# Patient Record
Sex: MALE | Race: BLACK OR AFRICAN AMERICAN | NOT HISPANIC OR LATINO | Employment: FULL TIME | URBAN - METROPOLITAN AREA
[De-identification: names, ages, dates, MRNs, and addresses within clinical notes are randomized per-mention and may not be internally consistent; named-entity substitution may affect disease eponyms.]

---

## 2017-01-03 ENCOUNTER — LAB VISIT (OUTPATIENT)
Dept: LAB | Facility: HOSPITAL | Age: 67
End: 2017-01-03
Attending: INTERNAL MEDICINE
Payer: COMMERCIAL

## 2017-01-03 DIAGNOSIS — E78.5 HYPERLIPIDEMIA, UNSPECIFIED HYPERLIPIDEMIA TYPE: Chronic | ICD-10-CM

## 2017-01-03 LAB
ALBUMIN SERPL BCP-MCNC: 3.9 G/DL
ALP SERPL-CCNC: 56 U/L
ALT SERPL W/O P-5'-P-CCNC: 58 U/L
ANION GAP SERPL CALC-SCNC: 8 MMOL/L
AST SERPL-CCNC: 32 U/L
BILIRUB SERPL-MCNC: 0.4 MG/DL
BUN SERPL-MCNC: 17 MG/DL
CALCIUM SERPL-MCNC: 9.8 MG/DL
CHLORIDE SERPL-SCNC: 106 MMOL/L
CHOLEST/HDLC SERPL: 4.3 {RATIO}
CO2 SERPL-SCNC: 26 MMOL/L
CREAT SERPL-MCNC: 1.4 MG/DL
EST. GFR  (AFRICAN AMERICAN): >60 ML/MIN/1.73 M^2
EST. GFR  (NON AFRICAN AMERICAN): 52.7 ML/MIN/1.73 M^2
GLUCOSE SERPL-MCNC: 160 MG/DL
HDL/CHOLESTEROL RATIO: 23.3 %
HDLC SERPL-MCNC: 163 MG/DL
HDLC SERPL-MCNC: 38 MG/DL
LDLC SERPL CALC-MCNC: 104.2 MG/DL
NONHDLC SERPL-MCNC: 125 MG/DL
POTASSIUM SERPL-SCNC: 4.4 MMOL/L
PROT SERPL-MCNC: 7.8 G/DL
SODIUM SERPL-SCNC: 140 MMOL/L
TRIGL SERPL-MCNC: 104 MG/DL

## 2017-01-03 PROCEDURE — 80061 LIPID PANEL: CPT

## 2017-01-03 PROCEDURE — 80053 COMPREHEN METABOLIC PANEL: CPT

## 2017-01-03 PROCEDURE — 36415 COLL VENOUS BLD VENIPUNCTURE: CPT | Mod: PO

## 2017-01-09 RX ORDER — FLUTICASONE PROPIONATE 50 MCG
SPRAY, SUSPENSION (ML) NASAL
Qty: 3 BOTTLE | Refills: 3 | Status: SHIPPED | OUTPATIENT
Start: 2017-01-09 | End: 2017-12-11 | Stop reason: SDUPTHER

## 2017-01-12 ENCOUNTER — OFFICE VISIT (OUTPATIENT)
Dept: INTERNAL MEDICINE | Facility: CLINIC | Age: 67
End: 2017-01-12
Payer: COMMERCIAL

## 2017-01-12 ENCOUNTER — LAB VISIT (OUTPATIENT)
Dept: LAB | Facility: HOSPITAL | Age: 67
End: 2017-01-12
Attending: INTERNAL MEDICINE
Payer: COMMERCIAL

## 2017-01-12 VITALS
SYSTOLIC BLOOD PRESSURE: 130 MMHG | TEMPERATURE: 97 F | BODY MASS INDEX: 35.44 KG/M2 | DIASTOLIC BLOOD PRESSURE: 82 MMHG | HEIGHT: 70 IN | WEIGHT: 247.56 LBS | HEART RATE: 75 BPM | OXYGEN SATURATION: 98 %

## 2017-01-12 DIAGNOSIS — E66.9 OBESITY, UNSPECIFIED OBESITY SEVERITY, UNSPECIFIED OBESITY TYPE: ICD-10-CM

## 2017-01-12 DIAGNOSIS — I10 ESSENTIAL HYPERTENSION: ICD-10-CM

## 2017-01-12 DIAGNOSIS — E78.5 HYPERLIPIDEMIA, UNSPECIFIED HYPERLIPIDEMIA TYPE: Chronic | ICD-10-CM

## 2017-01-12 PROCEDURE — 83036 HEMOGLOBIN GLYCOSYLATED A1C: CPT

## 2017-01-12 PROCEDURE — 99999 PR PBB SHADOW E&M-EST. PATIENT-LVL III: CPT | Mod: PBBFAC,,, | Performed by: INTERNAL MEDICINE

## 2017-01-12 PROCEDURE — 1159F MED LIST DOCD IN RCRD: CPT | Mod: S$GLB,,, | Performed by: INTERNAL MEDICINE

## 2017-01-12 PROCEDURE — 3079F DIAST BP 80-89 MM HG: CPT | Mod: S$GLB,,, | Performed by: INTERNAL MEDICINE

## 2017-01-12 PROCEDURE — 4010F ACE/ARB THERAPY RXD/TAKEN: CPT | Mod: S$GLB,,, | Performed by: INTERNAL MEDICINE

## 2017-01-12 PROCEDURE — 99214 OFFICE O/P EST MOD 30 MIN: CPT | Mod: S$GLB,,, | Performed by: INTERNAL MEDICINE

## 2017-01-12 PROCEDURE — 3046F HEMOGLOBIN A1C LEVEL >9.0%: CPT | Mod: S$GLB,,, | Performed by: INTERNAL MEDICINE

## 2017-01-12 PROCEDURE — 2022F DILAT RTA XM EVC RTNOPTHY: CPT | Mod: S$GLB,,, | Performed by: INTERNAL MEDICINE

## 2017-01-12 PROCEDURE — 36415 COLL VENOUS BLD VENIPUNCTURE: CPT | Mod: PO

## 2017-01-12 PROCEDURE — 3075F SYST BP GE 130 - 139MM HG: CPT | Mod: S$GLB,,, | Performed by: INTERNAL MEDICINE

## 2017-01-12 RX ORDER — ROSUVASTATIN CALCIUM 10 MG/1
10 TABLET, COATED ORAL DAILY
Qty: 90 TABLET | Refills: 1 | Status: SHIPPED | OUTPATIENT
Start: 2017-01-12 | End: 2017-04-18 | Stop reason: SDUPTHER

## 2017-01-12 RX ORDER — GLIMEPIRIDE 4 MG/1
4 TABLET ORAL
Qty: 90 TABLET | Refills: 0 | Status: SHIPPED | OUTPATIENT
Start: 2017-01-12 | End: 2017-02-22 | Stop reason: SDUPTHER

## 2017-01-12 NOTE — PATIENT INSTRUCTIONS
Increase trulicity to 1.5 mg weekly    Decrease amaryl to 4mg once daily    Stop pravastatin    Start crestor one tablet daily

## 2017-01-12 NOTE — PROGRESS NOTES
"Subjective:      Patient ID: Anup Miller is a 66 y.o. male.    Chief Complaint: Follow-up    HPI Comments: 67 yo with Patient Active Problem List:     Type II or unspecified type diabetes mellitus without mention of complication, uncontrolled     Elevated liver enzymes     HTN (hypertension)     Hyperlipidemia     Multiple pulmonary nodules determined by computed tomography of lung     Hinton's esophagus     KHOI on CPAP    Here today for management of hypertension, hyperlipidemia, and diabetes.  He is feeling well today without any new complaints.  He reports compliance with his medications without significant side effects.  He reports that his fasting glucose has improved and is now 92 to 130.  Nonfasting sugar is around 150.     Review of Systems   Constitutional: Negative for chills and fever.   HENT: Negative for ear pain and sore throat.    Respiratory: Negative for cough, shortness of breath and wheezing.    Cardiovascular: Negative for chest pain and palpitations.   Gastrointestinal: Negative for abdominal pain and blood in stool.   Genitourinary: Negative for dysuria and hematuria.   Neurological: Negative for seizures and syncope.     Objective:     Visit Vitals    /82 (BP Location: Right arm, Patient Position: Sitting)    Pulse 75    Temp 97.2 °F (36.2 °C) (Tympanic)    Ht 5' 9.75" (1.772 m)    Wt 112.3 kg (247 lb 9.2 oz)    SpO2 98%    BMI 35.78 kg/m2       Physical Exam   Constitutional: He is oriented to person, place, and time. He appears well-developed and well-nourished. No distress.   HENT:   Head: Normocephalic and atraumatic.   Mouth/Throat: Oropharynx is clear and moist.   Eyes: EOM are normal. Pupils are equal, round, and reactive to light.   Neck: Neck supple.   Cardiovascular: Normal rate and regular rhythm.    Pulmonary/Chest: Breath sounds normal. He has no wheezes. He has no rales.   Musculoskeletal: He exhibits no edema.   Neurological: He is alert and oriented to " person, place, and time.   Skin: Skin is warm and dry.   Psychiatric: He has a normal mood and affect. His behavior is normal.     Lab Visit on 01/03/2017   Component Date Value Ref Range Status    Sodium 01/03/2017 140  136 - 145 mmol/L Final    Potassium 01/03/2017 4.4  3.5 - 5.1 mmol/L Final    Chloride 01/03/2017 106  95 - 110 mmol/L Final    CO2 01/03/2017 26  23 - 29 mmol/L Final    Glucose 01/03/2017 160* 70 - 110 mg/dL Final    BUN, Bld 01/03/2017 17  8 - 23 mg/dL Final    Creatinine 01/03/2017 1.4  0.5 - 1.4 mg/dL Final    Calcium 01/03/2017 9.8  8.7 - 10.5 mg/dL Final    Total Protein 01/03/2017 7.8  6.0 - 8.4 g/dL Final    Albumin 01/03/2017 3.9  3.5 - 5.2 g/dL Final    Total Bilirubin 01/03/2017 0.4  0.1 - 1.0 mg/dL Final    Comment: For infants and newborns, interpretation of results should be based  on gestational age, weight and in agreement with clinical  observations.  Premature Infant recommended reference ranges:  Up to 24 hours.............<8.0 mg/dL  Up to 48 hours............<12.0 mg/dL  3-5 days..................<15.0 mg/dL  6-29 days.................<15.0 mg/dL      Alkaline Phosphatase 01/03/2017 56  55 - 135 U/L Final    AST 01/03/2017 32  10 - 40 U/L Final    ALT 01/03/2017 58* 10 - 44 U/L Final    Anion Gap 01/03/2017 8  8 - 16 mmol/L Final    eGFR if African American 01/03/2017 >60.0  >60 mL/min/1.73 m^2 Final    eGFR if non  01/03/2017 52.7* >60 mL/min/1.73 m^2 Final    Comment: Calculation used to obtain the estimated glomerular filtration  rate (eGFR) is the CKD-EPI equation. Since race is unknown   in our information system, the eGFR values for   -American and Non--American patients are given   for each creatinine result.      Cholesterol 01/03/2017 163  120 - 199 mg/dL Final    Comment: The National Cholesterol Education Program (NCEP) has set the  following guidelines (reference ranges) for  Cholesterol:  Optimal.....................<200 mg/dL  Borderline High.............200-239 mg/dL  High........................> or = 240 mg/dL      Triglycerides 01/03/2017 104  30 - 150 mg/dL Final    Comment: The National Cholesterol Education Program (NCEP) has set the  following guidelines (reference values) for triglycerides:  Normal......................<150 mg/dL  Borderline High.............150-199 mg/dL  High........................200-499 mg/dL      HDL 01/03/2017 38* 40 - 75 mg/dL Final    Comment: The National Cholesterol Education Program (NCEP) has set the  following guidelines (reference values) for HDL Cholesterol:  Low...............<40 mg/dL  Optimal...........>60 mg/dL      LDL Cholesterol 01/03/2017 104.2  63.0 - 159.0 mg/dL Final    Comment: The National Cholesterol Education Program (NCEP) has set the  following guidelines (reference values) for LDL Cholesterol:  Optimal.......................<130 mg/dL  Borderline High...............130-159 mg/dL  High..........................160-189 mg/dL  Very High.....................>190 mg/dL      HDL/Chol Ratio 01/03/2017 23.3  20.0 - 50.0 % Final    Total Cholesterol/HDL Ratio 01/03/2017 4.3  2.0 - 5.0 Final    Non-HDL Cholesterol 01/03/2017 125  mg/dL Final    Comment: Risk category and Non-HDL cholesterol goals:  Coronary heart disease (CHD)or equivalent (10-year risk of CHD >20%):  Non-HDL cholesterol goal     <130 mg/dL  Two or more CHD risk factors and 10-year risk of CHD <= 20%:  Non-HDL cholesterol goal     <160 mg/dL  0 to 1 CHD risk factor:  Non-HDL cholesterol goal     <190 mg/dL         Assessment:     1. Uncontrolled type 2 diabetes mellitus without complication, without long-term current use of insulin    2. Essential hypertension    3. Type II or unspecified type diabetes mellitus without mention of complication, uncontrolled    4. Hyperlipidemia, unspecified hyperlipidemia type    5. Obesity, unspecified obesity severity,  unspecified obesity type      Plan:   Uncontrolled type 2 diabetes mellitus without complication, without long-term current use of insulin  Improving.  Decrease amaryl since increasing trulicity   -     Hemoglobin A1c; Future; Expected date: 1/12/17  -     dulaglutide (TRULICITY) 1.5 mg/0.5 mL PnIj; Inject 1.5 mg into the skin every 7 days.  Dispense: 12 Syringe; Refill: 1  -     glimepiride (AMARYL) 4 MG tablet; Take 1 tablet (4 mg total) by mouth daily with breakfast.  Dispense: 90 tablet; Refill: 0    Essential hypertension  Controlled    Obesity  Improving  Cont d and e    Type II or unspecified type diabetes mellitus without mention of complication, uncontrolled    Hyperlipidemia, unspecified hyperlipidemia type  Not at goal  -     rosuvastatin (CRESTOR) 10 MG tablet; Take 1 tablet (10 mg total) by mouth once daily.  Dispense: 90 tablet; Refill: 1  -     Lipid panel; Future; Expected date: 4/12/17  -     ALT (SGPT); Future; Expected date: 4/12/17  -     ALT (SGPT); Future; Expected date: 2/12/17    Increase trulicity to 1.5 mg weekly    Decrease amaryl to 4mg once daily    Stop pravastatin    Start crestor one tablet daily      Lab Frequency Next Occurrence   Prostate Specific Antigen, Diagnostic Once 11/30/2015   Comprehensive metabolic panel Once 12/2/2015   Hemoglobin A1c Once 1/12/2017         Return in about 3 months (around 4/12/2017), or if symptoms worsen or fail to improve.

## 2017-01-12 NOTE — MR AVS SNAPSHOT
[facility]  [address] [place] [address]  Phone: [phone]  Fax: [phone]                  Anup Miller   2017 8:20 AM   Office Visit    Description:  Male : 1950   Provider:  Bryce Gonzales MD   Department:  [facility] Internal [facility]           Reason for Visit     Follow-up           Diagnoses this Visit        Comments    Uncontrolled type 2 diabetes mellitus without complication, without long-term current use of insulin    -  Primary     Essential hypertension         Type II or unspecified type diabetes mellitus without mention of complication, uncontrolled         Hyperlipidemia, unspecified hyperlipidemia type                To Do List           Future Appointments        Provider Department Dept Phone    2017 11:15 AM LAB, SAME DAY [place] [facility] [phone]    2017 8:00 AM Brady Cummins Jr., PA-C [facility] [phone]    2017 10:15 AM LABORATORY, [place] [facility] [phone]    4/3/2017 8:20 AM LABORATORY, [place] [facility] [phone]    4/10/2017 8:00 AM Bryce Gonzales MD [facility] [phone]      Goals (5 Years of Data)     None      Follow-Up and Disposition     Return in about 3 months (around 2017), or if symptoms worsen or fail to improve.       These Medications        Disp Refills Start End    dulaglutide (TRULICITY) 1.5 mg/0.5 mL PnIj 12 Syringe 1 2017     Inject 1.5 mg into the skin every 7 days. - Subcutaneous    Pharmacy: [facility] [address] Ph #: [phone]       glimepiride (AMARYL) 4 MG tablet 90 tablet 0 2017     Take 1 tablet (4 mg total) by mouth daily with breakfast. - Oral    Pharmacy: [facility] [address] Ph #: [phone]       rosuvastatin (CRESTOR) 10 MG tablet 90 tablet 1 2017     Take 1 tablet (10 mg total) by mouth once daily. - Oral    Pharmacy: [facility] [place]  [address] [facility] #: [phone]         [facility]     [facility] On Call Nurse Care Line - 24/7 Assistance  Registered nurses in the [facility] provide clinical advisement, health education, appointment booking, and other advisory services.  Call for this free service at [phone].             Medications           Message regarding Medications     Verify the changes and/or additions to your medication regime listed below are the same as discussed with your clinician today.  If any of these changes or additions are incorrect, please notify your healthcare provider.        START taking these NEW medications        Refills    dulaglutide (TRULICITY) 1.5 mg/0.5 mL PnIj 1    Sig: Inject 1.5 mg into the skin every 7 days.    Class: Normal    Route: Subcutaneous    rosuvastatin (CRESTOR) 10 MG tablet 1    Sig: Take 1 tablet (10 mg total) by mouth once daily.    Class: Normal    Route: Oral      CHANGE how you are taking these medications     Start Taking Instead of    glimepiride (AMARYL) 4 MG tablet glimepiride (AMARYL) 4 MG tablet    Dosage:  Take 1 tablet (4 mg total) by mouth daily with breakfast. Dosage:  Take 1 tablet (4 mg total) by mouth 2 (two) times daily with meals.    Reason for Change:  Reorder       STOP taking these medications     insulin glargine (LANTUS SOLOSTAR) 100 unit/mL (3 mL) InPn pen Inject 10 Units into the skin every evening.    dulaglutide 0.75 mg/0.5 mL PnIj Inject 0.75 mg into the skin every 7 days.    pravastatin (PRAVACHOL) 40 MG tablet Take 1 tablet (40 mg total) by mouth once daily.           Verify that the below list of medications is an accurate representation of the medications you are currently taking.  If none reported, the list may be blank. If incorrect, please contact your healthcare provider. Carry this list with you in case of emergency.     "       Current Medications     blood sugar diagnostic (BLOOD GLUCOSE TEST) Strp 1 strip by Misc.(Non-Drug; Combo Route) route 2 (two) times daily. One touch ultra    fluticasone (FLONASE) 50 mcg/actuation nasal spray USE 2 SPRAYS IN EACH NOSTRIL ONCE DAILY.    glimepiride (AMARYL) 4 MG tablet Take 1 tablet (4 mg total) by mouth daily with breakfast.    hydrochlorothiazide (HYDRODIURIL) 25 MG tablet Take 1 tablet (25 mg total) by mouth once daily.    LANCETS MISC 1 lancet by Misc.(Non-Drug; Combo Route) route 2 (two) times daily. One touch Ultra    losartan (COZAAR) 50 MG tablet Take 1 tablet (50 mg total) by mouth once daily.    metformin (GLUCOPHAGE) 500 MG tablet Take 1 tablet (500 mg total) by mouth 2 (two) times daily with meals.    multivitamin capsule Take 1 capsule by mouth once daily.    pantoprazole (PROTONIX) 40 MG tablet Take 1 tablet (40 mg total) by mouth once daily.    pen needle, diabetic (NOVOTWIST) 32 gauge x 1/5" Ndle Use as directed Lantus    XIIDRA 5 % Dpet Place 1 drop into both eyes 2 (two) times daily.    dulaglutide (TRULICITY) 1.5 mg/0.5 mL PnIj Inject 1.5 mg into the skin every 7 days.    rosuvastatin (CRESTOR) 10 MG tablet Take 1 tablet (10 mg total) by mouth once daily.           Clinical Reference Information           Vital Signs - Last Recorded  Most recent update: 1/12/2017  9:34 AM by Gemma Staley MA    BP Pulse Temp Ht Wt SpO2    130/82 (BP Location: Right arm, Patient Position: Sitting) 75 97.2 °F (36.2 °C) (Tympanic) 5' 9.75" (1.772 m) 112.3 kg (247 lb 9.2 oz) 98%    BMI                35.78 kg/m2          Blood Pressure          Most Recent Value    BP  130/82      Allergies as of 1/12/2017     No Known Allergies      Immunizations Administered on Date of Encounter - 1/12/2017     None      Orders Placed During Today's Visit     Future Labs/Procedures Expected by Expires    Hemoglobin A1c  1/12/2017 1/12/2018    ALT (SGPT)  2/12/2017 3/13/2018    ALT (SGPT)  4/12/2017 " 3/13/2018    Lipid panel  4/12/2017 (Approximate) 5/12/2017      Instructions    Increase trulicity to 1.5 mg weekly    Decrease amaryl to 4mg once daily    Stop pravastatin    Start crestor one tablet daily

## 2017-01-13 LAB
ESTIMATED AVG GLUCOSE: 229 MG/DL
HBA1C MFR BLD HPLC: 9.6 %

## 2017-01-16 ENCOUNTER — OFFICE VISIT (OUTPATIENT)
Dept: PULMONOLOGY | Facility: CLINIC | Age: 67
End: 2017-01-16
Payer: COMMERCIAL

## 2017-01-16 VITALS
HEART RATE: 95 BPM | BODY MASS INDEX: 36.29 KG/M2 | OXYGEN SATURATION: 98 % | DIASTOLIC BLOOD PRESSURE: 62 MMHG | SYSTOLIC BLOOD PRESSURE: 124 MMHG | WEIGHT: 253.5 LBS | HEIGHT: 70 IN | RESPIRATION RATE: 18 BRPM

## 2017-01-16 DIAGNOSIS — G47.33 OSA (OBSTRUCTIVE SLEEP APNEA): Primary | ICD-10-CM

## 2017-01-16 PROCEDURE — 99214 OFFICE O/P EST MOD 30 MIN: CPT | Mod: S$GLB,,, | Performed by: NURSE PRACTITIONER

## 2017-01-16 PROCEDURE — 1159F MED LIST DOCD IN RCRD: CPT | Mod: S$GLB,,, | Performed by: NURSE PRACTITIONER

## 2017-01-16 PROCEDURE — 3074F SYST BP LT 130 MM HG: CPT | Mod: S$GLB,,, | Performed by: NURSE PRACTITIONER

## 2017-01-16 PROCEDURE — 3078F DIAST BP <80 MM HG: CPT | Mod: S$GLB,,, | Performed by: NURSE PRACTITIONER

## 2017-01-16 PROCEDURE — 99999 PR PBB SHADOW E&M-EST. PATIENT-LVL IV: CPT | Mod: PBBFAC,,, | Performed by: NURSE PRACTITIONER

## 2017-01-16 RX ORDER — ROSUVASTATIN CALCIUM 10 MG/1
TABLET, FILM COATED ORAL
COMMUNITY
Start: 2017-01-12 | End: 2017-04-18 | Stop reason: SDUPTHER

## 2017-01-16 NOTE — LETTER
January 16, 2017                   [facility] - [facility]  [address] [place] [address]  Phone: [phone]  Fax: [phone] January 16, 2017     Patient: Anup Miller   YOB: 1950   Date of Visit: 1/16/2017       To Whom It May Concern:    Anup Miller has sleep apnea. He is compliant with CPAP and has AHI less than 10/hr. He wears on average 7hr and 14min. No foreseen work restrictions related to his sleep apnea.    If you have any questions or concerns, please don't hesitate to call.    Sincerely,          Elizabeth Lejeune, NP

## 2017-01-19 ENCOUNTER — TELEPHONE (OUTPATIENT)
Dept: DIABETES | Facility: CLINIC | Age: 67
End: 2017-01-19

## 2017-01-19 NOTE — TELEPHONE ENCOUNTER
----- Message from Javed Kenny sent at 1/19/2017  7:46 AM CST -----  Zhou, wife, [phone], states the pt is running about 30 mins late./ She was advised that they may have to reschedule.

## 2017-02-06 RX ORDER — METFORMIN HYDROCHLORIDE 500 MG/1
500 TABLET ORAL 2 TIMES DAILY WITH MEALS
Qty: 180 TABLET | Refills: 1 | Status: SHIPPED | OUTPATIENT
Start: 2017-02-06 | End: 2017-06-08 | Stop reason: SDUPTHER

## 2017-02-13 ENCOUNTER — LAB VISIT (OUTPATIENT)
Dept: LAB | Facility: HOSPITAL | Age: 67
End: 2017-02-13
Attending: INTERNAL MEDICINE
Payer: COMMERCIAL

## 2017-02-13 DIAGNOSIS — E78.5 HYPERLIPIDEMIA, UNSPECIFIED HYPERLIPIDEMIA TYPE: Chronic | ICD-10-CM

## 2017-02-13 PROCEDURE — 84460 ALANINE AMINO (ALT) (SGPT): CPT

## 2017-02-13 PROCEDURE — 36415 COLL VENOUS BLD VENIPUNCTURE: CPT | Mod: PO

## 2017-02-14 LAB — ALT SERPL W/O P-5'-P-CCNC: 66 U/L

## 2017-02-22 RX ORDER — PANTOPRAZOLE SODIUM 40 MG/1
40 TABLET, DELAYED RELEASE ORAL DAILY
Qty: 90 TABLET | Refills: 0 | Status: SHIPPED | OUTPATIENT
Start: 2017-02-22 | End: 2017-05-31 | Stop reason: SDUPTHER

## 2017-02-22 RX ORDER — LOSARTAN POTASSIUM 50 MG/1
50 TABLET ORAL DAILY
Qty: 90 TABLET | Refills: 0 | Status: SHIPPED | OUTPATIENT
Start: 2017-02-22 | End: 2017-05-31 | Stop reason: SDUPTHER

## 2017-02-22 RX ORDER — GLIMEPIRIDE 4 MG/1
4 TABLET ORAL
Qty: 90 TABLET | Refills: 0 | Status: SHIPPED | OUTPATIENT
Start: 2017-02-22 | End: 2017-04-18 | Stop reason: SDUPTHER

## 2017-03-01 ENCOUNTER — OFFICE VISIT (OUTPATIENT)
Dept: DIABETES | Facility: CLINIC | Age: 67
End: 2017-03-01
Payer: COMMERCIAL

## 2017-03-01 VITALS
HEIGHT: 69 IN | SYSTOLIC BLOOD PRESSURE: 110 MMHG | DIASTOLIC BLOOD PRESSURE: 74 MMHG | BODY MASS INDEX: 37.88 KG/M2 | WEIGHT: 255.75 LBS

## 2017-03-01 DIAGNOSIS — E11.9 TYPE II OR UNSPECIFIED TYPE DIABETES MELLITUS WITHOUT MENTION OF COMPLICATION, NOT STATED AS UNCONTROLLED: Primary | ICD-10-CM

## 2017-03-01 PROCEDURE — 3078F DIAST BP <80 MM HG: CPT | Mod: S$GLB,,, | Performed by: PHYSICIAN ASSISTANT

## 2017-03-01 PROCEDURE — 99214 OFFICE O/P EST MOD 30 MIN: CPT | Mod: S$GLB,,, | Performed by: PHYSICIAN ASSISTANT

## 2017-03-01 PROCEDURE — 2022F DILAT RTA XM EVC RTNOPTHY: CPT | Mod: S$GLB,,, | Performed by: PHYSICIAN ASSISTANT

## 2017-03-01 PROCEDURE — 3074F SYST BP LT 130 MM HG: CPT | Mod: S$GLB,,, | Performed by: PHYSICIAN ASSISTANT

## 2017-03-01 PROCEDURE — 3046F HEMOGLOBIN A1C LEVEL >9.0%: CPT | Mod: S$GLB,,, | Performed by: PHYSICIAN ASSISTANT

## 2017-03-01 PROCEDURE — 1160F RVW MEDS BY RX/DR IN RCRD: CPT | Mod: S$GLB,,, | Performed by: PHYSICIAN ASSISTANT

## 2017-03-01 PROCEDURE — 4010F ACE/ARB THERAPY RXD/TAKEN: CPT | Mod: S$GLB,,, | Performed by: PHYSICIAN ASSISTANT

## 2017-03-01 PROCEDURE — 99999 PR PBB SHADOW E&M-EST. PATIENT-LVL III: CPT | Mod: PBBFAC,,, | Performed by: PHYSICIAN ASSISTANT

## 2017-03-01 NOTE — LETTER
March 5, 2017      Bryce Gonzales MD  [address] [place] [address]           [facility] - Diabetes [facility]  [address] [place] [address]  Phone: [phone]  Fax: [phone]          Patient: Anup Miller   MR Number: [id number]   YOB: 1950   Date of Visit: 3/1/2017       Dear Dr. Bryce Gonzales:    Thank you for referring Anup Miller to me for evaluation. Attached you will find relevant portions of my assessment and plan of care.    If you have questions, please do not hesitate to call me. I look forward to following Anup Miller along with you.    Sincerely,    Brady Cummins Jr., ENRIQUE    Enclosure  CC:  No Recipients    If you would like to receive this communication electronically, please contact [email or web address] or [phone] to request more information on [employer] Link access.    For providers and/or their staff who would like to refer a patient to [facility], please contact us through our one-stop-shop provider referral line, [facility], at [phone].    If you feel you have received this communication in error or would no longer like to receive these types of communications, please e-mail [email or web address]

## 2017-03-05 NOTE — PROGRESS NOTES
PCP: Bryce Gonzales MD    Subjective:      HISTORY OF PRESENT ILLNESS:   Anup Miller Is a pleasant 66 y.o. Black or  male, he is in clinic today to establish care for diabetes mellitus. He was diagnosed as a type II diabetic in 2007. He is to be enrolled in diabetes education classes.     His comorbid conditions are, Diabetes Type 2, Hypertension, Hyperlipidemia, Fatty Liver disease and Obesity     He has the following diabetic complications, without complications    Most recent   Lab Results   Component Value Date    HGBA1C 9.6 (H) 01/12/2017    ADA recommends less than 7.0. He  has gained 2 pounds since last visit.    Blood glucose testing is performed sporadically. Per recall, fasting BGs are , post meal blood sugars are not taken. He denies polyuria, polydipsia, polyphagia, or blurred vision. Also denies nausea, vomiting, diarrhea, fever or hypoglycemic symptoms. He has not had any recent hospitalizations or significant hypoglycemia involving EMS or requiring assistance from others.     Blood Glucose reading this AM: not taken mg/dl fasting.  Blood Glucose reading in clinic:     Past Medical History:   Diagnosis Date    Diabetes mellitus type II     Elevated liver enzymes     Fatty liver disease, nonalcoholic     Hyperlipidemia     Hypertension      Family History   Problem Relation Age of Onset    Asthma Mother     Cancer Sister      Breast    Diabetes Paternal Aunt     Cancer Maternal Grandmother      Breast    Diabetes Maternal Grandmother     Diabetes Paternal Uncle     Colon cancer Neg Hx      Social History     Social History    Marital status:      Spouse name: yoselin    Number of children: 1    Years of education: N/A     Occupational History          Social History Main Topics    Smoking status: Never Smoker    Smokeless tobacco: Never Used    Alcohol use No    Drug use: No    Sexual activity: Yes     Partners: Female     Other Topics  Concern    Not on file     Social History Narrative       DM MEDICATIONS:   Current Outpatient Prescriptions:     blood sugar diagnostic (BLOOD GLUCOSE TEST) Strp, 1 strip by Misc.(Non-Drug; Combo Route) route 2 (two) times daily. One touch ultra, Disp: , Rfl:     CRESTOR 10 mg tablet, , Disp: , Rfl:     dulaglutide (TRULICITY) 1.5 mg/0.5 mL PnIj, Inject 1.5 mg into the skin every 7 days., Disp: 12 Syringe, Rfl: 1    fluticasone (FLONASE) 50 mcg/actuation nasal spray, USE 2 SPRAYS IN EACH NOSTRIL ONCE DAILY., Disp: 3 Bottle, Rfl: 3    glimepiride (AMARYL) 4 MG tablet, Take 1 tablet (4 mg total) by mouth daily with breakfast., Disp: 90 tablet, Rfl: 0    hydrochlorothiazide (HYDRODIURIL) 25 MG tablet, Take 1 tablet (25 mg total) by mouth once daily., Disp: 90 tablet, Rfl: 3    LANCETS MISC, 1 lancet by Misc.(Non-Drug; Combo Route) route 2 (two) times daily. One touch Ultra, Disp: , Rfl:     losartan (COZAAR) 50 MG tablet, Take 1 tablet (50 mg total) by mouth once daily., Disp: 90 tablet, Rfl: 0    metformin (GLUCOPHAGE) 500 MG tablet, Take 1 tablet (500 mg total) by mouth 2 (two) times daily with meals. (Patient taking differently: Take 500 mg by mouth daily with breakfast. ), Disp: 180 tablet, Rfl: 1    multivitamin capsule, Take 1 capsule by mouth once daily., Disp: , Rfl:     pantoprazole (PROTONIX) 40 MG tablet, Take 1 tablet (40 mg total) by mouth once daily., Disp: 90 tablet, Rfl: 0    rosuvastatin (CRESTOR) 10 MG tablet, Take 1 tablet (10 mg total) by mouth once daily., Disp: 90 tablet, Rfl: 1    XIIDRA 5 % Dpet, Place 1 drop into both eyes 2 (two) times daily. As needed, Disp: , Rfl: 1    Anup is compliant most of the time with DM medications.     Labs Reviewed.  Lab Results   Component Value Date    WBC 5.26 11/14/2016    HGB 13.5 (L) 11/14/2016    HCT 41.9 11/14/2016     11/14/2016    CHOL 163 01/03/2017    TRIG 104 01/03/2017    HDL 38 (L) 01/03/2017    ALT 66 (H) 02/13/2017    AST  32 01/03/2017     01/03/2017    K 4.4 01/03/2017     01/03/2017    CREATININE 1.4 01/03/2017    ESTGFRAFRICA >60.0 01/03/2017    EGFRNONAA 52.7 (A) 01/03/2017    BUN 17 01/03/2017    CO2 26 01/03/2017    TSH 0.913 12/29/2014    PSA 5.8 (H) 11/14/2016    INR 1.1 11/30/2015     (H) 01/03/2017       Lab Results   Component Value Date    HGBA1C 9.6 (H) 01/12/2017    HGBA1C 10.9 (H) 11/14/2016    HGBA1C 8.8 (H) 06/14/2016     Lab Results   Component Value Date    TSH 0.913 12/29/2014     Lab Results   Component Value Date    IRON 126 11/30/2015    TIBC 521 (H) 11/30/2015    FERRITIN 247 11/30/2015     Lab Results   Component Value Date    CALCIUM 9.8 01/03/2017       Review of Systems   Constitutional: Negative.  Negative for activity change, appetite change, chills, diaphoresis, fatigue, fever and unexpected weight change.   HENT: Negative.  Negative for dental problem, facial swelling, hearing loss, nosebleeds, trouble swallowing and voice change.    Eyes: Negative.  Negative for photophobia, pain, discharge, redness, itching and visual disturbance.   Respiratory: Negative.  Negative for cough, choking, chest tightness, shortness of breath and wheezing.    Cardiovascular: Negative.  Negative for chest pain, palpitations and leg swelling.   Gastrointestinal: Negative.  Negative for abdominal distention, abdominal pain, blood in stool, constipation, diarrhea, nausea and vomiting.   Endocrine: Negative.  Negative for cold intolerance, heat intolerance, polydipsia, polyphagia and polyuria.   Genitourinary: Negative.  Negative for decreased urine volume, difficulty urinating, dysuria, frequency, scrotal swelling, testicular pain and urgency.   Musculoskeletal: Negative.  Negative for arthralgias, back pain, gait problem, joint swelling, myalgias, neck pain and neck stiffness.   Skin: Negative.  Negative for color change, pallor, rash and wound.   Allergic/Immunologic: Negative.  Negative for environmental  "allergies, food allergies and immunocompromised state.   Neurological: Negative.  Negative for dizziness, tremors, seizures, syncope, facial asymmetry, speech difficulty, weakness, light-headedness, numbness and headaches.   Hematological: Negative.  Negative for adenopathy. Does not bruise/bleed easily.   Psychiatric/Behavioral: Negative.  Negative for agitation, behavioral problems, confusion, decreased concentration, dysphoric mood, hallucinations, self-injury, sleep disturbance and suicidal ideas. The patient is not nervous/anxious and is not hyperactive.        Objective:   Last 3 sets of Vitals:  Vitals - 1 value per visit 1/12/2017 1/16/2017 3/1/2017   SYSTOLIC 130 124 110   DIASTOLIC 82 62 74   PULSE 75 95 -   TEMPERATURE 97.2 - -   RESPIRATIONS - 18 -   SPO2 98 98 -   Weight (lb) 247.58 253.53 255.73   HEIGHT 5' 9.75" 5' 9.75" 5' 9"   BODY MASS INDEX 35.78 36.64 37.77   VISIT REPORT - - -   Pain Score  0 0 -          Physical Exam   Constitutional: He is oriented to person, place, and time. He appears well-developed and well-nourished. He is cooperative.  Non-toxic appearance. He does not have a sickly appearance. He does not appear ill. No distress. He is not intubated.   HENT:   Head: Normocephalic and atraumatic. Not macrocephalic and not microcephalic. Head is without raccoon's eyes, without Keyes's sign, without abrasion, without contusion, without laceration, without right periorbital erythema and without left periorbital erythema. Hair is normal.   Right Ear: External ear normal. No lacerations. No drainage, swelling or tenderness. No foreign bodies. No mastoid tenderness. Tympanic membrane is not injected, not scarred, not perforated, not erythematous, not retracted and not bulging. Tympanic membrane mobility is normal. No middle ear effusion. No hemotympanum. No decreased hearing is noted.   Left Ear: External ear normal. No lacerations. No drainage, swelling or tenderness. No foreign bodies. No " mastoid tenderness. Tympanic membrane is not injected, not scarred, not perforated, not erythematous, not retracted and not bulging. Tympanic membrane mobility is normal.  No middle ear effusion. No hemotympanum. No decreased hearing is noted.   Nose: Nose normal.   Mouth/Throat: Oropharynx is clear and moist.   Eyes: EOM and lids are normal. Pupils are equal, round, and reactive to light. Right eye exhibits no chemosis, no discharge, no exudate and no hordeolum. No foreign body present in the right eye. Left eye exhibits no chemosis, no discharge, no exudate and no hordeolum. No foreign body present in the left eye. Right conjunctiva is not injected. Right conjunctiva has no hemorrhage. Left conjunctiva is not injected. Left conjunctiva has no hemorrhage. No scleral icterus. Right eye exhibits normal extraocular motion and no nystagmus. Left eye exhibits normal extraocular motion and no nystagmus. Right pupil is round and reactive. Left pupil is round and reactive. Pupils are equal.   Fundoscopic exam:       The right eye shows no arteriolar narrowing, no AV nicking, no exudate, no hemorrhage and no papilledema. The right eye shows red reflex and venous pulsations.        The left eye shows no arteriolar narrowing, no AV nicking, no exudate, no hemorrhage and no papilledema. The left eye shows red reflex and venous pulsations.   Neck: Normal range of motion, full passive range of motion without pain and phonation normal. Neck supple. Normal carotid pulses, no hepatojugular reflux and no JVD present. No tracheal tenderness, no spinous process tenderness and no muscular tenderness present. Carotid bruit is not present. No rigidity. No tracheal deviation, no edema, no erythema and normal range of motion present. No thyroid mass and no thyromegaly present.   Cardiovascular: Normal rate, regular rhythm, normal heart sounds and intact distal pulses.   No extrasystoles are present. PMI is not displaced.  Exam reveals no  gallop, no friction rub and no decreased pulses.    No murmur heard.  Pulses:       Carotid pulses are 2+ on the right side, and 2+ on the left side.       Radial pulses are 2+ on the right side, and 2+ on the left side.        Dorsalis pedis pulses are 2+ on the right side, and 2+ on the left side.        Posterior tibial pulses are 2+ on the right side, and 2+ on the left side.   Pulmonary/Chest: Effort normal and breath sounds normal. No accessory muscle usage or stridor. No apnea, no tachypnea and no bradypnea. He is not intubated. No respiratory distress. He has no decreased breath sounds. He has no wheezes. He has no rhonchi. He has no rales. He exhibits no tenderness.   Abdominal: Soft. Bowel sounds are normal. He exhibits no shifting dullness, no distension, no pulsatile liver, no fluid wave, no abdominal bruit, no ascites, no pulsatile midline mass and no mass. There is no hepatosplenomegaly, splenomegaly or hepatomegaly. There is no tenderness. There is no rigidity, no rebound, no guarding, no CVA tenderness and no tenderness at McBurney's point. No hernia. Hernia confirmed negative in the ventral area.   Musculoskeletal: Normal range of motion. He exhibits no edema or tenderness.        Right foot: There is normal range of motion and no deformity.        Left foot: There is normal range of motion and no deformity.   Feet:   Right Foot:   Protective Sensation: 6 sites tested. 6 sites sensed.   Skin Integrity: Negative for ulcer, blister, skin breakdown, erythema, warmth, callus or dry skin.   Left Foot:   Protective Sensation: 6 sites tested. 6 sites sensed.   Skin Integrity: Negative for ulcer, blister, skin breakdown, erythema, warmth, callus or dry skin.   Lymphadenopathy:        Head (right side): No submental, no submandibular, no tonsillar, no preauricular, no posterior auricular and no occipital adenopathy present.        Head (left side): No submental, no submandibular, no tonsillar, no  preauricular, no posterior auricular and no occipital adenopathy present.     He has no cervical adenopathy.        Right cervical: No superficial cervical, no deep cervical and no posterior cervical adenopathy present.       Left cervical: No superficial cervical, no deep cervical and no posterior cervical adenopathy present.   Neurological: He is alert and oriented to person, place, and time. He has normal reflexes. He displays no atrophy and no tremor. No cranial nerve deficit or sensory deficit. He exhibits normal muscle tone. He displays no seizure activity. Coordination and gait normal.   Reflex Scores:       Bicep reflexes are 2+ on the right side and 2+ on the left side.       Brachioradialis reflexes are 2+ on the right side and 2+ on the left side.       Patellar reflexes are 2+ on the right side and 2+ on the left side.  Skin: Skin is warm and dry. No rash noted. No erythema. No pallor.   Psychiatric: He has a normal mood and affect. His mood appears not anxious. His affect is not angry, not blunt, not labile and not inappropriate. His speech is not rapid and/or pressured, not delayed, not tangential and not slurred. He is not agitated, not aggressive, not hyperactive, not slowed, not withdrawn, not actively hallucinating and not combative. Thought content is not paranoid and not delusional. Cognition and memory are not impaired. He does not express impulsivity or inappropriate judgment. He does not exhibit a depressed mood. He expresses no homicidal and no suicidal ideation. He expresses no suicidal plans and no homicidal plans. He is communicative. He exhibits normal recent memory and normal remote memory. He is attentive.         STANDARDS OF CARE:  Eye doctor: Dr. JAIME Parson, last exam 2015.  Dental exam: Recommend regular exams; denies gums bleeding.  Podiatry doctor:    ACTIVITY LEVEL: He exercises rarely.  MEAL PLANNING: Number of meals per day - 3. Number of snacks per day - 2.  Per dietary recall,  patient is not limiting carbohydrates, saturated fats and sodium.   BLOOD GLUCOSE TESTING: Self-monitoring with   SOCIAL HISTORY: . Lives with spouse. ;  no tobacco use    Assessment:     EDUCATIONAL ASSESSMENT:  1. Impediments in learning class environment - NONE.  2. Needs improvement in self-care management skills.    1. Type II or unspecified type diabetes mellitus without mention of complication, uncontrolled      Plan:     Anup Miller is seen today for   1. Type II or unspecified type diabetes mellitus without mention of complication, uncontrolled      We have discussed the etiology and treatment options associated with the diagnosis as well as alternatives. He has elected the following treatments.     Type II or unspecified type diabetes mellitus without mention of complication, uncontrolled  -     POCT glucose  - Discussed recommendations and regulations for Commercial Drivers with Diabetes. To include checking blood sugar before driving, maintaining good glycemic control and avoiding hypoglycemia.    1.) Patient was instructed to monitor blood glucose twice daily, fasting, post meal and ac dinner or at bedtime. Reminded to bring BG records or meter to each visit for review.  2.) Reviewed pathophysiology of type 2 diabetes, complications related to the disease, importance of annual dilated eye exam and self daily foot examination.  3.) Continue medications as prescribed Trulicity and Amaryl. [facility] or Phone review in 1 week with BG records for adjustment of medication.  4.) Reviewed carb counting, portion control, importance of spacing meals throughout the day to prevent post prandial elevations. Recommended low saturated fat, low sodium diet to aid in control of hypertension and cholesterol.  5.) Discussed activity, benefits, methods, and precautions. Recommended patient start/continue some form of exercise and increase as tolerated to 60 minutes per day to facilitate  weight loss and aid in control of BGs. Also reminded patient of WHO recommendation of 10,000 steps daily as a goal.   6.) A1C, TSH, Lipid Panel, CMP with eGFR and Micro/Creatinine per ADA protocol.  7.) Return to clinic in 3 months for follow up. Advised patient to call clinic with any questions or concerns.     A total of 60 minutes was spent in face to face time, of which 50 % was spent in counseling patient on disease process, complications, treatment, and side effects of medications.    The patient was explained the above plan and given opportunity to ask questions. He understands, chooses and consents to this plan and accepts all the risks, which include but are not limited to the risks mentioned above. He understands the alternative of having no testing, interventions or treatments at this time. He left content and without further questions.

## 2017-04-03 ENCOUNTER — LAB VISIT (OUTPATIENT)
Dept: LAB | Facility: HOSPITAL | Age: 67
End: 2017-04-03
Attending: INTERNAL MEDICINE
Payer: COMMERCIAL

## 2017-04-03 DIAGNOSIS — E11.9 TYPE II OR UNSPECIFIED TYPE DIABETES MELLITUS WITHOUT MENTION OF COMPLICATION, NOT STATED AS UNCONTROLLED: ICD-10-CM

## 2017-04-03 DIAGNOSIS — E78.5 HYPERLIPIDEMIA, UNSPECIFIED HYPERLIPIDEMIA TYPE: Chronic | ICD-10-CM

## 2017-04-03 LAB
ALT SERPL W/O P-5'-P-CCNC: 54 U/L
CHOLEST/HDLC SERPL: 3 {RATIO}
HDL/CHOLESTEROL RATIO: 33.3 %
HDLC SERPL-MCNC: 33 MG/DL
HDLC SERPL-MCNC: 99 MG/DL
LDLC SERPL CALC-MCNC: 48 MG/DL
NONHDLC SERPL-MCNC: 66 MG/DL
TRIGL SERPL-MCNC: 90 MG/DL

## 2017-04-03 PROCEDURE — 83036 HEMOGLOBIN GLYCOSYLATED A1C: CPT

## 2017-04-03 PROCEDURE — 84681 ASSAY OF C-PEPTIDE: CPT

## 2017-04-03 PROCEDURE — 80061 LIPID PANEL: CPT

## 2017-04-03 PROCEDURE — 36415 COLL VENOUS BLD VENIPUNCTURE: CPT | Mod: PO

## 2017-04-03 PROCEDURE — 84460 ALANINE AMINO (ALT) (SGPT): CPT

## 2017-04-03 PROCEDURE — 86341 ISLET CELL ANTIBODY: CPT

## 2017-04-04 LAB
ESTIMATED AVG GLUCOSE: 186 MG/DL
HBA1C MFR BLD HPLC: 8.1 %

## 2017-04-05 LAB — GAD65 AB SER-SCNC: 0 NMOL/L

## 2017-04-06 LAB — C PEPTIDE SERPL-MCNC: 4.8 NG/ML

## 2017-04-18 ENCOUNTER — OFFICE VISIT (OUTPATIENT)
Dept: INTERNAL MEDICINE | Facility: CLINIC | Age: 67
End: 2017-04-18
Payer: COMMERCIAL

## 2017-04-18 VITALS
HEART RATE: 103 BPM | BODY MASS INDEX: 37.65 KG/M2 | OXYGEN SATURATION: 98 % | SYSTOLIC BLOOD PRESSURE: 122 MMHG | WEIGHT: 254.19 LBS | HEIGHT: 69 IN | TEMPERATURE: 97 F | DIASTOLIC BLOOD PRESSURE: 80 MMHG

## 2017-04-18 DIAGNOSIS — R74.8 ELEVATED LIVER ENZYMES: ICD-10-CM

## 2017-04-18 DIAGNOSIS — E78.5 HYPERLIPIDEMIA, UNSPECIFIED HYPERLIPIDEMIA TYPE: Chronic | ICD-10-CM

## 2017-04-18 DIAGNOSIS — I10 ESSENTIAL HYPERTENSION: Primary | ICD-10-CM

## 2017-04-18 DIAGNOSIS — Z23 NEED FOR PNEUMOCOCCAL VACCINATION: ICD-10-CM

## 2017-04-18 PROCEDURE — 99214 OFFICE O/P EST MOD 30 MIN: CPT | Mod: 25,S$GLB,, | Performed by: INTERNAL MEDICINE

## 2017-04-18 PROCEDURE — 4010F ACE/ARB THERAPY RXD/TAKEN: CPT | Mod: S$GLB,,, | Performed by: INTERNAL MEDICINE

## 2017-04-18 PROCEDURE — 3079F DIAST BP 80-89 MM HG: CPT | Mod: S$GLB,,, | Performed by: INTERNAL MEDICINE

## 2017-04-18 PROCEDURE — 99999 PR PBB SHADOW E&M-EST. PATIENT-LVL III: CPT | Mod: PBBFAC,,, | Performed by: INTERNAL MEDICINE

## 2017-04-18 PROCEDURE — 3074F SYST BP LT 130 MM HG: CPT | Mod: S$GLB,,, | Performed by: INTERNAL MEDICINE

## 2017-04-18 PROCEDURE — 1160F RVW MEDS BY RX/DR IN RCRD: CPT | Mod: S$GLB,,, | Performed by: INTERNAL MEDICINE

## 2017-04-18 PROCEDURE — 90471 IMMUNIZATION ADMIN: CPT | Mod: S$GLB,,, | Performed by: INTERNAL MEDICINE

## 2017-04-18 PROCEDURE — 90670 PCV13 VACCINE IM: CPT | Mod: S$GLB,,, | Performed by: INTERNAL MEDICINE

## 2017-04-18 PROCEDURE — 3045F PR MOST RECENT HEMOGLOBIN A1C LEVEL 7.0-9.0%: CPT | Mod: S$GLB,,, | Performed by: INTERNAL MEDICINE

## 2017-04-18 RX ORDER — ROSUVASTATIN CALCIUM 10 MG/1
10 TABLET, COATED ORAL DAILY
Qty: 90 TABLET | Refills: 3 | Status: SHIPPED | OUTPATIENT
Start: 2017-04-18 | End: 2017-09-06 | Stop reason: SDUPTHER

## 2017-04-18 RX ORDER — GLIMEPIRIDE 2 MG/1
2 TABLET ORAL
Qty: 90 TABLET | Refills: 0 | Status: SHIPPED | OUTPATIENT
Start: 2017-04-18 | End: 2017-09-06 | Stop reason: SDUPTHER

## 2017-04-18 NOTE — MR AVS SNAPSHOT
[facility]  [address] [facility] [place] [address]  Phone: [phone]  Fax: [phone]                  Anup Miller   2017 8:20 AM   Office Visit    Description:  Male : 1950   Provider:  Bryce Gonzales MD   Department:  [facility] - Internal Medicine           Reason for Visit     Follow-up           Diagnoses this Visit        Comments    Essential hypertension    -  Primary     Hyperlipidemia, unspecified hyperlipidemia type         Uncontrolled type 2 diabetes mellitus without complication, without long-term current use of insulin         Elevated liver enzymes         Need for pneumococcal vaccination                To Do List           Future Appointments        Provider Department Dept Phone    2017 9:00 AM Brady Cummins Jr., ENRIQUE [facility] - Diabetes Management [phone]    2017 9:05 AM LABORATORY, [facility] [phone]    2017 8:00 AM Bryce Gonzales MD [facility] [phone]    1/10/2018 11:00 AM Elizabeth Lejeune, NP [facility] [phone]      Goals (5 Years of Data)     None      Follow-Up and Disposition     Return in about 3 months (around 2017), or if symptoms worsen or fail to improve.       These Medications        Disp Refills Start End    rosuvastatin (CRESTOR) 10 MG tablet 90 tablet 3 2017    Take 1 tablet (10 mg total) by mouth once daily. - Oral    Pharmacy: [facility] [address] [place] [address] [address] [facility] & [facility] Ph #: [phone]       empagliflozin (JARDIANCE) 10 mg Tab 90 tablet 0 2017     Take 10 mg by mouth once daily. - Oral    Pharmacy: [facility] [address] [place], [place] [address] [address] [facility] AT [facility] & [facility] Ph #: [phone]       glimepiride (AMARYL) 2 MG tablet 90 tablet 0 2017     Take 1 tablet (2 mg total) by mouth daily with  breakfast. - Oral    [id number] [place]  [address] [facility] AT [facility] [address]  #: [phone]         [facility]     [facility] On Call Nurse Care Line - 24/7 Assistance  Unless otherwise directed by your provider, please contact [facility], our nurse care line that is available for 24/7 assistance.     Registered nurses in the [facility] provide: appointment scheduling, clinical advisement, health education, and other advisory services.  Call: [phone] (toll free)               Medications           Message regarding Medications     Verify the changes and/or additions to your medication regime listed below are the same as discussed with your clinician today.  If any of these changes or additions are incorrect, please notify your healthcare provider.        START taking these NEW medications        Refills    empagliflozin (JARDIANCE) 10 mg Tab 0    Sig: Take 10 mg by mouth once daily.    Class: Normal    Route: Oral      CHANGE how you are taking these medications     Start Taking Instead of    glimepiride (AMARYL) 2 MG tablet glimepiride (AMARYL) 4 MG tablet    Dosage:  Take 1 tablet (2 mg total) by mouth daily with breakfast. Dosage:  Take 1 tablet (4 mg total) by mouth daily with breakfast.    Reason for Change:  Reorder            Verify that the below list of medications is an accurate representation of the medications you are currently taking.  If none reported, the list may be blank. If incorrect, please contact your healthcare provider. Carry this list with you in case of emergency.           Current Medications     blood sugar diagnostic (BLOOD GLUCOSE TEST) Strp 1 strip by Misc.(Non-Drug; Combo Route) route 2 (two) times daily. One touch ultra    dulaglutide (TRULICITY) 1.5 mg/0.5 mL PnIj Inject 1.5 mg into the skin every 7 days.    fluticasone (FLONASE) 50 mcg/actuation nasal spray USE 2 SPRAYS IN EACH NOSTRIL  "ONCE DAILY.    glimepiride (AMARYL) 2 MG tablet Take 1 tablet (2 mg total) by mouth daily with breakfast.    hydrochlorothiazide (HYDRODIURIL) 25 MG tablet Take 1 tablet (25 mg total) by mouth once daily.    LANCETS MISC 1 lancet by Misc.(Non-Drug; Combo Route) route 2 (two) times daily. One touch Ultra    losartan (COZAAR) 50 MG tablet Take 1 tablet (50 mg total) by mouth once daily.    metformin (GLUCOPHAGE) 500 MG tablet Take 1 tablet (500 mg total) by mouth 2 (two) times daily with meals.    multivitamin capsule Take 1 capsule by mouth once daily.    pantoprazole (PROTONIX) 40 MG tablet Take 1 tablet (40 mg total) by mouth once daily.    rosuvastatin (CRESTOR) 10 MG tablet Take 1 tablet (10 mg total) by mouth once daily.    XIIDRA 5 % Dpet Place 1 drop into both eyes 2 (two) times daily. As needed    empagliflozin (JARDIANCE) 10 mg Tab Take 10 mg by mouth once daily.           Clinical Reference Information           Your Vitals Were     BP Pulse Temp Height Weight SpO2    122/80 (BP Location: Right arm, Patient Position: Sitting) 103 96.7 °F (35.9 °C) (Tympanic) 5' 9" (1.753 m) 115.3 kg (254 lb 3.1 oz) 98%    BMI                37.54 kg/m2          Blood Pressure          Most Recent Value    BP  122/80      Allergies as of 4/18/2017     No Known Allergies      Immunizations Administered on Date of Encounter - 4/18/2017     Name Date Dose VIS Date Route    Pneumococcal Conjugate - 13 Valent  Incomplete 0.5 mL 11/5/2015 Intramuscular      Orders Placed During Today's Visit      Normal Orders This Visit    Pneumococcal Conjugate Vaccine (13 Valent) (IM)     Future Labs/Procedures Expected by Expires    Hemoglobin A1c  7/17/2017 10/15/2017      Instructions    Decrease amaryl to 2mg with breakfast when you start jardiance.       Language Assistance Services     ATTENTION: Language assistance services are available, free of charge. Please call [phone].      ATENCIÓN: Si vilma davidson a durham disposición " servicios gratuitos de asistencia lingüística. Penny garcia [phone].     [facility] Ý: N?u b?n nói Ti?ng Vi?t, có các d?ch v? h? tr? ngôn ng? mi?n phí dành cho b?n. G?i s? [phone].         [facility] complies with applicable Federal civil rights laws and does not discriminate on the basis of race, color, national origin, age, disability, or sex.

## 2017-04-18 NOTE — PROGRESS NOTES
"Subjective:      Patient ID: Anup Miller is a 67 y.o. male.    Chief Complaint: Follow-up    HPI Comments: 68 yo with Patient Active Problem List:     Type II or unspecified type diabetes mellitus without mention of complication, uncontrolled     Elevated liver enzymes     HTN (hypertension)     Hyperlipidemia     Multiple pulmonary nodules determined by computed tomography of lung     Hinton's esophagus     KHOI on CPAP    Here today for management of mult med problems present for months to years. bp improving with meds. Home glucose improving with readings of 134 to 150.  Compliant with meds without significant side effects. Feeling well with increased energy.     Review of Systems   Constitutional: Negative for chills and fever.   HENT: Negative for ear pain and sore throat.    Respiratory: Negative for cough.    Cardiovascular: Negative for chest pain.   Gastrointestinal: Negative for abdominal pain and blood in stool.   Genitourinary: Negative for dysuria and hematuria.   Neurological: Negative for seizures and syncope.     Objective:   /80 (BP Location: Right arm, Patient Position: Sitting)  Pulse 103  Temp 96.7 °F (35.9 °C) (Tympanic)   Ht 5' 9" (1.753 m)  Wt 115.3 kg (254 lb 3.1 oz)  SpO2 98%  BMI 37.54 kg/m2    Physical Exam   Constitutional: He is oriented to person, place, and time. He appears well-developed and well-nourished. No distress.   HENT:   Head: Normocephalic and atraumatic.   Mouth/Throat: Oropharynx is clear and moist.   Eyes: EOM are normal. Pupils are equal, round, and reactive to light.   Neck: Neck supple. No thyromegaly present.   Cardiovascular: Normal rate and regular rhythm.    Pulmonary/Chest: Breath sounds normal. He has no wheezes. He has no rales.   Abdominal: Soft. Bowel sounds are normal. There is no tenderness.   Musculoskeletal: He exhibits no edema.   Lymphadenopathy:     He has no cervical adenopathy.   Neurological: He is alert and oriented to person, " place, and time.   Skin: Skin is warm and dry.   Psychiatric: He has a normal mood and affect. His behavior is normal.     No visits with results within 2 Week(s) from this visit.  Latest known visit with results is:    Lab Visit on 04/03/2017   Component Date Value Ref Range Status    Cholesterol 04/03/2017 99* 120 - 199 mg/dL Final    Comment: The National Cholesterol Education Program (NCEP) has set the  following guidelines (reference ranges) for Cholesterol:  Optimal.....................<200 mg/dL  Borderline High.............200-239 mg/dL  High........................> or = 240 mg/dL      Triglycerides 04/03/2017 90  30 - 150 mg/dL Final    Comment: The National Cholesterol Education Program (NCEP) has set the  following guidelines (reference values) for triglycerides:  Normal......................<150 mg/dL  Borderline High.............150-199 mg/dL  High........................200-499 mg/dL      HDL 04/03/2017 33* 40 - 75 mg/dL Final    Comment: The National Cholesterol Education Program (NCEP) has set the  following guidelines (reference values) for HDL Cholesterol:  Low...............<40 mg/dL  Optimal...........>60 mg/dL      LDL Cholesterol 04/03/2017 48.0* 63.0 - 159.0 mg/dL Final    Comment: The National Cholesterol Education Program (NCEP) has set the  following guidelines (reference values) for LDL Cholesterol:  Optimal.......................<130 mg/dL  Borderline High...............130-159 mg/dL  High..........................160-189 mg/dL  Very High.....................>190 mg/dL      HDL/Chol Ratio 04/03/2017 33.3  20.0 - 50.0 % Final    Total Cholesterol/HDL Ratio 04/03/2017 3.0  2.0 - 5.0 Final    Non-HDL Cholesterol 04/03/2017 66  mg/dL Final    Comment: Risk category and Non-HDL cholesterol goals:  Coronary heart disease (CHD)or equivalent (10-year risk of CHD >20%):  Non-HDL cholesterol goal     <130 mg/dL  Two or more CHD risk factors and 10-year risk of CHD <= 20%:  Non-HDL  cholesterol goal     <160 mg/dL  0 to 1 CHD risk factor:  Non-HDL cholesterol goal     <190 mg/dL      ALT 04/03/2017 54* 10 - 44 U/L Final    Hemoglobin A1C 04/03/2017 8.1* 4.5 - 6.2 % Final    Comment: According to ADA guidelines, hemoglobin A1C <7.0% represents  optimal control in non-pregnant diabetic patients.  Different  metrics may apply to specific populations.   Standards of Medical Care in Diabetes - 2016.  For the purpose of screening for the presence of diabetes:  <5.7%     Consistent with the absence of diabetes  5.7-6.4%  Consistent with increasing risk for diabetes   (prediabetes)  >or=6.5%  Consistent with diabetes  Currently no consensus exists for use of hemoglobin A1C  for diagnosis of diabetes for children.      Estimated Avg Glucose 04/03/2017 186* 68 - 131 mg/dL Final    Glutamic Acid Decarb Ab 04/03/2017 0.00  <=0.02 nmol/L Final    Comment: -------------------ADDITIONAL INFORMATION-------------------  This test was developed and its performance characteristics   determined by [facility] in a manner consistent with CLIA   requirements. This test has not been cleared or approved by   the U.S. Food and Drug Administration.  Test Performed by:  [facility] - [address] [address]      C-Peptide 04/03/2017 4.8  0.9 - 5.5 ng/mL Final       Assessment:     1. Essential hypertension    2. Hyperlipidemia, unspecified hyperlipidemia type    3. Uncontrolled type 2 diabetes mellitus without complication, without long-term current use of insulin    4. Elevated liver enzymes    5. Need for pneumococcal vaccination      Plan:   Essential hypertension  Controlled, continue current medications    Hyperlipidemia, unspecified hyperlipidemia type  Improved  Continue statin  -     rosuvastatin (CRESTOR) 10 MG tablet; Take 1 tablet (10 mg total) by mouth once daily.  Dispense: 90 tablet; Refill: 3    Uncontrolled type 2 diabetes mellitus without  complication, without long-term current use of insulin  Improved, but not at goal  Still has occasional hypoglycemic symptoms  We will try add Jardiance and decrease Amaryl to 2 mg once daily  -     Hemoglobin A1c; Future; Expected date: 7/17/17  -     empagliflozin (JARDIANCE) 10 mg Tab; Take 10 mg by mouth once daily.  Dispense: 90 tablet; Refill: 0  -     glimepiride (AMARYL) 2 MG tablet; Take 1 tablet (2 mg total) by mouth daily with breakfast.  Dispense: 90 tablet; Refill: 0    Elevated liver enzymes  Stable and mild  No worsening with addition of statin    Need for pneumococcal vaccination  -     Pneumococcal Conjugate Vaccine (13 Valent) (IM)      Decrease amaryl to 2mg with breakfast when you start jardiance.    Lab Frequency Next Occurrence   Hemoglobin A1c Once 7/17/2017         Return in about 3 months (around 7/18/2017), or if symptoms worsen or fail to improve.

## 2017-05-02 ENCOUNTER — TELEPHONE (OUTPATIENT)
Dept: INTERNAL MEDICINE | Facility: CLINIC | Age: 67
End: 2017-05-02

## 2017-05-02 NOTE — TELEPHONE ENCOUNTER
Received fax from insurance [employer]; Jardiance PA denied.  Pt must have documented intolerance or contraindication to Invokana, Invomet, or Invokamet XR.  Spoke with pt who stated he has tried and failed Invokana as prescribed by Dr. North.  Notified pt that I will try to appeal decision from insurance.  Pt verbalized understanding.

## 2017-05-08 NOTE — TELEPHONE ENCOUNTER
Received fax from insurance company.  PA on Jardiance has been approved from 5/8/17 thru 5/8/18.  Notified pt's wife who verbalized understanding.

## 2017-05-31 ENCOUNTER — OFFICE VISIT (OUTPATIENT)
Dept: DIABETES | Facility: CLINIC | Age: 67
End: 2017-05-31
Payer: COMMERCIAL

## 2017-05-31 VITALS
DIASTOLIC BLOOD PRESSURE: 78 MMHG | SYSTOLIC BLOOD PRESSURE: 128 MMHG | HEIGHT: 69 IN | WEIGHT: 250.44 LBS | BODY MASS INDEX: 37.09 KG/M2

## 2017-05-31 LAB — GLUCOSE SERPL-MCNC: 182 MG/DL (ref 70–110)

## 2017-05-31 PROCEDURE — 99214 OFFICE O/P EST MOD 30 MIN: CPT | Mod: S$GLB,,, | Performed by: PHYSICIAN ASSISTANT

## 2017-05-31 PROCEDURE — 99999 PR PBB SHADOW E&M-EST. PATIENT-LVL III: CPT | Mod: PBBFAC,,, | Performed by: PHYSICIAN ASSISTANT

## 2017-05-31 PROCEDURE — 3045F PR MOST RECENT HEMOGLOBIN A1C LEVEL 7.0-9.0%: CPT | Mod: S$GLB,,, | Performed by: PHYSICIAN ASSISTANT

## 2017-05-31 PROCEDURE — 4010F ACE/ARB THERAPY RXD/TAKEN: CPT | Mod: S$GLB,,, | Performed by: PHYSICIAN ASSISTANT

## 2017-05-31 PROCEDURE — 82948 REAGENT STRIP/BLOOD GLUCOSE: CPT | Mod: S$GLB,,, | Performed by: PHYSICIAN ASSISTANT

## 2017-05-31 RX ORDER — PANTOPRAZOLE SODIUM 40 MG/1
TABLET, DELAYED RELEASE ORAL
Qty: 90 TABLET | Refills: 1 | Status: SHIPPED | OUTPATIENT
Start: 2017-05-31 | End: 2018-04-25 | Stop reason: SDUPTHER

## 2017-05-31 RX ORDER — HYDROCHLOROTHIAZIDE 25 MG/1
25 TABLET ORAL DAILY
Qty: 90 TABLET | Refills: 3 | Status: SHIPPED | OUTPATIENT
Start: 2017-05-31 | End: 2018-07-07 | Stop reason: SDUPTHER

## 2017-05-31 RX ORDER — LOSARTAN POTASSIUM 50 MG/1
TABLET ORAL
Qty: 90 TABLET | Refills: 1 | Status: SHIPPED | OUTPATIENT
Start: 2017-05-31 | End: 2018-04-25 | Stop reason: SDUPTHER

## 2017-05-31 NOTE — PROGRESS NOTES
Subjective:      Patient ID: Anup Miller is a 67 y.o. male.    PCP: Bryce Gonzales MD      Anup Miller is a pleasant 67 y.o. male presenting to follow up on diabetes mellitus. He has had diabetes for 15 or more years. His last visit in Diabetes Management was 3/1/2017 Since that time he has had mild improvement in his glycemia. His A1c is down 1.5% from last visit.  His blood sugar range fasting has been 130 and 2 hour post meal has been 180-200, and he has been monitoring 1 times per day as directed. His current concerns are glycemic control.    He denies any hospital admissions, emergency room visits, hypoglycemia, syncope, diaphoresis, chest pain, or dyspnea.    He has lost 4 pounds since last visit. His BMI is 36.98    His blood sugar in the clinic today was:   Lab Results   Component Value Date    POCGLU 182 (A) 05/31/2017     We discussed the American diabetes Association recommendations:  hemoglobin A1c below 7.0%; all diabetics should be on statins unless contraindicated; one aspirin daily unless contraindicated; fasting blood sugar between 80 and 130 mg/dL; postprandial blood sugar below 180 mg/dl; prevention of hypoglycemia, may adjust goals to higher levels if persistent; ACE or ARB therapy if not contraindicated; and maintain in an ideal body weight with BMI below 25.    Anup is compliant most of the time with DM medications.     Anup is noncompliant some of the time with lifestyle modifications to include activity and meal planning.       STANDARDS OF CARE:  Eye doctor: Dr. Alex Pelayo, last exam 5/24/2017.  Dental exam: Recommend regular exams; denies gums bleeding.  Podiatry doctor:    ACTIVITY LEVEL: He exercises rarely.  MEAL PLANNING: Number of meals per day - 3. Number of snacks per day - 2.  Per dietary recall, patient is not limiting carbohydrates, saturated fats and sodium.   BLOOD GLUCOSE TESTING: Self-monitoring with   SOCIAL HISTORY: . Lives with spouse. [employer] Truck  ;  no tobacco use    The following results were reviewed with patient.    Lab Results   Component Value Date    WBC 5.26 11/14/2016    HGB 13.5 (L) 11/14/2016    HCT 41.9 11/14/2016     11/14/2016    CHOL 99 (L) 04/03/2017    TRIG 90 04/03/2017    HDL 33 (L) 04/03/2017    ALT 54 (H) 04/03/2017    AST 32 01/03/2017     01/03/2017    K 4.4 01/03/2017     01/03/2017    CREATININE 1.4 01/03/2017    ESTGFRAFRICA >60.0 01/03/2017    EGFRNONAA 52.7 (A) 01/03/2017    BUN 17 01/03/2017    CO2 26 01/03/2017    TSH 0.913 12/29/2014    PSA 5.8 (H) 11/14/2016    INR 1.1 11/30/2015     (H) 01/03/2017       Lab Results   Component Value Date    HGBA1C 8.1 (H) 04/03/2017    HGBA1C 9.6 (H) 01/12/2017    HGBA1C 10.9 (H) 11/14/2016       Lab Results   Component Value Date    GLUTAMICACID 0.00 04/03/2017    CPEPTIDE 4.8 04/03/2017     Lab Results   Component Value Date    TSH 0.913 12/29/2014     Lab Results   Component Value Date    IRON 126 11/30/2015    TIBC 521 (H) 11/30/2015    FERRITIN 247 11/30/2015     Lab Results   Component Value Date    CALCIUM 9.8 01/03/2017           Review of patient's allergies indicates:  No Known Allergies    Past Medical History:   Diagnosis Date    Diabetes mellitus type II Diagnosed 2002    Elevated liver enzymes     Fatty liver disease, nonalcoholic     Hyperlipidemia     Hypertension        Review of Systems   Constitutional: Negative.  Negative for activity change, appetite change, chills, diaphoresis, fatigue, fever and unexpected weight change.   HENT: Negative.  Negative for dental problem, facial swelling, hearing loss, nosebleeds, trouble swallowing and voice change.    Eyes: Negative.  Negative for photophobia, pain, discharge, redness, itching and visual disturbance.   Respiratory: Negative.  Negative for cough, choking, chest tightness, shortness of breath and wheezing.    Cardiovascular: Negative.  Negative for chest pain, palpitations and leg  "swelling.   Gastrointestinal: Negative.  Negative for abdominal distention, abdominal pain, blood in stool, constipation, diarrhea, nausea and vomiting.   Endocrine: Negative.  Negative for cold intolerance, heat intolerance, polydipsia, polyphagia and polyuria.   Genitourinary: Negative.  Negative for decreased urine volume, difficulty urinating, dysuria, frequency, scrotal swelling, testicular pain and urgency.   Musculoskeletal: Negative.  Negative for arthralgias, back pain, gait problem, joint swelling, myalgias, neck pain and neck stiffness.   Skin: Negative.  Negative for color change, pallor, rash and wound.   Allergic/Immunologic: Negative.  Negative for environmental allergies, food allergies and immunocompromised state.   Neurological: Negative.  Negative for dizziness, tremors, seizures, syncope, facial asymmetry, speech difficulty, weakness, light-headedness, numbness and headaches.   Hematological: Negative.  Negative for adenopathy. Does not bruise/bleed easily.   Psychiatric/Behavioral: Negative.  Negative for agitation, behavioral problems, confusion, decreased concentration, dysphoric mood, hallucinations, self-injury, sleep disturbance and suicidal ideas. The patient is not nervous/anxious and is not hyperactive.       Objective:     Vitals - 1 value per visit 3/1/2017 4/18/2017 5/31/2017   SYSTOLIC 110 122 128   DIASTOLIC 74 80 78   PULSE - 103 -   TEMPERATURE - 96.7 -   RESPIRATIONS - - -   SPO2 - 98 -   Weight (lb) 255.73 254.19 250.44   Weight (kg) 116 115.3 113.6   HEIGHT 5' 9" 5' 9" 5' 9"   BODY MASS INDEX 37.77 37.54 36.98   VISIT REPORT - - -   Pain Score  - 0 -       Physical Exam   Constitutional: He is oriented to person, place, and time. He appears well-developed and well-nourished. He is cooperative.  Non-toxic appearance. He does not have a sickly appearance. He does not appear ill. No distress. He is not intubated.   HENT:   Head: Normocephalic and atraumatic. Not macrocephalic and " not microcephalic. Head is without raccoon's eyes, without Keyes's sign, without abrasion, without contusion, without laceration, without right periorbital erythema and without left periorbital erythema. Hair is normal.   Right Ear: External ear normal. No lacerations. No drainage, swelling or tenderness. No foreign bodies. No mastoid tenderness. Tympanic membrane is not injected, not scarred, not perforated, not erythematous, not retracted and not bulging. Tympanic membrane mobility is normal. No middle ear effusion. No hemotympanum. No decreased hearing is noted.   Left Ear: External ear normal. No lacerations. No drainage, swelling or tenderness. No foreign bodies. No mastoid tenderness. Tympanic membrane is not injected, not scarred, not perforated, not erythematous, not retracted and not bulging. Tympanic membrane mobility is normal.  No middle ear effusion. No hemotympanum. No decreased hearing is noted.   Nose: Nose normal.   Mouth/Throat: Oropharynx is clear and moist.   Eyes: EOM and lids are normal. Pupils are equal, round, and reactive to light. Right eye exhibits no chemosis, no discharge, no exudate and no hordeolum. No foreign body present in the right eye. Left eye exhibits no chemosis, no discharge, no exudate and no hordeolum. No foreign body present in the left eye. Right conjunctiva is not injected. Right conjunctiva has no hemorrhage. Left conjunctiva is not injected. Left conjunctiva has no hemorrhage. No scleral icterus. Right eye exhibits normal extraocular motion and no nystagmus. Left eye exhibits normal extraocular motion and no nystagmus. Right pupil is round and reactive. Left pupil is round and reactive. Pupils are equal.   Fundoscopic exam:       The right eye shows no arteriolar narrowing, no AV nicking, no exudate, no hemorrhage and no papilledema. The right eye shows red reflex and venous pulsations.        The left eye shows no arteriolar narrowing, no AV nicking, no exudate, no  hemorrhage and no papilledema. The left eye shows red reflex and venous pulsations.   Neck: Normal range of motion, full passive range of motion without pain and phonation normal. Neck supple. Normal carotid pulses, no hepatojugular reflux and no JVD present. No tracheal tenderness, no spinous process tenderness and no muscular tenderness present. Carotid bruit is not present. No no neck rigidity. No tracheal deviation, no edema, no erythema and normal range of motion present. No thyroid mass and no thyromegaly present.   Cardiovascular: Normal rate, regular rhythm, normal heart sounds and intact distal pulses.   No extrasystoles are present. PMI is not displaced.  Exam reveals no gallop, no friction rub and no decreased pulses.    No murmur heard.  Pulses:       Carotid pulses are 2+ on the right side, and 2+ on the left side.       Radial pulses are 2+ on the right side, and 2+ on the left side.        Dorsalis pedis pulses are 2+ on the right side, and 2+ on the left side.        Posterior tibial pulses are 2+ on the right side, and 2+ on the left side.   Pulmonary/Chest: Effort normal and breath sounds normal. No accessory muscle usage or stridor. No apnea, no tachypnea and no bradypnea. He is not intubated. No respiratory distress. He has no decreased breath sounds. He has no wheezes. He has no rhonchi. He has no rales. He exhibits no tenderness.   Abdominal: Soft. Bowel sounds are normal. He exhibits no shifting dullness, no distension, no pulsatile liver, no fluid wave, no abdominal bruit, no ascites, no pulsatile midline mass and no mass. There is no hepatosplenomegaly, splenomegaly or hepatomegaly. There is no tenderness. There is no rigidity, no rebound, no guarding, no CVA tenderness and no tenderness at McBurney's point. No hernia. Hernia confirmed negative in the ventral area.   Musculoskeletal: Normal range of motion. He exhibits no edema or tenderness.        Right foot: There is normal range of  motion and no deformity.        Left foot: There is normal range of motion and no deformity.   Feet:   Right Foot:   Protective Sensation: 6 sites tested. 6 sites sensed.   Skin Integrity: Negative for ulcer, blister, skin breakdown, erythema, warmth, callus or dry skin.   Left Foot:   Protective Sensation: 6 sites tested. 6 sites sensed.   Skin Integrity: Negative for ulcer, blister, skin breakdown, erythema, warmth, callus or dry skin.   Lymphadenopathy:        Head (right side): No submental, no submandibular, no tonsillar, no preauricular, no posterior auricular and no occipital adenopathy present.        Head (left side): No submental, no submandibular, no tonsillar, no preauricular, no posterior auricular and no occipital adenopathy present.     He has no cervical adenopathy.        Right cervical: No superficial cervical, no deep cervical and no posterior cervical adenopathy present.       Left cervical: No superficial cervical, no deep cervical and no posterior cervical adenopathy present.   Neurological: He is alert and oriented to person, place, and time. He has normal reflexes. He displays no atrophy and no tremor. No cranial nerve deficit or sensory deficit. He exhibits normal muscle tone. He displays no seizure activity. Coordination and gait normal.   Reflex Scores:       Bicep reflexes are 2+ on the right side and 2+ on the left side.       Brachioradialis reflexes are 2+ on the right side and 2+ on the left side.       Patellar reflexes are 2+ on the right side and 2+ on the left side.  Skin: Skin is warm and dry. No rash noted. No erythema. No pallor.   Psychiatric: He has a normal mood and affect. His mood appears not anxious. His affect is not angry, not blunt, not labile and not inappropriate. His speech is not rapid and/or pressured, not delayed, not tangential and not slurred. He is not agitated, not aggressive, not hyperactive, not slowed, not withdrawn, not actively hallucinating and not  combative. Thought content is not paranoid and not delusional. Cognition and memory are not impaired. He does not express impulsivity or inappropriate judgment. He does not exhibit a depressed mood. He expresses no homicidal and no suicidal ideation. He expresses no suicidal plans and no homicidal plans. He is communicative. He exhibits normal recent memory and normal remote memory. He is attentive.     Assessment:     1. Uncontrolled type 2 diabetes mellitus without complication, without long-term current use of insulin       Plan:   Anup Miller is seen today for   1. Uncontrolled type 2 diabetes mellitus without complication, without long-term current use of insulin      We have discussed the etiology and treatment options associated with the diagnosis as well as alternatives. He has elected the following treatments.     Uncontrolled type 2 diabetes mellitus without complication, without long-term current use of insulin   - Advised his to continue to monitor portion size as well as restrict his carbohydrates   - Continue with current medication regime.        1.) Patient was instructed to monitor blood glucose twice daily, fasting, and 2 hour post meal; if on Multiple Daily Injections (MDI) he will need to have pre-meal blood glucose as well. Reminded to bring BG meter or record to each visit for review.  2.) Reviewed pathophysiology of type 2 diabetes, complications related to the disease, importance of annual dilated eye exam and self daily foot examination.  3.) Continue medications as prescribed Trulicity; glimepiride; Jardiance; Metformin. [facility] MyChart or Phone review in 1 week with BG records for adjustment of medication.  4.) Reviewed carb counting, portion control, importance of spacing meals throughout the day to prevent post prandial elevations. Recommended low saturated fat, low sodium diet to aid in control of hypertension and cholesterol.  5.) Discussed activity, benefits, methods, and  precautions. Recommended patient start/continue some form of exercise and increase as tolerated to 60 minutes per day to facilitate weight loss and aid in control of BGs. Also reminded patient of WHO recommendation of 10,000 steps daily as a goal.   6.) A1C, TSH, Lipid Panel, CMP with eGFR and Micro/Creatinine per ADA protocol.  7.) Return to clinic in 3 months for follow up. Advised patient to call clinic with any questions or concerns.    A total of 40 minutes was spent in face to face time, of which 50 % was spent in counseling patient on disease process, complications, treatment, and side effects of medications.    The patient was explained the above plan and given opportunity to ask questions.  He understands, chooses and consents to this plan and accepts all the risks, which include but are not limited to the risks mentioned above.   He understands the alternative of having no testing, interventions or treatments at this time. He left content and without further questions.

## 2017-06-08 RX ORDER — METFORMIN HYDROCHLORIDE 500 MG/1
TABLET ORAL
Qty: 180 TABLET | Refills: 0 | Status: SHIPPED | OUTPATIENT
Start: 2017-06-08 | End: 2017-12-11 | Stop reason: SDUPTHER

## 2017-07-05 ENCOUNTER — PATIENT OUTREACH (OUTPATIENT)
Dept: ADMINISTRATIVE | Facility: HOSPITAL | Age: 67
End: 2017-07-05

## 2017-07-18 ENCOUNTER — LAB VISIT (OUTPATIENT)
Dept: LAB | Facility: HOSPITAL | Age: 67
End: 2017-07-18
Attending: INTERNAL MEDICINE
Payer: COMMERCIAL

## 2017-07-18 ENCOUNTER — OFFICE VISIT (OUTPATIENT)
Dept: INTERNAL MEDICINE | Facility: CLINIC | Age: 67
End: 2017-07-18
Payer: COMMERCIAL

## 2017-07-18 VITALS
OXYGEN SATURATION: 98 % | HEIGHT: 69 IN | BODY MASS INDEX: 37.39 KG/M2 | TEMPERATURE: 97 F | WEIGHT: 252.44 LBS | DIASTOLIC BLOOD PRESSURE: 78 MMHG | SYSTOLIC BLOOD PRESSURE: 118 MMHG | HEART RATE: 92 BPM

## 2017-07-18 DIAGNOSIS — K63.5 POLYP OF COLON, UNSPECIFIED PART OF COLON, UNSPECIFIED TYPE: ICD-10-CM

## 2017-07-18 DIAGNOSIS — R74.8 ELEVATED LIVER ENZYMES: ICD-10-CM

## 2017-07-18 DIAGNOSIS — E78.5 HYPERLIPIDEMIA ASSOCIATED WITH TYPE 2 DIABETES MELLITUS: ICD-10-CM

## 2017-07-18 DIAGNOSIS — G47.33 OSA ON CPAP: ICD-10-CM

## 2017-07-18 DIAGNOSIS — E11.69 HYPERLIPIDEMIA ASSOCIATED WITH TYPE 2 DIABETES MELLITUS: ICD-10-CM

## 2017-07-18 DIAGNOSIS — E11.59 HYPERTENSION ASSOCIATED WITH DIABETES: ICD-10-CM

## 2017-07-18 DIAGNOSIS — I15.2 HYPERTENSION ASSOCIATED WITH DIABETES: ICD-10-CM

## 2017-07-18 LAB
ALBUMIN SERPL BCP-MCNC: 3.9 G/DL
ALP SERPL-CCNC: 47 U/L
ALT SERPL W/O P-5'-P-CCNC: 48 U/L
AST SERPL-CCNC: 31 U/L
BILIRUB DIRECT SERPL-MCNC: 0.2 MG/DL
BILIRUB SERPL-MCNC: 0.5 MG/DL
PROT SERPL-MCNC: 7.7 G/DL

## 2017-07-18 PROCEDURE — 80076 HEPATIC FUNCTION PANEL: CPT

## 2017-07-18 PROCEDURE — 4010F ACE/ARB THERAPY RXD/TAKEN: CPT | Mod: S$GLB,,, | Performed by: INTERNAL MEDICINE

## 2017-07-18 PROCEDURE — 83036 HEMOGLOBIN GLYCOSYLATED A1C: CPT

## 2017-07-18 PROCEDURE — 36415 COLL VENOUS BLD VENIPUNCTURE: CPT | Mod: PO

## 2017-07-18 PROCEDURE — 99999 PR PBB SHADOW E&M-EST. PATIENT-LVL V: CPT | Mod: PBBFAC,,, | Performed by: INTERNAL MEDICINE

## 2017-07-18 PROCEDURE — 3045F PR MOST RECENT HEMOGLOBIN A1C LEVEL 7.0-9.0%: CPT | Mod: S$GLB,,, | Performed by: INTERNAL MEDICINE

## 2017-07-18 PROCEDURE — 99214 OFFICE O/P EST MOD 30 MIN: CPT | Mod: S$GLB,,, | Performed by: INTERNAL MEDICINE

## 2017-07-18 RX ORDER — SODIUM, POTASSIUM,MAG SULFATES 17.5-3.13G
SOLUTION, RECONSTITUTED, ORAL ORAL
Qty: 354 ML | Refills: 0 | Status: SHIPPED | OUTPATIENT
Start: 2017-07-18 | End: 2017-12-13

## 2017-07-18 RX ORDER — SODIUM, POTASSIUM,MAG SULFATES 17.5-3.13G
SOLUTION, RECONSTITUTED, ORAL ORAL
Qty: 354 ML | Refills: 0 | Status: SHIPPED | OUTPATIENT
Start: 2017-07-18 | End: 2017-07-18 | Stop reason: SDUPTHER

## 2017-07-18 RX ORDER — LOTEPREDNOL ETABONATE 2 MG/ML
SUSPENSION/ DROPS OPHTHALMIC
Refills: 0 | COMMUNITY
Start: 2017-05-31 | End: 2017-07-18

## 2017-07-18 NOTE — PROGRESS NOTES
"Subjective:      Patient ID: Anup Miller is a 67 y.o. male.    Chief Complaint: Follow-up    68 yo with Patient Active Problem List:     Uncontrolled type 2 diabetes mellitus without complication, without long-term current use of insulin     Elevated liver enzymes     Hypertension associated with diabetes     Hyperlipidemia associated with type 2 diabetes mellitus     Multiple pulmonary nodules determined by computed tomography of lung     KHOI on CPAP    Here today for management of mult med problems as outlined below.  Compliant with meds without significant side effects. Home glucose improving. Usually 100-160. Some diet noncompliance.       Review of Systems   Constitutional: Negative for chills and fever.   HENT: Negative for ear pain and sore throat.    Respiratory: Negative for cough.    Cardiovascular: Negative for chest pain.   Gastrointestinal: Negative for abdominal pain and blood in stool.   Genitourinary: Negative for dysuria and hematuria.   Neurological: Negative for seizures and syncope.     Objective:   /78 (BP Location: Right arm, Patient Position: Sitting)   Pulse 92   Temp 96.5 °F (35.8 °C) (Tympanic)   Ht 5' 9" (1.753 m)   Wt 114.5 kg (252 lb 6.8 oz)   SpO2 98%   BMI 37.28 kg/m²     Physical Exam   Constitutional: He is oriented to person, place, and time. He appears well-developed and well-nourished. No distress.   HENT:   Head: Normocephalic and atraumatic.   Mouth/Throat: Oropharynx is clear and moist.   Eyes: EOM are normal. Pupils are equal, round, and reactive to light.   Neck: Neck supple. No thyromegaly present.   Cardiovascular: Normal rate and regular rhythm.    Pulmonary/Chest: Breath sounds normal. He has no wheezes. He has no rales.   Abdominal: Soft. Bowel sounds are normal. There is no tenderness.   Lymphadenopathy:     He has no cervical adenopathy.   Neurological: He is alert and oriented to person, place, and time.   Skin: Skin is warm and dry.   Psychiatric: " He has a normal mood and affect. His behavior is normal.     Lab Visit on 07/18/2017   Component Date Value Ref Range Status    Total Protein 07/18/2017 7.7  6.0 - 8.4 g/dL Final    Albumin 07/18/2017 3.9  3.5 - 5.2 g/dL Final    Total Bilirubin 07/18/2017 0.5  0.1 - 1.0 mg/dL Final    Comment: For infants and newborns, interpretation of results should be based  on gestational age, weight and in agreement with clinical  observations.  Premature Infant recommended reference ranges:  Up to 24 hours.............<8.0 mg/dL  Up to 48 hours............<12.0 mg/dL  3-5 days..................<15.0 mg/dL  6-29 days.................<15.0 mg/dL      Bilirubin, Direct 07/18/2017 0.2  0.1 - 0.3 mg/dL Final    AST 07/18/2017 31  10 - 40 U/L Final    ALT 07/18/2017 48* 10 - 44 U/L Final    Alkaline Phosphatase 07/18/2017 47* 55 - 135 U/L Final       Assessment:     1. Uncontrolled type 2 diabetes mellitus without complication, without long-term current use of insulin    2. Elevated liver enzymes    3. Hypertension associated with diabetes    4. Hyperlipidemia associated with type 2 diabetes mellitus    5. KHOI on CPAP    6. Polyp of colon, unspecified part of colon, unspecified type      Plan:   Uncontrolled type 2 diabetes mellitus without complication, without long-term current use of insulin  Comments:  complete a1c as prev ordered  plan to increase jardiance if a1c not at goal  Orders:  -     Hemoglobin A1c; Future; Expected date: 10/16/2017    Elevated liver enzymes  -     Hepatic function panel; Future; Expected date: 07/18/2017    Hypertension associated with diabetes  Comments:  controlled    Hyperlipidemia associated with type 2 diabetes mellitus  Comments:  controlled    KHOI on CPAP  Comments:  stable    Polyp of colon, unspecified part of colon, unspecified type  -     Discontinue: sodium,potassium,mag sulfates (SUPREP BOWEL PREP KIT) 17.5-3.13-1.6 gram SolR; Take as directed  Dispense: 354 mL; Refill: 0  -      Case request GI: COLONOSCOPY  -     sodium,potassium,mag sulfates (SUPREP BOWEL PREP KIT) 17.5-3.13-1.6 gram SolR; Take as directed  Dispense: 354 mL; Refill: 0    Other orders  -     Cancel: Case request GI: COLONOSCOPY        Lab Frequency Next Occurrence   Hemoglobin A1c Once 07/17/2017         Return in about 3 months (around 10/18/2017), or if symptoms worsen or fail to improve.

## 2017-07-19 ENCOUNTER — PATIENT MESSAGE (OUTPATIENT)
Dept: INTERNAL MEDICINE | Facility: CLINIC | Age: 67
End: 2017-07-19

## 2017-07-19 LAB
ESTIMATED AVG GLUCOSE: 177 MG/DL
HBA1C MFR BLD HPLC: 7.8 %

## 2017-07-19 NOTE — TELEPHONE ENCOUNTER
Good news.  Your A1c (diabetes number) continues to improve.  However it is not quite at goal.  I would like to increase your Jardiance to 25 mg as we discussed that your last appointment.  I have sent in this prescription to your pharmacy.  Please let me know if you have any problems with the medication.  Your liver enzymes are stable.

## 2017-07-19 NOTE — TELEPHONE ENCOUNTER
Spoke with pt's wife, Zhou, notified her that pt's A1c is improving but not quite at goal. Dr. Gonzales is increasing Jardiance to 25 mg as discussed at pt's last appointment. Medication was sent to pharmacy. Also, liver ezymes are stable. Pt's wife verbalized understanding.

## 2017-07-26 NOTE — TELEPHONE ENCOUNTER
----- Message from Javed Kenny sent at 7/26/2017  7:40 AM CDT -----  Contact: [phone]  Zhou, wife, is requesting to speak with the nurse in regards to a problem with the pt's medication. Please advise [phone] (home)

## 2017-07-26 NOTE — TELEPHONE ENCOUNTER
Spoke with pt's wife, Zhou, she states that pt's medication (Jardiance) was sent to [facility] instead of [facility]. Rquesting prescription to go to [facility] on file.  Spoke with Ed at [facility] and cancelled prescription for Jardiance.

## 2017-07-27 ENCOUNTER — CLINICAL SUPPORT (OUTPATIENT)
Dept: DIABETES | Facility: CLINIC | Age: 67
End: 2017-07-27
Payer: COMMERCIAL

## 2017-07-27 VITALS — HEIGHT: 69 IN | WEIGHT: 250.44 LBS | BODY MASS INDEX: 37.09 KG/M2

## 2017-07-27 PROCEDURE — G0108 DIAB MANAGE TRN  PER INDIV: HCPCS | Mod: S$GLB,,, | Performed by: DIETITIAN, REGISTERED

## 2017-07-27 PROCEDURE — 99999 PR PBB SHADOW E&M-EST. PATIENT-LVL III: CPT | Mod: PBBFAC,,, | Performed by: DIETITIAN, REGISTERED

## 2017-07-27 NOTE — PROGRESS NOTES
Diabetes Education  Author: Sandy Hinds RD, CDE  Date: 7/27/2017    Diabetes Education Visit  Diabetes Education Record Assessment/Progress: Initial    Diabetes Type  Diabetes Type : Type II    Diabetes History  Diabetes Diagnosis: >10 years (~15 yrs ago)    Nutrition  Meal Planning:  (8102-7131 cals/d w/ excess carb, fat and sodium due to irregular meals, portions. Pt not using MR products consistently but does like protein shake, premier protein.)    Monitoring   Monitoring: Other  Self Monitoring : Per recall, fst -150  Blood Glucose Logs: No    Exercise   Exercise Type:  (Irregular walking patterns.)    Current Diabetes Treatment   Current Treatment: Oral Medication, Diet, Exercise, Injectable (trulicity 1.5 mg weekly, jardiance 25 mg daily, amaryl 2 mg am dailyl, metformin 500mg twice daily.)    Social History  Preferred Learning Method: Face to Face  Primary Support: Self  Occupation: . .  Smoking Status: Never a Smoker  Alcohol Use: Never     Barriers to Change  Barriers to Change: None  Learning Challenges : None    Readiness to Learn   Readiness to Learn : Eager    Cultural Influences  Cultural Influences: No    Diabetes Education Assessment/Progress  Acute Complications (preventing, detecting, and treating acute complications): Discussion, Individual Session, Competent (verbalizes/demonstrates), Written Materials Provided  Chronic Complications (preventing, detecting, and treating chronic complications): Discussion, Individual Session, Competent (verbalizes/demonstrates), Written Materials Provided  Diabetes Disease Process (diabetes disease process and treatment options): Discussion, Individual Session, Competent (verbalizes/demonstrates), Written Materials Provided  Nutrition (Incorporating nutritional management into one's lifestyle): Discussion, Individual Session, Competent (verbalizes/demonstrates), Written Materials Provided  Physical Activity (incorporating physical  activity into one's lifestyle): Discussion, Individual Session, Competent (verbalizes/demonstrates), Written Materials Provided  Medications (states correct name, dose, onset, peak, duration, side effects & timing of meds): Discussion, Individual Session, Competent (verbalizes/demonstrates), Written Materials Provided  Monitoring (monitoring blood glucose/other parameters & using results): Discussion, Individual Session, Competent (verbalizes/demonstrates), Written Materials Provided  Goal Setting and Problem Solving (verbalizes behavior change strategies & sets realistic goals): Discussion, Individual Session, Competent (verbalizes/demonstrates), Written Materials Provided  Behavior Change (developing personal strategies to health & behavior change): Discussion, Individual Session, Comnpetent (verbalizes/demonstrates), Written Materials Provided  Psychosocial Issues (developing personal srategies to address psychosocial concerns): Discussion, Individual Session, Competent (verbalizes/demonstrates), Written Materials Provided    Goals  Healthy Eating: Set (use meal plan - 3meals/d using MR shakes/entrees (criteria, list ex provided))  Start Date: 07/27/17  Target Date: 10/27/17  Physical Activity: Set (150 min/wk - chair based (bands, weights, table bike) due to job)  Start Date: 07/27/17  Target Date: 10/27/17  Monitoring: Set (test bg 2x/d (fst, 2hr pp), call weekly for review)  Start Date: 07/27/17  Target Date: 10/27/17    Diabetes Self-Management Support Plan  Diabetes Learning: other  Other learning: rd/cde via mychart and 3mos fu  Review Status: Patient has selected and agrees to support plan.    Diabetes Care Plan/Intervention  Education Plan/Intervention: Individual Follow-Up DSMT, Endocrine Provider Visit Set Up    Diabetes Meal Plan  Restrictions: Low Fat, Low Sodium  Calories: 1800  Carbohydrate Per Meal: 30-45g  Carbohydrate Per Snack : 15-30g    Education Units of Time   Time Spent: 60 min      Health  Maintenance Topics with due status: Not Due       Topic Last Completion Date    TETANUS VACCINE 04/02/2013    Influenza Vaccine 11/01/2016    Lipid Panel 04/03/2017    Pneumococcal (65+) 04/18/2017    Eye Exam 05/24/2017    Foot Exam 05/31/2017    Hemoglobin A1c 07/18/2017     Health Maintenance Due   Topic Date Due    Colonoscopy  11/14/2016

## 2017-07-27 NOTE — LETTER
July 27, 2017    Bryce Gonzales MD  [address] [place] [address]     [facility]  [address] [place] [address]  Phone: [phone]  Fax: [phone]   Patient: Anup Miller   MR Number: [id number]   YOB: 1950   Date of Visit: 7/27/2017       Dear Dr. Gonzales:    Thank you for referring Anup Miller to me for evaluation. Below are the relevant portions of my assessment and plan of care.    If you have questions, please do not hesitate to call me. I look forward to following Anup along with you.    Sincerely,      Sandy Hinds, ABELINO, CDE           CC  Brady Cummins Jr., ENRIQUE

## 2017-09-06 DIAGNOSIS — E78.5 HYPERLIPIDEMIA, UNSPECIFIED HYPERLIPIDEMIA TYPE: Chronic | ICD-10-CM

## 2017-09-06 RX ORDER — ROSUVASTATIN CALCIUM 10 MG/1
TABLET, COATED ORAL
Qty: 90 TABLET | Refills: 0 | Status: SHIPPED | OUTPATIENT
Start: 2017-09-06 | End: 2017-12-11 | Stop reason: SDUPTHER

## 2017-09-06 RX ORDER — GLIMEPIRIDE 4 MG/1
TABLET ORAL
Qty: 90 TABLET | Refills: 0 | Status: SHIPPED | OUTPATIENT
Start: 2017-09-06 | End: 2017-12-11 | Stop reason: SDUPTHER

## 2017-09-19 ENCOUNTER — TELEPHONE (OUTPATIENT)
Dept: INTERNAL MEDICINE | Facility: CLINIC | Age: 67
End: 2017-09-19

## 2017-09-19 NOTE — TELEPHONE ENCOUNTER
Spoke with pt's wife, Zhou, who stated pt needs refill of Jardiance.  Notified pt's wife that pt should have med on file at pharmacy since a 3-month supply was prescribed on 7/26/17.  Zhou stated bottle showed that only 30 pills were given to pt.  Advised Zhou to take bottle to pharmacy and show them that only 30 pills were given when prescription was for 90 pills.  Zhou verbalized understanding.

## 2017-09-19 NOTE — TELEPHONE ENCOUNTER
----- Message from Javed Kenny sent at 9/19/2017  3:09 PM CDT -----  REFILLS:   Patient is requesting a medication refill.   RX name: lisaince   Strength:  10 mg  Directions: Take one tablet by mouth every day  Pharmacy name:   [facility] [address] [facility] [address] AT [facility] [address] [place] [address] [address]  Phone: [phone] Fax: [phone]    Phone number where pt can be reached: Zhou, wife, #[phone]

## 2017-10-17 ENCOUNTER — LAB VISIT (OUTPATIENT)
Dept: LAB | Facility: HOSPITAL | Age: 67
End: 2017-10-17
Attending: INTERNAL MEDICINE
Payer: COMMERCIAL

## 2017-10-17 PROCEDURE — 36415 COLL VENOUS BLD VENIPUNCTURE: CPT | Mod: PO

## 2017-10-17 PROCEDURE — 83036 HEMOGLOBIN GLYCOSYLATED A1C: CPT

## 2017-10-18 LAB
ESTIMATED AVG GLUCOSE: 166 MG/DL
HBA1C MFR BLD HPLC: 7.4 %

## 2017-11-30 ENCOUNTER — NUTRITION (OUTPATIENT)
Dept: DIABETES | Facility: CLINIC | Age: 67
End: 2017-11-30
Payer: COMMERCIAL

## 2017-11-30 VITALS — BODY MASS INDEX: 37.55 KG/M2 | HEIGHT: 69 IN | WEIGHT: 253.5 LBS

## 2017-11-30 PROCEDURE — 99999 PR PBB SHADOW E&M-EST. PATIENT-LVL III: CPT | Mod: PBBFAC,,, | Performed by: DIETITIAN, REGISTERED

## 2017-11-30 PROCEDURE — G0108 DIAB MANAGE TRN  PER INDIV: HCPCS | Mod: S$GLB,,, | Performed by: DIETITIAN, REGISTERED

## 2017-11-30 NOTE — PROGRESS NOTES
Diabetes Education  Author: Sandy Hinds RD, CDE  Date: 11/30/2017    Diabetes Education Visit  Diabetes Education Record Assessment/Progress: Post Program/Follow-up    Diabetes Type  Diabetes Type : Type II    Nutrition  Meal Planning:  (6248-7826 cals/d - excess cals from irregular meals, portions. )  Meal Plan 24 Hour Recall - Breakfast: 2 boiled eggs- water, occs coffee/tea  Meal Plan 24 Hour Recall - Lunch: gumbo (chix, saus, crab, turkey necks, gizzards)  Meal Plan 24 Hour Recall - Dinner: 3pc chix (bbq), crackers  Meal Plan 24 Hour Recall - Snack: honeybun (yesterday), generally not snacking; janene: water    Monitoring   Self Monitoring : Per recall, fst BG ; pm irregular testing under 166. Pt denies hypoglycemia.  Blood Glucose Logs: No    Exercise   Frequency: Never    Current Diabetes Treatment   Current Treatment:  (trulicity 1.5 mg weekly, jardiance 25 mg daily, amaryl 2 mg am daily, metformin 500mg twice daily)    Social History  Preferred Learning Method: Face to Face  Primary Support: Self    Barriers to Change  Barriers to Change: None  Learning Challenges : None    Diabetes Education Assessment/Progress  Diabetes Disease Process (diabetes disease process and treatment options): Discussion, Individual Session, Demonstrates Understanding/Competency(verbalizes/demonstrates)  Nutrition (Incorporating nutritional management into one's lifestyle): Discussion, Individual Session, Demonstrates Understanding/Competency (verbalizes/demonstrates)  Physical Activity (incorporating physical activity into one's lifestyle): Discussion, Individual Session, Demonstrates Understanding/Competency (verbalizes/demonstrates)  Medications (states correct name, dose, onset, peak, duration, side effects & timing of meds): Discussion, Individual Session, Demonstrates Understanding/Competency(verbalizes/demonstrates)  Monitoring (monitoring blood glucose/other parameters & using results): Discussion, Individual  Session, Demonstrates Understanding/Competency (verbalizes/demonstrates)  Acute Complications (preventing, detecting, and treating acute complications): Discussion, Individual Session, Demonstrates Understanding/Competency (verbalizes/demonstrates)  Chronic Complications (preventing, detecting, and treating chronic complications): Discussion, Individual Session, Demonstrates Understanding/Competency (verbalizes/demonstrates)  Clinical (diabetes and other pertinent medical history): Discussion, Individual Session, Demonstrates Understanding/Competency (verbalizes/demonstrates)  Cognitive (knowledge of self-management skills, functional health literacy): Discussion, Individual Session, Demonstrates Understanding/Competency (verbalizes/demonstrates)  Psychosocial (emotional response to diabetes): Discussion, Individual Session, Demonstrates Understanding/Competency (verbalizes/demonstrates)  Diabetes Distress and Support Systems: Discussion, Individual Session, Demonstrates Understanding/Competency (verbalizes/demonstrates)  Behavioral (readiness for change, lifestyle practices, self-care behaviors): Discussion, Individual Session, Demonstrates Understanding/Competency (verbalizes/demonstrates)    Goals  Patient has selected/evaluated goals during today's session: Yes, evaluated  Healthy Eating: Set (use meal plan - 3meals/d using MR shakes/entrees)  Met Percentage : 25%  Start Date: 11/30/17  Target Date: 02/28/18  Physical Activity: Set (150 min/wk - chair based (bands, weights, table bike) due to job)  Met Percentage : 0%  Start Date: 11/30/17  Target Date: 02/28/18  Monitoring: Set (test bg 2x/d (fst, 2hr pp), call weekly for review)  Met Percentage : 25%  Start Date: 11/30/17  Target Date: 02/28/18    Diabetes Self-Management Support Plan  Exercise/Nutrition: websites  Review Status: Patient has selected and agrees to support plan.    Diabetes Care Plan/Intervention  Education Plan/Intervention: Individual  Follow-Up DSMT, Endocrine Provider Visit Set Up    Diabetes Meal Plan  Restrictions: Low Fat, Low Sodium  Calories: 1800  Carbohydrate Per Meal: 30-45g  Carbohydrate Per Snack : 15-30g    Education Units of Time   Time Spent: 30 min      Health Maintenance Topics with due status: Not Due       Topic Last Completion Date    TETANUS VACCINE 04/02/2013    Lipid Panel 04/03/2017    Pneumococcal (65+) 04/18/2017    Eye Exam 05/24/2017    Foot Exam 05/31/2017    Hemoglobin A1c 10/17/2017     Health Maintenance Due   Topic Date Due    Colonoscopy  11/14/2016    Influenza Vaccine  08/01/2017

## 2017-11-30 NOTE — LETTER
November 30, 2017      Bryce Gonzales MD  [address] [place] [address]         Patient: Anup Miller   MR Number: [id number]   YOB: 1950   Date of Visit: 11/30/2017       Dear Dr. Gonzales:    Thank you for referring Anup for diabetes self-management education and support. He has completed all components of our Diabetes Management Program and his Self-Management Support Plan. Below is a summary of his clinical outcomes and goal progress.    Patient Outcomes:    A1c Status:   Lab Results   Component Value Date    HGBA1C 7.4 (H) 10/17/2017    HGBA1C 7.8 (H) 07/18/2017        A1C                     8.1                                                              4/17       A1C                     10.9                                                             11/16     Goals  Patient has selected/evaluated goals during today's session: Yes, evaluated  Healthy Eating: Set (use meal plan - 3meals/d using MR shakes/entrees)  Met Percentage : 25%  Start Date: 11/30/17  Target Date: 02/28/18  Physical Activity: Set (150 min/wk - chair based (bands, weights, table bike) due to job)  Met Percentage : 0%  Start Date: 11/30/17  Target Date: 02/28/18  Monitoring: Set (test bg 2x/d (fst, 2hr pp), call weekly for review)  Met Percentage : 25%  Start Date: 11/30/17  Target Date: 02/28/18    Diabetes Self-Management Support Plan  Exercise/Nutrition: websites  Review Status: Patient has selected and agrees to support plan.    Follow up:   · Anup to follow diabetes support plan indicated above  · Anup to attend medical appointments as scheduled  · Anup to update you on his DM education progress as needed      If you have questions, please do not hesitate to call me. I look forward to providing additional education and support as needed.    Sincerely,    Sandy Hinds, RD, CDE

## 2017-12-11 DIAGNOSIS — E78.5 HYPERLIPIDEMIA, UNSPECIFIED HYPERLIPIDEMIA TYPE: Chronic | ICD-10-CM

## 2017-12-11 RX ORDER — METFORMIN HYDROCHLORIDE 500 MG/1
TABLET ORAL
Qty: 180 TABLET | Refills: 0 | Status: SHIPPED | OUTPATIENT
Start: 2017-12-11 | End: 2018-02-06 | Stop reason: SDUPTHER

## 2017-12-11 RX ORDER — GLIMEPIRIDE 4 MG/1
TABLET ORAL
Qty: 90 TABLET | Refills: 0 | Status: SHIPPED | OUTPATIENT
Start: 2017-12-11 | End: 2018-02-06 | Stop reason: SDUPTHER

## 2017-12-11 RX ORDER — ROSUVASTATIN CALCIUM 10 MG/1
TABLET, COATED ORAL
Qty: 90 TABLET | Refills: 0 | Status: SHIPPED | OUTPATIENT
Start: 2017-12-11 | End: 2018-04-25 | Stop reason: SDUPTHER

## 2017-12-11 RX ORDER — FLUTICASONE PROPIONATE 50 MCG
SPRAY, SUSPENSION (ML) NASAL
Qty: 1 BOTTLE | Refills: 1 | Status: SHIPPED | OUTPATIENT
Start: 2017-12-11 | End: 2018-04-25 | Stop reason: SDUPTHER

## 2017-12-12 NOTE — TELEPHONE ENCOUNTER
Spoke with pt's wife, notified her that pt is due for appointment. She stated she would call patient and then call clinic back to schedule.

## 2017-12-13 ENCOUNTER — LAB VISIT (OUTPATIENT)
Dept: LAB | Facility: HOSPITAL | Age: 67
End: 2017-12-13
Attending: INTERNAL MEDICINE
Payer: COMMERCIAL

## 2017-12-13 ENCOUNTER — OFFICE VISIT (OUTPATIENT)
Dept: INTERNAL MEDICINE | Facility: CLINIC | Age: 67
End: 2017-12-13
Payer: COMMERCIAL

## 2017-12-13 VITALS
BODY MASS INDEX: 37.09 KG/M2 | OXYGEN SATURATION: 98 % | SYSTOLIC BLOOD PRESSURE: 116 MMHG | HEART RATE: 98 BPM | TEMPERATURE: 97 F | DIASTOLIC BLOOD PRESSURE: 78 MMHG | HEIGHT: 69 IN | WEIGHT: 250.44 LBS

## 2017-12-13 DIAGNOSIS — Z87.891 HISTORY OF SMOKING: ICD-10-CM

## 2017-12-13 DIAGNOSIS — R97.20 ELEVATED PSA: ICD-10-CM

## 2017-12-13 DIAGNOSIS — R91.8 MULTIPLE PULMONARY NODULES DETERMINED BY COMPUTED TOMOGRAPHY OF LUNG: ICD-10-CM

## 2017-12-13 DIAGNOSIS — Z00.00 ROUTINE GENERAL MEDICAL EXAMINATION AT A HEALTH CARE FACILITY: Primary | ICD-10-CM

## 2017-12-13 DIAGNOSIS — R74.8 ELEVATED LIVER ENZYMES: ICD-10-CM

## 2017-12-13 DIAGNOSIS — I15.2 HYPERTENSION ASSOCIATED WITH DIABETES: ICD-10-CM

## 2017-12-13 DIAGNOSIS — E11.59 HYPERTENSION ASSOCIATED WITH DIABETES: ICD-10-CM

## 2017-12-13 DIAGNOSIS — Z00.00 ROUTINE GENERAL MEDICAL EXAMINATION AT A HEALTH CARE FACILITY: ICD-10-CM

## 2017-12-13 LAB — COMPLEXED PSA SERPL-MCNC: 6.7 NG/ML

## 2017-12-13 PROCEDURE — 99397 PER PM REEVAL EST PAT 65+ YR: CPT | Mod: S$GLB,,, | Performed by: INTERNAL MEDICINE

## 2017-12-13 PROCEDURE — 36415 COLL VENOUS BLD VENIPUNCTURE: CPT | Mod: PO

## 2017-12-13 PROCEDURE — 99999 PR PBB SHADOW E&M-EST. PATIENT-LVL IV: CPT | Mod: PBBFAC,,, | Performed by: INTERNAL MEDICINE

## 2017-12-13 PROCEDURE — 84153 ASSAY OF PSA TOTAL: CPT

## 2017-12-13 RX ORDER — SODIUM, POTASSIUM,MAG SULFATES 17.5-3.13G
SOLUTION, RECONSTITUTED, ORAL ORAL
Qty: 354 ML | Refills: 0 | Status: SHIPPED | OUTPATIENT
Start: 2017-12-13 | End: 2018-04-30 | Stop reason: SDUPTHER

## 2017-12-13 RX ORDER — LOTEPREDNOL ETABONATE 2 MG/ML
1 SUSPENSION/ DROPS OPHTHALMIC 3 TIMES DAILY PRN
Status: ON HOLD | COMMUNITY
Start: 2017-09-18 | End: 2020-09-24 | Stop reason: HOSPADM

## 2017-12-13 NOTE — PROGRESS NOTES
"Subjective:      Patient ID: Anup Miller is a 67 y.o. male.    Chief Complaint: Follow-up    66 yo with Patient Active Problem List:     Uncontrolled type 2 diabetes mellitus without complication, without long-term current use of insulin     Elevated liver enzymes     Hypertension associated with diabetes     Hyperlipidemia associated with type 2 diabetes mellitus     Multiple pulmonary nodules determined by computed tomography of lung     KHOI on CPAP     Elevated PSA    Here today for annual prev exam.  He is feeling well in his usual state of health.  He reports compliance with his medications without significant side effects.  He was noncompliant with recommendations by pulmonology and urology for follow-up.  Home glucose 80s to 140s.  He quit smoking over 30 years ago.    quit smoking 30 years again.   Review of Systems   Constitutional: Negative for chills and fever.   HENT: Negative for ear pain and sore throat.    Respiratory: Negative for cough.    Cardiovascular: Negative for chest pain.   Gastrointestinal: Negative for abdominal pain and blood in stool.   Genitourinary: Negative for dysuria and hematuria.   Neurological: Negative for seizures and syncope.     Objective:   /78 (BP Location: Right arm, Patient Position: Sitting)   Pulse 98   Temp 96.5 °F (35.8 °C) (Tympanic)   Ht 5' 9" (1.753 m)   Wt 113.6 kg (250 lb 7.1 oz)   SpO2 98%   BMI 36.98 kg/m²     Physical Exam   Constitutional: He is oriented to person, place, and time. He appears well-developed and well-nourished. No distress.   HENT:   Head: Normocephalic and atraumatic.   Mouth/Throat: Oropharynx is clear and moist.   Eyes: EOM are normal. Pupils are equal, round, and reactive to light.   Neck: Neck supple. Carotid bruit is not present. No thyromegaly present.   Cardiovascular: Normal rate and regular rhythm.    Pulmonary/Chest: Effort normal and breath sounds normal. He has no wheezes. He has no rales.   Abdominal: Soft. " Bowel sounds are normal. There is no tenderness.   Musculoskeletal: He exhibits no edema.   Lymphadenopathy:     He has no cervical adenopathy.   Neurological: He is alert and oriented to person, place, and time.   Skin: Skin is warm and dry.   Psychiatric: He has a normal mood and affect. His behavior is normal.       Assessment:     1. Routine general medical examination at a health care facility    2. Uncontrolled type 2 diabetes mellitus without complication, without long-term current use of insulin    3. Elevated liver enzymes    4. Hypertension associated with diabetes    5. Multiple pulmonary nodules determined by computed tomography of lung    6. Elevated PSA    7. History of smoking      Plan:   Routine general medical examination at a health care facility  -     Comprehensive metabolic panel; Future; Expected date: 03/15/2018  -     PSA, Screening; Future; Expected date: 12/13/2017  -     Case request GI: COLONOSCOPY  -     sodium,potassium,mag sulfates (SUPREP BOWEL PREP KIT) 17.5-3.13-1.6 gram SolR; Take as directed  Dispense: 354 mL; Refill: 0    Uncontrolled type 2 diabetes mellitus without complication, without long-term current use of insulin  -     Hemoglobin A1c; Future; Expected date: 03/13/2018    Elevated liver enzymes  Stable, monitor    Hypertension associated with diabetes  Controlled    Multiple pulmonary nodules determined by computed tomography of lung    Elevated PSA  Recheck PSA    History of smoking  -     US Abdominal Aorta; Future; Expected date: 12/13/2017            Problem List Items Addressed This Visit        Pulmonary    Multiple pulmonary nodules determined by computed tomography of lung    Overview     Found 2013. Largest nodule 4.2mm. Pt has declined repeat scanning.             Cardiac/Vascular    Hypertension associated with diabetes       Renal/    Elevated PSA    Overview     Did not f/u with urology as rec in 2015            Endocrine    Uncontrolled type 2 diabetes  mellitus without complication, without long-term current use of insulin    Relevant Orders    Hemoglobin A1c       GI    Elevated liver enzymes      Other Visit Diagnoses     Routine general medical examination at a health care facility    -  Primary    Relevant Medications    sodium,potassium,mag sulfates (SUPREP BOWEL PREP KIT) 17.5-3.13-1.6 gram SolR    Other Relevant Orders    Comprehensive metabolic panel    PSA, Screening    Case request GI: COLONOSCOPY (Completed)    History of smoking        Relevant Orders    US Abdominal Aorta          Return in about 3 months (around 3/13/2018), or if symptoms worsen or fail to improve.

## 2017-12-18 ENCOUNTER — TELEPHONE (OUTPATIENT)
Dept: DIABETES | Facility: CLINIC | Age: 67
End: 2017-12-18

## 2017-12-18 NOTE — TELEPHONE ENCOUNTER
Returned patient's wife call regarding patient's request to be seen for feet exam and cpap check. Informed her that I can schedule him with a podiatrist for diabetic foot exam and informed her that patient would have to be scheduled with pulmonology for CPAP. Patient's wife stated that she will speak with patient and call back to schedule appointments.

## 2017-12-18 NOTE — TELEPHONE ENCOUNTER
----- Message from Eleni Zuluaga sent at 12/18/2017  2:35 PM CST -----  Contact: yoselin Miller wife, [phone]  Trying to make an appt for  to have his foot check and and cpap.

## 2017-12-20 ENCOUNTER — TELEPHONE (OUTPATIENT)
Dept: RADIOLOGY | Facility: HOSPITAL | Age: 67
End: 2017-12-20

## 2017-12-21 ENCOUNTER — HOSPITAL ENCOUNTER (OUTPATIENT)
Dept: RADIOLOGY | Facility: HOSPITAL | Age: 67
Discharge: HOME | End: 2017-12-21
Attending: INTERNAL MEDICINE
Payer: COMMERCIAL

## 2017-12-21 DIAGNOSIS — Z87.891 HISTORY OF SMOKING: ICD-10-CM

## 2017-12-21 PROCEDURE — 76775 US EXAM ABDO BACK WALL LIM: CPT | Mod: 26,,, | Performed by: RADIOLOGY

## 2017-12-21 PROCEDURE — 76775 US EXAM ABDO BACK WALL LIM: CPT | Mod: TC,PO

## 2018-01-16 ENCOUNTER — TELEPHONE (OUTPATIENT)
Dept: GASTROENTEROLOGY | Facility: CLINIC | Age: 68
End: 2018-01-16

## 2018-01-16 NOTE — TELEPHONE ENCOUNTER
----- Message from Dona Westbrook sent at 1/16/2018  9:46 AM CST -----  Contact: Pt's wife  She is calling in regards to wanting to cancel the procedure for the pt scheduled on 01/24/18. She can be reached at .[phone] (home)

## 2018-02-06 ENCOUNTER — OFFICE VISIT (OUTPATIENT)
Dept: DIABETES | Facility: CLINIC | Age: 68
End: 2018-02-06
Payer: COMMERCIAL

## 2018-02-06 VITALS
DIASTOLIC BLOOD PRESSURE: 82 MMHG | HEIGHT: 69 IN | BODY MASS INDEX: 38.27 KG/M2 | SYSTOLIC BLOOD PRESSURE: 134 MMHG | WEIGHT: 258.38 LBS

## 2018-02-06 LAB — GLUCOSE SERPL-MCNC: 227 MG/DL (ref 70–110)

## 2018-02-06 PROCEDURE — 82948 REAGENT STRIP/BLOOD GLUCOSE: CPT | Mod: S$GLB,,, | Performed by: PHYSICIAN ASSISTANT

## 2018-02-06 PROCEDURE — 99214 OFFICE O/P EST MOD 30 MIN: CPT | Mod: S$GLB,,, | Performed by: PHYSICIAN ASSISTANT

## 2018-02-06 PROCEDURE — 99999 PR PBB SHADOW E&M-EST. PATIENT-LVL III: CPT | Mod: PBBFAC,,, | Performed by: PHYSICIAN ASSISTANT

## 2018-02-06 PROCEDURE — 3008F BODY MASS INDEX DOCD: CPT | Mod: S$GLB,,, | Performed by: PHYSICIAN ASSISTANT

## 2018-02-06 RX ORDER — PIOGLITAZONEHYDROCHLORIDE 15 MG/1
15 TABLET ORAL DAILY
Qty: 30 TABLET | Refills: 11 | Status: SHIPPED | OUTPATIENT
Start: 2018-02-06 | End: 2019-02-05 | Stop reason: SDUPTHER

## 2018-02-06 RX ORDER — GLIMEPIRIDE 4 MG/1
8 TABLET ORAL DAILY
Qty: 180 TABLET | Refills: 2 | Status: SHIPPED | OUTPATIENT
Start: 2018-02-06 | End: 2019-02-05 | Stop reason: SDUPTHER

## 2018-02-06 RX ORDER — METFORMIN HYDROCHLORIDE 750 MG/1
750 TABLET, EXTENDED RELEASE ORAL 2 TIMES DAILY WITH MEALS
Qty: 180 TABLET | Refills: 3 | Status: SHIPPED | OUTPATIENT
Start: 2018-02-06 | End: 2018-12-04 | Stop reason: SDUPTHER

## 2018-02-06 NOTE — PROGRESS NOTES
Subjective:      Patient ID: Anup Miller is a 67 y.o. male.    PCP: Bryce Gonzales MD      Anup Miller is a pleasant 67 y.o. male presenting to follow up on Type 2 diabetes mellitus without complications and without insulin use. He has had diabetes for 15 or more years. His last visit in Diabetes Management was May 31, 2017.  Since that time he has had moderate improvement in his glycemia.  However, his medications became unaffordable and his insurance was not supplementing and the cause was well over $1200 a month for the Jardiance and trulicity.  He states that he could not afford that and has not been on it for several months.  Today his in clinic blood sugar has been running 227 and he states that's pretty much where it has been when he tests his at home.  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  If he could gain control of his diabetes with oral antidiabetic or GLP-1's it would be much more beneficial to him.  His current concerns are glycemic control.    He denies any hospital admissions, emergency room visits, hypoglycemia, syncope, diaphoresis, chest pain, or dyspnea.    He has gained 8 pounds since last visit. His BMI is  .    His blood sugar in the clinic today was:   Lab Results   Component Value Date    POCGLU 227 (A) 02/06/2018     We discussed the American diabetes Association recommendations:  hemoglobin A1c below 7.0%; all diabetics should be on statins unless contraindicated; one aspirin daily unless contraindicated; fasting blood sugar between 80 and 130 mg/dL; postprandial blood sugar below 180 mg/dl; prevention of hypoglycemia, may adjust goals to higher levels if persistent; ACE or ARB therapy if not contraindicated; and maintain in an ideal body weight with BMI below 25.    Anup is compliant most of the time with DM medications.     Anup is noncompliant some of the time with lifestyle modifications to  include activity and meal planning.       Current Outpatient Prescriptions:     ALREX 0.2 % DrpS, Place 1 drop into both eyes 3 (three) times daily as needed., Disp: , Rfl:     dulaglutide (TRULICITY) 1.5 mg/0.5 mL PnIj, Inject 1.5 mg into the skin every 7 days., Disp: 12 Syringe, Rfl: 1    empagliflozin (JARDIANCE) 25 mg Tab, Take 25 mg by mouth once daily., Disp: 90 tablet, Rfl: 1    fluticasone (FLONASE) 50 mcg/actuation nasal spray, USE 2 SPRAYS IN EACH NOSTRIL ONE TIME DAILY, Disp: 1 Bottle, Rfl: 1    glimepiride (AMARYL) 4 MG tablet, TAKE 1 TABLET BY MOUTH DAILY WITH BREAKFAST, Disp: 90 tablet, Rfl: 0    hydrochlorothiazide (HYDRODIURIL) 25 MG tablet, Take 1 tablet (25 mg total) by mouth once daily., Disp: 90 tablet, Rfl: 3    losartan (COZAAR) 50 MG tablet, TAKE 1 TABLET BY MOUTH ONE TIME DAILY, Disp: 90 tablet, Rfl: 1    metFORMIN (GLUCOPHAGE) 500 MG tablet, TAKE 1 TABLET BY MOUTH TWICE DAILY WITH MEALS, Disp: 180 tablet, Rfl: 0    multivitamin capsule, Take 1 capsule by mouth once daily., Disp: , Rfl:     pantoprazole (PROTONIX) 40 MG tablet, TAKE 1 TABLET BY MOUTH ONE TIME DAILY, Disp: 90 tablet, Rfl: 1    rosuvastatin (CRESTOR) 10 MG tablet, TAKE 1 TABLET BY MOUTH DAILY, Disp: 90 tablet, Rfl: 0    sodium,potassium,mag sulfates (SUPREP BOWEL PREP KIT) 17.5-3.13-1.6 gram SolR, Take as directed, Disp: 354 mL, Rfl: 0    XIIDRA 5 % Dpet, Place 1 drop into both eyes once daily. , Disp: , Rfl: 1    STANDARDS OF CARE:  Eye doctor: Dr. Alex Pelayo, last exam 5/24/2017.  Dental exam: Recommend regular exams; denies gums bleeding.  Podiatry doctor:     ACTIVITY LEVEL: He exercises rarely.  MEAL PLANNING: Number of meals per day - 3. Number of snacks per day - 2.  Per dietary recall, patient is not limiting carbohydrates, saturated fats and sodium.   BLOOD GLUCOSE TESTING: Self-monitoring with   ACE/ARB: Yes  Statin: Yes    Health Maintenance   Topic Date Due    Colonoscopy  11/14/2016    Lipid  Panel  04/03/2018    Hemoglobin A1c  04/17/2018    Pneumococcal (65+) (2 of 2 - PPSV23) 04/18/2018    Foot Exam  05/31/2018    Eye Exam  08/24/2018    TETANUS VACCINE  04/02/2023    Hepatitis C Screening  Completed    Zoster Vaccine  Completed    Influenza Vaccine  Completed         Diabetes Management Status    Statin: Taking  ACE/ARB: Taking    Screening or Prevention Patient's value Goal Complete/Controlled?   HgA1C Testing and Control   Lab Results   Component Value Date    HGBA1C 7.4 (H) 10/17/2017      Annually/Less than 8% Yes   Lipid profile : 04/03/2017 Annually Yes   LDL control Lab Results   Component Value Date    LDLCALC 48.0 (L) 04/03/2017    Annually/Less than 100 mg/dl  Yes   Nephropathy screening Lab Results   Component Value Date    LABMICR 3.0 08/03/2015     No results found for: PROTEINUA Annually No   Blood pressure BP Readings from Last 1 Encounters:   12/13/17 116/78    Less than 140/90 Yes   Dilated retinal exam : 08/24/2017 Annually Yes   Foot exam   : 05/31/2017 Annually Yes       The following results were reviewed with patient.    Lab Results   Component Value Date    WBC 5.26 11/14/2016    HGB 13.5 (L) 11/14/2016    HCT 41.9 11/14/2016     11/14/2016    CHOL 99 (L) 04/03/2017    TRIG 90 04/03/2017    HDL 33 (L) 04/03/2017    ALT 48 (H) 07/18/2017    AST 31 07/18/2017     01/03/2017    K 4.4 01/03/2017     01/03/2017    CREATININE 1.4 01/03/2017    ESTGFRAFRICA >60.0 01/03/2017    EGFRNONAA 52.7 (A) 01/03/2017    BUN 17 01/03/2017    CO2 26 01/03/2017    TSH 0.913 12/29/2014    PSA 6.7 (H) 12/13/2017    INR 1.1 11/30/2015     (H) 01/03/2017       Lab Results   Component Value Date    HGBA1C 7.4 (H) 10/17/2017    HGBA1C 7.8 (H) 07/18/2017    HGBA1C 8.1 (H) 04/03/2017       Lab Results   Component Value Date    GLUTAMICACID 0.00 04/03/2017    CPEPTIDE 4.8 04/03/2017     Lab Results   Component Value Date    TSH 0.913 12/29/2014     Lab Results   Component  Value Date    IRON 126 11/30/2015    TIBC 521 (H) 11/30/2015    FERRITIN 247 11/30/2015     Lab Results   Component Value Date    CALCIUM 9.8 01/03/2017     Review of patient's allergies indicates:  No Known Allergies    Past Medical History:   Diagnosis Date    Diabetes mellitus type II Diagnosed 2002    Elevated liver enzymes     Fatty liver disease, nonalcoholic     Hyperlipidemia     Hypertension        Review of Systems   Constitutional: Negative.  Negative for activity change, appetite change, chills, diaphoresis, fatigue, fever and unexpected weight change.   HENT: Negative.  Negative for dental problem, facial swelling, hearing loss, nosebleeds, trouble swallowing and voice change.    Eyes: Negative.  Negative for photophobia, pain, discharge, redness, itching and visual disturbance.   Respiratory: Negative.  Negative for cough, choking, chest tightness, shortness of breath and wheezing.    Cardiovascular: Negative.  Negative for chest pain, palpitations and leg swelling.   Gastrointestinal: Negative.  Negative for abdominal distention, abdominal pain, blood in stool, constipation, diarrhea, nausea and vomiting.   Endocrine: Negative.  Negative for cold intolerance, heat intolerance, polydipsia, polyphagia and polyuria.   Genitourinary: Negative.  Negative for decreased urine volume, difficulty urinating, dysuria, frequency, scrotal swelling, testicular pain and urgency.   Musculoskeletal: Negative.  Negative for arthralgias, back pain, gait problem, joint swelling, myalgias, neck pain and neck stiffness.   Skin: Negative.  Negative for color change, pallor, rash and wound.   Allergic/Immunologic: Negative.  Negative for environmental allergies, food allergies and immunocompromised state.   Neurological: Negative.  Negative for dizziness, tremors, seizures, syncope, facial asymmetry, speech difficulty, weakness, light-headedness, numbness and headaches.   Hematological: Negative.  Negative for  "adenopathy. Does not bruise/bleed easily.   Psychiatric/Behavioral: Negative.  Negative for agitation, behavioral problems, confusion, decreased concentration, dysphoric mood, hallucinations, self-injury, sleep disturbance and suicidal ideas. The patient is not nervous/anxious and is not hyperactive.       Objective:     Vitals - 1 value per visit 11/30/2017 12/13/2017 2/6/2018   SYSTOLIC - 116 134   DIASTOLIC - 78 82   PULSE - 98 -   TEMPERATURE - 96.5 -   RESPIRATIONS - - -   SPO2 - 98 -   Weight (lb) 253.53 250.44 258.38   Weight (kg) 115 113.6 117.2   HEIGHT 5' 9" 5' 9" 5' 9"   BODY MASS INDEX 37.44 36.98 38.16   VISIT REPORT - - -   Pain Score  - 0 0   Some recent data might be hidden       Physical Exam   Constitutional: He is oriented to person, place, and time. He appears well-developed and well-nourished. He is cooperative.  Non-toxic appearance. He does not have a sickly appearance. He does not appear ill. No distress. He is not intubated.   HENT:   Head: Normocephalic and atraumatic. Not macrocephalic and not microcephalic. Head is without raccoon's eyes, without Keyes's sign, without abrasion, without contusion, without laceration, without right periorbital erythema and without left periorbital erythema. Hair is normal.   Right Ear: External ear normal. No lacerations. No drainage, swelling or tenderness. No foreign bodies. No mastoid tenderness. Tympanic membrane is not injected, not scarred, not perforated, not erythematous, not retracted and not bulging. Tympanic membrane mobility is normal. No middle ear effusion. No hemotympanum. No decreased hearing is noted.   Left Ear: External ear normal. No lacerations. No drainage, swelling or tenderness. No foreign bodies. No mastoid tenderness. Tympanic membrane is not injected, not scarred, not perforated, not erythematous, not retracted and not bulging. Tympanic membrane mobility is normal.  No middle ear effusion. No hemotympanum. No decreased hearing is " noted.   Nose: Nose normal.   Mouth/Throat: Oropharynx is clear and moist.   Eyes: EOM and lids are normal. Pupils are equal, round, and reactive to light. Right eye exhibits no chemosis, no discharge, no exudate and no hordeolum. No foreign body present in the right eye. Left eye exhibits no chemosis, no discharge, no exudate and no hordeolum. No foreign body present in the left eye. Right conjunctiva is not injected. Right conjunctiva has no hemorrhage. Left conjunctiva is not injected. Left conjunctiva has no hemorrhage. No scleral icterus. Right eye exhibits normal extraocular motion and no nystagmus. Left eye exhibits normal extraocular motion and no nystagmus. Right pupil is round and reactive. Left pupil is round and reactive. Pupils are equal.   Neck: Normal range of motion, full passive range of motion without pain and phonation normal. Neck supple. Normal carotid pulses, no hepatojugular reflux and no JVD present. No tracheal tenderness, no spinous process tenderness and no muscular tenderness present. Carotid bruit is not present. No neck rigidity. No tracheal deviation, no edema, no erythema and normal range of motion present. No thyroid mass and no thyromegaly present.   Cardiovascular: Normal rate, regular rhythm, normal heart sounds and intact distal pulses.   No extrasystoles are present. PMI is not displaced.  Exam reveals no gallop, no friction rub and no decreased pulses.    No murmur heard.  Pulses:       Carotid pulses are 2+ on the right side, and 2+ on the left side.       Radial pulses are 2+ on the right side, and 2+ on the left side.        Dorsalis pedis pulses are 2+ on the right side, and 2+ on the left side.        Posterior tibial pulses are 2+ on the right side, and 2+ on the left side.   Pulmonary/Chest: Effort normal and breath sounds normal. No accessory muscle usage or stridor. No apnea, no tachypnea and no bradypnea. He is not intubated. No respiratory distress. He has no  decreased breath sounds. He has no wheezes. He has no rhonchi. He has no rales. He exhibits no tenderness.   Abdominal: Soft. Bowel sounds are normal. He exhibits no shifting dullness, no distension, no pulsatile liver, no fluid wave, no abdominal bruit, no ascites, no pulsatile midline mass and no mass. There is no hepatosplenomegaly, splenomegaly or hepatomegaly. There is no tenderness. There is no rigidity, no rebound, no guarding, no CVA tenderness and no tenderness at McBurney's point. No hernia. Hernia confirmed negative in the ventral area.   Musculoskeletal: Normal range of motion. He exhibits no edema or tenderness.        Right foot: There is normal range of motion and no deformity.        Left foot: There is normal range of motion and no deformity.   Feet:   Right Foot:   Protective Sensation: 6 sites tested. 6 sites sensed.   Skin Integrity: Negative for ulcer, blister, skin breakdown, erythema, warmth, callus or dry skin.   Left Foot:   Protective Sensation: 6 sites tested. 6 sites sensed.   Skin Integrity: Negative for ulcer, blister, skin breakdown, erythema, warmth, callus or dry skin.   Lymphadenopathy:        Head (right side): No submental, no submandibular, no tonsillar, no preauricular, no posterior auricular and no occipital adenopathy present.        Head (left side): No submental, no submandibular, no tonsillar, no preauricular, no posterior auricular and no occipital adenopathy present.     He has no cervical adenopathy.        Right cervical: No superficial cervical, no deep cervical and no posterior cervical adenopathy present.       Left cervical: No superficial cervical, no deep cervical and no posterior cervical adenopathy present.   Neurological: He is alert and oriented to person, place, and time. He has normal reflexes. He displays no atrophy and no tremor. No cranial nerve deficit or sensory deficit. He exhibits normal muscle tone. He displays no seizure activity. Coordination and  gait normal.   Reflex Scores:       Bicep reflexes are 2+ on the right side and 2+ on the left side.       Brachioradialis reflexes are 2+ on the right side and 2+ on the left side.       Patellar reflexes are 2+ on the right side and 2+ on the left side.  Skin: Skin is warm and dry. No rash noted. No erythema. No pallor.   Psychiatric: He has a normal mood and affect. His mood appears not anxious. His affect is not angry, not blunt, not labile and not inappropriate. His speech is not rapid and/or pressured, not delayed, not tangential and not slurred. He is not agitated, not aggressive, not hyperactive, not slowed, not withdrawn, not actively hallucinating and not combative. Thought content is not paranoid and not delusional. Cognition and memory are not impaired. He does not express impulsivity or inappropriate judgment. He does not exhibit a depressed mood. He expresses no homicidal and no suicidal ideation. He expresses no suicidal plans and no homicidal plans. He is communicative. He exhibits normal recent memory and normal remote memory. He is attentive.     Assessment:     Anup Miller is seen today for   1. Uncontrolled type 2 diabetes mellitus without complication, without long-term current use of insulin      We have discussed the etiology and treatment options associated with the diagnosis as well as alternatives. He has elected the following treatments.     Uncontrolled type 2 diabetes mellitus without complication, without long-term current use of insulin  -     POCT glucose  -     glimepiride (AMARYL) 4 MG tablet; Take 2 tablets (8 mg total) by mouth once daily.  Dispense: 180 tablet; Refill: 2  -     pioglitazone (ACTOS) 15 MG tablet; Take 1 tablet (15 mg total) by mouth once daily.  Dispense: 30 tablet; Refill: 11  -     metFORMIN (GLUCOPHAGE-XR) 750 MG 24 hr tablet; Take 1 tablet (750 mg total) by mouth 2 (two) times daily with meals.  Dispense: 180 tablet; Refill: 3  - Discontinue Jardiance 25  mg; as well as trulicity 1.5 mg.    1.) Patient was instructed to monitor blood glucose twice daily, fasting, and 2 hour post meal; if on Multiple Daily Injections (MDI) he will need to have pre-meal blood glucose as well. Reminded to bring BG meter or record to each visit for review.  2.) Reviewed pathophysiology of diabetes, complications related to the disease, importance of annual dilated eye exam and self daily foot examination.  3.) Continue medications as prescribed Trulicity; Metformin; Amaryl; Jardiance. [facility] MyChart or Phone review in 1 week with [facility] records for adjustment of medication.  4.) Advised patient to continue to follow up with Dietician/CDE as directed.  5.) Discussed activity, benefits, methods, and precautions. Recommended patient start/continue some form of exercise and increase as tolerated to 60 minutes per day to facilitate weight loss and aid in control of BGs. Also reminded patient of WHO recommendation of 10,000 steps daily as a goal.   6.) A1C, TSH, Lipid Panel, CMP/renal panel with eGFR and Micro/Creatinine prior to next visit, if not already done.  7.) Return to clinic in 6 weeks for follow up. Advised patient to call clinic with any questions or concerns.    A total of 30 minutes was spent in face to face time, of which 50 % was spent in counseling patient on disease process, complications, treatment, and side effects of medications.    The patient was explained the above plan and given opportunity to ask questions.  He understands, chooses and consents to this plan and accepts all the risks, which include but are not limited to the risks mentioned above.   He understands the alternative of having no testing, interventions or treatments at this time. He left content and without further questions.

## 2018-03-14 ENCOUNTER — LAB VISIT (OUTPATIENT)
Dept: LAB | Facility: HOSPITAL | Age: 68
End: 2018-03-14
Attending: INTERNAL MEDICINE
Payer: COMMERCIAL

## 2018-03-14 DIAGNOSIS — Z00.00 ROUTINE GENERAL MEDICAL EXAMINATION AT A HEALTH CARE FACILITY: ICD-10-CM

## 2018-03-14 LAB
ALBUMIN SERPL BCP-MCNC: 4.1 G/DL
ALP SERPL-CCNC: 53 U/L
ALT SERPL W/O P-5'-P-CCNC: 50 U/L
ANION GAP SERPL CALC-SCNC: 11 MMOL/L
AST SERPL-CCNC: 31 U/L
BILIRUB SERPL-MCNC: 0.7 MG/DL
BUN SERPL-MCNC: 21 MG/DL
CALCIUM SERPL-MCNC: 10.2 MG/DL
CHLORIDE SERPL-SCNC: 101 MMOL/L
CO2 SERPL-SCNC: 28 MMOL/L
CREAT SERPL-MCNC: 1.4 MG/DL
EST. GFR  (AFRICAN AMERICAN): >60 ML/MIN/1.73 M^2
EST. GFR  (NON AFRICAN AMERICAN): 52.4 ML/MIN/1.73 M^2
ESTIMATED AVG GLUCOSE: 212 MG/DL
GLUCOSE SERPL-MCNC: 231 MG/DL
HBA1C MFR BLD HPLC: 9 %
POTASSIUM SERPL-SCNC: 4.3 MMOL/L
PROT SERPL-MCNC: 7.9 G/DL
SODIUM SERPL-SCNC: 140 MMOL/L

## 2018-03-14 PROCEDURE — 83036 HEMOGLOBIN GLYCOSYLATED A1C: CPT

## 2018-03-14 PROCEDURE — 36415 COLL VENOUS BLD VENIPUNCTURE: CPT | Mod: PO

## 2018-03-14 PROCEDURE — 80053 COMPREHEN METABOLIC PANEL: CPT

## 2018-03-19 ENCOUNTER — PATIENT MESSAGE (OUTPATIENT)
Dept: DIABETES | Facility: CLINIC | Age: 68
End: 2018-03-19

## 2018-03-27 ENCOUNTER — NUTRITION (OUTPATIENT)
Dept: DIABETES | Facility: CLINIC | Age: 68
End: 2018-03-27
Payer: COMMERCIAL

## 2018-03-27 VITALS — WEIGHT: 253.5 LBS | HEIGHT: 69 IN | BODY MASS INDEX: 37.55 KG/M2

## 2018-03-27 PROCEDURE — 99999 PR PBB SHADOW E&M-EST. PATIENT-LVL III: CPT | Mod: PBBFAC,,, | Performed by: DIETITIAN, REGISTERED

## 2018-03-27 PROCEDURE — G0108 DIAB MANAGE TRN  PER INDIV: HCPCS | Mod: S$GLB,,, | Performed by: DIETITIAN, REGISTERED

## 2018-03-27 NOTE — PROGRESS NOTES
Diabetes Education  Author: Sandy Hinds RD, CDE  Date: 3/27/2018    Diabetes Education Visit  Diabetes Education Record Assessment/Progress: Post Program/Follow-up    Diabetes Type  Diabetes Type : Type II    Nutrition  Meal Planning:  (~2700cals/d - excesss carb, fat, sodium from dining out and irregular meal patterns.)  Meal Plan 24 Hour Recall - Breakfast: none OR grits, egg, sausage - water, occs coffee/tea  Meal Plan 24 Hour Recall - Lunch: hamburger  Meal Plan 24 Hour Recall - Dinner: nuggets  Meal Plan 24 Hour Recall - Snack: aubrey; janene: water    Monitoring   Self Monitoring : Per recall, fst -250. Pt denies hypoglycemia.  Blood Glucose Logs: No  In the last month, how often have you had a low blood sugar reaction?: never    Exercise   Exercise Type: walking  Intensity: Low  Frequency: Daily  Duration: 15 min    Current Diabetes Treatment   Current Treatment: Diet, Exercise, Oral Medication (amaryl 4 mg 2 tab am daily, metformin xr 750mg ac, actos 15mg daily)    Social History  Preferred Learning Method: Face to Face  Primary Support: Self, Spouse     Barriers to Change  Barriers to Change: None  Learning Challenges : None    Readiness to Learn   Readiness to Learn : Acceptance    Cultural Influences  Cultural Influences: No    Diabetes Education Assessment/Progress  Diabetes Disease Process (diabetes disease process and treatment options): Discussion, Individual Session, Demonstrates Understanding/Competency(verbalizes/demonstrates)  Nutrition (Incorporating nutritional management into one's lifestyle): Discussion, Individual Session, Demonstrates Understanding/Competency (verbalizes/demonstrates), Written Materials Provided  Physical Activity (incorporating physical activity into one's lifestyle): Discussion, Individual Session, Demonstrates Understanding/Competency (verbalizes/demonstrates), Written Materials Provided  Medications (states correct name, dose, onset, peak, duration, side  effects & timing of meds): Discussion, Individual Session, Demonstrates Understanding/Competency(verbalizes/demonstrates), Written Materials Provided  Monitoring (monitoring blood glucose/other parameters & using results): Discussion, Individual Session, Demonstrates Understanding/Competency (verbalizes/demonstrates), Written Materials Provided  Acute Complications (preventing, detecting, and treating acute complications): Discussion, Individual Session, Demonstrates Understanding/Competency (verbalizes/demonstrates), Written Materials Provided  Chronic Complications (preventing, detecting, and treating chronic complications): Discussion, Individual Session, Demonstrates Understanding/Competency (verbalizes/demonstrates)  Clinical (diabetes, other pertinent medical history, and relevant comorbidities reviewed during visit): Discussion, Individual Session, Demonstrates Understanding/Competency (verbalizes/demonstrates)  Cognitive (knowledge of self-management skills, functional health literacy): Discussion, Individual Session, Demonstrates Understanding/Competency (verbalizes/demonstrates)  Psychosocial (emotional response to diabetes): Discussion, Individual Session, Demonstrates Understanding/Competency (verbalizes/demonstrates)  Diabetes Distress and Support Systems: Discussion, Individual Session, Demonstrates Understanding/Competency (verbalizes/demonstrates)  Behavioral (readiness for change, lifestyle practices, self-care behaviors): Discussion, Individual Session, Demonstrates Understanding/Competency (verbalizes/demonstrates)   Emphasis on taking amaryl with food.    Goals  Patient has selected/evaluated goals during today's session: Yes, evaluated  Healthy Eating: % Met (use meal plan - 3meals/d using MR shakes/entrees)  Met Percentage : 25%  Start Date: 03/27/18  Target Date: 05/08/18  Physical Activity: % Met (150 min/wk - chair based (bands, weights, table bike) due to job)  Met Percentage : 25%  Start  Date: 03/27/18  Target Date: 05/08/18  Monitoring: % Met (test bg 2x/d (fst, 2hr pp), call weekly for review)  Met Percentage : 25%  Start Date: 03/27/18  Target Date: 05/08/18    Diabetes Self-Management Support Plan  Stress Management: family, friends  Review Status: Patient has selected and agrees to support plan.    Diabetes Care Plan/Intervention  Education Plan/Intervention: Individual Follow-Up DSMT, Endocrine Provider Visit Set Up Pt may benefit from splitting Amaryl doses to twice daily.     Diabetes Meal Plan  Restrictions: Low Fat, Low Sodium  Calories: 1800  Carbohydrate Per Meal: 30-45g  Carbohydrate Per Snack : 15-30g    Education Units of Time   Time Spent: 30 min    Health Maintenance was reviewed today with patient. Discussed with patient importance of routine eye exams, foot exams/foot care, blood work (i.e.: A1c, microalbumin, and lipid), dental visits, yearly flu vaccine, and pneumonia vaccine as indicated by PCP. Patient verbalized understanding.     Health Maintenance Topics with due status: Not Due       Topic Last Completion Date    TETANUS VACCINE 04/02/2013    Lipid Panel 04/03/2017    Pneumococcal (65+) 04/18/2017    Eye Exam 08/24/2017    Hemoglobin A1c 03/14/2018    Foot Exam 03/20/2018    Low Dose Statin 03/27/2018     Health Maintenance Due   Topic Date Due    Colonoscopy  11/14/2016

## 2018-03-27 NOTE — LETTER
March 27, 2018      Bryce Gonzales MD  [address]  [place] [address]         Patient: Anup Miller   MR Number: [id number]   YOB: 1950   Date of Visit: 3/27/2018       Dear Dr. Gonzales:    Thank you for referring Anup for diabetes self-management education and support. He has completed all components of our Diabetes Management Program and his Self-Management Support Plan. Below is a summary of his clinical outcomes and goal progress.    Patient Outcomes:    A1c Status:   Lab Results   Component Value Date    HGBA1C 9.0 (H) 03/14/2018    HGBA1C 7.4 (H) 10/17/2017     Goals  Patient has selected/evaluated goals during today's session: Yes, evaluated  Healthy Eating: % Met (use meal plan - 3meals/d using MR shakes/entrees)  Met Percentage : 25%  Start Date: 03/27/18  Target Date: 05/08/18  Physical Activity: % Met (150 min/wk - chair based (bands, weights, table bike) due to job)  Met Percentage : 25%  Start Date: 03/27/18  Target Date: 05/08/18  Monitoring: % Met (test bg 2x/d (fst, 2hr pp), call weekly for review)  Met Percentage : 25%  Start Date: 03/27/18  Target Date: 05/08/18    Diabetes Self-Management Support Plan  Stress Management: family, friends  Review Status: Patient has selected and agrees to support plan.    Follow up:   · Anup to follow diabetes support plan indicated above  · Anup to attend medical appointments as scheduled  · Anup to update you on his DM education progress as needed      If you have questions, please do not hesitate to call me. I look forward to providing additional education and support as needed.    Sincerely,    Sandy Hinds, RD, CDE

## 2018-04-06 ENCOUNTER — OFFICE VISIT (OUTPATIENT)
Dept: DIABETES | Facility: CLINIC | Age: 68
End: 2018-04-06
Payer: COMMERCIAL

## 2018-04-06 VITALS
HEIGHT: 69 IN | DIASTOLIC BLOOD PRESSURE: 70 MMHG | BODY MASS INDEX: 37.09 KG/M2 | SYSTOLIC BLOOD PRESSURE: 120 MMHG | WEIGHT: 250.44 LBS

## 2018-04-06 LAB — GLUCOSE SERPL-MCNC: 146 MG/DL (ref 70–110)

## 2018-04-06 PROCEDURE — 82948 REAGENT STRIP/BLOOD GLUCOSE: CPT | Mod: S$GLB,,, | Performed by: PHYSICIAN ASSISTANT

## 2018-04-06 PROCEDURE — 3045F PR MOST RECENT HEMOGLOBIN A1C LEVEL 7.0-9.0%: CPT | Mod: CPTII,S$GLB,, | Performed by: PHYSICIAN ASSISTANT

## 2018-04-06 PROCEDURE — 99999 PR PBB SHADOW E&M-EST. PATIENT-LVL III: CPT | Mod: PBBFAC,,, | Performed by: PHYSICIAN ASSISTANT

## 2018-04-06 PROCEDURE — 3074F SYST BP LT 130 MM HG: CPT | Mod: CPTII,S$GLB,, | Performed by: PHYSICIAN ASSISTANT

## 2018-04-06 PROCEDURE — 99214 OFFICE O/P EST MOD 30 MIN: CPT | Mod: S$GLB,,, | Performed by: PHYSICIAN ASSISTANT

## 2018-04-06 PROCEDURE — 3078F DIAST BP <80 MM HG: CPT | Mod: CPTII,S$GLB,, | Performed by: PHYSICIAN ASSISTANT

## 2018-04-06 RX ORDER — DAPAGLIFLOZIN 5 MG/1
5 TABLET, FILM COATED ORAL DAILY
Qty: 90 TABLET | Refills: 1 | Status: SHIPPED | OUTPATIENT
Start: 2018-04-06 | End: 2018-04-06 | Stop reason: SDUPTHER

## 2018-04-06 RX ORDER — DAPAGLIFLOZIN 5 MG/1
5 TABLET, FILM COATED ORAL DAILY
Qty: 30 TABLET | Refills: 11 | Status: SHIPPED | OUTPATIENT
Start: 2018-04-06 | End: 2018-04-06 | Stop reason: SDUPTHER

## 2018-04-06 NOTE — PROGRESS NOTES
Subjective:      Patient ID: Anup Miller is a 68 y.o. male.    PCP: Bryce Gonzales MD      Anup Miller is a pleasant 68 y.o. male presenting to follow up on diabetes mellitus. He has had diabetes for 15 or more years. His last visit in Diabetes Management was 2/6/2018. Since that time he has had mild improvement in his glycemia. He denies any symptoms of dyspnea or edema from related to pioglitazone. He did not bring a log or glucometer but per recall, his blood sugar range fasting has been 180's and fed has been 200+, and he has been monitoring 1-2 times per day. His current concerns are glycemic control.    He denies any hospital admissions, emergency room visits, hypoglycemia, syncope, diaphoresis, chest pain, or dyspnea.    He has lost 2 pounds since last visit. His BMI is 36.98 kg/m².    His blood sugar in the clinic today was:   Lab Results   Component Value Date    POCGLU 146 (A) 04/06/2018       We discussed the American diabetes Association recommendations:  hemoglobin A1c below 7.0%; all diabetics should be on statins unless contraindicated; one aspirin daily unless contraindicated; fasting blood sugar between 80 and 130 mg/dL; postprandial blood sugar below 180 mg/dl; prevention of hypoglycemia, may adjust goals to higher levels if persistent; ACE or ARB therapy if not contraindicated; and maintain in an ideal body weight with BMI below 25.    Anup is compliant most of the time with DM medications.     Anup is noncompliant much of the time with lifestyle modifications to include activity and meal planning.       Current Outpatient Prescriptions:     ALREX 0.2 % DrpS, Place 1 drop into both eyes 3 (three) times daily as needed., Disp: , Rfl:     fluticasone (FLONASE) 50 mcg/actuation nasal spray, USE 2 SPRAYS IN EACH NOSTRIL ONE TIME DAILY, Disp: 1 Bottle, Rfl: 1    glimepiride (AMARYL) 4 MG tablet, Take 2 tablets (8 mg total) by mouth once daily., Disp: 180 tablet, Rfl: 2     hydrochlorothiazide (HYDRODIURIL) 25 MG tablet, Take 1 tablet (25 mg total) by mouth once daily., Disp: 90 tablet, Rfl: 3    losartan (COZAAR) 50 MG tablet, TAKE 1 TABLET BY MOUTH ONE TIME DAILY, Disp: 90 tablet, Rfl: 1    metFORMIN (GLUCOPHAGE-XR) 750 MG 24 hr tablet, Take 1 tablet (750 mg total) by mouth 2 (two) times daily with meals. (Patient taking differently: Take 750 mg by mouth 3 (three) times daily with meals. ), Disp: 180 tablet, Rfl: 3    multivitamin capsule, Take 1 capsule by mouth once daily., Disp: , Rfl:     pantoprazole (PROTONIX) 40 MG tablet, TAKE 1 TABLET BY MOUTH ONE TIME DAILY, Disp: 90 tablet, Rfl: 1    pioglitazone (ACTOS) 15 MG tablet, Take 1 tablet (15 mg total) by mouth once daily., Disp: 30 tablet, Rfl: 11    rosuvastatin (CRESTOR) 10 MG tablet, TAKE 1 TABLET BY MOUTH DAILY, Disp: 90 tablet, Rfl: 0    sodium,potassium,mag sulfates (SUPREP BOWEL PREP KIT) 17.5-3.13-1.6 gram SolR, Take as directed, Disp: 354 mL, Rfl: 0    XIIDRA 5 % Dpet, Place 1 drop into both eyes once daily. , Disp: , Rfl: 1    STANDARDS OF CARE:  Eye doctor: Dr. Alex Pelayo, last exam 5/24/2017.  Dental exam: Recommend regular exams; denies gums bleeding.  Podiatry doctor:     ACTIVITY LEVEL: He exercises rarely.  MEAL PLANNING: Number of meals per day - 3. Number of snacks per day - 2.  Per dietary recall, patient is not limiting carbohydrates, saturated fats and sodium.   BLOOD GLUCOSE TESTING: Self-monitoring with   ACE/ARB: Yes  Statin: Yes    Health Maintenance   Topic Date Due    Colonoscopy  11/14/2016    Lipid Panel  04/03/2018    Pneumococcal (65+) (2 of 2 - PPSV23) 04/18/2018    Eye Exam  08/24/2018    Hemoglobin A1c  09/14/2018    Foot Exam  03/20/2019    TETANUS VACCINE  04/02/2023    Hepatitis C Screening  Completed    Zoster Vaccine  Completed    Influenza Vaccine  Completed       Diabetes Status:   Diabetes Management Status    Statin: Taking  ACE/ARB: Taking    Screening or  Prevention Patient's value Goal Complete/Controlled?   HgA1C Testing and Control   Lab Results   Component Value Date    HGBA1C 9.0 (H) 03/14/2018      Annually/Less than 8% No   Lipid profile : 04/03/2017 Annually No   LDL control Lab Results   Component Value Date    LDLCALC 48.0 (L) 04/03/2017    Annually/Less than 100 mg/dl  No   Nephropathy screening Lab Results   Component Value Date    LABMICR 3.0 08/03/2015     No results found for: PROTEINUA Annually No   Blood pressure BP Readings from Last 1 Encounters:   04/06/18 120/70    Less than 140/90 Yes   Dilated retinal exam : 08/24/2017 Annually Yes   Foot exam   : 03/20/2018 Annually Yes     The following results were reviewed with patient.    Lab Results   Component Value Date    WBC 5.26 11/14/2016    HGB 13.5 (L) 11/14/2016    HCT 41.9 11/14/2016     11/14/2016    CHOL 99 (L) 04/03/2017    TRIG 90 04/03/2017    HDL 33 (L) 04/03/2017    ALT 50 (H) 03/14/2018    AST 31 03/14/2018     03/14/2018    K 4.3 03/14/2018     03/14/2018    CREATININE 1.4 03/14/2018    ESTGFRAFRICA >60.0 03/14/2018    EGFRNONAA 52.4 (A) 03/14/2018    BUN 21 03/14/2018    CO2 28 03/14/2018    TSH 0.913 12/29/2014    PSA 6.7 (H) 12/13/2017    INR 1.1 11/30/2015     (H) 03/14/2018       Lab Results   Component Value Date    HGBA1C 9.0 (H) 03/14/2018    HGBA1C 7.4 (H) 10/17/2017    HGBA1C 7.8 (H) 07/18/2017       Lab Results   Component Value Date    GLUTAMICACID 0.00 04/03/2017    CPEPTIDE 4.8 04/03/2017     Lab Results   Component Value Date    TSH 0.913 12/29/2014     Lab Results   Component Value Date    IRON 126 11/30/2015    TIBC 521 (H) 11/30/2015    FERRITIN 247 11/30/2015     Lab Results   Component Value Date    CALCIUM 10.2 03/14/2018     Review of patient's allergies indicates:  No Known Allergies    Past Medical History:   Diagnosis Date    Diabetes mellitus type II Diagnosed 2002    Elevated liver enzymes     Fatty liver disease, nonalcoholic      Hyperlipidemia     Hypertension        Review of Systems   Constitutional: Negative.  Negative for activity change, appetite change, chills, diaphoresis, fatigue, fever and unexpected weight change.   HENT: Negative.  Negative for dental problem, facial swelling, hearing loss, nosebleeds, trouble swallowing and voice change.    Eyes: Negative.  Negative for photophobia, pain, discharge, redness, itching and visual disturbance.   Respiratory: Negative.  Negative for cough, choking, chest tightness, shortness of breath and wheezing.    Cardiovascular: Negative.  Negative for chest pain, palpitations and leg swelling.   Gastrointestinal: Negative.  Negative for abdominal distention, abdominal pain, blood in stool, constipation, diarrhea, nausea and vomiting.   Endocrine: Negative.  Negative for cold intolerance, heat intolerance, polydipsia, polyphagia and polyuria.   Genitourinary: Negative.  Negative for decreased urine volume, difficulty urinating, dysuria, frequency, scrotal swelling, testicular pain and urgency.   Musculoskeletal: Negative.  Negative for arthralgias, back pain, gait problem, joint swelling, myalgias, neck pain and neck stiffness.   Skin: Negative.  Negative for color change, pallor, rash and wound.   Allergic/Immunologic: Negative.  Negative for environmental allergies, food allergies and immunocompromised state.   Neurological: Negative.  Negative for dizziness, tremors, seizures, syncope, facial asymmetry, speech difficulty, weakness, light-headedness, numbness and headaches.   Hematological: Negative.  Negative for adenopathy. Does not bruise/bleed easily.   Psychiatric/Behavioral: Negative.  Negative for agitation, behavioral problems, confusion, decreased concentration, dysphoric mood, hallucinations, self-injury, sleep disturbance and suicidal ideas. The patient is not nervous/anxious and is not hyperactive.       Objective:     Vitals - 1 value per visit 2/6/2018 3/27/2018 4/6/2018  "  SYSTOLIC 134 - 120   DIASTOLIC 82 - 70   PULSE - - -   TEMPERATURE - - -   RESPIRATIONS - - -   SPO2 - - -   Weight (lb) 258.38 253.53 250.44   Weight (kg) 117.2 115 113.6   HEIGHT 5' 9" 5' 9" 5' 9"   BODY MASS INDEX 38.16 37.44 36.98   VISIT REPORT - - -   Pain Score  0 0 0   Some recent data might be hidden       Physical Exam   Constitutional: He is oriented to person, place, and time. He appears well-developed and well-nourished. He is cooperative.  Non-toxic appearance. He does not have a sickly appearance. He does not appear ill. No distress. He is not intubated.   HENT:   Head: Normocephalic and atraumatic. Not macrocephalic and not microcephalic. Head is without raccoon's eyes, without Keyes's sign, without abrasion, without contusion, without laceration, without right periorbital erythema and without left periorbital erythema. Hair is normal.   Right Ear: External ear normal. No lacerations. No drainage, swelling or tenderness. No foreign bodies. No mastoid tenderness. Tympanic membrane is not injected, not scarred, not perforated, not erythematous, not retracted and not bulging. Tympanic membrane mobility is normal. No middle ear effusion. No hemotympanum. No decreased hearing is noted.   Left Ear: External ear normal. No lacerations. No drainage, swelling or tenderness. No foreign bodies. No mastoid tenderness. Tympanic membrane is not injected, not scarred, not perforated, not erythematous, not retracted and not bulging. Tympanic membrane mobility is normal.  No middle ear effusion. No hemotympanum. No decreased hearing is noted.   Nose: Nose normal.   Mouth/Throat: Oropharynx is clear and moist.   Eyes: EOM and lids are normal. Pupils are equal, round, and reactive to light. Right eye exhibits no chemosis, no discharge, no exudate and no hordeolum. No foreign body present in the right eye. Left eye exhibits no chemosis, no discharge, no exudate and no hordeolum. No foreign body present in the left " eye. Right conjunctiva is not injected. Right conjunctiva has no hemorrhage. Left conjunctiva is not injected. Left conjunctiva has no hemorrhage. No scleral icterus. Right eye exhibits normal extraocular motion and no nystagmus. Left eye exhibits normal extraocular motion and no nystagmus. Right pupil is round and reactive. Left pupil is round and reactive. Pupils are equal.   Neck: Normal range of motion, full passive range of motion without pain and phonation normal. Neck supple. Normal carotid pulses, no hepatojugular reflux and no JVD present. No tracheal tenderness, no spinous process tenderness and no muscular tenderness present. Carotid bruit is not present. No neck rigidity. No tracheal deviation, no edema, no erythema and normal range of motion present. No thyroid mass and no thyromegaly present.   Cardiovascular: Normal rate, regular rhythm, normal heart sounds and intact distal pulses.   No extrasystoles are present. PMI is not displaced.  Exam reveals no gallop, no friction rub and no decreased pulses.    No murmur heard.  Pulses:       Carotid pulses are 2+ on the right side, and 2+ on the left side.       Radial pulses are 2+ on the right side, and 2+ on the left side.        Dorsalis pedis pulses are 2+ on the right side, and 2+ on the left side.        Posterior tibial pulses are 2+ on the right side, and 2+ on the left side.   Pulmonary/Chest: Effort normal and breath sounds normal. No accessory muscle usage or stridor. No apnea, no tachypnea and no bradypnea. He is not intubated. No respiratory distress. He has no decreased breath sounds. He has no wheezes. He has no rhonchi. He has no rales. He exhibits no tenderness.   Abdominal: Soft. Bowel sounds are normal. He exhibits no shifting dullness, no distension, no pulsatile liver, no fluid wave, no abdominal bruit, no ascites, no pulsatile midline mass and no mass. There is no hepatosplenomegaly, splenomegaly or hepatomegaly. There is no  tenderness. There is no rigidity, no rebound, no guarding, no CVA tenderness and no tenderness at McBurney's point. No hernia. Hernia confirmed negative in the ventral area.   Musculoskeletal: Normal range of motion. He exhibits no edema or tenderness.        Right foot: There is normal range of motion and no deformity.        Left foot: There is normal range of motion and no deformity.   Feet:   Right Foot:   Protective Sensation: 6 sites tested. 6 sites sensed.   Skin Integrity: Negative for ulcer, blister, skin breakdown, erythema, warmth, callus or dry skin.   Left Foot:   Protective Sensation: 6 sites tested. 6 sites sensed.   Skin Integrity: Negative for ulcer, blister, skin breakdown, erythema, warmth, callus or dry skin.   Lymphadenopathy:        Head (right side): No submental, no submandibular, no tonsillar, no preauricular, no posterior auricular and no occipital adenopathy present.        Head (left side): No submental, no submandibular, no tonsillar, no preauricular, no posterior auricular and no occipital adenopathy present.     He has no cervical adenopathy.        Right cervical: No superficial cervical, no deep cervical and no posterior cervical adenopathy present.       Left cervical: No superficial cervical, no deep cervical and no posterior cervical adenopathy present.   Neurological: He is alert and oriented to person, place, and time. He has normal reflexes. He displays no atrophy and no tremor. No cranial nerve deficit or sensory deficit. He exhibits normal muscle tone. He displays no seizure activity. Coordination and gait normal.   Reflex Scores:       Bicep reflexes are 2+ on the right side and 2+ on the left side.       Brachioradialis reflexes are 2+ on the right side and 2+ on the left side.       Patellar reflexes are 2+ on the right side and 2+ on the left side.  Skin: Skin is warm and dry. No rash noted. No erythema. No pallor.   Psychiatric: He has a normal mood and affect. His mood  appears not anxious. His affect is not angry, not blunt, not labile and not inappropriate. His speech is not rapid and/or pressured, not delayed, not tangential and not slurred. He is not agitated, not aggressive, not hyperactive, not slowed, not withdrawn, not actively hallucinating and not combative. Thought content is not paranoid and not delusional. Cognition and memory are not impaired. He does not express impulsivity or inappropriate judgment. He does not exhibit a depressed mood. He expresses no homicidal and no suicidal ideation. He expresses no suicidal plans and no homicidal plans. He is communicative. He exhibits normal recent memory and normal remote memory. He is attentive.     Assessment:     1. Uncontrolled type 2 diabetes mellitus without complication, without long-term current use of insulin       Plan:   Anup Miller is seen today for   1. Uncontrolled type 2 diabetes mellitus without complication, without long-term current use of insulin      We have discussed the etiology and treatment options associated with the diagnosis as well as alternatives. Also, pertinent to this visit in decision making was Hyperlipidemia, Hypertension, NAFLD, KHOI..  He has elected the following treatments.     Uncontrolled type 2 diabetes mellitus without complication, without long-term current use of insulin  -     POCT glucose  - Will add SGLT-2i (Farxiga)  - F/U in 3 months.    1.) Patient was instructed to monitor blood glucose twice daily (fasting, and occasional 2 hour post meal). However; if on Multiple Daily Injections (MDI) he will need to have pre-meal blood glucose as well. Reminded to bring BG meter or record to each visit for review.  2.) Reviewed pathophysiology of diabetes, complications related to the disease, importance of annual dilated eye exam and self daily foot examination.  3.) Continue medications as prescribed Glimepiride; Metformin; Pioglitazone. [facility] MyChart or Phone review in 1 week  with [facility] records for adjustment of medication.  4.) Advised patient to continue to follow up with Dietician/CDE as directed.  5.) Discussed activity, benefits, methods, and precautions. Recommended patient start/continue some form of exercise and increase as tolerated to 60 minutes per day to facilitate weight loss and aid in control of BGs. Also reminded patient of WHO recommendation of 10,000 steps daily as a goal.   6.) A1C, TSH, Lipid Panel, CMP/renal panel with eGFR and Micro/Creatinine prior to next visit, if not already done.  7.) Return to clinic in 12 weeks for follow up. Advised patient to call clinic with any questions or concerns.    A total of 30 minutes was spent in face to face time, of which 50 % was spent in counseling patient on disease process, complications, treatment, and side effects of medications.    The patient was explained the above plan and given opportunity to ask questions.  He understands, chooses and consents to this plan and accepts all the risks, which include but are not limited to the risks mentioned above.   He understands the alternative of having no testing, interventions or treatments at this time. He left content and without further questions.     Disclaimer:  This note is prepared using voice recognition software and as such is likely to have errors and has not been proof read. Please contact me for questions.

## 2018-04-07 RX ORDER — DAPAGLIFLOZIN 5 MG/1
5 TABLET, FILM COATED ORAL DAILY
Qty: 90 TABLET | Refills: 3 | Status: SHIPPED | OUTPATIENT
Start: 2018-04-07 | End: 2018-08-06 | Stop reason: DRUGHIGH

## 2018-04-25 DIAGNOSIS — E78.5 HYPERLIPIDEMIA, UNSPECIFIED HYPERLIPIDEMIA TYPE: Chronic | ICD-10-CM

## 2018-04-25 RX ORDER — FLUTICASONE PROPIONATE 50 MCG
SPRAY, SUSPENSION (ML) NASAL
Qty: 3 BOTTLE | Refills: 0 | Status: SHIPPED | OUTPATIENT
Start: 2018-04-25 | End: 2018-12-03

## 2018-04-25 RX ORDER — PANTOPRAZOLE SODIUM 40 MG/1
TABLET, DELAYED RELEASE ORAL
Qty: 90 TABLET | Refills: 0 | Status: SHIPPED | OUTPATIENT
Start: 2018-04-25 | End: 2018-07-07 | Stop reason: SDUPTHER

## 2018-04-25 RX ORDER — LOSARTAN POTASSIUM 50 MG/1
TABLET ORAL
Qty: 90 TABLET | Refills: 0 | Status: SHIPPED | OUTPATIENT
Start: 2018-04-25 | End: 2018-07-07 | Stop reason: SDUPTHER

## 2018-04-25 RX ORDER — ROSUVASTATIN CALCIUM 10 MG/1
10 TABLET, COATED ORAL DAILY
Qty: 90 TABLET | Refills: 0 | Status: SHIPPED | OUTPATIENT
Start: 2018-04-25 | End: 2018-07-07 | Stop reason: SDUPTHER

## 2018-04-25 NOTE — TELEPHONE ENCOUNTER
Spoke with pt's wife, states pt needs a refill on Flonase and Crestor for [facility] day supply [facility]. Informed her that refill request would be sent to Dr. Gonzales for approval. She verbalized understanding.

## 2018-04-25 NOTE — TELEPHONE ENCOUNTER
----- Message from Shayna Ramos sent at 4/25/2018  8:24 AM CDT -----  Contact: Pt-wife   Pt wife called in regards to Crestor prescription callback number to discuss..[phone] (home)

## 2018-04-25 NOTE — TELEPHONE ENCOUNTER
Spoke with pt's wife, notified her that pt's medications were refilled x 1 month but is due for appointment. Appointment scheduled for 4/30/18 at 9:20 am.

## 2018-04-27 DIAGNOSIS — E11.9 TYPE 2 DIABETES MELLITUS WITHOUT COMPLICATION: ICD-10-CM

## 2018-04-30 ENCOUNTER — OFFICE VISIT (OUTPATIENT)
Dept: INTERNAL MEDICINE | Facility: CLINIC | Age: 68
End: 2018-04-30
Payer: COMMERCIAL

## 2018-04-30 ENCOUNTER — LAB VISIT (OUTPATIENT)
Dept: LAB | Facility: HOSPITAL | Age: 68
End: 2018-04-30
Attending: INTERNAL MEDICINE
Payer: COMMERCIAL

## 2018-04-30 VITALS
HEIGHT: 69 IN | OXYGEN SATURATION: 98 % | HEART RATE: 101 BPM | WEIGHT: 255.5 LBS | TEMPERATURE: 97 F | BODY MASS INDEX: 37.84 KG/M2 | SYSTOLIC BLOOD PRESSURE: 118 MMHG | DIASTOLIC BLOOD PRESSURE: 74 MMHG

## 2018-04-30 DIAGNOSIS — Z12.11 COLON CANCER SCREENING: ICD-10-CM

## 2018-04-30 DIAGNOSIS — Z23 NEED FOR PNEUMOCOCCAL VACCINATION: ICD-10-CM

## 2018-04-30 DIAGNOSIS — R97.20 ELEVATED PSA: ICD-10-CM

## 2018-04-30 DIAGNOSIS — E78.5 HYPERLIPIDEMIA ASSOCIATED WITH TYPE 2 DIABETES MELLITUS: ICD-10-CM

## 2018-04-30 DIAGNOSIS — E11.69 HYPERLIPIDEMIA ASSOCIATED WITH TYPE 2 DIABETES MELLITUS: ICD-10-CM

## 2018-04-30 DIAGNOSIS — E11.59 HYPERTENSION ASSOCIATED WITH DIABETES: ICD-10-CM

## 2018-04-30 DIAGNOSIS — Z00.00 ROUTINE GENERAL MEDICAL EXAMINATION AT A HEALTH CARE FACILITY: ICD-10-CM

## 2018-04-30 DIAGNOSIS — I15.2 HYPERTENSION ASSOCIATED WITH DIABETES: ICD-10-CM

## 2018-04-30 LAB
CHOLEST SERPL-MCNC: 186 MG/DL
CHOLEST/HDLC SERPL: 4 {RATIO}
COMPLEXED PSA SERPL-MCNC: 8.5 NG/ML
HDLC SERPL-MCNC: 46 MG/DL
HDLC SERPL: 24.7 %
LDLC SERPL CALC-MCNC: 110 MG/DL
NONHDLC SERPL-MCNC: 140 MG/DL
TRIGL SERPL-MCNC: 150 MG/DL

## 2018-04-30 PROCEDURE — G0009 ADMIN PNEUMOCOCCAL VACCINE: HCPCS | Mod: PBBFAC,PO

## 2018-04-30 PROCEDURE — 99213 OFFICE O/P EST LOW 20 MIN: CPT | Mod: PBBFAC,PO,25 | Performed by: INTERNAL MEDICINE

## 2018-04-30 PROCEDURE — 99999 PR PBB SHADOW E&M-EST. PATIENT-LVL III: CPT | Mod: PBBFAC,,, | Performed by: INTERNAL MEDICINE

## 2018-04-30 PROCEDURE — 84153 ASSAY OF PSA TOTAL: CPT

## 2018-04-30 PROCEDURE — 80061 LIPID PANEL: CPT

## 2018-04-30 PROCEDURE — 36415 COLL VENOUS BLD VENIPUNCTURE: CPT | Mod: PO

## 2018-04-30 PROCEDURE — 99214 OFFICE O/P EST MOD 30 MIN: CPT | Mod: S$GLB,,, | Performed by: INTERNAL MEDICINE

## 2018-04-30 RX ORDER — EMPAGLIFLOZIN 25 MG/1
25 TABLET, FILM COATED ORAL DAILY
COMMUNITY
Start: 2018-03-18 | End: 2018-04-30

## 2018-04-30 RX ORDER — SODIUM, POTASSIUM,MAG SULFATES 17.5-3.13G
SOLUTION, RECONSTITUTED, ORAL ORAL
Qty: 354 ML | Refills: 0 | Status: ON HOLD | OUTPATIENT
Start: 2018-04-30 | End: 2018-07-19 | Stop reason: HOSPADM

## 2018-04-30 NOTE — PROGRESS NOTES
"Subjective:      Patient ID: Anup Miller is a 68 y.o. male.    Chief Complaint: Follow-up    69 yo with Patient Active Problem List:     Uncontrolled type 2 diabetes mellitus without complication, without long-term current use of insulin     Elevated liver enzymes     Hypertension associated with diabetes     Hyperlipidemia associated with type 2 diabetes mellitus     Multiple pulmonary nodules determined by computed tomography of lung     KHOI on CPAP     Elevated PSA    Past Medical History:  Diagnosed 2002: Diabetes mellitus type II  No date: Elevated liver enzymes  No date: Fatty liver disease, nonalcoholic  No date: Hyperlipidemia  No date: Hypertension    Here today for management of multiple medical problems as outlined below.  He reports that he is feeling overall well.  Home glucose improving with addition of farxiga to fastings 125 to 170s.  He is not monitoring his home blood pressure.  He has not seen urology in the recent past.      Review of Systems   Constitutional: Negative for chills and fever.   HENT: Negative for ear pain and sore throat.    Respiratory: Negative for cough.    Cardiovascular: Negative for chest pain.   Gastrointestinal: Negative for abdominal pain and blood in stool.   Genitourinary: Negative for dysuria and hematuria.   Neurological: Negative for seizures and syncope.     Objective:   /74 (BP Location: Right arm, Patient Position: Sitting)   Pulse 101   Temp 96.7 °F (35.9 °C) (Tympanic)   Ht 5' 9" (1.753 m)   Wt 115.9 kg (255 lb 8.2 oz)   SpO2 98%   BMI 37.73 kg/m²     Physical Exam   Constitutional: He is oriented to person, place, and time. He appears well-developed and well-nourished. No distress.   HENT:   Head: Normocephalic and atraumatic.   Eyes: Conjunctivae and EOM are normal. Pupils are equal, round, and reactive to light.   Cardiovascular: Normal rate and regular rhythm.    Pulmonary/Chest: Breath sounds normal. He has no wheezes. He has no rales. "   Abdominal: There is no tenderness.   Musculoskeletal: Normal range of motion.   Neurological: He is alert and oriented to person, place, and time.   Skin: Skin is warm and dry.   Psychiatric: He has a normal mood and affect. His behavior is normal.       Assessment:     1. Uncontrolled type 2 diabetes mellitus without complication, without long-term current use of insulin    2. Hypertension associated with diabetes    3. Hyperlipidemia associated with type 2 diabetes mellitus    4. Routine general medical examination at a health care facility    5. Colon cancer screening    6. Need for pneumococcal vaccination    7. Elevated PSA      Plan:   Uncontrolled type 2 diabetes mellitus without complication, without long-term current use of insulin  Home glucose improving on recent addition of farxiga  -     Hemoglobin A1c; Future; Expected date: 07/02/2018    Hypertension associated with diabetes  Controlled  Hyperlipidemia associated with type 2 diabetes mellitus  -     Lipid panel; Future; Expected date: 04/30/2018    Routine general medical examination at a health care facility    Colon cancer screening  -     Case request GI: COLONOSCOPY  -     sodium,potassium,mag sulfates (SUPREP BOWEL PREP KIT) 17.5-3.13-1.6 gram SolR; Take as directed  Dispense: 354 mL; Refill: 0    Need for pneumococcal vaccination  -     (In Office Administered) Pneumococcal Polysaccharide Vaccine (23 Valent) (SQ/IM)    Elevated PSA  -     PSA, Screening; Future; Expected date: 04/30/2018        Lab Frequency Next Occurrence   Lipid panel Once 10/26/2018       Problem List Items Addressed This Visit        Cardiac/Vascular    Hypertension associated with diabetes    Hyperlipidemia associated with type 2 diabetes mellitus    Relevant Orders    Lipid panel       Renal/    Elevated PSA    Overview     Did not f/u with urology as rec in 2015         Relevant Orders    PSA, Screening       Endocrine    Uncontrolled type 2 diabetes mellitus without  complication, without long-term current use of insulin - Primary    Relevant Orders    Hemoglobin A1c      Other Visit Diagnoses     Routine general medical examination at a health care facility        Colon cancer screening        Relevant Medications    sodium,potassium,mag sulfates (SUPREP BOWEL PREP KIT) 17.5-3.13-1.6 gram SolR    Other Relevant Orders    Case request GI: COLONOSCOPY (Completed)    Need for pneumococcal vaccination        Relevant Orders    (In Office Administered) Pneumococcal Polysaccharide Vaccine (23 Valent) (SQ/IM) (Completed)          Follow-up in about 6 months (around 10/30/2018), or if symptoms worsen or fail to improve.

## 2018-05-02 ENCOUNTER — DOCUMENTATION ONLY (OUTPATIENT)
Dept: ENDOSCOPY | Facility: HOSPITAL | Age: 68
End: 2018-05-02

## 2018-05-02 NOTE — PROGRESS NOTES
5.2.2018 Per [employer], Person pressed touch tone key to speak with an endoscopy . Pt scheduled.  Instructions mailed.

## 2018-05-08 ENCOUNTER — NUTRITION (OUTPATIENT)
Dept: DIABETES | Facility: CLINIC | Age: 68
End: 2018-05-08
Payer: COMMERCIAL

## 2018-05-08 VITALS — HEIGHT: 69 IN | BODY MASS INDEX: 38.33 KG/M2 | WEIGHT: 258.81 LBS

## 2018-05-08 DIAGNOSIS — E78.5 HYPERLIPIDEMIA, UNSPECIFIED HYPERLIPIDEMIA TYPE: ICD-10-CM

## 2018-05-08 DIAGNOSIS — R97.20 RISING PSA LEVEL: Primary | ICD-10-CM

## 2018-05-08 PROCEDURE — G0108 DIAB MANAGE TRN  PER INDIV: HCPCS | Mod: S$GLB,,, | Performed by: DIETITIAN, REGISTERED

## 2018-05-08 PROCEDURE — 99999 PR PBB SHADOW E&M-EST. PATIENT-LVL III: CPT | Mod: PBBFAC,,, | Performed by: DIETITIAN, REGISTERED

## 2018-05-08 NOTE — LETTER
May 8, 2018        Bryce Gonzales MD  [address] [facility] [place] [place] [address]             [facility]  [address] [facility] [address] [place] [address]  Phone: [phone]  Fax: [phone]   Patient: Anup Miller   MR Number: [id number]   YOB: 1950   Date of Visit: 5/8/2018       Dear Dr. Gonzales:    Thank you for referring Anup Miller to me for evaluation. Below are the relevant portions of my assessment and plan of care.    If you have questions, please do not hesitate to call me. I look forward to following Anup along with you.    Sincerely,      Sandy Hinds, ABELINO, CDE           CC  No Recipients

## 2018-05-08 NOTE — PROGRESS NOTES
Diabetes Education  Author: Sandy Hinds RD, CDE  Date: 5/8/2018    Diabetes Education Visit  Diabetes Education Record Assessment/Progress: Comprehensive/Group    Diabetes Type  Diabetes Type : Type II      Nutrition  Meal Planning:  (2700cals/d - excesss carb, fat, sodium from dining out and irregular meal patterns. Pt hasn't tried MR burkett per rec at last visit; he isn't packing food items for work.)    Monitoring   Self Monitoring : Per recall, fst -170; ppd 160-220, 280. Pt denies hypoglycemia. Frequent excursions from food patterns and dining out selections.  Blood Glucose Logs: No  In the last month, how often have you had a low blood sugar reaction?: never    Exercise   Exercise Type: walking  Intensity: Low  Frequency: Daily  Duration: 15 min    Current Diabetes Treatment   Current Treatment:  (amaryl 4 mg 2 tab am daily, metformin xr 750mg ac, actos 15mg daily, farxiga 5mg daily)    Social History  Preferred Learning Method: Face to Face  Primary Support: Self      Barriers to Change  Barriers to Change: None  Learning Challenges : None    Readiness to Learn   Readiness to Learn : Acceptance    Cultural Influences  Cultural Influences: No    Diabetes Education Assessment/Progress  Diabetes Disease Process (diabetes disease process and treatment options): Discussion, Individual Session, Demonstrates Understanding/Competency(verbalizes/demonstrates)  Nutrition (Incorporating nutritional management into one's lifestyle): Discussion, Individual Session, Demonstrates Understanding/Competency (verbalizes/demonstrates), Written Materials Provided  Physical Activity (incorporating physical activity into one's lifestyle): Discussion, Individual Session, Demonstrates Understanding/Competency (verbalizes/demonstrates)  Medications (states correct name, dose, onset, peak, duration, side effects & timing of meds): Discussion, Individual Session, Demonstrates  Understanding/Competency(verbalizes/demonstrates), Written Materials Provided  Monitoring (monitoring blood glucose/other parameters & using results): Discussion, Individual Session, Demonstrates Understanding/Competency (verbalizes/demonstrates), Written Materials Provided  Acute Complications (preventing, detecting, and treating acute complications): Discussion, Individual Session, Demonstrates Understanding/Competency (verbalizes/demonstrates)  Chronic Complications (preventing, detecting, and treating chronic complications): Discussion, Individual Session, Demonstrates Understanding/Competency (verbalizes/demonstrates)  Clinical (diabetes, other pertinent medical history, and relevant comorbidities reviewed during visit): Discussion, Individual Session, Demonstrates Understanding/Competency (verbalizes/demonstrates)  Cognitive (knowledge of self-management skills, functional health literacy): Discussion, Individual Session, Demonstrates Understanding/Competency (verbalizes/demonstrates)  Psychosocial (emotional response to diabetes): Discussion, Individual Session, Demonstrates Understanding/Competency (verbalizes/demonstrates)  Diabetes Distress and Support Systems: Discussion, Individual Session, Demonstrates Understanding/Competency (verbalizes/demonstrates)  Behavioral (readiness for change, lifestyle practices, self-care behaviors): Discussion, Individual Session, Demonstrates Understanding/Competency (verbalizes/demonstrates)    Goals  Patient has selected/evaluated goals during today's session: Yes, evaluated  Healthy Eating: Set (use meal plan - 3meals/d using MR shakes/entrees, packing foods for work (samples of glucerna provided today))  Met Percentage : 25%  Start Date: 05/08/18  Target Date: 06/19/18  Physical Activity: % Met (150 min/wk - chair based (bands, weights, table bike) due to job)  Met Percentage : 0%  Start Date: 05/08/18  Target Date: 06/19/18  Monitoring: Set (test bg 2x/d (fst, 2hr  pp), call weekly for review)  Met Percentage : 50%  Start Date: 05/08/18  Target Date: 06/19/18     Diabetes Care Plan/Intervention  Education Plan/Intervention: Individual Follow-Up DSMT, Endocrine Provider Visit Set Up (Will update A1C, micro/cr after 6/15 per ADA protocol. Maintain visits w/ Guerrero. Emphasis on bringing meter to all visits.)    Diabetes Meal Plan  Restrictions: Low Fat, Low Sodium  Calories: 1800  Carbohydrate Per Meal: 30-45g  Carbohydrate Per Snack : 15-30g    Education Units of Time   Time Spent: 30 min    Health Maintenance was reviewed today with patient. Discussed with patient importance of routine eye exams, foot exams/foot care, blood work (i.e.: A1c, microalbumin, and lipid), dental visits, yearly flu vaccine, and pneumonia vaccine as indicated by PCP. Patient verbalized understanding.     Health Maintenance Topics with due status: Not Due       Topic Last Completion Date    TETANUS VACCINE 04/02/2013    Eye Exam 08/24/2017    Influenza Vaccine 09/17/2017    Hemoglobin A1c 03/14/2018    Foot Exam 04/06/2018    Lipid Panel 04/30/2018    Low Dose Statin 05/08/2018     Health Maintenance Due   Topic Date Due    Colonoscopy  11/14/2016

## 2018-05-17 NOTE — PROGRESS NOTES
Subjective:      Patient ID: Anup Miller is a 68 y.o. male.    Chief Complaint: No chief complaint on file.    HPI  Review of Systems  Objective:   There were no vitals taken for this visit.    Physical Exam    Assessment:     1. Rising PSA level    2. Hyperlipidemia, unspecified hyperlipidemia type      Plan:   Rising PSA level  -     Ambulatory referral to Urology    Hyperlipidemia, unspecified hyperlipidemia type  -     ALT (SGPT); Future; Expected date: 08/08/2018  -     Lipid panel; Future; Expected date: 08/08/2018        Lab Frequency Next Occurrence   Hemoglobin A1c Once 07/02/2018   ALT (SGPT) Once 08/08/2018   Lipid panel Once 08/08/2018   Hemoglobin A1c Once 06/15/2018   Microalbumin/creatinine urine ratio Once 06/15/2018       Problem List Items Addressed This Visit     None      Visit Diagnoses     Rising PSA level    -  Primary    Relevant Orders    Ambulatory referral to Urology    Hyperlipidemia, unspecified hyperlipidemia type        Relevant Orders    ALT (SGPT)    Lipid panel          No Follow-up on file.

## 2018-06-18 ENCOUNTER — LAB VISIT (OUTPATIENT)
Dept: LAB | Facility: HOSPITAL | Age: 68
End: 2018-06-18
Attending: PHYSICIAN ASSISTANT
Payer: COMMERCIAL

## 2018-06-18 LAB
ESTIMATED AVG GLUCOSE: 180 MG/DL
HBA1C MFR BLD HPLC: 7.9 %

## 2018-06-18 PROCEDURE — 36415 COLL VENOUS BLD VENIPUNCTURE: CPT | Mod: PO

## 2018-06-18 PROCEDURE — 83036 HEMOGLOBIN GLYCOSYLATED A1C: CPT

## 2018-07-07 DIAGNOSIS — E78.5 HYPERLIPIDEMIA, UNSPECIFIED HYPERLIPIDEMIA TYPE: Chronic | ICD-10-CM

## 2018-07-09 RX ORDER — ROSUVASTATIN CALCIUM 10 MG/1
TABLET, COATED ORAL
Qty: 90 TABLET | Refills: 1 | Status: SHIPPED | OUTPATIENT
Start: 2018-07-09 | End: 2018-12-04 | Stop reason: SDUPTHER

## 2018-07-09 RX ORDER — HYDROCHLOROTHIAZIDE 25 MG/1
TABLET ORAL
Qty: 90 TABLET | Refills: 1 | Status: SHIPPED | OUTPATIENT
Start: 2018-07-09 | End: 2019-08-08 | Stop reason: SDUPTHER

## 2018-07-09 RX ORDER — LOSARTAN POTASSIUM 50 MG/1
TABLET ORAL
Qty: 90 TABLET | Refills: 1 | Status: SHIPPED | OUTPATIENT
Start: 2018-07-09 | End: 2018-12-04 | Stop reason: SDUPTHER

## 2018-07-09 RX ORDER — PANTOPRAZOLE SODIUM 40 MG/1
TABLET, DELAYED RELEASE ORAL
Qty: 90 TABLET | Refills: 1 | Status: SHIPPED | OUTPATIENT
Start: 2018-07-09 | End: 2019-04-29 | Stop reason: SDUPTHER

## 2018-07-16 ENCOUNTER — DOCUMENTATION ONLY (OUTPATIENT)
Dept: ENDOSCOPY | Facility: HOSPITAL | Age: 68
End: 2018-07-16

## 2018-07-16 ENCOUNTER — TELEPHONE (OUTPATIENT)
Dept: GASTROENTEROLOGY | Facility: CLINIC | Age: 68
End: 2018-07-16

## 2018-07-16 NOTE — TELEPHONE ENCOUNTER
----- Message from Teagan Vasquez sent at 7/16/2018  9:31 AM CDT -----  Contact: pt wife  Calling in regards to the time and questions of procedure on July 19th and please advise [phone]

## 2018-07-16 NOTE — TELEPHONE ENCOUNTER
Returned patients call and patient did not answer, a VM was left for the patient to call back and schedule.

## 2018-07-16 NOTE — PROGRESS NOTES
7.16.2018 Pt's spouse states she does not know where instructions are for scheduled procedure 7/19/2018.  Instructions sent via [email or web address].

## 2018-07-16 NOTE — TELEPHONE ENCOUNTER
----- Message from Janice Landa sent at 7/16/2018  1:01 PM CDT -----  Contact: pt wife  Caller is requesting a call back from the nurse in regards to pt instructions not being on his my [facility].                                                    [phone] (home)

## 2018-07-18 ENCOUNTER — OFFICE VISIT (OUTPATIENT)
Dept: UROLOGY | Facility: CLINIC | Age: 68
End: 2018-07-18
Payer: COMMERCIAL

## 2018-07-18 VITALS
SYSTOLIC BLOOD PRESSURE: 138 MMHG | BODY MASS INDEX: 38.2 KG/M2 | WEIGHT: 257.94 LBS | HEART RATE: 88 BPM | DIASTOLIC BLOOD PRESSURE: 78 MMHG | HEIGHT: 69 IN

## 2018-07-18 DIAGNOSIS — R97.20 ELEVATED PSA: Primary | ICD-10-CM

## 2018-07-18 PROCEDURE — 99999 PR PBB SHADOW E&M-EST. PATIENT-LVL II: CPT | Mod: PBBFAC,,, | Performed by: UROLOGY

## 2018-07-18 PROCEDURE — 3075F SYST BP GE 130 - 139MM HG: CPT | Mod: CPTII,S$GLB,, | Performed by: UROLOGY

## 2018-07-18 PROCEDURE — 3078F DIAST BP <80 MM HG: CPT | Mod: CPTII,S$GLB,, | Performed by: UROLOGY

## 2018-07-18 PROCEDURE — 99214 OFFICE O/P EST MOD 30 MIN: CPT | Mod: S$GLB,,, | Performed by: UROLOGY

## 2018-07-18 RX ORDER — CIPROFLOXACIN 500 MG/1
500 TABLET ORAL EVERY 12 HOURS
Qty: 3 TABLET | Refills: 0 | Status: SHIPPED | OUTPATIENT
Start: 2018-07-18 | End: 2018-12-03

## 2018-07-18 NOTE — LETTER
July 18, 2018      Bryce Gonzales MD  [address] [facility] [address]           [facility]  [address] [place] [address]  Phone: [phone]  Fax: [phone]          Patient: Anup Miller   MR Number: [id number]   YOB: 1950   Date of Visit: 7/18/2018       Dear Dr. Bryce Gonzales:    Thank you for referring Anup Miller to me for evaluation. Attached you will find relevant portions of my assessment and plan of care.    If you have questions, please do not hesitate to call me. I look forward to following Anup Miller along with you.    Sincerely,    Candido Bernstein IV, MD    Enclosure  CC:  No Recipients    If you would like to receive this communication electronically, please contact [email or web address] or [phone] to request more information on [facility] Link access.    For providers and/or their staff who would like to refer a patient to [facility], please contact us through our one-stop-shop provider referral line, [facility], at [phone].    If you feel you have received this communication in error or would no longer like to receive these types of communications, please e-mail [email or web address]

## 2018-07-18 NOTE — PROGRESS NOTES
Chief Complaint: Elevated PSA    HPI:   7/18/18: PSA trend up over last years, no other new acute problems.  11/30/15: 64 yo man referred by Dr. Rincon for elevated PSA.  No abd/pelvic pain and no exac/rel factors.  No hematuria.  No urolithiasis.  No urinary bother.  No  history.  Normal sexual function except mild ED.  Works as a  loads/unloads too. One uncle had prostate cancer.     Allergies:  Patient has no known allergies.    Medications:  has a current medication list which includes the following prescription(s): alrex, dapagliflozin, fluticasone, glimepiride, hydrochlorothiazide, losartan, metformin, multivitamin, pantoprazole, pioglitazone, rosuvastatin, sodium,potassium,mag sulfates, and xiidra.    Review of Systems:  General: No fever, chills, fatigability, or weight loss.  Skin: No rashes, itching, or changes in color or texture of skin.  Chest: Denies ONTIVEROS, cyanosis, wheezing, cough, and sputum production.  Abdomen: Appetite fine. No weight loss. Denies diarrhea, abdominal pain, hematemesis, or blood in stool.  Musculoskeletal: No joint stiffness or swelling. Denies back pain.  : As above.  All other review of systems negative.    PMH:   has a past medical history of Diabetes mellitus type II (Diagnosed 2002); Elevated liver enzymes; Fatty liver disease, nonalcoholic; Hyperlipidemia; and Hypertension.    PSH:   has a past surgical history that includes Back surgery and Cholecystectomy (2013).    FamHx: family history includes Asthma in his mother; Cancer in his maternal grandmother and sister; Diabetes in his father, maternal grandmother, paternal aunt, and paternal uncle.    SocHx:  reports that he has never smoked. He has never used smokeless tobacco. He reports that he does not drink alcohol or use drugs.      Physical Exam:  Vitals:    07/18/18 0709   BP: 138/78   Pulse: 88     General: A&Ox3, no apparent distress, no deformities  Neck: No masses, normal thyroid  Lungs: normal  inspiration, no use of accessory muscles  Heart: normal pulse, no arrhythmias  Abdomen: Soft, NT, ND  Skin: The skin is warm and dry. No jaundice.  Ext: No c/c/e.  :   11/15: Test desc dominik, no abnormalities of epididymus. Penis normal, with normal penile and scrotal skin. Meatus normal. Normal rectal tone, no hemorrhoids. Prost 30 gm no nodules or masses appreciated. SV not palpable. Perineum and anus normal.    Labs/Studies: none today  PSA    1/14: 3.7    11/15: 4.1    11/16: 5.8    12/17: 6.7    4/18: 8.5    Impression/Plan:   1. PSA trend shows clear need for biopsy, will arrange.  Cipro rx.

## 2018-07-19 ENCOUNTER — HOSPITAL ENCOUNTER (OUTPATIENT)
Facility: HOSPITAL | Age: 68
Discharge: HOME | End: 2018-07-19
Attending: INTERNAL MEDICINE | Admitting: INTERNAL MEDICINE
Payer: COMMERCIAL

## 2018-07-19 ENCOUNTER — ANESTHESIA EVENT (OUTPATIENT)
Dept: ENDOSCOPY | Facility: HOSPITAL | Age: 68
End: 2018-07-19
Payer: COMMERCIAL

## 2018-07-19 ENCOUNTER — ANESTHESIA (OUTPATIENT)
Dept: ENDOSCOPY | Facility: HOSPITAL | Age: 68
End: 2018-07-19
Payer: COMMERCIAL

## 2018-07-19 ENCOUNTER — SURGERY (OUTPATIENT)
Age: 68
End: 2018-07-19

## 2018-07-19 VITALS
HEART RATE: 92 BPM | OXYGEN SATURATION: 98 % | DIASTOLIC BLOOD PRESSURE: 93 MMHG | SYSTOLIC BLOOD PRESSURE: 131 MMHG | WEIGHT: 251 LBS | BODY MASS INDEX: 37.18 KG/M2 | RESPIRATION RATE: 18 BRPM | HEIGHT: 69 IN | TEMPERATURE: 98 F

## 2018-07-19 DIAGNOSIS — K64.9 RECTAL VARICES: ICD-10-CM

## 2018-07-19 DIAGNOSIS — D17.9 MULTIPLE LIPOMAS: ICD-10-CM

## 2018-07-19 DIAGNOSIS — D12.6 ADENOMATOUS POLYP OF COLON, UNSPECIFIED PART OF COLON: Primary | ICD-10-CM

## 2018-07-19 DIAGNOSIS — K57.30 DIVERTICULOSIS OF LARGE INTESTINE WITHOUT HEMORRHAGE: ICD-10-CM

## 2018-07-19 DIAGNOSIS — Z86.010 HX OF COLONIC POLYP: ICD-10-CM

## 2018-07-19 PROBLEM — Z86.0100 HX OF COLONIC POLYP: Status: ACTIVE | Noted: 2018-07-19

## 2018-07-19 LAB — POCT GLUCOSE: 218 MG/DL (ref 70–110)

## 2018-07-19 PROCEDURE — 82962 GLUCOSE BLOOD TEST: CPT | Performed by: INTERNAL MEDICINE

## 2018-07-19 PROCEDURE — 45385 COLONOSCOPY W/LESION REMOVAL: CPT | Performed by: INTERNAL MEDICINE

## 2018-07-19 PROCEDURE — 25000003 PHARM REV CODE 250: Performed by: INTERNAL MEDICINE

## 2018-07-19 PROCEDURE — 88305 TISSUE EXAM BY PATHOLOGIST: CPT | Mod: 26,,, | Performed by: PATHOLOGY

## 2018-07-19 PROCEDURE — 37000009 HC ANESTHESIA EA ADD 15 MINS: Performed by: INTERNAL MEDICINE

## 2018-07-19 PROCEDURE — 88305 TISSUE EXAM BY PATHOLOGIST: CPT | Performed by: PATHOLOGY

## 2018-07-19 PROCEDURE — 37000008 HC ANESTHESIA 1ST 15 MINUTES: Performed by: INTERNAL MEDICINE

## 2018-07-19 PROCEDURE — 27201089 HC SNARE, DISP (ANY): Performed by: INTERNAL MEDICINE

## 2018-07-19 PROCEDURE — 25000003 PHARM REV CODE 250: Performed by: NURSE ANESTHETIST, CERTIFIED REGISTERED

## 2018-07-19 PROCEDURE — 27201012 HC FORCEPS, HOT/COLD, DISP: Performed by: INTERNAL MEDICINE

## 2018-07-19 PROCEDURE — 45380 COLONOSCOPY AND BIOPSY: CPT | Mod: 59,,, | Performed by: INTERNAL MEDICINE

## 2018-07-19 PROCEDURE — 63600175 PHARM REV CODE 636 W HCPCS: Performed by: NURSE ANESTHETIST, CERTIFIED REGISTERED

## 2018-07-19 PROCEDURE — 45380 COLONOSCOPY AND BIOPSY: CPT | Performed by: INTERNAL MEDICINE

## 2018-07-19 PROCEDURE — 45385 COLONOSCOPY W/LESION REMOVAL: CPT | Mod: 33,,, | Performed by: INTERNAL MEDICINE

## 2018-07-19 RX ORDER — LIDOCAINE HYDROCHLORIDE 20 MG/ML
INJECTION, SOLUTION EPIDURAL; INFILTRATION; INTRACAUDAL; PERINEURAL
Status: DISCONTINUED | OUTPATIENT
Start: 2018-07-19 | End: 2018-07-19

## 2018-07-19 RX ORDER — SODIUM CHLORIDE 0.9 % (FLUSH) 0.9 %
3 SYRINGE (ML) INJECTION
Status: CANCELLED | OUTPATIENT
Start: 2018-07-19

## 2018-07-19 RX ORDER — PROPOFOL 10 MG/ML
VIAL (ML) INTRAVENOUS
Status: DISCONTINUED | OUTPATIENT
Start: 2018-07-19 | End: 2018-07-19

## 2018-07-19 RX ORDER — SODIUM CHLORIDE, SODIUM LACTATE, POTASSIUM CHLORIDE, CALCIUM CHLORIDE 600; 310; 30; 20 MG/100ML; MG/100ML; MG/100ML; MG/100ML
INJECTION, SOLUTION INTRAVENOUS CONTINUOUS
Status: DISCONTINUED | OUTPATIENT
Start: 2018-07-19 | End: 2018-07-19 | Stop reason: HOSPADM

## 2018-07-19 RX ORDER — SODIUM CHLORIDE, SODIUM LACTATE, POTASSIUM CHLORIDE, CALCIUM CHLORIDE 600; 310; 30; 20 MG/100ML; MG/100ML; MG/100ML; MG/100ML
INJECTION, SOLUTION INTRAVENOUS CONTINUOUS PRN
Status: DISCONTINUED | OUTPATIENT
Start: 2018-07-19 | End: 2018-07-19

## 2018-07-19 RX ADMIN — PROPOFOL 20 MG: 10 INJECTION, EMULSION INTRAVENOUS at 08:07

## 2018-07-19 RX ADMIN — PROPOFOL 80 MG: 10 INJECTION, EMULSION INTRAVENOUS at 08:07

## 2018-07-19 RX ADMIN — PROPOFOL 30 MG: 10 INJECTION, EMULSION INTRAVENOUS at 08:07

## 2018-07-19 RX ADMIN — PROPOFOL 40 MG: 10 INJECTION, EMULSION INTRAVENOUS at 08:07

## 2018-07-19 RX ADMIN — LIDOCAINE HYDROCHLORIDE 40 MG: 20 INJECTION, SOLUTION EPIDURAL; INFILTRATION; INTRACAUDAL; PERINEURAL at 08:07

## 2018-07-19 RX ADMIN — SODIUM CHLORIDE, SODIUM LACTATE, POTASSIUM CHLORIDE, AND CALCIUM CHLORIDE: .6; .31; .03; .02 INJECTION, SOLUTION INTRAVENOUS at 08:07

## 2018-07-19 RX ADMIN — SODIUM CHLORIDE, SODIUM LACTATE, POTASSIUM CHLORIDE, AND CALCIUM CHLORIDE: 600; 310; 30; 20 INJECTION, SOLUTION INTRAVENOUS at 08:07

## 2018-07-19 NOTE — TRANSFER OF CARE
"Anesthesia Transfer of Care Note    Patient: Anup Miller    Procedure(s) Performed: Procedure(s) (LRB):  COLONOSCOPY (N/A)    Patient location: GI    Anesthesia Type: MAC    Transport from OR: Transported from OR on room air with adequate spontaneous ventilation    Post pain: adequate analgesia    Post assessment: no apparent anesthetic complications    Post vital signs: stable    Level of consciousness: responds to stimulation    Nausea/Vomiting: no nausea/vomiting    Complications: none    Transfer of care protocol was followed      Last vitals:   Visit Vitals  /83 (BP Location: Left arm, Patient Position: Lying)   Pulse 104   Temp 36.7 °C (98.1 °F) (Oral)   Resp 17   Ht 5' 9" (1.753 m)   Wt 113.9 kg (251 lb)   SpO2 97%   BMI 37.07 kg/m²     "

## 2018-07-19 NOTE — H&P
Short Stay Endoscopy History and Physical    PCP - Bryce Gonzales MD    Procedure - Colonoscopy  ASA - 2  Mallampati - per anesthesia  History of Anesthesia problems - no  Family history Anesthesia problems -  no     HPI:  This is a 68 y.o.male here for evaluation of : Hx of Colon Polyps    Reflux - no  Dysphagia - no  Abdominal pain - no  Diarrhea - no  Anemia - no  GI bleeding - no  Nausea and vomiting-no  Early satiety-no  aversion to sight or smell of food-no    ROS:  Constitutional: No fevers, chills, No weight loss  ENT: No allergies  CV: No chest pain  Pulm: No cough, No shortness of breath  Ophtho: No vision changes  GI: see HPI  Derm: No rash  Heme: No lymphadenopathy, No bruising  MSK: No arthritis  : No dysuria, No hematuria  Endo: No hot or cold intolerance  Neuro: No syncope, No seizure  Psych: No anxiety, No depression    Medical History:  Past Medical History:   Diagnosis Date    Diabetes mellitus type II Diagnosed 2002    Elevated liver enzymes     Fatty liver disease, nonalcoholic     Hyperlipidemia     Hypertension        Surgical History:  Past Surgical History:   Procedure Laterality Date    BACK SURGERY      CHOLECYSTECTOMY  2013       Family History:  Family History   Problem Relation Age of Onset    Asthma Mother     Diabetes Father     Cancer Sister         Breast    Diabetes Paternal Aunt     Cancer Maternal Grandmother         Breast    Diabetes Maternal Grandmother     Diabetes Paternal Uncle     Colon cancer Neg Hx        Social History:  Social History     Social History    Marital status:      Spouse name: yoselin    Number of children: 1    Years of education: N/A     Occupational History          Social History Main Topics    Smoking status: Never Smoker    Smokeless tobacco: Never Used    Alcohol use No    Drug use: No    Sexual activity: Yes     Partners: Female     Other Topics Concern    Not on file     Social History Narrative    No  narrative on file       Allergies: Review of patient's allergies indicates:  No Known Allergies    Medications:   No current facility-administered medications on file prior to encounter.      Current Outpatient Prescriptions on File Prior to Encounter   Medication Sig Dispense Refill    ALREX 0.2 % DrpS Place 1 drop into both eyes 3 (three) times daily as needed.      dapagliflozin (FARXIGA) 5 mg Tab tablet Take 1 tablet (5 mg total) by mouth once daily. 90 tablet 3    fluticasone (FLONASE) 50 mcg/actuation nasal spray USE 2 SPRAYS IN EACH NOSTRIL ONE TIME DAILY 3 Bottle 0    glimepiride (AMARYL) 4 MG tablet Take 2 tablets (8 mg total) by mouth once daily. 180 tablet 2    metFORMIN (GLUCOPHAGE-XR) 750 MG 24 hr tablet Take 1 tablet (750 mg total) by mouth 2 (two) times daily with meals. (Patient taking differently: Take 750 mg by mouth 3 (three) times daily with meals. ) 180 tablet 3    multivitamin capsule Take 1 capsule by mouth once daily.      pioglitazone (ACTOS) 15 MG tablet Take 1 tablet (15 mg total) by mouth once daily. 30 tablet 11    sodium,potassium,mag sulfates (SUPREP BOWEL PREP KIT) 17.5-3.13-1.6 gram SolR Take as directed 354 mL 0    XIIDRA 5 % Dpet Place 1 drop into both eyes once daily.   1       Objective Findings:    Vital Signs:There were no vitals filed for this visit.        Physical Exam:  General Appearance: Well appearing in no acute distress  Eyes:    No scleral icterus  ENT: Neck supple, Lips, mucosa, and tongue normal; teeth and gums normal  Lungs: CTA bilaterally in anterior and posterior fields, no wheezes, no crackles.  Heart:  Regular rate, S1, S2 normal, no murmurs heard.  Abdomen: Soft, non tender, non distended with normal bowel sounds. No hepatosplenomegaly, ascites, or mass.  Extremities: No clubbing, cyanosis or edema  Skin: No rash    Labs:  Reviewed  Colonoscopy  I have explained the risks and benefits of endoscopy procedures to the patient including but not limited  to bleeding, perforation, infection, and death. The patient wishes to proceed.

## 2018-07-19 NOTE — DISCHARGE INSTRUCTIONS
Hemorrhoids    Hemorrhoids are swollen and inflamed veins inside the rectum and near the anus. The rectum is the last several inches of the colon. The anus is the passage between the rectum and the outside of the body.  Causes  The veins can become swollen due to increased pressure in them. This is most often caused by:  · Chronic constipation or diarrhea  · Straining when having a bowel movement  · Sitting too long on the toilet  · A low-fiber diet  · Pregnancy  Symptoms  · Bleeding from the rectum (this may be noticeable after bowel movements)  · Lump near the anus  · Itching around the anus  · Pain around the anus  There are different types of hemorrhoids. Depending on the type you have and the severity, you may be able to treat yourself at home. In some cases, a procedure may be the best treatment option. Your healthcare provider can tell you more about this, if needed.  Home care  General care  · To get relief from pain or itching, try:  ¨ Topical products. Your healthcare provider may prescribe or recommend creams, ointments, or pads that can be applied to the hemorrhoid. Use these exactly as directed.  ¨ Medicines. Your healthcare provider may recommend stool softeners, suppositories, or laxatives to help manage constipation. Use these exactly as directed.  ¨ Sitz baths. A sitz bath involves sitting in a few inches of warm bath water. Be careful not to make the water so hot that you burn yourself--test it before sitting in it. Soak for about 10 to 15 minutes a few times a day. This may help relieve pain.  Tips to help prevent hemorrhoids  · Eat more fiber. Fiber adds bulk to stool and absorbs water as it moves through your colon. This makes stool softer and easier to pass.  ¨ Increase the fiber in your diet with more fiber-rich foods. These include fresh fruit, vegetables, and whole grains.  ¨ Take a fiber supplement or bulking agent, if advised to by your provider. These include products such as psyllium  or methylcellulose.  · Drink plenty of water, if directed to by your provider. This can help keep stool soft.  · Be more active. Frequent exercise aids digestion and helps prevent constipation. It may also help make bowel movements more regular.  · Dont strain during bowel movements. This can make hemorrhoids more likely. Also, dont sit on the toilet for long periods of time.  Follow-up care  Follow up with your healthcare provider, or as advised. If a culture or imaging tests were done, you will be notified of the results when they are ready. This may take a few days or longer.  When to seek medical advice  Call your healthcare provider right away if any of these occur:  · Increased bleeding from the rectum  · Increased pain around the rectum or anus  · Weakness or dizziness  Call 911  Call 911 or return to the emergency department right away if any of these occur:  · Trouble breathing or swallowing  · Fainting or loss of consciousness  · Unusually fast heart rate  · Vomiting blood  · Large amounts of blood in stool  Date Last Reviewed: 6/22/2015 © 2000-2017 [employer]. [address] [place] [address]. All rights reserved. This information is not intended as a substitute for professional medical care. Always follow your healthcare professional's instructions.        Understanding Colon and Rectal Polyps    The colon (also called the large intestine) is a muscular tube that forms the last part of the digestive tract. It absorbs water and stores food waste. The colon is about 4 to 6 feet long. The rectum is the last 6 inches of the colon. The colon and rectum have a smooth lining composed of millions of cells. Changes in these cells can lead to growths in the colon that can become cancerous and should be removed. Multiple tests are available to screen for colon cancer, but the colonoscopy is the most recommended test. During colonoscopy, these polyps can be removed. How often you need this  test depends on many things including your condition, your family history, symptoms, and what the findings were at the previous colonoscopy.   When the colon lining changes  Changes that happen in the cells that line the colon or rectum can lead to growths called polyps. Over a period of years, polyps can turn cancerous. Removing polyps early may prevent cancer from ever forming.  Polyps  Polyps are fleshy clumps of tissue that form on the lining of the colon or rectum. Small polyps are usually benign (not cancerous). However, over time, cells in a polyp can change and become cancerous. Certain types of polyps known as adenomatous polyps are premalignant. The risk for invasive cancer increases with the size of the polyp and certain cell and gene features. This means that they can become cancerous if they're not removed. Hyperplastic polyps are benign. They can grow quite large and not turn cancerous.   Cancer  Almost all colorectal cancers start when polyp cells begin growing abnormally. As a cancerous tumor grows, it may involve more and more of the colon or rectum. In time, cancer can also grow beyond the colon or rectum and spread to nearby organs or to glands called lymph nodes. The cells can also travel to other parts of the body. This is known as metastasis. The earlier a cancerous tumor is removed, the better the chance of preventing its spread.    Date Last Reviewed: 8/1/2016  © 4125-6894 [employer]. [address] [place] [address], [place] [address]. All rights reserved. This information is not intended as a substitute for professional medical care. Always follow your healthcare professional's instructions.

## 2018-07-19 NOTE — ANESTHESIA RELEASE NOTE
"Anesthesia Release from PACU Note    Patient: Anup Miller    Procedure(s) Performed: Procedure(s) (LRB):  COLONOSCOPY (N/A)    Anesthesia type: MAC    Post pain: Adequate analgesia    Post assessment: no apparent anesthetic complications and tolerated procedure well    Last Vitals:   Visit Vitals  /83 (BP Location: Left arm, Patient Position: Lying)   Pulse 104   Temp 36.7 °C (98.1 °F) (Oral)   Resp 17   Ht 5' 9" (1.753 m)   Wt 113.9 kg (251 lb)   SpO2 97%   BMI 37.07 kg/m²       Post vital signs: stable    Level of consciousness: responds to stimulation    Nausea/Vomiting: no nausea/no vomiting    Complications: none    Airway Patency: patent    Respiratory: unassisted, spontaneous ventilation, room air    Cardiovascular: stable and blood pressure at baseline    Hydration: euvolemic  "

## 2018-07-19 NOTE — OR NURSING
Lipomas: cecum, ascending colon, and hepatic flexure  Rectal varices noted per MD  Hemorrhoids noted per MD

## 2018-07-19 NOTE — [employer] PATIENT INSTRUCTIONS
Discharge Summary/Instructions after an Endoscopic Procedure  Patient Name: Anup Miller  Patient MRN: [id number]  Patient YOB: 1950  Thursday, July 19, 2018 Chacorta Lopez III, MD  RESTRICTIONS:  During your procedure today, you received medications for sedation.  These   medications may affect your judgment, balance and coordination.  Therefore,   for 24 hours, you have the following restrictions:   - DO NOT drive a car, operate machinery, make legal/financial decisions,   sign important papers or drink alcohol.    ACTIVITY:  Today: no heavy lifting, straining or running due to procedural   sedation/anesthesia.  The following day: return to full activity including work.  DIET:  Eat and drink normally unless instructed otherwise.     TREATMENT FOR COMMON SIDE EFFECTS:  - Mild abdominal pain, nausea, belching, bloating or excessive gas:  rest,   eat lightly and use a heating pad.  - Sore Throat: treat with throat lozenges and/or gargle with warm salt   water.  - Because air was used during the procedure, expelling large amounts of air   from your rectum or belching is normal.  - If a bowel prep was taken, you may not have a bowel movement for 1-3 days.    This is normal.  SYMPTOMS TO WATCH FOR AND REPORT TO YOUR PHYSICIAN:  1. Abdominal pain or bloating, other than gas cramps.  2. Chest pain.  3. Back pain.  4. Signs of infection such as: chills or fever occurring within 24 hours   after the procedure.  5. Rectal bleeding, which would show as bright red, maroon, or black stools.   (A tablespoon of blood from the rectum is not serious, especially if   hemorrhoids are present.)  6. Vomiting.  7. Weakness or dizziness.  GO DIRECTLY TO THE NEAREST EMERGENCY ROOM IF YOU HAVE ANY OF THE FOLLOWING:      Difficulty breathing              Chills and/or fever over 101 F   Persistent vomiting and/or vomiting blood   Severe abdominal pain   Severe chest pain   Black, tarry stools   Bleeding- more than one  tablespoon   Any other symptom or condition that you feel may need urgent attention  Your doctor recommends these additional instructions:  If any biopsies were taken, your doctors clinic will contact you in 1 to 2   weeks with any results.  - Discharge patient to home (via wheelchair).   - High fiber diet.   - Continue present medications.   - Await pathology results.   - Repeat colonoscopy in 3 years for surveillance.   - Return to primary care physician as previously scheduled.   - Discharge patient to home (via wheelchair).   - High fiber diet.   - Continue present medications.   - Await pathology results.   - Repeat colonoscopy in 3 years for surveillance.   - Return to primary care physician as previously scheduled.  For questions, problems or results please call your physician Chacorta Lopez III, MD at Work:  [phone]  If you have any questions about the above instructions, call the GI   department at [phone] or call the endoscopy unit at [phone]   from 7am until 3 pm.  [facility] [place], EMERGENCY ROOM PHONE NUMBER:   [phone]  IF A COMPLICATION OR EMERGENCY SITUATION ARISES AND YOU ARE UNABLE TO REACH   YOUR PHYSICIAN - GO DIRECTLY TO THE EMERGENCY ROOM.  I have read or have had read to me these discharge instructions for my   procedure and have received a written copy.  I understand these   instructions and will follow-up with my physician if I have any questions.     __________________________________       _____________________________________  Nurse Signature                                          Patient/Designated   Responsible Party Signature  Chacorta Lopez III, MD  7/19/2018 9:20:57 AM  This report has been verified and signed electronically.  [employer]

## 2018-07-19 NOTE — DISCHARGE SUMMARY
[facility] -   Brief Operative Note     SUMMARY     Surgery Date: 7/19/2018     Surgeon(s) and Role:     * Chacorta Lopez III, MD - Primary    Assisting Surgeon: None    Pre-op Diagnosis:  Colon cancer screening [Z12.11]    Post-op Diagnosis:  Post-Op Diagnosis Codes:     * Colon cancer screening [Z12.11]     - Colon Polyps     - Diverticulosis     - Rectal Varices     - Colonic Lipomas  Procedure(s) (LRB):  COLONOSCOPY (N/A)    Anesthesia: Choice    Description of the findings of the procedure: Procedures completed. See Procedure note for full details.    Findings/Key Components: Procedures completed. See Procedure note for full details.    Prosthetics/Devices: None    Estimated Blood Loss: * No values recorded between 7/19/2018 12:00 AM and 7/19/2018  9:24 AM *         Specimens:   Specimen (12h ago through future)    Start     Ordered    07/19/18 0901  Specimen to Pathology - Surgery  Once     Comments:  1. Transverse Colon Polyps2. Sigmoid Colon Polyp      07/19/18 0903          Discharge Note    SUMMARY     Admit Date: 7/19/2018    Discharge Date and Time: 7/19/2018    Hospital Course (synopsis of major diagnoses, care, treatment, and services provided during the course of the hospital stay):  Procedures completed. See Procedure note for full details. Discharge patient when discharge criteria met.    Final Diagnosis: Post-Op Diagnosis Codes:     * Colon cancer screening [Z12.11]      - Colon Polyps      - Diverticulosis      - Rectal Varices      - Colonic Lipomas  Disposition: Discharge patient when discharge criteria met.    Follow Up/Patient Instructions:       Medications:  Reconciled Home Medications: Current Discharge Medication List      CONTINUE these medications which have NOT CHANGED    Details   dapagliflozin (FARXIGA) 5 mg Tab tablet Take 1 tablet (5 mg total) by mouth once daily.  Qty: 90 tablet, Refills: 3    Associated Diagnoses: Uncontrolled type 2 diabetes mellitus without  complication, without long-term current use of insulin      fluticasone (FLONASE) 50 mcg/actuation nasal spray USE 2 SPRAYS IN EACH NOSTRIL ONE TIME DAILY  Qty: 3 Bottle, Refills: 0      glimepiride (AMARYL) 4 MG tablet Take 2 tablets (8 mg total) by mouth once daily.  Qty: 180 tablet, Refills: 2    Associated Diagnoses: Uncontrolled type 2 diabetes mellitus without complication, without long-term current use of insulin      hydroCHLOROthiazide (HYDRODIURIL) 25 MG tablet TAKE 1 TABLET BY MOUTH ONE TIME DAILY  Qty: 90 tablet, Refills: 1      losartan (COZAAR) 50 MG tablet TAKE 1 TABLET BY MOUTH ONE TIME DAILY  Qty: 90 tablet, Refills: 1      metFORMIN (GLUCOPHAGE-XR) 750 MG 24 hr tablet Take 1 tablet (750 mg total) by mouth 2 (two) times daily with meals.  Qty: 180 tablet, Refills: 3      multivitamin capsule Take 1 capsule by mouth once daily.      pantoprazole (PROTONIX) 40 MG tablet TAKE 1 TABLET BY MOUTH ONE TIME DAILY  Qty: 90 tablet, Refills: 1      pioglitazone (ACTOS) 15 MG tablet Take 1 tablet (15 mg total) by mouth once daily.  Qty: 30 tablet, Refills: 11      rosuvastatin (CRESTOR) 10 MG tablet TAKE 1 TABLET BY MOUTH DAILY  Qty: 90 tablet, Refills: 1    Associated Diagnoses: Hyperlipidemia, unspecified hyperlipidemia type      XIIDRA 5 % Dpet Place 1 drop into both eyes once daily.   Refills: 1      ALREX 0.2 % DrpS Place 1 drop into both eyes 3 (three) times daily as needed.      ciprofloxacin HCl (CIPRO) 500 MG tablet Take 1 tablet (500 mg total) by mouth every 12 (twelve) hours.  Qty: 3 tablet, Refills: 0         STOP taking these medications       sodium,potassium,mag sulfates (SUPREP BOWEL PREP KIT) 17.5-3.13-1.6 gram SolR Comments:   Reason for Stopping:                Discharge Procedure Orders  Diet general     Activity as tolerated

## 2018-07-19 NOTE — ANESTHESIA POSTPROCEDURE EVALUATION
"Anesthesia Post Evaluation    Patient: Anup Miller    Procedure(s) Performed: Procedure(s) (LRB):  COLONOSCOPY (N/A)    Final Anesthesia Type: MAC  Patient location during evaluation: GI PACU  Patient participation: Yes- Able to Participate  Level of consciousness: awake and alert and oriented  Post-procedure vital signs: reviewed and stable  Pain management: adequate  Airway patency: patent  PONV status at discharge: No PONV  Anesthetic complications: no      Cardiovascular status: hemodynamically stable  Respiratory status: unassisted, spontaneous ventilation and room air  Hydration status: euvolemic  Follow-up not needed.        Visit Vitals  /83 (BP Location: Left arm, Patient Position: Lying)   Pulse 104   Temp 36.7 °C (98.1 °F) (Oral)   Resp 17   Ht 5' 9" (1.753 m)   Wt 113.9 kg (251 lb)   SpO2 97%   BMI 37.07 kg/m²       Pain/Dudley Score: Presence of Pain: denies (7/19/2018  9:07 AM)  Dudley Score: 9 (7/19/2018  9:08 AM)      "

## 2018-07-19 NOTE — ANESTHESIA PREPROCEDURE EVALUATION
07/19/2018  Anup Miller is a 68 y.o., male.    Anesthesia Evaluation    I have reviewed the Patient Summary Reports.    I have reviewed the Nursing Notes.   I have reviewed the Medications.     Review of Systems  Anesthesia Hx:  No problems with previous Anesthesia  Denies Family Hx of Anesthesia complications.   Denies Personal Hx of Anesthesia complications.   Social:  No Alcohol Use, Non-Smoker    Hematology/Oncology:  Hematology Normal   Oncology Normal     Cardiovascular:   Hypertension Denies MI.   Denies CABG/stent.      hyperlipidemia    Pulmonary:   Denies COPD.  Denies Asthma. Sleep Apnea, CPAP Multiple pulmonary nodules determined by computed tomography of lung   Renal/:  Renal/ Normal     Hepatic/GI:   Denies GERD. Liver Disease,  Denies Hepatitis. Fatty liver disease, nonalcoholic  Elevated liver enzymes   Musculoskeletal:  Musculoskeletal Normal    Neurological:   Denies CVA. Denies Seizures.    Endocrine:   Diabetes, type 2 Denies Hypothyroidism. Denies Hyperthyroidism.        Physical Exam  General:  Obesity    Airway/Jaw/Neck:  Airway Findings: Mouth Opening: Normal Tongue: Normal  General Airway Assessment: Adult  Mallampati: II      Dental:  Dental Findings: In tact   Chest/Lungs:  Chest/Lungs Findings: Clear to auscultation, Normal Respiratory Rate     Heart/Vascular:  Heart Findings: Rate: Normal  Rhythm: Regular Rhythm  Sounds: Normal             Anesthesia Plan  Type of Anesthesia, risks & benefits discussed:  Anesthesia Type:  MAC  Patient's Preference:   Intra-op Monitoring Plan: standard ASA monitors  Intra-op Monitoring Plan Comments:   Post Op Pain Control Plan:   Post Op Pain Control Plan Comments:   Induction:   IV  Beta Blocker:  Patient is not currently on a Beta-Blocker (No further documentation required).       Informed Consent: Patient understands risks and agrees  with Anesthesia plan.  Questions answered. Anesthesia consent signed with patient.  ASA Score: 2     Day of Surgery Review of History & Physical: I have interviewed and examined the patient. I have reviewed the patient's H&P dated:  There are no significant changes.  H&P update referred to the surgeon.         Ready For Surgery From Anesthesia Perspective.

## 2018-07-26 ENCOUNTER — TELEPHONE (OUTPATIENT)
Dept: UROLOGY | Facility: CLINIC | Age: 68
End: 2018-07-26

## 2018-07-26 NOTE — TELEPHONE ENCOUNTER
----- Message from Hellen Rey sent at 7/26/2018  2:54 PM CDT -----  Pt is requesting a call from nurse to r/s procedure.      Please call pt back at [phone]

## 2018-08-02 ENCOUNTER — LAB VISIT (OUTPATIENT)
Dept: LAB | Facility: HOSPITAL | Age: 68
End: 2018-08-02
Attending: INTERNAL MEDICINE
Payer: COMMERCIAL

## 2018-08-02 DIAGNOSIS — E78.5 HYPERLIPIDEMIA, UNSPECIFIED HYPERLIPIDEMIA TYPE: ICD-10-CM

## 2018-08-02 LAB
ALT SERPL W/O P-5'-P-CCNC: 83 U/L
CHOLEST SERPL-MCNC: 134 MG/DL
CHOLEST/HDLC SERPL: 3.3 {RATIO}
ESTIMATED AVG GLUCOSE: 200 MG/DL
HBA1C MFR BLD HPLC: 8.6 %
HDLC SERPL-MCNC: 41 MG/DL
HDLC SERPL: 30.6 %
LDLC SERPL CALC-MCNC: 71.6 MG/DL
NONHDLC SERPL-MCNC: 93 MG/DL
TRIGL SERPL-MCNC: 107 MG/DL

## 2018-08-02 PROCEDURE — 83036 HEMOGLOBIN GLYCOSYLATED A1C: CPT

## 2018-08-02 PROCEDURE — 80061 LIPID PANEL: CPT

## 2018-08-02 PROCEDURE — 84460 ALANINE AMINO (ALT) (SGPT): CPT

## 2018-08-02 PROCEDURE — 36415 COLL VENOUS BLD VENIPUNCTURE: CPT | Mod: PO

## 2018-08-06 ENCOUNTER — OFFICE VISIT (OUTPATIENT)
Dept: DIABETES | Facility: CLINIC | Age: 68
End: 2018-08-06
Payer: COMMERCIAL

## 2018-08-06 VITALS
BODY MASS INDEX: 37.62 KG/M2 | HEIGHT: 69 IN | WEIGHT: 254 LBS | SYSTOLIC BLOOD PRESSURE: 102 MMHG | DIASTOLIC BLOOD PRESSURE: 68 MMHG

## 2018-08-06 LAB — GLUCOSE SERPL-MCNC: 220 MG/DL (ref 70–110)

## 2018-08-06 PROCEDURE — 3045F PR MOST RECENT HEMOGLOBIN A1C LEVEL 7.0-9.0%: CPT | Mod: CPTII,S$GLB,, | Performed by: PHYSICIAN ASSISTANT

## 2018-08-06 PROCEDURE — 99999 PR PBB SHADOW E&M-EST. PATIENT-LVL III: CPT | Mod: PBBFAC,,, | Performed by: PHYSICIAN ASSISTANT

## 2018-08-06 PROCEDURE — 99214 OFFICE O/P EST MOD 30 MIN: CPT | Mod: S$GLB,,, | Performed by: PHYSICIAN ASSISTANT

## 2018-08-06 PROCEDURE — 3078F DIAST BP <80 MM HG: CPT | Mod: CPTII,S$GLB,, | Performed by: PHYSICIAN ASSISTANT

## 2018-08-06 PROCEDURE — 3074F SYST BP LT 130 MM HG: CPT | Mod: CPTII,S$GLB,, | Performed by: PHYSICIAN ASSISTANT

## 2018-08-06 PROCEDURE — 82948 REAGENT STRIP/BLOOD GLUCOSE: CPT | Mod: S$GLB,,, | Performed by: PHYSICIAN ASSISTANT

## 2018-08-06 RX ORDER — DAPAGLIFLOZIN 10 MG/1
1 TABLET, FILM COATED ORAL DAILY
Qty: 90 TABLET | Refills: 1 | Status: SHIPPED | OUTPATIENT
Start: 2018-08-06 | End: 2018-12-03 | Stop reason: SDUPTHER

## 2018-08-06 NOTE — PROGRESS NOTES
Subjective:      Patient ID: Anup Miller is a 68 y.o. male.    PCP: Bryce Gonzales MD      Anup Miller is a pleasant 68 y.o. male presenting to follow up on diabetes mellitus. Also, pertinent to this visit in decision making is hypertension, hyperlipidemia, and compliance. He has had diabetes for 15 or more years. His last visit in Diabetes Management was 4/6/2018. Since that time he has had mild improvement in his glycemia. His blood sugar range fasting has been 146-160 and fed has been 200+, and he has been monitoring 4 times per day. His current concerns are glycemic control.    He denies any hospital admissions, emergency room visits, hypoglycemia, syncope, diaphoresis, chest pain, or dyspnea.    He has gained 2 pounds since last visit. His BMI is 37.50 kg/m².    His blood sugar in the clinic today was:   Lab Results   Component Value Date    POCGLU 220 (A) 08/06/2018     We discussed the American diabetes Association recommendations:  hemoglobin A1c below 7.0%; all diabetics should be on statins unless contraindicated; one aspirin daily unless contraindicated; fasting blood sugar between 80 and 130 mg/dL; postprandial blood sugar below 180 mg/dl; prevention of hypoglycemia, may adjust goals to higher levels if persistent; ACE or ARB therapy if not contraindicated; and maintain in an ideal body weight with BMI below 25.    Anup is compliant most of the time with DM medications.     Anup is compliant most of the time with lifestyle modifications to include activity and meal planning.       Current Outpatient Prescriptions:     ALREX 0.2 % DrpS, Place 1 drop into both eyes 3 (three) times daily as needed., Disp: , Rfl:     ciprofloxacin HCl (CIPRO) 500 MG tablet, Take 1 tablet (500 mg total) by mouth every 12 (twelve) hours., Disp: 3 tablet, Rfl: 0    dapagliflozin (FARXIGA) 5 mg Tab tablet, Take 1 tablet (5 mg total) by mouth once daily., Disp: 90 tablet, Rfl: 3    fluticasone (FLONASE) 50  mcg/actuation nasal spray, USE 2 SPRAYS IN EACH NOSTRIL ONE TIME DAILY, Disp: 3 Bottle, Rfl: 0    glimepiride (AMARYL) 4 MG tablet, Take 2 tablets (8 mg total) by mouth once daily., Disp: 180 tablet, Rfl: 2    hydroCHLOROthiazide (HYDRODIURIL) 25 MG tablet, TAKE 1 TABLET BY MOUTH ONE TIME DAILY, Disp: 90 tablet, Rfl: 1    losartan (COZAAR) 50 MG tablet, TAKE 1 TABLET BY MOUTH ONE TIME DAILY, Disp: 90 tablet, Rfl: 1    metFORMIN (GLUCOPHAGE-XR) 750 MG 24 hr tablet, Take 1 tablet (750 mg total) by mouth 2 (two) times daily with meals. (Patient taking differently: Take 750 mg by mouth 3 (three) times daily with meals. ), Disp: 180 tablet, Rfl: 3    multivitamin capsule, Take 1 capsule by mouth once daily., Disp: , Rfl:     pantoprazole (PROTONIX) 40 MG tablet, TAKE 1 TABLET BY MOUTH ONE TIME DAILY, Disp: 90 tablet, Rfl: 1    pioglitazone (ACTOS) 15 MG tablet, Take 1 tablet (15 mg total) by mouth once daily., Disp: 30 tablet, Rfl: 11    rosuvastatin (CRESTOR) 10 MG tablet, TAKE 1 TABLET BY MOUTH DAILY, Disp: 90 tablet, Rfl: 1    XIIDRA 5 % Dpet, Place 1 drop into both eyes once daily. , Disp: , Rfl: 1    STANDARDS OF CARE:  Eye doctor: Dr. Alex Pelayo, last exam 5/24/2017.  Dental exam: Recommend regular exams; denies gums bleeding.  Podiatry doctor:     ACTIVITY LEVEL: He exercises rarely.  MEAL PLANNING: Number of meals per day - 3. Number of snacks per day - 2.  Per dietary recall, patient is not limiting carbohydrates, saturated fats and sodium.   BLOOD GLUCOSE TESTING: Self-monitoring with   ACE/ARB: Yes  Statin: Yes    Health Maintenance   Topic Date Due    Influenza Vaccine  08/01/2018    Eye Exam  08/24/2018    Hemoglobin A1c  02/02/2019    Foot Exam  07/23/2019    Lipid Panel  08/02/2019    Colonoscopy  07/19/2021    TETANUS VACCINE  04/02/2023    Hepatitis C Screening  Completed    Zoster Vaccine  Completed    Pneumococcal (65+)  Completed       Diabetes Status:   Diabetes Management  Status    Statin: Taking  ACE/ARB: Taking    Screening or Prevention Patient's value Goal Complete/Controlled?   HgA1C Testing and Control   Lab Results   Component Value Date    HGBA1C 8.6 (H) 08/02/2018      Annually/Less than 8% No   Lipid profile : 08/02/2018 Annually Yes   LDL control Lab Results   Component Value Date    LDLCALC 71.6 08/02/2018    Annually/Less than 100 mg/dl  Yes   Nephropathy screening Lab Results   Component Value Date    LABMICR 4.0 06/18/2018     No results found for: PROTEINUA Annually Yes   Blood pressure BP Readings from Last 1 Encounters:   08/06/18 102/68    Less than 140/90 Yes   Dilated retinal exam : 08/24/2017 Annually Yes   Foot exam   : 08/06/2018 Annually Yes     The following results were reviewed with patient.    Lab Results   Component Value Date    WBC 5.26 11/14/2016    HGB 13.5 (L) 11/14/2016    HCT 41.9 11/14/2016     11/14/2016    CHOL 134 08/02/2018    TRIG 107 08/02/2018    HDL 41 08/02/2018    LDLCALC 71.6 08/02/2018    ALT 83 (H) 08/02/2018    AST 31 03/14/2018     03/14/2018    K 4.3 03/14/2018     03/14/2018    ANIONGAP 11 03/14/2018    CREATININE 1.4 03/14/2018    ESTGFRAFRICA >60.0 03/14/2018    EGFRNONAA 52.4 (A) 03/14/2018    BUN 21 03/14/2018    CO2 28 03/14/2018    TSH 0.913 12/29/2014    PSA 8.5 (H) 04/30/2018    INR 1.1 11/30/2015     (H) 03/14/2018       Lab Results   Component Value Date    HGBA1C 8.6 (H) 08/02/2018    HGBA1C 7.9 (H) 06/18/2018    HGBA1C 9.0 (H) 03/14/2018       Lab Results   Component Value Date    GLUTAMICACID 0.00 04/03/2017    CPEPTIDE 4.8 04/03/2017     Lab Results   Component Value Date    TSH 0.913 12/29/2014     Lab Results   Component Value Date    IRON 126 11/30/2015    TIBC 521 (H) 11/30/2015    FERRITIN 247 11/30/2015       Lab Results   Component Value Date    CALCIUM 10.2 03/14/2018     Review of patient's allergies indicates:  No Known Allergies    Past Medical History:   Diagnosis Date     Diabetes mellitus type II Diagnosed 2002    Elevated liver enzymes     Fatty liver disease, nonalcoholic     Hyperlipidemia     Hypertension     Sleep apnea        Review of Systems   Constitutional: Negative.  Negative for activity change, appetite change, chills, diaphoresis, fatigue, fever and unexpected weight change.   HENT: Negative.  Negative for dental problem, facial swelling, hearing loss, nosebleeds, trouble swallowing and voice change.    Eyes: Negative.  Negative for photophobia, pain, discharge, redness, itching and visual disturbance.   Respiratory: Negative.  Negative for cough, choking, chest tightness, shortness of breath and wheezing.    Cardiovascular: Negative.  Negative for chest pain, palpitations and leg swelling.   Gastrointestinal: Negative.  Negative for abdominal distention, abdominal pain, blood in stool, constipation, diarrhea, nausea and vomiting.   Endocrine: Negative.  Negative for cold intolerance, heat intolerance, polydipsia, polyphagia and polyuria.   Genitourinary: Negative.  Negative for decreased urine volume, difficulty urinating, dysuria, frequency, scrotal swelling, testicular pain and urgency.   Musculoskeletal: Negative.  Negative for arthralgias, back pain, gait problem, joint swelling, myalgias, neck pain and neck stiffness.   Skin: Negative.  Negative for color change, pallor, rash and wound.   Allergic/Immunologic: Negative.  Negative for environmental allergies, food allergies and immunocompromised state.   Neurological: Negative.  Negative for dizziness, tremors, seizures, syncope, facial asymmetry, speech difficulty, weakness, light-headedness, numbness and headaches.   Hematological: Negative.  Negative for adenopathy. Does not bruise/bleed easily.   Psychiatric/Behavioral: Negative.  Negative for agitation, behavioral problems, confusion, decreased concentration, dysphoric mood, hallucinations, self-injury, sleep disturbance and suicidal ideas. The patient  "is not nervous/anxious and is not hyperactive.       Objective:     Vitals - 1 value per visit 7/18/2018 7/19/2018 8/6/2018   SYSTOLIC 138 131 102   DIASTOLIC 78 93 68   PULSE 88 92 -   TEMPERATURE - 98.1 -   RESPIRATIONS - 18 -   SPO2 - 98 -   Weight (lb) 257.94 251 253.97   Weight (kg) 117 113.853 115.2   HEIGHT 5' 9" 5' 9" 5' 9"   BODY MASS INDEX 38.09 37.07 37.5   VISIT REPORT - - -   Pain Score  0 - 0   Some recent data might be hidden     Physical Exam   Constitutional: He is oriented to person, place, and time. He appears well-developed and well-nourished. He is cooperative.  Non-toxic appearance. He does not have a sickly appearance. He does not appear ill. No distress. He is not intubated.   HENT:   Head: Normocephalic and atraumatic. Not macrocephalic and not microcephalic. Head is without raccoon's eyes, without Keyes's sign, without abrasion, without contusion, without laceration, without right periorbital erythema and without left periorbital erythema. Hair is normal.   Right Ear: External ear normal. No lacerations. No drainage, swelling or tenderness. No foreign bodies. No mastoid tenderness. Tympanic membrane is not injected, not scarred, not perforated, not erythematous, not retracted and not bulging. Tympanic membrane mobility is normal. No middle ear effusion. No hemotympanum. No decreased hearing is noted.   Left Ear: External ear normal. No lacerations. No drainage, swelling or tenderness. No foreign bodies. No mastoid tenderness. Tympanic membrane is not injected, not scarred, not perforated, not erythematous, not retracted and not bulging. Tympanic membrane mobility is normal.  No middle ear effusion. No hemotympanum. No decreased hearing is noted.   Nose: Nose normal.   Mouth/Throat: Oropharynx is clear and moist.   Eyes: EOM and lids are normal. Pupils are equal, round, and reactive to light. Right eye exhibits no chemosis, no discharge, no exudate and no hordeolum. No foreign body present " in the right eye. Left eye exhibits no chemosis, no discharge, no exudate and no hordeolum. No foreign body present in the left eye. Right conjunctiva is not injected. Right conjunctiva has no hemorrhage. Left conjunctiva is not injected. Left conjunctiva has no hemorrhage. No scleral icterus. Right eye exhibits normal extraocular motion and no nystagmus. Left eye exhibits normal extraocular motion and no nystagmus. Right pupil is round and reactive. Left pupil is round and reactive. Pupils are equal.   Neck: Normal range of motion, full passive range of motion without pain and phonation normal. Neck supple. Normal carotid pulses, no hepatojugular reflux and no JVD present. No tracheal tenderness, no spinous process tenderness and no muscular tenderness present. Carotid bruit is not present. No neck rigidity. No tracheal deviation, no edema, no erythema and normal range of motion present. No thyroid mass and no thyromegaly present.   Cardiovascular: Normal rate, regular rhythm, normal heart sounds and intact distal pulses.   No extrasystoles are present. PMI is not displaced.  Exam reveals no gallop, no friction rub and no decreased pulses.    No murmur heard.  Pulses:       Carotid pulses are 2+ on the right side, and 2+ on the left side.       Radial pulses are 2+ on the right side, and 2+ on the left side.        Dorsalis pedis pulses are 2+ on the right side, and 2+ on the left side.        Posterior tibial pulses are 2+ on the right side, and 2+ on the left side.   Pulmonary/Chest: Effort normal and breath sounds normal. No accessory muscle usage or stridor. No apnea, no tachypnea and no bradypnea. He is not intubated. No respiratory distress. He has no decreased breath sounds. He has no wheezes. He has no rhonchi. He has no rales. He exhibits no tenderness.   Abdominal: Soft. Bowel sounds are normal. He exhibits no shifting dullness, no distension, no pulsatile liver, no fluid wave, no abdominal bruit, no  ascites, no pulsatile midline mass and no mass. There is no hepatosplenomegaly, splenomegaly or hepatomegaly. There is no tenderness. There is no rigidity, no rebound, no guarding, no CVA tenderness and no tenderness at McBurney's point. No hernia. Hernia confirmed negative in the ventral area.   Musculoskeletal: Normal range of motion. He exhibits no edema or tenderness.        Right foot: There is normal range of motion and no deformity.        Left foot: There is normal range of motion and no deformity.   Feet:   Right Foot:   Protective Sensation: 6 sites tested. 6 sites sensed.   Skin Integrity: Negative for ulcer, blister, skin breakdown, erythema, warmth, callus or dry skin.   Left Foot:   Protective Sensation: 6 sites tested. 6 sites sensed.   Skin Integrity: Negative for ulcer, blister, skin breakdown, erythema, warmth, callus or dry skin.   Lymphadenopathy:        Head (right side): No submental, no submandibular, no tonsillar, no preauricular, no posterior auricular and no occipital adenopathy present.        Head (left side): No submental, no submandibular, no tonsillar, no preauricular, no posterior auricular and no occipital adenopathy present.     He has no cervical adenopathy.        Right cervical: No superficial cervical, no deep cervical and no posterior cervical adenopathy present.       Left cervical: No superficial cervical, no deep cervical and no posterior cervical adenopathy present.   Neurological: He is alert and oriented to person, place, and time. He has normal reflexes. He displays no atrophy and no tremor. No cranial nerve deficit or sensory deficit. He exhibits normal muscle tone. He displays no seizure activity. Coordination and gait normal.   Reflex Scores:       Bicep reflexes are 2+ on the right side and 2+ on the left side.       Brachioradialis reflexes are 2+ on the right side and 2+ on the left side.       Patellar reflexes are 2+ on the right side and 2+ on the left  side.  Skin: Skin is warm and dry. No rash noted. No erythema. No pallor.   Psychiatric: He has a normal mood and affect. His mood appears not anxious. His affect is not angry, not blunt, not labile and not inappropriate. His speech is not rapid and/or pressured, not delayed, not tangential and not slurred. He is not agitated, not aggressive, not hyperactive, not slowed, not withdrawn, not actively hallucinating and not combative. Thought content is not paranoid and not delusional. Cognition and memory are not impaired. He does not express impulsivity or inappropriate judgment. He does not exhibit a depressed mood. He expresses no homicidal and no suicidal ideation. He expresses no suicidal plans and no homicidal plans. He is communicative. He exhibits normal recent memory and normal remote memory. He is attentive.     Assessment:     1. Uncontrolled type 2 diabetes mellitus without complication, without long-term current use of insulin       Plan:   Anup Miller is seen today for   1. Uncontrolled type 2 diabetes mellitus without complication, without long-term current use of insulin      We have discussed the etiology and treatment options associated with the diagnosis as well as alternatives.       He has elected the following treatments.     Uncontrolled type 2 diabetes mellitus without complication, without long-term current use of insulin  -     POCT glucose  - Continue with D&E, reduce portion size, and restrict carbohydrates (no more that 45 grams ) per meal.  - Increase Farxiga to 10 mg  - Increase Actos to 30 mg    1.) Patient was instructed to continue to monitor blood glucose 4 times daily. Reminded to bring BG meter or record to each visit for review.  2.) Reviewed pathophysiology of diabetes, complications related to the disease, importance of annual dilated eye exam and self daily foot examination.  3.) Continue medications as prescribed Metformin; Pioglitazone; Farxiga. [facility] or Phone  review in 1 week with [facility] records for adjustment of medication.  4.) Advised patient to continue to follow up with Dietician/CDE as directed.  5.) Discussed activity, benefits, methods, and precautions. Recommended patient start/continue some form of exercise and increase as tolerated to 60 minutes per day to facilitate weight loss and aid in control of BGs. Also reminded patient of WHO recommendation of 10,000 steps daily as a goal.   6.) A1C, TSH, Lipid Panel, CMP/renal panel with eGFR and Micro/Creatinine prior to next visit, if not already done.  7.) Return to clinic in 12 weeks for follow up. Advised patient to call clinic with any questions or concerns.    A total of 30 minutes was spent in face to face time, of which 50 % was spent in counseling patient on disease process, complications, treatment, and side effects of medications.    The patient was explained the above plan and given opportunity to ask questions.  He understands, chooses and consents to this plan and accepts all the risks, which include but are not limited to the risks mentioned above.   He understands the alternative of having no testing, interventions or treatments at this time. He left content and without further questions.     Disclaimer:  This note is prepared using voice recognition software and as such is likely to have errors and has not been proof read. Please contact me for questions.

## 2018-10-22 RX ORDER — DULAGLUTIDE 1.5 MG/.5ML
INJECTION, SOLUTION SUBCUTANEOUS
Qty: 12 ML | Refills: 0 | Status: SHIPPED | OUTPATIENT
Start: 2018-10-22 | End: 2018-12-03 | Stop reason: SDUPTHER

## 2018-12-03 ENCOUNTER — LAB VISIT (OUTPATIENT)
Dept: LAB | Facility: HOSPITAL | Age: 68
End: 2018-12-03
Attending: PHYSICIAN ASSISTANT
Payer: COMMERCIAL

## 2018-12-03 ENCOUNTER — CLINICAL SUPPORT (OUTPATIENT)
Dept: CARDIOLOGY | Facility: CLINIC | Age: 68
End: 2018-12-03
Payer: COMMERCIAL

## 2018-12-03 ENCOUNTER — OFFICE VISIT (OUTPATIENT)
Dept: DIABETES | Facility: CLINIC | Age: 68
End: 2018-12-03
Payer: COMMERCIAL

## 2018-12-03 VITALS
SYSTOLIC BLOOD PRESSURE: 140 MMHG | HEIGHT: 69 IN | WEIGHT: 257.94 LBS | DIASTOLIC BLOOD PRESSURE: 76 MMHG | BODY MASS INDEX: 38.2 KG/M2

## 2018-12-03 DIAGNOSIS — I49.9 CARDIAC ARRHYTHMIA, UNSPECIFIED CARDIAC ARRHYTHMIA TYPE: ICD-10-CM

## 2018-12-03 LAB
ESTIMATED AVG GLUCOSE: 157 MG/DL
GLUCOSE SERPL-MCNC: 138 MG/DL (ref 70–110)
HBA1C MFR BLD HPLC: 7.1 %

## 2018-12-03 PROCEDURE — 3078F DIAST BP <80 MM HG: CPT | Mod: CPTII,S$GLB,, | Performed by: PHYSICIAN ASSISTANT

## 2018-12-03 PROCEDURE — 1101F PT FALLS ASSESS-DOCD LE1/YR: CPT | Mod: CPTII,S$GLB,, | Performed by: PHYSICIAN ASSISTANT

## 2018-12-03 PROCEDURE — 82962 GLUCOSE BLOOD TEST: CPT | Mod: S$GLB,,, | Performed by: PHYSICIAN ASSISTANT

## 2018-12-03 PROCEDURE — 3077F SYST BP >= 140 MM HG: CPT | Mod: CPTII,S$GLB,, | Performed by: PHYSICIAN ASSISTANT

## 2018-12-03 PROCEDURE — 99999 PR PBB SHADOW E&M-EST. PATIENT-LVL III: CPT | Mod: PBBFAC,,, | Performed by: PHYSICIAN ASSISTANT

## 2018-12-03 PROCEDURE — 99214 OFFICE O/P EST MOD 30 MIN: CPT | Mod: S$GLB,,, | Performed by: PHYSICIAN ASSISTANT

## 2018-12-03 PROCEDURE — 3045F PR MOST RECENT HEMOGLOBIN A1C LEVEL 7.0-9.0%: CPT | Mod: CPTII,S$GLB,, | Performed by: PHYSICIAN ASSISTANT

## 2018-12-03 PROCEDURE — 36415 COLL VENOUS BLD VENIPUNCTURE: CPT | Mod: PO

## 2018-12-03 PROCEDURE — 93000 ELECTROCARDIOGRAM COMPLETE: CPT | Mod: S$GLB,,, | Performed by: NUCLEAR MEDICINE

## 2018-12-03 PROCEDURE — 83036 HEMOGLOBIN GLYCOSYLATED A1C: CPT

## 2018-12-03 RX ORDER — DAPAGLIFLOZIN 10 MG/1
1 TABLET, FILM COATED ORAL DAILY
Qty: 90 TABLET | Refills: 1 | Status: SHIPPED | OUTPATIENT
Start: 2018-12-03 | End: 2019-03-04 | Stop reason: CLARIF

## 2018-12-03 NOTE — PROGRESS NOTES
Subjective:      Patient ID: Anup Miller is a 68 y.o. male.    PCP: Bryce Gonzales MD      Anup Miller is a pleasant 68 y.o. male presenting to follow up on diabetes mellitus. Also, pertinent to this visit in decision making is hypertension, hyperlipidemia, and compliance. He has had diabetes for 15 or more years. His last visit in Diabetes Management was 8/6//2018. Since that time he has had significant improvement in his SMBG glycemia, he will need to have a hemoglobin A1c done today. His blood sugar range fasting has been  and fed has been 180+, and he has been monitoring 4 times per day. His current concerns are glycemic control.  Additionally, he states he feels better and has lots more energy than before.    He denies any hospital admissions, emergency room visits, hypoglycemia, syncope, diaphoresis, chest pain, or dyspnea.    He has gained 4 pounds since last visit. His BMI is 38.09 kg/m².    His blood sugar in the clinic today was:   Lab Results   Component Value Date    POCGLU 138 (A) 12/03/2018     We discussed the American diabetes Association recommendations:  hemoglobin A1c below 7.0%; all diabetics should be on statins unless contraindicated; one aspirin daily unless contraindicated; fasting blood sugar between 80 and 130 mg/dL; postprandial blood sugar below 180 mg/dl; prevention of hypoglycemia, may adjust goals to higher levels if persistent; ACE or ARB therapy if not contraindicated; and maintain in an ideal body weight with BMI below 25.    Anup is compliant most of the time with DM medications.     Anup is compliant most of the time with lifestyle modifications to include activity and meal planning.       Current Outpatient Medications:     ALREX 0.2 % DrpS, Place 1 drop into both eyes 3 (three) times daily as needed., Disp: , Rfl:     dapagliflozin 10 mg Tab, Take 1 tablet by mouth once daily., Disp: 90 tablet, Rfl: 1    losartan (COZAAR) 50 MG tablet, TAKE 1 TABLET BY  MOUTH ONE TIME DAILY, Disp: 90 tablet, Rfl: 1    metFORMIN (GLUCOPHAGE-XR) 750 MG 24 hr tablet, Take 1 tablet (750 mg total) by mouth 2 (two) times daily with meals. (Patient taking differently: Take 750 mg by mouth 3 (three) times daily with meals. ), Disp: 180 tablet, Rfl: 3    multivitamin capsule, Take 1 capsule by mouth once daily., Disp: , Rfl:     pantoprazole (PROTONIX) 40 MG tablet, TAKE 1 TABLET BY MOUTH ONE TIME DAILY, Disp: 90 tablet, Rfl: 1    pioglitazone (ACTOS) 15 MG tablet, Take 1 tablet (15 mg total) by mouth once daily., Disp: 30 tablet, Rfl: 11    rosuvastatin (CRESTOR) 10 MG tablet, TAKE 1 TABLET BY MOUTH DAILY, Disp: 90 tablet, Rfl: 1    TRULICITY 1.5 mg/0.5 mL PnIj, INJECT 1.5 MG UNDER THE SKIN EVERY 7 DAYS, Disp: 12 mL, Rfl: 0    XIIDRA 5 % Dpet, Place 1 drop into both eyes once daily. , Disp: , Rfl: 1    glimepiride (AMARYL) 4 MG tablet, Take 2 tablets (8 mg total) by mouth once daily., Disp: 180 tablet, Rfl: 2    hydroCHLOROthiazide (HYDRODIURIL) 25 MG tablet, TAKE 1 TABLET BY MOUTH ONE TIME DAILY, Disp: 90 tablet, Rfl: 1    STANDARDS OF CARE:  Eye doctor: Dr. Alex Pelayo, last exam 5/24/2017.  Dental exam: Recommend regular exams; denies gums bleeding.  Podiatry doctor:     ACTIVITY LEVEL: He exercises rarely.  MEAL PLANNING: Number of meals per day - 3. Number of snacks per day - 2.  Per dietary recall, patient is not limiting carbohydrates, saturated fats and sodium.   BLOOD GLUCOSE TESTING: Self-monitoring with   ACE/ARB: Yes  Statin: Yes    Health Maintenance   Topic Date Due    Influenza Vaccine  08/01/2018    Eye Exam  08/24/2018    Hemoglobin A1c  02/02/2019    Lipid Panel  08/02/2019    Foot Exam  08/06/2019    Colonoscopy  07/19/2021    TETANUS VACCINE  04/02/2023    Hepatitis C Screening  Completed    Zoster Vaccine  Completed    Pneumococcal Vaccine (65+ Low/Medium Risk)  Completed       Diabetes Status:   Diabetes Management Status    Statin:  Taking  ACE/ARB: Taking    Screening or Prevention Patient's value Goal Complete/Controlled?   HgA1C Testing and Control   Lab Results   Component Value Date    HGBA1C 8.6 (H) 08/02/2018      Annually/Less than 8% No   Lipid profile : 08/02/2018 Annually Yes   LDL control Lab Results   Component Value Date    LDLCALC 71.6 08/02/2018    Annually/Less than 100 mg/dl  Yes   Nephropathy screening Lab Results   Component Value Date    LABMICR 4.0 06/18/2018     No results found for: PROTEINUA Annually Yes   Blood pressure BP Readings from Last 1 Encounters:   12/03/18 (!) 140/76    Less than 140/90 Yes   Dilated retinal exam : 08/24/2017 Annually Yes   Foot exam   : 08/06/2018 Annually Yes     The following results were reviewed with patient.    Lab Results   Component Value Date    WBC 5.26 11/14/2016    HGB 13.5 (L) 11/14/2016    HCT 41.9 11/14/2016     11/14/2016    CHOL 134 08/02/2018    TRIG 107 08/02/2018    HDL 41 08/02/2018    LDLCALC 71.6 08/02/2018    ALT 83 (H) 08/02/2018    AST 31 03/14/2018     03/14/2018    K 4.3 03/14/2018     03/14/2018    ANIONGAP 11 03/14/2018    CREATININE 1.4 03/14/2018    ESTGFRAFRICA >60.0 03/14/2018    EGFRNONAA 52.4 (A) 03/14/2018    BUN 21 03/14/2018    CO2 28 03/14/2018    TSH 0.913 12/29/2014    PSA 8.5 (H) 04/30/2018    INR 1.1 11/30/2015     (H) 03/14/2018       Lab Results   Component Value Date    HGBA1C 8.6 (H) 08/02/2018    HGBA1C 7.9 (H) 06/18/2018    HGBA1C 9.0 (H) 03/14/2018       Lab Results   Component Value Date    GLUTAMICACID 0.00 04/03/2017    CPEPTIDE 4.8 04/03/2017     Lab Results   Component Value Date    TSH 0.913 12/29/2014     Lab Results   Component Value Date    IRON 126 11/30/2015    TIBC 521 (H) 11/30/2015    FERRITIN 247 11/30/2015       Lab Results   Component Value Date    CALCIUM 10.2 03/14/2018     Review of patient's allergies indicates:  No Known Allergies    Past Medical History:   Diagnosis Date    Diabetes mellitus  type II Diagnosed 2002    Elevated liver enzymes     Fatty liver disease, nonalcoholic     Hyperlipidemia     Hypertension     Sleep apnea        Review of Systems   Constitutional: Negative.  Negative for activity change, appetite change, chills, diaphoresis, fatigue, fever and unexpected weight change.   HENT: Negative.  Negative for dental problem, facial swelling, hearing loss, nosebleeds, trouble swallowing and voice change.    Eyes: Negative.  Negative for photophobia, pain, discharge, redness, itching and visual disturbance.   Respiratory: Negative.  Negative for cough, choking, chest tightness, shortness of breath and wheezing.    Cardiovascular: Negative.  Negative for chest pain, palpitations and leg swelling.   Gastrointestinal: Negative.  Negative for abdominal distention, abdominal pain, blood in stool, constipation, diarrhea, nausea and vomiting.   Endocrine: Negative.  Negative for cold intolerance, heat intolerance, polydipsia, polyphagia and polyuria.   Genitourinary: Negative.  Negative for decreased urine volume, difficulty urinating, dysuria, frequency, scrotal swelling, testicular pain and urgency.   Musculoskeletal: Negative.  Negative for arthralgias, back pain, gait problem, joint swelling, myalgias, neck pain and neck stiffness.   Skin: Negative.  Negative for color change, pallor, rash and wound.   Allergic/Immunologic: Negative.  Negative for environmental allergies, food allergies and immunocompromised state.   Neurological: Negative.  Negative for dizziness, tremors, seizures, syncope, facial asymmetry, speech difficulty, weakness, light-headedness, numbness and headaches.   Hematological: Negative.  Negative for adenopathy. Does not bruise/bleed easily.   Psychiatric/Behavioral: Negative.  Negative for agitation, behavioral problems, confusion, decreased concentration, dysphoric mood, hallucinations, self-injury, sleep disturbance and suicidal ideas. The patient is not  "nervous/anxious and is not hyperactive.       Objective:     Vitals - 1 value per visit 7/19/2018 8/6/2018 12/3/2018   SYSTOLIC 131 102 140   DIASTOLIC 93 68 76   PULSE 92 - -   TEMPERATURE 98.1 - -   RESPIRATIONS 18 - -   SPO2 98 - -   Weight (lb) 251 253.97 257.94   Weight (kg) 113.853 115.2 117   HEIGHT 5' 9" 5' 9" 5' 9"   BODY MASS INDEX 37.07 37.5 38.09   VISIT REPORT - - -   Pain Score  - 0 0   Some recent data might be hidden     Physical Exam   Constitutional: He is oriented to person, place, and time. He appears well-developed and well-nourished. He is cooperative.  Non-toxic appearance. He does not have a sickly appearance. He does not appear ill. No distress. He is not intubated.   HENT:   Head: Normocephalic and atraumatic. Not macrocephalic and not microcephalic. Head is without raccoon's eyes, without Keyes's sign, without abrasion, without contusion, without laceration, without right periorbital erythema and without left periorbital erythema. Hair is normal.   Right Ear: External ear normal. No lacerations. No drainage, swelling or tenderness. No foreign bodies. No mastoid tenderness. Tympanic membrane is not injected, not scarred, not perforated, not erythematous, not retracted and not bulging. Tympanic membrane mobility is normal. No middle ear effusion. No hemotympanum. No decreased hearing is noted.   Left Ear: External ear normal. No lacerations. No drainage, swelling or tenderness. No foreign bodies. No mastoid tenderness. Tympanic membrane is not injected, not scarred, not perforated, not erythematous, not retracted and not bulging. Tympanic membrane mobility is normal.  No middle ear effusion. No hemotympanum. No decreased hearing is noted.   Nose: Nose normal.   Mouth/Throat: Oropharynx is clear and moist.   Eyes: EOM and lids are normal. Pupils are equal, round, and reactive to light. Right eye exhibits no chemosis, no discharge, no exudate and no hordeolum. No foreign body present in the " right eye. Left eye exhibits no chemosis, no discharge, no exudate and no hordeolum. No foreign body present in the left eye. Right conjunctiva is not injected. Right conjunctiva has no hemorrhage. Left conjunctiva is not injected. Left conjunctiva has no hemorrhage. No scleral icterus. Right eye exhibits normal extraocular motion and no nystagmus. Left eye exhibits normal extraocular motion and no nystagmus. Right pupil is round and reactive. Left pupil is round and reactive. Pupils are equal.   Neck: Normal range of motion, full passive range of motion without pain and phonation normal. Neck supple. Normal carotid pulses, no hepatojugular reflux and no JVD present. No tracheal tenderness, no spinous process tenderness and no muscular tenderness present. Carotid bruit is not present. No neck rigidity. No tracheal deviation, no edema, no erythema and normal range of motion present. No thyroid mass and no thyromegaly present.   Cardiovascular: Normal rate, normal heart sounds and intact distal pulses. An irregularly irregular rhythm present.  Extrasystoles (But no compensatory pauses) are present. PMI is not displaced. Exam reveals no gallop, no friction rub and no decreased pulses.   No murmur heard.  Pulses:       Carotid pulses are 2+ on the right side, and 2+ on the left side.       Radial pulses are 2+ on the right side, and 2+ on the left side.        Dorsalis pedis pulses are 2+ on the right side, and 2+ on the left side.        Posterior tibial pulses are 2+ on the right side, and 2+ on the left side.   Pulmonary/Chest: Effort normal and breath sounds normal. No accessory muscle usage or stridor. No apnea, no tachypnea and no bradypnea. He is not intubated. No respiratory distress. He has no decreased breath sounds. He has no wheezes. He has no rhonchi. He has no rales. He exhibits no tenderness.   Abdominal: Soft. Bowel sounds are normal. He exhibits no shifting dullness, no distension, no pulsatile liver,  no fluid wave, no abdominal bruit, no ascites, no pulsatile midline mass and no mass. There is no hepatosplenomegaly, splenomegaly or hepatomegaly. There is no tenderness. There is no rigidity, no rebound, no guarding, no CVA tenderness and no tenderness at McBurney's point. No hernia. Hernia confirmed negative in the ventral area.   Musculoskeletal: Normal range of motion. He exhibits no edema or tenderness.        Right foot: There is normal range of motion and no deformity.        Left foot: There is normal range of motion and no deformity.   Feet:   Right Foot:   Protective Sensation: 6 sites tested. 6 sites sensed.   Skin Integrity: Negative for ulcer, blister, skin breakdown, erythema, warmth, callus or dry skin.   Left Foot:   Protective Sensation: 6 sites tested. 6 sites sensed.   Skin Integrity: Negative for ulcer, blister, skin breakdown, erythema, warmth, callus or dry skin.   Lymphadenopathy:        Head (right side): No submental, no submandibular, no tonsillar, no preauricular, no posterior auricular and no occipital adenopathy present.        Head (left side): No submental, no submandibular, no tonsillar, no preauricular, no posterior auricular and no occipital adenopathy present.     He has no cervical adenopathy.        Right cervical: No superficial cervical, no deep cervical and no posterior cervical adenopathy present.       Left cervical: No superficial cervical, no deep cervical and no posterior cervical adenopathy present.   Neurological: He is alert and oriented to person, place, and time. He has normal reflexes. He displays no atrophy and no tremor. No cranial nerve deficit or sensory deficit. He exhibits normal muscle tone. He displays no seizure activity. Coordination and gait normal.   Reflex Scores:       Bicep reflexes are 2+ on the right side and 2+ on the left side.       Brachioradialis reflexes are 2+ on the right side and 2+ on the left side.       Patellar reflexes are 2+ on the  right side and 2+ on the left side.  Skin: Skin is warm and dry. No rash noted. No erythema. No pallor.   Psychiatric: He has a normal mood and affect. His mood appears not anxious. His affect is not angry, not blunt, not labile and not inappropriate. His speech is not rapid and/or pressured, not delayed, not tangential and not slurred. He is not agitated, not aggressive, not hyperactive, not slowed, not withdrawn, not actively hallucinating and not combative. Thought content is not paranoid and not delusional. Cognition and memory are not impaired. He does not express impulsivity or inappropriate judgment. He does not exhibit a depressed mood. He expresses no homicidal and no suicidal ideation. He expresses no suicidal plans and no homicidal plans. He is communicative. He exhibits normal recent memory and normal remote memory. He is attentive.     Assessment:     1. Uncontrolled type 2 diabetes mellitus without complication, without long-term current use of insulin       Plan:   Anup Miller is seen today for   1. Uncontrolled type 2 diabetes mellitus without complication, without long-term current use of insulin      We have discussed the etiology and treatment options associated with the diagnosis as well as alternatives.       He has elected the following treatments.     Uncontrolled type 2 diabetes mellitus without complication, without long-term current use of insulin  -     POCT glucose  - A1c today  - Continue with D&E, reduce portion size, and restrict carbohydrates (no more that 45 grams ) per meal.  - Farxiga to 10 mg  - Actos to 15 mg  - Trulicity 1.5 mg      Arrhythmia, unspecified etiology   To Cardiology today for EKG 12 lead      1.) Patient was instructed to continue to monitor blood glucose 4 times daily. Reminded to bring BG meter or record to each visit for review.  2.) Reviewed pathophysiology of diabetes, complications related to the disease, importance of annual dilated eye exam and self  daily foot examination.  3.) Continue medications as prescribed Trulicity 1.5;  Metformin; Pioglitazone; Farxiga. [facility] MyChart or Phone review in 1 week with [facility] records for adjustment of medication.  4.) Advised patient to continue to follow up with Dietician/CDE as directed.  5.) Discussed activity, benefits, methods, and precautions. Recommended patient start/continue some form of exercise and increase as tolerated to 60 minutes per day to facilitate weight loss and aid in control of BGs. Also reminded patient of WHO recommendation of 10,000 steps daily as a goal.   6.) A1C, TSH, Lipid Panel, CMP/renal panel with eGFR and Micro/Creatinine prior to next visit, if not already done.  7.) Return to clinic in 12 weeks for follow up. Advised patient to call clinic with any questions or concerns.    A total of 30 minutes was spent in face to face time, of which 50 % was spent in counseling patient on disease process, complications, treatment, and side effects of medications.    The patient was explained the above plan and given opportunity to ask questions.  He understands, chooses and consents to this plan and accepts all the risks, which include but are not limited to the risks mentioned above.   He understands the alternative of having no testing, interventions or treatments at this time. He left content and without further questions.     Disclaimer:  This note is prepared using voice recognition software and as such is likely to have errors and has not been proof read. Please contact me for questions.

## 2018-12-04 DIAGNOSIS — E78.5 HYPERLIPIDEMIA, UNSPECIFIED HYPERLIPIDEMIA TYPE: Chronic | ICD-10-CM

## 2018-12-05 ENCOUNTER — OFFICE VISIT (OUTPATIENT)
Dept: SLEEP MEDICINE | Facility: CLINIC | Age: 68
End: 2018-12-05
Payer: COMMERCIAL

## 2018-12-05 VITALS
HEIGHT: 69 IN | BODY MASS INDEX: 38.27 KG/M2 | OXYGEN SATURATION: 99 % | HEART RATE: 97 BPM | SYSTOLIC BLOOD PRESSURE: 140 MMHG | RESPIRATION RATE: 17 BRPM | DIASTOLIC BLOOD PRESSURE: 80 MMHG | WEIGHT: 258.38 LBS

## 2018-12-05 DIAGNOSIS — G47.33 OSA (OBSTRUCTIVE SLEEP APNEA): Primary | ICD-10-CM

## 2018-12-05 PROCEDURE — 99213 OFFICE O/P EST LOW 20 MIN: CPT | Mod: S$GLB,,, | Performed by: NURSE PRACTITIONER

## 2018-12-05 PROCEDURE — 3077F SYST BP >= 140 MM HG: CPT | Mod: CPTII,S$GLB,, | Performed by: NURSE PRACTITIONER

## 2018-12-05 PROCEDURE — 1101F PT FALLS ASSESS-DOCD LE1/YR: CPT | Mod: CPTII,S$GLB,, | Performed by: NURSE PRACTITIONER

## 2018-12-05 PROCEDURE — 99999 PR PBB SHADOW E&M-EST. PATIENT-LVL IV: CPT | Mod: PBBFAC,,, | Performed by: NURSE PRACTITIONER

## 2018-12-05 PROCEDURE — 3079F DIAST BP 80-89 MM HG: CPT | Mod: CPTII,S$GLB,, | Performed by: NURSE PRACTITIONER

## 2018-12-05 RX ORDER — METFORMIN HYDROCHLORIDE 750 MG/1
TABLET, EXTENDED RELEASE ORAL
Qty: 180 TABLET | Refills: 3 | Status: SHIPPED | OUTPATIENT
Start: 2018-12-05 | End: 2019-08-20 | Stop reason: SDUPTHER

## 2018-12-05 RX ORDER — ROSUVASTATIN CALCIUM 10 MG/1
TABLET, COATED ORAL
Qty: 90 TABLET | Refills: 3 | Status: SHIPPED | OUTPATIENT
Start: 2018-12-05 | End: 2019-10-31 | Stop reason: SDUPTHER

## 2018-12-05 RX ORDER — LOSARTAN POTASSIUM 50 MG/1
TABLET ORAL
Qty: 90 TABLET | Refills: 3 | Status: SHIPPED | OUTPATIENT
Start: 2018-12-05 | End: 2019-02-05 | Stop reason: SDUPTHER

## 2018-12-05 NOTE — LETTER
December 5, 2018                   [facility]  [address] [place] [address]  Phone: [phone] December 5, 2018     Patient: Anup Miller    YOB: 1950   Date of Visit: 12/5/2018       To Whom It May Concern:     Anup Miller has sleep apnea. He is compliant with CPAP and has AHI less than 10/hr. He wears on average 8hr and 3sec. No foreseen work restrictions related to his sleep apnea.  .    If you have any questions or concerns, please don't hesitate to call.    Sincerely,          Elizabeth Lejeune, NP

## 2018-12-05 NOTE — PROGRESS NOTES
"Subjective:      Patient ID: Anup Miller is a 68 y.o. male.    Chief Complaint: Sleep Apnea    HPI  Presents to office for review of AutoPAP therapy. Patient states improved symptoms with use of AutoPAP. Sleeping more soundly. Waking up feeling more refreshed. Improved daytime sleepiness. Patient states he is benefiting from use of the AutoPAP.     Patient Active Problem List   Diagnosis    Uncontrolled type 2 diabetes mellitus without complication, without long-term current use of insulin    Elevated liver enzymes    Hypertension associated with diabetes    Hyperlipidemia associated with type 2 diabetes mellitus    Multiple pulmonary nodules determined by computed tomography of lung    KHOI on CPAP    Elevated PSA    Hx of colonic polyp    Cardiac arrhythmia           BP (!) 140/80   Pulse 97   Resp 17   Ht 5' 9" (1.753 m)   Wt 117.2 kg (258 lb 6.1 oz)   SpO2 99%   BMI 38.16 kg/m²    Body mass index is 38.16 kg/m².    Review of Systems   Constitutional: Negative.    HENT: Negative.    Respiratory: Negative.    Cardiovascular: Negative.    Musculoskeletal: Negative.    Gastrointestinal: Negative.    Neurological: Negative.    Psychiatric/Behavioral: Negative.      Objective:      Physical Exam   Constitutional: He is oriented to person, place, and time. He appears well-developed and well-nourished.   HENT:   Head: Normocephalic and atraumatic.   Neck: Normal range of motion. Neck supple.   Cardiovascular: Normal rate and regular rhythm.   Pulmonary/Chest: Effort normal and breath sounds normal.   Abdominal: Soft. Bowel sounds are normal.   Musculoskeletal: Normal range of motion. He exhibits no edema.   Neurological: He is alert and oriented to person, place, and time.   Skin: Skin is warm and dry.   Psychiatric: He has a normal mood and affect.     Personal Diagnostic Review  Autopap download: Patient wears on average 8 hrs and 0 minutes. Greater than 4 hrs 93.3 % of the time. 90% percentile " pressure 14 with AHI 7 events/hr    Assessment:       1. KHOI (obstructive sleep apnea)        Outpatient Encounter Medications as of 12/5/2018   Medication Sig Dispense Refill    ALREX 0.2 % DrpS Place 1 drop into both eyes 3 (three) times daily as needed.      dapagliflozin (FARXIGA) 10 mg Tab Take 1 tablet by mouth once daily. 90 tablet 1    dulaglutide (TRULICITY) 1.5 mg/0.5 mL PnIj INJECT 1.5 MG UNDER THE SKIN EVERY 7 DAYS 12 mL 1    glimepiride (AMARYL) 4 MG tablet Take 2 tablets (8 mg total) by mouth once daily. 180 tablet 2    hydroCHLOROthiazide (HYDRODIURIL) 25 MG tablet TAKE 1 TABLET BY MOUTH ONE TIME DAILY 90 tablet 1    losartan (COZAAR) 50 MG tablet TAKE 1 TABLET BY MOUTH ONE TIME DAILY 90 tablet 3    metFORMIN (GLUCOPHAGE-XR) 750 MG 24 hr tablet TAKE 1 TABLET BY MOUTH TWICE DAILY WITH MEALS 180 tablet 3    multivitamin capsule Take 1 capsule by mouth once daily.      pantoprazole (PROTONIX) 40 MG tablet TAKE 1 TABLET BY MOUTH ONE TIME DAILY 90 tablet 1    pioglitazone (ACTOS) 15 MG tablet Take 1 tablet (15 mg total) by mouth once daily. 30 tablet 11    rosuvastatin (CRESTOR) 10 MG tablet TAKE 1 TABLET BY MOUTH DAILY 90 tablet 3    XIIDRA 5 % Dpet Place 1 drop into both eyes once daily.   1     No facility-administered encounter medications on file as of 12/5/2018.      Orders Placed This Encounter   Procedures    CPAP/BIPAP SUPPLIES     Order Specific Question:   Type of mask:     Answer:   Nasal     Order Specific Question:   Headgear?     Answer:   Yes     Order Specific Question:   Tubing?     Answer:   Yes     Order Specific Question:   Humidifier chamber?     Answer:   Yes     Order Specific Question:   Chin strap?     Answer:   Yes     Order Specific Question:   Filters?     Answer:   Yes     Order Specific Question:   Length of need (1-99 months):     Answer:   99    CPAP/BIPAP SUPPLIES     Order Specific Question:   Type of mask:     Answer:   FFM     Order Specific Question:    Headgear?     Answer:   Yes     Order Specific Question:   Tubing?     Answer:   Yes     Order Specific Question:   Humidifier chamber?     Answer:   Yes     Order Specific Question:   Chin strap?     Answer:   Yes     Order Specific Question:   Filters?     Answer:   Yes     Order Specific Question:   Length of need (1-99 months):     Answer:   99     Plan:   Doing well on PAP settings. Patient is compliant. Follow up in 12 months with PAP data download or call earlier if any problems.

## 2019-02-05 ENCOUNTER — OFFICE VISIT (OUTPATIENT)
Dept: INTERNAL MEDICINE | Facility: CLINIC | Age: 69
End: 2019-02-05
Payer: MEDICARE

## 2019-02-05 VITALS
SYSTOLIC BLOOD PRESSURE: 140 MMHG | HEART RATE: 122 BPM | BODY MASS INDEX: 37.93 KG/M2 | OXYGEN SATURATION: 97 % | TEMPERATURE: 97 F | DIASTOLIC BLOOD PRESSURE: 76 MMHG | WEIGHT: 256.81 LBS

## 2019-02-05 DIAGNOSIS — R91.8 MULTIPLE PULMONARY NODULES DETERMINED BY COMPUTED TOMOGRAPHY OF LUNG: ICD-10-CM

## 2019-02-05 DIAGNOSIS — E78.5 HYPERLIPIDEMIA ASSOCIATED WITH TYPE 2 DIABETES MELLITUS: ICD-10-CM

## 2019-02-05 DIAGNOSIS — E11.69 HYPERLIPIDEMIA ASSOCIATED WITH TYPE 2 DIABETES MELLITUS: ICD-10-CM

## 2019-02-05 DIAGNOSIS — E11.59 HYPERTENSION ASSOCIATED WITH DIABETES: ICD-10-CM

## 2019-02-05 DIAGNOSIS — Z00.00 ROUTINE GENERAL MEDICAL EXAMINATION AT A HEALTH CARE FACILITY: Primary | ICD-10-CM

## 2019-02-05 DIAGNOSIS — J01.90 ACUTE SINUSITIS, RECURRENCE NOT SPECIFIED, UNSPECIFIED LOCATION: ICD-10-CM

## 2019-02-05 DIAGNOSIS — R97.20 ELEVATED PSA: ICD-10-CM

## 2019-02-05 DIAGNOSIS — I15.2 HYPERTENSION ASSOCIATED WITH DIABETES: ICD-10-CM

## 2019-02-05 PROCEDURE — 3077F PR MOST RECENT SYSTOLIC BLOOD PRESSURE >= 140 MM HG: ICD-10-PCS | Mod: CPTII,S$GLB,, | Performed by: INTERNAL MEDICINE

## 2019-02-05 PROCEDURE — 99999 PR PBB SHADOW E&M-EST. PATIENT-LVL III: ICD-10-PCS | Mod: PBBFAC,,, | Performed by: INTERNAL MEDICINE

## 2019-02-05 PROCEDURE — 99999 PR PBB SHADOW E&M-EST. PATIENT-LVL III: CPT | Mod: PBBFAC,,, | Performed by: INTERNAL MEDICINE

## 2019-02-05 PROCEDURE — 99397 PER PM REEVAL EST PAT 65+ YR: CPT | Mod: S$GLB,,, | Performed by: INTERNAL MEDICINE

## 2019-02-05 PROCEDURE — 3045F PR MOST RECENT HEMOGLOBIN A1C LEVEL 7.0-9.0%: ICD-10-PCS | Mod: CPTII,S$GLB,, | Performed by: INTERNAL MEDICINE

## 2019-02-05 PROCEDURE — 1101F PR PT FALLS ASSESS DOC 0-1 FALLS W/OUT INJ PAST YR: ICD-10-PCS | Mod: CPTII,S$GLB,, | Performed by: INTERNAL MEDICINE

## 2019-02-05 PROCEDURE — 3045F PR MOST RECENT HEMOGLOBIN A1C LEVEL 7.0-9.0%: CPT | Mod: CPTII,S$GLB,, | Performed by: INTERNAL MEDICINE

## 2019-02-05 PROCEDURE — 3078F DIAST BP <80 MM HG: CPT | Mod: CPTII,S$GLB,, | Performed by: INTERNAL MEDICINE

## 2019-02-05 PROCEDURE — 3078F PR MOST RECENT DIASTOLIC BLOOD PRESSURE < 80 MM HG: ICD-10-PCS | Mod: CPTII,S$GLB,, | Performed by: INTERNAL MEDICINE

## 2019-02-05 PROCEDURE — 99214 PR OFFICE/OUTPT VISIT, EST, LEVL IV, 30-39 MIN: ICD-10-PCS | Mod: 25,S$GLB,, | Performed by: INTERNAL MEDICINE

## 2019-02-05 PROCEDURE — 99397 PR PREVENTIVE VISIT,EST,65 & OVER: ICD-10-PCS | Mod: S$GLB,,, | Performed by: INTERNAL MEDICINE

## 2019-02-05 PROCEDURE — 1101F PT FALLS ASSESS-DOCD LE1/YR: CPT | Mod: CPTII,S$GLB,, | Performed by: INTERNAL MEDICINE

## 2019-02-05 PROCEDURE — 3077F SYST BP >= 140 MM HG: CPT | Mod: CPTII,S$GLB,, | Performed by: INTERNAL MEDICINE

## 2019-02-05 PROCEDURE — 99214 OFFICE O/P EST MOD 30 MIN: CPT | Mod: 25,S$GLB,, | Performed by: INTERNAL MEDICINE

## 2019-02-05 RX ORDER — METHYLPREDNISOLONE 4 MG/1
TABLET ORAL
Qty: 1 PACKAGE | Refills: 0 | Status: SHIPPED | OUTPATIENT
Start: 2019-02-05 | End: 2019-07-25 | Stop reason: ALTCHOICE

## 2019-02-05 RX ORDER — GLIMEPIRIDE 4 MG/1
8 TABLET ORAL DAILY
Qty: 180 TABLET | Refills: 3 | Status: SHIPPED | OUTPATIENT
Start: 2019-02-05 | End: 2019-08-20 | Stop reason: SDUPTHER

## 2019-02-05 RX ORDER — AMOXICILLIN AND CLAVULANATE POTASSIUM 875; 125 MG/1; MG/1
1 TABLET, FILM COATED ORAL 2 TIMES DAILY
Qty: 20 TABLET | Refills: 0 | Status: SHIPPED | OUTPATIENT
Start: 2019-02-05 | End: 2019-02-15

## 2019-02-05 RX ORDER — FLUTICASONE PROPIONATE 50 MCG
2 SPRAY, SUSPENSION (ML) NASAL DAILY
Qty: 16 G | Refills: 1 | Status: SHIPPED | OUTPATIENT
Start: 2019-02-05 | End: 2019-03-07

## 2019-02-05 RX ORDER — PIOGLITAZONEHYDROCHLORIDE 15 MG/1
15 TABLET ORAL DAILY
Qty: 90 TABLET | Refills: 1 | Status: SHIPPED | OUTPATIENT
Start: 2019-02-05 | End: 2019-08-05 | Stop reason: SDUPTHER

## 2019-02-05 RX ORDER — LOSARTAN POTASSIUM 100 MG/1
100 TABLET ORAL DAILY
Qty: 90 TABLET | Refills: 1 | Status: SHIPPED | OUTPATIENT
Start: 2019-02-05 | End: 2019-03-04

## 2019-02-05 RX ORDER — PIOGLITAZONEHYDROCHLORIDE 15 MG/1
15 TABLET ORAL DAILY
Qty: 90 TABLET | Refills: 1 | Status: SHIPPED | OUTPATIENT
Start: 2019-02-05 | End: 2019-02-05 | Stop reason: SDUPTHER

## 2019-02-05 RX ORDER — LOSARTAN POTASSIUM 100 MG/1
100 TABLET ORAL DAILY
Qty: 90 TABLET | Refills: 1 | Status: SHIPPED | OUTPATIENT
Start: 2019-02-05 | End: 2019-02-05 | Stop reason: SDUPTHER

## 2019-02-06 ENCOUNTER — TELEPHONE (OUTPATIENT)
Dept: INTERNAL MEDICINE | Facility: CLINIC | Age: 69
End: 2019-02-06

## 2019-02-06 NOTE — PROGRESS NOTES
Subjective:      Patient ID: Anup Miller is a 68 y.o. male.    Chief Complaint: Sinus Problem  69 yo with   Patient Active Problem List   Diagnosis    Uncontrolled type 2 diabetes mellitus without complication, without long-term current use of insulin    Elevated liver enzymes    Hypertension associated with diabetes    Hyperlipidemia associated with type 2 diabetes mellitus    Multiple pulmonary nodules determined by computed tomography of lung    KHOI on CPAP    Elevated PSA    Hx of colonic polyp    Cardiac arrhythmia     Past Medical History:   Diagnosis Date    Diabetes mellitus type II Diagnosed 2002    Elevated liver enzymes     Fatty liver disease, nonalcoholic     Hyperlipidemia     Hypertension     Sleep apnea      Here today for annual prev exam.  Compliant with meds without significant side effects. Energy and appetite are good. Pt also c/o sinus problem    Past Surgical History:   Procedure Laterality Date    BACK SURGERY      CHOLECYSTECTOMY  2013    CHOLECYSTECTOMY, LAPAROSCOPIC N/A 12/6/2013    [facility]    COLONOSCOPY N/A 7/19/2018    Performed by Chac[facility] Lopez III, [facility] at [facility] ENDO    COLONOSCOPY N/A 11/14/2013    Performed by Joaquin Stevens [facility] at [facility] ENDO    EGD (ESOPHAGOGASTRODUODENOSCOPY) N/A 11/14/2013    [facility] ENDO    ESOPHAGOGASTRODUODENOSCOPY (EGD) N/A 12/15/2015    Performed by [facility] ENDO     Social History     Socioeconomic History    Marital status:      Spouse name: yoselin    Number of children: 1    Years of education: Not on file    Highest education level: Not on file   Social Needs    Financial resource strain: Not on file    Food insecurity - worry: Not on file    Food insecurity - inability: Not on file    Transportation needs - medical: Not on file    Transportation needs - non-medical: Not on file   Occupational History     Occupation:    Tobacco Use    Smoking status: Never Smoker    Smokeless tobacco: Never Used   Substance and Sexual Activity    Alcohol use: No     Alcohol/week: 0.0 oz    Drug use: No    Sexual activity: Yes     Partners: Female   Other Topics Concern    Not on file   Social History Narrative    Not on file       Sinus Problem   This is a new problem. The current episode started 1 to 4 weeks ago. The problem has been gradually worsening since onset. There has been no fever. The pain is mild. Associated symptoms include congestion, coughing, headaches and sinus pressure. Pertinent negatives include no chills, diaphoresis, ear pain, hoarse voice, neck pain, shortness of breath, sneezing, sore throat or swollen glands. Treatments tried: Robitussin.  Mucinex.  Alayna-Kimball Plus. The treatment provided mild relief.     Review of Systems   Constitutional: Negative for chills and diaphoresis.   HENT: Positive for congestion and sinus pressure. Negative for ear pain, hoarse voice, sneezing and sore throat.    Respiratory: Positive for cough. Negative for shortness of breath.    Musculoskeletal: Negative for neck pain.   Neurological: Positive for headaches.     Objective:   BP (!) 140/76 (BP Location: Right arm, Patient Position: Sitting)   Pulse (!) 122   Temp 97 °F (36.1 °C) (Tympanic)   Wt 116.5 kg (256 lb 13.4 oz)   SpO2 97%   BMI 37.93 kg/m²     Physical Exam   Constitutional: He is oriented to person, place, and time. He appears well-developed and well-nourished. No distress.   HENT:   Head: Normocephalic and atraumatic.   Right Ear: Tympanic membrane normal.   Left Ear: Tympanic membrane normal.   Nose: Mucosal edema and rhinorrhea present. Right sinus exhibits no maxillary sinus tenderness and no frontal sinus tenderness. Left sinus exhibits no maxillary sinus tenderness and no frontal sinus tenderness.   Mouth/Throat: Posterior oropharyngeal erythema present. No oropharyngeal exudate or posterior  oropharyngeal edema.   Eyes: EOM are normal. Pupils are equal, round, and reactive to light.   Neck: Neck supple. Carotid bruit is not present. No thyromegaly present.   Cardiovascular: Normal rate and regular rhythm.   Pulmonary/Chest: Breath sounds normal. He has no wheezes. He has no rales.   Abdominal: Soft. Bowel sounds are normal. There is no tenderness.   Musculoskeletal: He exhibits no edema.   Lymphadenopathy:     He has no cervical adenopathy.   Neurological: He is alert and oriented to person, place, and time.   Skin: Skin is warm and dry.   Psychiatric: He has a normal mood and affect. His behavior is normal.     No visits with results within 2 Week(s) from this visit.   Latest known visit with results is:   Lab Visit on 12/03/2018   Component Date Value Ref Range Status    Hemoglobin A1C 12/03/2018 7.1* 4.0 - 5.6 % Final    Comment: ADA Screening Guidelines:  5.7-6.4%  Consistent with prediabetes  >or=6.5%  Consistent with diabetes  High levels of fetal hemoglobin interfere with the HbA1C  assay. Heterozygous hemoglobin variants (HbS, HgC, etc)do  not significantly interfere with this assay.   However, presence of multiple variants may affect accuracy.      Estimated Avg Glucose 12/03/2018 157* 68 - 131 mg/dL Final       Assessment:     1. Routine general medical examination at a health care facility    2. Elevated PSA    3. Hyperlipidemia associated with type 2 diabetes mellitus    4. Hypertension associated with diabetes    5. Multiple pulmonary nodules determined by computed tomography of lung    6. Uncontrolled type 2 diabetes mellitus without complication, without long-term current use of insulin    7. Acute sinusitis, recurrence not specified, unspecified location      Plan:   Routine general medical examination at a health care facility  Heart healthy diet and regular exercise.  Health maintenance reviewed  Elevated PSA  [facility]  -     PSA, Screening;  Future; Expected date: 02/05/2019    Hyperlipidemia associated with type 2 diabetes mellitus  Controlled  Hypertension associated with diabetes  Continue current medications and monitor not during acute illness  -     Discontinue: losartan (COZAAR) 100 MG tablet; Take 1 tablet (100 mg total) by mouth once daily.  Dispense: 90 tablet; Refill: 1  -     losartan (COZAAR) 100 MG tablet; Take 1 tablet (100 mg total) by mouth once daily.  Dispense: 90 tablet; Refill: 1    Multiple pulmonary nodules determined by computed tomography of lung    Uncontrolled type 2 diabetes mellitus without complication, without long-term current use of insulin  Stable  Acute sinusitis, recurrence not specified, unspecified location  -     amoxicillin-clavulanate 875-125mg (AUGMENTIN) 875-125 mg per tablet; Take 1 tablet by mouth 2 (two) times daily. for 10 days  Dispense: 20 tablet; Refill: 0  -     fluticasone (FLONASE) 50 mcg/actuation nasal spray; 2 sprays (100 mcg total) by Each Nare route once daily. For nasal congestion or runny nose  Dispense: 16 g; Refill: 1  -     methylPREDNISolone (MEDROL DOSEPACK) 4 mg tablet; use as directed  Dispense: 1 Package; Refill: 0          Problem List Items Addressed This Visit        Pulmonary    Multiple pulmonary nodules determined by computed tomography of lung    Overview     Found 2013. Largest nodule 4.2mm. Pt has declined repeat scanning.             Cardiac/Vascular    Hypertension associated with diabetes    Relevant Medications    pioglitazone (ACTOS) 15 MG tablet    losartan (COZAAR) 100 MG tablet    Hyperlipidemia associated with type 2 diabetes mellitus    Relevant Medications    pioglitazone (ACTOS) 15 MG tablet       Renal/    Elevated PSA    Overview     Did not f/u with urology as rec in 2015         Relevant Orders    PSA, Screening       Endocrine    Uncontrolled type 2 diabetes mellitus without complication, without long-term current use of insulin    Relevant Medications     pioglitazone (ACTOS) 15 MG tablet      Other Visit Diagnoses     Routine general medical examination at a health care facility    -  Primary    Acute sinusitis, recurrence not specified, unspecified location        Relevant Medications    amoxicillin-clavulanate 875-125mg (AUGMENTIN) 875-125 mg per tablet    fluticasone (FLONASE) 50 mcg/actuation nasal spray    methylPREDNISolone (MEDROL DOSEPACK) 4 mg tablet          Follow-up in about 6 months (around 8/5/2019), or if symptoms worsen or fail to improve.

## 2019-02-06 NOTE — TELEPHONE ENCOUNTER
Pt stated he does not see an outside urologist.  Last saw Dr. Bernstein and cancelled the prostate biopsy because he didn't want anyone cutting on him.  Advised pt that he will need a PSA completed.  Pt verbalized understanding and scheduled lab work for 2/7/19.

## 2019-02-07 ENCOUNTER — LAB VISIT (OUTPATIENT)
Dept: LAB | Facility: HOSPITAL | Age: 69
End: 2019-02-07
Attending: INTERNAL MEDICINE
Payer: COMMERCIAL

## 2019-02-07 DIAGNOSIS — R97.20 ELEVATED PSA: ICD-10-CM

## 2019-02-07 LAB — COMPLEXED PSA SERPL-MCNC: 9.8 NG/ML

## 2019-02-07 PROCEDURE — 36415 COLL VENOUS BLD VENIPUNCTURE: CPT

## 2019-02-07 PROCEDURE — 84153 ASSAY OF PSA TOTAL: CPT

## 2019-02-08 ENCOUNTER — TELEPHONE (OUTPATIENT)
Dept: INTERNAL MEDICINE | Facility: CLINIC | Age: 69
End: 2019-02-08

## 2019-02-09 NOTE — TELEPHONE ENCOUNTER
Prostate cancer marker (PSA) continues to rise.  It is very important that he see a urologist as soon as possible.

## 2019-02-11 ENCOUNTER — TELEPHONE (OUTPATIENT)
Dept: INTERNAL MEDICINE | Facility: CLINIC | Age: 69
End: 2019-02-11

## 2019-02-11 NOTE — TELEPHONE ENCOUNTER
Notified pt that PSA continues to rise and that Dr. Gonzales said it is very important that he see a urologist as soon as possible.  Pt stated he does not want to see a urologist because the last time he saw Dr. Bernstein it was recommended that he have a prostate biopsy and he does not want to be cut on.  Advised pt that he have an appt with a urologist of his choosing to discuss alternative.  Pt agreed to that and scheduled appt with Dr. Bernstein for 3/11/19.

## 2019-02-11 NOTE — TELEPHONE ENCOUNTER
----- Message from Ran Zayas sent at 2/11/2019  8:36 AM CST -----  Contact: Pt   Pt is returning nurse missed call. Pt state that he is unsure of what the call might be about. .[phone] (home)       ..Thank You  Rafaela Zayas

## 2019-02-24 RX ORDER — DULAGLUTIDE 0.75 MG/.5ML
INJECTION, SOLUTION SUBCUTANEOUS
Qty: 12 SYRINGE | Refills: 3 | OUTPATIENT
Start: 2019-02-24

## 2019-03-04 ENCOUNTER — OFFICE VISIT (OUTPATIENT)
Dept: DIABETES | Facility: CLINIC | Age: 69
End: 2019-03-04
Payer: COMMERCIAL

## 2019-03-04 ENCOUNTER — OFFICE VISIT (OUTPATIENT)
Dept: URGENT CARE | Facility: CLINIC | Age: 69
End: 2019-03-04
Payer: COMMERCIAL

## 2019-03-04 VITALS
HEART RATE: 94 BPM | DIASTOLIC BLOOD PRESSURE: 88 MMHG | TEMPERATURE: 97 F | HEIGHT: 69 IN | SYSTOLIC BLOOD PRESSURE: 126 MMHG | OXYGEN SATURATION: 99 % | BODY MASS INDEX: 38.72 KG/M2 | WEIGHT: 261.44 LBS

## 2019-03-04 VITALS
HEART RATE: 98 BPM | HEIGHT: 69 IN | WEIGHT: 261.94 LBS | SYSTOLIC BLOOD PRESSURE: 120 MMHG | TEMPERATURE: 98 F | BODY MASS INDEX: 38.8 KG/M2 | DIASTOLIC BLOOD PRESSURE: 76 MMHG

## 2019-03-04 DIAGNOSIS — E11.69 HYPERLIPIDEMIA ASSOCIATED WITH TYPE 2 DIABETES MELLITUS: ICD-10-CM

## 2019-03-04 DIAGNOSIS — J01.00 ACUTE NON-RECURRENT MAXILLARY SINUSITIS: Primary | ICD-10-CM

## 2019-03-04 DIAGNOSIS — E11.59 HYPERTENSION ASSOCIATED WITH DIABETES: ICD-10-CM

## 2019-03-04 DIAGNOSIS — E78.5 HYPERLIPIDEMIA ASSOCIATED WITH TYPE 2 DIABETES MELLITUS: ICD-10-CM

## 2019-03-04 DIAGNOSIS — I15.2 HYPERTENSION ASSOCIATED WITH DIABETES: ICD-10-CM

## 2019-03-04 DIAGNOSIS — R05.9 COUGH: ICD-10-CM

## 2019-03-04 LAB — GLUCOSE SERPL-MCNC: 105 MG/DL (ref 70–110)

## 2019-03-04 PROCEDURE — 1101F PT FALLS ASSESS-DOCD LE1/YR: CPT | Mod: CPTII,S$GLB,, | Performed by: NURSE PRACTITIONER

## 2019-03-04 PROCEDURE — 99999 PR PBB SHADOW E&M-EST. PATIENT-LVL III: ICD-10-PCS | Mod: PBBFAC,,, | Performed by: PHYSICIAN ASSISTANT

## 2019-03-04 PROCEDURE — 3074F SYST BP LT 130 MM HG: CPT | Mod: CPTII,S$GLB,, | Performed by: NURSE PRACTITIONER

## 2019-03-04 PROCEDURE — 3045F PR MOST RECENT HEMOGLOBIN A1C LEVEL 7.0-9.0%: ICD-10-PCS | Mod: CPTII,S$GLB,, | Performed by: PHYSICIAN ASSISTANT

## 2019-03-04 PROCEDURE — 82962 POCT GLUCOSE, HAND-HELD DEVICE: ICD-10-PCS | Mod: S$GLB,,, | Performed by: PHYSICIAN ASSISTANT

## 2019-03-04 PROCEDURE — 1101F PT FALLS ASSESS-DOCD LE1/YR: CPT | Mod: CPTII,S$GLB,, | Performed by: PHYSICIAN ASSISTANT

## 2019-03-04 PROCEDURE — 99214 PR OFFICE/OUTPT VISIT, EST, LEVL IV, 30-39 MIN: ICD-10-PCS | Mod: S$GLB,,, | Performed by: PHYSICIAN ASSISTANT

## 2019-03-04 PROCEDURE — 3079F PR MOST RECENT DIASTOLIC BLOOD PRESSURE 80-89 MM HG: ICD-10-PCS | Mod: CPTII,S$GLB,, | Performed by: NURSE PRACTITIONER

## 2019-03-04 PROCEDURE — 3079F DIAST BP 80-89 MM HG: CPT | Mod: CPTII,S$GLB,, | Performed by: NURSE PRACTITIONER

## 2019-03-04 PROCEDURE — 99214 OFFICE O/P EST MOD 30 MIN: CPT | Mod: 25,S$GLB,, | Performed by: NURSE PRACTITIONER

## 2019-03-04 PROCEDURE — 1101F PR PT FALLS ASSESS DOC 0-1 FALLS W/OUT INJ PAST YR: ICD-10-PCS | Mod: CPTII,S$GLB,, | Performed by: PHYSICIAN ASSISTANT

## 2019-03-04 PROCEDURE — 99214 PR OFFICE/OUTPT VISIT, EST, LEVL IV, 30-39 MIN: ICD-10-PCS | Mod: 25,S$GLB,, | Performed by: NURSE PRACTITIONER

## 2019-03-04 PROCEDURE — 3078F PR MOST RECENT DIASTOLIC BLOOD PRESSURE < 80 MM HG: ICD-10-PCS | Mod: CPTII,S$GLB,, | Performed by: PHYSICIAN ASSISTANT

## 2019-03-04 PROCEDURE — 3074F PR MOST RECENT SYSTOLIC BLOOD PRESSURE < 130 MM HG: ICD-10-PCS | Mod: CPTII,S$GLB,, | Performed by: PHYSICIAN ASSISTANT

## 2019-03-04 PROCEDURE — 1101F PR PT FALLS ASSESS DOC 0-1 FALLS W/OUT INJ PAST YR: ICD-10-PCS | Mod: CPTII,S$GLB,, | Performed by: NURSE PRACTITIONER

## 2019-03-04 PROCEDURE — 3078F DIAST BP <80 MM HG: CPT | Mod: CPTII,S$GLB,, | Performed by: PHYSICIAN ASSISTANT

## 2019-03-04 PROCEDURE — 96372 THER/PROPH/DIAG INJ SC/IM: CPT | Mod: S$GLB,,, | Performed by: NURSE PRACTITIONER

## 2019-03-04 PROCEDURE — 99999 PR PBB SHADOW E&M-EST. PATIENT-LVL III: CPT | Mod: PBBFAC,,, | Performed by: PHYSICIAN ASSISTANT

## 2019-03-04 PROCEDURE — 3074F SYST BP LT 130 MM HG: CPT | Mod: CPTII,S$GLB,, | Performed by: PHYSICIAN ASSISTANT

## 2019-03-04 PROCEDURE — 99999 PR PBB SHADOW E&M-EST. PATIENT-LVL V: CPT | Mod: PBBFAC,,, | Performed by: NURSE PRACTITIONER

## 2019-03-04 PROCEDURE — 99214 OFFICE O/P EST MOD 30 MIN: CPT | Mod: S$GLB,,, | Performed by: PHYSICIAN ASSISTANT

## 2019-03-04 PROCEDURE — 82962 GLUCOSE BLOOD TEST: CPT | Mod: S$GLB,,, | Performed by: PHYSICIAN ASSISTANT

## 2019-03-04 PROCEDURE — 99999 PR PBB SHADOW E&M-EST. PATIENT-LVL V: ICD-10-PCS | Mod: PBBFAC,,, | Performed by: NURSE PRACTITIONER

## 2019-03-04 PROCEDURE — 96372 PR INJECTION,THERAP/PROPH/DIAG2ST, IM OR SUBCUT: ICD-10-PCS | Mod: S$GLB,,, | Performed by: NURSE PRACTITIONER

## 2019-03-04 PROCEDURE — 3074F PR MOST RECENT SYSTOLIC BLOOD PRESSURE < 130 MM HG: ICD-10-PCS | Mod: CPTII,S$GLB,, | Performed by: NURSE PRACTITIONER

## 2019-03-04 PROCEDURE — 3045F PR MOST RECENT HEMOGLOBIN A1C LEVEL 7.0-9.0%: CPT | Mod: CPTII,S$GLB,, | Performed by: PHYSICIAN ASSISTANT

## 2019-03-04 RX ORDER — LOSARTAN POTASSIUM 50 MG/1
TABLET ORAL
COMMUNITY
Start: 2019-02-17 | End: 2020-03-25

## 2019-03-04 RX ORDER — MONTELUKAST SODIUM 10 MG/1
10 TABLET ORAL NIGHTLY
Qty: 30 TABLET | Refills: 0 | Status: SHIPPED | OUTPATIENT
Start: 2019-03-04 | End: 2019-04-03

## 2019-03-04 RX ORDER — LIDOCAINE HYDROCHLORIDE 10 MG/ML
1 INJECTION, SOLUTION EPIDURAL; INFILTRATION; INTRACAUDAL; PERINEURAL
Status: DISCONTINUED | OUTPATIENT
Start: 2019-03-04 | End: 2019-07-25

## 2019-03-04 RX ORDER — AZITHROMYCIN 500 MG/1
500 TABLET, FILM COATED ORAL DAILY
Qty: 5 TABLET | Refills: 0 | Status: SHIPPED | OUTPATIENT
Start: 2019-03-04 | End: 2019-03-09

## 2019-03-04 RX ORDER — CEFTRIAXONE 500 MG/1
500 INJECTION, POWDER, FOR SOLUTION INTRAMUSCULAR; INTRAVENOUS
Status: COMPLETED | OUTPATIENT
Start: 2019-03-04 | End: 2019-03-04

## 2019-03-04 RX ORDER — LEVOCETIRIZINE DIHYDROCHLORIDE 5 MG/1
5 TABLET, FILM COATED ORAL EVERY MORNING
Qty: 30 TABLET | Refills: 11 | Status: ON HOLD | OUTPATIENT
Start: 2019-03-04 | End: 2020-09-24 | Stop reason: HOSPADM

## 2019-03-04 RX ADMIN — CEFTRIAXONE 500 MG: 500 INJECTION, POWDER, FOR SOLUTION INTRAMUSCULAR; INTRAVENOUS at 12:03

## 2019-03-04 NOTE — PROGRESS NOTES
Subjective:      Patient ID: Anup Miller is a 68 y.o. male.    PCP: Bryce Gonzales MD      Anup Miller is a pleasant 68 y.o. male presenting to follow up on diabetes mellitus. Also, pertinent to this visit in decision making is hypertension, hyperlipidemia, and compliance. He has had diabetes for 15 or more years. His last visit in Diabetes Management was 12/03//2018. Since that time he has been in his usual state of health without significant hyperglycemia or hypoglycemia. He has been checking his blood glucose regularly twice daily, fasting and before supper. Since that time he has had significant improvement in his SMBG glycemia with A1c of 7.1%.  His blood sugar range fasting has been good  and fed has been 180+, and he has been monitoring 4 times per day. His current concerns are glycemic control.  Additionally, he states he feels better and has lots more energy than before.    He denies any hospital admissions, emergency room visits, hypoglycemia, syncope, diaphoresis, chest pain, or dyspnea.    He has gained 4 pounds since last visit. His BMI is 38.68 kg/m².    His blood sugar in the clinic today was:   Lab Results   Component Value Date    POCGLU 105 03/04/2019     We discussed the American diabetes Association recommendations:  hemoglobin A1c below 7.0%; all diabetics should be on statins unless contraindicated; one aspirin daily unless contraindicated; fasting blood sugar between 80 and 130 mg/dL; postprandial blood sugar below 180 mg/dl; prevention of hypoglycemia, may adjust goals to higher levels if persistent; ACE or ARB therapy if not contraindicated; and maintain in an ideal body weight with BMI below 25.    Anup is compliant most of the time with DM medications.     Anup is compliant most of the time with lifestyle modifications to include activity and meal planning.       Current Outpatient Medications:     ALREX 0.2 % DrpS, Place 1 drop into both eyes 3 (three) times daily  as needed., Disp: , Rfl:     dulaglutide (TRULICITY) 1.5 mg/0.5 mL PnIj, INJECT 1.5 MG UNDER THE SKIN EVERY 7 DAYS, Disp: 12 Syringe, Rfl: 1    fluticasone (FLONASE) 50 mcg/actuation nasal spray, 2 sprays (100 mcg total) by Each Nare route once daily. For nasal congestion or runny nose, Disp: 16 g, Rfl: 1    glimepiride (AMARYL) 4 MG tablet, Take 2 tablets (8 mg total) by mouth once daily., Disp: 180 tablet, Rfl: 3    hydroCHLOROthiazide (HYDRODIURIL) 25 MG tablet, TAKE 1 TABLET BY MOUTH ONE TIME DAILY, Disp: 90 tablet, Rfl: 1    losartan (COZAAR) 50 MG tablet, , Disp: , Rfl:     metFORMIN (GLUCOPHAGE-XR) 750 MG 24 hr tablet, TAKE 1 TABLET BY MOUTH TWICE DAILY WITH MEALS, Disp: 180 tablet, Rfl: 3    multivitamin capsule, Take 1 capsule by mouth once daily., Disp: , Rfl:     pantoprazole (PROTONIX) 40 MG tablet, TAKE 1 TABLET BY MOUTH ONE TIME DAILY, Disp: 90 tablet, Rfl: 1    pioglitazone (ACTOS) 15 MG tablet, Take 1 tablet (15 mg total) by mouth once daily., Disp: 90 tablet, Rfl: 1    rosuvastatin (CRESTOR) 10 MG tablet, TAKE 1 TABLET BY MOUTH DAILY, Disp: 90 tablet, Rfl: 3    XIIDRA 5 % Dpet, Place 1 drop into both eyes once daily. , Disp: , Rfl: 1    empagliflozin (JARDIANCE) 25 mg Tab, Take 25 mg by mouth once daily., Disp: 90 tablet, Rfl: 1    methylPREDNISolone (MEDROL DOSEPACK) 4 mg tablet, use as directed, Disp: 1 Package, Rfl: 0    STANDARDS OF CARE:  Eye doctor: Dr. Alex Pelayo, last exam 5/24/2017.  Dental exam: Recommend regular exams; denies gums bleeding.  Podiatry doctor:     ACTIVITY LEVEL: He exercises rarely.  MEAL PLANNING: Number of meals per day - 3. Number of snacks per day - 2.  Per dietary recall, patient is not limiting carbohydrates, saturated fats and sodium.   BLOOD GLUCOSE TESTING: Self-monitoring with   ACE/ARB: Yes  Statin: Yes    Health Maintenance   Topic Date Due    Eye Exam  08/24/2018    Hemoglobin A1c  06/03/2019    Lipid Panel  08/02/2019    Low Dose Statin   03/04/2020    Foot Exam  03/04/2020    Colonoscopy  07/19/2021    TETANUS VACCINE  04/02/2023    Hepatitis C Screening  Completed    Zoster Vaccine  Completed    Pneumococcal Vaccine (65+ Low/Medium Risk)  Completed    Influenza Vaccine  Completed       Diabetes Status:   Diabetes Management Status    Statin: Taking  ACE/ARB: Taking    Screening or Prevention Patient's value Goal Complete/Controlled?   HgA1C Testing and Control   Lab Results   Component Value Date    HGBA1C 7.1 (H) 12/03/2018      Annually/Less than 8% No   Lipid profile : 08/02/2018 Annually Yes   LDL control Lab Results   Component Value Date    LDLCALC 71.6 08/02/2018    Annually/Less than 100 mg/dl  Yes   Nephropathy screening Lab Results   Component Value Date    LABMICR 4.0 06/18/2018     No results found for: PROTEINUA Annually Yes   Blood pressure BP Readings from Last 1 Encounters:   03/04/19 120/76    Less than 140/90 Yes   Dilated retinal exam : 08/24/2017 Annually Yes   Foot exam   : 03/04/2019 Annually Yes     The following results were reviewed with patient.    Lab Results   Component Value Date    WBC 5.26 11/14/2016    HGB 13.5 (L) 11/14/2016    HCT 41.9 11/14/2016     11/14/2016    CHOL 134 08/02/2018    TRIG 107 08/02/2018    HDL 41 08/02/2018    LDLCALC 71.6 08/02/2018    ALT 83 (H) 08/02/2018    AST 31 03/14/2018     03/14/2018    K 4.3 03/14/2018     03/14/2018    ANIONGAP 11 03/14/2018    CREATININE 1.4 03/14/2018    ESTGFRAFRICA >60.0 03/14/2018    EGFRNONAA 52.4 (A) 03/14/2018    BUN 21 03/14/2018    CO2 28 03/14/2018    TSH 0.913 12/29/2014    PSA 9.8 (H) 02/07/2019    INR 1.1 11/30/2015     (H) 03/14/2018       Lab Results   Component Value Date    HGBA1C 7.1 (H) 12/03/2018    HGBA1C 8.6 (H) 08/02/2018    HGBA1C 7.9 (H) 06/18/2018       Lab Results   Component Value Date    GLUTAMICACID 0.00 04/03/2017    CPEPTIDE 4.8 04/03/2017     Lab Results   Component Value Date    TSH 0.913 12/29/2014      Lab Results   Component Value Date    IRON 126 11/30/2015    TIBC 521 (H) 11/30/2015    FERRITIN 247 11/30/2015       Lab Results   Component Value Date    CALCIUM 10.2 03/14/2018     Review of patient's allergies indicates:  No Known Allergies    Past Medical History:   Diagnosis Date    Diabetes mellitus type II Diagnosed 2002    Elevated liver enzymes     Fatty liver disease, nonalcoholic     Hyperlipidemia     Hypertension     Sleep apnea        Review of Systems   Constitutional: Negative.  Negative for activity change, appetite change, chills, diaphoresis, fatigue, fever and unexpected weight change.   HENT: Negative.  Negative for dental problem, facial swelling, hearing loss, nosebleeds, trouble swallowing and voice change.    Eyes: Negative.  Negative for photophobia, pain, discharge, redness, itching and visual disturbance.   Respiratory: Negative.  Negative for cough, choking, chest tightness, shortness of breath and wheezing.    Cardiovascular: Negative.  Negative for chest pain, palpitations and leg swelling.   Gastrointestinal: Negative.  Negative for abdominal distention, abdominal pain, blood in stool, constipation, diarrhea, nausea and vomiting.   Endocrine: Negative.  Negative for cold intolerance, heat intolerance, polydipsia, polyphagia and polyuria.   Genitourinary: Negative.  Negative for decreased urine volume, difficulty urinating, dysuria, frequency, scrotal swelling, testicular pain and urgency.   Musculoskeletal: Negative.  Negative for arthralgias, back pain, gait problem, joint swelling, myalgias, neck pain and neck stiffness.   Skin: Negative.  Negative for color change, pallor, rash and wound.   Allergic/Immunologic: Negative.  Negative for environmental allergies, food allergies and immunocompromised state.   Neurological: Negative.  Negative for dizziness, tremors, seizures, syncope, facial asymmetry, speech difficulty, weakness, light-headedness, numbness and headaches.  "  Hematological: Negative.  Negative for adenopathy. Does not bruise/bleed easily.   Psychiatric/Behavioral: Negative.  Negative for agitation, behavioral problems, confusion, decreased concentration, dysphoric mood, hallucinations, self-injury, sleep disturbance and suicidal ideas. The patient is not nervous/anxious and is not hyperactive.       Objective:     Vitals - 1 value per visit 12/5/2018 2/5/2019 3/4/2019   SYSTOLIC 140 140 120   DIASTOLIC 80 76 76   PULSE 97 122 98   TEMPERATURE - 97 97.9   RESPIRATIONS 17 - -   SPO2 99 97 -   Weight (lb) 258.38 256.84 261.91   Weight (kg) 117.2 116.5 118.8   HEIGHT 5' 9" - 5' 9"   BODY MASS INDEX 38.16 37.93 38.68   VISIT REPORT - - -   Pain Score  - 0 0   Some recent data might be hidden     Physical Exam   Constitutional: He is oriented to person, place, and time. He appears well-developed and well-nourished. He is cooperative.  Non-toxic appearance. He does not have a sickly appearance. He does not appear ill. No distress. He is not intubated.   HENT:   Head: Normocephalic and atraumatic. Not macrocephalic and not microcephalic. Head is without raccoon's eyes, without Keyes's sign, without abrasion, without contusion, without laceration, without right periorbital erythema and without left periorbital erythema. Hair is normal.   Right Ear: External ear normal. No lacerations. No drainage, swelling or tenderness. No foreign bodies. No mastoid tenderness. Tympanic membrane is not injected, not scarred, not perforated, not erythematous, not retracted and not bulging. Tympanic membrane mobility is normal. No middle ear effusion. No hemotympanum. No decreased hearing is noted.   Left Ear: External ear normal. No lacerations. No drainage, swelling or tenderness. No foreign bodies. No mastoid tenderness. Tympanic membrane is not injected, not scarred, not perforated, not erythematous, not retracted and not bulging. Tympanic membrane mobility is normal.  No middle ear " effusion. No hemotympanum. No decreased hearing is noted.   Nose: Nose normal.   Mouth/Throat: Oropharynx is clear and moist.   Eyes: EOM and lids are normal. Pupils are equal, round, and reactive to light. Right eye exhibits no chemosis, no discharge, no exudate and no hordeolum. No foreign body present in the right eye. Left eye exhibits no chemosis, no discharge, no exudate and no hordeolum. No foreign body present in the left eye. Right conjunctiva is not injected. Right conjunctiva has no hemorrhage. Left conjunctiva is not injected. Left conjunctiva has no hemorrhage. No scleral icterus. Right eye exhibits normal extraocular motion and no nystagmus. Left eye exhibits normal extraocular motion and no nystagmus. Right pupil is round and reactive. Left pupil is round and reactive. Pupils are equal.   Neck: Normal range of motion, full passive range of motion without pain and phonation normal. Neck supple. Normal carotid pulses, no hepatojugular reflux and no JVD present. No tracheal tenderness, no spinous process tenderness and no muscular tenderness present. Carotid bruit is not present. No neck rigidity. No tracheal deviation, no edema, no erythema and normal range of motion present. No thyroid mass and no thyromegaly present.   Cardiovascular: Normal rate, regular rhythm, normal heart sounds and intact distal pulses.  No extrasystoles (But no compensatory pauses) are present. PMI is not displaced. Exam reveals no gallop, no friction rub and no decreased pulses.   No murmur heard.  Pulses:       Carotid pulses are 2+ on the right side, and 2+ on the left side.       Radial pulses are 2+ on the right side, and 2+ on the left side.        Dorsalis pedis pulses are 2+ on the right side, and 2+ on the left side.        Posterior tibial pulses are 2+ on the right side, and 2+ on the left side.   Pulmonary/Chest: Effort normal and breath sounds normal. No accessory muscle usage or stridor. No apnea, no tachypnea and  no bradypnea. He is not intubated. No respiratory distress. He has no decreased breath sounds. He has no wheezes. He has no rhonchi. He has no rales. He exhibits no tenderness.   Abdominal: Soft. Bowel sounds are normal. He exhibits no shifting dullness, no distension, no pulsatile liver, no fluid wave, no abdominal bruit, no ascites, no pulsatile midline mass and no mass. There is no hepatosplenomegaly, splenomegaly or hepatomegaly. There is no tenderness. There is no rigidity, no rebound, no guarding, no CVA tenderness and no tenderness at McBurney's point. No hernia. Hernia confirmed negative in the ventral area.   Musculoskeletal: Normal range of motion. He exhibits no edema or tenderness.        Right foot: There is normal range of motion and no deformity.        Left foot: There is normal range of motion and no deformity.   Feet:   Right Foot:   Protective Sensation: 6 sites tested. 6 sites sensed.   Skin Integrity: Negative for ulcer, blister, skin breakdown, erythema, warmth, callus or dry skin.   Left Foot:   Protective Sensation: 6 sites tested. 6 sites sensed.   Skin Integrity: Negative for ulcer, blister, skin breakdown, erythema, warmth, callus or dry skin.   Lymphadenopathy:        Head (right side): No submental, no submandibular, no tonsillar, no preauricular, no posterior auricular and no occipital adenopathy present.        Head (left side): No submental, no submandibular, no tonsillar, no preauricular, no posterior auricular and no occipital adenopathy present.     He has no cervical adenopathy.        Right cervical: No superficial cervical, no deep cervical and no posterior cervical adenopathy present.       Left cervical: No superficial cervical, no deep cervical and no posterior cervical adenopathy present.   Neurological: He is alert and oriented to person, place, and time. He has normal reflexes. He displays no atrophy and no tremor. No cranial nerve deficit or sensory deficit. He exhibits  normal muscle tone. He displays no seizure activity. Coordination and gait normal.   Reflex Scores:       Bicep reflexes are 2+ on the right side and 2+ on the left side.       Brachioradialis reflexes are 2+ on the right side and 2+ on the left side.       Patellar reflexes are 2+ on the right side and 2+ on the left side.  Skin: Skin is warm and dry. No rash noted. No erythema. No pallor.   Psychiatric: He has a normal mood and affect. His mood appears not anxious. His affect is not angry, not blunt, not labile and not inappropriate. His speech is not rapid and/or pressured, not delayed, not tangential and not slurred. He is not agitated, not aggressive, not hyperactive, not slowed, not withdrawn, not actively hallucinating and not combative. Thought content is not paranoid and not delusional. Cognition and memory are not impaired. He does not express impulsivity or inappropriate judgment. He does not exhibit a depressed mood. He expresses no homicidal and no suicidal ideation. He expresses no suicidal plans and no homicidal plans. He is communicative. He exhibits normal recent memory and normal remote memory. He is attentive.     Assessment:     1. Uncontrolled type 2 diabetes mellitus without complication, without long-term current use of insulin    2. Hypertension associated with diabetes    3. Hyperlipidemia associated with type 2 diabetes mellitus       Plan:   Anup Miller is seen today for   1. Uncontrolled type 2 diabetes mellitus without complication, without long-term current use of insulin    2. Hypertension associated with diabetes    3. Hyperlipidemia associated with type 2 diabetes mellitus      We have discussed the etiology and treatment options associated with the diagnosis as well as alternatives.       He has elected the following treatments.     Anup Miller is seen today for   1. Uncontrolled type 2 diabetes mellitus without complication, without long-term current use of insulin    2.  Hypertension associated with diabetes    3. Hyperlipidemia associated with type 2 diabetes mellitus      We have discussed the etiology and treatment options associated with the diagnosis as well as alternatives. He has elected the following treatments.     Uncontrolled type 2 diabetes mellitus without complication, without long-term current use of insulin  -     POCT Glucose, Hand-Held Device  -     dulaglutide (TRULICITY) 1.5 mg/0.5 mL PnIj; INJECT 1.5 MG UNDER THE SKIN EVERY 7 DAYS  Dispense: 12 Syringe; Refill: 1    Hypertension associated with diabetes  -     POCT Glucose, Hand-Held Device  -     dulaglutide (TRULICITY) 1.5 mg/0.5 mL PnIj; INJECT 1.5 MG UNDER THE SKIN EVERY 7 DAYS  Dispense: 12 Syringe; Refill: 1    Hyperlipidemia associated with type 2 diabetes mellitus  -     POCT Glucose, Hand-Held Device  -     dulaglutide (TRULICITY) 1.5 mg/0.5 mL PnIj; INJECT 1.5 MG UNDER THE SKIN EVERY 7 DAYS  Dispense: 12 Syringe; Refill: 1    Other orders  -     empagliflozin (JARDIANCE) 25 mg Tab; Take 25 mg by mouth once daily.  Dispense: 90 tablet; Refill: 1  -     levocetirizine (XYZAL) 5 MG tablet; Take 1 tablet (5 mg total) by mouth every morning.  Dispense: 30 tablet; Refill: 11  -     montelukast (SINGULAIR) 10 mg tablet; Take 1 tablet (10 mg total) by mouth every evening.  Dispense: 30 tablet; Refill: 0    Uncontrolled type 2 diabetes mellitus without complication, without long-term current use of insulin  -     POCT glucose  - Labs today  - Continue with D&E, reduce portion size, and restrict carbohydrates (no more that 45 grams ) per meal.  - Change Farxiga to Jardiance 25 mg  - Actos to 15 mg continue  - Trulicity 1.5 mg continue      1.) Patient was instructed to continue to monitor blood glucose 4 times daily. Reminded to bring BG meter or record to each visit for review.  2.) Reviewed pathophysiology of diabetes, complications related to the disease, importance of annual dilated eye exam and self daily  foot examination.  3.) Continue medications as prescribed Trulicity 1.5;  Metformin; Pioglitazone; Farxiga. [facility] MyChart or Phone review in 1 week with [facility] records for adjustment of medication.  4.) Advised patient to continue to follow up with Dietician/CDE as directed.  5.) Discussed activity, benefits, methods, and precautions. Recommended patient start/continue some form of exercise and increase as tolerated to 60 minutes per day to facilitate weight loss and aid in control of BGs. Also reminded patient of WHO recommendation of 10,000 steps daily as a goal.   6.) A1C, TSH, Lipid Panel, CMP/renal panel with eGFR and Micro/Creatinine prior to next visit, if not already done.  7.) Return to clinic in 12 weeks for follow up. Advised patient to call clinic with any questions or concerns.    A total of 30 minutes was spent in face to face time, of which 50 % was spent in counseling patient on disease process, complications, treatment, and side effects of medications.    The patient was explained the above plan and given opportunity to ask questions.  He understands, chooses and consents to this plan and accepts all the risks, which include but are not limited to the risks mentioned above.   He understands the alternative of having no testing, interventions or treatments at this time. He left content and without further questions.     Disclaimer:  This note is prepared using voice recognition software and as such is likely to have errors and has not been proof read. Please contact me for questions.

## 2019-03-04 NOTE — PATIENT INSTRUCTIONS
Sinusitis (Antibiotic Treatment)    The sinuses are air-filled spaces within the bones of the face. They connect to the inside of the nose. Sinusitis is an inflammation of the tissue lining the sinus cavity. Sinus inflammation can occur during a cold. It can also be due to allergies to pollens and other particles in the air. Sinusitis can cause symptoms of sinus congestion and fullness. A sinus infection causes fever, headache and facial pain. There is often green or yellow drainage from the nose or into the back of the throat (post-nasal drip). You have been given antibiotics to treat this condition.  Home care:  · Take the full course of antibiotics as instructed. Do not stop taking them, even if you feel better.  · Drink plenty of water, hot tea, and other liquids. This may help thin mucus. It also may promote sinus drainage.  · Heat may help soothe painful areas of the face. Use a towel soaked in hot water. Or,  the shower and direct the hot spray onto your face. Using a vaporizer along with a menthol rub at night may also help.   · An expectorant containing guaifenesin may help thin the mucus and promote drainage from the sinuses.  · Over-the-counter decongestants may be used unless a similar medicine was prescribed. Nasal sprays work the fastest. Use one that contains phenylephrine or oxymetazoline. First blow the nose gently. Then use the spray. Do not use these medicines more often than directed on the label or symptoms may get worse. You may also use tablets containing pseudoephedrine. Avoid products that combine ingredients, because side effects may be increased. Read labels. You can also ask the pharmacist for help. (NOTE: Persons with high blood pressure should not use decongestants. They can raise blood pressure.)  · Over-the-counter antihistamines may help if allergies contributed to your sinusitis.    · Do not use nasal rinses or irrigation during an acute sinus infection, unless told to by  your health care provider. Rinsing may spread the infection to other sinuses.  · Use acetaminophen or ibuprofen to control pain, unless another pain medicine was prescribed. (If you have chronic liver or kidney disease or ever had a stomach ulcer, talk with your doctor before using these medicines. Aspirin should never be used in anyone under 18 years of age who is ill with a fever. It may cause severe liver damage.)  · Don't smoke. This can worsen symptoms.  Follow-up care  Follow up with your healthcare provider or our staff if you are not improving within the next week.  When to seek medical advice  Call your healthcare provider if any of these occur:  · Facial pain or headache becoming more severe  · Stiff neck  · Unusual drowsiness or confusion  · Swelling of the forehead or eyelids  · Vision problems, including blurred or double vision  · Fever of 100.4ºF (38ºC) or higher, or as directed by your healthcare provider  · Seizure  · Breathing problems  · Symptoms not resolving within 10 days  Date Last Reviewed: 4/13/2015  © 0464-9193 [employer]. [address] [place] [address], [place] [address]. All rights reserved. This information is not intended as a substitute for professional medical care. Always follow your healthcare professional's instructions.

## 2019-03-04 NOTE — PROGRESS NOTES
Subjective:       Patient ID: Anup Miller is a 68 y.o. male.    Chief Complaint: Sinusitis    68 year old presents to Urgent Care with reports of sinus pressure and tenderness that has been present for about 2 weeks with no improvement with OTC medications. Denies any other problems or concerns at this time.       Sinusitis   Associated symptoms include sinus pressure. Pertinent negatives include no chills, ear pain, shortness of breath or sore throat.     Review of Systems   Constitutional: Negative for appetite change, chills and fever.   HENT: Positive for sinus pressure and sinus pain. Negative for ear pain, sore throat and trouble swallowing.    Eyes: Negative for visual disturbance.   Respiratory: Negative for shortness of breath.    Cardiovascular: Negative for chest pain.   Gastrointestinal: Negative for abdominal pain, diarrhea, nausea and vomiting.   Endocrine: Negative for cold intolerance, polyphagia and polyuria.   Genitourinary: Negative for decreased urine volume and dysuria.   Musculoskeletal: Negative for back pain.   Skin: Negative for rash.   Allergic/Immunologic: Negative for environmental allergies and food allergies.   Neurological: Negative for dizziness, tremors, weakness and numbness.   Hematological: Does not bruise/bleed easily.   Psychiatric/Behavioral: Negative for confusion and hallucinations. The patient is not nervous/anxious and is not hyperactive.    All other systems reviewed and are negative.      Objective:     Physical Exam   Constitutional: He is oriented to person, place, and time. He appears well-developed and well-nourished. He appears lethargic.   HENT:   Head: Macrocephalic.   Right Ear: External ear normal.   Left Ear: External ear normal.   Nose: Mucosal edema present. Right sinus exhibits maxillary sinus tenderness. Left sinus exhibits maxillary sinus tenderness.   Mouth/Throat: Oropharynx is clear and moist.   Eyes: Conjunctivae and EOM are normal. Pupils are  equal, round, and reactive to light.   Neck: Normal range of motion. Neck supple. No JVD present.   Cardiovascular: Normal rate, regular rhythm, normal heart sounds and intact distal pulses.   Pulmonary/Chest: Effort normal and breath sounds normal. He has no wheezes.   Abdominal: Soft. Bowel sounds are normal. There is no tenderness.   Lymphadenopathy:     He has no cervical adenopathy.   Neurological: He is oriented to person, place, and time. He appears lethargic.   Skin: Skin is warm and dry.   Psychiatric: He has a normal mood and affect. His behavior is normal. Judgment and thought content normal.   Nursing note and vitals reviewed.  Patient requesting injection instead of oral antibiotics. Discussed risk and benefits associated with sinusitis infection-informed patient that medication will be sent to pharmacy and recommended that they are taken as prescribed. Agrees and understands plan.   Assessment:     1. Acute non-recurrent maxillary sinusitis    2. Cough      Plan:   Anup was seen today for sinusitis.    Diagnoses and all orders for this visit:    Acute non-recurrent maxillary sinusitis    Cough    Other orders  -     cefTRIAXone injection 500 mg  -     lidocaine (PF) 10 mg/ml (1%) injection 10 mg

## 2019-04-08 ENCOUNTER — TELEPHONE (OUTPATIENT)
Dept: UROLOGY | Facility: CLINIC | Age: 69
End: 2019-04-08

## 2019-04-08 NOTE — TELEPHONE ENCOUNTER
----- Message from Josse Julio sent at 4/8/2019  7:49 AM CDT -----  Contact: self [phone]  Pt running late, does not want to reschedule; will arrive by 8:15am.  Please call back at [phone]/Md

## 2019-04-29 RX ORDER — PANTOPRAZOLE SODIUM 40 MG/1
TABLET, DELAYED RELEASE ORAL
Qty: 90 TABLET | Refills: 3 | Status: SHIPPED | OUTPATIENT
Start: 2019-04-29 | End: 2020-03-25

## 2019-05-27 ENCOUNTER — OFFICE VISIT (OUTPATIENT)
Dept: DIABETES | Facility: CLINIC | Age: 69
End: 2019-05-27
Payer: COMMERCIAL

## 2019-05-27 VITALS
BODY MASS INDEX: 38.33 KG/M2 | SYSTOLIC BLOOD PRESSURE: 110 MMHG | WEIGHT: 258.81 LBS | HEIGHT: 69 IN | DIASTOLIC BLOOD PRESSURE: 66 MMHG

## 2019-05-27 LAB — GLUCOSE SERPL-MCNC: 193 MG/DL (ref 70–110)

## 2019-05-27 PROCEDURE — 1101F PR PT FALLS ASSESS DOC 0-1 FALLS W/OUT INJ PAST YR: ICD-10-PCS | Mod: CPTII,S$GLB,, | Performed by: PHYSICIAN ASSISTANT

## 2019-05-27 PROCEDURE — 3045F PR MOST RECENT HEMOGLOBIN A1C LEVEL 7.0-9.0%: CPT | Mod: CPTII,S$GLB,, | Performed by: PHYSICIAN ASSISTANT

## 2019-05-27 PROCEDURE — 3045F PR MOST RECENT HEMOGLOBIN A1C LEVEL 7.0-9.0%: ICD-10-PCS | Mod: CPTII,S$GLB,, | Performed by: PHYSICIAN ASSISTANT

## 2019-05-27 PROCEDURE — 3074F PR MOST RECENT SYSTOLIC BLOOD PRESSURE < 130 MM HG: ICD-10-PCS | Mod: CPTII,S$GLB,, | Performed by: PHYSICIAN ASSISTANT

## 2019-05-27 PROCEDURE — 1101F PT FALLS ASSESS-DOCD LE1/YR: CPT | Mod: CPTII,S$GLB,, | Performed by: PHYSICIAN ASSISTANT

## 2019-05-27 PROCEDURE — 99214 PR OFFICE/OUTPT VISIT, EST, LEVL IV, 30-39 MIN: ICD-10-PCS | Mod: S$GLB,,, | Performed by: PHYSICIAN ASSISTANT

## 2019-05-27 PROCEDURE — 3078F PR MOST RECENT DIASTOLIC BLOOD PRESSURE < 80 MM HG: ICD-10-PCS | Mod: CPTII,S$GLB,, | Performed by: PHYSICIAN ASSISTANT

## 2019-05-27 PROCEDURE — 3074F SYST BP LT 130 MM HG: CPT | Mod: CPTII,S$GLB,, | Performed by: PHYSICIAN ASSISTANT

## 2019-05-27 PROCEDURE — 82962 GLUCOSE BLOOD TEST: CPT | Mod: S$GLB,,, | Performed by: PHYSICIAN ASSISTANT

## 2019-05-27 PROCEDURE — 99214 OFFICE O/P EST MOD 30 MIN: CPT | Mod: S$GLB,,, | Performed by: PHYSICIAN ASSISTANT

## 2019-05-27 PROCEDURE — 82962 POCT GLUCOSE, HAND-HELD DEVICE: ICD-10-PCS | Mod: S$GLB,,, | Performed by: PHYSICIAN ASSISTANT

## 2019-05-27 PROCEDURE — 99999 PR PBB SHADOW E&M-EST. PATIENT-LVL III: CPT | Mod: PBBFAC,,, | Performed by: PHYSICIAN ASSISTANT

## 2019-05-27 PROCEDURE — 3078F DIAST BP <80 MM HG: CPT | Mod: CPTII,S$GLB,, | Performed by: PHYSICIAN ASSISTANT

## 2019-05-27 PROCEDURE — 99999 PR PBB SHADOW E&M-EST. PATIENT-LVL III: ICD-10-PCS | Mod: PBBFAC,,, | Performed by: PHYSICIAN ASSISTANT

## 2019-05-27 NOTE — PROGRESS NOTES
Subjective:      Patient ID: Anup Miller is a 69 y.o. male.    PCP: Bryce Gonzales MD      Anup Miller is a pleasant 69 y.o. male presenting to follow up on diabetes mellitus. Also, pertinent to this visit in decision making is occupation of a , hypertension, hyperlipidemia, and adherence. He has had diabetes for 15 or more years. His last visit in Diabetes Management was 3/4/19. Since that time he has been in his usual state of health without significant hyperglycemia or hypoglycemia. He has been checking his blood glucose regularly daily. Since that time he has had significant improvement in his SMBG glycemia with A1c of 7.1%.  Lately he has noticed his fasting blood sugars are higher than they were 3 months ago.  His blood sugar range fasting has been good 's and fed has been 200+, and he has been monitoring 4 times per day. His current concerns are glycemic control.  Additionally, he is concerned and would like to explore other then use for treatment.  Also, he has modified his diet to consume less carbohydrates.  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  His insurance would not cover the Jardiance which I have prescribed last visit.    He denies any hospital admissions, emergency room visits, hypoglycemia, syncope, diaphoresis, chest pain, or dyspnea.    He has gained 4 pounds since last visit. His BMI is 38.22 kg/m².    His blood sugar in the clinic today was:   Lab Results   Component Value Date    POCGLU 193 (A) 05/27/2019     Anup is compliant most of the time with DM medications.     Anup is compliant most of the time with lifestyle modifications to include activity and meal planning.     Diabetes Management Status    Statin: Taking  ACE/ARB: Taking    Screening or Prevention Patient's value Goal Complete/Controlled?   HgA1C Testing and Control   Lab Results  "  Component Value Date    HGBA1C 7.1 (H) 12/03/2018      Annually/Less than 8% No   Lipid profile : 08/02/2018 Annually Yes   LDL control Lab Results   Component Value Date    LDLCALC 71.6 08/02/2018    Annually/Less than 100 mg/dl  Yes   Nephropathy screening Lab Results   Component Value Date    LABMICR 4.0 06/18/2018     No results found for: PROTEINUA Annually Yes   Blood pressure BP Readings from Last 1 Encounters:   05/27/19 110/66    Less than 140/90 Yes   Dilated retinal exam : 02/07/2019 Annually Yes   Foot exam   : 03/04/2019 Annually Yes         Lab Results   Component Value Date    HGBA1C 7.1 (H) 12/03/2018    HGBA1C 8.6 (H) 08/02/2018    HGBA1C 7.9 (H) 06/18/2018       Review of Systems   Constitutional: Negative for activity change and unexpected weight change.   Eyes: Negative for visual disturbance.   Respiratory: Negative for chest tightness and shortness of breath.    Cardiovascular: Negative for chest pain and leg swelling.   Gastrointestinal: Negative for constipation and diarrhea.   Endocrine: Negative for cold intolerance, heat intolerance, polydipsia, polyphagia and polyuria.   Genitourinary: Negative for frequency.   Skin: Negative for wound.   Neurological: Negative for numbness.   Psychiatric/Behavioral: Negative for confusion.      Objective:   /66   Ht 5' 9" (1.753 m)   Wt 117.4 kg (258 lb 13.1 oz)   BMI 38.22 kg/m²     Physical Exam   Constitutional: He is oriented to person, place, and time. He appears well-developed and well-nourished. No distress.   Neck: Normal range of motion. Neck supple. No tracheal deviation present. No thyromegaly present.   Cardiovascular: Normal rate, regular rhythm and normal heart sounds.   Pulmonary/Chest: Effort normal and breath sounds normal.   Musculoskeletal: He exhibits no edema.   Lymphadenopathy:     He has no cervical adenopathy.   Neurological: He is alert and oriented to person, place, and time.   Skin: Skin is warm and dry. He is not " diaphoretic.   Psychiatric: He has a normal mood and affect.     Assessment:     1. Uncontrolled type 2 diabetes mellitus without complication, without long-term current use of insulin       Plan:   Anup Miller is seen today for   1. Uncontrolled type 2 diabetes mellitus without complication, without long-term current use of insulin      Uncontrolled type 2 diabetes mellitus without complication, without long-term current use of insulin  -     POCT Glucose, Hand-Held Device    Uncontrolled type 2 diabetes mellitus without complication, without long-term current use of insulin  -     POCT glucose  - Labs today  - Continue with D&E, reduce portion size, and restrict carbohydrates (no more that 45 grams ) per meal.  - Jardiance 25 mg (Too expensive)  - Will try Steglatro 15 because of formulary  - Increase glimepiride to 4 mg BID  - Continue Metformin BID  - Actos to 15 mg continue  - Trulicity 1.5 mg continue      A total of 30 minutes was spent in face to face time, of which 50 % was spent in counseling patient on disease process, complications, treatment, and side effects of medications.    The patient was explained the above plan and given opportunity to ask questions.  He understands, chooses and consents to this plan and accepts all the risks, which include but are not limited to the risks mentioned above.   He understands the alternative of having no testing, interventions or treatments at this time. He left content and without further questions.     Disclaimer:  This note is prepared using voice recognition software and as such is likely to have errors and has not been proof read. Please contact me for questions.

## 2019-06-06 ENCOUNTER — TELEPHONE (OUTPATIENT)
Dept: DIABETES | Facility: CLINIC | Age: 69
End: 2019-06-06

## 2019-06-06 NOTE — TELEPHONE ENCOUNTER
----- Message from Ken Kenny sent at 6/6/2019  8:24 AM CDT -----  Type:  RX Refill Request    Who Called:  Wife- Zhou Miller  Refill or New Rx:  Refill   RX Name and Strength:  steglatro  How is the patient currently taking it? (ex. 1XDay):    Is this a 30 day or 90 day RX:  30 day supply   Preferred Pharmacy with phone number:  [facility]  Local or Mail Order:  Local   Ordering Provider:  Dr Cummins   Best Call Back Number:  [phone] Additional Information:  Patient's wife asking for recent prescribed rx to be erx to [facility]

## 2019-06-14 ENCOUNTER — PATIENT OUTREACH (OUTPATIENT)
Dept: ADMINISTRATIVE | Facility: HOSPITAL | Age: 69
End: 2019-06-14

## 2019-06-14 RX ORDER — ERTUGLIFLOZIN 15 MG/1
TABLET, FILM COATED ORAL
Qty: 30 TABLET | Refills: 0 | Status: SHIPPED | OUTPATIENT
Start: 2019-06-14 | End: 2019-07-12 | Stop reason: SDUPTHER

## 2019-06-21 ENCOUNTER — PATIENT OUTREACH (OUTPATIENT)
Dept: ADMINISTRATIVE | Facility: HOSPITAL | Age: 69
End: 2019-06-21

## 2019-06-24 ENCOUNTER — LAB VISIT (OUTPATIENT)
Dept: LAB | Facility: HOSPITAL | Age: 69
End: 2019-06-24
Attending: INTERNAL MEDICINE
Payer: COMMERCIAL

## 2019-06-24 LAB
ESTIMATED AVG GLUCOSE: 226 MG/DL (ref 68–131)
HBA1C MFR BLD HPLC: 9.5 % (ref 4–5.6)

## 2019-06-24 PROCEDURE — 83036 HEMOGLOBIN GLYCOSYLATED A1C: CPT

## 2019-06-24 PROCEDURE — 36415 COLL VENOUS BLD VENIPUNCTURE: CPT

## 2019-07-08 ENCOUNTER — TELEPHONE (OUTPATIENT)
Dept: INTERNAL MEDICINE | Facility: CLINIC | Age: 69
End: 2019-07-08

## 2019-07-08 NOTE — TELEPHONE ENCOUNTER
----- Message from Patti Vasquez sent at 7/8/2019 11:36 AM CDT -----  Contact: Wife Mrs MillerZadrtdg-[phone]  Would like to consult with the nurse, patient is returning the nurse call, please call back at [phone], thanks sj  .Type:  Patient Returning Call    Who Called: Mrs Miller  Who Left Message for Patient:Madison  Does the patient know what this is regarding?:no  Would the patient rather a call back or a response via [facility]? callback  Best Call Back Number:[phone]  Additional Information:

## 2019-07-08 NOTE — TELEPHONE ENCOUNTER
----- Message from Karen Kenny sent at 7/8/2019 11:14 AM CDT -----  Contact: Zhou - Wife  Type:  Sooner Apoointment Request    Caller is requesting a sooner appointment.  Caller declined first available appointment listed below.  Caller will not accept being placed on the waitlist and is requesting a message be sent to doctor.  Name of Caller: 07/22/2019  When is the first available appointment? 07/22/2019  Symptoms: follow up  Would the patient rather a call back or a response via [facility]? Call back  Best Call Back Number: [phone]  Additional Information: The pt wants to be worked in next week.

## 2019-07-13 RX ORDER — ERTUGLIFLOZIN 15 MG/1
TABLET, FILM COATED ORAL
Qty: 30 TABLET | Refills: 0 | Status: SHIPPED | OUTPATIENT
Start: 2019-07-13 | End: 2019-07-25

## 2019-07-25 ENCOUNTER — OFFICE VISIT (OUTPATIENT)
Dept: INTERNAL MEDICINE | Facility: CLINIC | Age: 69
End: 2019-07-25
Payer: COMMERCIAL

## 2019-07-25 ENCOUNTER — LAB VISIT (OUTPATIENT)
Dept: LAB | Facility: HOSPITAL | Age: 69
End: 2019-07-25
Attending: INTERNAL MEDICINE
Payer: COMMERCIAL

## 2019-07-25 VITALS
OXYGEN SATURATION: 97 % | DIASTOLIC BLOOD PRESSURE: 78 MMHG | HEART RATE: 107 BPM | WEIGHT: 253.5 LBS | SYSTOLIC BLOOD PRESSURE: 118 MMHG | BODY MASS INDEX: 37.44 KG/M2 | TEMPERATURE: 97 F

## 2019-07-25 DIAGNOSIS — R97.20 ELEVATED PSA: ICD-10-CM

## 2019-07-25 DIAGNOSIS — I15.2 HYPERTENSION ASSOCIATED WITH DIABETES: ICD-10-CM

## 2019-07-25 DIAGNOSIS — E11.69 HYPERLIPIDEMIA ASSOCIATED WITH TYPE 2 DIABETES MELLITUS: ICD-10-CM

## 2019-07-25 DIAGNOSIS — G47.33 OSA ON CPAP: ICD-10-CM

## 2019-07-25 DIAGNOSIS — E78.5 HYPERLIPIDEMIA ASSOCIATED WITH TYPE 2 DIABETES MELLITUS: ICD-10-CM

## 2019-07-25 DIAGNOSIS — R74.8 ELEVATED LIVER ENZYMES: ICD-10-CM

## 2019-07-25 DIAGNOSIS — E11.65 UNCONTROLLED TYPE 2 DIABETES MELLITUS WITH HYPERGLYCEMIA: Primary | ICD-10-CM

## 2019-07-25 DIAGNOSIS — E11.59 HYPERTENSION ASSOCIATED WITH DIABETES: ICD-10-CM

## 2019-07-25 LAB — COMPLEXED PSA SERPL-MCNC: 10.1 NG/ML (ref 0–4)

## 2019-07-25 PROCEDURE — 84153 ASSAY OF PSA TOTAL: CPT

## 2019-07-25 PROCEDURE — 3078F PR MOST RECENT DIASTOLIC BLOOD PRESSURE < 80 MM HG: ICD-10-PCS | Mod: CPTII,S$GLB,, | Performed by: INTERNAL MEDICINE

## 2019-07-25 PROCEDURE — 3046F HEMOGLOBIN A1C LEVEL >9.0%: CPT | Mod: CPTII,S$GLB,, | Performed by: INTERNAL MEDICINE

## 2019-07-25 PROCEDURE — 36415 COLL VENOUS BLD VENIPUNCTURE: CPT

## 2019-07-25 PROCEDURE — 1101F PR PT FALLS ASSESS DOC 0-1 FALLS W/OUT INJ PAST YR: ICD-10-PCS | Mod: CPTII,S$GLB,, | Performed by: INTERNAL MEDICINE

## 2019-07-25 PROCEDURE — 99999 PR PBB SHADOW E&M-EST. PATIENT-LVL III: CPT | Mod: PBBFAC,,, | Performed by: INTERNAL MEDICINE

## 2019-07-25 PROCEDURE — 3046F PR MOST RECENT HEMOGLOBIN A1C LEVEL > 9.0%: ICD-10-PCS | Mod: CPTII,S$GLB,, | Performed by: INTERNAL MEDICINE

## 2019-07-25 PROCEDURE — 3078F DIAST BP <80 MM HG: CPT | Mod: CPTII,S$GLB,, | Performed by: INTERNAL MEDICINE

## 2019-07-25 PROCEDURE — 99214 OFFICE O/P EST MOD 30 MIN: CPT | Mod: S$GLB,,, | Performed by: INTERNAL MEDICINE

## 2019-07-25 PROCEDURE — 1101F PT FALLS ASSESS-DOCD LE1/YR: CPT | Mod: CPTII,S$GLB,, | Performed by: INTERNAL MEDICINE

## 2019-07-25 PROCEDURE — 3074F PR MOST RECENT SYSTOLIC BLOOD PRESSURE < 130 MM HG: ICD-10-PCS | Mod: CPTII,S$GLB,, | Performed by: INTERNAL MEDICINE

## 2019-07-25 PROCEDURE — 99214 PR OFFICE/OUTPT VISIT, EST, LEVL IV, 30-39 MIN: ICD-10-PCS | Mod: S$GLB,,, | Performed by: INTERNAL MEDICINE

## 2019-07-25 PROCEDURE — 99999 PR PBB SHADOW E&M-EST. PATIENT-LVL III: ICD-10-PCS | Mod: PBBFAC,,, | Performed by: INTERNAL MEDICINE

## 2019-07-25 PROCEDURE — 3074F SYST BP LT 130 MM HG: CPT | Mod: CPTII,S$GLB,, | Performed by: INTERNAL MEDICINE

## 2019-07-25 NOTE — PROGRESS NOTES
Subjective:      Patient ID: Anup Miller is a 69 y.o. male.    Chief Complaint: Follow-up (6 month)    HPI   68 yo with   Patient Active Problem List   Diagnosis    Uncontrolled type 2 diabetes mellitus    Elevated liver enzymes    Hypertension associated with diabetes    Hyperlipidemia associated with type 2 diabetes mellitus    Multiple pulmonary nodules determined by computed tomography of lung    KHOI on CPAP    Elevated PSA    Hx of colonic polyp    Cardiac arrhythmia     Past Medical History:   Diagnosis Date    Diabetes mellitus type II Diagnosed 2002    Elevated liver enzymes     Fatty liver disease, nonalcoholic     Hyperlipidemia     Hypertension     Sleep apnea      Here today for management of multiple medical problems as outlined below.  He reports compliance with his medications without significant side effects.  He has not been compliant with urology follow-up.    Review of Systems   Constitutional: Negative for chills and fever.   HENT: Negative for ear pain and sore throat.    Respiratory: Negative for cough.    Cardiovascular: Negative for chest pain.   Gastrointestinal: Negative for abdominal pain and blood in stool.   Genitourinary: Negative for dysuria and hematuria.   Neurological: Negative for seizures and syncope.     Objective:   /78 (BP Location: Left arm, Patient Position: Sitting, BP Method: Large (Manual))   Pulse 107   Temp 97.3 °F (36.3 °C) (Tympanic)   Wt 115 kg (253 lb 8.5 oz)   SpO2 97%   BMI 37.44 kg/m²     Physical Exam   Constitutional: He is oriented to person, place, and time. He appears well-developed and well-nourished. No distress.   HENT:   Head: Normocephalic and atraumatic.   Mouth/Throat: Oropharynx is clear and moist.   Eyes: Pupils are equal, round, and reactive to light. EOM are normal.   Neck: Neck supple. No thyromegaly present.   Cardiovascular: Normal rate and regular rhythm.   Pulmonary/Chest: Breath sounds normal. He has no  wheezes. He has no rales.   Abdominal: Soft. Bowel sounds are normal. There is no tenderness.   Musculoskeletal: He exhibits no edema.   Lymphadenopathy:     He has no cervical adenopathy.   Neurological: He is alert and oriented to person, place, and time.   Skin: Skin is warm and dry.   Psychiatric: He has a normal mood and affect. His behavior is normal.         Assessment:     1. Uncontrolled type 2 diabetes mellitus with hyperglycemia    2. Elevated liver enzymes    3. Hypertension associated with diabetes    4. Hyperlipidemia associated with type 2 diabetes mellitus    5. KHOI on CPAP    6. Elevated PSA      Plan:   Uncontrolled type 2 diabetes mellitus with hyperglycemia  Worsening  Continue current medications.  Discussed med adjustment with upcoming diabetes clinic appointment  -     Hemoglobin A1c; Future; Expected date: 09/25/2019  -     Comprehensive metabolic panel; Future; Expected date: 09/25/2019    Elevated liver enzymes  Continue to monitor  Hypertension associated with diabetes  Controlled.  Continue current medications  Hyperlipidemia associated with type 2 diabetes mellitus  Controlled.  Continue current medication  -     Lipid panel; Future; Expected date: 09/25/2019    KHOI on CPAP  Continue CPAP  Elevated PSA  Declines biopsy.  Advised to discuss further with Urology  -     PSA, Screening; Future; Expected date: 07/25/2019            Problem List Items Addressed This Visit        Cardiac/Vascular    Hypertension associated with diabetes    Hyperlipidemia associated with type 2 diabetes mellitus    Relevant Orders    Lipid panel       Renal/    Elevated PSA    Overview     Did not f/u with urology as rec in 2015         Relevant Orders    PSA, Screening (Completed)       Endocrine    Uncontrolled type 2 diabetes mellitus - Primary    Relevant Orders    Hemoglobin A1c    Comprehensive metabolic panel       GI    Elevated liver enzymes       Other    KHOI on CPAP          Follow up in about 6  months (around 1/25/2020), or if symptoms worsen or fail to improve.

## 2019-08-05 RX ORDER — PIOGLITAZONEHYDROCHLORIDE 15 MG/1
15 TABLET ORAL DAILY
Qty: 90 TABLET | Refills: 1 | Status: SHIPPED | OUTPATIENT
Start: 2019-08-05 | End: 2019-08-20 | Stop reason: SDUPTHER

## 2019-08-08 RX ORDER — HYDROCHLOROTHIAZIDE 25 MG/1
TABLET ORAL
Qty: 90 TABLET | Refills: 3 | Status: SHIPPED | OUTPATIENT
Start: 2019-08-08 | End: 2020-06-08

## 2019-08-11 RX ORDER — ERTUGLIFLOZIN 15 MG/1
TABLET, FILM COATED ORAL
Qty: 30 TABLET | Refills: 0 | Status: SHIPPED | OUTPATIENT
Start: 2019-08-11 | End: 2019-12-30

## 2019-08-11 RX ORDER — ERTUGLIFLOZIN 15 MG/1
TABLET, FILM COATED ORAL
Qty: 30 TABLET | Refills: 0 | Status: SHIPPED | OUTPATIENT
Start: 2019-08-11 | End: 2019-08-20 | Stop reason: SDUPTHER

## 2019-08-20 ENCOUNTER — OFFICE VISIT (OUTPATIENT)
Dept: DIABETES | Facility: CLINIC | Age: 69
End: 2019-08-20
Payer: COMMERCIAL

## 2019-08-20 VITALS
SYSTOLIC BLOOD PRESSURE: 122 MMHG | BODY MASS INDEX: 37.48 KG/M2 | HEIGHT: 69 IN | DIASTOLIC BLOOD PRESSURE: 76 MMHG | WEIGHT: 253.06 LBS

## 2019-08-20 DIAGNOSIS — E78.5 HYPERLIPIDEMIA ASSOCIATED WITH TYPE 2 DIABETES MELLITUS: ICD-10-CM

## 2019-08-20 DIAGNOSIS — E11.65 UNCONTROLLED TYPE 2 DIABETES MELLITUS WITH HYPERGLYCEMIA: Primary | ICD-10-CM

## 2019-08-20 DIAGNOSIS — E11.69 HYPERLIPIDEMIA ASSOCIATED WITH TYPE 2 DIABETES MELLITUS: ICD-10-CM

## 2019-08-20 DIAGNOSIS — I15.2 HYPERTENSION ASSOCIATED WITH DIABETES: ICD-10-CM

## 2019-08-20 DIAGNOSIS — E11.59 HYPERTENSION ASSOCIATED WITH DIABETES: ICD-10-CM

## 2019-08-20 LAB — GLUCOSE SERPL-MCNC: 104 MG/DL (ref 70–110)

## 2019-08-20 PROCEDURE — 82962 GLUCOSE BLOOD TEST: CPT | Mod: S$GLB,,, | Performed by: PHYSICIAN ASSISTANT

## 2019-08-20 PROCEDURE — 82962 POCT GLUCOSE, HAND-HELD DEVICE: ICD-10-PCS | Mod: S$GLB,,, | Performed by: PHYSICIAN ASSISTANT

## 2019-08-20 PROCEDURE — 99214 PR OFFICE/OUTPT VISIT, EST, LEVL IV, 30-39 MIN: ICD-10-PCS | Mod: S$GLB,,, | Performed by: PHYSICIAN ASSISTANT

## 2019-08-20 PROCEDURE — 3074F PR MOST RECENT SYSTOLIC BLOOD PRESSURE < 130 MM HG: ICD-10-PCS | Mod: CPTII,S$GLB,, | Performed by: PHYSICIAN ASSISTANT

## 2019-08-20 PROCEDURE — 1101F PR PT FALLS ASSESS DOC 0-1 FALLS W/OUT INJ PAST YR: ICD-10-PCS | Mod: CPTII,S$GLB,, | Performed by: PHYSICIAN ASSISTANT

## 2019-08-20 PROCEDURE — 99999 PR PBB SHADOW E&M-EST. PATIENT-LVL III: ICD-10-PCS | Mod: PBBFAC,,, | Performed by: PHYSICIAN ASSISTANT

## 2019-08-20 PROCEDURE — 3046F PR MOST RECENT HEMOGLOBIN A1C LEVEL > 9.0%: ICD-10-PCS | Mod: CPTII,S$GLB,, | Performed by: PHYSICIAN ASSISTANT

## 2019-08-20 PROCEDURE — 99999 PR PBB SHADOW E&M-EST. PATIENT-LVL III: CPT | Mod: PBBFAC,,, | Performed by: PHYSICIAN ASSISTANT

## 2019-08-20 PROCEDURE — 1101F PT FALLS ASSESS-DOCD LE1/YR: CPT | Mod: CPTII,S$GLB,, | Performed by: PHYSICIAN ASSISTANT

## 2019-08-20 PROCEDURE — 3046F HEMOGLOBIN A1C LEVEL >9.0%: CPT | Mod: CPTII,S$GLB,, | Performed by: PHYSICIAN ASSISTANT

## 2019-08-20 PROCEDURE — 3078F PR MOST RECENT DIASTOLIC BLOOD PRESSURE < 80 MM HG: ICD-10-PCS | Mod: CPTII,S$GLB,, | Performed by: PHYSICIAN ASSISTANT

## 2019-08-20 PROCEDURE — 3078F DIAST BP <80 MM HG: CPT | Mod: CPTII,S$GLB,, | Performed by: PHYSICIAN ASSISTANT

## 2019-08-20 PROCEDURE — 99214 OFFICE O/P EST MOD 30 MIN: CPT | Mod: S$GLB,,, | Performed by: PHYSICIAN ASSISTANT

## 2019-08-20 PROCEDURE — 3074F SYST BP LT 130 MM HG: CPT | Mod: CPTII,S$GLB,, | Performed by: PHYSICIAN ASSISTANT

## 2019-08-20 RX ORDER — PIOGLITAZONEHYDROCHLORIDE 15 MG/1
15 TABLET ORAL DAILY
Qty: 90 TABLET | Refills: 1 | Status: SHIPPED | OUTPATIENT
Start: 2019-08-20 | End: 2020-03-16 | Stop reason: SDUPTHER

## 2019-08-20 RX ORDER — GLIMEPIRIDE 4 MG/1
8 TABLET ORAL DAILY
Qty: 180 TABLET | Refills: 3 | Status: SHIPPED | OUTPATIENT
Start: 2019-08-20 | End: 2020-03-16 | Stop reason: SDUPTHER

## 2019-08-20 RX ORDER — METFORMIN HYDROCHLORIDE 750 MG/1
750 TABLET, EXTENDED RELEASE ORAL 2 TIMES DAILY WITH MEALS
Qty: 180 TABLET | Refills: 3 | Status: SHIPPED | OUTPATIENT
Start: 2019-08-20 | End: 2020-03-16 | Stop reason: SDUPTHER

## 2019-08-20 NOTE — PROGRESS NOTES
Subjective:      Patient ID: Anup Miller is a 69 y.o. male.    PCP: Bryce Gonzales MD      Anup Miller is a pleasant 69 y.o. male presenting to follow up on Type 2 diabetes mellitus.  Also, pertinent to this visit in decision making is hypertension, hyperlipidemia, and adherence.  He has had diabetes for 15 or more years.  His last visit in Diabetes Management was 5/27/2019 .   Since that time he has been in his usual state of health without significant hyperglycemia or hypoglycemia. He has been checking his blood glucose regularly four times daily, before meals and HS.  Since that time he has had moderate improvement in his glycemia with A1c of 9.5%.   He is currently on Trulicity, Amaryl, Metformin, Pioglitazone, Steglatro.  His current concerns are glycemic control.    He denies any hospital admissions, emergency room visits, hypoglycemia, syncope, diaphoresis, chest pain, or dyspnea.    He has gained 4 pounds since last visit. His BMI is 37.37 kg/m².    His blood sugar in the clinic today was:   Lab Results   Component Value Date    POCGLU 104 08/20/2019     Anup is compliant most of the time with DM medications.     Anup is compliant most of the time with lifestyle modifications to include activity and meal planning.     Diabetes Management Status    Statin: Taking  ACE/ARB: Taking    Screening or Prevention Patient's value Goal Complete/Controlled?   HgA1C Testing and Control   Lab Results   Component Value Date    HGBA1C 9.5 (H) 06/24/2019      Annually/Less than 8% No   Lipid profile : 08/02/2018 Annually Yes   LDL control Lab Results   Component Value Date    LDLCALC 71.6 08/02/2018    Annually/Less than 100 mg/dl  Yes   Nephropathy screening Lab Results   Component Value Date    LABMICR 4.0 06/18/2018     No results found for: PROTEINUA Annually Yes   Blood pressure BP Readings from Last 1 Encounters:   08/20/19 122/76    Less than 140/90 Yes   Dilated retinal exam : 02/07/2019 Annually  "Yes   Foot exam   : 03/04/2019 Annually Yes         Lab Results   Component Value Date    HGBA1C 9.5 (H) 06/24/2019    HGBA1C 7.1 (H) 12/03/2018    HGBA1C 8.6 (H) 08/02/2018       Review of Systems   Constitutional: Negative for activity change and unexpected weight change.   Eyes: Negative for visual disturbance.   Respiratory: Negative for chest tightness and shortness of breath.    Cardiovascular: Negative for chest pain and leg swelling.   Gastrointestinal: Negative for constipation and diarrhea.   Endocrine: Negative for cold intolerance, heat intolerance, polydipsia, polyphagia and polyuria.   Genitourinary: Negative for frequency.   Skin: Negative for wound.   Neurological: Negative for numbness.   Psychiatric/Behavioral: Negative for confusion.      Objective:   /76   Ht 5' 9" (1.753 m)   Wt 114.8 kg (253 lb 1.4 oz)   BMI 37.37 kg/m²     Physical Exam   Constitutional: He is oriented to person, place, and time. He appears well-developed and well-nourished. No distress.   Neck: Normal range of motion. Neck supple. No tracheal deviation present. No thyromegaly present.   Cardiovascular: Normal rate, regular rhythm and normal heart sounds.   Pulmonary/Chest: Effort normal and breath sounds normal.   Musculoskeletal: He exhibits no edema.   Lymphadenopathy:     He has no cervical adenopathy.   Neurological: He is alert and oriented to person, place, and time.   Skin: Skin is warm and dry. He is not diaphoretic.   Psychiatric: He has a normal mood and affect.     Assessment:     1. Uncontrolled type 2 diabetes mellitus with hyperglycemia    2. Uncontrolled type 2 diabetes mellitus without complication, without long-term current use of insulin    3. Hypertension associated with diabetes    4. Hyperlipidemia associated with type 2 diabetes mellitus       Plan:   Anup Miller is seen today for   1. Uncontrolled type 2 diabetes mellitus with hyperglycemia    2. Uncontrolled type 2 diabetes mellitus " without complication, without long-term current use of insulin    3. Hypertension associated with diabetes    4. Hyperlipidemia associated with type 2 diabetes mellitus      Uncontrolled type 2 diabetes mellitus with hyperglycemia  -     POCT Glucose, Hand-Held Device  -     dulaglutide (TRULICITY) 1.5 mg/0.5 mL PnIj; INJECT 1.5 MG UNDER THE SKIN EVERY 7 DAYS  Dispense: 12 Syringe; Refill: 1  -     glimepiride (AMARYL) 4 MG tablet; Take 2 tablets (8 mg total) by mouth once daily.  Dispense: 180 tablet; Refill: 3  -     metFORMIN (GLUCOPHAGE-XR) 750 MG 24 hr tablet; Take 1 tablet (750 mg total) by mouth 2 (two) times daily with meals.  Dispense: 180 tablet; Refill: 3  -     pioglitazone (ACTOS) 15 MG tablet; Take 1 tablet (15 mg total) by mouth once daily.  Dispense: 90 tablet; Refill: 1  -     ertugliflozin (STEGLATRO) 15 mg Tab; Take 1 tablet by mouth once daily.  Dispense: 30 tablet; Refill: 11  -     Hemoglobin A1c; Future; Expected date: 09/27/2019  -     Renal function panel; Future; Expected date: 09/27/2019  -     ALT (SGPT); Future; Expected date: 09/27/2019  -     AST (SGOT); Future; Expected date: 09/27/2019  -     Lipid panel; Future; Expected date: 09/27/2019  -     TSH; Future; Expected date: 09/27/2019  -     CBC auto differential; Future; Expected date: 09/27/2019  -     Protein / creatinine ratio, urine; Future; Expected date: 09/27/2019    Uncontrolled type 2 diabetes mellitus without complication, without long-term current use of insulin    Hypertension associated with diabetes  -     Hemoglobin A1c; Future; Expected date: 09/27/2019  -     Renal function panel; Future; Expected date: 09/27/2019  -     ALT (SGPT); Future; Expected date: 09/27/2019  -     AST (SGOT); Future; Expected date: 09/27/2019  -     Lipid panel; Future; Expected date: 09/27/2019  -     TSH; Future; Expected date: 09/27/2019  -     CBC auto differential; Future; Expected date: 09/27/2019  -     Protein / creatinine ratio,  urine; Future; Expected date: 09/27/2019    Hyperlipidemia associated with type 2 diabetes mellitus  -     Hemoglobin A1c; Future; Expected date: 09/27/2019  -     Renal function panel; Future; Expected date: 09/27/2019  -     ALT (SGPT); Future; Expected date: 09/27/2019  -     AST (SGOT); Future; Expected date: 09/27/2019  -     Lipid panel; Future; Expected date: 09/27/2019  -     TSH; Future; Expected date: 09/27/2019  -     CBC auto differential; Future; Expected date: 09/27/2019  -     Protein / creatinine ratio, urine; Future; Expected date: 09/27/2019    Uncontrolled type 2 diabetes mellitus without complication, without long-term current use of insulin  -     POCT glucose  - Labs today  - Continue with D&E, reduce portion size, and restrict carbohydrates (no more that 45 grams ) per meal.  - Will try Steglatro 15 because of formulary  - Increase glimepiride to 4 mg BID  - Continue Metformin BID  - Actos to 15 mg continue  - Trulicity 1.5 mg continue      A total of 30 minutes was spent in face to face time, of which 50 % was spent in counseling patient on disease process, complications, treatment, and side effects of medications.    The patient was explained the above plan and given opportunity to ask questions.  He understands, chooses and consents to this plan and accepts all the risks, which include but are not limited to the risks mentioned above.   He understands the alternative of having no testing, interventions or treatments at this time. He left content and without further questions.     Disclaimer:  This note is prepared using voice recognition software and as such is likely to have errors and has not been proof read. Please contact me for questions.

## 2019-10-31 DIAGNOSIS — E78.5 HYPERLIPIDEMIA, UNSPECIFIED HYPERLIPIDEMIA TYPE: Chronic | ICD-10-CM

## 2019-11-01 RX ORDER — ROSUVASTATIN CALCIUM 10 MG/1
TABLET, COATED ORAL
Qty: 90 TABLET | Refills: 3 | Status: ON HOLD | OUTPATIENT
Start: 2019-11-01 | End: 2020-09-24 | Stop reason: HOSPADM

## 2019-11-03 RX ORDER — DULAGLUTIDE 1.5 MG/.5ML
INJECTION, SOLUTION SUBCUTANEOUS
Qty: 12 ML | Refills: 0 | Status: SHIPPED | OUTPATIENT
Start: 2019-11-03 | End: 2019-11-04 | Stop reason: SDUPTHER

## 2019-11-04 NOTE — TELEPHONE ENCOUNTER
----- Message from Karen Efraín sent at 11/4/2019  1:15 PM CST -----  Contact: Wife  1. What is the name of the medication you are requesting? Trulicity  2. What is the dose? n/a  3. How do you take the medication? Orally, topically, etc? n/a  4. How often do you take this medication? n/a  5. Do you need a 30 day or 90 day supply? n/a  6. How many refills are you requesting? n/a  7. What is your preferred pharmacy and location of the pharmacy? [facility]  8. Who can we contact with further questions? The pt at [phone]

## 2019-11-06 ENCOUNTER — OFFICE VISIT (OUTPATIENT)
Dept: SLEEP MEDICINE | Facility: CLINIC | Age: 69
End: 2019-11-06
Payer: COMMERCIAL

## 2019-11-06 VITALS
SYSTOLIC BLOOD PRESSURE: 130 MMHG | WEIGHT: 256.38 LBS | BODY MASS INDEX: 37.97 KG/M2 | HEART RATE: 96 BPM | DIASTOLIC BLOOD PRESSURE: 80 MMHG | RESPIRATION RATE: 18 BRPM | HEIGHT: 69 IN | OXYGEN SATURATION: 98 %

## 2019-11-06 DIAGNOSIS — G47.33 OSA (OBSTRUCTIVE SLEEP APNEA): Primary | ICD-10-CM

## 2019-11-06 DIAGNOSIS — E66.9 OBESITY, CLASS II, BMI 35-39.9: ICD-10-CM

## 2019-11-06 PROCEDURE — 3079F DIAST BP 80-89 MM HG: CPT | Mod: CPTII,S$GLB,, | Performed by: NURSE PRACTITIONER

## 2019-11-06 PROCEDURE — 1101F PT FALLS ASSESS-DOCD LE1/YR: CPT | Mod: CPTII,S$GLB,, | Performed by: NURSE PRACTITIONER

## 2019-11-06 PROCEDURE — 1101F PR PT FALLS ASSESS DOC 0-1 FALLS W/OUT INJ PAST YR: ICD-10-PCS | Mod: CPTII,S$GLB,, | Performed by: NURSE PRACTITIONER

## 2019-11-06 PROCEDURE — 99214 PR OFFICE/OUTPT VISIT, EST, LEVL IV, 30-39 MIN: ICD-10-PCS | Mod: S$GLB,,, | Performed by: NURSE PRACTITIONER

## 2019-11-06 PROCEDURE — 3075F PR MOST RECENT SYSTOLIC BLOOD PRESS GE 130-139MM HG: ICD-10-PCS | Mod: CPTII,S$GLB,, | Performed by: NURSE PRACTITIONER

## 2019-11-06 PROCEDURE — 99214 OFFICE O/P EST MOD 30 MIN: CPT | Mod: S$GLB,,, | Performed by: NURSE PRACTITIONER

## 2019-11-06 PROCEDURE — 99999 PR PBB SHADOW E&M-EST. PATIENT-LVL IV: CPT | Mod: PBBFAC,,, | Performed by: NURSE PRACTITIONER

## 2019-11-06 PROCEDURE — 3075F SYST BP GE 130 - 139MM HG: CPT | Mod: CPTII,S$GLB,, | Performed by: NURSE PRACTITIONER

## 2019-11-06 PROCEDURE — 99999 PR PBB SHADOW E&M-EST. PATIENT-LVL IV: ICD-10-PCS | Mod: PBBFAC,,, | Performed by: NURSE PRACTITIONER

## 2019-11-06 PROCEDURE — 3079F PR MOST RECENT DIASTOLIC BLOOD PRESSURE 80-89 MM HG: ICD-10-PCS | Mod: CPTII,S$GLB,, | Performed by: NURSE PRACTITIONER

## 2019-11-06 NOTE — PROGRESS NOTES
"Subjective:      Patient ID: Anup Miller is a 69 y.o. male.    Chief Complaint: Sleep Apnea    HPI  Presents to office for review of AutoPAP therapy. Patient states improved symptoms with use of AutoPAP. Sleeping more soundly. Waking up feeling more refreshed. Improved daytime sleepiness. Patient states he is benefiting from use of the AutoPAP. He is a . No dayteime sleepiness. [place] Sleepiness scale score: 1    Patient Active Problem List   Diagnosis    Uncontrolled type 2 diabetes mellitus    Elevated liver enzymes    Hypertension associated with diabetes    Hyperlipidemia associated with type 2 diabetes mellitus    Multiple pulmonary nodules determined by computed tomography of lung    KHOI on CPAP    Elevated PSA    Hx of colonic polyp    Cardiac arrhythmia          /80   Pulse 96   Resp 18   Ht 5' 9" (1.753 m)   Wt 116.3 kg (256 lb 6.3 oz)   SpO2 98%   BMI 37.86 kg/m²   Body mass index is 37.86 kg/m².    Review of Systems   Constitutional: Negative.    HENT: Negative.    Respiratory: Negative.    Cardiovascular: Negative.    Musculoskeletal: Negative.    Gastrointestinal: Negative.    Neurological: Negative.    Psychiatric/Behavioral: Negative.      Objective:      Physical Exam   Constitutional: He is oriented to person, place, and time. He appears well-developed and well-nourished.   HENT:   Head: Normocephalic and atraumatic.   Mallampati Score: II     Neck: Normal range of motion. Neck supple.   Cardiovascular: Normal rate and regular rhythm.   Pulmonary/Chest: Effort normal and breath sounds normal.   Abdominal: Soft. Bowel sounds are normal.   Musculoskeletal: Normal range of motion. He exhibits no edema.   Neurological: He is alert and oriented to person, place, and time.   Skin: Skin is warm and dry.   Psychiatric: He has a normal mood and affect.     Personal Diagnostic Review  Autopap download: Patient wears on average 7 hrs and 58 minutes. Greater than 4 hrs " 96.7 % of the time. 90% percentile pressure 14 with AHI 6 events/hr    Assessment:       1. KHOI (obstructive sleep apnea)    2. Obesity, Class II, BMI 35-39.9        Outpatient Encounter Medications as of 11/6/2019   Medication Sig Dispense Refill    ALREX 0.2 % DrpS Place 1 drop into both eyes 3 (three) times daily as needed.      dulaglutide (TRULICITY) 1.5 mg/0.5 mL PnIj INJECT 1.5 MG UNDER THE SKIN EVERY 7 DAYS 12 mL 0    ertugliflozin (STEGLATRO) 15 mg Tab Take 1 tablet by mouth once daily. 30 tablet 11    glimepiride (AMARYL) 4 MG tablet Take 2 tablets (8 mg total) by mouth once daily. 180 tablet 3    hydroCHLOROthiazide (HYDRODIURIL) 25 MG tablet TAKE 1 TABLET BY MOUTH ONE TIME DAILY 90 tablet 3    levocetirizine (XYZAL) 5 MG tablet Take 1 tablet (5 mg total) by mouth every morning. 30 tablet 11    losartan (COZAAR) 50 MG tablet       metFORMIN (GLUCOPHAGE-XR) 750 MG 24 hr tablet Take 1 tablet (750 mg total) by mouth 2 (two) times daily with meals. 180 tablet 3    multivitamin capsule Take 1 capsule by mouth once daily.      pantoprazole (PROTONIX) 40 MG tablet TAKE 1 TABLET BY MOUTH ONE TIME DAILY 90 tablet 3    pioglitazone (ACTOS) 15 MG tablet Take 1 tablet (15 mg total) by mouth once daily. 90 tablet 1    rosuvastatin (CRESTOR) 10 MG tablet TAKE 1 TABLET BY MOUTH DAILY 90 tablet 3    STEGLATRO 15 mg Tab TAKE 1 TABLET BY MOUTH EVERY DAY 30 tablet 0     No facility-administered encounter medications on file as of 11/6/2019.      Orders Placed This Encounter   Procedures    CPAP/BIPAP SUPPLIES     Order Specific Question:   Type of mask:     Answer:   FFM     Order Specific Question:   Headgear?     Answer:   Yes     Order Specific Question:   Tubing?     Answer:   Yes     Order Specific Question:   Humidifier chamber?     Answer:   Yes     Order Specific Question:   Chin strap?     Answer:   Yes     Order Specific Question:   Filters?     Answer:   Yes     Order Specific Question:   Cushions?      Answer:   Yes     Order Specific Question:   Length of need (1-99 months):     Answer:   99     Plan:        Problem List Items Addressed This Visit     None      Visit Diagnoses     KHOI (obstructive sleep apnea)    -  Primary    Relevant Orders    CPAP/BIPAP SUPPLIES    Obesity, Class II, BMI 35-39.9          Weight loss and exercise to improve overall health.  CDL letter

## 2019-11-06 NOTE — LETTER
November 6, 2019                   [facility]  [address]  [place] [address]  Phone: [phone]  Fax: [phone] November 6, 2019     Patient: Anup Miller    YOB: 1950   Date of Visit: 11/6/2019       To Whom It May Concern:     Anup Miller has KHOI on CPAP. He compliant. No daytime sleepiness. No work restrictions as long as he remains compliant.    If you have any questions or concerns, please don't hesitate to call.    Sincerely,          Elizabeth Lejeune, NP

## 2019-11-26 ENCOUNTER — PATIENT OUTREACH (OUTPATIENT)
Dept: ADMINISTRATIVE | Facility: HOSPITAL | Age: 69
End: 2019-11-26

## 2019-12-02 ENCOUNTER — TELEPHONE (OUTPATIENT)
Dept: DIABETES | Facility: CLINIC | Age: 69
End: 2019-12-02

## 2019-12-02 NOTE — TELEPHONE ENCOUNTER
Called pt to reschedule appt I spoke with the wife and she stated new time and date of appt was ok

## 2019-12-19 ENCOUNTER — LAB VISIT (OUTPATIENT)
Dept: LAB | Facility: HOSPITAL | Age: 69
End: 2019-12-19
Attending: PHYSICIAN ASSISTANT
Payer: COMMERCIAL

## 2019-12-19 DIAGNOSIS — E11.69 HYPERLIPIDEMIA ASSOCIATED WITH TYPE 2 DIABETES MELLITUS: ICD-10-CM

## 2019-12-19 DIAGNOSIS — I15.2 HYPERTENSION ASSOCIATED WITH DIABETES: ICD-10-CM

## 2019-12-19 DIAGNOSIS — E11.59 HYPERTENSION ASSOCIATED WITH DIABETES: ICD-10-CM

## 2019-12-19 DIAGNOSIS — E78.5 HYPERLIPIDEMIA ASSOCIATED WITH TYPE 2 DIABETES MELLITUS: ICD-10-CM

## 2019-12-19 DIAGNOSIS — E11.65 UNCONTROLLED TYPE 2 DIABETES MELLITUS WITH HYPERGLYCEMIA: ICD-10-CM

## 2019-12-19 LAB
ESTIMATED AVG GLUCOSE: 177 MG/DL (ref 68–131)
HBA1C MFR BLD HPLC: 7.8 % (ref 4–5.6)

## 2019-12-19 PROCEDURE — 36415 COLL VENOUS BLD VENIPUNCTURE: CPT

## 2019-12-19 PROCEDURE — 83036 HEMOGLOBIN GLYCOSYLATED A1C: CPT

## 2019-12-30 ENCOUNTER — OFFICE VISIT (OUTPATIENT)
Dept: DIABETES | Facility: CLINIC | Age: 69
End: 2019-12-30
Payer: COMMERCIAL

## 2019-12-30 VITALS
DIASTOLIC BLOOD PRESSURE: 72 MMHG | HEIGHT: 69 IN | WEIGHT: 262.38 LBS | BODY MASS INDEX: 38.86 KG/M2 | SYSTOLIC BLOOD PRESSURE: 130 MMHG

## 2019-12-30 DIAGNOSIS — E11.69 HYPERLIPIDEMIA ASSOCIATED WITH TYPE 2 DIABETES MELLITUS: ICD-10-CM

## 2019-12-30 DIAGNOSIS — I15.2 HYPERTENSION ASSOCIATED WITH DIABETES: ICD-10-CM

## 2019-12-30 DIAGNOSIS — E11.59 HYPERTENSION ASSOCIATED WITH DIABETES: ICD-10-CM

## 2019-12-30 DIAGNOSIS — E11.65 UNCONTROLLED TYPE 2 DIABETES MELLITUS WITH HYPERGLYCEMIA: Primary | ICD-10-CM

## 2019-12-30 DIAGNOSIS — E78.5 HYPERLIPIDEMIA ASSOCIATED WITH TYPE 2 DIABETES MELLITUS: ICD-10-CM

## 2019-12-30 LAB — GLUCOSE SERPL-MCNC: 154 MG/DL (ref 70–110)

## 2019-12-30 PROCEDURE — 3075F PR MOST RECENT SYSTOLIC BLOOD PRESS GE 130-139MM HG: ICD-10-PCS | Mod: CPTII,S$GLB,, | Performed by: NURSE PRACTITIONER

## 2019-12-30 PROCEDURE — 3075F SYST BP GE 130 - 139MM HG: CPT | Mod: CPTII,S$GLB,, | Performed by: NURSE PRACTITIONER

## 2019-12-30 PROCEDURE — 99999 PR PBB SHADOW E&M-EST. PATIENT-LVL III: CPT | Mod: PBBFAC,,, | Performed by: NURSE PRACTITIONER

## 2019-12-30 PROCEDURE — 1101F PR PT FALLS ASSESS DOC 0-1 FALLS W/OUT INJ PAST YR: ICD-10-PCS | Mod: CPTII,S$GLB,, | Performed by: NURSE PRACTITIONER

## 2019-12-30 PROCEDURE — 1159F MED LIST DOCD IN RCRD: CPT | Mod: S$GLB,,, | Performed by: NURSE PRACTITIONER

## 2019-12-30 PROCEDURE — 1126F PR PAIN SEVERITY QUANTIFIED, NO PAIN PRESENT: ICD-10-PCS | Mod: S$GLB,,, | Performed by: NURSE PRACTITIONER

## 2019-12-30 PROCEDURE — 1126F AMNT PAIN NOTED NONE PRSNT: CPT | Mod: S$GLB,,, | Performed by: NURSE PRACTITIONER

## 2019-12-30 PROCEDURE — 99215 OFFICE O/P EST HI 40 MIN: CPT | Mod: S$GLB,,, | Performed by: NURSE PRACTITIONER

## 2019-12-30 PROCEDURE — 99215 PR OFFICE/OUTPT VISIT, EST, LEVL V, 40-54 MIN: ICD-10-PCS | Mod: S$GLB,,, | Performed by: NURSE PRACTITIONER

## 2019-12-30 PROCEDURE — 99999 PR PBB SHADOW E&M-EST. PATIENT-LVL III: ICD-10-PCS | Mod: PBBFAC,,, | Performed by: NURSE PRACTITIONER

## 2019-12-30 PROCEDURE — 1159F PR MEDICATION LIST DOCUMENTED IN MEDICAL RECORD: ICD-10-PCS | Mod: S$GLB,,, | Performed by: NURSE PRACTITIONER

## 2019-12-30 PROCEDURE — 3078F DIAST BP <80 MM HG: CPT | Mod: CPTII,S$GLB,, | Performed by: NURSE PRACTITIONER

## 2019-12-30 PROCEDURE — 3078F PR MOST RECENT DIASTOLIC BLOOD PRESSURE < 80 MM HG: ICD-10-PCS | Mod: CPTII,S$GLB,, | Performed by: NURSE PRACTITIONER

## 2019-12-30 PROCEDURE — 82962 GLUCOSE BLOOD TEST: CPT | Mod: S$GLB,,, | Performed by: NURSE PRACTITIONER

## 2019-12-30 PROCEDURE — 82962 POCT GLUCOSE, HAND-HELD DEVICE: ICD-10-PCS | Mod: S$GLB,,, | Performed by: NURSE PRACTITIONER

## 2019-12-30 PROCEDURE — 1101F PT FALLS ASSESS-DOCD LE1/YR: CPT | Mod: CPTII,S$GLB,, | Performed by: NURSE PRACTITIONER

## 2019-12-30 NOTE — LETTER
December 30, 2019      Bryce Gonzales MD  [address]  [place] [address]           [facility]  [address] [place] [address]  Phone: [phone]  Fax: [phone]          Patient: Anup Miller   MR Number: [id number]   YOB: 1950   Date of Visit: 12/30/2019       Dear Dr. Bryce Gonzales:    Thank you for referring Anup Miller to me for evaluation. Attached you will find relevant portions of my assessment and plan of care.    If you have questions, please do not hesitate to call me. I look forward to following Anup Miller along with you.    Sincerely,    Shahla Whatley, NP    Enclosure  CC:  No Recipients    If you would like to receive this communication electronically, please contact [email or web address] or [phone] to request more information on [employer] Link access.    For providers and/or their staff who would like to refer a patient to [facility], please contact us through our one-stop-shop provider referral line, [facility] , at [phone].    If you feel you have received this communication in error or would no longer like to receive these types of communications, please e-mail [email or web address]

## 2019-12-30 NOTE — PATIENT INSTRUCTIONS
DOWNLOAD Compendium ROCHELLE ON TELEPHONE. USE THIS TO CALORIE COUNT AND COUNT CARBS    CARB GOAL: 45 GRAMS OR LESS PER MEAL; 15 GRAMS OR LESS PER SNACK  WORK ON DIET. RETURN IN 3 MONTHS.     IF YOU ARE HAPPY WITH YOUR VISIT TODAY, PLEASE FILL OUT THE SURVEY THAT MAY BE SENT TO YOUR EMAIL ADDRESS OR MAILBOX FOLLOWING THIS VISIT. WE LOVE TO HEAR YOUR COMMENTS! HAVE A WONDERFUL DAY!

## 2019-12-30 NOTE — PROGRESS NOTES
"Subjective:         Patient ID: Anup Miller is a 69 y.o. male.  Patient's current PCP is Bryce Gonzales MD.     Chief Complaint: Diabetes Mellitus    HPI  Anup Miller is a 69 y.o. Black or  male presenting for new consultation for Diabetes. Patient has been diagnosed with diabetes for over 15 years and has the following complications associated with diabetes: none reported. Other pertinent history: HTN, HLD. Blood glucose testing is performed regularly. However, patient did not bring log with him today. Recent A1C showed improvement.    He denies any recent hospital admissions or emergency room visits related to Diabetes Mellitus.      Height: 5' 9" (175.3 cm)  //  Weight: 119 kg (262 lb 5.6 oz), Body mass index is 38.74 kg/m².  His blood sugar in clinic today is:   Lab Results   Component Value Date    POCGLU 154 (A) 12/30/2019       Labs reviewed and are noted below.    His most recent A1C is:   Lab Results   Component Value Date    HGBA1C 7.8 (H) 12/19/2019     Lab Results   Component Value Date    CPEPTIDE 4.8 04/03/2017     Lab Results   Component Value Date    GLUTAMICACID 0.00 04/03/2017     No components found for: LDL    CURRENT DM MEDICATIONS:   Current Outpatient Medications   Medication Sig Dispense Refill    dulaglutide (TRULICITY) 1.5 mg/0.5 mL PnIj INJECT 1.5 MG UNDER THE SKIN EVERY 7 DAYS 12 mL 3    ertugliflozin (STEGLATRO) 15 mg Tab Take 1 tablet by mouth once daily. 30 tablet 11    glimepiride (AMARYL) 4 MG tablet Take 2 tablets (8 mg total) by mouth once daily. 180 tablet 3    metFORMIN (GLUCOPHAGE-XR) 750 MG 24 hr tablet Take 1 tablet (750 mg total) by mouth 2 (two) times daily with meals. 180 tablet 3    pioglitazone (ACTOS) 15 MG tablet Take 1 tablet (15 mg total) by mouth once daily. 90 tablet 1     No current facility-administered medications for this visit.        Health Maintenance   Topic Date Due    Eye Exam  02/07/2020    Foot Exam  03/04/2020    " Hemoglobin A1c  06/19/2020    Lipid Panel  09/27/2020    Low Dose Statin  12/30/2020    Colonoscopy  07/19/2021    TETANUS VACCINE  04/02/2023    Hepatitis C Screening  Completed    Pneumococcal Vaccine (65+ Low/Medium Risk)  Completed       STANDARDS OF CARE:  Current Ophthalmologist/Optometrist: MARCOS  Current Podiatrist: No  ACE/ARB: Yes  Statin: Yes  He  has attended diabetes education in the past.     LIFESTYLE:    BLOOD GLUCOSE TESTING: Patient reports testing on average a total of 2 times per day.       Review of Systems   Constitutional: Negative.  Negative for activity change, appetite change, fatigue and unexpected weight change.   Eyes: Negative for visual disturbance.   Gastrointestinal: Negative for constipation, diarrhea and nausea.   Endocrine: Negative.  Negative for cold intolerance, heat intolerance, polydipsia, polyphagia and polyuria.   Skin: Negative for wound.         Objective:      Physical Exam   Constitutional: He appears well-developed and well-nourished.   HENT:   Head: Normocephalic and atraumatic.   Cardiovascular: Normal rate.   Pulmonary/Chest: Effort normal.   Skin: Skin is warm and dry.   Psychiatric: He has a normal mood and affect. His speech is normal and behavior is normal. Judgment and thought content normal.   Nursing note and vitals reviewed.      Assessment:       1. Uncontrolled type 2 diabetes mellitus with hyperglycemia    2. Hypertension associated with diabetes    3. Hyperlipidemia associated with type 2 diabetes mellitus        Plan:   Uncontrolled type 2 diabetes mellitus with hyperglycemia  -     POCT Glucose, Hand-Held Device  -     Hemoglobin A1c; Future; Expected date: 03/30/2020    - Condition improved, but not at goal. Patient declines to make any medication changes. We did go over diet in depth and he feels as if he can make great headway in this area. DM education reviewed. Patient encouraged to carb count and exercise per recommendations. Labs and  Referrals as noted. Patient instructed to send in log weekly for review and potential medication adjustments. RV scheduled in 3 months.     Hypertension associated with diabetes    - Stable    Hyperlipidemia associated with type 2 diabetes mellitus    - LDL at goal    Additional Details:    1. Patient was instructed to monitor blood glucose 2 - 3 x daily, fasting and ac dinner or at bedtime. Discussed ADA goals for blood glucose levels and hypoglycemic protocol.  Reminded to bring BG records or meter to each visit for review.    2. The patient was explained the above plan and given opportunity to ask questions.  He understands, chooses and consents to this plan and accepts all the risks, which include but are not limited to the risks mentioned above. He understands the alternative of having no testing, interventions or treatments at this time. He left content and without further questions.     A total of 60 minutes was spent in face to face time, of which over 50% was spent in counseling patient on disease process, complications, treatment, and side effects of medications.        TALISHA Pope

## 2020-01-29 ENCOUNTER — OFFICE VISIT (OUTPATIENT)
Dept: INTERNAL MEDICINE | Facility: CLINIC | Age: 70
End: 2020-01-29
Payer: COMMERCIAL

## 2020-01-29 ENCOUNTER — LAB VISIT (OUTPATIENT)
Dept: LAB | Facility: HOSPITAL | Age: 70
End: 2020-01-29
Attending: INTERNAL MEDICINE
Payer: COMMERCIAL

## 2020-01-29 VITALS
HEART RATE: 96 BPM | WEIGHT: 260.81 LBS | HEIGHT: 69 IN | TEMPERATURE: 98 F | BODY MASS INDEX: 38.63 KG/M2 | SYSTOLIC BLOOD PRESSURE: 128 MMHG | DIASTOLIC BLOOD PRESSURE: 78 MMHG | OXYGEN SATURATION: 97 %

## 2020-01-29 DIAGNOSIS — R91.8 MULTIPLE PULMONARY NODULES DETERMINED BY COMPUTED TOMOGRAPHY OF LUNG: ICD-10-CM

## 2020-01-29 DIAGNOSIS — E78.5 HYPERLIPIDEMIA ASSOCIATED WITH TYPE 2 DIABETES MELLITUS: ICD-10-CM

## 2020-01-29 DIAGNOSIS — I15.2 HYPERTENSION ASSOCIATED WITH DIABETES: ICD-10-CM

## 2020-01-29 DIAGNOSIS — E11.69 HYPERLIPIDEMIA ASSOCIATED WITH TYPE 2 DIABETES MELLITUS: ICD-10-CM

## 2020-01-29 DIAGNOSIS — R97.20 ELEVATED PSA: ICD-10-CM

## 2020-01-29 DIAGNOSIS — G47.33 OSA ON CPAP: ICD-10-CM

## 2020-01-29 DIAGNOSIS — E11.59 HYPERTENSION ASSOCIATED WITH DIABETES: ICD-10-CM

## 2020-01-29 DIAGNOSIS — E11.65 UNCONTROLLED TYPE 2 DIABETES MELLITUS WITH HYPERGLYCEMIA: Primary | ICD-10-CM

## 2020-01-29 LAB — COMPLEXED PSA SERPL-MCNC: 12.3 NG/ML (ref 0–4)

## 2020-01-29 PROCEDURE — 3078F PR MOST RECENT DIASTOLIC BLOOD PRESSURE < 80 MM HG: ICD-10-PCS | Mod: CPTII,S$GLB,, | Performed by: INTERNAL MEDICINE

## 2020-01-29 PROCEDURE — 1101F PT FALLS ASSESS-DOCD LE1/YR: CPT | Mod: CPTII,S$GLB,, | Performed by: INTERNAL MEDICINE

## 2020-01-29 PROCEDURE — 99999 PR PBB SHADOW E&M-EST. PATIENT-LVL IV: CPT | Mod: PBBFAC,,, | Performed by: INTERNAL MEDICINE

## 2020-01-29 PROCEDURE — 99999 PR PBB SHADOW E&M-EST. PATIENT-LVL IV: ICD-10-PCS | Mod: PBBFAC,,, | Performed by: INTERNAL MEDICINE

## 2020-01-29 PROCEDURE — 1159F PR MEDICATION LIST DOCUMENTED IN MEDICAL RECORD: ICD-10-PCS | Mod: S$GLB,,, | Performed by: INTERNAL MEDICINE

## 2020-01-29 PROCEDURE — 1126F AMNT PAIN NOTED NONE PRSNT: CPT | Mod: S$GLB,,, | Performed by: INTERNAL MEDICINE

## 2020-01-29 PROCEDURE — 1159F MED LIST DOCD IN RCRD: CPT | Mod: S$GLB,,, | Performed by: INTERNAL MEDICINE

## 2020-01-29 PROCEDURE — 3078F DIAST BP <80 MM HG: CPT | Mod: CPTII,S$GLB,, | Performed by: INTERNAL MEDICINE

## 2020-01-29 PROCEDURE — 84153 ASSAY OF PSA TOTAL: CPT

## 2020-01-29 PROCEDURE — 99214 PR OFFICE/OUTPT VISIT, EST, LEVL IV, 30-39 MIN: ICD-10-PCS | Mod: S$GLB,,, | Performed by: INTERNAL MEDICINE

## 2020-01-29 PROCEDURE — 3074F SYST BP LT 130 MM HG: CPT | Mod: CPTII,S$GLB,, | Performed by: INTERNAL MEDICINE

## 2020-01-29 PROCEDURE — 1126F PR PAIN SEVERITY QUANTIFIED, NO PAIN PRESENT: ICD-10-PCS | Mod: S$GLB,,, | Performed by: INTERNAL MEDICINE

## 2020-01-29 PROCEDURE — 99214 OFFICE O/P EST MOD 30 MIN: CPT | Mod: S$GLB,,, | Performed by: INTERNAL MEDICINE

## 2020-01-29 PROCEDURE — 1101F PR PT FALLS ASSESS DOC 0-1 FALLS W/OUT INJ PAST YR: ICD-10-PCS | Mod: CPTII,S$GLB,, | Performed by: INTERNAL MEDICINE

## 2020-01-29 PROCEDURE — 36415 COLL VENOUS BLD VENIPUNCTURE: CPT

## 2020-01-29 PROCEDURE — 3074F PR MOST RECENT SYSTOLIC BLOOD PRESSURE < 130 MM HG: ICD-10-PCS | Mod: CPTII,S$GLB,, | Performed by: INTERNAL MEDICINE

## 2020-01-29 NOTE — PROGRESS NOTES
"Subjective:      Patient ID: Anup Miller is a 69 y.o. male.    Chief Complaint: Follow-up    HPI   68 yo with   Patient Active Problem List   Diagnosis    Uncontrolled type 2 diabetes mellitus    Elevated liver enzymes    Hypertension associated with diabetes    Hyperlipidemia associated with type 2 diabetes mellitus    Multiple pulmonary nodules determined by computed tomography of lung    KHOI on CPAP    Elevated PSA    Hx of colonic polyp    Cardiac arrhythmia     Past Medical History:   Diagnosis Date    Diabetes mellitus type II Diagnosed 2002    Elevated liver enzymes     Fatty liver disease, nonalcoholic     Hyperlipidemia     Hypertension     Sleep apnea      Here today for management of multiple medical problems as outlined below.  He reports compliance with his medications without significant side effects.  He is feeling well in his usual state of health.  Review of Systems   Constitutional: Negative for chills and fever.   HENT: Negative for ear pain and sore throat.    Respiratory: Negative for cough.    Cardiovascular: Negative for chest pain.   Gastrointestinal: Negative for abdominal pain and blood in stool.   Genitourinary: Negative for dysuria and hematuria.   Neurological: Negative for seizures and syncope.     Objective:   /78 (BP Location: Left arm, Patient Position: Sitting, BP Method: Large (Manual))   Pulse 96   Temp 98.3 °F (36.8 °C) (Oral)   Ht 5' 9" (1.753 m)   Wt 118.3 kg (260 lb 12.9 oz)   SpO2 97%   BMI 38.51 kg/m²     Physical Exam   Constitutional: He is oriented to person, place, and time. He appears well-developed and well-nourished. No distress.   HENT:   Head: Normocephalic and atraumatic.   Mouth/Throat: Oropharynx is clear and moist.   Eyes: Pupils are equal, round, and reactive to light. EOM are normal.   Neck: Neck supple. No thyromegaly present.   Cardiovascular: Normal rate and regular rhythm.   Pulmonary/Chest: Breath sounds normal. He has no " wheezes. He has no rales.   Abdominal: Soft. Bowel sounds are normal. There is no tenderness.   Musculoskeletal: He exhibits no edema.   Lymphadenopathy:     He has no cervical adenopathy.   Neurological: He is alert and oriented to person, place, and time.   Skin: Skin is warm and dry.   Psychiatric: He has a normal mood and affect. His behavior is normal.     No visits with results within 2 Week(s) from this visit.   Latest known visit with results is:   Office Visit on 12/30/2019   Component Date Value Ref Range Status    POC Glucose 12/30/2019 154* 70 - 110 MG/DL Final       Assessment:     1. Uncontrolled type 2 diabetes mellitus with hyperglycemia    2. KHOI on CPAP    3. Hyperlipidemia associated with type 2 diabetes mellitus    4. Hypertension associated with diabetes    5. Multiple pulmonary nodules determined by computed tomography of lung    6. Elevated PSA      Plan:   Uncontrolled type 2 diabetes mellitus with hyperglycemia  Declines adjustment in meds.  Reports feeling well and will adjust diet and activity.  -     Hemoglobin A1c; Future; Expected date: 07/27/2020    KHOI on CPAP  Continue CPAP  Hyperlipidemia associated with type 2 diabetes mellitus  Continue statin  Hypertension associated with diabetes  Controlled.  Continue current medications  Multiple pulmonary nodules determined by computed tomography of lung    Elevated PSA  Has declined biopsy in the past but will reconsider  -     Cancel: PSA, Screening; Future; Expected date: 01/29/2020  -     Ambulatory referral to Urology  -     PROSTATE SPECIFIC ANTIGEN, DIAGNOSTIC; Future; Expected date: 01/29/2020        Lab Frequency Next Occurrence   Renal function panel Once 09/27/2019   ALT (SGPT) Once 09/27/2019   AST (SGOT) Once 09/27/2019   Lipid panel Once 09/27/2019   TSH Once 09/27/2019   CBC auto differential Once 09/27/2019   Hemoglobin A1c Once 03/30/2020   Hemoglobin A1c Once 07/27/2020       Problem List Items Addressed This Visit         Pulmonary    Multiple pulmonary nodules determined by computed tomography of lung    Overview     Found 2013. Largest nodule 4.2mm. Pt has declined repeat scanning.             Cardiac/Vascular    Hypertension associated with diabetes    Hyperlipidemia associated with type 2 diabetes mellitus       Renal/    Elevated PSA    Overview     Did not f/u with urology as rec in 2015         Relevant Orders    Ambulatory referral to Urology    PROSTATE SPECIFIC ANTIGEN, DIAGNOSTIC       Endocrine    Uncontrolled type 2 diabetes mellitus - Primary    Relevant Orders    Hemoglobin A1c       Other    KHOI on CPAP          Follow up in about 6 months (around 7/29/2020), or if symptoms worsen or fail to improve.

## 2020-01-31 ENCOUNTER — TELEPHONE (OUTPATIENT)
Dept: INTERNAL MEDICINE | Facility: CLINIC | Age: 70
End: 2020-01-31

## 2020-01-31 DIAGNOSIS — R97.20 ELEVATED PSA: Primary | ICD-10-CM

## 2020-01-31 NOTE — TELEPHONE ENCOUNTER
PSA continues to rise and very important to meed with a urologist. Let me know if referral is needed.

## 2020-02-03 NOTE — TELEPHONE ENCOUNTER
Spoke with pt and relayed lab results per Dr. Gonzales. Pt verbalized understanding and asked that referral be placed to see urologist. Referral pended.

## 2020-02-06 NOTE — TELEPHONE ENCOUNTER
Spoke with pt to let him know that urology referral has been placed and they should be contacting him to schedule. Verbalized understanding.

## 2020-03-13 ENCOUNTER — PATIENT OUTREACH (OUTPATIENT)
Dept: ADMINISTRATIVE | Facility: OTHER | Age: 70
End: 2020-03-13

## 2020-03-13 ENCOUNTER — LAB VISIT (OUTPATIENT)
Dept: LAB | Facility: HOSPITAL | Age: 70
End: 2020-03-13
Attending: NURSE PRACTITIONER
Payer: COMMERCIAL

## 2020-03-13 DIAGNOSIS — E11.65 UNCONTROLLED TYPE 2 DIABETES MELLITUS WITH HYPERGLYCEMIA: ICD-10-CM

## 2020-03-13 DIAGNOSIS — E11.65 UNCONTROLLED TYPE 2 DIABETES MELLITUS WITH HYPERGLYCEMIA: Primary | ICD-10-CM

## 2020-03-13 LAB
ESTIMATED AVG GLUCOSE: 180 MG/DL (ref 68–131)
HBA1C MFR BLD HPLC: 7.9 % (ref 4–5.6)

## 2020-03-13 PROCEDURE — 36415 COLL VENOUS BLD VENIPUNCTURE: CPT | Mod: PO

## 2020-03-13 PROCEDURE — 83036 HEMOGLOBIN GLYCOSYLATED A1C: CPT

## 2020-03-15 ENCOUNTER — TELEPHONE (OUTPATIENT)
Dept: DIABETES | Facility: CLINIC | Age: 70
End: 2020-03-15

## 2020-03-16 ENCOUNTER — OFFICE VISIT (OUTPATIENT)
Dept: DIABETES | Facility: CLINIC | Age: 70
End: 2020-03-16
Payer: COMMERCIAL

## 2020-03-16 ENCOUNTER — TELEPHONE (OUTPATIENT)
Dept: DIABETES | Facility: CLINIC | Age: 70
End: 2020-03-16

## 2020-03-16 VITALS
HEIGHT: 69 IN | TEMPERATURE: 98 F | HEART RATE: 95 BPM | DIASTOLIC BLOOD PRESSURE: 74 MMHG | BODY MASS INDEX: 37.95 KG/M2 | WEIGHT: 256.19 LBS | SYSTOLIC BLOOD PRESSURE: 126 MMHG

## 2020-03-16 DIAGNOSIS — E78.5 HYPERLIPIDEMIA ASSOCIATED WITH TYPE 2 DIABETES MELLITUS: ICD-10-CM

## 2020-03-16 DIAGNOSIS — R74.8 ELEVATED LIVER ENZYMES: ICD-10-CM

## 2020-03-16 DIAGNOSIS — E11.65 UNCONTROLLED TYPE 2 DIABETES MELLITUS WITH HYPERGLYCEMIA: Primary | ICD-10-CM

## 2020-03-16 DIAGNOSIS — I15.2 HYPERTENSION ASSOCIATED WITH DIABETES: ICD-10-CM

## 2020-03-16 DIAGNOSIS — L60.0 INGROWN TOENAIL OF BOTH FEET: ICD-10-CM

## 2020-03-16 DIAGNOSIS — E66.9 OBESITY (BMI 30-39.9): ICD-10-CM

## 2020-03-16 DIAGNOSIS — E11.69 HYPERLIPIDEMIA ASSOCIATED WITH TYPE 2 DIABETES MELLITUS: ICD-10-CM

## 2020-03-16 DIAGNOSIS — E11.59 HYPERTENSION ASSOCIATED WITH DIABETES: ICD-10-CM

## 2020-03-16 PROCEDURE — 3078F DIAST BP <80 MM HG: CPT | Mod: CPTII,S$GLB,, | Performed by: PHYSICIAN ASSISTANT

## 2020-03-16 PROCEDURE — 3074F PR MOST RECENT SYSTOLIC BLOOD PRESSURE < 130 MM HG: ICD-10-PCS | Mod: CPTII,S$GLB,, | Performed by: PHYSICIAN ASSISTANT

## 2020-03-16 PROCEDURE — 1159F PR MEDICATION LIST DOCUMENTED IN MEDICAL RECORD: ICD-10-PCS | Mod: S$GLB,,, | Performed by: PHYSICIAN ASSISTANT

## 2020-03-16 PROCEDURE — 1101F PT FALLS ASSESS-DOCD LE1/YR: CPT | Mod: CPTII,S$GLB,, | Performed by: PHYSICIAN ASSISTANT

## 2020-03-16 PROCEDURE — 1126F AMNT PAIN NOTED NONE PRSNT: CPT | Mod: S$GLB,,, | Performed by: PHYSICIAN ASSISTANT

## 2020-03-16 PROCEDURE — 1101F PR PT FALLS ASSESS DOC 0-1 FALLS W/OUT INJ PAST YR: ICD-10-PCS | Mod: CPTII,S$GLB,, | Performed by: PHYSICIAN ASSISTANT

## 2020-03-16 PROCEDURE — 99999 PR PBB SHADOW E&M-EST. PATIENT-LVL III: ICD-10-PCS | Mod: PBBFAC,,, | Performed by: PHYSICIAN ASSISTANT

## 2020-03-16 PROCEDURE — 3074F SYST BP LT 130 MM HG: CPT | Mod: CPTII,S$GLB,, | Performed by: PHYSICIAN ASSISTANT

## 2020-03-16 PROCEDURE — 1159F MED LIST DOCD IN RCRD: CPT | Mod: S$GLB,,, | Performed by: PHYSICIAN ASSISTANT

## 2020-03-16 PROCEDURE — 3078F PR MOST RECENT DIASTOLIC BLOOD PRESSURE < 80 MM HG: ICD-10-PCS | Mod: CPTII,S$GLB,, | Performed by: PHYSICIAN ASSISTANT

## 2020-03-16 PROCEDURE — 3051F HG A1C>EQUAL 7.0%<8.0%: CPT | Mod: CPTII,S$GLB,, | Performed by: PHYSICIAN ASSISTANT

## 2020-03-16 PROCEDURE — 99214 OFFICE O/P EST MOD 30 MIN: CPT | Mod: S$GLB,,, | Performed by: PHYSICIAN ASSISTANT

## 2020-03-16 PROCEDURE — 99214 PR OFFICE/OUTPT VISIT, EST, LEVL IV, 30-39 MIN: ICD-10-PCS | Mod: S$GLB,,, | Performed by: PHYSICIAN ASSISTANT

## 2020-03-16 PROCEDURE — 3051F PR MOST RECENT HEMOGLOBIN A1C LEVEL 7.0 - < 8.0%: ICD-10-PCS | Mod: CPTII,S$GLB,, | Performed by: PHYSICIAN ASSISTANT

## 2020-03-16 PROCEDURE — 1126F PR PAIN SEVERITY QUANTIFIED, NO PAIN PRESENT: ICD-10-PCS | Mod: S$GLB,,, | Performed by: PHYSICIAN ASSISTANT

## 2020-03-16 PROCEDURE — 99999 PR PBB SHADOW E&M-EST. PATIENT-LVL III: CPT | Mod: PBBFAC,,, | Performed by: PHYSICIAN ASSISTANT

## 2020-03-16 RX ORDER — GLIMEPIRIDE 4 MG/1
8 TABLET ORAL DAILY
Qty: 180 TABLET | Refills: 3 | Status: ON HOLD | OUTPATIENT
Start: 2020-03-16 | End: 2020-09-24 | Stop reason: HOSPADM

## 2020-03-16 RX ORDER — PIOGLITAZONEHYDROCHLORIDE 15 MG/1
15 TABLET ORAL DAILY
Qty: 90 TABLET | Refills: 1 | Status: ON HOLD | OUTPATIENT
Start: 2020-03-16 | End: 2020-09-08

## 2020-03-16 RX ORDER — METFORMIN HYDROCHLORIDE 750 MG/1
750 TABLET, EXTENDED RELEASE ORAL 2 TIMES DAILY WITH MEALS
Qty: 180 TABLET | Refills: 3 | Status: ON HOLD | OUTPATIENT
Start: 2020-03-16 | End: 2020-09-24 | Stop reason: HOSPADM

## 2020-03-16 NOTE — TELEPHONE ENCOUNTER
----- Message from Sachin Pastor sent at 3/16/2020 12:13 PM CDT -----  Contact: Pt spouse   Caller called in regards to letting the staff know that nothing changed as far as the pt dulaglutide (TRULICITY) 1.5 mg/0.5 mL PnIj . Caller can be reached at [phone] (jorx)

## 2020-03-16 NOTE — TELEPHONE ENCOUNTER
Spoke with pt in regard to concerns. Verbalized to pt that we will get back with him once notified by physician.

## 2020-03-16 NOTE — PROGRESS NOTES
PCP: Bryce Gonzales MD    Subjective:     Chief Complaint: Diabetes - Established Patient    HISTORY OF PRESENT ILLNESS: 69 y.o.   male presenting for diabetes management visit.   Patient has previously been established with the Diabetes Management Department and was last seen by Shahla Leahy NP on 12/30/19.   Patient is new to me and is here for establishment of future care .   Patient has had Type II diabetes since 2002.  Pertinent to decision making is the following comorbidities: HTN, HLD, Obesity by BMI and History of elevated liver enzymes. 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   Patient has the following Diabetes complications: without complications  He  has attended diabetes education in the past.     Patient's most recent A1c of 7.9% was completed 3 days ago.   Patient states since His last A1c His blood glucose levels have been high  in the morning / fasting.   Patient monitors blood glucose 1 times per day with meter : Fasting.   Patient blood glucose monitoring device will not be uploaded into [employer] today secondary to patient forgetting device.   Per patient recall, fasting blood sugars ranging from 160 - 170.   Patient endorses the following diabetes related symptoms: Polydipsia, Polyuria and Tingling in Lower Extremities. Polydipsia and Polyuria related to HCTZ. Tingling in feet is chronic and bothers him.   Patient is due today for the following diabetes-related health maintenance standards: Foot Exam  and Eye Exam.   He denies recent hospital admissions or emergency room visits.   He voices having hypoglycemia when skipping meals.   Patient's concerns today include glycemic control.   Patient medication regimen is as below.     CURRENT DM MEDICATIONS:    Steglatro 15 mg    Trulicity 1.5 mg weekly - has stopped x 1 month secondary to cost   Amaryl 4 mg before breakfast and before dinner   Metformin 750 mg BID    Actos 15 mg    Patient  "has failed the following Diabetes medications:    Farxiga, Invokana    Byetta, Victoza   Lantus - L   Januvia      Labs Reviewed.       Lab Results   Component Value Date    CPEPTIDE 4.8 04/03/2017     Lab Results   Component Value Date    GLUTAMICACID 0.00 04/03/2017       Height: 5' 9" (175.3 cm)  //  Weight: 116.2 kg (256 lb 2.8 oz), Body mass index is 37.83 kg/m².  His blood sugar in clinic today is:    Lab Results   Component Value Date    POCGLU 154 (A) 12/30/2019       Review of Systems   Constitutional: Negative for activity change, appetite change, chills and fever.   HENT: Negative for dental problem, mouth sores, nosebleeds, sore throat and trouble swallowing.    Eyes: Negative for pain and discharge.   Respiratory: Negative for shortness of breath, wheezing and stridor.    Cardiovascular: Negative for chest pain, palpitations and leg swelling.   Gastrointestinal: Negative for abdominal pain, diarrhea, nausea and vomiting.   Endocrine: Positive for polydipsia and polyuria. Negative for polyphagia.   Genitourinary: Negative for dysuria, frequency and urgency.   Musculoskeletal: Negative for joint swelling and myalgias.   Skin: Negative for rash and wound.   Neurological: Positive for numbness. Negative for dizziness, syncope, weakness and headaches.   Psychiatric/Behavioral: Negative for behavioral problems and dysphoric mood.         Diabetes Management Status  Statin: Taking  ACE/ARB: Taking    Screening or Prevention Patient's value Goal Complete/Controlled?   HgA1C Testing and Control   Lab Results   Component Value Date    HGBA1C 7.9 (H) 03/13/2020      Annually/Less than 8% Yes   Lipid profile : 09/27/2019 Annually Yes   LDL control Lab Results   Component Value Date    LDLCALC 78.4 09/27/2019    Annually/Less than 100 mg/dl  Yes   Nephropathy screening Lab Results   Component Value Date    MICALBCREAT 3.0 06/18/2018     No results found for: PROTEINUA Annually No   Blood pressure BP Readings " from Last 1 Encounters:   01/29/20 128/78    Less than 140/90 Yes   Dilated retinal exam : 02/07/2019 Annually Yes    Foot exam   : 03/04/2019 Annually No     Social History     Socioeconomic History    Marital status:      Spouse name: yoselin    Number of children: 1    Years of education: Not on file    Highest education level: Not on file   Occupational History    Occupation:    Social Needs    Financial resource strain: Not on file    Food insecurity:     Worry: Not on file     Inability: Not on file    Transportation needs:     Medical: Not on file     Non-medical: Not on file   Tobacco Use    Smoking status: Never Smoker    Smokeless tobacco: Never Used   Substance and Sexual Activity    Alcohol use: No     Alcohol/week: 0.0 standard drinks    Drug use: No    Sexual activity: Yes     Partners: Female   Lifestyle    Physical activity:     Days per week: Not on file     Minutes per session: Not on file    Stress: Not on file   Relationships    Social connections:     Talks on phone: Not on file     Gets together: Not on file     Attends Oriental orthodox service: Not on file     Active member of club or organization: Not on file     Attends meetings of clubs or organizations: Not on file     Relationship status: Not on file   Other Topics Concern    Not on file   Social History Narrative    Not on file     Past Medical History:   Diagnosis Date    Diabetes mellitus type II Diagnosed 2002    Elevated liver enzymes     Fatty liver disease, nonalcoholic     Hyperlipidemia     Hypertension     Sleep apnea        Objective:        Physical Exam   Constitutional: He is oriented to person, place, and time. He appears well-developed and well-nourished. No distress.   HENT:   Head: Normocephalic and atraumatic.   Right Ear: External ear normal.   Left Ear: External ear normal.   Nose: Nose normal.   Eyes: Pupils are equal, round, and reactive to light. EOM are normal. Right eye exhibits no  discharge. Left eye exhibits no discharge.   Neck: Normal range of motion. Neck supple.   Cardiovascular: Normal rate, regular rhythm, normal heart sounds and intact distal pulses.   Pulses:       Dorsalis pedis pulses are 2+ on the right side, and 2+ on the left side.        Posterior tibial pulses are 2+ on the right side, and 2+ on the left side.   Pulmonary/Chest: Effort normal and breath sounds normal.   Abdominal: Soft.   Musculoskeletal: Normal range of motion.        Right foot: There is normal range of motion and no deformity.        Left foot: There is normal range of motion and no deformity.   Feet:   Right Foot:   Protective Sensation: 6 sites tested. 5 sites sensed.   Skin Integrity: Positive for callus and dry skin. Negative for ulcer, blister, skin breakdown, erythema or warmth.   Left Foot:   Protective Sensation: 6 sites tested. 5 sites sensed.   Skin Integrity: Positive for callus and dry skin. Negative for ulcer, blister, skin breakdown, erythema or warmth.   Neurological: He is oriented to person, place, and time.   Skin: Skin is warm and dry. Capillary refill takes less than 2 seconds. He is not diaphoretic.   Psychiatric: He has a normal mood and affect. His behavior is normal. Judgment and thought content normal.         Assessment / Plan:     Uncontrolled type 2 diabetes mellitus with hyperglycemia  -     POCT Glucose, Hand-Held Device  -     ertugliflozin (STEGLATRO) 15 mg Tab; Take 1 tablet by mouth once daily.  Dispense: 30 tablet; Refill: 11  -     pioglitazone (ACTOS) 15 MG tablet; Take 1 tablet (15 mg total) by mouth once daily.  Dispense: 90 tablet; Refill: 1  -     metFORMIN (GLUCOPHAGE-XR) 750 MG XR 24hr tablet; Take 1 tablet (750 mg total) by mouth 2 (two) times daily with meals.  Dispense: 180 tablet; Refill: 3  -     glimepiride (AMARYL) 4 MG tablet; Take 2 tablets (8 mg total) by mouth once daily.  Dispense: 180 tablet; Refill: 3  -     Ambulatory referral/consult to Podiatry;  Future; Expected date: 03/23/2020  -     Hemoglobin A1c; Standing    Hypertension associated with diabetes    Hyperlipidemia associated with type 2 diabetes mellitus    Ingrown toenail of both feet  -     Ambulatory referral/consult to Podiatry; Future; Expected date: 03/23/2020    Elevated liver enzymes    Obesity (BMI 30-39.9)    Other orders  -     dulaglutide (TRULICITY) 1.5 mg/0.5 mL PnIj; INJECT 1.5 MG UNDER THE SKIN EVERY 7 DAYS  Dispense: 12 mL; Refill: 3      Additional Plan Details:    - POCT Glucose  - Encouraged continuation of lifestyle changes including regular exercise and limiting carbohydrates to 30-45 grams per meal threes times daily and 15 grams per snack with a limit of two daily.   - Encouraged continued monitoring of blood glucose with an increase to 2 times daily at Fasting and Before Bed.   - Current DM Medication Regimen: Restart Trulicity 1.5 mg with savings card. Continue Steglatro 15 mg, Amaryl 4 mg before breakfast and before dinner, Metformin 750 mg BID, and Actos 15 mg.  - Referral to Podiatry - Diabetic foot care; Ingrown toenail of both feet  - Health Maintenance standards addressed today: Foot Exam - completed today and documented in physical exam with feedback to patient about proper diabetic foot care and findings and Eye Exam - will be completed outside of [facility] and patient will schedule  - Nursing Visit: Patient is age 79 or younger with an A1c of 7.5 or greater and qualifies for nursing visit, but will be deferred for now. .   - Follow up in 3 months with A1c prior.       Blakeney McKnight, PA-C [facility]    A total of 30 minutes was spent in face to face time, of which over 50 % was spent in counseling patient on disease process, complications, treatment, and side effects of medications.

## 2020-03-17 ENCOUNTER — PATIENT MESSAGE (OUTPATIENT)
Dept: DIABETES | Facility: CLINIC | Age: 70
End: 2020-03-17

## 2020-03-17 ENCOUNTER — TELEPHONE (OUTPATIENT)
Dept: DIABETES | Facility: CLINIC | Age: 70
End: 2020-03-17

## 2020-03-17 DIAGNOSIS — E11.65 UNCONTROLLED TYPE 2 DIABETES MELLITUS WITH HYPERGLYCEMIA: Primary | ICD-10-CM

## 2020-03-17 RX ORDER — INSULIN GLARGINE 100 [IU]/ML
10 INJECTION, SOLUTION SUBCUTANEOUS NIGHTLY
Qty: 3 ML | Refills: 11 | Status: ON HOLD | OUTPATIENT
Start: 2020-03-17 | End: 2020-09-24 | Stop reason: HOSPADM

## 2020-03-17 NOTE — TELEPHONE ENCOUNTER
Patient unable to fill Trulicity secondary to cost of medication.       Will rx Lantus 10 units qhs.     BM

## 2020-03-25 RX ORDER — PANTOPRAZOLE SODIUM 40 MG/1
TABLET, DELAYED RELEASE ORAL
Qty: 90 TABLET | Refills: 3 | Status: ON HOLD | OUTPATIENT
Start: 2020-03-25 | End: 2020-09-24 | Stop reason: HOSPADM

## 2020-03-25 RX ORDER — LOSARTAN POTASSIUM 100 MG/1
TABLET ORAL
Qty: 90 TABLET | Refills: 3 | Status: ON HOLD | OUTPATIENT
Start: 2020-03-25 | End: 2020-09-24 | Stop reason: HOSPADM

## 2020-06-22 ENCOUNTER — PATIENT OUTREACH (OUTPATIENT)
Dept: ADMINISTRATIVE | Facility: OTHER | Age: 70
End: 2020-06-22

## 2020-07-15 DIAGNOSIS — Z71.89 COMPLEX CARE COORDINATION: ICD-10-CM

## 2020-07-22 ENCOUNTER — TELEPHONE (OUTPATIENT)
Dept: DIABETES | Facility: CLINIC | Age: 70
End: 2020-07-22

## 2020-07-22 NOTE — TELEPHONE ENCOUNTER
Called pt wife to get further detail on why he's shaking I ask her does he have an seizure disorder she stated he does not have a seizure disorder she went on to explain she think it was the Mucinex was causing him to shake she stated she is feeling better today and I advised her if he starts to shake again and blood sugars are normal then he may need to go to the ED so they can run some test on him.

## 2020-07-25 ENCOUNTER — HOSPITAL ENCOUNTER (INPATIENT)
Facility: HOSPITAL | Age: 70
LOS: 1 days | Discharge: ANOTHER HOSPITAL | DRG: 208 | End: 2020-07-26
Attending: EMERGENCY MEDICINE | Admitting: INTERNAL MEDICINE
Payer: COMMERCIAL

## 2020-07-25 DIAGNOSIS — U07.1 COVID-19 VIRUS INFECTION: ICD-10-CM

## 2020-07-25 DIAGNOSIS — U07.1 PNEUMONIA DUE TO COVID-19 VIRUS: ICD-10-CM

## 2020-07-25 DIAGNOSIS — J96.01 ACUTE HYPOXEMIC RESPIRATORY FAILURE: Primary | ICD-10-CM

## 2020-07-25 DIAGNOSIS — J12.82 PNEUMONIA DUE TO COVID-19 VIRUS: ICD-10-CM

## 2020-07-25 DIAGNOSIS — Z20.822 SUSPECTED COVID-19 VIRUS INFECTION: ICD-10-CM

## 2020-07-25 DIAGNOSIS — U07.1 COVID-19 VIRUS DETECTED: ICD-10-CM

## 2020-07-25 DIAGNOSIS — J18.9 PNEUMONIA OF BOTH LUNGS DUE TO INFECTIOUS ORGANISM, UNSPECIFIED PART OF LUNG: ICD-10-CM

## 2020-07-25 LAB
ALBUMIN SERPL BCP-MCNC: 3.2 G/DL (ref 3.5–5.2)
ALLENS TEST: ABNORMAL
ALP SERPL-CCNC: 38 U/L (ref 55–135)
ALT SERPL W/O P-5'-P-CCNC: 107 U/L (ref 10–44)
ANION GAP SERPL CALC-SCNC: 20 MMOL/L (ref 8–16)
AST SERPL-CCNC: 118 U/L (ref 10–40)
BASOPHILS # BLD AUTO: 0 K/UL (ref 0–0.2)
BASOPHILS NFR BLD: 0 % (ref 0–1.9)
BILIRUB SERPL-MCNC: 0.5 MG/DL (ref 0.1–1)
BUN SERPL-MCNC: 21 MG/DL (ref 8–23)
CALCIUM SERPL-MCNC: 8.9 MG/DL (ref 8.7–10.5)
CHLORIDE SERPL-SCNC: 101 MMOL/L (ref 95–110)
CK MB SERPL-MCNC: 3.1 NG/ML (ref 0.1–6.5)
CK MB SERPL-RTO: 0.2 % (ref 0–5)
CK SERPL-CCNC: 1613 U/L (ref 20–200)
CO2 SERPL-SCNC: 16 MMOL/L (ref 23–29)
CREAT SERPL-MCNC: 1.5 MG/DL (ref 0.5–1.4)
CRP SERPL-MCNC: 109.2 MG/L (ref 0–8.2)
DELSYS: ABNORMAL
DIFFERENTIAL METHOD: ABNORMAL
EOSINOPHIL # BLD AUTO: 0.1 K/UL (ref 0–0.5)
EOSINOPHIL NFR BLD: 1.7 % (ref 0–8)
EP: 8
ERYTHROCYTE [DISTWIDTH] IN BLOOD BY AUTOMATED COUNT: 13.3 % (ref 11.5–14.5)
ERYTHROCYTE [SEDIMENTATION RATE] IN BLOOD BY WESTERGREN METHOD: 12 MM/H
EST. GFR  (AFRICAN AMERICAN): 55 ML/MIN/1.73 M^2
EST. GFR  (NON AFRICAN AMERICAN): 47 ML/MIN/1.73 M^2
FIO2: 75
GLUCOSE SERPL-MCNC: 133 MG/DL (ref 70–110)
HCO3 UR-SCNC: 18.8 MMOL/L (ref 24–28)
HCT VFR BLD AUTO: 42.8 % (ref 40–54)
HGB BLD-MCNC: 13.6 G/DL (ref 14–18)
IMM GRANULOCYTES # BLD AUTO: 0.03 K/UL (ref 0–0.04)
IMM GRANULOCYTES NFR BLD AUTO: 0.4 % (ref 0–0.5)
IP: 14
LACTATE SERPL-SCNC: 1.7 MMOL/L (ref 0.5–2.2)
LDH SERPL L TO P-CCNC: 743 U/L (ref 110–260)
LYMPHOCYTES # BLD AUTO: 0.8 K/UL (ref 1–4.8)
LYMPHOCYTES NFR BLD: 11.8 % (ref 18–48)
MCH RBC QN AUTO: 27.4 PG (ref 27–31)
MCHC RBC AUTO-ENTMCNC: 31.8 G/DL (ref 32–36)
MCV RBC AUTO: 86 FL (ref 82–98)
MIN VOL: 36
MODE: ABNORMAL
MONOCYTES # BLD AUTO: 0.9 K/UL (ref 0.3–1)
MONOCYTES NFR BLD: 12.3 % (ref 4–15)
NEUTROPHILS # BLD AUTO: 5.2 K/UL (ref 1.8–7.7)
NEUTROPHILS NFR BLD: 73.8 % (ref 38–73)
NRBC BLD-RTO: 0 /100 WBC
PCO2 BLDA: 29.4 MMHG (ref 35–45)
PH SMN: 7.41 [PH] (ref 7.35–7.45)
PLATELET # BLD AUTO: 282 K/UL (ref 150–350)
PMV BLD AUTO: 9.9 FL (ref 9.2–12.9)
PO2 BLDA: 127 MMHG (ref 80–100)
POC BE: -6 MMOL/L
POC SATURATED O2: 99 % (ref 95–100)
POTASSIUM SERPL-SCNC: 4.3 MMOL/L (ref 3.5–5.1)
PROCALCITONIN SERPL IA-MCNC: 0.22 NG/ML
PROT SERPL-MCNC: 7.6 G/DL (ref 6–8.4)
RBC # BLD AUTO: 4.96 M/UL (ref 4.6–6.2)
SAMPLE: ABNORMAL
SARS-COV-2 RDRP RESP QL NAA+PROBE: POSITIVE
SITE: ABNORMAL
SODIUM SERPL-SCNC: 137 MMOL/L (ref 136–145)
SP02: 100
SPONT RATE: 50
TROPONIN I SERPL DL<=0.01 NG/ML-MCNC: 0.03 NG/ML (ref 0–0.03)
WBC # BLD AUTO: 7.02 K/UL (ref 3.9–12.7)

## 2020-07-25 PROCEDURE — 83615 LACTATE (LD) (LDH) ENZYME: CPT

## 2020-07-25 PROCEDURE — U0002 COVID-19 LAB TEST NON-CDC: HCPCS

## 2020-07-25 PROCEDURE — 99900035 HC TECH TIME PER 15 MIN (STAT)

## 2020-07-25 PROCEDURE — 36600 WITHDRAWAL OF ARTERIAL BLOOD: CPT

## 2020-07-25 PROCEDURE — 25000003 PHARM REV CODE 250

## 2020-07-25 PROCEDURE — 82553 CREATINE MB FRACTION: CPT

## 2020-07-25 PROCEDURE — 93010 EKG 12-LEAD: ICD-10-PCS | Mod: ,,, | Performed by: INTERNAL MEDICINE

## 2020-07-25 PROCEDURE — 84484 ASSAY OF TROPONIN QUANT: CPT

## 2020-07-25 PROCEDURE — 36415 COLL VENOUS BLD VENIPUNCTURE: CPT

## 2020-07-25 PROCEDURE — 86140 C-REACTIVE PROTEIN: CPT

## 2020-07-25 PROCEDURE — 93005 ELECTROCARDIOGRAM TRACING: CPT

## 2020-07-25 PROCEDURE — 84145 PROCALCITONIN (PCT): CPT

## 2020-07-25 PROCEDURE — 85025 COMPLETE CBC W/AUTO DIFF WBC: CPT

## 2020-07-25 PROCEDURE — 27000190 HC CPAP FULL FACE MASK W/VALVE

## 2020-07-25 PROCEDURE — 96375 TX/PRO/DX INJ NEW DRUG ADDON: CPT | Mod: 59

## 2020-07-25 PROCEDURE — 63600175 PHARM REV CODE 636 W HCPCS: Performed by: EMERGENCY MEDICINE

## 2020-07-25 PROCEDURE — 63600175 PHARM REV CODE 636 W HCPCS

## 2020-07-25 PROCEDURE — 99291 CRITICAL CARE FIRST HOUR: CPT | Mod: 25

## 2020-07-25 PROCEDURE — 96374 THER/PROPH/DIAG INJ IV PUSH: CPT | Mod: 59

## 2020-07-25 PROCEDURE — 25000003 PHARM REV CODE 250: Performed by: EMERGENCY MEDICINE

## 2020-07-25 PROCEDURE — 31500 INSERT EMERGENCY AIRWAY: CPT

## 2020-07-25 PROCEDURE — 80053 COMPREHEN METABOLIC PANEL: CPT

## 2020-07-25 PROCEDURE — 82803 BLOOD GASES ANY COMBINATION: CPT

## 2020-07-25 PROCEDURE — 87040 BLOOD CULTURE FOR BACTERIA: CPT

## 2020-07-25 PROCEDURE — 82728 ASSAY OF FERRITIN: CPT

## 2020-07-25 PROCEDURE — 93010 ELECTROCARDIOGRAM REPORT: CPT | Mod: ,,, | Performed by: INTERNAL MEDICINE

## 2020-07-25 PROCEDURE — 82550 ASSAY OF CK (CPK): CPT

## 2020-07-25 PROCEDURE — 94660 CPAP INITIATION&MGMT: CPT

## 2020-07-25 PROCEDURE — 83605 ASSAY OF LACTIC ACID: CPT

## 2020-07-25 PROCEDURE — 96365 THER/PROPH/DIAG IV INF INIT: CPT

## 2020-07-25 RX ORDER — ACETAMINOPHEN 500 MG
1000 TABLET ORAL
Status: COMPLETED | OUTPATIENT
Start: 2020-07-25 | End: 2020-07-25

## 2020-07-25 RX ORDER — AZITHROMYCIN 250 MG/1
TABLET, FILM COATED ORAL
Status: ON HOLD | COMMUNITY
Start: 2020-07-24 | End: 2020-09-24 | Stop reason: HOSPADM

## 2020-07-25 RX ORDER — LORAZEPAM 2 MG/ML
1 INJECTION INTRAMUSCULAR
Status: COMPLETED | OUTPATIENT
Start: 2020-07-25 | End: 2020-07-25

## 2020-07-25 RX ORDER — DEXAMETHASONE SODIUM PHOSPHATE 4 MG/ML
6 INJECTION, SOLUTION INTRA-ARTICULAR; INTRALESIONAL; INTRAMUSCULAR; INTRAVENOUS; SOFT TISSUE
Status: COMPLETED | OUTPATIENT
Start: 2020-07-25 | End: 2020-07-25

## 2020-07-25 RX ORDER — KETOROLAC TROMETHAMINE 30 MG/ML
15 INJECTION, SOLUTION INTRAMUSCULAR; INTRAVENOUS
Status: COMPLETED | OUTPATIENT
Start: 2020-07-25 | End: 2020-07-25

## 2020-07-25 RX ADMIN — LORAZEPAM 1 MG: 2 INJECTION INTRAMUSCULAR; INTRAVENOUS at 07:07

## 2020-07-25 RX ADMIN — AZITHROMYCIN MONOHYDRATE 500 MG: 500 INJECTION, POWDER, LYOPHILIZED, FOR SOLUTION INTRAVENOUS at 09:07

## 2020-07-25 RX ADMIN — ACETAMINOPHEN 1000 MG: 500 TABLET ORAL at 08:07

## 2020-07-25 RX ADMIN — DEXAMETHASONE SODIUM PHOSPHATE 6 MG: 4 INJECTION, SOLUTION INTRAMUSCULAR; INTRAVENOUS at 11:07

## 2020-07-25 RX ADMIN — CEFTRIAXONE 1 G: 1 INJECTION, SOLUTION INTRAVENOUS at 08:07

## 2020-07-25 RX ADMIN — KETOROLAC TROMETHAMINE 15 MG: 30 INJECTION, SOLUTION INTRAMUSCULAR at 08:07

## 2020-07-26 ENCOUNTER — HOSPITAL ENCOUNTER (INPATIENT)
Facility: HOSPITAL | Age: 70
LOS: 31 days | Discharge: NURSING, REHAB OR LONG-TERM CARE | DRG: 004 | End: 2020-08-26
Attending: INTERNAL MEDICINE | Admitting: INTERNAL MEDICINE
Payer: COMMERCIAL

## 2020-07-26 VITALS
HEART RATE: 102 BPM | HEIGHT: 69 IN | DIASTOLIC BLOOD PRESSURE: 57 MMHG | BODY MASS INDEX: 37.84 KG/M2 | WEIGHT: 255.5 LBS | TEMPERATURE: 99 F | OXYGEN SATURATION: 91 % | SYSTOLIC BLOOD PRESSURE: 118 MMHG | RESPIRATION RATE: 47 BRPM

## 2020-07-26 DIAGNOSIS — R13.11 ORAL PHASE DYSPHAGIA: ICD-10-CM

## 2020-07-26 DIAGNOSIS — R00.0 TACHYCARDIA: ICD-10-CM

## 2020-07-26 DIAGNOSIS — U07.1 PNEUMONIA DUE TO COVID-19 VIRUS: ICD-10-CM

## 2020-07-26 DIAGNOSIS — I48.91 ATRIAL FIBRILLATION: ICD-10-CM

## 2020-07-26 DIAGNOSIS — R50.9 FEVER: ICD-10-CM

## 2020-07-26 DIAGNOSIS — J12.82 PNEUMONIA DUE TO COVID-19 VIRUS: ICD-10-CM

## 2020-07-26 DIAGNOSIS — J96.01 ACUTE HYPOXEMIC RESPIRATORY FAILURE: ICD-10-CM

## 2020-07-26 DIAGNOSIS — I31.9 PERICARDITIS: ICD-10-CM

## 2020-07-26 DIAGNOSIS — U07.1 COVID-19 VIRUS INFECTION: ICD-10-CM

## 2020-07-26 PROBLEM — J06.9 ACUTE RESPIRATORY DISEASE DUE TO COVID-19 VIRUS: Status: ACTIVE | Noted: 2020-07-26

## 2020-07-26 LAB
ALBUMIN SERPL BCP-MCNC: 3 G/DL (ref 3.5–5.2)
ALBUMIN SERPL BCP-MCNC: 3.1 G/DL (ref 3.5–5.2)
ALLENS TEST: ABNORMAL
ALP SERPL-CCNC: 39 U/L (ref 55–135)
ALP SERPL-CCNC: 46 U/L (ref 55–135)
ALT SERPL W/O P-5'-P-CCNC: 106 U/L (ref 10–44)
ALT SERPL W/O P-5'-P-CCNC: 109 U/L (ref 10–44)
ANION GAP SERPL CALC-SCNC: 16 MMOL/L (ref 8–16)
ANION GAP SERPL CALC-SCNC: 16 MMOL/L (ref 8–16)
AST SERPL-CCNC: 118 U/L (ref 10–40)
AST SERPL-CCNC: 126 U/L (ref 10–40)
BACTERIA #/AREA URNS HPF: NORMAL /HPF
BASOPHILS # BLD AUTO: 0.01 K/UL (ref 0–0.2)
BASOPHILS # BLD AUTO: 0.01 K/UL (ref 0–0.2)
BASOPHILS NFR BLD: 0.1 % (ref 0–1.9)
BASOPHILS NFR BLD: 0.2 % (ref 0–1.9)
BILIRUB SERPL-MCNC: 0.5 MG/DL (ref 0.1–1)
BILIRUB SERPL-MCNC: 0.6 MG/DL (ref 0.1–1)
BILIRUB UR QL STRIP: NEGATIVE
BNP SERPL-MCNC: <10 PG/ML (ref 0–99)
BUN SERPL-MCNC: 28 MG/DL (ref 8–23)
BUN SERPL-MCNC: 33 MG/DL (ref 8–23)
CALCIUM SERPL-MCNC: 9 MG/DL (ref 8.7–10.5)
CALCIUM SERPL-MCNC: 9.1 MG/DL (ref 8.7–10.5)
CHLORIDE SERPL-SCNC: 101 MMOL/L (ref 95–110)
CHLORIDE SERPL-SCNC: 104 MMOL/L (ref 95–110)
CK SERPL-CCNC: 1702 U/L (ref 20–200)
CLARITY UR: CLEAR
CO2 SERPL-SCNC: 18 MMOL/L (ref 23–29)
CO2 SERPL-SCNC: 19 MMOL/L (ref 23–29)
COLOR UR: YELLOW
CREAT SERPL-MCNC: 1.5 MG/DL (ref 0.5–1.4)
CREAT SERPL-MCNC: 1.7 MG/DL (ref 0.5–1.4)
CRP SERPL-MCNC: 109.1 MG/L (ref 0–8.2)
CRP SERPL-MCNC: 124.4 MG/L (ref 0–8.2)
D DIMER PPP IA.FEU-MCNC: 0.36 MG/L FEU
D DIMER PPP IA.FEU-MCNC: 1.15 MG/L FEU
DELSYS: ABNORMAL
DIFFERENTIAL METHOD: ABNORMAL
DIFFERENTIAL METHOD: ABNORMAL
EOSINOPHIL # BLD AUTO: 0 K/UL (ref 0–0.5)
EOSINOPHIL # BLD AUTO: 0.2 K/UL (ref 0–0.5)
EOSINOPHIL NFR BLD: 0 % (ref 0–8)
EOSINOPHIL NFR BLD: 2.8 % (ref 0–8)
EP: 8
EP: 8
ERYTHROCYTE [DISTWIDTH] IN BLOOD BY AUTOMATED COUNT: 13.4 % (ref 11.5–14.5)
ERYTHROCYTE [DISTWIDTH] IN BLOOD BY AUTOMATED COUNT: 13.8 % (ref 11.5–14.5)
ERYTHROCYTE [SEDIMENTATION RATE] IN BLOOD BY WESTERGREN METHOD: 12 MM/H
ERYTHROCYTE [SEDIMENTATION RATE] IN BLOOD BY WESTERGREN METHOD: 12 MM/H
ERYTHROCYTE [SEDIMENTATION RATE] IN BLOOD BY WESTERGREN METHOD: 20 MM/H
ERYTHROCYTE [SEDIMENTATION RATE] IN BLOOD BY WESTERGREN METHOD: 30 MM/H
ERYTHROCYTE [SEDIMENTATION RATE] IN BLOOD BY WESTERGREN METHOD: 80 MM/HR (ref 0–10)
EST. GFR  (AFRICAN AMERICAN): 47 ML/MIN/1.73 M^2
EST. GFR  (AFRICAN AMERICAN): 54.5 ML/MIN/1.73 M^2
EST. GFR  (NON AFRICAN AMERICAN): 41 ML/MIN/1.73 M^2
EST. GFR  (NON AFRICAN AMERICAN): 47.2 ML/MIN/1.73 M^2
ESTIMATED AVG GLUCOSE: 192 MG/DL (ref 68–131)
FERRITIN SERPL-MCNC: 1752 NG/ML (ref 20–300)
FIO2: 100
FIO2: 50
FIO2: 70
FIO2: 75
GLUCOSE SERPL-MCNC: 185 MG/DL (ref 70–110)
GLUCOSE SERPL-MCNC: 199 MG/DL (ref 70–110)
GLUCOSE UR QL STRIP: ABNORMAL
HBA1C MFR BLD HPLC: 8.3 % (ref 4–5.6)
HCO3 UR-SCNC: 17.2 MMOL/L (ref 24–28)
HCO3 UR-SCNC: 17.8 MMOL/L (ref 24–28)
HCO3 UR-SCNC: 18.5 MMOL/L (ref 24–28)
HCO3 UR-SCNC: 19.9 MMOL/L (ref 24–28)
HCT VFR BLD AUTO: 40.5 % (ref 40–54)
HCT VFR BLD AUTO: 41 % (ref 40–54)
HGB BLD-MCNC: 12.8 G/DL (ref 14–18)
HGB BLD-MCNC: 13.2 G/DL (ref 14–18)
HGB UR QL STRIP: ABNORMAL
IMM GRANULOCYTES # BLD AUTO: 0.06 K/UL (ref 0–0.04)
IMM GRANULOCYTES # BLD AUTO: 0.07 K/UL (ref 0–0.04)
IMM GRANULOCYTES NFR BLD AUTO: 0.7 % (ref 0–0.5)
IMM GRANULOCYTES NFR BLD AUTO: 1.1 % (ref 0–0.5)
INR PPP: 1 (ref 0.8–1.2)
IP: 14
IP: 14
KETONES UR QL STRIP: ABNORMAL
LACTATE SERPL-SCNC: 2.6 MMOL/L (ref 0.5–2.2)
LDH SERPL L TO P-CCNC: 745 U/L (ref 110–260)
LEUKOCYTE ESTERASE UR QL STRIP: NEGATIVE
LYMPHOCYTES # BLD AUTO: 0.7 K/UL (ref 1–4.8)
LYMPHOCYTES # BLD AUTO: 0.9 K/UL (ref 1–4.8)
LYMPHOCYTES NFR BLD: 13.1 % (ref 18–48)
LYMPHOCYTES NFR BLD: 8.6 % (ref 18–48)
MAGNESIUM SERPL-MCNC: 2.3 MG/DL (ref 1.6–2.6)
MAGNESIUM SERPL-MCNC: 2.4 MG/DL (ref 1.6–2.6)
MCH RBC QN AUTO: 27 PG (ref 27–31)
MCH RBC QN AUTO: 27.8 PG (ref 27–31)
MCHC RBC AUTO-ENTMCNC: 31.6 G/DL (ref 32–36)
MCHC RBC AUTO-ENTMCNC: 32.2 G/DL (ref 32–36)
MCV RBC AUTO: 85 FL (ref 82–98)
MCV RBC AUTO: 87 FL (ref 82–98)
MICROSCOPIC COMMENT: NORMAL
MIN VOL: 20.8
MODE: ABNORMAL
MONOCYTES # BLD AUTO: 0.8 K/UL (ref 0.3–1)
MONOCYTES # BLD AUTO: 1.1 K/UL (ref 0.3–1)
MONOCYTES NFR BLD: 11.3 % (ref 4–15)
MONOCYTES NFR BLD: 14.1 % (ref 4–15)
NEUTROPHILS # BLD AUTO: 3.9 K/UL (ref 1.8–7.7)
NEUTROPHILS # BLD AUTO: 7.8 K/UL (ref 1.8–7.7)
NEUTROPHILS NFR BLD: 68.7 % (ref 38–73)
NEUTROPHILS NFR BLD: 79.3 % (ref 38–73)
NITRITE UR QL STRIP: NEGATIVE
NRBC BLD-RTO: 0 /100 WBC
NRBC BLD-RTO: 0 /100 WBC
PCO2 BLDA: 29.3 MMHG (ref 35–45)
PCO2 BLDA: 31.3 MMHG (ref 35–45)
PCO2 BLDA: 35.2 MMHG (ref 35–45)
PCO2 BLDA: 35.6 MMHG (ref 35–45)
PEEP: 8
PEEP: 8
PH SMN: 7.32 [PH] (ref 7.35–7.45)
PH SMN: 7.36 [PH] (ref 7.35–7.45)
PH SMN: 7.36 [PH] (ref 7.35–7.45)
PH SMN: 7.38 [PH] (ref 7.35–7.45)
PH UR STRIP: 6 [PH] (ref 5–8)
PHOSPHATE SERPL-MCNC: 3.7 MG/DL (ref 2.7–4.5)
PHOSPHATE SERPL-MCNC: 5.1 MG/DL (ref 2.7–4.5)
PLATELET # BLD AUTO: 268 K/UL (ref 150–350)
PLATELET # BLD AUTO: 324 K/UL (ref 150–350)
PMV BLD AUTO: 10.3 FL (ref 9.2–12.9)
PMV BLD AUTO: 9.8 FL (ref 9.2–12.9)
PO2 BLDA: 111 MMHG (ref 80–100)
PO2 BLDA: 67 MMHG (ref 80–100)
PO2 BLDA: 68 MMHG (ref 80–100)
PO2 BLDA: 69 MMHG (ref 80–100)
POC BE: -6 MMOL/L
POC BE: -8 MMOL/L
POC SATURATED O2: 92 % (ref 95–100)
POC SATURATED O2: 93 % (ref 95–100)
POC SATURATED O2: 93 % (ref 95–100)
POC SATURATED O2: 98 % (ref 95–100)
POC TCO2: 19 MMOL/L (ref 23–27)
POCT GLUCOSE: 183 MG/DL (ref 70–110)
POTASSIUM SERPL-SCNC: 4.3 MMOL/L (ref 3.5–5.1)
POTASSIUM SERPL-SCNC: 4.7 MMOL/L (ref 3.5–5.1)
PROT SERPL-MCNC: 7.5 G/DL (ref 6–8.4)
PROT SERPL-MCNC: 7.7 G/DL (ref 6–8.4)
PROT UR QL STRIP: ABNORMAL
PROTHROMBIN TIME: 11.1 SEC (ref 9–12.5)
RBC # BLD AUTO: 4.74 M/UL (ref 4.6–6.2)
RBC # BLD AUTO: 4.74 M/UL (ref 4.6–6.2)
RBC #/AREA URNS HPF: 2 /HPF (ref 0–4)
SAMPLE: ABNORMAL
SITE: ABNORMAL
SODIUM SERPL-SCNC: 135 MMOL/L (ref 136–145)
SODIUM SERPL-SCNC: 139 MMOL/L (ref 136–145)
SP GR UR STRIP: 1.02 (ref 1–1.03)
SP02: 99
SPONT RATE: 45
URN SPEC COLLECT METH UR: ABNORMAL
UROBILINOGEN UR STRIP-ACNC: NEGATIVE EU/DL
VT: 420
VT: 450
WBC # BLD AUTO: 5.67 K/UL (ref 3.9–12.7)
WBC # BLD AUTO: 9.89 K/UL (ref 3.9–12.7)
YEAST URNS QL MICRO: NORMAL

## 2020-07-26 PROCEDURE — 25000003 PHARM REV CODE 250: Performed by: INTERNAL MEDICINE

## 2020-07-26 PROCEDURE — 99900035 HC TECH TIME PER 15 MIN (STAT)

## 2020-07-26 PROCEDURE — 82550 ASSAY OF CK (CPK): CPT

## 2020-07-26 PROCEDURE — 25000003 PHARM REV CODE 250: Performed by: EMERGENCY MEDICINE

## 2020-07-26 PROCEDURE — 85610 PROTHROMBIN TIME: CPT

## 2020-07-26 PROCEDURE — 36415 COLL VENOUS BLD VENIPUNCTURE: CPT

## 2020-07-26 PROCEDURE — 84145 PROCALCITONIN (PCT): CPT

## 2020-07-26 PROCEDURE — 86703 HIV-1/HIV-2 1 RESULT ANTBDY: CPT

## 2020-07-26 PROCEDURE — 63600175 PHARM REV CODE 636 W HCPCS: Performed by: STUDENT IN AN ORGANIZED HEALTH CARE EDUCATION/TRAINING PROGRAM

## 2020-07-26 PROCEDURE — 80053 COMPREHEN METABOLIC PANEL: CPT

## 2020-07-26 PROCEDURE — 83735 ASSAY OF MAGNESIUM: CPT

## 2020-07-26 PROCEDURE — 82803 BLOOD GASES ANY COMBINATION: CPT

## 2020-07-26 PROCEDURE — 94761 N-INVAS EAR/PLS OXIMETRY MLT: CPT

## 2020-07-26 PROCEDURE — 83605 ASSAY OF LACTIC ACID: CPT

## 2020-07-26 PROCEDURE — 82800 BLOOD PH: CPT

## 2020-07-26 PROCEDURE — 83880 ASSAY OF NATRIURETIC PEPTIDE: CPT

## 2020-07-26 PROCEDURE — 83036 HEMOGLOBIN GLYCOSYLATED A1C: CPT

## 2020-07-26 PROCEDURE — 36600 WITHDRAWAL OF ARTERIAL BLOOD: CPT

## 2020-07-26 PROCEDURE — 80053 COMPREHEN METABOLIC PANEL: CPT | Mod: 91

## 2020-07-26 PROCEDURE — 85025 COMPLETE CBC W/AUTO DIFF WBC: CPT

## 2020-07-26 PROCEDURE — 20000000 HC ICU ROOM

## 2020-07-26 PROCEDURE — 80074 ACUTE HEPATITIS PANEL: CPT

## 2020-07-26 PROCEDURE — 11000001 HC ACUTE MED/SURG PRIVATE ROOM

## 2020-07-26 PROCEDURE — S0028 INJECTION, FAMOTIDINE, 20 MG: HCPCS | Performed by: STUDENT IN AN ORGANIZED HEALTH CARE EDUCATION/TRAINING PROGRAM

## 2020-07-26 PROCEDURE — 85651 RBC SED RATE NONAUTOMATED: CPT

## 2020-07-26 PROCEDURE — 81000 URINALYSIS NONAUTO W/SCOPE: CPT

## 2020-07-26 PROCEDURE — 85379 FIBRIN DEGRADATION QUANT: CPT | Mod: 91

## 2020-07-26 PROCEDURE — 85379 FIBRIN DEGRADATION QUANT: CPT

## 2020-07-26 PROCEDURE — 25000003 PHARM REV CODE 250: Performed by: STUDENT IN AN ORGANIZED HEALTH CARE EDUCATION/TRAINING PROGRAM

## 2020-07-26 PROCEDURE — 83735 ASSAY OF MAGNESIUM: CPT | Mod: 91

## 2020-07-26 PROCEDURE — 86140 C-REACTIVE PROTEIN: CPT

## 2020-07-26 PROCEDURE — 84100 ASSAY OF PHOSPHORUS: CPT

## 2020-07-26 PROCEDURE — 84100 ASSAY OF PHOSPHORUS: CPT | Mod: 91

## 2020-07-26 PROCEDURE — 63600175 PHARM REV CODE 636 W HCPCS: Performed by: INTERNAL MEDICINE

## 2020-07-26 PROCEDURE — 83615 LACTATE (LD) (LDH) ENZYME: CPT

## 2020-07-26 PROCEDURE — 85025 COMPLETE CBC W/AUTO DIFF WBC: CPT | Mod: 91

## 2020-07-26 PROCEDURE — 86140 C-REACTIVE PROTEIN: CPT | Mod: 91

## 2020-07-26 PROCEDURE — 94660 CPAP INITIATION&MGMT: CPT

## 2020-07-26 PROCEDURE — 82728 ASSAY OF FERRITIN: CPT

## 2020-07-26 PROCEDURE — 94002 VENT MGMT INPAT INIT DAY: CPT

## 2020-07-26 PROCEDURE — 63600175 PHARM REV CODE 636 W HCPCS: Performed by: EMERGENCY MEDICINE

## 2020-07-26 PROCEDURE — 84439 ASSAY OF FREE THYROXINE: CPT

## 2020-07-26 PROCEDURE — 96361 HYDRATE IV INFUSION ADD-ON: CPT

## 2020-07-26 PROCEDURE — 27000221 HC OXYGEN, UP TO 24 HOURS

## 2020-07-26 PROCEDURE — 85347 COAGULATION TIME ACTIVATED: CPT

## 2020-07-26 PROCEDURE — 84443 ASSAY THYROID STIM HORMONE: CPT

## 2020-07-26 PROCEDURE — 84484 ASSAY OF TROPONIN QUANT: CPT

## 2020-07-26 RX ORDER — FENTANYL CITRATE 50 UG/ML
50 INJECTION, SOLUTION INTRAMUSCULAR; INTRAVENOUS
Status: COMPLETED | OUTPATIENT
Start: 2020-07-26 | End: 2020-07-26

## 2020-07-26 RX ORDER — DEXAMETHASONE SODIUM PHOSPHATE 4 MG/ML
6 INJECTION, SOLUTION INTRA-ARTICULAR; INTRALESIONAL; INTRAMUSCULAR; INTRAVENOUS; SOFT TISSUE DAILY
Status: DISCONTINUED | OUTPATIENT
Start: 2020-07-27 | End: 2020-08-04

## 2020-07-26 RX ORDER — LABETALOL HYDROCHLORIDE 5 MG/ML
10 INJECTION, SOLUTION INTRAVENOUS EVERY 4 HOURS PRN
Status: DISCONTINUED | OUTPATIENT
Start: 2020-07-26 | End: 2020-07-26 | Stop reason: HOSPADM

## 2020-07-26 RX ORDER — CHLORHEXIDINE GLUCONATE ORAL RINSE 1.2 MG/ML
15 SOLUTION DENTAL 2 TIMES DAILY
Status: DISCONTINUED | OUTPATIENT
Start: 2020-07-26 | End: 2020-07-26 | Stop reason: HOSPADM

## 2020-07-26 RX ORDER — GLUCAGON 1 MG
1 KIT INJECTION
Status: DISCONTINUED | OUTPATIENT
Start: 2020-07-26 | End: 2020-08-01

## 2020-07-26 RX ORDER — FENTANYL CITRAT/DEXTROSE 5%/PF 100 MCG/10
25 PATIENT CONTROLLED ANALGESIA SYRINGE INTRAVENOUS CONTINUOUS
Status: DISCONTINUED | OUTPATIENT
Start: 2020-07-26 | End: 2020-07-26 | Stop reason: HOSPADM

## 2020-07-26 RX ORDER — PROPOFOL 10 MG/ML
5 INJECTION, EMULSION INTRAVENOUS CONTINUOUS
Status: DISCONTINUED | OUTPATIENT
Start: 2020-07-26 | End: 2020-07-26 | Stop reason: HOSPADM

## 2020-07-26 RX ORDER — MIDAZOLAM HYDROCHLORIDE 1 MG/ML
5 INJECTION INTRAMUSCULAR; INTRAVENOUS
Status: COMPLETED | OUTPATIENT
Start: 2020-07-26 | End: 2020-07-26

## 2020-07-26 RX ORDER — FAMOTIDINE 10 MG/ML
20 INJECTION INTRAVENOUS EVERY 12 HOURS
Status: DISCONTINUED | OUTPATIENT
Start: 2020-07-26 | End: 2020-07-29

## 2020-07-26 RX ORDER — PROPOFOL 10 MG/ML
INJECTION, EMULSION INTRAVENOUS
Status: DISCONTINUED
Start: 2020-07-26 | End: 2020-07-26 | Stop reason: HOSPADM

## 2020-07-26 RX ORDER — IBUPROFEN 200 MG
16 TABLET ORAL
Status: DISCONTINUED | OUTPATIENT
Start: 2020-07-26 | End: 2020-07-26 | Stop reason: HOSPADM

## 2020-07-26 RX ORDER — MIDAZOLAM HYDROCHLORIDE 5 MG/ML
5 INJECTION INTRAMUSCULAR; INTRAVENOUS
Status: DISCONTINUED | OUTPATIENT
Start: 2020-07-26 | End: 2020-07-26 | Stop reason: SDUPTHER

## 2020-07-26 RX ORDER — PROPOFOL 10 MG/ML
40 VIAL (ML) INTRAVENOUS ONCE
Status: COMPLETED | OUTPATIENT
Start: 2020-07-26 | End: 2020-07-26

## 2020-07-26 RX ORDER — CEFTRIAXONE 1 G/1
1 INJECTION, POWDER, FOR SOLUTION INTRAMUSCULAR; INTRAVENOUS
Status: DISCONTINUED | OUTPATIENT
Start: 2020-07-26 | End: 2020-07-30

## 2020-07-26 RX ORDER — FENTANYL CITRATE 50 UG/ML
50 INJECTION, SOLUTION INTRAMUSCULAR; INTRAVENOUS
Status: DISCONTINUED | OUTPATIENT
Start: 2020-08-01 | End: 2020-07-30

## 2020-07-26 RX ORDER — SODIUM CHLORIDE 0.9 % (FLUSH) 0.9 %
10 SYRINGE (ML) INJECTION
Status: DISCONTINUED | OUTPATIENT
Start: 2020-07-26 | End: 2020-07-26 | Stop reason: HOSPADM

## 2020-07-26 RX ORDER — LORAZEPAM 2 MG/ML
2 INJECTION INTRAMUSCULAR ONCE
Status: COMPLETED | OUTPATIENT
Start: 2020-07-26 | End: 2020-07-26

## 2020-07-26 RX ORDER — PROPOFOL 10 MG/ML
20 VIAL (ML) INTRAVENOUS ONCE
Status: COMPLETED | OUTPATIENT
Start: 2020-07-26 | End: 2020-07-26

## 2020-07-26 RX ORDER — FENTANYL CITRATE-0.9 % NACL/PF 10 MCG/ML
25 PLASTIC BAG, INJECTION (ML) INTRAVENOUS CONTINUOUS
Status: DISCONTINUED | OUTPATIENT
Start: 2020-07-26 | End: 2020-07-30

## 2020-07-26 RX ORDER — HYDRALAZINE HYDROCHLORIDE 20 MG/ML
10 INJECTION INTRAMUSCULAR; INTRAVENOUS EVERY 6 HOURS PRN
Status: DISCONTINUED | OUTPATIENT
Start: 2020-07-26 | End: 2020-07-26 | Stop reason: HOSPADM

## 2020-07-26 RX ORDER — ENOXAPARIN SODIUM 100 MG/ML
40 INJECTION SUBCUTANEOUS EVERY 24 HOURS
Status: DISCONTINUED | OUTPATIENT
Start: 2020-07-26 | End: 2020-07-26 | Stop reason: HOSPADM

## 2020-07-26 RX ORDER — PROPOFOL 10 MG/ML
5 INJECTION, EMULSION INTRAVENOUS CONTINUOUS
Status: DISCONTINUED | OUTPATIENT
Start: 2020-07-26 | End: 2020-07-30

## 2020-07-26 RX ORDER — FAMOTIDINE 10 MG/ML
20 INJECTION INTRAVENOUS 2 TIMES DAILY
Status: DISCONTINUED | OUTPATIENT
Start: 2020-07-26 | End: 2020-07-26 | Stop reason: HOSPADM

## 2020-07-26 RX ORDER — INSULIN ASPART 100 [IU]/ML
0-5 INJECTION, SOLUTION INTRAVENOUS; SUBCUTANEOUS EVERY 6 HOURS PRN
Status: DISCONTINUED | OUTPATIENT
Start: 2020-07-26 | End: 2020-08-01

## 2020-07-26 RX ORDER — FENTANYL CITRATE 50 UG/ML
50 INJECTION, SOLUTION INTRAMUSCULAR; INTRAVENOUS
Status: DISCONTINUED | OUTPATIENT
Start: 2020-07-26 | End: 2020-07-30

## 2020-07-26 RX ORDER — SODIUM CHLORIDE 9 MG/ML
1000 INJECTION, SOLUTION INTRAVENOUS
Status: COMPLETED | OUTPATIENT
Start: 2020-07-26 | End: 2020-07-26

## 2020-07-26 RX ORDER — DEXMEDETOMIDINE HYDROCHLORIDE 4 UG/ML
0.2 INJECTION, SOLUTION INTRAVENOUS CONTINUOUS
Status: DISCONTINUED | OUTPATIENT
Start: 2020-07-26 | End: 2020-08-08

## 2020-07-26 RX ORDER — IBUPROFEN 200 MG
24 TABLET ORAL
Status: DISCONTINUED | OUTPATIENT
Start: 2020-07-26 | End: 2020-07-26 | Stop reason: HOSPADM

## 2020-07-26 RX ORDER — ETOMIDATE 2 MG/ML
20 INJECTION INTRAVENOUS
Status: COMPLETED | OUTPATIENT
Start: 2020-07-26 | End: 2020-07-26

## 2020-07-26 RX ORDER — SUCCINYLCHOLINE CHLORIDE 20 MG/ML
100 INJECTION INTRAMUSCULAR; INTRAVENOUS
Status: COMPLETED | OUTPATIENT
Start: 2020-07-26 | End: 2020-07-26

## 2020-07-26 RX ORDER — INSULIN ASPART 100 [IU]/ML
1-10 INJECTION, SOLUTION INTRAVENOUS; SUBCUTANEOUS
Status: DISCONTINUED | OUTPATIENT
Start: 2020-07-26 | End: 2020-07-26 | Stop reason: HOSPADM

## 2020-07-26 RX ORDER — ALBUTEROL SULFATE 90 UG/1
2 AEROSOL, METERED RESPIRATORY (INHALATION) EVERY 6 HOURS
Status: DISCONTINUED | OUTPATIENT
Start: 2020-07-26 | End: 2020-07-26 | Stop reason: HOSPADM

## 2020-07-26 RX ORDER — ROSUVASTATIN CALCIUM 10 MG/1
10 TABLET, COATED ORAL DAILY
Status: DISCONTINUED | OUTPATIENT
Start: 2020-07-26 | End: 2020-07-26 | Stop reason: HOSPADM

## 2020-07-26 RX ORDER — GLUCAGON 1 MG
1 KIT INJECTION
Status: DISCONTINUED | OUTPATIENT
Start: 2020-07-26 | End: 2020-07-26 | Stop reason: HOSPADM

## 2020-07-26 RX ORDER — HEPARIN SODIUM 5000 [USP'U]/ML
5000 INJECTION, SOLUTION INTRAVENOUS; SUBCUTANEOUS EVERY 8 HOURS
Status: DISCONTINUED | OUTPATIENT
Start: 2020-07-26 | End: 2020-07-27

## 2020-07-26 RX ORDER — CHLORHEXIDINE GLUCONATE ORAL RINSE 1.2 MG/ML
15 SOLUTION DENTAL 2 TIMES DAILY
Status: DISCONTINUED | OUTPATIENT
Start: 2020-07-26 | End: 2020-07-27

## 2020-07-26 RX ORDER — PANTOPRAZOLE SODIUM 40 MG/1
40 TABLET, DELAYED RELEASE ORAL DAILY
Status: DISCONTINUED | OUTPATIENT
Start: 2020-07-26 | End: 2020-07-26 | Stop reason: SDUPTHER

## 2020-07-26 RX ORDER — ACETAMINOPHEN 650 MG/20.3ML
650 LIQUID ORAL EVERY 8 HOURS
Status: DISCONTINUED | OUTPATIENT
Start: 2020-07-26 | End: 2020-07-30

## 2020-07-26 RX ORDER — SODIUM CHLORIDE 0.9 % (FLUSH) 0.9 %
10 SYRINGE (ML) INJECTION
Status: DISCONTINUED | OUTPATIENT
Start: 2020-07-26 | End: 2020-08-26 | Stop reason: HOSPADM

## 2020-07-26 RX ADMIN — PROPOFOL 20 MG: 10 INJECTION, EMULSION INTRAVENOUS at 05:07

## 2020-07-26 RX ADMIN — SODIUM CHLORIDE, SODIUM LACTATE, POTASSIUM CHLORIDE, AND CALCIUM CHLORIDE 1000 ML: .6; .31; .03; .02 INJECTION, SOLUTION INTRAVENOUS at 09:07

## 2020-07-26 RX ADMIN — PROPOFOL 50 MCG/KG/MIN: 10 INJECTION, EMULSION INTRAVENOUS at 11:07

## 2020-07-26 RX ADMIN — FAMOTIDINE 20 MG: 10 INJECTION INTRAVENOUS at 10:07

## 2020-07-26 RX ADMIN — FENTANYL CITRATE 50 MCG: 50 INJECTION INTRAMUSCULAR; INTRAVENOUS at 09:07

## 2020-07-26 RX ADMIN — Medication 25 MCG/HR: at 09:07

## 2020-07-26 RX ADMIN — DEXMEDETOMIDINE HYDROCHLORIDE 0.2 MCG/KG/HR: 4 INJECTION, SOLUTION INTRAVENOUS at 09:07

## 2020-07-26 RX ADMIN — PROPOFOL 5 MCG/KG/MIN: 10 INJECTION, EMULSION INTRAVENOUS at 05:07

## 2020-07-26 RX ADMIN — FENTANYL CITRATE 50 MCG: 50 INJECTION INTRAMUSCULAR; INTRAVENOUS at 05:07

## 2020-07-26 RX ADMIN — PROPOFOL 40 MG: 10 INJECTION, EMULSION INTRAVENOUS at 05:07

## 2020-07-26 RX ADMIN — ETOMIDATE 20 MG: 2 INJECTION, SOLUTION INTRAVENOUS at 05:07

## 2020-07-26 RX ADMIN — PROPOFOL 50 MCG/KG/MIN: 10 INJECTION, EMULSION INTRAVENOUS at 10:07

## 2020-07-26 RX ADMIN — CHLORHEXIDINE GLUCONATE 0.12% ORAL RINSE 15 ML: 1.2 LIQUID ORAL at 10:07

## 2020-07-26 RX ADMIN — HEPARIN SODIUM 5000 UNITS: 5000 INJECTION, SOLUTION INTRAVENOUS; SUBCUTANEOUS at 10:07

## 2020-07-26 RX ADMIN — MIDAZOLAM HYDROCHLORIDE 5 MG: 1 INJECTION, SOLUTION INTRAMUSCULAR; INTRAVENOUS at 06:07

## 2020-07-26 RX ADMIN — ACETAMINOPHEN 650 MG: 160 SOLUTION ORAL at 10:07

## 2020-07-26 RX ADMIN — SODIUM CHLORIDE 1000 ML: 0.9 INJECTION, SOLUTION INTRAVENOUS at 05:07

## 2020-07-26 RX ADMIN — CEFTRIAXONE 1 G: 1 INJECTION, POWDER, FOR SOLUTION INTRAMUSCULAR; INTRAVENOUS at 10:07

## 2020-07-26 RX ADMIN — ENOXAPARIN SODIUM 40 MG: 40 INJECTION SUBCUTANEOUS at 05:07

## 2020-07-26 RX ADMIN — LABETALOL HYDROCHLORIDE 10 MG: 5 INJECTION, SOLUTION INTRAVENOUS at 06:07

## 2020-07-26 RX ADMIN — SUCCINYLCHOLINE CHLORIDE 100 MG: 20 INJECTION, SOLUTION INTRAMUSCULAR; INTRAVENOUS; PARENTERAL at 05:07

## 2020-07-26 RX ADMIN — LORAZEPAM 2 MG: 2 INJECTION INTRAMUSCULAR; INTRAVENOUS at 05:07

## 2020-07-26 NOTE — ED NOTES
Pt lying in bed comfortably. AAO x 4. Skin warm and dry. Resp even and unlabored with equal chest rise and fall with Bipap in place. Denies any needs or assist at this time.

## 2020-07-26 NOTE — H&P
[facility]  History & Physical    Patient Name: Anup Miller  MRN: [id number]  Admission Date: 7/25/2020  Attending Physician: Marty Westbrook MD   Primary Care Provider: Bryce Gonzales MD         Patient information was obtained from patient and ER records.     Subjective:     Principal Problem:Acute respiratory disease due to COVID-19 virus    Chief Complaint:   Chief Complaint   Patient presents with    Shortness of Breath     reports feeling bad for 1 week. SOB got worse today. pt sat low 70s on RA. grunting and only able to speak in small sentences.        HPI: Anup Miller is a 70 y.o. male patient with a PMHx of HTN, HLD, and DM who presents to the Emergency Department for evaluation of SOB which  1 week ago. Pt became more SOB today and has an O2 sat of low 70s upon arrival on RA. Symptoms are worsening and moderate in severity. No mitigating or exacerbating factors reported. Associated sxs include cough and fever. Patient denies any congestion, sore throat, CP, abd pain, N/V, back pain, HA, weakness, and all other sxs at this time. No prior Tx reported. No further complaints or concerns at this time.  Patient continued to be tachypneic and respiratory distress patient continued to be tachypneic and respiratory distress continue BiPAP request ICU admission if can be weaned off of BiPAP and moved to telemetry.       Past Medical History:   Diagnosis Date    Diabetes mellitus type II Diagnosed 2002    Elevated liver enzymes     Fatty liver disease, nonalcoholic     Hyperlipidemia     Hypertension     Sleep apnea        Past Surgical History:   Procedure Laterality Date    BACK SURGERY      CHOLECYSTECTOMY  2013    COLONOSCOPY N/A 7/19/2018    Procedure: COLONOSCOPY;  Surgeon: Chacorta Lopez III, MD;  Location: [facility];  Service: Endoscopy;  Laterality: N/A;       Review of patient's allergies indicates:  No Known Allergies    No current  facility-administered medications on file prior to encounter.      Current Outpatient Medications on File Prior to Encounter   Medication Sig    ALREX 0.2 % DrpS Place 1 drop into both eyes 3 (three) times daily as needed.    azithromycin (Z-TEMI) 250 MG tablet     ertugliflozin (STEGLATRO) 15 mg Tab Take 1 tablet by mouth once daily.    glimepiride (AMARYL) 4 MG tablet Take 2 tablets (8 mg total) by mouth once daily.    hydroCHLOROthiazide (HYDRODIURIL) 25 MG tablet TAKE 1 TABLET BY MOUTH ONE TIME DAILY    losartan (COZAAR) 100 MG tablet TAKE 1 TABLET BY MOUTH ONE TIME DAILY    metFORMIN (GLUCOPHAGE-XR) 750 MG XR 24hr tablet Take 1 tablet (750 mg total) by mouth 2 (two) times daily with meals.    multivitamin capsule Take 1 capsule by mouth once daily.    pantoprazole (PROTONIX) 40 MG tablet TAKE 1 TABLET BY MOUTH ONE TIME DAILY    pioglitazone (ACTOS) 15 MG tablet Take 1 tablet (15 mg total) by mouth once daily.    rosuvastatin (CRESTOR) 10 MG tablet TAKE 1 TABLET BY MOUTH DAILY    insulin (LANTUS SOLOSTAR U-100 INSULIN) glargine 100 units/mL (3mL) SubQ pen Inject 10 Units into the skin every evening.    levocetirizine (XYZAL) 5 MG tablet Take 1 tablet (5 mg total) by mouth every morning.     Family History     Problem Relation (Age of Onset)    Asthma Mother    Cancer Sister, Maternal Grandmother    Diabetes Father, Paternal Aunt, Maternal Grandmother, Paternal Uncle        Tobacco Use    Smoking status: Never Smoker    Smokeless tobacco: Never Used   Substance and Sexual Activity    Alcohol use: No     Alcohol/week: 0.0 standard drinks    Drug use: No    Sexual activity: Yes     Partners: Female     Review of Systems   Constitutional: Positive for fever. Negative for activity change and appetite change.   HENT: Negative for congestion, rhinorrhea and sore throat.    Eyes: Negative for discharge and redness.   Respiratory: Positive for cough and shortness of breath. Negative for chest tightness  and wheezing.    Cardiovascular: Negative for chest pain and leg swelling.   Gastrointestinal: Negative for abdominal pain, diarrhea and vomiting.   Endocrine: Negative for cold intolerance, heat intolerance and polyuria.   Genitourinary: Negative for difficulty urinating, dysuria, flank pain, hematuria and urgency.   Musculoskeletal: Negative for arthralgias, back pain and myalgias.   Skin: Negative for color change and pallor.   Allergic/Immunologic: Negative for immunocompromised state.   Neurological: Negative for dizziness, light-headedness and headaches.   Hematological: Negative for adenopathy.   Psychiatric/Behavioral: Negative for agitation and confusion. The patient is not nervous/anxious.      Objective:     Vital Signs (Most Recent):  Temp: 98.9 °F (37.2 °C) (07/25/20 2124)  Pulse: 76 (07/26/20 0017)  Resp: (!) 45 (07/26/20 0017)  BP: 128/72 (07/26/20 0016)  SpO2: 100 % (07/26/20 0017) Vital Signs (24h Range):  Temp:  [98.9 °F (37.2 °C)-101.5 °F (38.6 °C)] 98.9 °F (37.2 °C)  Pulse:  [76-96] 76  Resp:  [30-65] 45  SpO2:  [71 %-100 %] 100 %  BP: (128-163)/(72-87) 128/72     Weight: 115.9 kg (255 lb 8.2 oz)  Body mass index is 37.73 kg/m².    Physical Exam  Vitals signs and nursing note reviewed.   Constitutional:       Appearance: He is well-developed.   HENT:      Head: Normocephalic and atraumatic.   Eyes:      General: No scleral icterus.        Right eye: No discharge.         Left eye: No discharge.   Cardiovascular:      Rate and Rhythm: Normal rate and regular rhythm.      Heart sounds: Normal heart sounds.   Pulmonary:      Effort: Pulmonary effort is normal.      Breath sounds: Rales present.   Abdominal:      General: Bowel sounds are normal. There is no distension.      Palpations: Abdomen is soft.   Musculoskeletal: Normal range of motion.   Lymphadenopathy:      Cervical: No cervical adenopathy.   Skin:     General: Skin is warm and dry.   Neurological:      Mental Status: He is alert and  oriented to person, place, and time.             Significant Labs: All pertinent labs within the past 24 hours have been reviewed.    Significant Imaging: I have reviewed all pertinent imaging results/findings within the past 24 hours.    Assessment/Plan:     * Acute respiratory disease due to COVID-19 virus  - COVID-19 testing   - Infection Control notified     - Isolation:   - Airborne, Contact and Droplet Precautions  - Cohort patients into COVID units  - N95 mask, wear eye protection  - 20 second hand hygiene              - Limit visitors per hospital policy              - Consolidating lab draws, nursing care, provider visits, and interventions    - Diagnostics: (leukopenia, hyponatremia, hyperferritinemia, elevated troponin, elevated d-dimer, age, and comorbidities are significant predictors of poor clinical outcome)  CBC, CMP, Ferritin, CRP and Portable CXR    - Management:  Supplemental O2 to maintain SpO2 >92%  Continuous/intermittent Pulse Ox  Albuterol treatment PRN                   Acute hypoxemic respiratory failure  -covid 19 pneumonia   -azithromycin/ceftrixone   -inhalers  -dexa  -continuous bipap icu admit if weaned off can consider telemetry      Uncontrolled type 2 diabetes mellitus  -sliding scale for now   -hold home antidiabetics     VTE Risk Mitigation (From admission, onward)         Ordered     enoxaparin injection 40 mg  Every 24 hours      07/26/20 0040     IP VTE HIGH RISK PATIENT  Once      07/26/20 0040     Place sequential compression device  Until discontinued      07/26/20 0040                   Shreyas Cortez MD  Department of Hospital Medicine   [facility]

## 2020-07-26 NOTE — HPI
Anup Miller is a 70 y.o. male patient with a PMHx of HTN, HLD, and DM who presents to the Emergency Department for evaluation of SOB which  1 week ago. Pt became more SOB today and has an O2 sat of low 70s upon arrival on RA. Symptoms are worsening and moderate in severity. No mitigating or exacerbating factors reported. Associated sxs include cough and fever. Patient denies any congestion, sore throat, CP, abd pain, N/V, back pain, HA, weakness, and all other sxs at this time. No prior Tx reported. No further complaints or concerns at this time.  Patient continued to be tachypneic and respiratory distress patient continued to be tachypneic and respiratory distress continue BiPAP request ICU admission if can be weaned off of BiPAP and moved to telemetry.

## 2020-07-26 NOTE — ED NOTES
Pt in bed shaking uncontrollably, Breathing 60+ breaths per minute. Dr Berumen notified of change in condition.

## 2020-07-26 NOTE — SUBJECTIVE & OBJECTIVE
Past Medical History:   Diagnosis Date    Diabetes mellitus type II Diagnosed 2002    Elevated liver enzymes     Fatty liver disease, nonalcoholic     Hyperlipidemia     Hypertension     Sleep apnea        Past Surgical History:   Procedure Laterality Date    BACK SURGERY      CHOLECYSTECTOMY  2013    COLONOSCOPY N/A 7/19/2018    Procedure: COLONOSCOPY;  Surgeon: Chacorta Lopez III, MD;  Location: [facility];  Service: Endoscopy;  Laterality: N/A;       Review of patient's allergies indicates:  No Known Allergies    No current facility-administered medications on file prior to encounter.      Current Outpatient Medications on File Prior to Encounter   Medication Sig    ALREX 0.2 % DrpS Place 1 drop into both eyes 3 (three) times daily as needed.    azithromycin (Z-TEMI) 250 MG tablet     ertugliflozin (STEGLATRO) 15 mg Tab Take 1 tablet by mouth once daily.    glimepiride (AMARYL) 4 MG tablet Take 2 tablets (8 mg total) by mouth once daily.    hydroCHLOROthiazide (HYDRODIURIL) 25 MG tablet TAKE 1 TABLET BY MOUTH ONE TIME DAILY    losartan (COZAAR) 100 MG tablet TAKE 1 TABLET BY MOUTH ONE TIME DAILY    metFORMIN (GLUCOPHAGE-XR) 750 MG XR 24hr tablet Take 1 tablet (750 mg total) by mouth 2 (two) times daily with meals.    multivitamin capsule Take 1 capsule by mouth once daily.    pantoprazole (PROTONIX) 40 MG tablet TAKE 1 TABLET BY MOUTH ONE TIME DAILY    pioglitazone (ACTOS) 15 MG tablet Take 1 tablet (15 mg total) by mouth once daily.    rosuvastatin (CRESTOR) 10 MG tablet TAKE 1 TABLET BY MOUTH DAILY    insulin (LANTUS SOLOSTAR U-100 INSULIN) glargine 100 units/mL (3mL) SubQ pen Inject 10 Units into the skin every evening.    levocetirizine (XYZAL) 5 MG tablet Take 1 tablet (5 mg total) by mouth every morning.     Family History     Problem Relation (Age of Onset)    Asthma Mother    Cancer Sister, Maternal Grandmother    Diabetes Father, Paternal Aunt, Maternal Grandmother, Paternal Uncle         Tobacco Use    Smoking status: Never Smoker    Smokeless tobacco: Never Used   Substance and Sexual Activity    Alcohol use: No     Alcohol/week: 0.0 standard drinks    Drug use: No    Sexual activity: Yes     Partners: Female     Review of Systems   Constitutional: Positive for fever. Negative for activity change and appetite change.   HENT: Negative for congestion, rhinorrhea and sore throat.    Eyes: Negative for discharge and redness.   Respiratory: Positive for cough and shortness of breath. Negative for chest tightness and wheezing.    Cardiovascular: Negative for chest pain and leg swelling.   Gastrointestinal: Negative for abdominal pain, diarrhea and vomiting.   Endocrine: Negative for cold intolerance, heat intolerance and polyuria.   Genitourinary: Negative for difficulty urinating, dysuria, flank pain, hematuria and urgency.   Musculoskeletal: Negative for arthralgias, back pain and myalgias.   Skin: Negative for color change and pallor.   Allergic/Immunologic: Negative for immunocompromised state.   Neurological: Negative for dizziness, light-headedness and headaches.   Hematological: Negative for adenopathy.   Psychiatric/Behavioral: Negative for agitation and confusion. The patient is not nervous/anxious.      Objective:     Vital Signs (Most Recent):  Temp: 98.9 °F (37.2 °C) (07/25/20 2124)  Pulse: 76 (07/26/20 0017)  Resp: (!) 45 (07/26/20 0017)  BP: 128/72 (07/26/20 0016)  SpO2: 100 % (07/26/20 0017) Vital Signs (24h Range):  Temp:  [98.9 °F (37.2 °C)-101.5 °F (38.6 °C)] 98.9 °F (37.2 °C)  Pulse:  [76-96] 76  Resp:  [30-65] 45  SpO2:  [71 %-100 %] 100 %  BP: (128-163)/(72-87) 128/72     Weight: 115.9 kg (255 lb 8.2 oz)  Body mass index is 37.73 kg/m².    Physical Exam  Vitals signs and nursing note reviewed.   Constitutional:       Appearance: He is well-developed.   HENT:      Head: Normocephalic and atraumatic.   Eyes:      General: No scleral icterus.        Right eye: No discharge.          Left eye: No discharge.   Cardiovascular:      Rate and Rhythm: Normal rate and regular rhythm.      Heart sounds: Normal heart sounds.   Pulmonary:      Effort: Pulmonary effort is normal.      Breath sounds: Rales present.   Abdominal:      General: Bowel sounds are normal. There is no distension.      Palpations: Abdomen is soft.   Musculoskeletal: Normal range of motion.   Lymphadenopathy:      Cervical: No cervical adenopathy.   Skin:     General: Skin is warm and dry.   Neurological:      Mental Status: He is alert and oriented to person, place, and time.             Significant Labs: All pertinent labs within the past 24 hours have been reviewed.    Significant Imaging: I have reviewed all pertinent imaging results/findings within the past 24 hours.

## 2020-07-26 NOTE — ED NOTES
Patient in bed resting quietly on BIPAP. Patient continues to breath 48 bpm on BIPAP. No signs of acute distress at this time. Will continue to monitor.

## 2020-07-26 NOTE — ASSESSMENT & PLAN NOTE
-covid 19 pneumonia   -azithromycin/ceftrixone   -inhalers  -dexa  -continuous bipap icu admit if weaned off can consider telemetry

## 2020-07-26 NOTE — ED NOTES
Pt sitting in bed. Bed in lowest position. Side rails up x 2. Pt is alert and oriented x 4. Pt has clear lung sounds on right side. Pt has coarse lung sounds on left. Pt has audible and active bowel sounds x 4. Pt has no edema noted. Pt is resting comfortably. Pt has no complaints at this time. Pt states that he is feeling better since admission.

## 2020-07-26 NOTE — ED NOTES
Assumed care of pt at this time. Pt lying in bed comfortably. AAO x 4. Resp even and unlabored with equal chest rise and fall with Bipap in place. Vitals stable on the cardiac monitor. Skin warm and dry. 20G PIV noted to R AC and L hand. Flushes well, drg CDI. Condom cath noted on patient draining clear yellow urine. Side rails up x 2. Call light within reach. No distress noted. Pt denies any needs or assist at this time.

## 2020-07-26 NOTE — ED NOTES
Wife Zhou updated on POC and patient condition. [phone]. Don't give anybody else information but the wife!!

## 2020-07-26 NOTE — ED NOTES
The patient is resting quietly, eyes closed, arouses easily to stimuli. Airway is open and patent, respirations are spontaneous, normal respiratory effort and rate noted, skin warm and dry, appearance: in no acute distress and resting comfortably. Pt given his phone to speak with his wife when she calls.

## 2020-07-26 NOTE — PLAN OF CARE
[facility] Transfer Acceptance Note     Transferring Facility/Hospital: [facility]     Referring Provider/Specialty giving report: Dr. Alberto Hyde* - hospital medicine     Accepting Physician for admission to hospital: Tolu Hargrove MD    [place] Dest[place] & [place] [facility] [place]     Date of acceptance:  7/26/2020   10:48 AM    Patients name: Anup Miller     Allergies:Review of patient's allergies indicates:  No Known Allergies     Reason for transfer:  CAPACITY_ICU bed availability      Overview/ Report from Physician/Mid-Level Provider:    HPI:  69 y/o hx of HTN, DM, HLD, CKD, obesity presented [place] [facility] [place] last night with 1 week of progressive dyspnea found with room air hypoxia to 70% and noted to be in moderate respiratory distress.   Initial vital signs patient febrile to 101.5 and tachypneic, hypertensive and hypoxic was placed on Non-rebreather and transitioned to BIPAP.  Pt is COVID positive with COVID pneumonia.       When stopped for nebulizer treatment pt becomes very tachypneic, but is comfortable on BIPAP  Placed on continuous BIPAP @ 0100 - Current settings 14/8  Fio2 75%.      CXR shows patchy bilateral infiltrates.     Labs demonstrate normal electrolytes, mild increase in Cr to 1.7, transaminases in 100s, , Ferritin 1752, D-dimer is normal.  Lymphopneia is present.  Blood cultures sent     Patient has received Ceftriaxone_Azithromycin_Dexamethasone 6mg IV_Toradol 15_Ativa 1mg_APAP 1gram    VS: Temp:  [98.4 °F (36.9 °C)]   Pulse:  [71-81]   Resp:  [13-51]   BP: (128-150)/(72-88)   SpO2:  [97 %-100 %]     Labs:  Lab Results   Component Value Date    [id number] Positive (A) 07/25/2020     Lab Results   Component Value Date    WBC 5.67 07/26/2020    HGB 12.8 (L) 07/26/2020    HCT 40.5 07/26/2020    MCV 85 07/26/2020     07/26/2020       BMP  Lab Results   Component Value Date     (L) 07/26/2020    K  4.3 07/26/2020     07/26/2020    CO2 18 (L) 07/26/2020    BUN 28 (H) 07/26/2020    CREATININE 1.7 (H) 07/26/2020    CALCIUM 9.1 07/26/2020    ANIONGAP 16 07/26/2020    ESTGFRAFRICA 47 (A) 07/26/2020    EGFRNONAA 41 (A) 07/26/2020     Lab Results   Component Value Date    .1 (H) 07/26/2020    FERRITIN 1,752 (H) 07/25/2020     (H) 07/25/2020    DDIMER 0.36 07/26/2020    CPK 1702 (H) 07/26/2020    LYMPH 0.7 (L) 07/26/2020    LYMPH 13.1 (L) 07/26/2020     Lab Results   Component Value Date    PROCAL 0.22 07/25/2020     ABG  Recent Labs   Lab 07/26/20  0050   PH 7.360   PO2 111*   PCO2 35.2   HCO3 19.9*   BE -6         Diagnostic Tests/Radiographs: as above - see epic      To Do List upon arrival:    1. [place] COVID orderset     2. Continue CAP coverage as appropriate - procalcitonin returned negative_doses of CTX/Azithro given 8-9pm    3. Continue Dexamethasone 6mg daily_ 1st dose given 2350    4. Place consult for remdesivir.  Pt does have mild transaminase elevation but not quite @ 5x ULN.   Daily CMP.             Tolu Hargrove M.D.  Department of Hospital Medicine  Physician in Lead of Transfers ()  [facility] - [phone]   Ext: [phone]  ([phone])  Pager: [phone]

## 2020-07-26 NOTE — ED NOTES
RT at bedside, repeatedly suctioning patient. Pt coughing and gagging around ETT. Whole body tremors noted.

## 2020-07-26 NOTE — ASSESSMENT & PLAN NOTE
- COVID-19 testing   - Infection Control notified     - Isolation:   - Airborne, Contact and Droplet Precautions  - Cohort patients into COVID units  - N95 mask, wear eye protection  - 20 second hand hygiene              - Limit visitors per hospital policy              - Consolidating lab draws, nursing care, provider visits, and interventions    - Diagnostics: (leukopenia, hyponatremia, hyperferritinemia, elevated troponin, elevated d-dimer, age, and comorbidities are significant predictors of poor clinical outcome)  CBC, CMP, Ferritin, CRP and Portable CXR    - Management:  Supplemental O2 to maintain SpO2 >92%  Continuous/intermittent Pulse Ox  Albuterol treatment PRN

## 2020-07-26 NOTE — ED NOTES
Spoke to wife. Informed her on current condition. She will bring phone to patient some time today.

## 2020-07-26 NOTE — ED NOTES
Per Dr Berumen, transfer form does not need to be completed as the patient will be d/c and taken to [facility].   Verified with Fiona Charge nurse

## 2020-07-26 NOTE — ED PROVIDER NOTES
SCRIBE #1 NOTE: I, Ulises Flores, am scribing for, and in the presence of, Marty Westbrook MD. I have scribed the entire note.       History     Chief Complaint   Patient presents with    Shortness of Breath     reports feeling bad for 1 week. SOB got worse today. pt sat low 70s on RA. grunting and only able to speak in small sentences.     Review of patient's allergies indicates:  No Known Allergies      History of Present Illness     HPI    7/25/2020, 7:40  PM   History obtained from the patient      History of Present Illness: Anup Miller is a 70 y.o. male patient with a PMHx of HTN, HLD, and DM who presents to the Emergency Department for evaluation of SOB which onset gradually x 1 week ago. Pt became more SOB today and has an O2 sat of low 70s upon arrival on RA. Symptoms are worsening and moderate in severity. No mitigating or exacerbating factors reported. Associated sxs include cough and fever. Patient denies any congestion, sore throat, CP, abd pain, N/V, back pain, HA, weakness, and all other sxs at this time. No prior Tx reported. No further complaints or concerns at this time.        Arrival mode: Personal vehicle     PCP: Bryce Gonzales MD        Past Medical History:  Past Medical History:   Diagnosis Date    Diabetes mellitus type II Diagnosed 2002    Elevated liver enzymes     Fatty liver disease, nonalcoholic     Hyperlipidemia     Hypertension     Sleep apnea        Past Surgical History:  Past Surgical History:   Procedure Laterality Date    BACK SURGERY      CHOLECYSTECTOMY  2013    COLONOSCOPY N/A 7/19/2018    Procedure: COLONOSCOPY;  Surgeon: Chacorta Lopez III, MD;  Location: [facility];  Service: Endoscopy;  Laterality: N/A;         Family History:  Family History   Problem Relation Age of Onset    Asthma Mother     Diabetes Father     Cancer Sister         Breast    Diabetes Paternal Aunt     Cancer Maternal Grandmother         Breast    Diabetes Maternal  Grandmother     Diabetes Paternal Uncle     Colon cancer Neg Hx        Social History:  Social History     Tobacco Use    Smoking status: Never Smoker    Smokeless tobacco: Never Used   Substance and Sexual Activity    Alcohol use: No     Alcohol/week: 0.0 standard drinks    Drug use: No    Sexual activity: Yes     Partners: Female        Review of Systems     Review of Systems   Constitutional: Positive for fever.        (+) body aches   HENT: Negative for congestion and sore throat.    Respiratory: Positive for cough. Negative for shortness of breath.    Cardiovascular: Negative for chest pain.   Gastrointestinal: Negative for abdominal pain, nausea and vomiting.   Genitourinary: Negative for dysuria.   Musculoskeletal: Negative for back pain.   Skin: Negative for rash.   Neurological: Negative for weakness and headaches.   Hematological: Does not bruise/bleed easily.   All other systems reviewed and are negative.       Physical Exam     Initial Vitals [07/25/20 1936]   BP Pulse Resp Temp SpO2   (!) 163/76 92 (!) 30 (!) 101.5 °F (38.6 °C) (!) 71 %      MAP       --          Physical Exam  Nursing Notes and Vital Signs Reviewed.  Constitutional: Patient is in mild distress. Well-developed. Ill-apearing.  Head: Atraumatic. Normocephalic.  Eyes: PERRL. EOM intact. Conjunctivae are not pale. No scleral icterus.  ENT: Mucous membranes are moist. Oropharynx is clear and symmetric.    Neck: Supple. Full ROM. No lymphadenopathy.  Cardiovascular: Regular rate. Regular rhythm. No murmurs, rubs, or gallops. Distal pulses are 2+ and symmetric.  Pulmonary/Chest: Tachypnic. Diffuse crackles. No wheezing or rales. Severe respiratory distress. Cannot finish a sentence.   Abdominal: Soft and non-distended.  There is no tenderness.  No rebound, guarding, or rigidity. Good bowel sounds.  Genitourinary: No CVA tenderness  Musculoskeletal: Moves all extremities. No obvious deformities. No edema. No calf tenderness.  Skin: Warm  "and dry.  Neurological:  Alert, awake, and appropriate.  Normal speech.  No acute focal neurological deficits are appreciated.  Psychiatric: Normal affect. Good eye contact. Appropriate in content.     ED Course   Critical Care    Date/Time: 7/25/2020 7:53 PM  Performed by: Marty Westbrook MD  Authorized by: Marty Westbrook MD   Direct patient critical care time: 10 minutes  Additional history critical care time: 5 minutes  Ordering / reviewing critical care time: 5 minutes  Documentation critical care time: 5 minutes  Consulting other physicians critical care time: 5 minutes  Consult with family critical care time: 5 minutes  Total critical care time (exclusive of procedural time) : 35 minutes  Critical care time was exclusive of separately billable procedures and treating other patients and teaching time.  Critical care was necessary to treat or prevent imminent or life-threatening deterioration of the following conditions: hypoxic respiratory failure.  Critical care was time spent personally by me on the following activities: blood draw for specimens, development of treatment plan with patient or surrogate, discussions with consultants, discussions with primary provider, interpretation of cardiac output measurements, evaluation of patient's response to treatment, examination of patient, obtaining history from patient or surrogate, ordering and performing treatments and interventions, ordering and review of laboratory studies, ordering and review of radiographic studies, pulse oximetry, re-evaluation of patient's condition and review of old charts.        ED Vital Signs:  Vitals:    07/25/20 1936 07/25/20 1938 07/25/20 1940 07/25/20 1956   BP: (!) 163/76  (!) 163/76 (!) 147/87   Pulse: 92  93 89   Resp: (!) 30  (!) 37 (!) 65   Temp: (!) 101.5 °F (38.6 °C)      TempSrc: Oral      SpO2: (!) 71% (!) 91% (!) 91% 100%   Weight: 115.9 kg (255 lb 8.2 oz)      Height: 5' 9" (1.753 m)       07/25/20 2016 07/25/20 " 2045 07/25/20 2124 07/25/20 2231   BP: (!) 163/85 (!) 140/72 136/75 132/74   Pulse: 96 96 87 83   Resp: (!) 55 (!) 50 (!) 46    Temp: (!) 101.5 °F (38.6 °C)  98.9 °F (37.2 °C)    TempSrc:   Oral    SpO2: 98% 100%  98%   Weight:       Height:        07/25/20 2243 07/25/20 2349 07/26/20 0006 07/26/20 0016   BP:    128/72   Pulse:    77   Resp: (!) 52 (!) 50 (!) 49    Temp:       TempSrc:       SpO2:    100%   Weight:       Height:        07/26/20 0017   BP:    Pulse: 76   Resp: (!) 45   Temp:    TempSrc:    SpO2: 100%   Weight:    Height:        Abnormal Lab Results:  Labs Reviewed   CBC W/ AUTO DIFFERENTIAL - Abnormal; Notable for the following components:       Result Value    Hemoglobin 13.6 (*)     Mean Corpuscular Hemoglobin Conc 31.8 (*)     Lymph # 0.8 (*)     Gran% 73.8 (*)     Lymph% 11.8 (*)     All other components within normal limits   LACTATE DEHYDROGENASE - Abnormal; Notable for the following components:     (*)     All other components within normal limits   SARS-COV-2 RNA AMPLIFICATION, QUAL - Abnormal; Notable for the following components:    SARS-CoV-2 RNA, Amplification, Qual Positive (*)     All other components within normal limits   COMPREHENSIVE METABOLIC PANEL - Abnormal; Notable for the following components:    CO2 16 (*)     Glucose 133 (*)     Creatinine 1.5 (*)     Albumin 3.2 (*)     Alkaline Phosphatase 38 (*)      (*)      (*)     Anion Gap 20 (*)     eGFR if  55 (*)     eGFR if non  47 (*)     All other components within normal limits   C-REACTIVE PROTEIN - Abnormal; Notable for the following components:    .2 (*)     All other components within normal limits   CK-MB - Abnormal; Notable for the following components:    CPK 1613 (*)     All other components within normal limits   TROPONIN I - Abnormal; Notable for the following components:    Troponin I 0.031 (*)     All other components within normal limits   ISTAT PROCEDURE -  Abnormal; Notable for the following components:    POC PCO2 29.4 (*)     POC PO2 127 (*)     POC HCO3 18.8 (*)     All other components within normal limits   ISTAT PROCEDURE - Abnormal; Notable for the following components:    POC PO2 111 (*)     POC HCO3 19.9 (*)     All other components within normal limits   CULTURE, BLOOD   CULTURE, BLOOD   LACTIC ACID, PLASMA   PROCALCITONIN   FERRITIN   URINALYSIS, REFLEX TO URINE CULTURE   HEMOGLOBIN A1C   B-TYPE NATRIURETIC PEPTIDE   C-REACTIVE PROTEIN   SEDIMENTATION RATE   CK   D DIMER, QUANTITATIVE   POCT GLUCOSE MONITORING CONTINUOUS        All Lab Results:  Results for orders placed or performed during the hospital encounter of 07/25/20   CBC auto differential   Result Value Ref Range    WBC 7.02 3.90 - 12.70 K/uL    RBC 4.96 4.60 - 6.20 M/uL    Hemoglobin 13.6 (L) 14.0 - 18.0 g/dL    Hematocrit 42.8 40.0 - 54.0 %    Mean Corpuscular Volume 86 82 - 98 fL    Mean Corpuscular Hemoglobin 27.4 27.0 - 31.0 pg    Mean Corpuscular Hemoglobin Conc 31.8 (L) 32.0 - 36.0 g/dL    RDW 13.3 11.5 - 14.5 %    Platelets 282 150 - 350 K/uL    MPV 9.9 9.2 - 12.9 fL    Immature Granulocytes 0.4 0.0 - 0.5 %    Gran # (ANC) 5.2 1.8 - 7.7 K/uL    Immature Grans (Abs) 0.03 0.00 - 0.04 K/uL    Lymph # 0.8 (L) 1.0 - 4.8 K/uL    Mono # 0.9 0.3 - 1.0 K/uL    Eos # 0.1 0.0 - 0.5 K/uL    Baso # 0.00 0.00 - 0.20 K/uL    nRBC 0 0 /100 WBC    Gran% 73.8 (H) 38.0 - 73.0 %    Lymph% 11.8 (L) 18.0 - 48.0 %    Mono% 12.3 4.0 - 15.0 %    Eosinophil% 1.7 0.0 - 8.0 %    Basophil% 0.0 0.0 - 1.9 %    Differential Method Automated    Lactate Dehydrogenase   Result Value Ref Range     (H) 110 - 260 U/L   COVID-19 Rapid Screening   Result Value Ref Range    SARS-CoV-2 RNA, Amplification, Qual Positive (A) Negative   Comprehensive metabolic panel   Result Value Ref Range    Sodium 137 136 - 145 mmol/L    Potassium 4.3 3.5 - 5.1 mmol/L    Chloride 101 95 - 110 mmol/L    CO2 16 (L) 23 - 29 mmol/L    Glucose  133 (H) 70 - 110 mg/dL    BUN, Bld 21 8 - 23 mg/dL    Creatinine 1.5 (H) 0.5 - 1.4 mg/dL    Calcium 8.9 8.7 - 10.5 mg/dL    Total Protein 7.6 6.0 - 8.4 g/dL    Albumin 3.2 (L) 3.5 - 5.2 g/dL    Total Bilirubin 0.5 0.1 - 1.0 mg/dL    Alkaline Phosphatase 38 (L) 55 - 135 U/L     (H) 10 - 40 U/L     (H) 10 - 44 U/L    Anion Gap 20 (H) 8 - 16 mmol/L    eGFR if African American 55 (A) >60 mL/min/1.73 m^2    eGFR if non African American 47 (A) >60 mL/min/1.73 m^2   C-reactive protein   Result Value Ref Range    .2 (H) 0.0 - 8.2 mg/L   CK-MB   Result Value Ref Range    CPK 1613 (H) 20 - 200 U/L    CPK MB 3.1 0.1 - 6.5 ng/mL    MB% 0.2 0.0 - 5.0 %   Lactic acid, plasma   Result Value Ref Range    Lactate (Lactic Acid) 1.7 0.5 - 2.2 mmol/L   Troponin I   Result Value Ref Range    Troponin I 0.031 (H) 0.000 - 0.026 ng/mL   Procalcitonin   Result Value Ref Range    Procalcitonin 0.22 <0.25 ng/mL   ISTAT PROCEDURE   Result Value Ref Range    POC PH 7.413 7.35 - 7.45    POC PCO2 29.4 (L) 35 - 45 mmHg    POC PO2 127 (H) 80 - 100 mmHg    POC HCO3 18.8 (L) 24 - 28 mmol/L    POC BE -6 -2 to 2 mmol/L    POC SATURATED O2 99 95 - 100 %    Rate 12     Sample ARTERIAL     Site LR     Allens Test Pass     DelSys Adult Vent     Mode BiPAP     FiO2 75     Spont Rate 50     Min Vol 36     Sp02 100     IP 14     EP 8    ISTAT PROCEDURE   Result Value Ref Range    POC PH 7.360 7.35 - 7.45    POC PCO2 35.2 35 - 45 mmHg    POC PO2 111 (H) 80 - 100 mmHg    POC HCO3 19.9 (L) 24 - 28 mmol/L    POC BE -6 -2 to 2 mmol/L    POC SATURATED O2 98 95 - 100 %    Rate 12     Sample ARTERIAL     Site RR     Allens Test Pass     DelSys CPAP/BiPAP     Mode BiPAP     FiO2 75     Spont Rate 45     Min Vol 20.8     Sp02 99     IP 14     EP 8          Imaging Results:  Imaging Results          X-Ray Chest AP Portable (Final result)  Result time 07/25/20 20:18:27    Final result by Efren Cisneros MD (07/25/20 20:18:27)                  Impression:      Bilateral patchy infiltrates throughout the lungs.      Electronically signed by: Efren Cisneros MD  Date:    07/25/2020  Time:    20:18             Narrative:    EXAMINATION:  XR CHEST AP PORTABLE    CLINICAL HISTORY:  Suspected Covid-19 Virus Infection;    COMPARISON:  Chest x-ray, 11/21/2013    FINDINGS:  There are patchy infiltrates throughout the lungs bilaterally.  The heart size is normal.  No pleural effusion or pneumothorax.                                 The EKG was ordered, reviewed, and independently interpreted by the ED provider.  Interpretation time: 1940  Rate: 93 BPM  Rhythm: normal sinus rhythm  Interpretation: Right atrial enlargement Nonspecific ST abnormality. Prolonged QT. No STEMI.       The Emergency Provider reviewed the vital signs and test results, which are outlined above.     ED Discussion     1:02 AM: Discussed case with Alisa Cloud NP ([facility]). Dr. Cortez agrees with current care and management of pt and accepts admission. Recommend keeping on continuous BiPAP  Admitting Service: Hospital medicine  Admitting Physician: Dr. Cortez  Admit to: ICU    1:04 AM:  Re-evaluated pt. Patient no longer anxious, now calm on bipap with improved ABG. I have discussed test results, shared treatment plan, and the need for admission with patient at bedside. Pt express understanding at this time and agree with all information. All questions answered. Pt have no further questions or concerns at this time. Pt is ready for admit.        ED Medication(s):  Medications   pantoprazole EC tablet 40 mg (has no administration in time range)   rosuvastatin tablet 10 mg (has no administration in time range)   insulin aspart U-100 pen 1-10 Units (has no administration in time range)   glucose chewable tablet 16 g (has no administration in time range)   glucose chewable tablet 24 g (has no administration in time range)   dextrose 50% injection 12.5 g (has no administration in time  range)   dextrose 50% injection 25 g (has no administration in time range)   glucagon (human recombinant) injection 1 mg (has no administration in time range)   sodium chloride 0.9% flush 10 mL (has no administration in time range)   dexAMETHasone tablet 6 mg (has no administration in time range)   enoxaparin injection 40 mg (has no administration in time range)   albuterol inhaler 2 puff (has no administration in time range)   cefTRIAXone (ROCEPHIN) 1 g/50 mL D5W IVPB (has no administration in time range)   azithromycin 500 mg in dextrose 5 % 250 mL IVPB (ready to mix system) (has no administration in time range)   hydrALAZINE injection 10 mg (has no administration in time range)   lorazepam injection 1 mg (1 mg Intravenous Given 7/25/20 1956)   ketorolac injection 15 mg (15 mg Intravenous Given 7/25/20 2013)   acetaminophen tablet 1,000 mg (1,000 mg Oral Given 7/25/20 2016)   cefTRIAXone (ROCEPHIN) 1 g/50 mL D5W IVPB (0 g Intravenous Stopped 7/25/20 2055)   azithromycin 500 mg in dextrose 5 % 250 mL IVPB (ready to mix system) (0 mg Intravenous Stopped 7/25/20 2243)   dexamethasone injection 6 mg (6 mg Intravenous Given 7/25/20 2348)       New Prescriptions    No medications on file                 Medical Decision Making:   Clinical Tests:   Lab Tests: Ordered and Reviewed  Radiological Study: Ordered and Reviewed  Medical Tests: Ordered and Reviewed             Scribe Attestation:   Scribe #1: I performed the above scribed service and the documentation accurately describes the services I performed. I attest to the accuracy of the note.     Attending:   Physician Attestation Statement for Scribe #1: I, Marty Westbrook MD, personally performed the services described in this documentation, as scribed by Ulises Flores, in my presence, and it is both accurate and complete.           Clinical Impression       ICD-10-CM ICD-9-CM   1. Acute hypoxemic respiratory failure  J96.01 518.81   2. Suspected Covid-19  Virus Infection  R68.89    3. COVID-19 virus infection  U07.1    4. Pneumonia of both lungs due to infectious organism, unspecified part of lung  J18.9 483.8       Disposition:   Disposition: Admitted  Condition: Serious        Marty Westbrook MD  07/26/20 5691

## 2020-07-26 NOTE — PROGRESS NOTES
RT at  BS with Dr. Rasheed and [place]. PT intubated number 8 ET tube secured 23 at the lip . Placed on 980. All alarms functioning an ET . RT wore PPE.

## 2020-07-26 NOTE — PROGRESS NOTES
5:37 PM  July 26, 2020      Patient awaiting transfer to [facility] for ICU critical care divert.  Patient reportedly failing BiPAP with worsening work breathing.  Patient positive COVID.  Discussed risk benefits of intubation with patient.  Patient would like to proceed with intubation.  Patient denies any prior history of failed intubations.    General:  Patient on BiPAP.  Patient is tachypneic.  Heart:  Regular rate and rhythm  Lungs:  Diffuse crackles.  Tachypneic    Procedure:  Elective intubation.  Medications:  50 mcg of Fentanyl, 60 mg of Propafol, 100 mg of succinylcholine  ETT 8 0  Video scope x3 attempt  ET tube 23 at the lip.  Positive CO2 change.  Breath sounds equal bilaterally.    Assessment:   Impending respiratory failure  Plan elective intubation.    Care to Dr. Berumen

## 2020-07-26 NOTE — ED NOTES
The patient is lying in bed with eyes closed. Easily aroused. Airway is open, respiration is labored with abdominal muscle use. Pt is tachypneic with RR rate of 46-54. Skin warm and dry, moves all extremities well. Rounded, soft and nontender abdomen.External  Urinary catheter in place with 0 ml urine output. Bed placed in low position, side rails up x 2, call light is within reach of patient, explanation of care provided to patient. Will continue to monitor.

## 2020-07-27 ENCOUNTER — RESEARCH ENCOUNTER (OUTPATIENT)
Dept: RESEARCH | Facility: HOSPITAL | Age: 70
End: 2020-07-27

## 2020-07-27 ENCOUNTER — TELEPHONE (OUTPATIENT)
Dept: INTERNAL MEDICINE | Facility: CLINIC | Age: 70
End: 2020-07-27

## 2020-07-27 LAB
ALBUMIN SERPL BCP-MCNC: 2.8 G/DL (ref 3.5–5.2)
ALP SERPL-CCNC: 43 U/L (ref 55–135)
ALT SERPL W/O P-5'-P-CCNC: 98 U/L (ref 10–44)
ANION GAP SERPL CALC-SCNC: 16 MMOL/L (ref 8–16)
AST SERPL-CCNC: 114 U/L (ref 10–40)
BASOPHILS # BLD AUTO: 0.01 K/UL (ref 0–0.2)
BASOPHILS NFR BLD: 0.1 % (ref 0–1.9)
BILIRUB SERPL-MCNC: 0.6 MG/DL (ref 0.1–1)
BUN SERPL-MCNC: 32 MG/DL (ref 8–23)
CALCIUM SERPL-MCNC: 8.9 MG/DL (ref 8.7–10.5)
CHLORIDE SERPL-SCNC: 104 MMOL/L (ref 95–110)
CO2 SERPL-SCNC: 18 MMOL/L (ref 23–29)
CREAT SERPL-MCNC: 1.4 MG/DL (ref 0.5–1.4)
DIFFERENTIAL METHOD: ABNORMAL
EOSINOPHIL # BLD AUTO: 0 K/UL (ref 0–0.5)
EOSINOPHIL NFR BLD: 0 % (ref 0–8)
ERYTHROCYTE [DISTWIDTH] IN BLOOD BY AUTOMATED COUNT: 13.9 % (ref 11.5–14.5)
EST. GFR  (AFRICAN AMERICAN): 59.3 ML/MIN/1.73 M^2
EST. GFR  (NON AFRICAN AMERICAN): 51.3 ML/MIN/1.73 M^2
FACT X PPP CHRO-ACNC: 0.24 IU/ML (ref 0.3–0.7)
FACT X PPP CHRO-ACNC: 1.23 IU/ML (ref 0.3–0.7)
FACT X PPP CHRO-ACNC: 1.3 IU/ML (ref 0.3–0.7)
FACT X PPP CHRO-ACNC: 1.44 IU/ML (ref 0.3–0.7)
FERRITIN SERPL-MCNC: 2359 NG/ML (ref 20–300)
GLUCOSE SERPL-MCNC: 248 MG/DL (ref 70–110)
HAV IGM SERPL QL IA: NEGATIVE
HBV CORE IGM SERPL QL IA: NEGATIVE
HBV SURFACE AG SERPL QL IA: NEGATIVE
HCT VFR BLD AUTO: 39.5 % (ref 40–54)
HCV AB SERPL QL IA: NEGATIVE
HGB BLD-MCNC: 12.1 G/DL (ref 14–18)
HIV 1+2 AB+HIV1 P24 AG SERPL QL IA: NEGATIVE
IMM GRANULOCYTES # BLD AUTO: 0.12 K/UL (ref 0–0.04)
IMM GRANULOCYTES NFR BLD AUTO: 1.3 % (ref 0–0.5)
LACTATE SERPL-SCNC: 1.9 MMOL/L (ref 0.5–2.2)
LYMPHOCYTES # BLD AUTO: 1.3 K/UL (ref 1–4.8)
LYMPHOCYTES NFR BLD: 13.5 % (ref 18–48)
MAGNESIUM SERPL-MCNC: 2.6 MG/DL (ref 1.6–2.6)
MCH RBC QN AUTO: 27.4 PG (ref 27–31)
MCHC RBC AUTO-ENTMCNC: 30.6 G/DL (ref 32–36)
MCV RBC AUTO: 89 FL (ref 82–98)
MONOCYTES # BLD AUTO: 0.8 K/UL (ref 0.3–1)
MONOCYTES NFR BLD: 8.3 % (ref 4–15)
NEUTROPHILS # BLD AUTO: 7.1 K/UL (ref 1.8–7.7)
NEUTROPHILS NFR BLD: 76.8 % (ref 38–73)
NRBC BLD-RTO: 0 /100 WBC
PHOSPHATE SERPL-MCNC: 4.6 MG/DL (ref 2.7–4.5)
PLATELET # BLD AUTO: 268 K/UL (ref 150–350)
PMV BLD AUTO: 10.9 FL (ref 9.2–12.9)
POCT GLUCOSE: 184 MG/DL (ref 70–110)
POCT GLUCOSE: 232 MG/DL (ref 70–110)
POCT GLUCOSE: 244 MG/DL (ref 70–110)
POCT GLUCOSE: 253 MG/DL (ref 70–110)
POCT GLUCOSE: 297 MG/DL (ref 70–110)
POTASSIUM SERPL-SCNC: 4.7 MMOL/L (ref 3.5–5.1)
PROCALCITONIN SERPL IA-MCNC: 1.38 NG/ML
PROT SERPL-MCNC: 7.2 G/DL (ref 6–8.4)
RBC # BLD AUTO: 4.42 M/UL (ref 4.6–6.2)
SODIUM SERPL-SCNC: 138 MMOL/L (ref 136–145)
T4 FREE SERPL-MCNC: 0.93 NG/DL (ref 0.71–1.51)
TROPONIN I SERPL DL<=0.01 NG/ML-MCNC: 0.42 NG/ML (ref 0–0.03)
TROPONIN I SERPL DL<=0.01 NG/ML-MCNC: 0.99 NG/ML (ref 0–0.03)
TSH SERPL DL<=0.005 MIU/L-ACNC: 0.18 UIU/ML (ref 0.4–4)
WBC # BLD AUTO: 9.26 K/UL (ref 3.9–12.7)

## 2020-07-27 PROCEDURE — 20000000 HC ICU ROOM

## 2020-07-27 PROCEDURE — 87205 SMEAR GRAM STAIN: CPT

## 2020-07-27 PROCEDURE — 84484 ASSAY OF TROPONIN QUANT: CPT

## 2020-07-27 PROCEDURE — 99291 PR CRITICAL CARE, E/M 30-74 MINUTES: ICD-10-PCS | Mod: ,,, | Performed by: INTERNAL MEDICINE

## 2020-07-27 PROCEDURE — 25000003 PHARM REV CODE 250: Performed by: STUDENT IN AN ORGANIZED HEALTH CARE EDUCATION/TRAINING PROGRAM

## 2020-07-27 PROCEDURE — 94761 N-INVAS EAR/PLS OXIMETRY MLT: CPT

## 2020-07-27 PROCEDURE — 99291 CRITICAL CARE FIRST HOUR: CPT | Mod: ,,, | Performed by: INTERNAL MEDICINE

## 2020-07-27 PROCEDURE — 99900035 HC TECH TIME PER 15 MIN (STAT)

## 2020-07-27 PROCEDURE — 94003 VENT MGMT INPAT SUBQ DAY: CPT

## 2020-07-27 PROCEDURE — 63600175 PHARM REV CODE 636 W HCPCS: Performed by: STUDENT IN AN ORGANIZED HEALTH CARE EDUCATION/TRAINING PROGRAM

## 2020-07-27 PROCEDURE — 27000221 HC OXYGEN, UP TO 24 HOURS

## 2020-07-27 PROCEDURE — 85520 HEPARIN ASSAY: CPT | Mod: 91

## 2020-07-27 PROCEDURE — 99900026 HC AIRWAY MAINTENANCE (STAT)

## 2020-07-27 PROCEDURE — 87070 CULTURE OTHR SPECIMN AEROBIC: CPT

## 2020-07-27 PROCEDURE — S0028 INJECTION, FAMOTIDINE, 20 MG: HCPCS | Performed by: STUDENT IN AN ORGANIZED HEALTH CARE EDUCATION/TRAINING PROGRAM

## 2020-07-27 PROCEDURE — 36415 COLL VENOUS BLD VENIPUNCTURE: CPT

## 2020-07-27 PROCEDURE — 85025 COMPLETE CBC W/AUTO DIFF WBC: CPT

## 2020-07-27 PROCEDURE — 83605 ASSAY OF LACTIC ACID: CPT

## 2020-07-27 PROCEDURE — 84100 ASSAY OF PHOSPHORUS: CPT

## 2020-07-27 PROCEDURE — 80053 COMPREHEN METABOLIC PANEL: CPT

## 2020-07-27 PROCEDURE — 63600175 PHARM REV CODE 636 W HCPCS: Performed by: INTERNAL MEDICINE

## 2020-07-27 PROCEDURE — 25000003 PHARM REV CODE 250: Performed by: INTERNAL MEDICINE

## 2020-07-27 PROCEDURE — 83735 ASSAY OF MAGNESIUM: CPT

## 2020-07-27 RX ORDER — HEPARIN SODIUM,PORCINE/D5W 25000/250
18 INTRAVENOUS SOLUTION INTRAVENOUS CONTINUOUS
Status: DISCONTINUED | OUTPATIENT
Start: 2020-07-27 | End: 2020-08-04

## 2020-07-27 RX ORDER — ROSUVASTATIN CALCIUM 10 MG/1
10 TABLET, COATED ORAL NIGHTLY
Status: DISCONTINUED | OUTPATIENT
Start: 2020-07-27 | End: 2020-08-26 | Stop reason: HOSPADM

## 2020-07-27 RX ADMIN — FAMOTIDINE 20 MG: 10 INJECTION INTRAVENOUS at 09:07

## 2020-07-27 RX ADMIN — Medication 75 MCG/HR: at 10:07

## 2020-07-27 RX ADMIN — ACETAMINOPHEN 650 MG: 160 SOLUTION ORAL at 05:07

## 2020-07-27 RX ADMIN — DEXMEDETOMIDINE HYDROCHLORIDE 1.4 MCG/KG/HR: 4 INJECTION, SOLUTION INTRAVENOUS at 02:07

## 2020-07-27 RX ADMIN — ACETAMINOPHEN 650 MG: 160 SOLUTION ORAL at 02:07

## 2020-07-27 RX ADMIN — Medication 25 MCG/HR: at 08:07

## 2020-07-27 RX ADMIN — DEXMEDETOMIDINE HYDROCHLORIDE 1.4 MCG/KG/HR: 4 INJECTION, SOLUTION INTRAVENOUS at 11:07

## 2020-07-27 RX ADMIN — AZITHROMYCIN MONOHYDRATE 500 MG: 500 INJECTION, POWDER, LYOPHILIZED, FOR SOLUTION INTRAVENOUS at 09:07

## 2020-07-27 RX ADMIN — Medication 75 MCG/HR: at 11:07

## 2020-07-27 RX ADMIN — Medication 125 MCG/HR: at 12:07

## 2020-07-27 RX ADMIN — ROSUVASTATIN CALCIUM 10 MG: 10 TABLET, FILM COATED ORAL at 08:07

## 2020-07-27 RX ADMIN — ACETAMINOPHEN 650 MG: 160 SOLUTION ORAL at 09:07

## 2020-07-27 RX ADMIN — INSULIN ASPART 2 UNITS: 100 INJECTION, SOLUTION INTRAVENOUS; SUBCUTANEOUS at 05:07

## 2020-07-27 RX ADMIN — DEXMEDETOMIDINE HYDROCHLORIDE 0.6 MCG/KG/HR: 4 INJECTION, SOLUTION INTRAVENOUS at 09:07

## 2020-07-27 RX ADMIN — HEPARIN SODIUM AND DEXTROSE 18 UNITS/KG/HR: 10000; 5 INJECTION INTRAVENOUS at 02:07

## 2020-07-27 RX ADMIN — DEXMEDETOMIDINE HYDROCHLORIDE 0.4 MCG/KG/HR: 4 INJECTION, SOLUTION INTRAVENOUS at 05:07

## 2020-07-27 RX ADMIN — PROPOFOL 5 MCG/KG/MIN: 10 INJECTION, EMULSION INTRAVENOUS at 08:07

## 2020-07-27 RX ADMIN — CEFTRIAXONE 1 G: 1 INJECTION, POWDER, FOR SOLUTION INTRAMUSCULAR; INTRAVENOUS at 09:07

## 2020-07-27 RX ADMIN — DEXAMETHASONE SODIUM PHOSPHATE 6 MG: 4 INJECTION, SOLUTION INTRA-ARTICULAR; INTRALESIONAL; INTRAMUSCULAR; INTRAVENOUS; SOFT TISSUE at 09:07

## 2020-07-27 RX ADMIN — PROPOFOL 15 MCG/KG/MIN: 10 INJECTION, EMULSION INTRAVENOUS at 11:07

## 2020-07-27 RX ADMIN — PROPOFOL 35 MCG/KG/MIN: 10 INJECTION, EMULSION INTRAVENOUS at 05:07

## 2020-07-27 RX ADMIN — FENTANYL CITRATE 50 MCG: 50 INJECTION INTRAMUSCULAR; INTRAVENOUS at 05:07

## 2020-07-27 RX ADMIN — Medication 150 MCG/HR: at 01:07

## 2020-07-27 RX ADMIN — HEPARIN SODIUM AND DEXTROSE 18 UNITS/KG/HR: 10000; 5 INJECTION INTRAVENOUS at 03:07

## 2020-07-27 RX ADMIN — FAMOTIDINE 20 MG: 10 INJECTION INTRAVENOUS at 08:07

## 2020-07-27 RX ADMIN — DEXMEDETOMIDINE HYDROCHLORIDE 1.4 MCG/KG/HR: 4 INJECTION, SOLUTION INTRAVENOUS at 08:07

## 2020-07-27 RX ADMIN — Medication 50 MCG/HR: at 09:07

## 2020-07-27 RX ADMIN — FENTANYL CITRATE 50 MCG: 50 INJECTION INTRAMUSCULAR; INTRAVENOUS at 09:07

## 2020-07-27 RX ADMIN — INSULIN ASPART 2 UNITS: 100 INJECTION, SOLUTION INTRAVENOUS; SUBCUTANEOUS at 12:07

## 2020-07-27 RX ADMIN — CHLORHEXIDINE GLUCONATE 0.12% ORAL RINSE 15 ML: 1.2 LIQUID ORAL at 09:07

## 2020-07-27 NOTE — ASSESSMENT & PLAN NOTE
- hx of elevated LFTs dating back to 2015  - will check Acute hepatitis panel and HIV  - monitor with CMP daily

## 2020-07-27 NOTE — PLAN OF CARE
Patient was transferred from [facility] with SOB, Sats low 70s.  Patient was placed on Bipap for a few hours and subsequently intubated. PMHx of HTN, HLD, and DM.  COVID+ 7/25.   CM contacted pt's wife Zhou Miller [phone] as pt remains intubated to complete the discharge assessment.  Pt lives with his wife and their 11 year old son in a single story home.  He has a glucometer at home and uses BIPAP every night.  His wife was very tearful on the phone and expressed her concerns for him getting better.  Prior to this admission he was independent functional status.  When medically stable, anticipate discharge plan A - home with family or discharge plan B - SNF.  CM to follow.    Bryce Gonzales MD     Payor: CIG[employer] / Plan: CIGNA OPEN ACCESS PLUS / Product Type: Commercial /        [facility] - [place], [place] - [address]  [address]  [place] [address]  Phone: [phone] Fax: [phone]    [id number] - [place], [place] - [address] AT [facility] [address]  [address] [address]  Phone: [phone] Fax: [phone]      07/27/20 121   Discharge Assessment   Assessment Type Discharge Planning Assessment   Confirmed/corrected address and phone number on facesheet? Yes   Assessment information obtained from? Caregiver  (Spoke with pt's wife Zhou [phone])   Expected Length of Stay (days) 7   Communicated expected length of stay with patient/caregiver yes   Prior to hospitilization cognitive status: Alert/Oriented   Prior to hospitalization functional status: Independent   Current cognitive status: Coma/Sedated/Intubated   Current Functional Status:   (Pt is intubated.)   Facility Arrived From: [facility]   Lives With spouse;child(santo), dependent  (Lives with his wife and their 11 year old son)   Able to Return to Prior Arrangements yes   Is patient able to care for self  after discharge? Unable to determine at this time (comments)   Who are your caregiver(s) and their phone number(s)? Wife Zhou [phone]   Patient's perception of discharge disposition home or selfcare   Readmission Within the Last 30 Days current reason for admission unrelated to previous admission   If yes, most recent facility name: [facility]   Patient currently being followed by outpatient case management? No   Patient currently receives any other outside agency services? No   Equipment Currently Used at Home BIPAP;other (see comments)  (Glucometer)   Do you have any problems affording any of your prescribed medications? TBD   Is the patient taking medications as prescribed? yes   Does the patient have transportation home? Yes   Transportation Anticipated family or friend will provide  (pt's wife Zhou [phone])   Dialysis Name and Scheduled days NA   Does the patient receive services at the [facility]? No   Discharge Plan A Home with family   Discharge Plan B Skilled Nursing Facility   DME Needed Upon Discharge  other (see comments)  (TBD)   Readmission Questionnaire   At the time of your discharge, did someone talk to you about what your health problems were? Yes   At the time of discharge, did someone talk to you about what to watch out for regarding worsening of your health problem? Yes   At the time of discharge, did someone talk to you about what to do if you experienced worsening of your health problem? Yes   At the time of discharge, did someone talk to you about which medication to take when you left the hospital and which ones to stop taking? Yes   At the time of discharge, did someone talk to you about when and where to follow up with a doctor after you left the hospital? Yes   How often do you need to have someone help you when you read instructions, pamphlets, or other written material from your doctor or pharmacy? Always   Do you have problems taking your medications as  prescribed? No   Do you have any problems affording any of  your prescribed medications? To be determined   Do you have problems obtaining/receiving your medications? No   Does the patient have transportation to healthcare appointments? Yes   Living Arrangements house   Does the patient have family/friends to help with healtcare needs after discharge? yes     Vandana García RN CM  [id number]

## 2020-07-27 NOTE — H&P
[facility]   Critical Care Medicine  History & Physical    Patient Name: Anup Miller  MRN: [id number]  Admission Date: 7/26/2020  Hospital Length of Stay: 1 days  Code Status: Full Code  Attending Physician: Jong Morrell MD   Primary Care Provider: Bryce Gonzales MD   Principal Problem: Pneumonia due to COVID-19 virus    Subjective:     HPI:  Anup Miller is a 70 y.o. male patient with a PMHx of HTN, HLD, and DM who presents to the [facility] for evaluation of SOB which  1 week ago. Pt became more SOB today and has an O2 sat of low 70s upon arrival on RA. Symptoms are worsening and moderate in severity. No mitigating or exacerbating factors reported. Associated sxs include cough and fever. Patient denies any congestion, sore throat, CP, abd pain, N/V, back pain, HA, weakness, and all other sxs at this time. No prior Tx reported. No further complaints or concerns at this time. Patient was placed on Bipap for a few hours and subsequently intubated. COVID positive. Was started on IV dexamethasone, Ceftriaxone, and Azithromycin. Patient transferred to [facility] on evening of 7/26/20 for higher level of care.     Hospital/ICU Course:  No notes on file     Past Medical History:   Diagnosis Date    Diabetes mellitus type II Diagnosed 2002    Elevated liver enzymes     Fatty liver disease, nonalcoholic     Hyperlipidemia     Hypertension     Sleep apnea        Past Surgical History:   Procedure Laterality Date    BACK SURGERY      CHOLECYSTECTOMY  2013    COLONOSCOPY N/A 7/19/2018    Procedure: COLONOSCOPY;  Surgeon: Chacorta Lopez III, MD;  Location: [facility];  Service: Endoscopy;  Laterality: N/A;       Review of patient's allergies indicates:  No Known Allergies    Family History     Problem Relation (Age of Onset)    Asthma Mother    Cancer Sister, Maternal Grandmother    Diabetes Father, Paternal Aunt, Maternal Grandmother, Paternal Uncle        Tobacco Use     Smoking status: Never Smoker    Smokeless tobacco: Never Used   Substance and Sexual Activity    Alcohol use: No     Alcohol/week: 0.0 standard drinks    Drug use: No    Sexual activity: Yes     Partners: Female      Review of Systems   Unable to perform ROS: Intubated     Objective:     Vital Signs (Most Recent):  Temp: 100.3 °F (37.9 °C) (07/26/20 2315)  Pulse: 95 (07/26/20 2341)  Resp: (!) 38 (07/26/20 2341)  BP: 106/67 (07/26/20 2330)  SpO2: (!) 93 % (07/26/20 2341) Vital Signs (24h Range):  Temp:  [97.5 °F (36.4 °C)-102.5 °F (39.2 °C)] 100.3 °F (37.9 °C)  Pulse:  [] 95  Resp:  [13-67] 38  SpO2:  [81 %-100 %] 93 %  BP: (103-197)/() 106/67   Weight: 111 kg (244 lb 11.4 oz)  Body mass index is 36.14 kg/m².    No intake or output data in the 24 hours ending 07/26/20 2344    Physical Exam  Nursing note reviewed.   Constitutional:       Comments: Intubated and sedated   HENT:      Head: Normocephalic and atraumatic.   Eyes:      Conjunctiva/sclera: Conjunctivae normal.   Neck:      Musculoskeletal: Neck supple.   Cardiovascular:      Rate and Rhythm: Regular rhythm. Tachycardia present.      Heart sounds: No murmur.   Pulmonary:      Breath sounds: No stridor. No wheezing or rales.      Comments: Course mechanical breath sounds bilaterally   Abdominal:      General: There is no distension.      Palpations: Abdomen is soft.      Tenderness: There is no abdominal tenderness.   Musculoskeletal:      Right lower leg: No edema.      Left lower leg: No edema.   Skin:     General: Skin is warm and dry.   Neurological:      General: No focal deficit present.         Vents:  Vent Mode: A/C (07/26/20 2341)  Ventilator Initiated: Yes (07/26/20 2108)  Set Rate: 30 BPM (07/26/20 2341)  Vt Set: 420 mL (07/26/20 2341)  Pressure Support: 0 cmH20 (07/26/20 2341)  PEEP/CPAP: 8 cmH20 (07/26/20 2341)  Oxygen Concentration (%): 50 (07/26/20 2341)  Peak Airway Pressure: 27 cmH2O (07/26/20 2341)  Plateau Pressure: 15  cmH20 (07/26/20 2341)  Total Ve: 15.5 mL (07/26/20 2341)  F/VT Ratio<105 (RSBI): 161.02 (07/26/20 2341)  Lines/Drains/Airways     Drain                 NG/OG Tube 07/26/20 1756 [place] sump;orogastric 18 Fr. Center mouth less than 1 day         Urethral Catheter 07/26/20 2205 16 Fr. less than 1 day          Airway                 Airway - Non-Surgical 07/26/20 1724 Endotracheal Tube less than 1 day          Peripheral Intravenous Line                 Peripheral IV - Single Lumen 07/25/20 1945 20 G Right Antecubital 1 day         Peripheral IV - Single Lumen 07/25/20 1950 20 G Left Hand 1 day              Significant Labs:    CBC/Anemia Profile:  Recent Labs   Lab 07/25/20 1953 07/26/20  0553 07/26/20 2236   WBC 7.02 5.67 9.89   HGB 13.6* 12.8* 13.2*   HCT 42.8 40.5 41.0    268 324   MCV 86 85 87   RDW 13.3 13.4 13.8   FERRITIN 1,752*  --   --         Chemistries:  Recent Labs   Lab 07/25/20 2050 07/26/20  0553    135*   K 4.3 4.3    101   CO2 16* 18*   BUN 21 28*   CREATININE 1.5* 1.7*   CALCIUM 8.9 9.1   ALBUMIN 3.2* 3.1*   PROT 7.6 7.7   BILITOT 0.5 0.5   ALKPHOS 38* 39*   * 109*   * 118*   MG  --  2.4   PHOS  --  5.1*       All pertinent labs within the past 24 hours have been reviewed.    Significant Imaging: I have reviewed all pertinent imaging results/findings within the past 24 hours.    Assessment/Plan:     Pulmonary  Acute hypoxemic respiratory failure  - see Pneumonia due to COVID 19    Cardiac/Vascular  Hyperlipidemia associated with type 2 diabetes mellitus  - restart Crestor once extubated     Hypertension associated with diabetes  - hold antihypertensives in setting of shock    Endocrine  Uncontrolled type 2 diabetes mellitus  - A1c 8.3%  - LDSSI     GI  Elevated liver enzymes  - hx of elevated LFTs dating back to 2015  - will check Acute hepatitis panel and HIV  - monitor with CMP daily     Other  * Pneumonia due to COVID-19 virus  Anup Miller is a 70  y.o. male patient with a PMHx of HTN, HLD, and DM who presents to the [facility] for shortness of breath and found to be COVID positive. Was started on IV dexamethasone, Ceftriaxone, and Azithromycin. Patient transferred to [facility] on evening of 7/26/20 for higher level of care. Labs on arrival to [facility] remarkable for WBC 9.8, Procal 1.3, D-dimer 1.1, Ferritin 2,300, , and .     Plan   - Given 1 L LR on arrival to [facility]  - Will start Remdesivir for COVID and Heparin gtt for hypercoagulable state in COVID   - Continue IV Dexamethasone   - Continue Ceftriaxone and Azithromycin for possible superimposed bacterial pneumonia   - f/u blood and sputum cx   - trend lactate          Critical secondary to Patient has a condition that poses threat to life and bodily function: Severe Respiratory Distress     Critical care was time spent personally by me on the following activities: development of treatment plan with patient or surrogate and bedside caregivers, discussions with consultants, evaluation of patient's response to treatment, examination of patient, ordering and performing treatments and interventions, ordering and review of laboratory studies, ordering and review of radiographic studies, pulse oximetry, re-evaluation of patient's condition. This critical care time did not overlap with that of any other provider or involve time for any procedures.     Adam Felipe MD  Critical Care Medicine  [facility]

## 2020-07-27 NOTE — PROGRESS NOTES
RT received @ 2100 on transport vent w/ EMS. Charge RN notified MD of pt arrival. Pt placed on ventilator per same settings pt was on, 20/450/+8/70%. Pt's ETT is 8.0, 27 @ teeth. RT will continue to monitor.  RT Aubree

## 2020-07-27 NOTE — HPI
Anup Miller is a 70 y.o. male patient with a PMHx of HTN, HLD, and DM who presents to the [place] for evaluation of SOB which  1 week ago. Pt became more SOB and has an O2 sat of low 70s upon arrival on RA. Symptoms are worsening and moderate in severity. No mitigating or exacerbating factors reported. Associated sxs include cough and fever. Patient denies any congestion, sore throat, CP, abd pain, N/V, back pain, HA, weakness, and all other sxs at this time. No prior Tx reported. No further complaints or concerns at this time. Patient was placed on Bipap for a few hours and subsequently intubated. COVID positive. Was started on IV dexamethasone, Ceftriaxone, and Azithromycin. Patient transferred to [facility] on evening of 7/26/20 for higher level of care.

## 2020-07-27 NOTE — SUBJECTIVE & OBJECTIVE
Past Medical History:   Diagnosis Date    Diabetes mellitus type II Diagnosed 2002    Elevated liver enzymes     Fatty liver disease, nonalcoholic     Hyperlipidemia     Hypertension     Sleep apnea        Past Surgical History:   Procedure Laterality Date    BACK SURGERY      CHOLECYSTECTOMY  2013    COLONOSCOPY N/A 7/19/2018    Procedure: COLONOSCOPY;  Surgeon: Chacorta Lopez III, MD;  Location: [facility];  Service: Endoscopy;  Laterality: N/A;       Review of patient's allergies indicates:  No Known Allergies    Family History     Problem Relation (Age of Onset)    Asthma Mother    Cancer Sister, Maternal Grandmother    Diabetes Father, Paternal Aunt, Maternal Grandmother, Paternal Uncle        Tobacco Use    Smoking status: Never Smoker    Smokeless tobacco: Never Used   Substance and Sexual Activity    Alcohol use: No     Alcohol/week: 0.0 standard drinks    Drug use: No    Sexual activity: Yes     Partners: Female      Review of Systems   Unable to perform ROS: Intubated     Objective:     Vital Signs (Most Recent):  Temp: 100.3 °F (37.9 °C) (07/26/20 2315)  Pulse: 95 (07/26/20 2341)  Resp: (!) 38 (07/26/20 2341)  BP: 106/67 (07/26/20 2330)  SpO2: (!) 93 % (07/26/20 2341) Vital Signs (24h Range):  Temp:  [97.5 °F (36.4 °C)-102.5 °F (39.2 °C)] 100.3 °F (37.9 °C)  Pulse:  [] 95  Resp:  [13-67] 38  SpO2:  [81 %-100 %] 93 %  BP: (103-197)/() 106/67   Weight: 111 kg (244 lb 11.4 oz)  Body mass index is 36.14 kg/m².    No intake or output data in the 24 hours ending 07/26/20 2344    Physical Exam  Nursing note reviewed.   Constitutional:       Comments: Intubated and sedated   HENT:      Head: Normocephalic and atraumatic.   Eyes:      Conjunctiva/sclera: Conjunctivae normal.   Neck:      Musculoskeletal: Neck supple.   Cardiovascular:      Rate and Rhythm: Regular rhythm. Tachycardia present.      Heart sounds: No murmur.   Pulmonary:      Breath sounds: No stridor. No wheezing or rales.       Comments: Course mechanical breath sounds bilaterally   Abdominal:      General: There is no distension.      Palpations: Abdomen is soft.      Tenderness: There is no abdominal tenderness.   Musculoskeletal:      Right lower leg: No edema.      Left lower leg: No edema.   Skin:     General: Skin is warm and dry.   Neurological:      General: No focal deficit present.         Vents:  Vent Mode: A/C (07/26/20 2341)  Ventilator Initiated: Yes (07/26/20 2108)  Set Rate: 30 BPM (07/26/20 2341)  Vt Set: 420 mL (07/26/20 2341)  Pressure Support: 0 cmH20 (07/26/20 2341)  PEEP/CPAP: 8 cmH20 (07/26/20 2341)  Oxygen Concentration (%): 50 (07/26/20 2341)  Peak Airway Pressure: 27 cmH2O (07/26/20 2341)  Plateau Pressure: 15 cmH20 (07/26/20 2341)  Total Ve: 15.5 mL (07/26/20 2341)  F/VT Ratio<105 (RSBI): 161.02 (07/26/20 2341)  Lines/Drains/Airways     Drain                 NG/OG Tube 07/26/20 1756 [place] sump;orogastric 18 Fr. Center mouth less than 1 day         Urethral Catheter 07/26/20 2205 16 Fr. less than 1 day          Airway                 Airway - Non-Surgical 07/26/20 1724 Endotracheal Tube less than 1 day          Peripheral Intravenous Line                 Peripheral IV - Single Lumen 07/25/20 1945 20 G Right Antecubital 1 day         Peripheral IV - Single Lumen 07/25/20 1950 20 G Left Hand 1 day              Significant Labs:    CBC/Anemia Profile:  Recent Labs   Lab 07/25/20 1953 07/26/20  0553 07/26/20 2236   WBC 7.02 5.67 9.89   HGB 13.6* 12.8* 13.2*   HCT 42.8 40.5 41.0    268 324   MCV 86 85 87   RDW 13.3 13.4 13.8   FERRITIN 1,752*  --   --         Chemistries:  Recent Labs   Lab 07/25/20 2050 07/26/20  0553    135*   K 4.3 4.3    101   CO2 16* 18*   BUN 21 28*   CREATININE 1.5* 1.7*   CALCIUM 8.9 9.1   ALBUMIN 3.2* 3.1*   PROT 7.6 7.7   BILITOT 0.5 0.5   ALKPHOS 38* 39*   * 109*   * 118*   MG  --  2.4   PHOS  --  5.1*       All pertinent labs within the past 24 hours  have been reviewed.    Significant Imaging: I have reviewed all pertinent imaging results/findings within the past 24 hours.

## 2020-07-27 NOTE — HOSPITAL COURSE
71 y/o male admitted with a dx of COVID-19 infection  Pneumonitis and  acute hypoxic  respiratory  Failure . Pt  Was placed on Bipap due to  Worsen respiratory status . He improved  For a couple of Hours on Bipap  And subsequently  Need mechanical intubation due to worse respiratory status on bipap .  Pt required heavy sedation  After intubation due to agitation .  Pt  Is max out on propofol  .  His vs stabilized  And  The RR went down to 40  After 5 mg of versed .  The Vent setting were changed. Pt will be transfer to the Main campus .

## 2020-07-27 NOTE — LETTER
July 27, 2020      [facility]  [address]  [place] [address]  Phone: [phone]  Fax: [phone]       Patient: Anup Miller   YOB: 1950    To Whom It May Concern:    Anup Miller is currently admitted into [facility] and being treated for COVID and pneumonia.    Sincerely,    Tisha Brooks MA

## 2020-07-27 NOTE — TELEPHONE ENCOUNTER
Pt wife called stating that the pt job needs something stating that he is currently in the hospital and positive for covid. Letter created and faxed to [phone]

## 2020-07-27 NOTE — DISCHARGE SUMMARY
[facility] Hospital Medicine  Discharge Summary      Patient Name: Anup Miller  MRN: [id number]  Admission Date: 7/25/2020  Hospital Length of Stay: 1 days  Discharge Date and Time: 7/26/2020  7:04 PM  Attending Physician: No att. providers found   Discharging Provider: Alberto Wu MD  Primary Care Provider: Bryce Gonzales MD      HPI:   Anup Miller is a 70 y.o. male patient with a PMHx of HTN, HLD, and DM who presents to the Emergency Department for evaluation of SOB which  1 week ago. Pt became more SOB today and has an O2 sat of low 70s upon arrival on RA. Symptoms are worsening and moderate in severity. No mitigating or exacerbating factors reported. Associated sxs include cough and fever. Patient denies any congestion, sore throat, CP, abd pain, N/V, back pain, HA, weakness, and all other sxs at this time. No prior Tx reported. No further complaints or concerns at this time.  Patient continued to be tachypneic and respiratory distress patient continued to be tachypneic and respiratory distress continue BiPAP request ICU admission if can be weaned off of BiPAP and moved to telemetry.       * No surgery found *      Hospital Course:   69 y/o male admitted with a dx of COVID-19 infection  Pneumonitis and  acute hypoxic  respiratory  Failure . Pt  Was placed on Bipap due to  Worsen respiratory status . He improved  For a couple of Hours on Bipap  And subsequently  Need mechanical intubation due to worse respiratory status on bipap .  Pt required heavy sedation  After intubation due to agitation .  Pt  Is max out on propofol  .  His vs stabilized  And  The RR went down to 40  After 5 mg of versed .  The Vent setting were changed. Pt will be transfer to the Main campus .       Consults:   Consults (From admission, onward)        Status Ordering Provider     Inpatient consult to Pulmonology  Once     Provider:  Karl Drew MD    Acknowledged CLEO GUERIN          *  Pneumonia due to COVID-19 virus  - COVID-19 testing   - Infection Control notified     - Isolation:   - Airborne, Contact and Droplet Precautions  - Cohort patients into COVID units  - N95 mask, wear eye protection  - 20 second hand hygiene              - Limit visitors per hospital policy              - Consolidating lab draws, nursing care, provider visits, and interventions    - Diagnostics: (leukopenia, hyponatremia, hyperferritinemia, elevated troponin, elevated d-dimer, age, and comorbidities are significant predictors of poor clinical outcome)  CBC, CMP, Ferritin, CRP and Portable CXR    - Management:  Supplemental O2 to maintain SpO2 >92%  Continuous/intermittent Pulse Ox  Albuterol treatment PRN                   Acute hypoxemic respiratory failure  -covid 19 pneumonia   -azithromycin/ceftrixone   -inhalers  -dexa  -continuous bipap icu admit if weaned off can consider telemetry      Uncontrolled type 2 diabetes mellitus  -sliding scale for now   -hold home antidiabetics       Final Active Diagnoses:    Diagnosis Date Noted POA    PRINCIPAL PROBLEM:  Pneumonia due to COVID-19 virus [U07.1, J12.89] 07/26/2020 Yes    Acute hypoxemic respiratory failure [J96.01] 07/26/2020 Yes    Uncontrolled type 2 diabetes mellitus [E11.65] 10/14/2013 Yes      Problems Resolved During this Admission:       Discharged Condition: fair    Disposition: Another Health Care Inst*    Follow Up:  Follow-up Information     Bryce Gonzales MD In 5 weeks.    Specialty: Internal Medicine  Contact information:  [id number]  [place] [address] [phone]                 Patient Instructions:      Diet diabetic     Activity as tolerated       Significant Diagnostic Studies: Labs:   BMP:   Recent Labs   Lab 07/25/20 2050 07/26/20  0553   * 199*    135*   K 4.3 4.3    101   CO2 16* 18*   BUN 21 28*   CREATININE 1.5* 1.7*   CALCIUM 8.9 9.1   MG  --  2.4   , CMP   Recent Labs   Lab 07/25/20 2050  07/26/20  0553    135*   K 4.3 4.3    101   CO2 16* 18*   * 199*   BUN 21 28*   CREATININE 1.5* 1.7*   CALCIUM 8.9 9.1   PROT 7.6 7.7   ALBUMIN 3.2* 3.1*   BILITOT 0.5 0.5   ALKPHOS 38* 39*   * 118*   * 109*   ANIONGAP 20* 16   ESTGFRAFRICA 55* 47*   EGFRNONAA 47* 41*    and CBC   Recent Labs   Lab 07/25/20 1953 07/26/20  0553   WBC 7.02 5.67   HGB 13.6* 12.8*   HCT 42.8 40.5    268     Microbiology:   Blood Culture   Lab Results   Component Value Date    LABBLOO No Growth to date 07/25/2020       Pending Diagnostic Studies:     None         Medications:  Reconciled Home Medications:      Medication List      CONTINUE taking these medications    ALREX 0.2 % Drps  Generic drug: loteprednol  Place 1 drop into both eyes 3 (three) times daily as needed.     azithromycin 250 MG tablet  Commonly known as: Z-TEMI     ertugliflozin 15 mg Tab  Commonly known as: STEGLATRO  Take 1 tablet by mouth once daily.     glimepiride 4 MG tablet  Commonly known as: AMARYL  Take 2 tablets (8 mg total) by mouth once daily.     hydroCHLOROthiazide 25 MG tablet  Commonly known as: HYDRODIURIL  TAKE 1 TABLET BY MOUTH ONE TIME DAILY     insulin glargine 100 units/mL (3mL) SubQ pen  Commonly known as: LANTUS SOLOSTAR U-100 INSULIN  Inject 10 Units into the skin every evening.     levocetirizine 5 MG tablet  Commonly known as: XYZAL  Take 1 tablet (5 mg total) by mouth every morning.     losartan 100 MG tablet  Commonly known as: COZAAR  TAKE 1 TABLET BY MOUTH ONE TIME DAILY     metFORMIN 750 MG XR 24hr tablet  Commonly known as: GLUCOPHAGE-XR  Take 1 tablet (750 mg total) by mouth 2 (two) times daily with meals.     multivitamin capsule  Take 1 capsule by mouth once daily.     pantoprazole 40 MG tablet  Commonly known as: PROTONIX  TAKE 1 TABLET BY MOUTH ONE TIME DAILY     pioglitazone 15 MG tablet  Commonly known as: ACTOS  Take 1 tablet (15 mg total) by mouth once daily.     rosuvastatin 10 MG  tablet  Commonly known as: CRESTOR  TAKE 1 TABLET BY MOUTH DAILY            Indwelling Lines/Drains at time of discharge:   Lines/Drains/Airways     Drain                 NG/OG Tube 07/26/20 1756 [place] sump;orogastric 18 Fr. Center mouth less than 1 day    Male External Urinary Catheter 07/25/20 2022 Medium less than 1 day          Airway                 Airway - Non-Surgical 07/26/20 1724 Endotracheal Tube less than 1 day                Time spent on the discharge of patient: 35 minutes  Patient was seen and examined on the date of discharge and determined to be suitable for discharge.         Alberto Wu MD  Department of Hospital Medicine  [facility]

## 2020-07-27 NOTE — CARE UPDATE
Remdesivir FDA EUA Verbal Consent  The patient or parent/caregiver has been provided with the remdesivir Fact Sheet for Patients and Parents/Caregivers and has been counseled that the FDA has authorized the emergency use of remdesivir, which is not an FDA approved drug. The significant known and potential risks and benefits are unknown. The patient or parent/caregiver has been given the option to accept or refuse and has verbally agreed to receive remdesivir. Daily labs will be ordered and monitoring for Serious Adverse Events will be performed.    Adam Negron,   PGY-1 Internal Medicine  [facility]

## 2020-07-27 NOTE — ASSESSMENT & PLAN NOTE
Anup Miller is a 70 y.o. male patient with a PMHx of HTN, HLD, and DM who presents to the [place] Emergency Department for shortness of breath and found to be COVID positive. Was started on IV dexamethasone, Ceftriaxone, and Azithromycin. Patient transferred to [facility] on evening of 7/26/20 for higher level of care. Labs on arrival to [facility] remarkable for WBC 9.8, Procal 1.3, D-dimer 1.1, Ferritin 2,300, , and .     Plan   - Given 1 L LR on arrival to C  - Will start Remdesivir for COVID and Heparin gtt for hypercoagulable state in COVID   - Continue IV Dexamethasone   - Continue Ceftriaxone and Azithromycin for possible superimposed bacterial pneumonia   - f/u blood and sputum cx   - trend lactate

## 2020-07-27 NOTE — PROGRESS NOTES
07/27/2020:  14:58  TC to the patient's wife Zhou regarding [facility] COVID clinical trial. She was currently driving and emailed the informed consent form to her to read and review.  She stated she wanted to review with the patient's daughter, Tim.    16:00 TC to the patient's wife Zhou.  She stated both her and Tim read the [facility] COVID informed consent form and they did not want him to participate in the [facility] COVID clinical trial. Thanked her for her time and stated if she had any additional questions or wanted to reconsider participation to call or email.

## 2020-07-28 LAB
ALBUMIN SERPL BCP-MCNC: 2.5 G/DL (ref 3.5–5.2)
ALP SERPL-CCNC: 53 U/L (ref 55–135)
ALT SERPL W/O P-5'-P-CCNC: 84 U/L (ref 10–44)
ANION GAP SERPL CALC-SCNC: 16 MMOL/L (ref 8–16)
AST SERPL-CCNC: 85 U/L (ref 10–40)
BASOPHILS # BLD AUTO: 0.01 K/UL (ref 0–0.2)
BASOPHILS NFR BLD: 0.1 % (ref 0–1.9)
BILIRUB SERPL-MCNC: 0.6 MG/DL (ref 0.1–1)
BUN SERPL-MCNC: 31 MG/DL (ref 8–23)
CALCIUM SERPL-MCNC: 8.7 MG/DL (ref 8.7–10.5)
CHLORIDE SERPL-SCNC: 108 MMOL/L (ref 95–110)
CO2 SERPL-SCNC: 18 MMOL/L (ref 23–29)
CREAT SERPL-MCNC: 1.3 MG/DL (ref 0.5–1.4)
DIFFERENTIAL METHOD: ABNORMAL
EOSINOPHIL # BLD AUTO: 0 K/UL (ref 0–0.5)
EOSINOPHIL NFR BLD: 0 % (ref 0–8)
ERYTHROCYTE [DISTWIDTH] IN BLOOD BY AUTOMATED COUNT: 14 % (ref 11.5–14.5)
EST. GFR  (AFRICAN AMERICAN): >60 ML/MIN/1.73 M^2
EST. GFR  (NON AFRICAN AMERICAN): 56.1 ML/MIN/1.73 M^2
FACT X PPP CHRO-ACNC: 0.29 IU/ML (ref 0.3–0.7)
FACT X PPP CHRO-ACNC: 0.35 IU/ML (ref 0.3–0.7)
FACT X PPP CHRO-ACNC: 0.77 IU/ML (ref 0.3–0.7)
FACT X PPP CHRO-ACNC: 1.05 IU/ML (ref 0.3–0.7)
FACT X PPP CHRO-ACNC: 1.08 IU/ML (ref 0.3–0.7)
GLUCOSE SERPL-MCNC: 222 MG/DL (ref 70–110)
HCT VFR BLD AUTO: 40.8 % (ref 40–54)
HGB BLD-MCNC: 12.7 G/DL (ref 14–18)
IMM GRANULOCYTES # BLD AUTO: 0.21 K/UL (ref 0–0.04)
IMM GRANULOCYTES NFR BLD AUTO: 1.6 % (ref 0–0.5)
LYMPHOCYTES # BLD AUTO: 1 K/UL (ref 1–4.8)
LYMPHOCYTES NFR BLD: 7.5 % (ref 18–48)
MAGNESIUM SERPL-MCNC: 2.7 MG/DL (ref 1.6–2.6)
MCH RBC QN AUTO: 27.5 PG (ref 27–31)
MCHC RBC AUTO-ENTMCNC: 31.1 G/DL (ref 32–36)
MCV RBC AUTO: 88 FL (ref 82–98)
MONOCYTES # BLD AUTO: 0.8 K/UL (ref 0.3–1)
MONOCYTES NFR BLD: 6.1 % (ref 4–15)
NEUTROPHILS # BLD AUTO: 11.3 K/UL (ref 1.8–7.7)
NEUTROPHILS NFR BLD: 84.7 % (ref 38–73)
NRBC BLD-RTO: 0 /100 WBC
PHOSPHATE SERPL-MCNC: 3.5 MG/DL (ref 2.7–4.5)
PLATELET # BLD AUTO: 308 K/UL (ref 150–350)
PMV BLD AUTO: 10.7 FL (ref 9.2–12.9)
POCT GLUCOSE: 241 MG/DL (ref 70–110)
POCT GLUCOSE: 265 MG/DL (ref 70–110)
POCT GLUCOSE: 265 MG/DL (ref 70–110)
POTASSIUM SERPL-SCNC: 5 MMOL/L (ref 3.5–5.1)
PROT SERPL-MCNC: 7.2 G/DL (ref 6–8.4)
RBC # BLD AUTO: 4.62 M/UL (ref 4.6–6.2)
SODIUM SERPL-SCNC: 142 MMOL/L (ref 136–145)
WBC # BLD AUTO: 13.27 K/UL (ref 3.9–12.7)

## 2020-07-28 PROCEDURE — 85025 COMPLETE CBC W/AUTO DIFF WBC: CPT

## 2020-07-28 PROCEDURE — 99900035 HC TECH TIME PER 15 MIN (STAT)

## 2020-07-28 PROCEDURE — 27000221 HC OXYGEN, UP TO 24 HOURS

## 2020-07-28 PROCEDURE — 94003 VENT MGMT INPAT SUBQ DAY: CPT

## 2020-07-28 PROCEDURE — 80053 COMPREHEN METABOLIC PANEL: CPT

## 2020-07-28 PROCEDURE — 25000003 PHARM REV CODE 250: Performed by: STUDENT IN AN ORGANIZED HEALTH CARE EDUCATION/TRAINING PROGRAM

## 2020-07-28 PROCEDURE — 25000003 PHARM REV CODE 250: Performed by: INTERNAL MEDICINE

## 2020-07-28 PROCEDURE — 99291 CRITICAL CARE FIRST HOUR: CPT | Mod: ,,, | Performed by: INTERNAL MEDICINE

## 2020-07-28 PROCEDURE — 84100 ASSAY OF PHOSPHORUS: CPT

## 2020-07-28 PROCEDURE — 99291 PR CRITICAL CARE, E/M 30-74 MINUTES: ICD-10-PCS | Mod: ,,, | Performed by: INTERNAL MEDICINE

## 2020-07-28 PROCEDURE — S0028 INJECTION, FAMOTIDINE, 20 MG: HCPCS | Performed by: STUDENT IN AN ORGANIZED HEALTH CARE EDUCATION/TRAINING PROGRAM

## 2020-07-28 PROCEDURE — 85520 HEPARIN ASSAY: CPT | Mod: 91

## 2020-07-28 PROCEDURE — 83735 ASSAY OF MAGNESIUM: CPT

## 2020-07-28 PROCEDURE — 94761 N-INVAS EAR/PLS OXIMETRY MLT: CPT

## 2020-07-28 PROCEDURE — 99900026 HC AIRWAY MAINTENANCE (STAT)

## 2020-07-28 PROCEDURE — 20000000 HC ICU ROOM

## 2020-07-28 PROCEDURE — 63600175 PHARM REV CODE 636 W HCPCS: Performed by: STUDENT IN AN ORGANIZED HEALTH CARE EDUCATION/TRAINING PROGRAM

## 2020-07-28 PROCEDURE — 63600175 PHARM REV CODE 636 W HCPCS: Performed by: INTERNAL MEDICINE

## 2020-07-28 PROCEDURE — 85520 HEPARIN ASSAY: CPT

## 2020-07-28 RX ORDER — POLYETHYLENE GLYCOL 3350 17 G/17G
17 POWDER, FOR SOLUTION ORAL DAILY
Status: DISCONTINUED | OUTPATIENT
Start: 2020-07-28 | End: 2020-08-26 | Stop reason: HOSPADM

## 2020-07-28 RX ADMIN — REMDESIVIR 100 MG: 100 INJECTION, POWDER, LYOPHILIZED, FOR SOLUTION INTRAVENOUS at 04:07

## 2020-07-28 RX ADMIN — ACETAMINOPHEN 650 MG: 160 SOLUTION ORAL at 06:07

## 2020-07-28 RX ADMIN — ROSUVASTATIN CALCIUM 10 MG: 10 TABLET, FILM COATED ORAL at 09:07

## 2020-07-28 RX ADMIN — HEPARIN SODIUM AND DEXTROSE 20 UNITS/KG/HR: 10000; 5 INJECTION INTRAVENOUS at 04:07

## 2020-07-28 RX ADMIN — PROPOFOL 30 MCG/KG/MIN: 10 INJECTION, EMULSION INTRAVENOUS at 12:07

## 2020-07-28 RX ADMIN — PROPOFOL 20 MCG/KG/MIN: 10 INJECTION, EMULSION INTRAVENOUS at 04:07

## 2020-07-28 RX ADMIN — Medication 175 MCG/HR: at 08:07

## 2020-07-28 RX ADMIN — DEXMEDETOMIDINE HYDROCHLORIDE 1 MCG/KG/HR: 4 INJECTION, SOLUTION INTRAVENOUS at 08:07

## 2020-07-28 RX ADMIN — DEXMEDETOMIDINE HYDROCHLORIDE 1.4 MCG/KG/HR: 4 INJECTION, SOLUTION INTRAVENOUS at 01:07

## 2020-07-28 RX ADMIN — CEFTRIAXONE 1 G: 1 INJECTION, POWDER, FOR SOLUTION INTRAMUSCULAR; INTRAVENOUS at 09:07

## 2020-07-28 RX ADMIN — PROPOFOL 25 MCG/KG/MIN: 10 INJECTION, EMULSION INTRAVENOUS at 04:07

## 2020-07-28 RX ADMIN — PROPOFOL 20 MCG/KG/MIN: 10 INJECTION, EMULSION INTRAVENOUS at 01:07

## 2020-07-28 RX ADMIN — FAMOTIDINE 20 MG: 10 INJECTION INTRAVENOUS at 08:07

## 2020-07-28 RX ADMIN — PROPOFOL 20 MCG/KG/MIN: 10 INJECTION, EMULSION INTRAVENOUS at 08:07

## 2020-07-28 RX ADMIN — INSULIN ASPART 2 UNITS: 100 INJECTION, SOLUTION INTRAVENOUS; SUBCUTANEOUS at 12:07

## 2020-07-28 RX ADMIN — ACETAMINOPHEN 650 MG: 160 SOLUTION ORAL at 02:07

## 2020-07-28 RX ADMIN — ACETAMINOPHEN 650 MG: 160 SOLUTION ORAL at 09:07

## 2020-07-28 RX ADMIN — FAMOTIDINE 20 MG: 10 INJECTION INTRAVENOUS at 10:07

## 2020-07-28 RX ADMIN — DEXMEDETOMIDINE HYDROCHLORIDE 0.6 MCG/KG/HR: 4 INJECTION, SOLUTION INTRAVENOUS at 12:07

## 2020-07-28 RX ADMIN — DEXMEDETOMIDINE HYDROCHLORIDE 1 MCG/KG/HR: 4 INJECTION, SOLUTION INTRAVENOUS at 04:07

## 2020-07-28 RX ADMIN — INSULIN ASPART 3 UNITS: 100 INJECTION, SOLUTION INTRAVENOUS; SUBCUTANEOUS at 04:07

## 2020-07-28 RX ADMIN — AZITHROMYCIN MONOHYDRATE 500 MG: 500 INJECTION, POWDER, LYOPHILIZED, FOR SOLUTION INTRAVENOUS at 10:07

## 2020-07-28 RX ADMIN — DEXAMETHASONE SODIUM PHOSPHATE 6 MG: 4 INJECTION, SOLUTION INTRA-ARTICULAR; INTRALESIONAL; INTRAMUSCULAR; INTRAVENOUS; SOFT TISSUE at 10:07

## 2020-07-28 NOTE — SUBJECTIVE & OBJECTIVE
Interval History/Significant Events: Failed SBT today. Pt de-recruits extremely quickly during SBT and takes a lengthy amount of time to recover. Needs more time, however, O2 requirements are improving.     Review of Systems   Unable to perform ROS: Intubated     Objective:     Vital Signs (Most Recent):  Temp: 98.2 °F (36.8 °C) (07/28/20 1200)  Pulse: 65 (07/28/20 1400)  Resp: (!) 27 (07/28/20 1400)  BP: 112/70 (07/28/20 1400)  SpO2: (!) 94 % (07/28/20 1400) Vital Signs (24h Range):  Temp:  [97.9 °F (36.6 °C)-99 °F (37.2 °C)] 98.2 °F (36.8 °C)  Pulse:  [64-74] 65  Resp:  [26-46] 27  SpO2:  [88 %-96 %] 94 %  BP: (109-146)/(65-86) 112/70   Weight: 111 kg (244 lb 11.4 oz)  Body mass index is 36.14 kg/m².      Intake/Output Summary (Last 24 hours) at 7/28/2020 1639  Last data filed at 7/28/2020 0700  Gross per 24 hour   Intake 1405.99 ml   Output 1975 ml   Net -569.01 ml       Physical Exam  Nursing note reviewed.   Constitutional:       Comments: Intubated and sedated   HENT:      Head: Normocephalic and atraumatic.   Eyes:      Conjunctiva/sclera: Conjunctivae normal.   Neck:      Musculoskeletal: Neck supple.   Cardiovascular:      Rate and Rhythm: Regular rhythm. Tachycardia present.      Heart sounds: No murmur.   Pulmonary:      Breath sounds: No stridor. No wheezing or rales.      Comments: Course mechanical breath sounds bilaterally   Abdominal:      General: There is no distension.      Palpations: Abdomen is soft.      Tenderness: There is no abdominal tenderness.   Musculoskeletal:      Right lower leg: No edema.      Left lower leg: No edema.   Skin:     General: Skin is warm and dry.   Neurological:      General: No focal deficit present.         Vents:  Vent Mode: A/C (07/28/20 1318)  Ventilator Initiated: Yes (07/26/20 2108)  Set Rate: 20 BPM (07/28/20 1318)  Vt Set: 0 mL (07/28/20 1318)  Pressure Support: 0 cmH20 (07/28/20 1318)  PEEP/CPAP: 10 cmH20 (07/28/20 1318)  Oxygen Concentration (%): 60  (07/28/20 1400)  Peak Airway Pressure: 29 cmH2O (07/28/20 1318)  Plateau Pressure: 34 cmH20 (07/28/20 1318)  Total Ve: 13.2 mL (07/28/20 1318)  F/VT Ratio<105 (RSBI): (!) 51.69 (07/28/20 1318)  Lines/Drains/Airways     Drain                 NG/OG Tube 07/26/20 1756 [place] sump;orogastric 18 Fr. Center mouth 1 day         Urethral Catheter 07/26/20 2205 16 Fr. 1 day          Airway                 Airway - Non-Surgical 07/26/20 1724 Endotracheal Tube 1 day          Peripheral Intravenous Line                 Peripheral IV - Single Lumen 07/25/20 1945 20 G Right Antecubital 2 days         Peripheral IV - Single Lumen 07/25/20 1950 20 G Left Hand 2 days         Peripheral IV - Single Lumen 07/27/20 1447 18 G Anterior;Distal;Left Wrist 1 day         Peripheral IV - Single Lumen 07/28/20 0100 20 G Distal;Posterior;Right Forearm less than 1 day              Significant Labs:    CBC/Anemia Profile:  Recent Labs   Lab 07/26/20 2236 07/27/20  0500 07/28/20  0500   WBC 9.89 9.26 13.27*   HGB 13.2* 12.1* 12.7*   HCT 41.0 39.5* 40.8    268 308   MCV 87 89 88   RDW 13.8 13.9 14.0   FERRITIN 2,359*  --   --         Chemistries:  Recent Labs   Lab 07/26/20 2236 07/27/20  0500 07/28/20  0500    138 142   K 4.7 4.7 5.0    104 108   CO2 19* 18* 18*   BUN 33* 32* 31*   CREATININE 1.5* 1.4 1.3   CALCIUM 9.0 8.9 8.7   ALBUMIN 3.0* 2.8* 2.5*   PROT 7.5 7.2 7.2   BILITOT 0.6 0.6 0.6   ALKPHOS 46* 43* 53*   * 98* 84*   * 114* 85*   MG 2.3 2.6 2.7*   PHOS 3.7 4.6* 3.5       All pertinent labs within the past 24 hours have been reviewed.    Significant Imaging:  I have reviewed all pertinent imaging results/findings within the past 24 hours.

## 2020-07-28 NOTE — PROGRESS NOTES
[facility]   Critical Care Medicine  Progress Note    Patient Name: Anup Miller  MRN: [id number]  Admission Date: 7/26/2020  Hospital Length of Stay: 2 days  Code Status: Full Code  Attending Provider: Jong Morrell MD  Primary Care Provider: Bryce Gonzales MD   Principal Problem: Pneumonia due to COVID-19 virus    Subjective:     HPI:  Anup Miller is a 70 y.o. male patient with a PMHx of HTN, HLD, and DM who presents to the [place] Emergency Department for evaluation of SOB which  1 week ago. Pt became more SOB today and has an O2 sat of low 70s upon arrival on RA. Symptoms are worsening and moderate in severity. No mitigating or exacerbating factors reported. Associated sxs include cough and fever. Patient denies any congestion, sore throat, CP, abd pain, N/V, back pain, HA, weakness, and all other sxs at this time. No prior Tx reported. No further complaints or concerns at this time. Patient was placed on Bipap for a few hours and subsequently intubated. COVID positive. Was started on IV dexamethasone, Ceftriaxone, and Azithromycin. Patient transferred to [facility] on evening of 7/26/20 for higher level of care.     Interval History/Significant Events: Failed SBT today. Pt de-recruits extremely quickly during SBT and takes a lengthy amount of time to recover. Needs more time, however, O2 requirements are improving.     Review of Systems   Unable to perform ROS: Intubated     Objective:     Vital Signs (Most Recent):  Temp: 98.2 °F (36.8 °C) (07/28/20 1200)  Pulse: 65 (07/28/20 1400)  Resp: (!) 27 (07/28/20 1400)  BP: 112/70 (07/28/20 1400)  SpO2: (!) 94 % (07/28/20 1400) Vital Signs (24h Range):  Temp:  [97.9 °F (36.6 °C)-99 °F (37.2 °C)] 98.2 °F (36.8 °C)  Pulse:  [64-74] 65  Resp:  [26-46] 27  SpO2:  [88 %-96 %] 94 %  BP: (109-146)/(65-86) 112/70   Weight: 111 kg (244 lb 11.4 oz)  Body mass index is 36.14 kg/m².      Intake/Output Summary (Last 24 hours) at 7/28/2020 3609  Last  data filed at 7/28/2020 0700  Gross per 24 hour   Intake 1405.99 ml   Output 1975 ml   Net -569.01 ml       Physical Exam  Nursing note reviewed.   Constitutional:       Comments: Intubated and sedated   HENT:      Head: Normocephalic and atraumatic.   Eyes:      Conjunctiva/sclera: Conjunctivae normal.   Neck:      Musculoskeletal: Neck supple.   Cardiovascular:      Rate and Rhythm: Regular rhythm. Tachycardia present.      Heart sounds: No murmur.   Pulmonary:      Breath sounds: No stridor. No wheezing or rales.      Comments: Course mechanical breath sounds bilaterally   Abdominal:      General: There is no distension.      Palpations: Abdomen is soft.      Tenderness: There is no abdominal tenderness.   Musculoskeletal:      Right lower leg: No edema.      Left lower leg: No edema.   Skin:     General: Skin is warm and dry.   Neurological:      General: No focal deficit present.         Vents:  Vent Mode: A/C (07/28/20 1318)  Ventilator Initiated: Yes (07/26/20 2108)  Set Rate: 20 BPM (07/28/20 1318)  Vt Set: 0 mL (07/28/20 1318)  Pressure Support: 0 cmH20 (07/28/20 1318)  PEEP/CPAP: 10 cmH20 (07/28/20 1318)  Oxygen Concentration (%): 60 (07/28/20 1400)  Peak Airway Pressure: 29 cmH2O (07/28/20 1318)  Plateau Pressure: 34 cmH20 (07/28/20 1318)  Total Ve: 13.2 mL (07/28/20 1318)  F/VT Ratio<105 (RSBI): (!) 51.69 (07/28/20 1318)  Lines/Drains/Airways     Drain                 NG/OG Tube 07/26/20 1756 [place] sump;orogastric 18 Fr. Center mouth 1 day         Urethral Catheter 07/26/20 2205 16 Fr. 1 day          Airway                 Airway - Non-Surgical 07/26/20 1724 Endotracheal Tube 1 day          Peripheral Intravenous Line                 Peripheral IV - Single Lumen 07/25/20 1945 20 G Right Antecubital 2 days         Peripheral IV - Single Lumen 07/25/20 1950 20 G Left Hand 2 days         Peripheral IV - Single Lumen 07/27/20 1447 18 G Anterior;Distal;Left Wrist 1 day         Peripheral IV - Single Lumen  07/28/20 0100 20 G Distal;Posterior;Right Forearm less than 1 day              Significant Labs:    CBC/Anemia Profile:  Recent Labs   Lab 07/26/20 2236 07/27/20  0500 07/28/20  0500   WBC 9.89 9.26 13.27*   HGB 13.2* 12.1* 12.7*   HCT 41.0 39.5* 40.8    268 308   MCV 87 89 88   RDW 13.8 13.9 14.0   FERRITIN 2,359*  --   --         Chemistries:  Recent Labs   Lab 07/26/20 2236 07/27/20  0500 07/28/20  0500    138 142   K 4.7 4.7 5.0    104 108   CO2 19* 18* 18*   BUN 33* 32* 31*   CREATININE 1.5* 1.4 1.3   CALCIUM 9.0 8.9 8.7   ALBUMIN 3.0* 2.8* 2.5*   PROT 7.5 7.2 7.2   BILITOT 0.6 0.6 0.6   ALKPHOS 46* 43* 53*   * 98* 84*   * 114* 85*   MG 2.3 2.6 2.7*   PHOS 3.7 4.6* 3.5       All pertinent labs within the past 24 hours have been reviewed.    Significant Imaging:  I have reviewed all pertinent imaging results/findings within the past 24 hours.      ABG  Recent Labs   Lab 07/26/20  2340   PH 7.363   PO2 69*   PCO2 31.3*   HCO3 17.8*   BE -8     Assessment/Plan:     Pulmonary  Acute hypoxemic respiratory failure  - see Pneumonia due to COVID 19    Cardiac/Vascular  Hyperlipidemia associated with type 2 diabetes mellitus  - restart Crestor once extubated     Hypertension associated with diabetes  - hold antihypertensives in setting of shock    Endocrine  Uncontrolled type 2 diabetes mellitus  - A1c 8.3%  - LDSSI + Detemir 5U qd    GI  Elevated liver enzymes  - hx of elevated LFTs dating back to 2015  - will check Acute hepatitis panel and HIV  - monitor with CMP daily   - Improving    Other  * Pneumonia due to COVID-19 virus  Anup Miller is a 70 y.o. male patient with a PMHx of HTN, HLD, and DM who presents to the [place] Emergency Department for shortness of breath and found to be COVID positive. Was started on IV dexamethasone, Ceftriaxone, and Azithromycin. Patient transferred to [facility] on evening of 7/26/20 for higher level of care. Labs on arrival to [facility] remarkable for  WBC 9.8, Procal 1.3, D-dimer 1.1, Ferritin 2,300, , and .     Plan   - Given 1 L LR on arrival to [facility]  - Will start Remdesivir for COVID and Heparin gtt for hypercoagulable state in COVID   - Continue IV Dexamethasone   - Continue Ceftriaxone and Azithromycin for possible superimposed bacterial pneumonia   - D/C Azithromycin (completed 3 doses)  - f/u blood and sputum cx - NGTD  - trend lactate        Critical Care Daily Checklist:    A: Awake: RASS Goal/Actual Goal:    Actual: [place] Agitation Sedation Scale (RASS): Light sedation   B: Spontaneous Breathing Trial Performed? Spon. Breathing Trial Initiated?: Not initiated (07/28/20 1318)   C: SAT & SBT Coordinated?  Performed - failed                      D: Delirium: CAM-ICU Overall CAM-ICU: Positive   E: Early Mobility Performed? Yes   F: Feeding Goal:  40  Status:   10 (trickle)   Current Diet Order   Procedures    Diet NPO   Glucerna   AS: Analgesia/Sedation Yes. Will wean as tolerated   T: Thromboembolic Prophylaxis Heparin   H: HOB > 300 Yes   U: Stress Ulcer Prophylaxis (if needed) Yes   G: Glucose Control Yes Detemir and LDSSI   B: Bowel Function     I: Indwelling Catheter (Lines & Boothe) Necessity Yes   D: De-escalation of Antimicrobials/Pharmacotherapies Yes (d/c [place])    Plan for the day/ETD Supportive Care    Code Status:  Family/Goals of Care: Full Code         Critical secondary to Patient has a condition that poses threat to life and bodily function: Severe Respiratory Distress      Critical care was time spent personally by me on the following activities: development of treatment plan with patient or surrogate and bedside caregivers, discussions with consultants, evaluation of patient's response to treatment, examination of patient, ordering and performing treatments and interventions, ordering and review of laboratory studies, ordering and review of radiographic studies, pulse oximetry, re-evaluation of patient's condition. This  critical care time did not overlap with that of any other provider or involve time for any procedures.     Efren West MD  Critical Care Medicine  [facility]

## 2020-07-28 NOTE — NURSING
Physician notified of 1st degree AV Block. Prolonged STI; and frequent PJC's.  Waiting for new orders

## 2020-07-28 NOTE — NURSING
Wife called five times during shift. Gave full report initially. Later conversations were limited and notified her that their was no change in status and pt was stable and comfortable.wife expressed that she did not want pt sister to obtain information. Pt sister called and was upset that I would not give her pt information. Explained HIPPA to her.

## 2020-07-29 LAB
ALBUMIN SERPL BCP-MCNC: 2.4 G/DL (ref 3.5–5.2)
ALLENS TEST: ABNORMAL
ALP SERPL-CCNC: 53 U/L (ref 55–135)
ALT SERPL W/O P-5'-P-CCNC: 73 U/L (ref 10–44)
ANION GAP SERPL CALC-SCNC: 12 MMOL/L (ref 8–16)
ANISOCYTOSIS BLD QL SMEAR: SLIGHT
AST SERPL-CCNC: 54 U/L (ref 10–40)
BACTERIA SPEC AEROBE CULT: NO GROWTH
BASO STIPL BLD QL SMEAR: ABNORMAL
BASOPHILS # BLD AUTO: 0.04 K/UL (ref 0–0.2)
BASOPHILS NFR BLD: 0.4 % (ref 0–1.9)
BILIRUB SERPL-MCNC: 0.5 MG/DL (ref 0.1–1)
BUN SERPL-MCNC: 40 MG/DL (ref 8–23)
CALCIUM SERPL-MCNC: 8.7 MG/DL (ref 8.7–10.5)
CHLORIDE SERPL-SCNC: 110 MMOL/L (ref 95–110)
CO2 SERPL-SCNC: 21 MMOL/L (ref 23–29)
CREAT SERPL-MCNC: 1.2 MG/DL (ref 0.5–1.4)
DELSYS: ABNORMAL
DIFFERENTIAL METHOD: ABNORMAL
EOSINOPHIL # BLD AUTO: 0 K/UL (ref 0–0.5)
EOSINOPHIL NFR BLD: 0.1 % (ref 0–8)
ERYTHROCYTE [DISTWIDTH] IN BLOOD BY AUTOMATED COUNT: 14.1 % (ref 11.5–14.5)
ERYTHROCYTE [SEDIMENTATION RATE] IN BLOOD BY WESTERGREN METHOD: 20 MM/H
ERYTHROCYTE [SEDIMENTATION RATE] IN BLOOD BY WESTERGREN METHOD: 20 MM/H
ERYTHROCYTE [SEDIMENTATION RATE] IN BLOOD BY WESTERGREN METHOD: 31 MM/H
EST. GFR  (AFRICAN AMERICAN): >60 ML/MIN/1.73 M^2
EST. GFR  (NON AFRICAN AMERICAN): >60 ML/MIN/1.73 M^2
FACT X PPP CHRO-ACNC: 0.21 IU/ML (ref 0.3–0.7)
FACT X PPP CHRO-ACNC: 0.7 IU/ML (ref 0.3–0.7)
FACT X PPP CHRO-ACNC: 0.97 IU/ML (ref 0.3–0.7)
FIO2: 100
FIO2: 70
FIO2: 70
GLUCOSE SERPL-MCNC: 267 MG/DL (ref 70–110)
GRAM STN SPEC: NORMAL
HCO3 UR-SCNC: 19.1 MMOL/L (ref 24–28)
HCO3 UR-SCNC: 21.2 MMOL/L (ref 24–28)
HCO3 UR-SCNC: 22.2 MMOL/L (ref 24–28)
HCO3 UR-SCNC: 24.2 MMOL/L (ref 24–28)
HCT VFR BLD AUTO: 43 % (ref 40–54)
HGB BLD-MCNC: 13 G/DL (ref 14–18)
IMM GRANULOCYTES # BLD AUTO: 0.21 K/UL (ref 0–0.04)
IMM GRANULOCYTES NFR BLD AUTO: 2 % (ref 0–0.5)
IP: 15
IP: 18
IP: 8
IT: 0.8
IT: 0.8
LYMPHOCYTES # BLD AUTO: 1.3 K/UL (ref 1–4.8)
LYMPHOCYTES NFR BLD: 12.6 % (ref 18–48)
MAGNESIUM SERPL-MCNC: 2.9 MG/DL (ref 1.6–2.6)
MCH RBC QN AUTO: 26.9 PG (ref 27–31)
MCHC RBC AUTO-ENTMCNC: 30.2 G/DL (ref 32–36)
MCV RBC AUTO: 89 FL (ref 82–98)
MODE: ABNORMAL
MONOCYTES # BLD AUTO: 0.6 K/UL (ref 0.3–1)
MONOCYTES NFR BLD: 6 % (ref 4–15)
NEUTROPHILS # BLD AUTO: 8.1 K/UL (ref 1.8–7.7)
NEUTROPHILS NFR BLD: 78.9 % (ref 38–73)
NRBC BLD-RTO: 1 /100 WBC
PCO2 BLDA: 31.4 MMHG (ref 35–45)
PCO2 BLDA: 36.2 MMHG (ref 35–45)
PCO2 BLDA: 38.8 MMHG (ref 35–45)
PCO2 BLDA: 61 MMHG (ref 35–45)
PEEP: 10
PEEP: 12
PEEP: 2
PH SMN: 7.21 [PH] (ref 7.35–7.45)
PH SMN: 7.34 [PH] (ref 7.35–7.45)
PH SMN: 7.39 [PH] (ref 7.35–7.45)
PH SMN: 7.39 [PH] (ref 7.35–7.45)
PHOSPHATE SERPL-MCNC: 3.1 MG/DL (ref 2.7–4.5)
PIP: 28
PIP: 31
PLATELET # BLD AUTO: 336 K/UL (ref 150–350)
PLATELET BLD QL SMEAR: ABNORMAL
PMV BLD AUTO: 10.6 FL (ref 9.2–12.9)
PO2 BLDA: 121 MMHG (ref 80–100)
PO2 BLDA: 149 MMHG (ref 80–100)
PO2 BLDA: 42 MMHG (ref 40–60)
PO2 BLDA: 73 MMHG (ref 80–100)
POC BE: -3 MMOL/L
POC BE: -4 MMOL/L
POC BE: -5 MMOL/L
POC BE: -6 MMOL/L
POC SATURATED O2: 75 % (ref 95–100)
POC SATURATED O2: 95 % (ref 95–100)
POC SATURATED O2: 99 % (ref 95–100)
POC SATURATED O2: 99 % (ref 95–100)
POC TCO2: 20 MMOL/L (ref 23–27)
POC TCO2: 22 MMOL/L (ref 24–29)
POC TCO2: 23 MMOL/L (ref 23–27)
POC TCO2: 26 MMOL/L (ref 23–27)
POCT GLUCOSE: 252 MG/DL (ref 70–110)
POCT GLUCOSE: 259 MG/DL (ref 70–110)
POCT GLUCOSE: 263 MG/DL (ref 70–110)
POCT GLUCOSE: 269 MG/DL (ref 70–110)
POCT GLUCOSE: 280 MG/DL (ref 70–110)
POLYCHROMASIA BLD QL SMEAR: ABNORMAL
POTASSIUM SERPL-SCNC: 4.6 MMOL/L (ref 3.5–5.1)
PROT SERPL-MCNC: 7 G/DL (ref 6–8.4)
RBC # BLD AUTO: 4.83 M/UL (ref 4.6–6.2)
SAMPLE: ABNORMAL
SITE: ABNORMAL
SODIUM SERPL-SCNC: 143 MMOL/L (ref 136–145)
SP02: 95
SP02: 98
VT: 586
VT: 638
WBC # BLD AUTO: 10.25 K/UL (ref 3.9–12.7)

## 2020-07-29 PROCEDURE — 31615 TRCHEOBRNCHSC EST TRACHS INC: CPT

## 2020-07-29 PROCEDURE — 27200966 HC CLOSED SUCTION SYSTEM

## 2020-07-29 PROCEDURE — 82800 BLOOD PH: CPT

## 2020-07-29 PROCEDURE — 25000003 PHARM REV CODE 250: Performed by: NURSE PRACTITIONER

## 2020-07-29 PROCEDURE — 80053 COMPREHEN METABOLIC PANEL: CPT

## 2020-07-29 PROCEDURE — 85025 COMPLETE CBC W/AUTO DIFF WBC: CPT

## 2020-07-29 PROCEDURE — C9399 UNCLASSIFIED DRUGS OR BIOLOG: HCPCS | Performed by: STUDENT IN AN ORGANIZED HEALTH CARE EDUCATION/TRAINING PROGRAM

## 2020-07-29 PROCEDURE — 85520 HEPARIN ASSAY: CPT | Mod: 91

## 2020-07-29 PROCEDURE — S0028 INJECTION, FAMOTIDINE, 20 MG: HCPCS | Performed by: STUDENT IN AN ORGANIZED HEALTH CARE EDUCATION/TRAINING PROGRAM

## 2020-07-29 PROCEDURE — 85520 HEPARIN ASSAY: CPT

## 2020-07-29 PROCEDURE — 25000003 PHARM REV CODE 250: Performed by: INTERNAL MEDICINE

## 2020-07-29 PROCEDURE — 99291 CRITICAL CARE FIRST HOUR: CPT | Mod: ,,, | Performed by: INTERNAL MEDICINE

## 2020-07-29 PROCEDURE — 25000003 PHARM REV CODE 250: Performed by: STUDENT IN AN ORGANIZED HEALTH CARE EDUCATION/TRAINING PROGRAM

## 2020-07-29 PROCEDURE — 82803 BLOOD GASES ANY COMBINATION: CPT

## 2020-07-29 PROCEDURE — 63600175 PHARM REV CODE 636 W HCPCS: Performed by: STUDENT IN AN ORGANIZED HEALTH CARE EDUCATION/TRAINING PROGRAM

## 2020-07-29 PROCEDURE — 84100 ASSAY OF PHOSPHORUS: CPT

## 2020-07-29 PROCEDURE — 63600175 PHARM REV CODE 636 W HCPCS: Performed by: INTERNAL MEDICINE

## 2020-07-29 PROCEDURE — 99291 PR CRITICAL CARE, E/M 30-74 MINUTES: ICD-10-PCS | Mod: ,,, | Performed by: INTERNAL MEDICINE

## 2020-07-29 PROCEDURE — 99900035 HC TECH TIME PER 15 MIN (STAT)

## 2020-07-29 PROCEDURE — 36600 WITHDRAWAL OF ARTERIAL BLOOD: CPT

## 2020-07-29 PROCEDURE — 20000000 HC ICU ROOM

## 2020-07-29 PROCEDURE — 27000221 HC OXYGEN, UP TO 24 HOURS

## 2020-07-29 PROCEDURE — 37799 UNLISTED PX VASCULAR SURGERY: CPT

## 2020-07-29 PROCEDURE — 25000003 PHARM REV CODE 250

## 2020-07-29 PROCEDURE — 94761 N-INVAS EAR/PLS OXIMETRY MLT: CPT

## 2020-07-29 PROCEDURE — 94003 VENT MGMT INPAT SUBQ DAY: CPT

## 2020-07-29 PROCEDURE — 97802 MEDICAL NUTRITION INDIV IN: CPT

## 2020-07-29 PROCEDURE — 83735 ASSAY OF MAGNESIUM: CPT

## 2020-07-29 RX ORDER — FAMOTIDINE 20 MG/1
20 TABLET, FILM COATED ORAL 2 TIMES DAILY
Status: DISCONTINUED | OUTPATIENT
Start: 2020-07-29 | End: 2020-08-26 | Stop reason: HOSPADM

## 2020-07-29 RX ADMIN — INSULIN DETEMIR 5 UNITS: 100 INJECTION, SOLUTION SUBCUTANEOUS at 09:07

## 2020-07-29 RX ADMIN — REMDESIVIR 100 MG: 100 INJECTION, POWDER, LYOPHILIZED, FOR SOLUTION INTRAVENOUS at 04:07

## 2020-07-29 RX ADMIN — INSULIN ASPART 1 UNITS: 100 INJECTION, SOLUTION INTRAVENOUS; SUBCUTANEOUS at 11:07

## 2020-07-29 RX ADMIN — INSULIN ASPART 3 UNITS: 100 INJECTION, SOLUTION INTRAVENOUS; SUBCUTANEOUS at 06:07

## 2020-07-29 RX ADMIN — ACETAMINOPHEN 650 MG: 160 SOLUTION ORAL at 02:07

## 2020-07-29 RX ADMIN — PROPOFOL 20 MCG/KG/MIN: 10 INJECTION, EMULSION INTRAVENOUS at 05:07

## 2020-07-29 RX ADMIN — DEXTROSE MONOHYDRATE 1 MCG/KG/MIN: 50 INJECTION, SOLUTION INTRAVENOUS at 08:07

## 2020-07-29 RX ADMIN — INSULIN ASPART 3 UNITS: 100 INJECTION, SOLUTION INTRAVENOUS; SUBCUTANEOUS at 01:07

## 2020-07-29 RX ADMIN — INSULIN ASPART 3 UNITS: 100 INJECTION, SOLUTION INTRAVENOUS; SUBCUTANEOUS at 05:07

## 2020-07-29 RX ADMIN — WHITE PETROLATUM: 1.75 OINTMENT TOPICAL at 10:07

## 2020-07-29 RX ADMIN — ACETAMINOPHEN 650 MG: 160 SOLUTION ORAL at 09:07

## 2020-07-29 RX ADMIN — FAMOTIDINE 20 MG: 20 TABLET ORAL at 09:07

## 2020-07-29 RX ADMIN — MINERAL OIL AND PETROLATUM: 150; 830 OINTMENT OPHTHALMIC at 09:07

## 2020-07-29 RX ADMIN — DEXAMETHASONE SODIUM PHOSPHATE 6 MG: 4 INJECTION, SOLUTION INTRA-ARTICULAR; INTRALESIONAL; INTRAMUSCULAR; INTRAVENOUS; SOFT TISSUE at 09:07

## 2020-07-29 RX ADMIN — DEXMEDETOMIDINE HYDROCHLORIDE 1 MCG/KG/HR: 4 INJECTION, SOLUTION INTRAVENOUS at 09:07

## 2020-07-29 RX ADMIN — DEXMEDETOMIDINE HYDROCHLORIDE 0.6 MCG/KG/HR: 4 INJECTION, SOLUTION INTRAVENOUS at 03:07

## 2020-07-29 RX ADMIN — INSULIN DETEMIR 10 UNITS: 100 INJECTION, SOLUTION SUBCUTANEOUS at 08:07

## 2020-07-29 RX ADMIN — PROPOFOL 5 MCG/KG/MIN: 10 INJECTION, EMULSION INTRAVENOUS at 11:07

## 2020-07-29 RX ADMIN — DEXMEDETOMIDINE HYDROCHLORIDE 1.2 MCG/KG/HR: 4 INJECTION, SOLUTION INTRAVENOUS at 11:07

## 2020-07-29 RX ADMIN — POLYETHYLENE GLYCOL 3350 17 G: 17 POWDER, FOR SOLUTION ORAL at 09:07

## 2020-07-29 RX ADMIN — DEXMEDETOMIDINE HYDROCHLORIDE 0.4 MCG/KG/HR: 4 INJECTION, SOLUTION INTRAVENOUS at 06:07

## 2020-07-29 RX ADMIN — Medication 200 MCG/HR: at 04:07

## 2020-07-29 RX ADMIN — FAMOTIDINE 20 MG: 10 INJECTION INTRAVENOUS at 09:07

## 2020-07-29 RX ADMIN — DEXMEDETOMIDINE HYDROCHLORIDE 1 MCG/KG/HR: 4 INJECTION, SOLUTION INTRAVENOUS at 07:07

## 2020-07-29 RX ADMIN — ACETAMINOPHEN 650 MG: 160 SOLUTION ORAL at 05:07

## 2020-07-29 RX ADMIN — ROSUVASTATIN CALCIUM 10 MG: 10 TABLET, FILM COATED ORAL at 09:07

## 2020-07-29 RX ADMIN — HEPARIN SODIUM AND DEXTROSE 13 UNITS/KG/HR: 10000; 5 INJECTION INTRAVENOUS at 06:07

## 2020-07-29 RX ADMIN — CEFTRIAXONE 1 G: 1 INJECTION, POWDER, FOR SOLUTION INTRAMUSCULAR; INTRAVENOUS at 10:07

## 2020-07-29 RX ADMIN — Medication 200 MCG/HR: at 11:07

## 2020-07-29 NOTE — PROGRESS NOTES
[facility] - ICU 16   Critical Care Medicine  Progress Note    Patient Name: Anup Miller  MRN: [id number]  Admission Date: 7/26/2020  Hospital Length of Stay: 3 days  Code Status: Full Code  Attending Provider: Jong Morrell MD  Primary Care Provider: Bryce Gonzales MD   Principal Problem: Pneumonia due to COVID-19 virus    Subjective:     HPI:  Anup Miller is a 70 y.o. male patient with a PMHx of HTN, HLD, and DM who presents to the [facility] [facility] for evaluation of SOB which  1 week ago. Pt became more SOB today and has an O2 sat of low 70s upon arrival on RA. Symptoms are worsening and moderate in severity. No mitigating or exacerbating factors reported. Associated sxs include cough and fever. Patient denies any congestion, sore throat, CP, abd pain, N/V, back pain, HA, weakness, and all other sxs at this time. No prior Tx reported. No further complaints or concerns at this time. Patient was placed on Bipap for a few hours and subsequently intubated. COVID positive. Was started on IV dexamethasone, Ceftriaxone, and Azithromycin. Patient transferred to [facility] on evening of 7/26/20 for higher level of care.     Interval History/Significant Events: Attempted to wean sedation for SAT/SBT today. Pt coughing a lot and getting extremely anxious resulting in desaturations in O2. FiO2 now at 100% with oxygen in the low 90's. Will allow pt some time to recover and wean. Will obtain a gas shortly and assess P/F and utility of proning him. Wife updated this morning.     Review of Systems   Unable to perform ROS: Intubated     Objective:     Vital Signs (Most Recent):  Temp: 98.4 °F (36.9 °C) (07/29/20 0400)  Pulse: 82 (07/29/20 0750)  Resp: (!) 40 (07/29/20 0750)  BP: 128/78 (07/29/20 0700)  SpO2: (!) 90 % (07/29/20 0750) Vital Signs (24h Range):  Temp:  [98 °F (36.7 °C)-98.9 °F (37.2 °C)] 98.4 °F (36.9 °C)  Pulse:  [64-82] 82  Resp:  [24-42] 40  SpO2:  [88 %-97 %] 90 %  BP:  (106-141)/(61-81) 128/78   Weight: 111 kg (244 lb 11.4 oz)  Body mass index is 36.14 kg/m².      Intake/Output Summary (Last 24 hours) at 7/29/2020 0936  Last data filed at 7/29/2020 0700  Gross per 24 hour   Intake 2020.67 ml   Output 1630 ml   Net 390.67 ml       Physical Exam  Nursing note reviewed.   Constitutional:       Comments: Intubated and sedated   HENT:      Head: Normocephalic and atraumatic.   Eyes:      Conjunctiva/sclera: Conjunctivae normal.   Neck:      Musculoskeletal: Neck supple.   Cardiovascular:      Rate and Rhythm: Regular rhythm. Tachycardia present.      Heart sounds: No murmur.   Pulmonary:      Breath sounds: No stridor. No wheezing or rales.      Comments: Course mechanical breath sounds bilaterally   Abdominal:      General: There is no distension.      Palpations: Abdomen is soft.      Tenderness: There is no abdominal tenderness.   Musculoskeletal:      Right lower leg: No edema.      Left lower leg: No edema.   Skin:     General: Skin is warm and dry.   Neurological:      General: No focal deficit present.         Vents:  Vent Mode: A/C (07/29/20 0750)  Ventilator Initiated: Yes(chart correction) (07/26/20 2108)  Set Rate: 20 BPM (07/29/20 0750)  Vt Set: 0 mL (07/29/20 0750)  Pressure Support: 0 cmH20 (07/29/20 0750)  PEEP/CPAP: 8 cmH20 (07/29/20 0750)  Oxygen Concentration (%): 50 (07/29/20 0750)  Peak Airway Pressure: 30 cmH2O (07/29/20 0750)  Plateau Pressure: 34 cmH20 (07/29/20 0750)  Total Ve: 20.4 mL (07/29/20 0750)  F/VT Ratio<105 (RSBI): (!) 73.94 (07/29/20 0750)  Lines/Drains/Airways     Drain                 NG/OG Tube 07/26/20 1756 Leslie sump;orogastric 18 Fr. Center mouth 2 days         Urethral Catheter 07/26/20 2205 16 Fr. 2 days          Airway                 Airway - Non-Surgical 07/26/20 1724 Endotracheal Tube 2 days          Peripheral Intravenous Line                 Peripheral IV - Single Lumen 07/25/20 1945 20 G Right Antecubital 3 days         Peripheral IV -  Single Lumen 07/25/20 1950 20 G Left Hand 3 days         Peripheral IV - Single Lumen 07/27/20 1447 18 G Anterior;Distal;Left Wrist 1 day         Peripheral IV - Single Lumen 07/28/20 0100 20 G Distal;Posterior;Right Forearm 1 day              Significant Labs:    CBC/Anemia Profile:  Recent Labs   Lab 07/28/20  0500 07/29/20  0500   WBC 13.27* 10.25   HGB 12.7* 13.0*   HCT 40.8 43.0    336   MCV 88 89   RDW 14.0 14.1        Chemistries:  Recent Labs   Lab 07/28/20  0500 07/29/20  0500    143   K 5.0 4.6    110   CO2 18* 21*   BUN 31* 40*   CREATININE 1.3 1.2   CALCIUM 8.7 8.7   ALBUMIN 2.5* 2.4*   PROT 7.2 7.0   BILITOT 0.6 0.5   ALKPHOS 53* 53*   ALT 84* 73*   AST 85* 54*   MG 2.7* 2.9*   PHOS 3.5 3.1       All pertinent labs within the past 24 hours have been reviewed.    Significant Imaging:  I have reviewed all pertinent imaging results/findings within the past 24 hours.      ABG  Recent Labs   Lab 07/26/20  2340   PH 7.363   PO2 69*   PCO2 31.3*   HCO3 17.8*   BE -8     Assessment/Plan:     Pulmonary  Acute hypoxemic respiratory failure  - see Pneumonia due to COVID 19    Cardiac/Vascular  Hyperlipidemia associated with type 2 diabetes mellitus  - restart Crestor once extubated     Hypertension associated with diabetes  - hold antihypertensives in setting of shock    Endocrine  Uncontrolled type 2 diabetes mellitus  - A1c 8.3%  - LDSSI   - 10U detemir qhs    GI  Elevated liver enzymes  - hx of elevated LFTs dating back to 2015  - will check Acute hepatitis panel and HIV  - monitor with CMP daily     Other  * Pneumonia due to COVID-19 virus  Anup Miller is a 70 y.o. male patient with a PMHx of HTN, HLD, and DM who presents to the [facility] for shortness of breath and found to be COVID positive. Was started on IV dexamethasone, Ceftriaxone, and Azithromycin. Patient transferred to [facility] on evening of 7/26/20 for higher level of care. Labs on arrival to [facility] remarkable  for WBC 9.8, Procal 1.3, D-dimer 1.1, Ferritin 2,300, , and .     Plan   - Given 1 L LR on arrival to [facility]  - Will start Remdesivir for COVID and Heparin gtt for hypercoagulable state in COVID   - Continue IV Dexamethasone   - Continue Ceftriaxone and Azithromycin for possible superimposed bacterial pneumonia   - f/u blood and sputum cx - NGTD  - nl lactate   - Afebrile, without leukocytosis  - very sensitive trachea. Coughs frequently resulting in severe de-recruitment  - Serial P:F assessment for utility of proning       Critical Care Daily Checklist:    A: Awake: RASS Goal/Actual Goal:    Actual: Hackett Agitation Sedation Scale (RASS): Light sedation   B: Spontaneous Breathing Trial Performed? Spon. Breathing Trial Initiated?: Not initiated (07/28/20 1318)   C: SAT & SBT Coordinated?  Yes                      D: Delirium: CAM-ICU Overall CAM-ICU: Positive   E: Early Mobility Performed? Yes   F: Feeding Goal:  40  Status:   10  Current Diet Order   Procedures    Diet NPO   Glucerna TF   AS: Analgesia/Sedation Fentanyl/Precedex, Propofol   T: Thromboembolic Prophylaxis Heparin   H: HOB > 300 Yes   U: Stress Ulcer Prophylaxis (if needed) Yes   G: Glucose Control Yes   B: Bowel Function     I: Indwelling Catheter (Lines & Boothe) Necessity Miralax   D: De-escalation of Antimicrobials/Pharmacotherapies Yes. On CTX day 4/5    Plan for the day/ETD SBT, wean O2. Consider proning    Code Status:  Family/Goals of Care: Full Code  Wife updated by critical care resident this morning.        Critical secondary to Patient has a condition that poses threat to life and bodily function: Severe Respiratory Distress      Critical care was time spent personally by me on the following activities: development of treatment plan with patient or surrogate and bedside caregivers, discussions with consultants, evaluation of patient's response to treatment, examination of patient, ordering and performing treatments and  interventions, ordering and review of laboratory studies, ordering and review of radiographic studies, pulse oximetry, re-evaluation of patient's condition. This critical care time did not overlap with that of any other provider or involve time for any procedures.     Efren West MD  Critical Care Medicine  [facility] - ICU 16 WT

## 2020-07-29 NOTE — PROCEDURES
"Anup Miller is a 70 y.o. male patient.    Temp: 98.1 °F (36.7 °C) (07/29/20 1600)  Pulse: 82 (07/29/20 1700)  Resp: (!) 26 (07/29/20 1700)  BP: 125/76 (07/29/20 1700)  SpO2: (!) 94 % (07/29/20 1700)  Weight: 111 kg (244 lb 11.4 oz) (07/29/20 1000)  Height: 5' 9" (175.3 cm) (07/29/20 1000)       Central Line    Date/Time: 7/29/2020 5:56 PM  Location procedure was performed: [facility] RESPIRATORY ICU  Performed by: Efren West MD  Assisting provider: Bolivar Newell MD  Pre-operative Diagnosis: covid ards  Post-operative diagnosis: covid ards  Consent Done: Yes  Indications: med administration, vascular access and hemodynamic monitoring  Preparation: skin prepped with ChloraPrep  Location details: right internal jugular  Catheter type: triple lumen  Catheter size: 7 Fr  Catheter Length: 16cm    Ultrasound guidance: yes  Vessel Caliber: medium, patent, compressibility normal  Manometry: Yes  Number of attempts: 2  Assessment: placement verified by x-ray  Complications: none  Estimated blood loss (mL): 5  Post-procedure: line sutured,  chlorhexidine patch,  sterile dressing applied and blood return through all ports  Complications: No          Efren West  7/29/2020  "

## 2020-07-29 NOTE — PROGRESS NOTES
"  [facility] - ICU 16 WT  Adult Nutrition  Consult Note    SUMMARY     Recommendations    1. Consider modifying current TF regimen:   - With Propofol, rec'd Peptamen Intense VHP @ 50 mL/hr to provide 1815 kcals (615 calories), 110 g of protein, 1008 mL fluid.    - When Propofol discontinued, rec'd Peptamen Intense VHP @ 60 mL/hr to provide 1440 kcals, 132 g of protein, 1210 mL fluid.  2. RD to monitor & follow-up.    Goals: Meet % EEN, EPN by RD f/u date  Nutrition Goal Status: new  Communication of RD Recs: reviewed with RN    Reason for Assessment    Reason For Assessment: new tube feeding  Diagnosis: other (see comments)(PNA  COVID19)  Relevant Medical History: DM, HTN, HLD  Interdisciplinary Rounds: did not attend    General Information Comments: RD working remotely, pt +COVID-19. Pt intubated/sedated, TFs initiated yesterday - pt tolerating thus far at [facility]. Unsure of PO intake PTA, chart review reveals UBW ~ 250#. RD unable to assess for malnutrition, although pt likely doesn't meet criteria. NFPE not complete  +COVID-19.  Nutrition Discharge Planning: Unable to determine    Nutrition/Diet History    Factors Affecting Nutritional Intake: NPO, on mechanical ventilation    Anthropometrics    Temp: 98.1 °F (36.7 °C)  Height: 5' 9" (175.3 cm)  Height (inches): 69 in  Weight Method: Bed Scale  Weight: 111 kg (244 lb 11.4 oz)  Weight (lb): 244.71 lb  Ideal Body Weight (IBW), Male: 160 lb  % Ideal Body Weight, Male (lb): 152.94 %  BMI (Calculated): 36.1  BMI Grade: 35 - 39.9 - obesity - grade II  Usual Body Weight (UBW), k.6 kg(Per chart review)  % Usual Body Weight: 97.92  % Weight Change From Usual Weight: -2.29 %    Lab/Procedures/Meds    Pertinent Labs Reviewed: reviewed  Pertinent Labs Comments: A1C 8.3  Pertinent Medications Reviewed: reviewed  Pertinent Medications Comments: Precedex, Fentanyl, Propofol, Heparin    Estimated/Assessed Needs    Weight Used For Calorie " Calculations: 111 kg (244 lb 11.4 oz)     Energy Calorie Requirements (kcal): 8911-5463 kcal/d  Energy Need Method: Kcal/kg(11-14 kcal/kg, BMI > 35)     Protein Requirements: 145 g/d (2 g/kg IBW)  Weight Used For Protein Calculations: 72.7 kg (160 lb 4.4 oz)     Estimated Fluid Requirement Method: other (see comments)(Per MD or 1 mL/kcal)     CHO Requirement: 50% total kcals    Nutrition Prescription Ordered    Current Diet Order: NPO  Current Nutrition Support Formula Ordered: Glucerna 1.5  Current Nutrition Support Rate Ordered: 10 mL/hr    Evaluation of Received Nutrient/Fluid Intake    Enteral Calories (kcal): 360  Enteral Protein (gm): 20  Enteral (Free Water) Fluid (mL): 182    Other Calories (kcal): 615 (Propofol)  Total Calories (kcal): 975    % Kcal Needs: 80%  % Protein Needs: 14%    Energy Calories Required: not meeting needs  Protein Required: not meeting needs  Fluid Required: other (see comments)(Per MD or 1 mL/kcal)    Comments: LBM: 7/25    Tolerance: tolerating    Nutrition Risk    Level of Risk/Frequency of Follow-up: (2x/week)     Assessment and Plan    Nutrition Problem  Inadequate oral intake    Related to (etiology):   Inability to consume sufficient energy    Signs and Symptoms (as evidenced by):   NPO, Intubated     Interventions(treatment strategy):  Collaboration of nutrition care w/ other providers     Nutrition Diagnosis Status:   New     Monitor and Evaluation    Food and Nutrient Intake: energy intake, food and beverage intake, enteral nutrition intake  Food and Nutrient Adminstration: diet order, enteral and parenteral nutrition administration  Physical Activity and Function: nutrition-related ADLs and IADLs  Anthropometric Measurements: weight, weight change  Biochemical Data, Medical Tests and Procedures: lipid profile, inflammatory profile, gastrointestinal profile, electrolyte and renal panel, glucose/endocrine profile  Nutrition-Focused Physical Findings: overall appearance      Nutrition Follow-Up    RD Follow-up?: Yes

## 2020-07-29 NOTE — EICU
Intervention Initiated From:  Bedside    Kelli Communicated with Bedside Nurse regarding:  Time-Out    E-alerted into the room. Dr. West at bedside. Time out performed and documented for RIJ TLC placement. Blood return positive, xray ordered. NAD noted.

## 2020-07-29 NOTE — PLAN OF CARE
Recommendations     1. Consider modifying current TF regimen:              - With Propofol, rec'd Peptamen Intense VHP @ 50 mL/hr to provide 1815 kcals (615 calories), 110 g of protein, 1008 mL fluid.               - When Propofol discontinued, rec'd Peptamen Intense VHP @ 60 mL/hr to provide 1440 kcals, 132 g of protein, 1210 mL fluid.  2. RD to monitor & follow-up.     Goals: Meet % EEN, EPN by RD f/u date  Nutrition Goal Status: new  Communication of ABELINO Ortiz: reviewed with RN

## 2020-07-29 NOTE — SUBJECTIVE & OBJECTIVE
Interval History/Significant Events: Attempted to wean sedation for SAT/SBT today. Pt coughing a lot and getting extremely anxious resulting in desaturations in O2. FiO2 now at 100% with oxygen in the low 90's. Will allow pt some time to recover and wean. Will obtain a gas shortly and assess P/F and utility of proning him. Wife updated this morning.     Review of Systems   Unable to perform ROS: Intubated     Objective:     Vital Signs (Most Recent):  Temp: 98.4 °F (36.9 °C) (07/29/20 0400)  Pulse: 82 (07/29/20 0750)  Resp: (!) 40 (07/29/20 0750)  BP: 128/78 (07/29/20 0700)  SpO2: (!) 90 % (07/29/20 0750) Vital Signs (24h Range):  Temp:  [98 °F (36.7 °C)-98.9 °F (37.2 °C)] 98.4 °F (36.9 °C)  Pulse:  [64-82] 82  Resp:  [24-42] 40  SpO2:  [88 %-97 %] 90 %  BP: (106-141)/(61-81) 128/78   Weight: 111 kg (244 lb 11.4 oz)  Body mass index is 36.14 kg/m².      Intake/Output Summary (Last 24 hours) at 7/29/2020 0936  Last data filed at 7/29/2020 0700  Gross per 24 hour   Intake 2020.67 ml   Output 1630 ml   Net 390.67 ml       Physical Exam  Nursing note reviewed.   Constitutional:       Comments: Intubated and sedated   HENT:      Head: Normocephalic and atraumatic.   Eyes:      Conjunctiva/sclera: Conjunctivae normal.   Neck:      Musculoskeletal: Neck supple.   Cardiovascular:      Rate and Rhythm: Regular rhythm. Tachycardia present.      Heart sounds: No murmur.   Pulmonary:      Breath sounds: No stridor. No wheezing or rales.      Comments: Course mechanical breath sounds bilaterally   Abdominal:      General: There is no distension.      Palpations: Abdomen is soft.      Tenderness: There is no abdominal tenderness.   Musculoskeletal:      Right lower leg: No edema.      Left lower leg: No edema.   Skin:     General: Skin is warm and dry.   Neurological:      General: No focal deficit present.         Vents:  Vent Mode: A/C (07/29/20 0750)  Ventilator Initiated: Yes(chart correction) (07/26/20 5537)  Set Rate: 20  BPM (07/29/20 0750)  Vt Set: 0 mL (07/29/20 0750)  Pressure Support: 0 cmH20 (07/29/20 0750)  PEEP/CPAP: 8 cmH20 (07/29/20 0750)  Oxygen Concentration (%): 50 (07/29/20 0750)  Peak Airway Pressure: 30 cmH2O (07/29/20 0750)  Plateau Pressure: 34 cmH20 (07/29/20 0750)  Total Ve: 20.4 mL (07/29/20 0750)  F/VT Ratio<105 (RSBI): (!) 73.94 (07/29/20 0750)  Lines/Drains/Airways     Drain                 NG/OG Tube 07/26/20 1756 Hardy sump;orogastric 18 Fr. Center mouth 2 days         Urethral Catheter 07/26/20 2205 16 Fr. 2 days          Airway                 Airway - Non-Surgical 07/26/20 1724 Endotracheal Tube 2 days          Peripheral Intravenous Line                 Peripheral IV - Single Lumen 07/25/20 1945 20 G Right Antecubital 3 days         Peripheral IV - Single Lumen 07/25/20 1950 20 G Left Hand 3 days         Peripheral IV - Single Lumen 07/27/20 1447 18 G Anterior;Distal;Left Wrist 1 day         Peripheral IV - Single Lumen 07/28/20 0100 20 G Distal;Posterior;Right Forearm 1 day              Significant Labs:    CBC/Anemia Profile:  Recent Labs   Lab 07/28/20  0500 07/29/20  0500   WBC 13.27* 10.25   HGB 12.7* 13.0*   HCT 40.8 43.0    336   MCV 88 89   RDW 14.0 14.1        Chemistries:  Recent Labs   Lab 07/28/20  0500 07/29/20  0500    143   K 5.0 4.6    110   CO2 18* 21*   BUN 31* 40*   CREATININE 1.3 1.2   CALCIUM 8.7 8.7   ALBUMIN 2.5* 2.4*   PROT 7.2 7.0   BILITOT 0.6 0.5   ALKPHOS 53* 53*   ALT 84* 73*   AST 85* 54*   MG 2.7* 2.9*   PHOS 3.5 3.1       All pertinent labs within the past 24 hours have been reviewed.    Significant Imaging:  I have reviewed all pertinent imaging results/findings within the past 24 hours.

## 2020-07-29 NOTE — PROCEDURES
"Anup Miller is a 70 y.o. male patient.    Temp: 98.1 °F (36.7 °C) (20 1600)  Pulse: 82 (20 1700)  Resp: (!) 26 (20 1700)  BP: 125/76 (20 1700)  SpO2: (!) 94 % (20 1700)  Weight: 111 kg (244 lb 11.4 oz) (20 1000)  Height: 5' 9" (175.3 cm) (20 1000)       Arterial Line    Date/Time: 2020 5:53 PM  Location procedure was performed: [facility] RESPIRATORY ICU  Performed by: Efren West MD  Authorized by: Efren West MD   Assisting provider: Bolivar Newell MD  Pre-op Diagnosis: covid ARDS  Post-operative diagnosis:  covid ARDS  Consent Done: Yes  Consent: Verbal consent obtained.  Consent given by: spouse  Patient identity confirmed: , MRN, name and provided demographic data  Time out: Immediately prior to procedure a "time out" was called to verify the correct patient, procedure, equipment, support staff and site/side marked as required.  Preparation: Patient was prepped and draped in the usual sterile fashion.  Indications: multiple ABGs, respiratory failure and hemodynamic monitoring  Location: right radial  Anesthesia: see MAR for details  Patient sedated: yes  Sedatives: fentanyl and propofol  Description of findings:  First attempt failed (kink in the wire). Repeat on same wrist successful without incident.    Anderson's test normal: yes  Needle gauge: 20  Seldinger technique: Seldinger technique used  Number of attempts: 2  Estimated blood loss (mL): 2  Post-procedure: line sutured and dressing applied  Post-procedure CMS: normal  Patient tolerance: Patient tolerated the procedure well with no immediate complications          Efren West  2020  "

## 2020-07-30 LAB
ALBUMIN SERPL BCP-MCNC: 2.3 G/DL (ref 3.5–5.2)
ALLENS TEST: ABNORMAL
ALP SERPL-CCNC: 57 U/L (ref 55–135)
ALT SERPL W/O P-5'-P-CCNC: 76 U/L (ref 10–44)
ANION GAP SERPL CALC-SCNC: 10 MMOL/L (ref 8–16)
AST SERPL-CCNC: 54 U/L (ref 10–40)
BASOPHILS # BLD AUTO: 0.13 K/UL (ref 0–0.2)
BASOPHILS NFR BLD: 0.8 % (ref 0–1.9)
BILIRUB SERPL-MCNC: 0.4 MG/DL (ref 0.1–1)
BUN SERPL-MCNC: 37 MG/DL (ref 8–23)
CALCIUM SERPL-MCNC: 9.4 MG/DL (ref 8.7–10.5)
CHLORIDE SERPL-SCNC: 110 MMOL/L (ref 95–110)
CO2 SERPL-SCNC: 24 MMOL/L (ref 23–29)
CREAT SERPL-MCNC: 1.1 MG/DL (ref 0.5–1.4)
DELSYS: ABNORMAL
DIFFERENTIAL METHOD: ABNORMAL
EOSINOPHIL # BLD AUTO: 0 K/UL (ref 0–0.5)
EOSINOPHIL NFR BLD: 0 % (ref 0–8)
ERYTHROCYTE [DISTWIDTH] IN BLOOD BY AUTOMATED COUNT: 14.3 % (ref 11.5–14.5)
ERYTHROCYTE [SEDIMENTATION RATE] IN BLOOD BY WESTERGREN METHOD: 30 MM/H
ERYTHROCYTE [SEDIMENTATION RATE] IN BLOOD BY WESTERGREN METHOD: 34 MM/H
ERYTHROCYTE [SEDIMENTATION RATE] IN BLOOD BY WESTERGREN METHOD: 34 MM/H
ERYTHROCYTE [SEDIMENTATION RATE] IN BLOOD BY WESTERGREN METHOD: 35 MM/H
EST. GFR  (AFRICAN AMERICAN): >60 ML/MIN/1.73 M^2
EST. GFR  (NON AFRICAN AMERICAN): >60 ML/MIN/1.73 M^2
FACT X PPP CHRO-ACNC: 0.31 IU/ML (ref 0.3–0.7)
FACT X PPP CHRO-ACNC: 0.79 IU/ML (ref 0.3–0.7)
FACT X PPP CHRO-ACNC: 0.91 IU/ML (ref 0.3–0.7)
FIO2: 50
FIO2: 50
FIO2: 60
FIO2: 60
GLUCOSE SERPL-MCNC: 252 MG/DL (ref 70–110)
HCO3 UR-SCNC: 26.2 MMOL/L (ref 24–28)
HCO3 UR-SCNC: 26.3 MMOL/L (ref 24–28)
HCO3 UR-SCNC: 28.7 MMOL/L (ref 24–28)
HCO3 UR-SCNC: 30.3 MMOL/L (ref 24–28)
HCT VFR BLD AUTO: 45.5 % (ref 40–54)
HGB BLD-MCNC: 13.5 G/DL (ref 14–18)
IMM GRANULOCYTES # BLD AUTO: 0.76 K/UL (ref 0–0.04)
IMM GRANULOCYTES NFR BLD AUTO: 4.7 % (ref 0–0.5)
IP: 15
LYMPHOCYTES # BLD AUTO: 2.4 K/UL (ref 1–4.8)
LYMPHOCYTES NFR BLD: 14.7 % (ref 18–48)
MAGNESIUM SERPL-MCNC: 2.8 MG/DL (ref 1.6–2.6)
MCH RBC QN AUTO: 27.4 PG (ref 27–31)
MCHC RBC AUTO-ENTMCNC: 29.7 G/DL (ref 32–36)
MCV RBC AUTO: 92 FL (ref 82–98)
MIN VOL: 11.6
MIN VOL: 12
MODE: ABNORMAL
MONOCYTES # BLD AUTO: 1.3 K/UL (ref 0.3–1)
MONOCYTES NFR BLD: 8.4 % (ref 4–15)
NEUTROPHILS # BLD AUTO: 11.4 K/UL (ref 1.8–7.7)
NEUTROPHILS NFR BLD: 71.4 % (ref 38–73)
NRBC BLD-RTO: 0 /100 WBC
PCO2 BLDA: 103.1 MMHG (ref 35–45)
PCO2 BLDA: 51.2 MMHG (ref 35–45)
PCO2 BLDA: 52.2 MMHG (ref 35–45)
PCO2 BLDA: 58.9 MMHG (ref 35–45)
PEEP: 10
PEEP: 10
PEEP: 12
PEEP: 8
PH SMN: 7.08 [PH] (ref 7.35–7.45)
PH SMN: 7.26 [PH] (ref 7.35–7.45)
PH SMN: 7.32 [PH] (ref 7.35–7.45)
PH SMN: 7.35 [PH] (ref 7.35–7.45)
PHOSPHATE SERPL-MCNC: 6 MG/DL (ref 2.7–4.5)
PIP: 27
PIP: 35
PLATELET # BLD AUTO: 386 K/UL (ref 150–350)
PMV BLD AUTO: 10 FL (ref 9.2–12.9)
PO2 BLDA: 106 MMHG (ref 80–100)
PO2 BLDA: 109 MMHG (ref 80–100)
PO2 BLDA: 112 MMHG (ref 80–100)
PO2 BLDA: 161 MMHG (ref 80–100)
POC BE: -1 MMOL/L
POC BE: 0 MMOL/L
POC BE: 0 MMOL/L
POC BE: 3 MMOL/L
POC SATURATED O2: 97 % (ref 95–100)
POC SATURATED O2: 98 % (ref 95–100)
POC TCO2: 28 MMOL/L (ref 23–27)
POC TCO2: 28 MMOL/L (ref 23–27)
POC TCO2: 30 MMOL/L (ref 23–27)
POC TCO2: 33 MMOL/L (ref 23–27)
POCT GLUCOSE: 232 MG/DL (ref 70–110)
POCT GLUCOSE: 236 MG/DL (ref 70–110)
POCT GLUCOSE: 249 MG/DL (ref 70–110)
POCT GLUCOSE: 253 MG/DL (ref 70–110)
POCT GLUCOSE: 296 MG/DL (ref 70–110)
POTASSIUM SERPL-SCNC: 5.8 MMOL/L (ref 3.5–5.1)
PROT SERPL-MCNC: 7.2 G/DL (ref 6–8.4)
RBC # BLD AUTO: 4.93 M/UL (ref 4.6–6.2)
SAMPLE: ABNORMAL
SITE: ABNORMAL
SODIUM SERPL-SCNC: 144 MMOL/L (ref 136–145)
SP02: 100
SP02: 100
VT: 350
WBC # BLD AUTO: 16.01 K/UL (ref 3.9–12.7)

## 2020-07-30 PROCEDURE — 25000003 PHARM REV CODE 250

## 2020-07-30 PROCEDURE — 63600175 PHARM REV CODE 636 W HCPCS: Performed by: STUDENT IN AN ORGANIZED HEALTH CARE EDUCATION/TRAINING PROGRAM

## 2020-07-30 PROCEDURE — 80053 COMPREHEN METABOLIC PANEL: CPT

## 2020-07-30 PROCEDURE — 82800 BLOOD PH: CPT

## 2020-07-30 PROCEDURE — 25000003 PHARM REV CODE 250: Performed by: INTERNAL MEDICINE

## 2020-07-30 PROCEDURE — 99900035 HC TECH TIME PER 15 MIN (STAT)

## 2020-07-30 PROCEDURE — 37799 UNLISTED PX VASCULAR SURGERY: CPT

## 2020-07-30 PROCEDURE — 63600175 PHARM REV CODE 636 W HCPCS: Performed by: NURSE PRACTITIONER

## 2020-07-30 PROCEDURE — 25000003 PHARM REV CODE 250: Performed by: STUDENT IN AN ORGANIZED HEALTH CARE EDUCATION/TRAINING PROGRAM

## 2020-07-30 PROCEDURE — 82803 BLOOD GASES ANY COMBINATION: CPT

## 2020-07-30 PROCEDURE — 85520 HEPARIN ASSAY: CPT | Mod: 91

## 2020-07-30 PROCEDURE — 20000000 HC ICU ROOM

## 2020-07-30 PROCEDURE — 84100 ASSAY OF PHOSPHORUS: CPT

## 2020-07-30 PROCEDURE — 27200966 HC CLOSED SUCTION SYSTEM

## 2020-07-30 PROCEDURE — 27000221 HC OXYGEN, UP TO 24 HOURS

## 2020-07-30 PROCEDURE — 85520 HEPARIN ASSAY: CPT

## 2020-07-30 PROCEDURE — 94003 VENT MGMT INPAT SUBQ DAY: CPT

## 2020-07-30 PROCEDURE — 94761 N-INVAS EAR/PLS OXIMETRY MLT: CPT

## 2020-07-30 PROCEDURE — 85025 COMPLETE CBC W/AUTO DIFF WBC: CPT

## 2020-07-30 PROCEDURE — 99900026 HC AIRWAY MAINTENANCE (STAT)

## 2020-07-30 PROCEDURE — 83735 ASSAY OF MAGNESIUM: CPT

## 2020-07-30 PROCEDURE — 99291 PR CRITICAL CARE, E/M 30-74 MINUTES: ICD-10-PCS | Mod: ,,, | Performed by: INTERNAL MEDICINE

## 2020-07-30 PROCEDURE — 99291 CRITICAL CARE FIRST HOUR: CPT | Mod: ,,, | Performed by: INTERNAL MEDICINE

## 2020-07-30 PROCEDURE — C9399 UNCLASSIFIED DRUGS OR BIOLOG: HCPCS | Performed by: STUDENT IN AN ORGANIZED HEALTH CARE EDUCATION/TRAINING PROGRAM

## 2020-07-30 PROCEDURE — 63600175 PHARM REV CODE 636 W HCPCS: Performed by: INTERNAL MEDICINE

## 2020-07-30 RX ORDER — ACETAMINOPHEN 650 MG/20.3ML
650 LIQUID ORAL EVERY 8 HOURS PRN
Status: DISCONTINUED | OUTPATIENT
Start: 2020-07-30 | End: 2020-08-26 | Stop reason: HOSPADM

## 2020-07-30 RX ORDER — FENTANYL CITRATE-0.9 % NACL/PF 10 MCG/ML
25 PLASTIC BAG, INJECTION (ML) INTRAVENOUS CONTINUOUS
Status: DISCONTINUED | OUTPATIENT
Start: 2020-07-30 | End: 2020-08-01

## 2020-07-30 RX ORDER — FUROSEMIDE 10 MG/ML
60 INJECTION INTRAMUSCULAR; INTRAVENOUS ONCE
Status: COMPLETED | OUTPATIENT
Start: 2020-07-30 | End: 2020-07-30

## 2020-07-30 RX ORDER — CEFTRIAXONE 1 G/1
1 INJECTION, POWDER, FOR SOLUTION INTRAMUSCULAR; INTRAVENOUS
Status: COMPLETED | OUTPATIENT
Start: 2020-07-30 | End: 2020-07-30

## 2020-07-30 RX ORDER — PROPOFOL 10 MG/ML
5 INJECTION, EMULSION INTRAVENOUS CONTINUOUS
Status: DISCONTINUED | OUTPATIENT
Start: 2020-07-30 | End: 2020-08-01

## 2020-07-30 RX ORDER — NOREPINEPHRINE BITARTRATE/D5W 4MG/250ML
PLASTIC BAG, INJECTION (ML) INTRAVENOUS
Status: DISPENSED
Start: 2020-07-30 | End: 2020-07-30

## 2020-07-30 RX ADMIN — FAMOTIDINE 20 MG: 20 TABLET ORAL at 08:07

## 2020-07-30 RX ADMIN — INSULIN ASPART 3 UNITS: 100 INJECTION, SOLUTION INTRAVENOUS; SUBCUTANEOUS at 05:07

## 2020-07-30 RX ADMIN — PROPOFOL 50 MCG/KG/MIN: 10 INJECTION, EMULSION INTRAVENOUS at 01:07

## 2020-07-30 RX ADMIN — MINERAL OIL AND PETROLATUM: 150; 830 OINTMENT OPHTHALMIC at 06:07

## 2020-07-30 RX ADMIN — POLYETHYLENE GLYCOL 3350 17 G: 17 POWDER, FOR SOLUTION ORAL at 08:07

## 2020-07-30 RX ADMIN — Medication 250 MCG/HR: at 09:07

## 2020-07-30 RX ADMIN — MINERAL OIL AND PETROLATUM: 150; 830 OINTMENT OPHTHALMIC at 09:07

## 2020-07-30 RX ADMIN — DEXMEDETOMIDINE HYDROCHLORIDE 0.8 MCG/KG/HR: 4 INJECTION, SOLUTION INTRAVENOUS at 02:07

## 2020-07-30 RX ADMIN — INSULIN ASPART 2 UNITS: 100 INJECTION, SOLUTION INTRAVENOUS; SUBCUTANEOUS at 05:07

## 2020-07-30 RX ADMIN — INSULIN ASPART 4 UNITS: 100 INJECTION, SOLUTION INTRAVENOUS; SUBCUTANEOUS at 12:07

## 2020-07-30 RX ADMIN — DEXTROSE MONOHYDRATE 2 MCG/KG/MIN: 50 INJECTION, SOLUTION INTRAVENOUS at 05:07

## 2020-07-30 RX ADMIN — PROPOFOL 50 MCG/KG/MIN: 10 INJECTION, EMULSION INTRAVENOUS at 10:07

## 2020-07-30 RX ADMIN — PROPOFOL 50 MCG/KG/MIN: 10 INJECTION, EMULSION INTRAVENOUS at 08:07

## 2020-07-30 RX ADMIN — DEXMEDETOMIDINE HYDROCHLORIDE 1.4 MCG/KG/HR: 4 INJECTION, SOLUTION INTRAVENOUS at 10:07

## 2020-07-30 RX ADMIN — DEXMEDETOMIDINE HYDROCHLORIDE 1 MCG/KG/HR: 4 INJECTION, SOLUTION INTRAVENOUS at 04:07

## 2020-07-30 RX ADMIN — ACETAMINOPHEN 650 MG: 160 SOLUTION ORAL at 05:07

## 2020-07-30 RX ADMIN — PROPOFOL 50 MCG/KG/MIN: 10 INJECTION, EMULSION INTRAVENOUS at 04:07

## 2020-07-30 RX ADMIN — Medication 250 MCG/HR: at 08:07

## 2020-07-30 RX ADMIN — DEXMEDETOMIDINE HYDROCHLORIDE 1.4 MCG/KG/HR: 4 INJECTION, SOLUTION INTRAVENOUS at 05:07

## 2020-07-30 RX ADMIN — FUROSEMIDE 60 MG: 10 INJECTION, SOLUTION INTRAMUSCULAR; INTRAVENOUS at 06:07

## 2020-07-30 RX ADMIN — INSULIN ASPART 1 UNITS: 100 INJECTION, SOLUTION INTRAVENOUS; SUBCUTANEOUS at 11:07

## 2020-07-30 RX ADMIN — MINERAL OIL AND PETROLATUM: 150; 830 OINTMENT OPHTHALMIC at 04:07

## 2020-07-30 RX ADMIN — PROPOFOL 50 MCG/KG/MIN: 10 INJECTION, EMULSION INTRAVENOUS at 07:07

## 2020-07-30 RX ADMIN — CEFTRIAXONE SODIUM 1 G: 1 INJECTION, POWDER, FOR SOLUTION INTRAMUSCULAR; INTRAVENOUS at 09:07

## 2020-07-30 RX ADMIN — DEXAMETHASONE SODIUM PHOSPHATE 6 MG: 4 INJECTION, SOLUTION INTRA-ARTICULAR; INTRALESIONAL; INTRAMUSCULAR; INTRAVENOUS; SOFT TISSUE at 08:07

## 2020-07-30 RX ADMIN — REMDESIVIR 100 MG: 100 INJECTION, POWDER, LYOPHILIZED, FOR SOLUTION INTRAVENOUS at 04:07

## 2020-07-30 RX ADMIN — INSULIN DETEMIR 13 UNITS: 100 INJECTION, SOLUTION SUBCUTANEOUS at 08:07

## 2020-07-30 RX ADMIN — Medication 25 MCG/HR: at 04:07

## 2020-07-30 RX ADMIN — ROSUVASTATIN CALCIUM 10 MG: 10 TABLET, FILM COATED ORAL at 08:07

## 2020-07-30 NOTE — PHYSICIAN QUERY
PT Name: Anup Miller  MR #: [id number]     SYSTEMIC INFECTIOUS PROCESS CLARIFICATION     CDS: Aurelio Verdugo RN CCDS               Contact information: [email or web address]   This form is a permanent document in the medical record.     Query Date: July 30, 2020    By submitting this query, we are merely seeking further clarification of documentation.  Please utilize your independent clinical judgment when addressing the question(s) below.  The Medical Record contains the following:  Indicators Supporting Clinical Findings Location in Medical Record   x HR         RR          BP        Temp T 102.5, , RR 28, /74  T 100.3, HR 75, RR 33, BP 81/56 (MAP 64)  found with room air hypoxia to 70% 7/26/2020 Vitals  7/27/2020 Vitals  7/26/2020 Critical Care Medicine plan of care Tolu Hargrove MD   x Lactic Acid          Procalcitonin  7/26/2020 22:36   Lactate, Jett 2.6 (H)      7/26/2020 22:36   Procalcitonin 1.38 (H)    Lab values   x WBC           Bands          CRP     7/26/2020 22:36 7/28/2020 05:00 7/30/2020 02:58   WBC 9.89 13.27 (H) 16.01 (H)      7/26/2020 22:36   .4 (H)    Lab values    Culture(s)      AMS, Confusion, LOC, etc.     x Organ Dysfunction/Failure Acute respiratory failure due to ARDS 7/27/2020 H&P Adam Felipe MD/Jong Morrell MD    Bacteremia or Sepsis / Septic     x Known or Suspected Source of Infection documented COVID 19 Pneumonia 7/27/2020 H&P Adam Felipe MD/Jong Morrell MD    (Failed) Outpatient Treatment     x Medication cefTRIAXone 1 g IV q24h  dexamethasone 6 mg IV daily  remdesivir (EUA) 100 mg IV daily  remdesivir (EUA) 200 mg IV  azithromycin 500 mg IVPB q24h  lactated ringers bolus 1,000 mL 7/26/2020 - Current medications    7/28/2020- Current Medication  7/27/2020 Medication  7/26-7/28/2020 Medication  7/26/2020 Medication   x Treatment Arterial line placement 7/29/2020 Procedure note Efren Anup West MD/Jong Morrell MD   x Other Shock 7/27/2020  H&P Adam Felipe MD/Jong Morrell MD        Provider, please specify diagnosis or diagnoses associated with above clinical findings.  [ x  ] Sepsis due to COVID-19   [   ] Severe Sepsis due to COVID-19 with Acute Organ Dysfunction/Failure (please specify organ dysfunction/failure): Acute respiratory failure due to ARDS   [   ] Sepsis due to COVID-19 with Septic Shock   [   ] Sepsis Ruled Out   [   ] Other Infectious Disease (please specify): __________   [  ] Clinically Undetermined         Please document in your progress notes daily for the duration of treatment until resolved and include in your discharge summary.

## 2020-07-30 NOTE — PROCEDURES
Procedure Note  Supine Positioning  Critical Care Medicine       Chief Complaint: Pneumonia due to COVID-19 virus  MRN: [id number]  LOS: 4 Days  Sex: male   Age: 70 y.o.     Time Of Supination: 16:10      Last ABG:   Recent Labs   Lab 07/30/20  1350   PH 7.319*   PO2 109*   PCO2 51.2*   HCO3 26.3   BE 0       Vital Signs (Most Recent):   Temp: 98.6 °F (37 °C) (07/30/20 1400)  Pulse: 85 (07/30/20 1400)  Resp: (!) 34 (07/30/20 1145)  BP: 94/68 (07/30/20 1400)  SpO2: 100 % (07/30/20 1400)    Ventilator Settings:  Vent Mode: A/C  Oxygen Concentration (%):  [40-70] 50  Resp Rate Total:  [20 br/min-35 br/min] 34 br/min  Vt Set:  [0 mL-350 mL] 350 mL  PEEP/CPAP:  [8 qzS10-61 cmH20] 10 cmH20  Pressure Support:  [0 cmH20] 0 cmH20  Mean Airway Pressure:  [16 lxO10-91 cmH20] 17 cmH20      Indication: Severe, refractory ARDS. High risk for deterioration during supination procedure in need of direct monitoring by Critical Care personnel.     Prior to beginning the procedure, all appropriate equipment and personnel were gathered in the room. Two individuals were placed on each side of the patient and two respiratory therapists stood at the head of the bed and was dedicated to the management of the head of the patient, the endotracheal tube, and the ventilator lines. The person at the head of the bed coordinated the steps of the supination procedure at the direction of Critical Care team members at the bedside. The patient was first log-rolled 90 degrees onto their side away from the direction of their central venous catheter. Cardiac electrodes were then moved from the patient's posterior back to the anterior. Once properly positioned in the bed, another 90 degree log roll was performed placing the patient into the supine position. The patient's arms were placed alongside the body. Continuous pulse oximetry and blood pressure monitoring was performed without any desaturation or hemodynamic instability. The patient tolerated the  procedure well.     Total Critical Care time (uninterrupted not including procedures): 15 minutes        Efren West MD  Internal Medicine Resident, PGY-3

## 2020-07-30 NOTE — PLAN OF CARE
Pt remains intubated, on 50%, 10 peep.  Not medically stable for discharge.    CM to follow for discharge needs.       07/30/20 9847   Discharge Reassessment   Assessment Type Discharge Planning Reassessment   Do you have any problems affording any of your prescribed medications? TBD   Discharge Plan A Skilled Nursing Facility   Discharge Plan B Home with family   DME Needed Upon Discharge  other (see comments)  (TBD)   Anticipated Discharge Disposition SNF   Can the patient/caregiver answer the patient profile reliably?   (Pt is intubated)   Describe the patient's ability to walk at the present time. Major restrictions/daily assistance from another person   Number of comorbid conditions (as recorded on the chart) Four   Post-Acute Status   Post-Acute Placement Status Awaiting Internal Medical Clearance     Vandana García RN   [id number]

## 2020-07-30 NOTE — SUBJECTIVE & OBJECTIVE
Interval History/Significant Events:     Worsening oxygenation yesterday with P:F <150. R IJ and Arterial Line placed. Pt sedated, paralyzed, and proned at 9:30pm last night. Oxygenation improving since proning.     Attempted to wean sedation for SAT/SBT today. Pt coughing a lot and getting extremely anxious resulting in desaturations in O2. FiO2 now at 100% with oxygen in the low 90's. Will allow pt some time to recover and wean. Will obtain a gas shortly and assess P/F and utility of proning him. Wife updated this morning.     Review of Systems   Unable to perform ROS: Intubated     Objective:     Vital Signs (Most Recent):  Temp: (!) 95.7 °F (35.4 °C) (07/30/20 0900)  Pulse: 87 (07/30/20 0900)  Resp: (!) 35 (07/30/20 0700)  BP: 116/84 (07/30/20 0900)  SpO2: (!) 94 % (07/30/20 0900) Vital Signs (24h Range):  Temp:  [95.7 °F (35.4 °C)-99.3 °F (37.4 °C)] 95.7 °F (35.4 °C)  Pulse:  [] 87  Resp:  [20-35] 35  SpO2:  [92 %-100 %] 94 %  BP: ()/(58-89) 116/84  Arterial Line BP: (104-161)/(53-79) 133/78   Weight: 111 kg (244 lb 11.4 oz)  Body mass index is 36.14 kg/m².      Intake/Output Summary (Last 24 hours) at 7/30/2020 0920  Last data filed at 7/30/2020 0900  Gross per 24 hour   Intake 2540.96 ml   Output 5360 ml   Net -2819.04 ml       Physical Exam  Nursing note reviewed.   Constitutional:       Comments: Intubated and sedated   HENT:      Head: Normocephalic and atraumatic.   Eyes:      Conjunctiva/sclera: Conjunctivae normal.   Neck:      Musculoskeletal: Neck supple.   Cardiovascular:      Rate and Rhythm: Regular rhythm. Tachycardia present.      Heart sounds: No murmur.   Pulmonary:      Breath sounds: No stridor. No wheezing or rales.      Comments: Course mechanical breath sounds bilaterally   Abdominal:      General: There is no distension.      Palpations: Abdomen is soft.      Tenderness: There is no abdominal tenderness.   Musculoskeletal:      Right lower leg: No edema.      Left lower leg:  No edema.   Skin:     General: Skin is warm and dry.   Neurological:      General: No focal deficit present.         Vents:  Vent Mode: A/C (07/30/20 0823)  Ventilator Initiated: Yes(chart correction) (07/26/20 2108)  Set Rate: 35 BPM (07/30/20 0823)  Vt Set: 350 mL (07/30/20 0823)  Pressure Support: 0 cmH20 (07/30/20 0823)  PEEP/CPAP: 8 cmH20 (07/30/20 0823)  Oxygen Concentration (%): 40 (07/30/20 0900)  Peak Airway Pressure: 28 cmH2O (07/30/20 0823)  Plateau Pressure: 21 cmH20 (07/30/20 0823)  Total Ve: 12.7 mL (07/30/20 0823)  F/VT Ratio<105 (RSBI): (!) 94.85 (07/30/20 0500)  Lines/Drains/Airways     Central Venous Catheter Line            Percutaneous Central Line Insertion/Assessment - Triple Lumen  07/29/20 1729 right internal jugular less than 1 day          Drain                 NG/OG Tube 07/26/20 1756 [place] sump;orogastric 18 Fr. Center mouth 3 days         Urethral Catheter 07/26/20 2205 16 Fr. 3 days          Airway                 Airway - Non-Surgical 07/26/20 1724 Endotracheal Tube 3 days          Arterial Line                 Arterial Line 07/29/20 1700 Right Radial less than 1 day          Peripheral Intravenous Line                 Peripheral IV - Single Lumen 07/25/20 1945 20 G Right Antecubital 4 days         Peripheral IV - Single Lumen 07/25/20 1950 20 G Left Hand 4 days         Peripheral IV - Single Lumen 07/27/20 1447 18 G Anterior;Distal;Left Wrist 2 days         Peripheral IV - Single Lumen 07/28/20 0100 20 G Distal;Posterior;Right Forearm 2 days              Significant Labs:    CBC/Anemia Profile:  Recent Labs   Lab 07/29/20  0500 07/30/20  0258   WBC 10.25 16.01*   HGB 13.0* 13.5*   HCT 43.0 45.5    386*   MCV 89 92   RDW 14.1 14.3        Chemistries:  Recent Labs   Lab 07/29/20  0500 07/30/20  0258    144   K 4.6 5.8*    110   CO2 21* 24   BUN 40* 37*   CREATININE 1.2 1.1   CALCIUM 8.7 9.4   ALBUMIN 2.4* 2.3*   PROT 7.0 7.2   BILITOT 0.5 0.4   ALKPHOS 53* 57   ALT  73* 76*   AST 54* 54*   MG 2.9* 2.8*   PHOS 3.1 6.0*       All pertinent labs within the past 24 hours have been reviewed.    Significant Imaging:  I have reviewed all pertinent imaging results/findings within the past 24 hours.

## 2020-07-30 NOTE — PLAN OF CARE
POC reviewed with pt and pts wife via phone. Pt unable to verbalize understanding. Questions and concerns addressed with pts wife. Facetime call with pts wife and patient.  Pt proned at 2130.  Nimbex gtt initiated. Precedex, Heparin, Fentanyl, Propofol gtts continued.  BIS monitoring and TOF in use.  TF held while pt proned per CC.   Per CC do not administered heparin bolus per nomogram for anti-xa 0.21, increase by 2u and then recheck per nomogram.  Head turned q2 while in proned position.  Pt progressing toward goals. Will continue to monitor. See flowsheets for full assessment and VS info

## 2020-07-30 NOTE — PLAN OF CARE
POC reviewed with patient, family over the phone. Supinated this shift approx 1615. Nimbex, prop, fent, heparin, precedex per flowsheets. Great UOP this shift after lasix admin this am. VS, assessments per flowsheets. All questions concerns addressed at bedside, WCTM.

## 2020-07-30 NOTE — ASSESSMENT & PLAN NOTE
Anup Miller is a 70 y.o. male patient with a PMHx of HTN, HLD, and DM who presents to the [place] [facility] for shortness of breath and found to be COVID positive. Was started on IV dexamethasone, Ceftriaxone, and Azithromycin. Patient transferred to [facility] on evening of 7/26/20 for higher level of care. Labs on arrival to [facility] remarkable for WBC 9.8, Procal 1.3, D-dimer 1.1, Ferritin 2,300, , and .     Plan   - Given 1 L LR on arrival to [facility]  - Remdesivir  - Heparin gtt for hypercoagulable state in COVID   - IV Dexamethasone   - Continue Ceftriaxone and Azithromycin for possible superimposed bacterial pneumonia (complete)   - f/u blood and sputum cx - NGTD  - trend lactate - nl  - Proned due to P:F <150. Tolerating well.  - Paralyzed, proned, sedated.

## 2020-07-30 NOTE — SIGNIFICANT EVENT
Procedure Note  Prone Positioning  Critical Care Medicine       Chief Complaint: Pneumonia due to COVID-19 virus  MRN: [id number]  LOS: 3  Sex: male  Age: 70 y.o.      Last ABG:   Recent Labs   Lab 07/29/20 1859   PH 7.345*   PO2 42   PCO2 38.8   HCO3 21.2*   BE -5       Vital Signs (Most Recent):   Temp: 99.3 °F (37.4 °C) (07/29/20 1900)  Pulse: 83 (07/29/20 2000)  Resp: (!) 27 (07/29/20 1800)  BP: 133/78 (07/29/20 2000)  SpO2: 96 % (07/29/20 2000)    Ventilator Settings:  Vent Mode: A/C  Oxygen Concentration (%):  [] 70  Resp Rate Total:  [20 br/min-43 br/min] 20 br/min  Vt Set:  [0 mL] 0 mL  PEEP/CPAP:  [8 jqK66-51 cmH20] 12 cmH20  Pressure Support:  [0 cmH20] 0 cmH20  Mean Airway Pressure:  [15 ojK32-79 cmH20] 17 cmH20    Indication: Severe, refractory ARDS. High risk for deterioration during proning procedure in need of direct monitoring by Critical Care personnel.     Prior to beginning the procedure, all appropriate equipment and personnel were gathered in the room. Two individuals were placed on each side of the patient and one person stood at the head of the bed and was dedicated to the management of the head of the patient, the endotracheal tube, and the ventilator lines. The person at the head of the bed  coordinated the steps of the proning procedure with team team at the direction of Critical Care team members at the bedside. The patient was first log-rolled 90 degrees onto their side towards the direction of their central venous catheter. Cardiac electrodes were then moved from the patient's anterior to the posterior. The patient?s knees, forehead, chest, and iliac crests were protected using adhesive pads and/or foam pads to reduce the risk of skin breakdown. Once properly positioned in the bed, another 90 degree log roll was performed placing the patient into the prone position. The patient's arms were placed alongside the body. The patient's head should be turned alternately to the right or left  every 2 hours. The patient tolerated the procedure well.     Total Critical Care time (uninterrupted not including procedures): 20 mins    Connie Caicedo NP  Critical Care Medicine

## 2020-07-30 NOTE — PROGRESS NOTES
[facility] - ICU 16   Critical Care Medicine  Progress Note    Patient Name: Anup Miller  MRN: [id number]  Admission Date: 7/26/2020  Hospital Length of Stay: 4 days  Code Status: Full Code  Attending Provider: Jong Morrell MD  Primary Care Provider: Bryce Gonzales MD   Principal Problem: Pneumonia due to COVID-19 virus    Subjective:     HPI:  Anup Miller is a 70 y.o. male patient with a PMHx of HTN, HLD, and DM who presents to the [place] [facility] for evaluation of SOB which  1 week ago. Pt became more SOB today and has an O2 sat of low 70s upon arrival on RA. Symptoms are worsening and moderate in severity. No mitigating or exacerbating factors reported. Associated sxs include cough and fever. Patient denies any congestion, sore throat, CP, abd pain, N/V, back pain, HA, weakness, and all other sxs at this time. No prior Tx reported. No further complaints or concerns at this time. Patient was placed on Bipap for a few hours and subsequently intubated. COVID positive. Was started on IV dexamethasone, Ceftriaxone, and Azithromycin. Patient transferred to [facility] on evening of 7/26/20 for higher level of care.     Hospital Course: Admitted on ventilator. P:F ratio <150, so he was subsequently proned.     Interval History/Significant Events:     Worsening oxygenation yesterday with P:F <150. R IJ and Arterial Line placed. Pt sedated, paralyzed, and proned at 9:30pm last night. Oxygenation improving since proning.     Attempted to wean sedation for SAT/SBT today. Pt coughing a lot and getting extremely anxious resulting in desaturations in O2. FiO2 now at 100% with oxygen in the low 90's. Will allow pt some time to recover and wean. Will obtain a gas shortly and assess P/F and utility of proning him. Wife updated this morning.     Review of Systems   Unable to perform ROS: Intubated     Objective:     Vital Signs (Most Recent):  Temp: (!) 95.7 °F (35.4 °C) (07/30/20 0900)  Pulse:  87 (07/30/20 0900)  Resp: (!) 35 (07/30/20 0700)  BP: 116/84 (07/30/20 0900)  SpO2: (!) 94 % (07/30/20 0900) Vital Signs (24h Range):  Temp:  [95.7 °F (35.4 °C)-99.3 °F (37.4 °C)] 95.7 °F (35.4 °C)  Pulse:  [] 87  Resp:  [20-35] 35  SpO2:  [92 %-100 %] 94 %  BP: ()/(58-89) 116/84  Arterial Line BP: (104-161)/(53-79) 133/78   Weight: 111 kg (244 lb 11.4 oz)  Body mass index is 36.14 kg/m².      Intake/Output Summary (Last 24 hours) at 7/30/2020 0920  Last data filed at 7/30/2020 0900  Gross per 24 hour   Intake 2540.96 ml   Output 5360 ml   Net -2819.04 ml       Physical Exam  Nursing note reviewed.   Constitutional:       Comments: Intubated and sedated   HENT:      Head: Normocephalic and atraumatic.   Eyes:      Conjunctiva/sclera: Conjunctivae normal.   Neck:      Musculoskeletal: Neck supple.   Cardiovascular:      Rate and Rhythm: Regular rhythm. Tachycardia present.      Heart sounds: No murmur.   Pulmonary:      Breath sounds: No stridor. No wheezing or rales.      Comments: Course mechanical breath sounds bilaterally   Abdominal:      General: There is no distension.      Palpations: Abdomen is soft.      Tenderness: There is no abdominal tenderness.   Musculoskeletal:      Right lower leg: No edema.      Left lower leg: No edema.   Skin:     General: Skin is warm and dry.   Neurological:      General: No focal deficit present.         Vents:  Vent Mode: A/C (07/30/20 0823)  Ventilator Initiated: Yes(chart correction) (07/26/20 2108)  Set Rate: 35 BPM (07/30/20 0823)  Vt Set: 350 mL (07/30/20 0823)  Pressure Support: 0 cmH20 (07/30/20 0823)  PEEP/CPAP: 8 cmH20 (07/30/20 0823)  Oxygen Concentration (%): 40 (07/30/20 0900)  Peak Airway Pressure: 28 cmH2O (07/30/20 0823)  Plateau Pressure: 21 cmH20 (07/30/20 0823)  Total Ve: 12.7 mL (07/30/20 0823)  F/VT Ratio<105 (RSBI): (!) 94.85 (07/30/20 0500)  Lines/Drains/Airways     Central Venous Catheter Line            Percutaneous Central Line  Insertion/Assessment - Triple Lumen  07/29/20 1729 right internal jugular less than 1 day          Drain                 NG/OG Tube 07/26/20 1756 [place] sump;orogastric 18 Fr. Center mouth 3 days         Urethral Catheter 07/26/20 2205 16 Fr. 3 days          Airway                 Airway - Non-Surgical 07/26/20 1724 Endotracheal Tube 3 days          Arterial Line                 Arterial Line 07/29/20 1700 Right Radial less than 1 day          Peripheral Intravenous Line                 Peripheral IV - Single Lumen 07/25/20 1945 20 G Right Antecubital 4 days         Peripheral IV - Single Lumen 07/25/20 1950 20 G Left Hand 4 days         Peripheral IV - Single Lumen 07/27/20 1447 18 G Anterior;Distal;Left Wrist 2 days         Peripheral IV - Single Lumen 07/28/20 0100 20 G Distal;Posterior;Right Forearm 2 days              Significant Labs:    CBC/Anemia Profile:  Recent Labs   Lab 07/29/20  0500 07/30/20  0258   WBC 10.25 16.01*   HGB 13.0* 13.5*   HCT 43.0 45.5    386*   MCV 89 92   RDW 14.1 14.3        Chemistries:  Recent Labs   Lab 07/29/20  0500 07/30/20  0258    144   K 4.6 5.8*    110   CO2 21* 24   BUN 40* 37*   CREATININE 1.2 1.1   CALCIUM 8.7 9.4   ALBUMIN 2.4* 2.3*   PROT 7.0 7.2   BILITOT 0.5 0.4   ALKPHOS 53* 57   ALT 73* 76*   AST 54* 54*   MG 2.9* 2.8*   PHOS 3.1 6.0*       All pertinent labs within the past 24 hours have been reviewed.    Significant Imaging:  I have reviewed all pertinent imaging results/findings within the past 24 hours.      ABG  Recent Labs   Lab 07/30/20  0533   PH 7.256*   PO2 112*   PCO2 58.9*   HCO3 26.2   BE -1     Assessment/Plan:     Pulmonary  Acute hypoxemic respiratory failure  - see Pneumonia due to COVID 19    Cardiac/Vascular  Hyperlipidemia associated with type 2 diabetes mellitus  - restart Crestor once extubated     Hypertension associated with diabetes  - hold antihypertensives in setting of shock    Endocrine  Uncontrolled type 2 diabetes  mellitus  - A1c 8.3%  - Detemir 13U qhs  - LDSSI     GI  Elevated liver enzymes  - hx of elevated LFTs dating back to 2015  - will check Acute hepatitis panel and HIV  - monitor with CMP daily     Other  * Pneumonia due to COVID-19 virus  Anup Miller is a 70 y.o. male patient with a PMHx of HTN, HLD, and DM who presents to the [place] [facility] for shortness of breath and found to be COVID positive. Was started on IV dexamethasone, Ceftriaxone, and Azithromycin. [place] [facility] [place] for higher level of care. Labs [place] [facility] remarkable for WBC 9.8, Procal 1.3, D-dimer 1.1, Ferritin 2,300, , and .     Plan   - Given 1 L LR [place] [facility]  - Remdesivir  - Heparin gtt for hypercoagulable state in COVID   - IV Dexamethasone   - Continue Ceftriaxone and Azithromycin for possible superimposed bacterial pneumonia (complete)   - f/u blood and sputum cx - NGTD  - trend lactate - nl  - Proned due to P:F <150. Tolerating well.  - Paralyzed, proned, sedated.     Critical Care Daily Checklist:    A: Awake: RASS Goal/Actual Goal:    Actual: [place] Agitation Sedation Scale (RASS): Unarousable   B: Spontaneous Breathing Trial Performed? Spon. Breathing Trial Initiated?: Not initiated (07/28/20 1318)   C: SAT & SBT Coordinated?  Proned                    D: Delirium: CAM-ICU Overall CAM-ICU: Positive   E: Early Mobility Performed? Yes   F: Feeding Goal: Goals: Meet % EEN, EPN by RD f/u date  Status: Nutrition Goal Status: new   Current Diet Order   Procedures    Diet NPO      AS: Analgesia/Sedation Paralyzed and sedated   T: Thromboembolic Prophylaxis Heparin   H: HOB > 300 No   U: Stress Ulcer Prophylaxis (if needed) ppx   G: Glucose Control Yes   B: Bowel Function  Miralax   I: Indwelling Catheter (Lines & Boothe) Necessity A line R IJ triple lumen   D: De-escalation of Antimicrobials/Pharmacotherapies Yes    Plan for the day/ETD Supinate    Code  Status:  Family/Goals of Care: Full Code       Critical secondary to Patient has a condition that poses threat to life and bodily function: Severe Respiratory Distress      Critical care was time spent personally by me on the following activities: development of treatment plan with patient or surrogate and bedside caregivers, discussions with consultants, evaluation of patient's response to treatment, examination of patient, ordering and performing treatments and interventions, ordering and review of laboratory studies, ordering and review of radiographic studies, pulse oximetry, re-evaluation of patient's condition. This critical care time did not overlap with that of any other provider or involve time for any procedures.     Efren West MD  Critical Care Medicine  [facility] - ICU 16 [facility]

## 2020-07-31 LAB
ALBUMIN SERPL BCP-MCNC: 2.4 G/DL (ref 3.5–5.2)
ALLENS TEST: ABNORMAL
ALLENS TEST: ABNORMAL
ALP SERPL-CCNC: 61 U/L (ref 55–135)
ALT SERPL W/O P-5'-P-CCNC: 77 U/L (ref 10–44)
ANION GAP SERPL CALC-SCNC: 12 MMOL/L (ref 8–16)
AST SERPL-CCNC: 56 U/L (ref 10–40)
BACTERIA BLD CULT: NORMAL
BACTERIA BLD CULT: NORMAL
BASOPHILS # BLD AUTO: 0.07 K/UL (ref 0–0.2)
BASOPHILS NFR BLD: 0.7 % (ref 0–1.9)
BILIRUB SERPL-MCNC: 0.4 MG/DL (ref 0.1–1)
BUN SERPL-MCNC: 40 MG/DL (ref 8–23)
CALCIUM SERPL-MCNC: 9.1 MG/DL (ref 8.7–10.5)
CHLORIDE SERPL-SCNC: 107 MMOL/L (ref 95–110)
CO2 SERPL-SCNC: 25 MMOL/L (ref 23–29)
CREAT SERPL-MCNC: 1.2 MG/DL (ref 0.5–1.4)
DELSYS: ABNORMAL
DELSYS: ABNORMAL
DIFFERENTIAL METHOD: ABNORMAL
EOSINOPHIL # BLD AUTO: 0.1 K/UL (ref 0–0.5)
EOSINOPHIL NFR BLD: 0.5 % (ref 0–8)
ERYTHROCYTE [DISTWIDTH] IN BLOOD BY AUTOMATED COUNT: 14.4 % (ref 11.5–14.5)
ERYTHROCYTE [SEDIMENTATION RATE] IN BLOOD BY WESTERGREN METHOD: 34 MM/H
ERYTHROCYTE [SEDIMENTATION RATE] IN BLOOD BY WESTERGREN METHOD: 34 MM/H
EST. GFR  (AFRICAN AMERICAN): >60 ML/MIN/1.73 M^2
EST. GFR  (NON AFRICAN AMERICAN): >60 ML/MIN/1.73 M^2
FACT X PPP CHRO-ACNC: 0.34 IU/ML (ref 0.3–0.7)
FACT X PPP CHRO-ACNC: 0.4 IU/ML (ref 0.3–0.7)
FIO2: 40
FIO2: 40
GLUCOSE SERPL-MCNC: 243 MG/DL (ref 70–110)
HCO3 UR-SCNC: 23.1 MMOL/L (ref 24–28)
HCO3 UR-SCNC: 27.5 MMOL/L (ref 24–28)
HCT VFR BLD AUTO: 44.5 % (ref 40–54)
HGB BLD-MCNC: 13.4 G/DL (ref 14–18)
IMM GRANULOCYTES # BLD AUTO: 0.46 K/UL (ref 0–0.04)
IMM GRANULOCYTES NFR BLD AUTO: 4.7 % (ref 0–0.5)
LYMPHOCYTES # BLD AUTO: 1.3 K/UL (ref 1–4.8)
LYMPHOCYTES NFR BLD: 13.7 % (ref 18–48)
MAGNESIUM SERPL-MCNC: 2.7 MG/DL (ref 1.6–2.6)
MCH RBC QN AUTO: 27.1 PG (ref 27–31)
MCHC RBC AUTO-ENTMCNC: 30.1 G/DL (ref 32–36)
MCV RBC AUTO: 90 FL (ref 82–98)
MIN VOL: 14.2
MODE: ABNORMAL
MODE: ABNORMAL
MONOCYTES # BLD AUTO: 0.8 K/UL (ref 0.3–1)
MONOCYTES NFR BLD: 8.3 % (ref 4–15)
NEUTROPHILS # BLD AUTO: 7 K/UL (ref 1.8–7.7)
NEUTROPHILS NFR BLD: 72.1 % (ref 38–73)
NRBC BLD-RTO: 1 /100 WBC
PCO2 BLDA: 33 MMHG (ref 35–45)
PCO2 BLDA: 45 MMHG (ref 35–45)
PEEP: 10
PEEP: 8
PH SMN: 7.39 [PH] (ref 7.35–7.45)
PH SMN: 7.45 [PH] (ref 7.35–7.45)
PHOSPHATE SERPL-MCNC: 4.5 MG/DL (ref 2.7–4.5)
PIP: 22
PLATELET # BLD AUTO: 366 K/UL (ref 150–350)
PMV BLD AUTO: 10.6 FL (ref 9.2–12.9)
PO2 BLDA: 56 MMHG (ref 80–100)
PO2 BLDA: 63 MMHG (ref 80–100)
POC BE: -1 MMOL/L
POC BE: 3 MMOL/L
POC SATURATED O2: 90 % (ref 95–100)
POC SATURATED O2: 91 % (ref 95–100)
POC TCO2: 24 MMOL/L (ref 23–27)
POC TCO2: 29 MMOL/L (ref 23–27)
POCT GLUCOSE: 143 MG/DL (ref 70–110)
POCT GLUCOSE: 185 MG/DL (ref 70–110)
POCT GLUCOSE: 223 MG/DL (ref 70–110)
POCT GLUCOSE: 266 MG/DL (ref 70–110)
POCT GLUCOSE: 279 MG/DL (ref 70–110)
POTASSIUM SERPL-SCNC: 5 MMOL/L (ref 3.5–5.1)
PROT SERPL-MCNC: 7.6 G/DL (ref 6–8.4)
RBC # BLD AUTO: 4.94 M/UL (ref 4.6–6.2)
SAMPLE: ABNORMAL
SAMPLE: ABNORMAL
SITE: ABNORMAL
SITE: ABNORMAL
SODIUM SERPL-SCNC: 144 MMOL/L (ref 136–145)
SP02: 96
VT: 350
VT: 350
WBC # BLD AUTO: 9.73 K/UL (ref 3.9–12.7)

## 2020-07-31 PROCEDURE — 63600175 PHARM REV CODE 636 W HCPCS: Performed by: STUDENT IN AN ORGANIZED HEALTH CARE EDUCATION/TRAINING PROGRAM

## 2020-07-31 PROCEDURE — 25000003 PHARM REV CODE 250: Performed by: INTERNAL MEDICINE

## 2020-07-31 PROCEDURE — 25000003 PHARM REV CODE 250: Performed by: PHYSICIAN ASSISTANT

## 2020-07-31 PROCEDURE — 99900026 HC AIRWAY MAINTENANCE (STAT)

## 2020-07-31 PROCEDURE — 94761 N-INVAS EAR/PLS OXIMETRY MLT: CPT

## 2020-07-31 PROCEDURE — 99900035 HC TECH TIME PER 15 MIN (STAT)

## 2020-07-31 PROCEDURE — 94003 VENT MGMT INPAT SUBQ DAY: CPT

## 2020-07-31 PROCEDURE — 99291 PR CRITICAL CARE, E/M 30-74 MINUTES: ICD-10-PCS | Mod: ,,, | Performed by: NURSE PRACTITIONER

## 2020-07-31 PROCEDURE — 84100 ASSAY OF PHOSPHORUS: CPT

## 2020-07-31 PROCEDURE — 80053 COMPREHEN METABOLIC PANEL: CPT

## 2020-07-31 PROCEDURE — 27200966 HC CLOSED SUCTION SYSTEM

## 2020-07-31 PROCEDURE — 85520 HEPARIN ASSAY: CPT

## 2020-07-31 PROCEDURE — 83735 ASSAY OF MAGNESIUM: CPT

## 2020-07-31 PROCEDURE — 99291 CRITICAL CARE FIRST HOUR: CPT | Mod: ,,, | Performed by: NURSE PRACTITIONER

## 2020-07-31 PROCEDURE — 37799 UNLISTED PX VASCULAR SURGERY: CPT

## 2020-07-31 PROCEDURE — 25000003 PHARM REV CODE 250: Performed by: STUDENT IN AN ORGANIZED HEALTH CARE EDUCATION/TRAINING PROGRAM

## 2020-07-31 PROCEDURE — 27000221 HC OXYGEN, UP TO 24 HOURS

## 2020-07-31 PROCEDURE — 20000000 HC ICU ROOM

## 2020-07-31 PROCEDURE — 85025 COMPLETE CBC W/AUTO DIFF WBC: CPT

## 2020-07-31 PROCEDURE — 82803 BLOOD GASES ANY COMBINATION: CPT

## 2020-07-31 RX ORDER — AMOXICILLIN 250 MG
1 CAPSULE ORAL 2 TIMES DAILY
Status: DISCONTINUED | OUTPATIENT
Start: 2020-07-31 | End: 2020-08-26 | Stop reason: HOSPADM

## 2020-07-31 RX ADMIN — MINERAL OIL AND PETROLATUM: 150; 830 OINTMENT OPHTHALMIC at 03:07

## 2020-07-31 RX ADMIN — PROPOFOL 50 MCG/KG/MIN: 10 INJECTION, EMULSION INTRAVENOUS at 06:07

## 2020-07-31 RX ADMIN — INSULIN ASPART 3 UNITS: 100 INJECTION, SOLUTION INTRAVENOUS; SUBCUTANEOUS at 05:07

## 2020-07-31 RX ADMIN — FAMOTIDINE 20 MG: 20 TABLET ORAL at 08:07

## 2020-07-31 RX ADMIN — PROPOFOL 50 MCG/KG/MIN: 10 INJECTION, EMULSION INTRAVENOUS at 07:07

## 2020-07-31 RX ADMIN — POLYETHYLENE GLYCOL 3350 17 G: 17 POWDER, FOR SOLUTION ORAL at 08:07

## 2020-07-31 RX ADMIN — ACETAMINOPHEN 650 MG: 160 SOLUTION ORAL at 03:07

## 2020-07-31 RX ADMIN — REMDESIVIR 100 MG: 100 INJECTION, POWDER, LYOPHILIZED, FOR SOLUTION INTRAVENOUS at 05:07

## 2020-07-31 RX ADMIN — HEPARIN SODIUM AND DEXTROSE 10 UNITS/KG/HR: 10000; 5 INJECTION INTRAVENOUS at 08:07

## 2020-07-31 RX ADMIN — MINERAL OIL AND PETROLATUM: 150; 830 OINTMENT OPHTHALMIC at 06:07

## 2020-07-31 RX ADMIN — STANDARDIZED SENNA CONCENTRATE AND DOCUSATE SODIUM 1 TABLET: 8.6; 5 TABLET ORAL at 08:07

## 2020-07-31 RX ADMIN — INSULIN DETEMIR 13 UNITS: 100 INJECTION, SOLUTION SUBCUTANEOUS at 08:07

## 2020-07-31 RX ADMIN — DEXMEDETOMIDINE HYDROCHLORIDE 0.9 MCG/KG/HR: 4 INJECTION, SOLUTION INTRAVENOUS at 06:07

## 2020-07-31 RX ADMIN — PROPOFOL 50 MCG/KG/MIN: 10 INJECTION, EMULSION INTRAVENOUS at 08:07

## 2020-07-31 RX ADMIN — MINERAL OIL AND PETROLATUM: 150; 830 OINTMENT OPHTHALMIC at 10:07

## 2020-07-31 RX ADMIN — PROPOFOL 50 MCG/KG/MIN: 10 INJECTION, EMULSION INTRAVENOUS at 01:07

## 2020-07-31 RX ADMIN — DEXAMETHASONE SODIUM PHOSPHATE 6 MG: 4 INJECTION, SOLUTION INTRA-ARTICULAR; INTRALESIONAL; INTRAMUSCULAR; INTRAVENOUS; SOFT TISSUE at 08:07

## 2020-07-31 RX ADMIN — DEXMEDETOMIDINE HYDROCHLORIDE 1 MCG/KG/HR: 4 INJECTION, SOLUTION INTRAVENOUS at 02:07

## 2020-07-31 RX ADMIN — ROSUVASTATIN CALCIUM 10 MG: 10 TABLET, FILM COATED ORAL at 08:07

## 2020-07-31 RX ADMIN — STANDARDIZED SENNA CONCENTRATE AND DOCUSATE SODIUM 1 TABLET: 8.6; 5 TABLET ORAL at 02:07

## 2020-07-31 RX ADMIN — PROPOFOL 50 MCG/KG/MIN: 10 INJECTION, EMULSION INTRAVENOUS at 05:07

## 2020-07-31 RX ADMIN — DEXMEDETOMIDINE HYDROCHLORIDE 0.9 MCG/KG/HR: 4 INJECTION, SOLUTION INTRAVENOUS at 08:07

## 2020-07-31 RX ADMIN — ACETAMINOPHEN 650 MG: 160 SOLUTION ORAL at 12:07

## 2020-07-31 NOTE — PLAN OF CARE
Problem: Ventilator-Induced Lung Injury (Mechanical Ventilation, Invasive)  Goal: Absence of Ventilator-Induced Lung Injury  7/31/2020 0415 by Isaac Goodman RN  Outcome: Ongoing, Progressing  7/31/2020 0413 by Isaac Goodman RN  Outcome: Ongoing, Progressing  7/31/2020 0412 by Isaac Goodman RN  Outcome: Ongoing, Progressing   Patient in supine position at of 1600 7/30/2020, tolerating. Nimbex off at 2000 and tolerating well. Ventilator settings on A/C mod reate of 34, tidal volume 350, fi2 40 % and peep 10. Will continue to monitor and assess.   Problem: Communication Impairment (Artificial Airway)  Goal: Effective Communication  7/31/2020 0415 by Isaac Goodman RN  Outcome: Ongoing, Progressing  7/31/2020 0413 by Isaac Goodman RN  Outcome: Ongoing, Progressing  7/31/2020 0412 by Isaac Goodman RN  Outcome: Ongoing, Progressing     Problem: Skin and Tissue Injury (Artificial Airway)  Goal: Absence of Device-Related Skin or Tissue Injury  7/31/2020 0415 by Isaac Goodman RN  Outcome: Ongoing, Progressing  7/31/2020 0413 by Isaac Goodman RN  Outcome: Ongoing, Progressing  7/31/2020 0412 by Isaac Goodman RN  Outcome: Ongoing, Progressing     Problem: Adult Inpatient Plan of Care  Goal: Plan of Care Review  7/31/2020 0415 by Isaac Goodman RN  Outcome: Ongoing, Progressing  7/31/2020 0413 by Isaac Goodman RN  Outcome: Ongoing, Progressing  7/31/2020 0412 by Isaac Goodman RN  Outcome: Ongoing, Progressing

## 2020-07-31 NOTE — ASSESSMENT & PLAN NOTE
- Hx of elevated LFTs dating back to 2015  - Acute hepatitis panel and HIV - negative  - CMP daily

## 2020-07-31 NOTE — HOSPITAL COURSE
As of 7/29, the patient's oxygenation continued to worsen with P:F <150. R IJ and Arterial Line placed. Pt sedated, paralyzed, and proned at 9:30pm with improved oxygenation. Repeat AM gas on 7/30 Arterial Blood Gas result:  pO2 112; pCO2 58.9; pH 7.256;  HCO3 26.2, %O2 Sat 97%. FiO2 60, 8 PEEP. Pt supinated at 4:10 pm on 7/30 with Arterial Blood Gas result:  pO2 106; pCO2 52.2; pH 7.349;  HCO3 28.7, %O2 Sat 106. (P:F 212). FiO2 50, 10 PEEP.    As of 7/31, paralytic d/c'ed and sedation slowly weaned. Propofol d/c'ed on 8/1 2/2 triglycerides with ketamine and versed initiated. Now requiring higher vent settings due to dyssynchrony, will continue to increase sedation. Patient paralyzed again on 8/1 for vent dyssynchrony. Nimbex was discontinued on 08/03 and sedative medications were increased, however, patient struggled to pull enough tidal volume and was desatting even on maximum sedation so nimbex was restarted. Nimbex was stopped on 08/04 and restarted propofol. As patient became more agitated, ketamine was added on 08/06. Sedation was attempted to be weaned off by adding seroquel and zyprexa. Versed and ketamine were weaned off. Patient was weaned off of levo on morning of 08/09. Patient failed multiple SBTs and it was discussed with family of likely PEG/trach, pending their decision. Patient has continued agitation with coughing/ increased BPs requiring propofol, fentanyl, and precedex with scheduled valium. Discussed with family that as patient continues to require ventillator support past 21d, would benefit from trach/peg. Family agrees to pursue trach/peg/LTAC placement. Trach/peg placed 8/25 and patient tolerating ventilation well.

## 2020-07-31 NOTE — SUBJECTIVE & OBJECTIVE
Interval History/Significant Events: Patient remains intubated and sedated. Nimbex off. Sedation weaning.    Review of Systems   Unable to perform ROS: Intubated     Objective:     Vital Signs (Most Recent):  Temp: (!) 100.9 °F (38.3 °C) (07/31/20 1324)  Pulse: 81 (07/31/20 1324)  Resp: (!) 26 (07/31/20 1324)  BP: 115/67 (07/31/20 1400)  SpO2: 95 % (07/31/20 1324) Vital Signs (24h Range):  Temp:  [99 °F (37.2 °C)-101.5 °F (38.6 °C)] 100.9 °F (38.3 °C)  Pulse:  [64-81] 81  Resp:  [14-36] 26  SpO2:  [94 %-100 %] 95 %  BP: ()/(58-69) 115/67  Arterial Line BP: ()/(56-68) 145/67   Weight: 111 kg (244 lb 11.4 oz)  Body mass index is 36.14 kg/m².      Intake/Output Summary (Last 24 hours) at 7/31/2020 1613  Last data filed at 7/31/2020 1300  Gross per 24 hour   Intake 2583.55 ml   Output 2220 ml   Net 363.55 ml       Physical Exam  Nursing note reviewed.   Constitutional:       Interventions: He is intubated.      Comments: Intubated and sedated   HENT:      Head: Normocephalic and atraumatic.   Eyes:      Conjunctiva/sclera: Conjunctivae normal.   Neck:      Musculoskeletal: Neck supple.   Cardiovascular:      Rate and Rhythm: Tachycardia present.      Heart sounds: No murmur.   Pulmonary:      Effort: He is intubated.      Breath sounds: No stridor. Examination of the right-lower field reveals rhonchi and rales. Examination of the left-lower field reveals rhonchi and rales. Rhonchi and rales present. No wheezing.   Abdominal:      General: There is no distension.      Palpations: Abdomen is soft.      Tenderness: There is no abdominal tenderness.   Musculoskeletal:      Right lower leg: No edema.      Left lower leg: No edema.   Skin:     General: Skin is warm and dry.   Neurological:      General: No focal deficit present.         Vents:  Vent Mode: A/C (07/31/20 1324)  Ventilator Initiated: Yes(chart correction) (07/26/20 2108)  Set Rate: 34 BPM (07/31/20 1324)  Vt Set: 350 mL (07/31/20 1324)  Pressure  Support: 0 cmH20 (07/31/20 1324)  PEEP/CPAP: 8 cmH20 (07/31/20 1324)  Oxygen Concentration (%): 50 (07/31/20 1324)  Peak Airway Pressure: 23 cmH2O (07/31/20 1324)  Plateau Pressure: 23 cmH20 (07/31/20 1324)  Total Ve: 12.2 mL (07/31/20 1324)  F/VT Ratio<105 (RSBI): (!) 74.71 (07/31/20 1324)  Lines/Drains/Airways     Central Venous Catheter Line            Percutaneous Central Line Insertion/Assessment - Triple Lumen  07/29/20 1729 right internal jugular 1 day          Drain                 NG/OG Tube 07/26/20 1756 [place] sump;orogastric 18 Fr. Center mouth 4 days         Urethral Catheter 07/26/20 2205 16 Fr. 4 days          Airway                 Airway - Non-Surgical 07/26/20 1724 Endotracheal Tube 4 days          Arterial Line                 Arterial Line 07/29/20 1700 Right Radial 1 day          Peripheral Intravenous Line                 Peripheral IV - Single Lumen 07/27/20 1447 18 G Anterior;Distal;Left Wrist 4 days              Significant Labs:    CBC/Anemia Profile:  Recent Labs   Lab 07/30/20  0258 07/31/20  0319   WBC 16.01* 9.73   HGB 13.5* 13.4*   HCT 45.5 44.5   * 366*   MCV 92 90   RDW 14.3 14.4        Chemistries:  Recent Labs   Lab 07/30/20  0258 07/31/20  0319    144   K 5.8* 5.0    107   CO2 24 25   BUN 37* 40*   CREATININE 1.1 1.2   CALCIUM 9.4 9.1   ALBUMIN 2.3* 2.4*   PROT 7.2 7.6   BILITOT 0.4 0.4   ALKPHOS 57 61   ALT 76* 77*   AST 54* 56*   MG 2.8* 2.7*   PHOS 6.0* 4.5       All pertinent labs within the past 24 hours have been reviewed.    Significant Imaging:  I have reviewed all pertinent imaging results/findings within the past 24 hours.

## 2020-07-31 NOTE — PROGRESS NOTES
[facility] - ICU 16   Critical Care Medicine  Progress Note    Patient Name: Anup Miller  MRN: [id number]  Admission Date: 7/26/2020  Hospital Length of Stay: 5 days  Code Status: Full Code  Attending Provider: Marina Chandler MD  Primary Care Provider: Bryce Gonzales MD   Principal Problem: Pneumonia due to COVID-19 virus    Subjective:     HPI:  Anup Miller is a 70 y.o. male patient with a PMHx of HTN, HLD, and DM who presents to the [place] [facility] for evaluation of SOB which  1 week ago. Pt became more SOB today and has an O2 sat of low 70s upon arrival on RA. Symptoms are worsening and moderate in severity. No mitigating or exacerbating factors reported. Associated sxs include cough and fever. Patient denies any congestion, sore throat, CP, abd pain, N/V, back pain, HA, weakness, and all other sxs at this time. No prior Tx reported. No further complaints or concerns at this time. Patient was placed on Bipap for a few hours and subsequently intubated. COVID positive. Was started on IV dexamethasone, Ceftriaxone, and Azithromycin. Patient transferred to [facility] on evening of 7/26/20 for higher level of care.     Hospital/ICU Course:  7/29  -- Oxygenation worsening on 7/29 with P:F <150.   -- R IJ and Arterial Line placed. Pt sedated, paralyzed, and proned at 9:30pm. Oxygenation improving since proning.   7/30  Repeat AM gas on 7/30 Arterial Blood Gas result:  pO2 112; pCO2 58.9; pH 7.256;  HCO3 26.2, %O2 Sat 97%. FiO2 60, 8 PEEP  Pt supinated at 4:10 pm on 7/30 with Arterial Blood Gas result:  pO2 106; pCO2 52.2; pH 7.349;  HCO3 28.7, %O2 Sat 106. (P:F 212). FiO2 50, 10 PEEP    As of 7/31, paralytic d/c'ed and sedation slowly weaned. Patient remains sedated at 50%/8 PEEP.    Interval History/Significant Events: Patient remains intubated and sedated. Nimbex off. Sedation weaning.    Review of Systems   Unable to perform ROS: Intubated     Objective:     Vital Signs (Most  Recent):  Temp: (!) 100.9 °F (38.3 °C) (07/31/20 1324)  Pulse: 81 (07/31/20 1324)  Resp: (!) 26 (07/31/20 1324)  BP: 115/67 (07/31/20 1400)  SpO2: 95 % (07/31/20 1324) Vital Signs (24h Range):  Temp:  [99 °F (37.2 °C)-101.5 °F (38.6 °C)] 100.9 °F (38.3 °C)  Pulse:  [64-81] 81  Resp:  [14-36] 26  SpO2:  [94 %-100 %] 95 %  BP: ()/(58-69) 115/67  Arterial Line BP: ()/(56-68) 145/67   Weight: 111 kg (244 lb 11.4 oz)  Body mass index is 36.14 kg/m².      Intake/Output Summary (Last 24 hours) at 7/31/2020 1613  Last data filed at 7/31/2020 1300  Gross per 24 hour   Intake 2583.55 ml   Output 2220 ml   Net 363.55 ml       Physical Exam  Nursing note reviewed.   Constitutional:       Interventions: He is intubated.      Comments: Intubated and sedated   HENT:      Head: Normocephalic and atraumatic.   Eyes:      Conjunctiva/sclera: Conjunctivae normal.   Neck:      Musculoskeletal: Neck supple.   Cardiovascular:      Rate and Rhythm: Tachycardia present.      Heart sounds: No murmur.   Pulmonary:      Effort: He is intubated.      Breath sounds: No stridor. Examination of the right-lower field reveals rhonchi and rales. Examination of the left-lower field reveals rhonchi and rales. Rhonchi and rales present. No wheezing.   Abdominal:      General: There is no distension.      Palpations: Abdomen is soft.      Tenderness: There is no abdominal tenderness.   Musculoskeletal:      Right lower leg: No edema.      Left lower leg: No edema.   Skin:     General: Skin is warm and dry.   Neurological:      General: No focal deficit present.         Vents:  Vent Mode: A/C (07/31/20 1324)  Ventilator Initiated: Yes(chart correction) (07/26/20 2108)  Set Rate: 34 BPM (07/31/20 1324)  Vt Set: 350 mL (07/31/20 1324)  Pressure Support: 0 cmH20 (07/31/20 1324)  PEEP/CPAP: 8 cmH20 (07/31/20 1324)  Oxygen Concentration (%): 50 (07/31/20 1324)  Peak Airway Pressure: 23 cmH2O (07/31/20 1324)  Plateau Pressure: 23 cmH20 (07/31/20  1324)  Total Ve: 12.2 mL (07/31/20 1324)  F/VT Ratio<105 (RSBI): (!) 74.71 (07/31/20 1324)  Lines/Drains/Airways     Central Venous Catheter Line            Percutaneous Central Line Insertion/Assessment - Triple Lumen  07/29/20 1729 right internal jugular 1 day          Drain                 NG/OG Tube 07/26/20 1756 [place] sump;orogastric 18 Fr. Center mouth 4 days         Urethral Catheter 07/26/20 2205 16 Fr. 4 days          Airway                 Airway - Non-Surgical 07/26/20 1724 Endotracheal Tube 4 days          Arterial Line                 Arterial Line 07/29/20 1700 Right Radial 1 day          Peripheral Intravenous Line                 Peripheral IV - Single Lumen 07/27/20 1447 18 G Anterior;Distal;Left Wrist 4 days              Significant Labs:    CBC/Anemia Profile:  Recent Labs   Lab 07/30/20  0258 07/31/20  0319   WBC 16.01* 9.73   HGB 13.5* 13.4*   HCT 45.5 44.5   * 366*   MCV 92 90   RDW 14.3 14.4        Chemistries:  Recent Labs   Lab 07/30/20  0258 07/31/20  0319    144   K 5.8* 5.0    107   CO2 24 25   BUN 37* 40*   CREATININE 1.1 1.2   CALCIUM 9.4 9.1   ALBUMIN 2.3* 2.4*   PROT 7.2 7.6   BILITOT 0.4 0.4   ALKPHOS 57 61   ALT 76* 77*   AST 54* 56*   MG 2.8* 2.7*   PHOS 6.0* 4.5       All pertinent labs within the past 24 hours have been reviewed.    Significant Imaging:  I have reviewed all pertinent imaging results/findings within the past 24 hours.      ABG  Recent Labs   Lab 07/31/20  1411   PH 7.454*   PO2 56*   PCO2 33.0*   HCO3 23.1*   BE -1     Assessment/Plan:     Pulmonary  Acute hypoxemic respiratory failure  - see Pneumonia due to COVID 19    Cardiac/Vascular  Hyperlipidemia associated with type 2 diabetes mellitus  - Restart Crestor once extubated     Hypertension associated with diabetes  - Hold antihypertensives in setting of shock  - Resume as tolerated    Endocrine  Uncontrolled type 2 diabetes mellitus  - A1c 8.3%  - accuchecks q6h  - Detemir 13U qhs  - LDSSI      GI  Elevated liver enzymes  - Hx of elevated LFTs dating back to 2015  - Acute hepatitis panel and HIV - negative  - CMP daily    Other  * Pneumonia due to COVID-19 virus  Anup Miller is a 70 y.o. male patient with a PMHx of HTN, HLD, and DM who presents to the [place] [facility] for shortness of breath and found to be COVID positive. Was started on IV dexamethasone, Ceftriaxone, and Azithromycin. Patient transferred [place] [facility] on evening of 7/26/20 for higher level of care. Labs on arrival to [facility] remarkable for WBC 9.8, Procal 1.3, D-dimer 1.1, Ferritin 2,300, , and .     Plan   - Given 1 L LR on arrival to [facility]  - Remdesivir complete  - Heparin gtt for hypercoagulable state in COVID  - IV Dexamethasone  - Continue Ceftriaxone and Azithromycin for possible superimposed bacterial pneumonia (complete)   - f/u blood and sputum cx - NGTD  - trend lactate - nl  - Proned due to P:F <150. Tolerating well.  - Paralyzed, proned, sedated previously  On FiO2 - 50% PEEP - 18  Supinated at 4:10 pm on 7/30  Recent Labs     07/30/20  1824   PH 7.349*   PCO2 52.2*   PO2 106*   HCO3 28.7*   POCSATURATED 98   BE 3          Critical Care Daily Checklist:    A: Awake: RASS Goal/Actual Goal:    Actual: Hackett Agitation Sedation Scale (RASS): Unarousable   B: Spontaneous Breathing Trial Performed? Spon. Breathing Trial Initiated?: Not initiated (07/28/20 1318)   C: SAT & SBT Coordinated?  n/a                      D: Delirium: CAM-ICU Overall CAM-ICU: Positive   E: Early Mobility Performed? No   F: Feeding Goal: Goals: Meet % EEN, EPN by RD f/u date  Status: Nutrition Goal Status: new   Current Diet Order   Procedures    Diet NPO      AS: Analgesia/Sedation done   T: Thromboembolic Prophylaxis Heparin gtt   H: HOB > 300 Yes   U: Stress Ulcer Prophylaxis (if needed) famotidine   G: Glucose Control q6h   B: Bowel Function     I: Indwelling Catheter (Lines & Boothe) Necessity necessary   D:  De-escalation of Antimicrobials/Pharmacotherapies done    Plan for the day/ETD Cont course    Code Status:  Family/Goals of Care: Full Code  Cont course     Critical Care Time: 65 minutes  Critical secondary to Patient has a condition that poses threat to life and bodily function: Severe Respiratory Distress.      Critical care was time spent personally by me on the following activities: development of treatment plan with patient or surrogate and bedside caregivers, discussions with consultants, evaluation of patient's response to treatment, examination of patient, ordering and performing treatments and interventions, ordering and review of laboratory studies, ordering and review of radiographic studies, pulse oximetry, re-evaluation of patient's condition. This critical care time did not overlap with that of any other provider or involve time for any procedures.     Kody Gunter NP  Critical Care Medicine  [facility] - ICU 16 WT

## 2020-07-31 NOTE — ASSESSMENT & PLAN NOTE
Anup Miller is a 70 y.o. male patient with a PMHx of HTN, HLD, and DM who presents to the [place] [facility] for shortness of breath and found to be COVID positive. Was started on IV dexamethasone, Ceftriaxone, and Azithromycin. Patient transferred to [facility] on evening of 7/26/20 for higher level of care. Labs on arrival to [facility] remarkable for WBC 9.8, Procal 1.3, D-dimer 1.1, Ferritin 2,300, , and .     Plan   - Given 1 L LR on arrival to [facility]  - Remdesivir complete  - Heparin gtt for hypercoagulable state in COVID  - IV Dexamethasone  - Continue Ceftriaxone and Azithromycin for possible superimposed bacterial pneumonia (complete)   - f/u blood and sputum cx - NGTD  - trend lactate - nl  - Proned due to P:F <150. Tolerating well.  - Paralyzed, proned, sedated previously  On FiO2 - 50% PEEP - 18  Supinated at 4:10 pm on 7/30  Recent Labs     07/30/20  1824   PH 7.349*   PCO2 52.2*   PO2 106*   HCO3 28.7*   POCSATURATED 98   BE 3

## 2020-08-01 LAB
ALBUMIN SERPL BCP-MCNC: 2.2 G/DL (ref 3.5–5.2)
ALLENS TEST: ABNORMAL
ALLENS TEST: ABNORMAL
ALLENS TEST: NORMAL
ALP SERPL-CCNC: 55 U/L (ref 55–135)
ALT SERPL W/O P-5'-P-CCNC: 68 U/L (ref 10–44)
ANION GAP SERPL CALC-SCNC: 14 MMOL/L (ref 8–16)
ANISOCYTOSIS BLD QL SMEAR: SLIGHT
AST SERPL-CCNC: 56 U/L (ref 10–40)
BASOPHILS NFR BLD: 0 % (ref 0–1.9)
BILIRUB SERPL-MCNC: 0.5 MG/DL (ref 0.1–1)
BUN SERPL-MCNC: 39 MG/DL (ref 8–23)
CALCIUM SERPL-MCNC: 9.3 MG/DL (ref 8.7–10.5)
CHLORIDE SERPL-SCNC: 109 MMOL/L (ref 95–110)
CO2 SERPL-SCNC: 19 MMOL/L (ref 23–29)
CREAT SERPL-MCNC: 1 MG/DL (ref 0.5–1.4)
DELSYS: ABNORMAL
DELSYS: ABNORMAL
DIFFERENTIAL METHOD: ABNORMAL
EOSINOPHIL NFR BLD: 3 % (ref 0–8)
ERYTHROCYTE [DISTWIDTH] IN BLOOD BY AUTOMATED COUNT: 13.9 % (ref 11.5–14.5)
ERYTHROCYTE [SEDIMENTATION RATE] IN BLOOD BY WESTERGREN METHOD: 34 MM/H
ERYTHROCYTE [SEDIMENTATION RATE] IN BLOOD BY WESTERGREN METHOD: 36 MM/H
EST. GFR  (AFRICAN AMERICAN): >60 ML/MIN/1.73 M^2
EST. GFR  (NON AFRICAN AMERICAN): >60 ML/MIN/1.73 M^2
FACT X PPP CHRO-ACNC: 0.44 IU/ML (ref 0.3–0.7)
FIO2: 60
FIO2: 70
GLUCOSE SERPL-MCNC: 253 MG/DL (ref 70–110)
HCO3 UR-SCNC: 24.3 MMOL/L (ref 24–28)
HCO3 UR-SCNC: 24.9 MMOL/L (ref 24–28)
HCO3 UR-SCNC: 26.4 MMOL/L (ref 24–28)
HCT VFR BLD AUTO: 42.4 % (ref 40–54)
HGB BLD-MCNC: 13.1 G/DL (ref 14–18)
IMM GRANULOCYTES # BLD AUTO: ABNORMAL K/UL (ref 0–0.04)
IMM GRANULOCYTES NFR BLD AUTO: ABNORMAL % (ref 0–0.5)
LYMPHOCYTES NFR BLD: 7 % (ref 18–48)
MAGNESIUM SERPL-MCNC: 2.4 MG/DL (ref 1.6–2.6)
MCH RBC QN AUTO: 28.1 PG (ref 27–31)
MCHC RBC AUTO-ENTMCNC: 30.9 G/DL (ref 32–36)
MCV RBC AUTO: 91 FL (ref 82–98)
METAMYELOCYTES NFR BLD MANUAL: 2 %
MIN VOL: 12.4
MODE: ABNORMAL
MODE: ABNORMAL
MONOCYTES NFR BLD: 7 % (ref 4–15)
MYELOCYTES NFR BLD MANUAL: 1 %
NEUTROPHILS NFR BLD: 79 % (ref 38–73)
NEUTS BAND NFR BLD MANUAL: 1 %
NRBC BLD-RTO: 0 /100 WBC
PCO2 BLDA: 43.5 MMHG (ref 35–45)
PCO2 BLDA: 44.2 MMHG (ref 35–45)
PCO2 BLDA: 60.9 MMHG (ref 35–45)
PEEP: 10
PEEP: 10
PH SMN: 7.24 [PH] (ref 7.35–7.45)
PH SMN: 7.36 [PH] (ref 7.35–7.45)
PH SMN: 7.36 [PH] (ref 7.35–7.45)
PHOSPHATE SERPL-MCNC: 4.3 MG/DL (ref 2.7–4.5)
PIP: 28
PLATELET # BLD AUTO: 341 K/UL (ref 150–350)
PLATELET BLD QL SMEAR: ABNORMAL
PMV BLD AUTO: 10.8 FL (ref 9.2–12.9)
PO2 BLDA: 76 MMHG (ref 80–100)
PO2 BLDA: 93 MMHG (ref 80–100)
PO2 BLDA: 95 MMHG (ref 80–100)
POC BE: -1 MMOL/L
POC SATURATED O2: 94 % (ref 95–100)
POC SATURATED O2: 95 % (ref 95–100)
POC SATURATED O2: 97 % (ref 95–100)
POC TCO2: 26 MMOL/L (ref 23–27)
POC TCO2: 26 MMOL/L (ref 23–27)
POC TCO2: 28 MMOL/L (ref 23–27)
POCT GLUCOSE: 243 MG/DL (ref 70–110)
POCT GLUCOSE: 257 MG/DL (ref 70–110)
POCT GLUCOSE: 268 MG/DL (ref 70–110)
POCT GLUCOSE: 270 MG/DL (ref 70–110)
POTASSIUM SERPL-SCNC: 5 MMOL/L (ref 3.5–5.1)
PROT SERPL-MCNC: 7.4 G/DL (ref 6–8.4)
PROVIDER CREDENTIALS: NORMAL
RBC # BLD AUTO: 4.67 M/UL (ref 4.6–6.2)
SAMPLE: ABNORMAL
SAMPLE: ABNORMAL
SAMPLE: NORMAL
SITE: ABNORMAL
SITE: ABNORMAL
SITE: NORMAL
SODIUM SERPL-SCNC: 142 MMOL/L (ref 136–145)
SP02: 96
TIME NOTIFIED: 1249
TRIGL SERPL-MCNC: 529 MG/DL (ref 30–150)
VERBAL RESULT READBACK PERFORMED: YES
VT: 350
VT: 380
WBC # BLD AUTO: 9.81 K/UL (ref 3.9–12.7)

## 2020-08-01 PROCEDURE — 82803 BLOOD GASES ANY COMBINATION: CPT

## 2020-08-01 PROCEDURE — 85520 HEPARIN ASSAY: CPT

## 2020-08-01 PROCEDURE — 84100 ASSAY OF PHOSPHORUS: CPT

## 2020-08-01 PROCEDURE — 27200966 HC CLOSED SUCTION SYSTEM

## 2020-08-01 PROCEDURE — 80053 COMPREHEN METABOLIC PANEL: CPT

## 2020-08-01 PROCEDURE — 85007 BL SMEAR W/DIFF WBC COUNT: CPT

## 2020-08-01 PROCEDURE — 63600175 PHARM REV CODE 636 W HCPCS: Performed by: STUDENT IN AN ORGANIZED HEALTH CARE EDUCATION/TRAINING PROGRAM

## 2020-08-01 PROCEDURE — 25000003 PHARM REV CODE 250: Performed by: STUDENT IN AN ORGANIZED HEALTH CARE EDUCATION/TRAINING PROGRAM

## 2020-08-01 PROCEDURE — 84478 ASSAY OF TRIGLYCERIDES: CPT

## 2020-08-01 PROCEDURE — 63600175 PHARM REV CODE 636 W HCPCS: Performed by: NURSE PRACTITIONER

## 2020-08-01 PROCEDURE — 25000003 PHARM REV CODE 250: Performed by: NURSE PRACTITIONER

## 2020-08-01 PROCEDURE — 37799 UNLISTED PX VASCULAR SURGERY: CPT

## 2020-08-01 PROCEDURE — 94761 N-INVAS EAR/PLS OXIMETRY MLT: CPT

## 2020-08-01 PROCEDURE — 25000003 PHARM REV CODE 250: Performed by: PHYSICIAN ASSISTANT

## 2020-08-01 PROCEDURE — 82800 BLOOD PH: CPT

## 2020-08-01 PROCEDURE — 99900035 HC TECH TIME PER 15 MIN (STAT)

## 2020-08-01 PROCEDURE — 27000221 HC OXYGEN, UP TO 24 HOURS

## 2020-08-01 PROCEDURE — 85027 COMPLETE CBC AUTOMATED: CPT

## 2020-08-01 PROCEDURE — 83735 ASSAY OF MAGNESIUM: CPT

## 2020-08-01 PROCEDURE — 99291 CRITICAL CARE FIRST HOUR: CPT | Mod: ,,, | Performed by: NURSE PRACTITIONER

## 2020-08-01 PROCEDURE — 99291 PR CRITICAL CARE, E/M 30-74 MINUTES: ICD-10-PCS | Mod: ,,, | Performed by: NURSE PRACTITIONER

## 2020-08-01 PROCEDURE — 20000000 HC ICU ROOM

## 2020-08-01 PROCEDURE — 99900026 HC AIRWAY MAINTENANCE (STAT)

## 2020-08-01 PROCEDURE — 25000003 PHARM REV CODE 250: Performed by: INTERNAL MEDICINE

## 2020-08-01 RX ORDER — FENTANYL CITRATE-0.9 % NACL/PF 10 MCG/ML
25 PLASTIC BAG, INJECTION (ML) INTRAVENOUS CONTINUOUS
Status: DISCONTINUED | OUTPATIENT
Start: 2020-08-01 | End: 2020-08-08

## 2020-08-01 RX ORDER — DEXTROSE MONOHYDRATE 100 MG/ML
INJECTION, SOLUTION INTRAVENOUS CONTINUOUS PRN
Status: DISCONTINUED | OUTPATIENT
Start: 2020-08-01 | End: 2020-08-02

## 2020-08-01 RX ORDER — GLUCAGON 1 MG
1 KIT INJECTION
Status: DISCONTINUED | OUTPATIENT
Start: 2020-08-01 | End: 2020-08-02

## 2020-08-01 RX ORDER — INSULIN ASPART 100 [IU]/ML
1-10 INJECTION, SOLUTION INTRAVENOUS; SUBCUTANEOUS EVERY 4 HOURS PRN
Status: DISCONTINUED | OUTPATIENT
Start: 2020-08-01 | End: 2020-08-02

## 2020-08-01 RX ADMIN — INSULIN ASPART 2 UNITS: 100 INJECTION, SOLUTION INTRAVENOUS; SUBCUTANEOUS at 11:08

## 2020-08-01 RX ADMIN — INSULIN DETEMIR 13 UNITS: 100 INJECTION, SOLUTION SUBCUTANEOUS at 08:08

## 2020-08-01 RX ADMIN — POLYETHYLENE GLYCOL 3350 17 G: 17 POWDER, FOR SOLUTION ORAL at 08:08

## 2020-08-01 RX ADMIN — MINERAL OIL AND PETROLATUM: 150; 830 OINTMENT OPHTHALMIC at 06:08

## 2020-08-01 RX ADMIN — STANDARDIZED SENNA CONCENTRATE AND DOCUSATE SODIUM 1 TABLET: 8.6; 5 TABLET ORAL at 08:08

## 2020-08-01 RX ADMIN — DEXMEDETOMIDINE HYDROCHLORIDE 0.8 MCG/KG/HR: 4 INJECTION, SOLUTION INTRAVENOUS at 02:08

## 2020-08-01 RX ADMIN — MIDAZOLAM HYDROCHLORIDE 5 MG/HR: 5 INJECTION, SOLUTION INTRAMUSCULAR; INTRAVENOUS at 10:08

## 2020-08-01 RX ADMIN — MINERAL OIL AND PETROLATUM: 150; 830 OINTMENT OPHTHALMIC at 10:08

## 2020-08-01 RX ADMIN — DEXMEDETOMIDINE HYDROCHLORIDE 0.8 MCG/KG/HR: 4 INJECTION, SOLUTION INTRAVENOUS at 11:08

## 2020-08-01 RX ADMIN — Medication 125 MCG/HR: at 01:08

## 2020-08-01 RX ADMIN — MINERAL OIL AND PETROLATUM: 150; 830 OINTMENT OPHTHALMIC at 03:08

## 2020-08-01 RX ADMIN — Medication 250 MCG/HR: at 08:08

## 2020-08-01 RX ADMIN — FAMOTIDINE 20 MG: 20 TABLET ORAL at 08:08

## 2020-08-01 RX ADMIN — MIDAZOLAM HYDROCHLORIDE 2 MG/HR: 5 INJECTION, SOLUTION INTRAMUSCULAR; INTRAVENOUS at 05:08

## 2020-08-01 RX ADMIN — ROSUVASTATIN CALCIUM 10 MG: 10 TABLET, FILM COATED ORAL at 08:08

## 2020-08-01 RX ADMIN — INSULIN ASPART 6 UNITS: 100 INJECTION, SOLUTION INTRAVENOUS; SUBCUTANEOUS at 05:08

## 2020-08-01 RX ADMIN — Medication 250 MCG/HR: at 02:08

## 2020-08-01 RX ADMIN — KETAMINE HYDROCHLORIDE 2.5 MCG/KG/MIN: 100 INJECTION INTRAMUSCULAR; INTRAVENOUS at 10:08

## 2020-08-01 RX ADMIN — KETAMINE HYDROCHLORIDE 5 MCG/KG/MIN: 100 INJECTION INTRAMUSCULAR; INTRAVENOUS at 11:08

## 2020-08-01 RX ADMIN — DEXAMETHASONE SODIUM PHOSPHATE 6 MG: 4 INJECTION, SOLUTION INTRA-ARTICULAR; INTRALESIONAL; INTRAMUSCULAR; INTRAVENOUS; SOFT TISSUE at 08:08

## 2020-08-01 RX ADMIN — INSULIN ASPART 6 UNITS: 100 INJECTION, SOLUTION INTRAVENOUS; SUBCUTANEOUS at 12:08

## 2020-08-01 NOTE — SUBJECTIVE & OBJECTIVE
Interval History/Significant Events: Propofol d/c'ed on 8/1 2/2 triglycerides with ketamine and versed initiated. Now requiring higher vent settings due to dyssynchrony, will continue to increase sedation and consider paralyzing if necessary.     Review of Systems   Unable to perform ROS: Intubated     Objective:     Vital Signs (Most Recent):  Temp: 99.3 °F (37.4 °C) (08/01/20 1500)  Pulse: 70 (08/01/20 1500)  Resp: (!) 34 (08/01/20 1500)  BP: 123/69 (08/01/20 1500)  SpO2: 98 % (08/01/20 1500) Vital Signs (24h Range):  Temp:  [97.2 °F (36.2 °C)-101.4 °F (38.6 °C)] 99.3 °F (37.4 °C)  Pulse:  [62-89] 70  Resp:  [21-42] 34  SpO2:  [79 %-98 %] 98 %  BP: ()/(61-79) 123/69  Arterial Line BP: ()/(59-78) 128/64   Weight: 111 kg (244 lb 11.4 oz)  Body mass index is 36.14 kg/m².      Intake/Output Summary (Last 24 hours) at 8/1/2020 1529  Last data filed at 8/1/2020 1452  Gross per 24 hour   Intake 2551.74 ml   Output 1910 ml   Net 641.74 ml       Physical Exam  Nursing note reviewed.   Constitutional:       Interventions: He is sedated, intubated and restrained.      Comments: Intubated and sedated   HENT:      Head: Normocephalic and atraumatic.   Eyes:      Conjunctiva/sclera: Conjunctivae normal.   Neck:      Musculoskeletal: Neck supple.   Cardiovascular:      Rate and Rhythm: Normal rate.      Heart sounds: No murmur.   Pulmonary:      Effort: He is intubated.      Breath sounds: No stridor. Examination of the right-lower field reveals rhonchi and rales. Examination of the left-lower field reveals rhonchi and rales. Rhonchi and rales present. No wheezing.   Abdominal:      General: There is no distension.      Palpations: Abdomen is soft.      Tenderness: There is no abdominal tenderness.   Musculoskeletal:      Right lower leg: No edema.      Left lower leg: No edema.   Skin:     General: Skin is warm and dry.   Neurological:      General: No focal deficit present.      GCS: GCS eye subscore is 1. GCS  verbal subscore is 1. GCS motor subscore is 1.         Vents:  Vent Mode: A/C (08/01/20 1329)  Ventilator Initiated: Yes(chart correction) (07/26/20 2108)  Set Rate: 34 BPM (08/01/20 1329)  Vt Set: 350 mL (08/01/20 1329)  Pressure Support: 0 cmH20 (08/01/20 1329)  PEEP/CPAP: 12 cmH20 (08/01/20 1329)  Oxygen Concentration (%): 80 (08/01/20 1500)  Peak Airway Pressure: 19 cmH2O (08/01/20 1329)  Plateau Pressure: 3.1 cmH20 (08/01/20 1329)  Total Ve: 14.9 mL (08/01/20 1329)  F/VT Ratio<105 (RSBI): 110.82 (08/01/20 1329)  Lines/Drains/Airways     Central Venous Catheter Line            Percutaneous Central Line Insertion/Assessment - Triple Lumen  07/29/20 1729 right internal jugular 2 days          Drain                 NG/OG Tube 07/26/20 1756 [place] sump;orogastric 18 Fr. Center mouth 5 days         Urethral Catheter 07/26/20 2205 16 Fr. 5 days          Airway                 Airway - Non-Surgical 07/26/20 1724 Endotracheal Tube 5 days          Arterial Line                 Arterial Line 07/29/20 1700 Right Radial 2 days          Peripheral Intravenous Line                 Peripheral IV - Single Lumen 07/31/20 1508 18 G Anterior;Right Forearm 1 day              Significant Labs:    CBC/Anemia Profile:  Recent Labs   Lab 07/31/20 0319 08/01/20  0230   WBC 9.73 9.81   HGB 13.4* 13.1*   HCT 44.5 42.4   * 341   MCV 90 91   RDW 14.4 13.9        Chemistries:  Recent Labs   Lab 07/31/20  0319 08/01/20  0230    142   K 5.0 5.0    109   CO2 25 19*   BUN 40* 39*   CREATININE 1.2 1.0   CALCIUM 9.1 9.3   ALBUMIN 2.4* 2.2*   PROT 7.6 7.4   BILITOT 0.4 0.5   ALKPHOS 61 55   ALT 77* 68*   AST 56* 56*   MG 2.7* 2.4   PHOS 4.5 4.3       All pertinent labs within the past 24 hours have been reviewed.    Significant Imaging:  I have reviewed all pertinent imaging results/findings within the past 24 hours.

## 2020-08-01 NOTE — ASSESSMENT & PLAN NOTE
Anup Miller is a 70 y.o. male patient with a PMHx of HTN, HLD, and DM who presents to the [place] [facility] for shortness of breath and found to be COVID positive. Was started on IV dexamethasone, Ceftriaxone, and Azithromycin. Patient transferred to [facility] on evening of 7/26/20 for higher level of care. Labs on arrival to [facility] remarkable for WBC 9.8, Procal 1.3, D-dimer 1.1, Ferritin 2,300, , and . Given 1 L LR on arrival to [facility]    - Remdesivir complete  - Heparin gtt for hypercoagulable state in COVID  - IV Dexamethasone  - Continue Ceftriaxone and Azithromycin for possible superimposed bacterial pneumonia (complete)   - f/u blood and sputum cx - NGTD  - trend lactate - nl  - Proned due to P:F <150. Tolerating well.  - Paralyzed, proned, sedated previously. Last supinated at 4:10 pm on 7/30.  - nimbex d/c'ed on 7/31. Patient tolerating sedation only.

## 2020-08-01 NOTE — PLAN OF CARE
CMICU DAILY GOALS       A: Awake    RASS: Goal - RASS Goal: 0-->alert and calm  Actual - RASS (Hackett Agitation-Sedation Scale): -4-->deep sedation   Restraint necessity: Clinical Justification: Removing medical devices, Treatment Interference  B: Breath   SBT: Not attempted   C: Coordinate A & B, analgesics/sedatives   Pain: managed    SAT: Not attempted  D: Delirium   CAM-ICU: Overall CAM-ICU: Positive  E: Early(intubated/ Progressive (non-intubated) Mobility   MOVE Screen: Fail   Activity: Activity Management: activity minimized  FAS: Feeding/Nutrition   Diet order: Diet/Nutrition Received: NPO, tube feeding, Specialty Diet/Nutrition Received: renal diet Fluid restriction:    T: Thrombus   DVT prophylaxis: VTE Required Core Measure: (SCDs) Sequential compression device initiated/maintained  H: HOB Elevation   Head of Bed (HOB): HOB at 30-45 degrees  U: Ulcer Prophylaxis   GI: yes  G: Glucose control   managed Glycemic Management: blood glucose monitoring  S: Skin   Bundle compliance: yes   Bathing/Skin Care: back care, bath, complete, bath, chlorhexidine, dressed/undressed, incontinence care, foot care, linen changed Date: 8/1/2020  B: Bowel Function   constipation  I: Indwelling Catheters   Boothe necessity:      Urethral Catheter 07/26/20 2205 16 Fr.-Reason for Continuing Urinary Catheterization: Critically ill in ICU requiring intensive monitoring   CVC necessity: Yes   IPAD offered: Not appropriate  D: De-escalation Antibx   No  Plan for the day   Wean off vent settings  Family/Goals of care/Code Status   Code Status: Full Code     No acute events throughout day, VS and assessment per flow sheet, patient progressing towards goals as tolerated, plan of care reviewed with Anup Miller and family, all concerns addressed, will continue to monitor.

## 2020-08-01 NOTE — PROGRESS NOTES
[facility] - ICU 16   Critical Care Medicine  Progress Note    Patient Name: Anup Miller  MRN: [id number]  Admission Date: 7/26/2020  Hospital Length of Stay: 6 days  Code Status: Full Code  Attending Provider: Adam Dickinson MD  Primary Care Provider: Bryce Goznales MD   Principal Problem: Pneumonia due to COVID-19 virus    Subjective:     HPI:  Anup Miller is a 70 y.o. male patient with a PMHx of HTN, HLD, and DM who presents to the [place] [facility] for evaluation of SOB which  1 week ago. Pt became more SOB today and has an O2 sat of low 70s upon arrival on RA. Symptoms are worsening and moderate in severity. No mitigating or exacerbating factors reported. Associated sxs include cough and fever. Patient denies any congestion, sore throat, CP, abd pain, N/V, back pain, HA, weakness, and all other sxs at this time. No prior Tx reported. No further complaints or concerns at this time. Patient was placed on Bipap for a few hours and subsequently intubated. COVID positive. Was started on IV dexamethasone, Ceftriaxone, and Azithromycin. [place] [facility] [place] higher level of care.     Hospital/ICU Course:  As of 7/29, the patient's oxygenation continued to worsen with P:F <150. R IJ and Arterial Line placed. Pt sedated, paralyzed, and proned at 9:30pm with improved oxygenation. Repeat AM gas on 7/30 Arterial Blood Gas result:  pO2 112; pCO2 58.9; pH 7.256;  HCO3 26.2, %O2 Sat 97%. FiO2 60, 8 PEEP. Pt supinated at 4:10 pm on 7/30 with Arterial Blood Gas result:  pO2 106; pCO2 52.2; pH 7.349;  HCO3 28.7, %O2 Sat 106. (P:F 212). FiO2 50, 10 PEEP.    As of 7/31, paralytic d/c'ed and sedation slowly weaned. Propofol d/c'ed on 8/1 2/2 triglycerides with ketamine and versed initiated. Now requiring higher vent settings due to dyssynchrony, will continue to increase sedation and consider paralyzing if necessary.     Interval History/Significant Events:  Propofol d/c'ed on 8/1 2/2 triglycerides with ketamine and versed initiated. Now requiring higher vent settings due to dyssynchrony, will continue to increase sedation and consider paralyzing if necessary.     Review of Systems   Unable to perform ROS: Intubated     Objective:     Vital Signs (Most Recent):  Temp: 99.3 °F (37.4 °C) (08/01/20 1500)  Pulse: 70 (08/01/20 1500)  Resp: (!) 34 (08/01/20 1500)  BP: 123/69 (08/01/20 1500)  SpO2: 98 % (08/01/20 1500) Vital Signs (24h Range):  Temp:  [97.2 °F (36.2 °C)-101.4 °F (38.6 °C)] 99.3 °F (37.4 °C)  Pulse:  [62-89] 70  Resp:  [21-42] 34  SpO2:  [79 %-98 %] 98 %  BP: ()/(61-79) 123/69  Arterial Line BP: ()/(59-78) 128/64   Weight: 111 kg (244 lb 11.4 oz)  Body mass index is 36.14 kg/m².      Intake/Output Summary (Last 24 hours) at 8/1/2020 1529  Last data filed at 8/1/2020 1452  Gross per 24 hour   Intake 2551.74 ml   Output 1910 ml   Net 641.74 ml       Physical Exam  Nursing note reviewed.   Constitutional:       Interventions: He is sedated, intubated and restrained.      Comments: Intubated and sedated   HENT:      Head: Normocephalic and atraumatic.   Eyes:      Conjunctiva/sclera: Conjunctivae normal.   Neck:      Musculoskeletal: Neck supple.   Cardiovascular:      Rate and Rhythm: Normal rate.      Heart sounds: No murmur.   Pulmonary:      Effort: He is intubated.      Breath sounds: No stridor. Examination of the right-lower field reveals rhonchi and rales. Examination of the left-lower field reveals rhonchi and rales. Rhonchi and rales present. No wheezing.   Abdominal:      General: There is no distension.      Palpations: Abdomen is soft.      Tenderness: There is no abdominal tenderness.   Musculoskeletal:      Right lower leg: No edema.      Left lower leg: No edema.   Skin:     General: Skin is warm and dry.   Neurological:      General: No focal deficit present.      GCS: GCS eye subscore is 1. GCS verbal subscore is 1. GCS motor subscore  is 1.         Vents:  Vent Mode: A/C (08/01/20 1329)  Ventilator Initiated: Yes(chart correction) (07/26/20 2108)  Set Rate: 34 BPM (08/01/20 1329)  Vt Set: 350 mL (08/01/20 1329)  Pressure Support: 0 cmH20 (08/01/20 1329)  PEEP/CPAP: 12 cmH20 (08/01/20 1329)  Oxygen Concentration (%): 80 (08/01/20 1500)  Peak Airway Pressure: 19 cmH2O (08/01/20 1329)  Plateau Pressure: 3.1 cmH20 (08/01/20 1329)  Total Ve: 14.9 mL (08/01/20 1329)  F/VT Ratio<105 (RSBI): 110.82 (08/01/20 1329)  Lines/Drains/Airways     Central Venous Catheter Line            Percutaneous Central Line Insertion/Assessment - Triple Lumen  07/29/20 1729 right internal jugular 2 days          Drain                 NG/OG Tube 07/26/20 1756 [place] sump;orogastric 18 Fr. Center mouth 5 days         Urethral Catheter 07/26/20 2205 16 Fr. 5 days          Airway                 Airway - Non-Surgical 07/26/20 1724 Endotracheal Tube 5 days          Arterial Line                 Arterial Line 07/29/20 1700 Right Radial 2 days          Peripheral Intravenous Line                 Peripheral IV - Single Lumen 07/31/20 1508 18 G Anterior;Right Forearm 1 day              Significant Labs:    CBC/Anemia Profile:  Recent Labs   Lab 07/31/20  0319 08/01/20  0230   WBC 9.73 9.81   HGB 13.4* 13.1*   HCT 44.5 42.4   * 341   MCV 90 91   RDW 14.4 13.9        Chemistries:  Recent Labs   Lab 07/31/20  0319 08/01/20  0230    142   K 5.0 5.0    109   CO2 25 19*   BUN 40* 39*   CREATININE 1.2 1.0   CALCIUM 9.1 9.3   ALBUMIN 2.4* 2.2*   PROT 7.6 7.4   BILITOT 0.4 0.5   ALKPHOS 61 55   ALT 77* 68*   AST 56* 56*   MG 2.7* 2.4   PHOS 4.5 4.3       All pertinent labs within the past 24 hours have been reviewed.    Significant Imaging:  I have reviewed all pertinent imaging results/findings within the past 24 hours.      ABG  Recent Labs   Lab 08/01/20  1251   PH 7.355   PO2 95   PCO2 43.5   HCO3 24.3   BE -1     Assessment/Plan:     Pulmonary  Acute hypoxemic  respiratory failure  - see Pneumonia due to COVID 19    Cardiac/Vascular  Hyperlipidemia associated with type 2 diabetes mellitus  - Restart Crestor once extubated   - q6h accuchecks; SSI (1-10 units)    Hypertension associated with diabetes  - Hold antihypertensives in setting of shock  - Resume as tolerated    Endocrine  Uncontrolled type 2 diabetes mellitus  - A1c 8.3%  - accuchecks q6h  - Detemir 13U qhs  - LDSSI     GI  Elevated liver enzymes  - Hx of elevated LFTs dating back to 2015  - Acute hepatitis panel and HIV - negative  - CMP daily    Other  * Pneumonia due to COVID-19 virus  Anup Miller is a 70 y.o. male patient with a PMHx of HTN, HLD, and DM who presents to the [place] [facility] for shortness of breath and found to be COVID positive. Was started on IV dexamethasone, Ceftriaxone, and Azithromycin. Patient transferred to [facility] [place] on evening of 7/26/20 for higher level of care. Labs on arrival to [facility] remarkable for WBC 9.8, Procal 1.3, D-dimer 1.1, Ferritin 2,300, , and . Given 1 L LR on arrival to [facility] [place]    - Remdesivir complete  - Heparin gtt for hypercoagulable state in COVID  - IV Dexamethasone  - Continue Ceftriaxone and Azithromycin for possible superimposed bacterial pneumonia (complete)   - f/u blood and sputum cx - NGTD  - trend lactate - nl  - Proned due to P:F <150. Tolerating well.  - Paralyzed, proned, sedated previously. Last supinated at 4:10 pm on 7/30.  - nimbex d/c'ed on 7/31. Patient tolerating sedation only.       Critical Care Daily Checklist:    A: Awake: RASS Goal/Actual Goal: RASS Goal: 0-->alert and calm  Actual: Hackett Agitation Sedation Scale (RASS): Deep sedation   B: Spontaneous Breathing Trial Performed? Spon. Breathing Trial Initiated?: Not initiated (07/28/20 1318)   C: SAT & SBT Coordinated?  Not appropriate                      D: Delirium: CAM-ICU Overall CAM-ICU: Positive   E: Early Mobility Performed? No   F: Feeding Goal: Goals: Meet  % EEN, EPN by RD f/u date  Status: Nutrition Goal Status: new   Current Diet Order   Procedures    Diet NPO      AS: Analgesia/Sedation done   T: Thromboembolic Prophylaxis Heparin gtt   H: HOB > 300 Yes   U: Stress Ulcer Prophylaxis (if needed) famotidine   G: Glucose Control q6h   B: Bowel Function     I: Indwelling Catheter (Lines & Boothe) Necessity necessary   D: De-escalation of Antimicrobials/Pharmacotherapies done    Plan for the day/ETD Cont course    Code Status:  Family/Goals of Care: Full Code  Cont course     Critical Care Time: 70 minutes  Critical secondary to Patient has a condition that poses threat to life and bodily function: Severe Respiratory Distress remains intubated.      Critical care was time spent personally by me on the following activities: development of treatment plan with patient or surrogate and bedside caregivers, discussions with consultants, evaluation of patient's response to treatment, examination of patient, ordering and performing treatments and interventions, ordering and review of laboratory studies, ordering and review of radiographic studies, pulse oximetry, re-evaluation of patient's condition. This critical care time did not overlap with that of any other provider or involve time for any procedures.     Kody Gunter NP  Critical Care Medicine  [facility] - ICU 16 [facility]

## 2020-08-02 LAB
ALBUMIN SERPL BCP-MCNC: 2.1 G/DL (ref 3.5–5.2)
ALLENS TEST: ABNORMAL
ALP SERPL-CCNC: 65 U/L (ref 55–135)
ALT SERPL W/O P-5'-P-CCNC: 68 U/L (ref 10–44)
ANION GAP SERPL CALC-SCNC: 11 MMOL/L (ref 8–16)
ANION GAP SERPL CALC-SCNC: 8 MMOL/L (ref 8–16)
ANISOCYTOSIS BLD QL SMEAR: SLIGHT
AST SERPL-CCNC: 50 U/L (ref 10–40)
BASOPHILS # BLD AUTO: ABNORMAL K/UL (ref 0–0.2)
BASOPHILS NFR BLD: 0 % (ref 0–1.9)
BILIRUB SERPL-MCNC: 0.5 MG/DL (ref 0.1–1)
BUN SERPL-MCNC: 42 MG/DL (ref 8–23)
BUN SERPL-MCNC: 43 MG/DL (ref 8–23)
BURR CELLS BLD QL SMEAR: ABNORMAL
CALCIUM SERPL-MCNC: 9.2 MG/DL (ref 8.7–10.5)
CALCIUM SERPL-MCNC: 9.4 MG/DL (ref 8.7–10.5)
CHLORIDE SERPL-SCNC: 110 MMOL/L (ref 95–110)
CHLORIDE SERPL-SCNC: 110 MMOL/L (ref 95–110)
CO2 SERPL-SCNC: 23 MMOL/L (ref 23–29)
CO2 SERPL-SCNC: 26 MMOL/L (ref 23–29)
CREAT SERPL-MCNC: 1.1 MG/DL (ref 0.5–1.4)
CREAT SERPL-MCNC: 1.1 MG/DL (ref 0.5–1.4)
DELSYS: ABNORMAL
DIFFERENTIAL METHOD: ABNORMAL
EOSINOPHIL # BLD AUTO: ABNORMAL K/UL (ref 0–0.5)
EOSINOPHIL NFR BLD: 1 % (ref 0–8)
ERYTHROCYTE [DISTWIDTH] IN BLOOD BY AUTOMATED COUNT: 14.3 % (ref 11.5–14.5)
ERYTHROCYTE [SEDIMENTATION RATE] IN BLOOD BY WESTERGREN METHOD: 36 MM/H
EST. GFR  (AFRICAN AMERICAN): >60 ML/MIN/1.73 M^2
EST. GFR  (AFRICAN AMERICAN): >60 ML/MIN/1.73 M^2
EST. GFR  (NON AFRICAN AMERICAN): >60 ML/MIN/1.73 M^2
EST. GFR  (NON AFRICAN AMERICAN): >60 ML/MIN/1.73 M^2
FACT X PPP CHRO-ACNC: 0.4 IU/ML (ref 0.3–0.7)
FIO2: 40
GLUCOSE SERPL-MCNC: 252 MG/DL (ref 70–110)
GLUCOSE SERPL-MCNC: 260 MG/DL (ref 70–110)
HCO3 UR-SCNC: 27.4 MMOL/L (ref 24–28)
HCT VFR BLD AUTO: 42.7 % (ref 40–54)
HGB BLD-MCNC: 12.8 G/DL (ref 14–18)
IMM GRANULOCYTES # BLD AUTO: ABNORMAL K/UL (ref 0–0.04)
IMM GRANULOCYTES NFR BLD AUTO: ABNORMAL % (ref 0–0.5)
LYMPHOCYTES # BLD AUTO: ABNORMAL K/UL (ref 1–4.8)
LYMPHOCYTES NFR BLD: 6 % (ref 18–48)
MAGNESIUM SERPL-MCNC: 2.4 MG/DL (ref 1.6–2.6)
MCH RBC QN AUTO: 27.7 PG (ref 27–31)
MCHC RBC AUTO-ENTMCNC: 30 G/DL (ref 32–36)
MCV RBC AUTO: 92 FL (ref 82–98)
METAMYELOCYTES NFR BLD MANUAL: 1 %
MIN VOL: 14.1
MODE: ABNORMAL
MONOCYTES # BLD AUTO: ABNORMAL K/UL (ref 0.3–1)
MONOCYTES NFR BLD: 8 % (ref 4–15)
MYELOCYTES NFR BLD MANUAL: 1 %
NEUTROPHILS NFR BLD: 78 % (ref 38–73)
NEUTS BAND NFR BLD MANUAL: 5 %
NRBC BLD-RTO: 0 /100 WBC
PCO2 BLDA: 43.8 MMHG (ref 35–45)
PEEP: 8
PH SMN: 7.4 [PH] (ref 7.35–7.45)
PHOSPHATE SERPL-MCNC: 4.4 MG/DL (ref 2.7–4.5)
PIP: 24
PLATELET # BLD AUTO: 340 K/UL (ref 150–350)
PLATELET BLD QL SMEAR: ABNORMAL
PMV BLD AUTO: 11.2 FL (ref 9.2–12.9)
PO2 BLDA: 67 MMHG (ref 80–100)
POC BE: 3 MMOL/L
POC SATURATED O2: 93 % (ref 95–100)
POC TCO2: 29 MMOL/L (ref 23–27)
POCT GLUCOSE: 247 MG/DL (ref 70–110)
POCT GLUCOSE: 290 MG/DL (ref 70–110)
POCT GLUCOSE: 291 MG/DL (ref 70–110)
POCT GLUCOSE: 308 MG/DL (ref 70–110)
POCT GLUCOSE: 323 MG/DL (ref 70–110)
POCT GLUCOSE: 327 MG/DL (ref 70–110)
POCT GLUCOSE: 336 MG/DL (ref 70–110)
POIKILOCYTOSIS BLD QL SMEAR: SLIGHT
POTASSIUM SERPL-SCNC: 5.4 MMOL/L (ref 3.5–5.1)
POTASSIUM SERPL-SCNC: 5.6 MMOL/L (ref 3.5–5.1)
PROT SERPL-MCNC: 7.2 G/DL (ref 6–8.4)
RBC # BLD AUTO: 4.62 M/UL (ref 4.6–6.2)
SAMPLE: ABNORMAL
SITE: ABNORMAL
SODIUM SERPL-SCNC: 144 MMOL/L (ref 136–145)
SODIUM SERPL-SCNC: 144 MMOL/L (ref 136–145)
SP02: 91
TROPONIN I SERPL DL<=0.01 NG/ML-MCNC: <0.006 NG/ML (ref 0–0.03)
VT: 380
WBC # BLD AUTO: 13.21 K/UL (ref 3.9–12.7)

## 2020-08-02 PROCEDURE — 27200966 HC CLOSED SUCTION SYSTEM

## 2020-08-02 PROCEDURE — 25000003 PHARM REV CODE 250: Performed by: INTERNAL MEDICINE

## 2020-08-02 PROCEDURE — 94761 N-INVAS EAR/PLS OXIMETRY MLT: CPT

## 2020-08-02 PROCEDURE — 80048 BASIC METABOLIC PNL TOTAL CA: CPT

## 2020-08-02 PROCEDURE — 25000003 PHARM REV CODE 250: Performed by: STUDENT IN AN ORGANIZED HEALTH CARE EDUCATION/TRAINING PROGRAM

## 2020-08-02 PROCEDURE — 63600175 PHARM REV CODE 636 W HCPCS: Performed by: STUDENT IN AN ORGANIZED HEALTH CARE EDUCATION/TRAINING PROGRAM

## 2020-08-02 PROCEDURE — 99900026 HC AIRWAY MAINTENANCE (STAT)

## 2020-08-02 PROCEDURE — 83735 ASSAY OF MAGNESIUM: CPT

## 2020-08-02 PROCEDURE — 84484 ASSAY OF TROPONIN QUANT: CPT

## 2020-08-02 PROCEDURE — C9399 UNCLASSIFIED DRUGS OR BIOLOG: HCPCS | Performed by: NURSE PRACTITIONER

## 2020-08-02 PROCEDURE — 85007 BL SMEAR W/DIFF WBC COUNT: CPT

## 2020-08-02 PROCEDURE — 99291 CRITICAL CARE FIRST HOUR: CPT | Mod: ,,, | Performed by: NURSE PRACTITIONER

## 2020-08-02 PROCEDURE — 85520 HEPARIN ASSAY: CPT

## 2020-08-02 PROCEDURE — 25000003 PHARM REV CODE 250: Performed by: NURSE PRACTITIONER

## 2020-08-02 PROCEDURE — 99900035 HC TECH TIME PER 15 MIN (STAT)

## 2020-08-02 PROCEDURE — 93005 ELECTROCARDIOGRAM TRACING: CPT

## 2020-08-02 PROCEDURE — 93010 ELECTROCARDIOGRAM REPORT: CPT | Mod: ,,, | Performed by: INTERNAL MEDICINE

## 2020-08-02 PROCEDURE — 63600175 PHARM REV CODE 636 W HCPCS: Performed by: NURSE PRACTITIONER

## 2020-08-02 PROCEDURE — 94003 VENT MGMT INPAT SUBQ DAY: CPT

## 2020-08-02 PROCEDURE — 93010 EKG 12-LEAD: ICD-10-PCS | Mod: ,,, | Performed by: INTERNAL MEDICINE

## 2020-08-02 PROCEDURE — 20000000 HC ICU ROOM

## 2020-08-02 PROCEDURE — 27000221 HC OXYGEN, UP TO 24 HOURS

## 2020-08-02 PROCEDURE — 25000003 PHARM REV CODE 250: Performed by: PHYSICIAN ASSISTANT

## 2020-08-02 PROCEDURE — 84100 ASSAY OF PHOSPHORUS: CPT

## 2020-08-02 PROCEDURE — 99291 PR CRITICAL CARE, E/M 30-74 MINUTES: ICD-10-PCS | Mod: ,,, | Performed by: NURSE PRACTITIONER

## 2020-08-02 PROCEDURE — 80053 COMPREHEN METABOLIC PANEL: CPT

## 2020-08-02 PROCEDURE — 85027 COMPLETE CBC AUTOMATED: CPT

## 2020-08-02 RX ORDER — INSULIN ASPART 100 [IU]/ML
1-10 INJECTION, SOLUTION INTRAVENOUS; SUBCUTANEOUS EVERY 4 HOURS PRN
Status: DISCONTINUED | OUTPATIENT
Start: 2020-08-02 | End: 2020-08-05

## 2020-08-02 RX ORDER — FUROSEMIDE 10 MG/ML
40 INJECTION INTRAMUSCULAR; INTRAVENOUS ONCE
Status: COMPLETED | OUTPATIENT
Start: 2020-08-02 | End: 2020-08-02

## 2020-08-02 RX ORDER — INSULIN ASPART 100 [IU]/ML
7 INJECTION, SOLUTION INTRAVENOUS; SUBCUTANEOUS ONCE
Status: COMPLETED | OUTPATIENT
Start: 2020-08-02 | End: 2020-08-02

## 2020-08-02 RX ORDER — LOSARTAN POTASSIUM 25 MG/1
50 TABLET ORAL DAILY
Status: DISCONTINUED | OUTPATIENT
Start: 2020-08-02 | End: 2020-08-04

## 2020-08-02 RX ORDER — GLUCAGON 1 MG
1 KIT INJECTION
Status: DISCONTINUED | OUTPATIENT
Start: 2020-08-02 | End: 2020-08-05

## 2020-08-02 RX ORDER — DEXTROSE MONOHYDRATE 100 MG/ML
INJECTION, SOLUTION INTRAVENOUS CONTINUOUS PRN
Status: DISCONTINUED | OUTPATIENT
Start: 2020-08-02 | End: 2020-08-05

## 2020-08-02 RX ORDER — FUROSEMIDE 10 MG/ML
INJECTION INTRAMUSCULAR; INTRAVENOUS
Status: DISPENSED
Start: 2020-08-02 | End: 2020-08-03

## 2020-08-02 RX ADMIN — DEXMEDETOMIDINE HYDROCHLORIDE 0.6 MCG/KG/HR: 4 INJECTION, SOLUTION INTRAVENOUS at 11:08

## 2020-08-02 RX ADMIN — INSULIN DETEMIR 10 UNITS: 100 INJECTION, SOLUTION SUBCUTANEOUS at 01:08

## 2020-08-02 RX ADMIN — INSULIN ASPART 3 UNITS: 100 INJECTION, SOLUTION INTRAVENOUS; SUBCUTANEOUS at 08:08

## 2020-08-02 RX ADMIN — DEXMEDETOMIDINE HYDROCHLORIDE 0.8 MCG/KG/HR: 4 INJECTION, SOLUTION INTRAVENOUS at 03:08

## 2020-08-02 RX ADMIN — DEXAMETHASONE SODIUM PHOSPHATE 6 MG: 4 INJECTION, SOLUTION INTRA-ARTICULAR; INTRALESIONAL; INTRAMUSCULAR; INTRAVENOUS; SOFT TISSUE at 08:08

## 2020-08-02 RX ADMIN — DEXMEDETOMIDINE HYDROCHLORIDE 0.6 MCG/KG/HR: 4 INJECTION, SOLUTION INTRAVENOUS at 06:08

## 2020-08-02 RX ADMIN — STANDARDIZED SENNA CONCENTRATE AND DOCUSATE SODIUM 1 TABLET: 8.6; 5 TABLET ORAL at 08:08

## 2020-08-02 RX ADMIN — Medication 250 MCG/HR: at 03:08

## 2020-08-02 RX ADMIN — MINERAL OIL AND PETROLATUM: 150; 830 OINTMENT OPHTHALMIC at 11:08

## 2020-08-02 RX ADMIN — INSULIN ASPART 7 UNITS: 100 INJECTION, SOLUTION INTRAVENOUS; SUBCUTANEOUS at 01:08

## 2020-08-02 RX ADMIN — MIDAZOLAM HYDROCHLORIDE 5 MG/HR: 5 INJECTION, SOLUTION INTRAMUSCULAR; INTRAVENOUS at 06:08

## 2020-08-02 RX ADMIN — ROSUVASTATIN CALCIUM 10 MG: 10 TABLET, FILM COATED ORAL at 08:08

## 2020-08-02 RX ADMIN — DEXTROSE 3 MCG/KG/MIN: 5 SOLUTION INTRAVENOUS at 06:08

## 2020-08-02 RX ADMIN — FAMOTIDINE 20 MG: 20 TABLET ORAL at 08:08

## 2020-08-02 RX ADMIN — ACETAMINOPHEN 650 MG: 160 SOLUTION ORAL at 07:08

## 2020-08-02 RX ADMIN — KETAMINE HYDROCHLORIDE 2.5 MCG/KG/MIN: 100 INJECTION INTRAMUSCULAR; INTRAVENOUS at 09:08

## 2020-08-02 RX ADMIN — HEPARIN SODIUM AND DEXTROSE 10 UNITS/KG/HR: 10000; 5 INJECTION INTRAVENOUS at 06:08

## 2020-08-02 RX ADMIN — FUROSEMIDE 40 MG: 10 INJECTION, SOLUTION INTRAMUSCULAR; INTRAVENOUS at 07:08

## 2020-08-02 RX ADMIN — MINERAL OIL AND PETROLATUM: 150; 830 OINTMENT OPHTHALMIC at 01:08

## 2020-08-02 RX ADMIN — DEXMEDETOMIDINE HYDROCHLORIDE 0.6 MCG/KG/HR: 4 INJECTION, SOLUTION INTRAVENOUS at 12:08

## 2020-08-02 RX ADMIN — DEXMEDETOMIDINE HYDROCHLORIDE 0.8 MCG/KG/HR: 4 INJECTION, SOLUTION INTRAVENOUS at 06:08

## 2020-08-02 RX ADMIN — INSULIN ASPART 8 UNITS: 100 INJECTION, SOLUTION INTRAVENOUS; SUBCUTANEOUS at 04:08

## 2020-08-02 RX ADMIN — INSULIN ASPART 6 UNITS: 100 INJECTION, SOLUTION INTRAVENOUS; SUBCUTANEOUS at 07:08

## 2020-08-02 RX ADMIN — Medication 250 MCG/HR: at 06:08

## 2020-08-02 RX ADMIN — INSULIN DETEMIR 10 UNITS: 100 INJECTION, SOLUTION SUBCUTANEOUS at 08:08

## 2020-08-02 RX ADMIN — INSULIN ASPART 7 UNITS: 100 INJECTION, SOLUTION INTRAVENOUS; SUBCUTANEOUS at 02:08

## 2020-08-02 RX ADMIN — MINERAL OIL AND PETROLATUM: 150; 830 OINTMENT OPHTHALMIC at 06:08

## 2020-08-02 RX ADMIN — INSULIN ASPART 8 UNITS: 100 INJECTION, SOLUTION INTRAVENOUS; SUBCUTANEOUS at 01:08

## 2020-08-02 RX ADMIN — DEXTROSE 2.5 MCG/KG/MIN: 5 SOLUTION INTRAVENOUS at 02:08

## 2020-08-02 NOTE — PLAN OF CARE
"      SICU PLAN OF CARE NOTE    Dx: Pneumonia due to COVID-19 virus    Shift Events: Pt went into afib on the monitor @ approx. 1715, rate mid 90's - MD aware. Pt is sedated and paralyzed. BIS and TOF monitoring.     Goals of Care: Plan to rest for today and wean paralytic tomorrow. Continue to wean vent as tolerated. POC reviewed with pt wife - all questions and concerns addressed and answered appropriately.     Neuro: Unresponsive - pt sedated and paralyzed    Vital Signs: BP (!) 103/58 (BP Location: Left arm, Patient Position: Lying)   Pulse 66   Temp 97.3 °F (36.3 °C) (Core Esophageal)   Resp (!) 36   Ht 5' 9" (1.753 m)   Wt 111 kg (244 lb 11.4 oz)   SpO2 95%   BMI 36.14 kg/m²     Respiratory: Ventilator 50%/8 PEEP/Rate 36/    Diet: Tube Feeds @ 10ml/hr via pump - tolerating well. Minimal residuals.     Gtts: Fentanyl, Precedex, Cisatricurium, and Heparin and ketamine    Urine Output: Urinary Catheter >2300 cc/shift    Drains: OG - continuous tube feeds by pump    TOF baseline 4/4 @ 3, 2/4 @ 3 on 2.5mcg/kg of nimbex      Labs/Accuchecks: Accuchecks Q4H - sliding scale given every check d/t increased glucose levels. Multiple additional doses of insulin given per MD order, Glucose levels communicated to Kody. Labs every AM. Heparin monitoring Xa daily.     Skin: No acute breakdown noted to marbin prominences. Foam on coccyx. Heel boots in place. Pt turned Q2-4H. Waffle mattress inflated.       Will continue to monitor closely.     "

## 2020-08-02 NOTE — ASSESSMENT & PLAN NOTE
Anup Miller is a 70 y.o. male patient with a PMHx of HTN, HLD, and DM who presents to the [place] [facility] for shortness of breath and found to be COVID positive. Was started on IV dexamethasone, Ceftriaxone, and Azithromycin. Patient transferred to [facility] [place] on evening of 7/26/20 for higher level of care. Labs on arrival to [facility] remarkable for WBC 9.8, Procal 1.3, D-dimer 1.1, Ferritin 2,300, , and . Given 1 L LR on arrival to [facility] [place]    - Remdesivir complete  - Heparin gtt for hypercoagulable state in COVID  - IV Dexamethasone continues  - Continue Ceftriaxone and Azithromycin for possible superimposed bacterial pneumonia (now complete).  - f/u blood and sputum cx - NGTD  - trend lactate - nl  - Paralyzed, proned, sedated previously d/t P:F <150. Last supinated at 4:10 pm on 7/30.  - nimbex restarted on 8/1. Continue to wean vent as tolerated  --spot dose Lasix (x 1 dose 8/2)

## 2020-08-02 NOTE — ASSESSMENT & PLAN NOTE
- Hold antihypertensives in setting of shock  - Resume as tolerated   Bcc  Nodular Histology Text: The dermis contains distinctive tumor cell masses of various shapes and sizes composed of cells with large oval or elongated nuclei with relatively little cytoplasm.  The nuclei are homogenous.  There is no pronounced variation in size or intensity of staining and no significant anaplastic appearance.  The cells at the outer aspect of the tumor masses show palisading of nuclei.  Tumor masses are surrounded by a connective tissue stroma and in some areas there is retraction artifact of the tumor nodule away from the surrounding stroma.  A nodular histology is noted.

## 2020-08-02 NOTE — PROGRESS NOTES
[facility] - ICU 16   Critical Care Medicine  Progress Note    Patient Name: Anup Miller  MRN: [id number]  Admission Date: 7/26/2020  Hospital Length of Stay: 7 days  Code Status: Full Code  Attending Provider: Adam Dickinson MD  Primary Care Provider: Bryce Gonzales MD   Principal Problem: Pneumonia due to COVID-19 virus    Subjective:     HPI:  Anup Miller is a 70 y.o. male patient with a PMHx of HTN, HLD, and DM who presents to the [place] [facility] for evaluation of SOB which  1 week ago. Pt became more SOB today and has an O2 sat of low 70s upon arrival on RA. Symptoms are worsening and moderate in severity. No mitigating or exacerbating factors reported. Associated sxs include cough and fever. Patient denies any congestion, sore throat, CP, abd pain, N/V, back pain, HA, weakness, and all other sxs at this time. No prior Tx reported. No further complaints or concerns at this time. Patient was placed on Bipap for a few hours and subsequently intubated. COVID positive. Was started on IV dexamethasone, Ceftriaxone, and Azithromycin. [place] [facility] [place] higher level of care.     Hospital/ICU Course:  As of 7/29, the patient's oxygenation continued to worsen with P:F <150. R IJ and Arterial Line placed. Pt sedated, paralyzed, and proned at 9:30pm with improved oxygenation. Repeat AM gas on 7/30 Arterial Blood Gas result:  pO2 112; pCO2 58.9; pH 7.256;  HCO3 26.2, %O2 Sat 97%. FiO2 60, 8 PEEP. Pt supinated at 4:10 pm on 7/30 with Arterial Blood Gas result:  pO2 106; pCO2 52.2; pH 7.349;  HCO3 28.7, %O2 Sat 106. (P:F 212). FiO2 50, 10 PEEP.    As of 7/31, paralytic d/c'ed and sedation slowly weaned. Propofol d/c'ed on 8/1 2/2 triglycerides with ketamine and versed initiated. Now requiring higher vent settings due to dyssynchrony, will continue to increase sedation. Patient paralyzed again on 8/1 for vent dyssynchrony.    Interval  History/Significant Events: No acute overnight events. Patient remains intubated, sedated, and paralyzed. Weaning vent as tolerated.    Review of Systems   Unable to perform ROS: Intubated     Objective:     Vital Signs (Most Recent):  Temp: 100 °F (37.8 °C) (08/02/20 0900)  Pulse: 74 (08/02/20 0900)  Resp: (!) 36 (08/02/20 0900)  BP: (!) 144/80 (08/02/20 0800)  SpO2: (!) 90 % (08/02/20 0900) Vital Signs (24h Range):  Temp:  [98.2 °F (36.8 °C)-100 °F (37.8 °C)] 100 °F (37.8 °C)  Pulse:  [66-89] 74  Resp:  [24-42] 36  SpO2:  [79 %-98 %] 90 %  BP: (109-145)/(67-80) 144/80  Arterial Line BP: (104-167)/(52-78) 104/52   Weight: 111 kg (244 lb 11.4 oz)  Body mass index is 36.14 kg/m².      Intake/Output Summary (Last 24 hours) at 8/2/2020 0930  Last data filed at 8/2/2020 0900  Gross per 24 hour   Intake 2102.61 ml   Output 2615 ml   Net -512.39 ml       Physical Exam  Nursing note reviewed.   Constitutional:       Interventions: He is sedated, intubated and restrained.      Comments: Intubated, sedated, and paralyzed.   HENT:      Head: Normocephalic and atraumatic.   Eyes:      Conjunctiva/sclera: Conjunctivae normal.   Neck:      Musculoskeletal: Neck supple.   Cardiovascular:      Rate and Rhythm: Normal rate.      Heart sounds: No murmur.   Pulmonary:      Effort: He is intubated.      Breath sounds: No stridor. Examination of the right-lower field reveals rhonchi and rales. Examination of the left-lower field reveals rhonchi and rales. Rhonchi and rales present. No wheezing.   Abdominal:      General: There is no distension.      Palpations: Abdomen is soft.      Tenderness: There is no abdominal tenderness.   Musculoskeletal:      Right lower leg: No edema.      Left lower leg: No edema.   Skin:     General: Skin is warm and dry.   Neurological:      General: No focal deficit present.      GCS: GCS eye subscore is 1. GCS verbal subscore is 1. GCS motor subscore is 1.         Vents:  Vent Mode: A/C (08/02/20  0744)  Ventilator Initiated: Yes(chart correction) (07/26/20 2108)  Set Rate: 36 BPM (08/02/20 0744)  Vt Set: 380 mL (08/02/20 0744)  Pressure Support: 0 cmH20 (08/02/20 0744)  PEEP/CPAP: 10 cmH20 (08/02/20 0744)  Oxygen Concentration (%): 40 (08/02/20 0900)  Peak Airway Pressure: 28 cmH2O (08/02/20 0744)  Plateau Pressure: 25 cmH20 (08/02/20 0744)  Total Ve: 14.1 mL (08/02/20 0744)  F/VT Ratio<105 (RSBI): (!) 91.37 (08/02/20 0744)  Lines/Drains/Airways     Central Venous Catheter Line            Percutaneous Central Line Insertion/Assessment - Triple Lumen  07/29/20 1729 right internal jugular 3 days          Drain                 NG/OG Tube 07/26/20 1756 [place] sump;orogastric 18 Fr. Center mouth 6 days         Urethral Catheter 07/26/20 2205 16 Fr. 6 days          Airway                 Airway - Non-Surgical 07/26/20 1724 Endotracheal Tube 6 days          Arterial Line                 Arterial Line 07/29/20 1700 Right Radial 3 days          Peripheral Intravenous Line                 Peripheral IV - Single Lumen 07/31/20 1508 18 G Anterior;Right Forearm 1 day              Significant Labs:    CBC/Anemia Profile:  Recent Labs   Lab 08/01/20  0230 08/02/20  0349   WBC 9.81 13.21*   HGB 13.1* 12.8*   HCT 42.4 42.7    340   MCV 91 92   RDW 13.9 14.3        Chemistries:  Recent Labs   Lab 08/01/20  0230 08/02/20  0349    144   K 5.0 5.6*    110   CO2 19* 23   BUN 39* 42*   CREATININE 1.0 1.1   CALCIUM 9.3 9.2   ALBUMIN 2.2* 2.1*   PROT 7.4 7.2   BILITOT 0.5 0.5   ALKPHOS 55 65   ALT 68* 68*   AST 56* 50*   MG 2.4 2.4   PHOS 4.3 4.4       All pertinent labs within the past 24 hours have been reviewed.    Significant Imaging:  I have reviewed all pertinent imaging results/findings within the past 24 hours.      ABG  Recent Labs   Lab 08/01/20 1957   PH 7.358   PO2 76*   PCO2 44.2   HCO3 24.9   BE -1     Assessment/Plan:     Pulmonary  Acute hypoxemic respiratory failure  - see Pneumonia due to COVID  19    Cardiac/Vascular  Hyperlipidemia associated with type 2 diabetes mellitus  - Restart Crestor once extubated   - q6h accuchecks; SSI (1-10 units)    Hypertension associated with diabetes  - Hold antihypertensives in setting of shock  - Resume as tolerated    Endocrine  Uncontrolled type 2 diabetes mellitus  - A1c 8.3%  - accuchecks q6h  - Detemir 15U qhs  - ssi (1-10 units)    GI  Elevated liver enzymes  - Hx of elevated LFTs dating back to 2015  - Acute hepatitis panel and HIV - negative  - CMP daily    Other  * Pneumonia due to COVID-19 virus  Anup Miller is a 70 y.o. male patient with a PMHx of HTN, HLD, and DM who presents to the [place] [facility] for shortness of breath and found to be COVID positive. Was started on IV dexamethasone, Ceftriaxone, and Azithromycin. [place] [facility] [place] for higher level of care. Labs on arrival to [facility] remarkable for WBC 9.8, Procal 1.3, D-dimer 1.1, Ferritin 2,300, , and . Given 1 L LR on arrival to [facility]    - Remdesivir complete  - Heparin gtt for hypercoagulable state in COVID  - IV Dexamethasone continues  - Continue Ceftriaxone and Azithromycin for possible superimposed bacterial pneumonia (now complete).  - f/u blood and sputum cx - NGTD  - trend lactate - nl  - Paralyzed, proned, sedated previously d/t P:F <150. Last supinated at 4:10 pm on 7/30.  - nimbex restarted on 8/1. Continue to wean vent as tolerated  --spot dose Lasix (x 1 dose 8/2)       Critical Care Daily Checklist:    A: Awake: RASS Goal/Actual Goal: RASS Goal: -5-->unarousable  Actual: Hackett Agitation Sedation Scale (RASS): Unarousable   B: Spontaneous Breathing Trial Performed? Spon. Breathing Trial Initiated?: Not initiated (07/28/20 1768)   C: SAT & SBT Coordinated?  n/a                      D: Delirium: CAM-ICU Overall CAM-ICU: Positive   E: Early Mobility Performed? No   F: Feeding Goal: Goals: Meet % EEN, EPN by RD f/u  date  Status: Nutrition Goal Status: new   Current Diet Order   Procedures    Diet NPO      AS: Analgesia/Sedation done   T: Thromboembolic Prophylaxis Heparin gtt   H: HOB > 300 Yes   U: Stress Ulcer Prophylaxis (if needed) famotidine   G: Glucose Control q6h   B: Bowel Function     I: Indwelling Catheter (Lines & Boothe) Necessity necessary   D: De-escalation of Antimicrobials/Pharmacotherapies done    Plan for the day/ETD Cont course    Code Status:  Family/Goals of Care: Full Code  Cont course     Critical Care Time: 70 minutes  Critical secondary to Patient has a condition that poses threat to life and bodily function: Severe Respiratory Distress on vent.      Critical care was time spent personally by me on the following activities: development of treatment plan with patient or surrogate and bedside caregivers, discussions with consultants, evaluation of patient's response to treatment, examination of patient, ordering and performing treatments and interventions, ordering and review of laboratory studies, ordering and review of radiographic studies, pulse oximetry, re-evaluation of patient's condition. This critical care time did not overlap with that of any other provider or involve time for any procedures.     Kody Gunter NP  Critical Care Medicine  [facility] - ICU 16 [facility]

## 2020-08-02 NOTE — PROGRESS NOTES
Patient's WBC count up to 13 from and potassium 5.6 this morning. Notified Dr. Calix no interventions done.

## 2020-08-02 NOTE — SUBJECTIVE & OBJECTIVE
Interval History/Significant Events: No acute overnight events. Patient remains intubated, sedated, and paralyzed. Weaning vent as tolerated.    Review of Systems   Unable to perform ROS: Intubated     Objective:     Vital Signs (Most Recent):  Temp: 100 °F (37.8 °C) (08/02/20 0900)  Pulse: 74 (08/02/20 0900)  Resp: (!) 36 (08/02/20 0900)  BP: (!) 144/80 (08/02/20 0800)  SpO2: (!) 90 % (08/02/20 0900) Vital Signs (24h Range):  Temp:  [98.2 °F (36.8 °C)-100 °F (37.8 °C)] 100 °F (37.8 °C)  Pulse:  [66-89] 74  Resp:  [24-42] 36  SpO2:  [79 %-98 %] 90 %  BP: (109-145)/(67-80) 144/80  Arterial Line BP: (104-167)/(52-78) 104/52   Weight: 111 kg (244 lb 11.4 oz)  Body mass index is 36.14 kg/m².      Intake/Output Summary (Last 24 hours) at 8/2/2020 0930  Last data filed at 8/2/2020 0900  Gross per 24 hour   Intake 2102.61 ml   Output 2615 ml   Net -512.39 ml       Physical Exam  Nursing note reviewed.   Constitutional:       Interventions: He is sedated, intubated and restrained.      Comments: Intubated, sedated, and paralyzed.   HENT:      Head: Normocephalic and atraumatic.   Eyes:      Conjunctiva/sclera: Conjunctivae normal.   Neck:      Musculoskeletal: Neck supple.   Cardiovascular:      Rate and Rhythm: Normal rate.      Heart sounds: No murmur.   Pulmonary:      Effort: He is intubated.      Breath sounds: No stridor. Examination of the right-lower field reveals rhonchi and rales. Examination of the left-lower field reveals rhonchi and rales. Rhonchi and rales present. No wheezing.   Abdominal:      General: There is no distension.      Palpations: Abdomen is soft.      Tenderness: There is no abdominal tenderness.   Musculoskeletal:      Right lower leg: No edema.      Left lower leg: No edema.   Skin:     General: Skin is warm and dry.   Neurological:      General: No focal deficit present.      GCS: GCS eye subscore is 1. GCS verbal subscore is 1. GCS motor subscore is 1.         Vents:  Vent Mode: A/C  (08/02/20 0744)  Ventilator Initiated: Yes(chart correction) (07/26/20 2108)  Set Rate: 36 BPM (08/02/20 0744)  Vt Set: 380 mL (08/02/20 0744)  Pressure Support: 0 cmH20 (08/02/20 0744)  PEEP/CPAP: 10 cmH20 (08/02/20 0744)  Oxygen Concentration (%): 40 (08/02/20 0900)  Peak Airway Pressure: 28 cmH2O (08/02/20 0744)  Plateau Pressure: 25 cmH20 (08/02/20 0744)  Total Ve: 14.1 mL (08/02/20 0744)  F/VT Ratio<105 (RSBI): (!) 91.37 (08/02/20 0744)  Lines/Drains/Airways     Central Venous Catheter Line            Percutaneous Central Line Insertion/Assessment - Triple Lumen  07/29/20 1729 right internal jugular 3 days          Drain                 NG/OG Tube 07/26/20 1756 [place] sump;orogastric 18 Fr. Center mouth 6 days         Urethral Catheter 07/26/20 2205 16 Fr. 6 days          Airway                 Airway - Non-Surgical 07/26/20 1724 Endotracheal Tube 6 days          Arterial Line                 Arterial Line 07/29/20 1700 Right Radial 3 days          Peripheral Intravenous Line                 Peripheral IV - Single Lumen 07/31/20 1508 18 G Anterior;Right Forearm 1 day              Significant Labs:    CBC/Anemia Profile:  Recent Labs   Lab 08/01/20  0230 08/02/20  0349   WBC 9.81 13.21*   HGB 13.1* 12.8*   HCT 42.4 42.7    340   MCV 91 92   RDW 13.9 14.3        Chemistries:  Recent Labs   Lab 08/01/20  0230 08/02/20  0349    144   K 5.0 5.6*    110   CO2 19* 23   BUN 39* 42*   CREATININE 1.0 1.1   CALCIUM 9.3 9.2   ALBUMIN 2.2* 2.1*   PROT 7.4 7.2   BILITOT 0.5 0.5   ALKPHOS 55 65   ALT 68* 68*   AST 56* 50*   MG 2.4 2.4   PHOS 4.3 4.4       All pertinent labs within the past 24 hours have been reviewed.    Significant Imaging:  I have reviewed all pertinent imaging results/findings within the past 24 hours.

## 2020-08-02 NOTE — PLAN OF CARE
CMICU DAILY GOALS       A: Awake    RASS: Goal - RASS Goal: -5-->unarousable  Actual - RASS (Hackett Agitation-Sedation Scale): -5-->unarousable   Restraint necessity: Clinical Justification: Removing medical devices, Treatment Interference  B: Breath   SBT: Not attempted   C: Coordinate A & B, analgesics/sedatives   Pain: managed    SAT: Not attempted  D: Delirium   CAM-ICU: Overall CAM-ICU: Positive  E: Early(intubated/ Progressive (non-intubated) Mobility   MOVE Screen: Fail   Activity: Activity Management: bedrest maintained per order, activity clustered for rest period, activity minimized  FAS: Feeding/Nutrition   Diet order: Diet/Nutrition Received: tube feeding, Specialty Diet/Nutrition Received: renal diet Fluid restriction:    T: Thrombus   DVT prophylaxis: VTE Required Core Measure: Pharmacological prophylaxis initiated/maintained  H: HOB Elevation   Head of Bed (HOB): HOB at 30-45 degrees  U: Ulcer Prophylaxis   GI: yes  G: Glucose control   managed Glycemic Management: blood glucose monitoring  S: Skin   Bundle compliance: yes   Bathing/Skin Care: back care, bath, complete, bath, chlorhexidine, dressed/undressed, incontinence care, foot care, linen changed Date: [unfilled]  B: Bowel Function   constipation   I: Indwelling Catheters   [place] necessity:      Urethral Catheter 07/26/20 2205 16 Fr.-Reason for Continuing Urinary Catheterization: Critically ill in ICU requiring intensive monitoring   CVC necessity: Yes   IPAD offered: Not appropriate  D: De-escalation Antibx   Yes  Plan for the day   Remain synchronous with the vent and wean vent settings as tolerated  Family/Goals of care/Code Status   Code Status: Full Code     No acute events throughout day, VS and assessment per flow sheet, patient progressing towards goals as tolerated, plan of care reviewed with Anup Miller and family, all concerns addressed, will continue to monitor.

## 2020-08-03 LAB
ALBUMIN SERPL BCP-MCNC: 1.9 G/DL (ref 3.5–5.2)
ALLENS TEST: ABNORMAL
ALLENS TEST: ABNORMAL
ALP SERPL-CCNC: 68 U/L (ref 55–135)
ALT SERPL W/O P-5'-P-CCNC: 73 U/L (ref 10–44)
ANION GAP SERPL CALC-SCNC: 6 MMOL/L (ref 8–16)
ANION GAP SERPL CALC-SCNC: 9 MMOL/L (ref 8–16)
ASCENDING AORTA: 3.03 CM
AST SERPL-CCNC: 53 U/L (ref 10–40)
AV INDEX (PROSTH): 1.04
AV MEAN GRADIENT: 3 MMHG
AV PEAK GRADIENT: 6 MMHG
AV VALVE AREA: 3.61 CM2
AV VELOCITY RATIO: 0.89
BASOPHILS # BLD AUTO: 0.04 K/UL (ref 0–0.2)
BASOPHILS NFR BLD: 0.4 % (ref 0–1.9)
BILIRUB SERPL-MCNC: 0.5 MG/DL (ref 0.1–1)
BSA FOR ECHO PROCEDURE: 2.32 M2
BUN SERPL-MCNC: 52 MG/DL (ref 8–23)
BUN SERPL-MCNC: 53 MG/DL (ref 8–23)
CALCIUM SERPL-MCNC: 9.4 MG/DL (ref 8.7–10.5)
CALCIUM SERPL-MCNC: 9.5 MG/DL (ref 8.7–10.5)
CHLORIDE SERPL-SCNC: 106 MMOL/L (ref 95–110)
CHLORIDE SERPL-SCNC: 106 MMOL/L (ref 95–110)
CO2 SERPL-SCNC: 26 MMOL/L (ref 23–29)
CO2 SERPL-SCNC: 26 MMOL/L (ref 23–29)
CREAT SERPL-MCNC: 1.2 MG/DL (ref 0.5–1.4)
CREAT SERPL-MCNC: 1.3 MG/DL (ref 0.5–1.4)
CV ECHO LV RWT: 0.37 CM
DELSYS: ABNORMAL
DELSYS: ABNORMAL
DIFFERENTIAL METHOD: ABNORMAL
DOP CALC AO PEAK VEL: 1.19 M/S
DOP CALC AO VTI: 19.29 CM
DOP CALC LVOT AREA: 3.5 CM2
DOP CALC LVOT DIAMETER: 2.1 CM
DOP CALC LVOT PEAK VEL: 1.06 M/S
DOP CALC LVOT STROKE VOLUME: 69.62 CM3
DOP CALCLVOT PEAK VEL VTI: 20.11 CM
E WAVE DECELERATION TIME: 220.44 MSEC
E/A RATIO: 0.73
E/E' RATIO: 7.43 M/S
ECHO LV POSTERIOR WALL: 0.7 CM (ref 0.6–1.1)
EOSINOPHIL # BLD AUTO: 0 K/UL (ref 0–0.5)
EOSINOPHIL NFR BLD: 0.2 % (ref 0–8)
ERYTHROCYTE [DISTWIDTH] IN BLOOD BY AUTOMATED COUNT: 14 % (ref 11.5–14.5)
ERYTHROCYTE [SEDIMENTATION RATE] IN BLOOD BY WESTERGREN METHOD: 36 MM/H
ERYTHROCYTE [SEDIMENTATION RATE] IN BLOOD BY WESTERGREN METHOD: 36 MM/H
EST. GFR  (AFRICAN AMERICAN): >60 ML/MIN/1.73 M^2
EST. GFR  (AFRICAN AMERICAN): >60 ML/MIN/1.73 M^2
EST. GFR  (NON AFRICAN AMERICAN): 56.1 ML/MIN/1.73 M^2
EST. GFR  (NON AFRICAN AMERICAN): >60 ML/MIN/1.73 M^2
FACT X PPP CHRO-ACNC: 0.3 IU/ML (ref 0.3–0.7)
FIO2: 60
FIO2: 60
FRACTIONAL SHORTENING: 29 % (ref 28–44)
GLUCOSE SERPL-MCNC: 303 MG/DL (ref 70–110)
GLUCOSE SERPL-MCNC: 387 MG/DL (ref 70–110)
HCO3 UR-SCNC: 26 MMOL/L (ref 24–28)
HCO3 UR-SCNC: 26.6 MMOL/L (ref 24–28)
HCT VFR BLD AUTO: 38.4 % (ref 40–54)
HGB BLD-MCNC: 12.1 G/DL (ref 14–18)
IMM GRANULOCYTES # BLD AUTO: 0.48 K/UL (ref 0–0.04)
IMM GRANULOCYTES NFR BLD AUTO: 4.6 % (ref 0–0.5)
INTERVENTRICULAR SEPTUM: 0.85 CM (ref 0.6–1.1)
IVRT: 71.36 MSEC
LA MAJOR: 5.1 CM
LA MINOR: 4.62 CM
LA WIDTH: 3.42 CM
LEFT ATRIUM SIZE: 3.5 CM
LEFT ATRIUM VOLUME INDEX: 21.9 ML/M2
LEFT ATRIUM VOLUME: 49.33 CM3
LEFT INTERNAL DIMENSION IN SYSTOLE: 2.69 CM (ref 2.1–4)
LEFT VENTRICLE DIASTOLIC VOLUME INDEX: 23.65 ML/M2
LEFT VENTRICLE DIASTOLIC VOLUME: 53.17 ML
LEFT VENTRICLE MASS INDEX: 37 G/M2
LEFT VENTRICLE SYSTOLIC VOLUME INDEX: 11.9 ML/M2
LEFT VENTRICLE SYSTOLIC VOLUME: 26.78 ML
LEFT VENTRICULAR INTERNAL DIMENSION IN DIASTOLE: 3.8 CM (ref 3.5–6)
LEFT VENTRICULAR MASS: 82.35 G
LV LATERAL E/E' RATIO: 7.43 M/S
LV SEPTAL E/E' RATIO: 7.43 M/S
LYMPHOCYTES # BLD AUTO: 1.1 K/UL (ref 1–4.8)
LYMPHOCYTES NFR BLD: 10.4 % (ref 18–48)
MAGNESIUM SERPL-MCNC: 2.3 MG/DL (ref 1.6–2.6)
MCH RBC QN AUTO: 27.6 PG (ref 27–31)
MCHC RBC AUTO-ENTMCNC: 31.5 G/DL (ref 32–36)
MCV RBC AUTO: 88 FL (ref 82–98)
MIN VOL: 14.5
MODE: ABNORMAL
MODE: ABNORMAL
MONOCYTES # BLD AUTO: 1.2 K/UL (ref 0.3–1)
MONOCYTES NFR BLD: 11.3 % (ref 4–15)
MV PEAK A VEL: 0.71 M/S
MV PEAK E VEL: 0.52 M/S
MV STENOSIS PRESSURE HALF TIME: 63.93 MS
MV VALVE AREA P 1/2 METHOD: 3.44 CM2
NEUTROPHILS # BLD AUTO: 7.6 K/UL (ref 1.8–7.7)
NEUTROPHILS NFR BLD: 73.1 % (ref 38–73)
NRBC BLD-RTO: 0 /100 WBC
PCO2 BLDA: 41.6 MMHG (ref 35–45)
PCO2 BLDA: 43.9 MMHG (ref 35–45)
PEEP: 8
PEEP: 8
PH SMN: 7.38 [PH] (ref 7.35–7.45)
PH SMN: 7.41 [PH] (ref 7.35–7.45)
PHOSPHATE SERPL-MCNC: 3.3 MG/DL (ref 2.7–4.5)
PIP: 37
PLATELET # BLD AUTO: 313 K/UL (ref 150–350)
PMV BLD AUTO: 11.5 FL (ref 9.2–12.9)
PO2 BLDA: 109 MMHG (ref 80–100)
PO2 BLDA: 97 MMHG (ref 80–100)
POC BE: 1 MMOL/L
POC BE: 2 MMOL/L
POC SATURATED O2: 98 % (ref 95–100)
POC SATURATED O2: 98 % (ref 95–100)
POC TCO2: 27 MMOL/L (ref 23–27)
POC TCO2: 28 MMOL/L (ref 23–27)
POCT GLUCOSE: 245 MG/DL (ref 70–110)
POCT GLUCOSE: 261 MG/DL (ref 70–110)
POCT GLUCOSE: 286 MG/DL (ref 70–110)
POCT GLUCOSE: 317 MG/DL (ref 70–110)
POCT GLUCOSE: 332 MG/DL (ref 70–110)
POCT GLUCOSE: 350 MG/DL (ref 70–110)
POCT GLUCOSE: 372 MG/DL (ref 70–110)
POTASSIUM SERPL-SCNC: 4.6 MMOL/L (ref 3.5–5.1)
POTASSIUM SERPL-SCNC: 5.2 MMOL/L (ref 3.5–5.1)
PROT SERPL-MCNC: 6.9 G/DL (ref 6–8.4)
RA MAJOR: 4.27 CM
RA WIDTH: 3.44 CM
RBC # BLD AUTO: 4.39 M/UL (ref 4.6–6.2)
RIGHT VENTRICULAR END-DIASTOLIC DIMENSION: 3.68 CM
RV TISSUE DOPPLER FREE WALL SYSTOLIC VELOCITY 1 (APICAL 4 CHAMBER VIEW): 9.31 CM/S
SAMPLE: ABNORMAL
SAMPLE: ABNORMAL
SINUS: 3.34 CM
SITE: ABNORMAL
SITE: ABNORMAL
SODIUM SERPL-SCNC: 138 MMOL/L (ref 136–145)
SODIUM SERPL-SCNC: 141 MMOL/L (ref 136–145)
SP02: 60
SP02: 97
STJ: 2.6 CM
TDI LATERAL: 0.07 M/S
TDI SEPTAL: 0.07 M/S
TDI: 0.07 M/S
TRICUSPID ANNULAR PLANE SYSTOLIC EXCURSION: 1.44 CM
VT: 380
VT: 380
WBC # BLD AUTO: 10.37 K/UL (ref 3.9–12.7)

## 2020-08-03 PROCEDURE — 99233 SBSQ HOSP IP/OBS HIGH 50: CPT | Mod: ,,, | Performed by: INTERNAL MEDICINE

## 2020-08-03 PROCEDURE — 25000003 PHARM REV CODE 250: Performed by: NURSE PRACTITIONER

## 2020-08-03 PROCEDURE — 99900026 HC AIRWAY MAINTENANCE (STAT)

## 2020-08-03 PROCEDURE — 82803 BLOOD GASES ANY COMBINATION: CPT

## 2020-08-03 PROCEDURE — 25000003 PHARM REV CODE 250: Performed by: STUDENT IN AN ORGANIZED HEALTH CARE EDUCATION/TRAINING PROGRAM

## 2020-08-03 PROCEDURE — 80053 COMPREHEN METABOLIC PANEL: CPT

## 2020-08-03 PROCEDURE — 27000221 HC OXYGEN, UP TO 24 HOURS

## 2020-08-03 PROCEDURE — 99900035 HC TECH TIME PER 15 MIN (STAT)

## 2020-08-03 PROCEDURE — 99233 PR SUBSEQUENT HOSPITAL CARE,LEVL III: ICD-10-PCS | Mod: ,,, | Performed by: INTERNAL MEDICINE

## 2020-08-03 PROCEDURE — 63600175 PHARM REV CODE 636 W HCPCS: Performed by: STUDENT IN AN ORGANIZED HEALTH CARE EDUCATION/TRAINING PROGRAM

## 2020-08-03 PROCEDURE — 94761 N-INVAS EAR/PLS OXIMETRY MLT: CPT

## 2020-08-03 PROCEDURE — 25000003 PHARM REV CODE 250: Performed by: INTERNAL MEDICINE

## 2020-08-03 PROCEDURE — 85025 COMPLETE CBC W/AUTO DIFF WBC: CPT

## 2020-08-03 PROCEDURE — 63600175 PHARM REV CODE 636 W HCPCS: Performed by: NURSE PRACTITIONER

## 2020-08-03 PROCEDURE — 25000003 PHARM REV CODE 250: Performed by: PHYSICIAN ASSISTANT

## 2020-08-03 PROCEDURE — 85520 HEPARIN ASSAY: CPT

## 2020-08-03 PROCEDURE — 37799 UNLISTED PX VASCULAR SURGERY: CPT

## 2020-08-03 PROCEDURE — 80048 BASIC METABOLIC PNL TOTAL CA: CPT

## 2020-08-03 PROCEDURE — 25000003 PHARM REV CODE 250

## 2020-08-03 PROCEDURE — 83735 ASSAY OF MAGNESIUM: CPT

## 2020-08-03 PROCEDURE — 84100 ASSAY OF PHOSPHORUS: CPT

## 2020-08-03 PROCEDURE — 94003 VENT MGMT INPAT SUBQ DAY: CPT

## 2020-08-03 PROCEDURE — 20000000 HC ICU ROOM

## 2020-08-03 PROCEDURE — 27200966 HC CLOSED SUCTION SYSTEM

## 2020-08-03 PROCEDURE — C9399 UNCLASSIFIED DRUGS OR BIOLOG: HCPCS | Performed by: STUDENT IN AN ORGANIZED HEALTH CARE EDUCATION/TRAINING PROGRAM

## 2020-08-03 RX ORDER — NOREPINEPHRINE BITARTRATE/D5W 4MG/250ML
0.02 PLASTIC BAG, INJECTION (ML) INTRAVENOUS CONTINUOUS
Status: DISCONTINUED | OUTPATIENT
Start: 2020-08-03 | End: 2020-08-05

## 2020-08-03 RX ORDER — PHENYLEPHRINE HCL IN 0.9% NACL 1 MG/10 ML
SYRINGE (ML) INTRAVENOUS
Status: COMPLETED
Start: 2020-08-03 | End: 2020-08-03

## 2020-08-03 RX ORDER — PHENYLEPHRINE HYDROCHLORIDE 10 MG/ML
INJECTION INTRAVENOUS
Status: COMPLETED
Start: 2020-08-03 | End: 2020-08-03

## 2020-08-03 RX ORDER — PHENYLEPHRINE HCL IN 0.9% NACL 20MG/250ML
PLASTIC BAG, INJECTION (ML) INTRAVENOUS
Status: COMPLETED
Start: 2020-08-03 | End: 2020-08-03

## 2020-08-03 RX ORDER — PHENYLEPHRINE HCL IN 0.9% NACL 1 MG/10 ML
100 SYRINGE (ML) INTRAVENOUS ONCE
Status: COMPLETED | OUTPATIENT
Start: 2020-08-03 | End: 2020-08-03

## 2020-08-03 RX ADMIN — STANDARDIZED SENNA CONCENTRATE AND DOCUSATE SODIUM 1 TABLET: 8.6; 5 TABLET ORAL at 08:08

## 2020-08-03 RX ADMIN — INSULIN DETEMIR 12 UNITS: 100 INJECTION, SOLUTION SUBCUTANEOUS at 10:08

## 2020-08-03 RX ADMIN — DEXTROSE 2.5 MCG/KG/MIN: 5 SOLUTION INTRAVENOUS at 04:08

## 2020-08-03 RX ADMIN — MINERAL OIL AND PETROLATUM: 150; 830 OINTMENT OPHTHALMIC at 02:08

## 2020-08-03 RX ADMIN — DEXMEDETOMIDINE HYDROCHLORIDE 1.4 MCG/KG/HR: 4 INJECTION, SOLUTION INTRAVENOUS at 09:08

## 2020-08-03 RX ADMIN — INSULIN ASPART 6 UNITS: 100 INJECTION, SOLUTION INTRAVENOUS; SUBCUTANEOUS at 10:08

## 2020-08-03 RX ADMIN — INSULIN ASPART 2 UNITS: 100 INJECTION, SOLUTION INTRAVENOUS; SUBCUTANEOUS at 12:08

## 2020-08-03 RX ADMIN — POLYETHYLENE GLYCOL 3350 17 G: 17 POWDER, FOR SOLUTION ORAL at 09:08

## 2020-08-03 RX ADMIN — Medication 100 MCG: at 11:08

## 2020-08-03 RX ADMIN — STANDARDIZED SENNA CONCENTRATE AND DOCUSATE SODIUM 1 TABLET: 8.6; 5 TABLET ORAL at 09:08

## 2020-08-03 RX ADMIN — INSULIN ASPART 4 UNITS: 100 INJECTION, SOLUTION INTRAVENOUS; SUBCUTANEOUS at 09:08

## 2020-08-03 RX ADMIN — Medication 0.02 MCG/KG/MIN: at 11:08

## 2020-08-03 RX ADMIN — ROSUVASTATIN CALCIUM 10 MG: 10 TABLET, FILM COATED ORAL at 08:08

## 2020-08-03 RX ADMIN — FAMOTIDINE 20 MG: 20 TABLET ORAL at 09:08

## 2020-08-03 RX ADMIN — KETAMINE HYDROCHLORIDE 7.5 MCG/KG/MIN: 100 INJECTION INTRAMUSCULAR; INTRAVENOUS at 12:08

## 2020-08-03 RX ADMIN — Medication 250 MCG/HR: at 01:08

## 2020-08-03 RX ADMIN — Medication 275 MCG/HR: at 12:08

## 2020-08-03 RX ADMIN — Medication 300 MCG/HR: at 06:08

## 2020-08-03 RX ADMIN — LOSARTAN POTASSIUM 50 MG: 25 TABLET ORAL at 09:08

## 2020-08-03 RX ADMIN — MINERAL OIL AND PETROLATUM: 150; 830 OINTMENT OPHTHALMIC at 09:08

## 2020-08-03 RX ADMIN — KETAMINE HYDROCHLORIDE 10 MCG/KG/MIN: 100 INJECTION INTRAMUSCULAR; INTRAVENOUS at 10:08

## 2020-08-03 RX ADMIN — MINERAL OIL AND PETROLATUM: 150; 830 OINTMENT OPHTHALMIC at 06:08

## 2020-08-03 RX ADMIN — DEXMEDETOMIDINE HYDROCHLORIDE 0.6 MCG/KG/HR: 4 INJECTION, SOLUTION INTRAVENOUS at 06:08

## 2020-08-03 RX ADMIN — INSULIN DETEMIR 15 UNITS: 100 INJECTION, SOLUTION SUBCUTANEOUS at 09:08

## 2020-08-03 RX ADMIN — INSULIN ASPART 8 UNITS: 100 INJECTION, SOLUTION INTRAVENOUS; SUBCUTANEOUS at 06:08

## 2020-08-03 RX ADMIN — DEXAMETHASONE SODIUM PHOSPHATE 6 MG: 4 INJECTION, SOLUTION INTRA-ARTICULAR; INTRALESIONAL; INTRAMUSCULAR; INTRAVENOUS; SOFT TISSUE at 09:08

## 2020-08-03 RX ADMIN — FAMOTIDINE 20 MG: 20 TABLET ORAL at 08:08

## 2020-08-03 RX ADMIN — DEXMEDETOMIDINE HYDROCHLORIDE 1.2 MCG/KG/HR: 4 INJECTION, SOLUTION INTRAVENOUS at 01:08

## 2020-08-03 RX ADMIN — DEXMEDETOMIDINE HYDROCHLORIDE 0.6 MCG/KG/HR: 4 INJECTION, SOLUTION INTRAVENOUS at 12:08

## 2020-08-03 RX ADMIN — DEXMEDETOMIDINE HYDROCHLORIDE 1.2 MCG/KG/HR: 4 INJECTION, SOLUTION INTRAVENOUS at 12:08

## 2020-08-03 RX ADMIN — INSULIN ASPART 10 UNITS: 100 INJECTION, SOLUTION INTRAVENOUS; SUBCUTANEOUS at 02:08

## 2020-08-03 RX ADMIN — HEPARIN SODIUM AND DEXTROSE 10 UNITS/KG/HR: 10000; 5 INJECTION INTRAVENOUS at 03:08

## 2020-08-03 RX ADMIN — Medication 275 MCG/HR: at 09:08

## 2020-08-03 RX ADMIN — MIDAZOLAM HYDROCHLORIDE 3 MG/HR: 5 INJECTION, SOLUTION INTRAMUSCULAR; INTRAVENOUS at 06:08

## 2020-08-03 RX ADMIN — DEXMEDETOMIDINE HYDROCHLORIDE 1.4 MCG/KG/HR: 4 INJECTION, SOLUTION INTRAVENOUS at 06:08

## 2020-08-03 NOTE — PLAN OF CARE
Patient remains paralyzed and sedated with the plan to attempt to discontinue paralytic on day shift today 08/03/2020, tube feeding rate increased and free water flushes added to regime, patient cardiac rhythm remains in Afib, cardiac enzymes negative and Echo scheduled today to r/o pericarditis, skin remains intact and urine output adequate. Vent settings remain the same. Hypothermic overnight and alexi hugger placed on patient. Blood glucose monitoring continued.

## 2020-08-03 NOTE — ASSESSMENT & PLAN NOTE
Anup Miller is a 70 y.o. male patient with a PMHx of HTN, HLD, and DM who presents to the [place] [facility] for shortness of breath and found to be COVID positive. Was started on IV dexamethasone, Ceftriaxone, and Azithromycin. Patient transferred to [facility] on evening of 7/26/20 for higher level of care. Labs on arrival to [facility] remarkable for WBC 9.8, Procal 1.3, D-dimer 1.1, Ferritin 2,300, , and . Given 1 L LR on arrival to [facility]    - Remdesivir complete  - Heparin gtt for hypercoagulable state in COVID  - IV Dexamethasone continues (total of 10 days, end date 08/05)  - Completed Ceftriaxone and Azithromycin for possible superimposed bacterial pneumonia  - f/u blood and sputum cx - NGTD  - trend lactate - nl  - Was previously paralyzed, proned, sedated. Nimbex restarted on 8/1 and attempted trial to discontinued on 8/3 was unsuccessful. Possibly trial nimbex off again as tolerated.

## 2020-08-03 NOTE — PROGRESS NOTES
Dr. Barber with Critical Care called to bedside.  Pt continues to have elevated peak pressures on vent, low SPO2, low MAP to 59, increased WOB with abd use, increased secretions after stopping nimbex and despite optimizing sedation to maintain adequate VS.  Gtts to continue to be titrated to promote vent syncrony and levo to be added to maintain normal MAP, greater than 65.  Neosynephrine 100 mcg IVP x1 ordered by MD.

## 2020-08-03 NOTE — PROGRESS NOTES
"[facility] - ICU 16 WT  Adult Nutrition  Progress Note    SUMMARY       Recommendations    Recommendation:   1. Continue TF of Peptamen Intense VHP increase to goal of 60 mL/hr to provide 1440 kcals, 132 g of protein, 1210 mL fluid.   -Additional water per MD. Hold for residuals >500 mL.   2. RD to monitor & follow-up.    Goals: Meet % EEN, EPN by RD f/u date  Nutrition Goal Status: progressing towards goal, goal not met  Communication of RD Recs: reviewed with RN    Reason for Assessment    Reason For Assessment: RD follow-up  Diagnosis: other (see comments)(PNA  COVID19)  Relevant Medical History: DM, HTN, HLD  Interdisciplinary Rounds: did not attend  General Information Comments: Pt remains intubated on vent, paralytics off this am. TF increasing to goal, currently tolerating at 30 mL/hr per chart, unable to reach RN. Previously pt was tolerating trickle feeds. Wt stable since admit with some fluctuation, UBW 250lbs. No indications of malnutrition at this time. NFPE not performed, patient has been screened for possible COVID-19 and has been placed on airborne and contact precautions. Patient is noted as being positive for COVID-19.  Nutrition Discharge Planning: Unable to determine    Nutrition Risk Screen    Nutrition Risk Screen: tube feeding or parenteral nutrition    Nutrition/Diet History    Food Allergies: NKFA  Factors Affecting Nutritional Intake: NPO, on mechanical ventilation    Anthropometrics    Temp: 99.5 °F (37.5 °C)  Height: 5' 9" (175.3 cm)  Height (inches): 69 in  Weight Method: Bed Scale  Weight: 110.7 kg (244 lb)  Weight (lb): 244 lb  Ideal Body Weight (IBW), Male: 160 lb  % Ideal Body Weight, Male (lb): 152.5 %  BMI (Calculated): 36  BMI Grade: 35 - 39.9 - obesity - grade II  Usual Body Weight (UBW), k.6 kg(Per chart review)  % Usual Body Weight: 97.92  % Weight Change From Usual Weight: -2.29 %       Lab/Procedures/Meds    Pertinent Labs Reviewed: " reviewed  Pertinent Labs Comments: BUN 52; Glucose 303  Pertinent Medications Reviewed: reviewed  Pertinent Medications Comments: insulin; polyethylene glycol; statin; senna-docusate; famotidine; dexamethasone     Estimated/Assessed Needs    Weight Used For Calorie Calculations: 111 kg (244 lb 11.4 oz)  Energy Calorie Requirements (kcal): 8892-7065 kcal/d  Energy Need Method: Kcal/kg(11-14 kcal/kg, BMI > 35)  Protein Requirements: 146-182 g/d (2-2.5 g/kg IBW)  Weight Used For Protein Calculations: 72.7 kg (160 lb 4.4 oz)  Estimated Fluid Requirement Method: other (see comments)(Per MD or 1 mL/kcal)  RDA Method (mL): 1221  CHO Requirement: 50% total kcals    Nutrition Prescription Ordered    Current Diet Order: NPO  Current Nutrition Support Formula Ordered: Peptamen Intense VHP  Current Nutrition Support Rate Ordered: 30 (ml)  Current Nutrition Support Frequency Ordered: mL/hr    Evaluation of Received Nutrient/Fluid Intake    Enteral Calories (kcal): 720  Enteral Protein (gm): 66  Enteral (Free Water) Fluid (mL): 605  Total Calories (kcal): 720  % Kcal Needs: 59  % Protein Needs: 45  I/O: -5.6 L since admit  Energy Calories Required: not meeting needs  Protein Required: not meeting needs  Fluid Required: other (see comments)(Per MD or 1 mL/kcal)  Comments: LBM: 7/25  Tolerance: tolerating  % Intake of Estimated Energy Needs: 25 - 50 %  % Meal Intake: NPO    Nutrition Risk    Level of Risk/Frequency of Follow-up: (2x/week)     Assessment and Plan  Nutrition Problem  Inadequate oral intake     Related to (etiology):   Inability to consume sufficient energy     Signs and Symptoms (as evidenced by):   NPO, Intubated      Interventions(treatment strategy):  Collaboration of nutrition care w/ other providers   Enteral nutrition      Nutrition Diagnosis Status:   Continues     Monitor and Evaluation    Food and Nutrient Intake: energy intake, food and beverage intake, enteral nutrition intake  Food and Nutrient  Adminstration: diet order, enteral and parenteral nutrition administration  Physical Activity and Function: nutrition-related ADLs and IADLs  Anthropometric Measurements: weight, weight change  Biochemical Data, Medical Tests and Procedures: lipid profile, inflammatory profile, gastrointestinal profile, electrolyte and renal panel, glucose/endocrine profile  Nutrition-Focused Physical Findings: overall appearance     Nutrition Follow-Up    RD Follow-up?: Yes

## 2020-08-03 NOTE — SUBJECTIVE & OBJECTIVE
Interval History/Significant Events: No acute overnight events. Patient remained on sedation and nimbex this morning but nimbex was discontinued with an increase in sedative medications, however, patient was not able to tolerate this and nimbex has to be restarted.    Review of Systems   Unable to perform ROS: Intubated     Objective:     Vital Signs (Most Recent):  Temp: 98.6 °F (37 °C) (08/03/20 1600)  Pulse: 69 (08/03/20 1600)  Resp: (!) 25 (08/03/20 1600)  BP: 127/66 (08/03/20 1600)  SpO2: 96 % (08/03/20 1600) Vital Signs (24h Range):  Temp:  [95.7 °F (35.4 °C)-99.9 °F (37.7 °C)] 98.6 °F (37 °C)  Pulse:  [] 69  Resp:  [25-38] 25  SpO2:  [85 %-100 %] 96 %  BP: ()/(53-89) 127/66  Arterial Line BP: ()/(47-69) 114/54   Weight: 110.7 kg (244 lb)  Body mass index is 36.03 kg/m².      Intake/Output Summary (Last 24 hours) at 8/3/2020 1638  Last data filed at 8/3/2020 1100  Gross per 24 hour   Intake 2527.82 ml   Output 1835 ml   Net 692.82 ml       Physical Exam  Nursing note reviewed.   Constitutional:       Interventions: He is sedated, intubated and restrained.      Comments: Intubated, sedated, and paralyzed.   HENT:      Head: Normocephalic and atraumatic.   Eyes:      Conjunctiva/sclera: Conjunctivae normal.   Neck:      Musculoskeletal: Neck supple.   Cardiovascular:      Rate and Rhythm: Normal rate.      Heart sounds: No murmur.   Pulmonary:      Effort: He is intubated.      Breath sounds: No stridor. Examination of the right-lower field reveals rhonchi and rales. Examination of the left-lower field reveals rhonchi and rales. Rhonchi and rales present. No wheezing.   Abdominal:      General: There is no distension.      Palpations: Abdomen is soft.      Tenderness: There is no abdominal tenderness.   Musculoskeletal:      Right lower leg: No edema.      Left lower leg: No edema.   Skin:     General: Skin is warm and dry.   Neurological:      General: No focal deficit present.      Comments:  Intubated, sedated         Vents:  Vent Mode: A/C (08/03/20 1242)  Ventilator Initiated: Yes(chart correction) (07/26/20 2108)  Set Rate: 36 BPM (08/03/20 1242)  Vt Set: 380 mL (08/03/20 1242)  Pressure Support: 0 cmH20 (08/03/20 1242)  PEEP/CPAP: 8 cmH20 (08/03/20 1242)  Oxygen Concentration (%): 60 (08/03/20 1600)  Peak Airway Pressure: 35 cmH2O (08/03/20 1242)  Plateau Pressure: 25 cmH20 (08/03/20 1242)  Total Ve: 14 mL (08/03/20 1242)  F/VT Ratio<105 (RSBI): (!) 92.78 (08/03/20 1242)  Lines/Drains/Airways     Central Venous Catheter Line            Percutaneous Central Line Insertion/Assessment - Triple Lumen  07/29/20 1729 right internal jugular 4 days          Drain                 NG/OG Tube 07/26/20 1756 [place] sump;orogastric 18 Fr. Center mouth 7 days         Urethral Catheter 07/26/20 2205 16 Fr. 7 days          Airway                 Airway - Non-Surgical 07/26/20 1724 Endotracheal Tube 7 days          Arterial Line                 Arterial Line 07/29/20 1700 Right Radial 4 days          Peripheral Intravenous Line                 Peripheral IV - Single Lumen 07/31/20 1508 18 G Anterior;Right Forearm 3 days              Significant Labs:    CBC/Anemia Profile:  Recent Labs   Lab 08/02/20  0349 08/03/20  0412   WBC 13.21* 10.37   HGB 12.8* 12.1*   HCT 42.7 38.4*    313   MCV 92 88   RDW 14.3 14.0        Chemistries:  Recent Labs   Lab 08/02/20  0349 08/02/20  0748 08/03/20  0412 08/03/20  1506    144 141 138   K 5.6* 5.4* 4.6 5.2*    110 106 106   CO2 23 26 26 26   BUN 42* 43* 52* 53*   CREATININE 1.1 1.1 1.2 1.3   CALCIUM 9.2 9.4 9.5 9.4   ALBUMIN 2.1*  --  1.9*  --    PROT 7.2  --  6.9  --    BILITOT 0.5  --  0.5  --    ALKPHOS 65  --  68  --    ALT 68*  --  73*  --    AST 50*  --  53*  --    MG 2.4  --  2.3  --    PHOS 4.4  --  3.3  --        Significant Imaging:  I have reviewed all pertinent imaging results/findings within the past 24 hours.

## 2020-08-03 NOTE — PROGRESS NOTES
Notified Dr. Croft with critical care pt not pulling tidal volumes, RR elevated along with WOB./ Vent dysyncrony noted. RT notified.  Also informed pt almost maxed out on sedation, providing boluses as tolerated via pump per order.  MD stated will notify fellow, if decompensates, will check ABG and reconsider starting paralytic again.

## 2020-08-03 NOTE — PROGRESS NOTES
Elevated Peak Pressures on vent noted.  Increased coughing and vent dysyncrony noted.  Sedation adjusted, see flowsheets.  Paralytic remains off.

## 2020-08-03 NOTE — PROGRESS NOTES
Dr. Rees to bedside to assess.  Notified pt not pulling tidal volumes, and having vent dysyncrony. MD ordered to bolus versed 2 mg via pump, also notified maxed on all sedation except versed gtt.  MD ordered to reassess ABG later this afternoon and if abnormal or pt does not improve, paralytic will be restarted.  Other VS stable. No additional orders received.  Will continue to monitor.

## 2020-08-03 NOTE — PROGRESS NOTES
Ordered to stop paralytic, increase ketamine to 10 mcg/kg/min and versed to 5 mg/hr.  Done approximately 20 minutes prior, pt noted to have drastic decrease in BP and MAP.  See flowsheets for adjustments in gtts.  Paralytic remains off.  Fio2 decreased to 50% per MD order, Dr. Nelson with Critical Care.

## 2020-08-03 NOTE — PLAN OF CARE
Problem: Aspiration (Enteral Nutrition)  Goal: Absence of Aspiration Signs/Symptoms  Outcome: Ongoing, Progressing   Recommendations    Recommendation:   1. Continue TF of Peptamen Intense VHP increase to goal of 60 mL/hr to provide 1440 kcals, 132 g of protein, 1210 mL fluid.   -Additional water per MD. Hold for residuals >500 mL.   2. RD to monitor & follow-up.    Goals: Meet % EEN, EPN by RD f/u date  Nutrition Goal Status: progressing towards goal, goal not met  Communication of RD Recs: reviewed with RN

## 2020-08-03 NOTE — ASSESSMENT & PLAN NOTE
- Hx of elevated LFTs dating back to 2015  - Acute hepatitis panel and HIV - negative  - Trend CMP daily

## 2020-08-04 LAB
ALBUMIN SERPL BCP-MCNC: 1.8 G/DL (ref 3.5–5.2)
ALLENS TEST: ABNORMAL
ALP SERPL-CCNC: 71 U/L (ref 55–135)
ALT SERPL W/O P-5'-P-CCNC: 79 U/L (ref 10–44)
ANION GAP SERPL CALC-SCNC: 10 MMOL/L (ref 8–16)
ANION GAP SERPL CALC-SCNC: 7 MMOL/L (ref 8–16)
AST SERPL-CCNC: 48 U/L (ref 10–40)
B-OH-BUTYR BLD STRIP-SCNC: 0.4 MMOL/L (ref 0–0.5)
BASOPHILS # BLD AUTO: 0.02 K/UL (ref 0–0.2)
BASOPHILS NFR BLD: 0.2 % (ref 0–1.9)
BILIRUB SERPL-MCNC: 0.4 MG/DL (ref 0.1–1)
BUN SERPL-MCNC: 39 MG/DL (ref 8–23)
BUN SERPL-MCNC: 44 MG/DL (ref 8–23)
CALCIUM SERPL-MCNC: 9 MG/DL (ref 8.7–10.5)
CALCIUM SERPL-MCNC: 9.4 MG/DL (ref 8.7–10.5)
CHLORIDE SERPL-SCNC: 105 MMOL/L (ref 95–110)
CHLORIDE SERPL-SCNC: 106 MMOL/L (ref 95–110)
CO2 SERPL-SCNC: 24 MMOL/L (ref 23–29)
CO2 SERPL-SCNC: 25 MMOL/L (ref 23–29)
CREAT SERPL-MCNC: 1 MG/DL (ref 0.5–1.4)
CREAT SERPL-MCNC: 1.1 MG/DL (ref 0.5–1.4)
DELSYS: ABNORMAL
DIFFERENTIAL METHOD: ABNORMAL
EOSINOPHIL # BLD AUTO: 0.1 K/UL (ref 0–0.5)
EOSINOPHIL NFR BLD: 0.6 % (ref 0–8)
ERYTHROCYTE [DISTWIDTH] IN BLOOD BY AUTOMATED COUNT: 13.8 % (ref 11.5–14.5)
ERYTHROCYTE [SEDIMENTATION RATE] IN BLOOD BY WESTERGREN METHOD: 36 MM/H
EST. GFR  (AFRICAN AMERICAN): >60 ML/MIN/1.73 M^2
EST. GFR  (AFRICAN AMERICAN): >60 ML/MIN/1.73 M^2
EST. GFR  (NON AFRICAN AMERICAN): >60 ML/MIN/1.73 M^2
EST. GFR  (NON AFRICAN AMERICAN): >60 ML/MIN/1.73 M^2
FACT X PPP CHRO-ACNC: 0.41 IU/ML (ref 0.3–0.7)
FIO2: 40
GLUCOSE SERPL-MCNC: 350 MG/DL (ref 70–110)
GLUCOSE SERPL-MCNC: 364 MG/DL (ref 70–110)
HCO3 UR-SCNC: 26.1 MMOL/L (ref 24–28)
HCT VFR BLD AUTO: 36.8 % (ref 40–54)
HGB BLD-MCNC: 11.1 G/DL (ref 14–18)
IMM GRANULOCYTES # BLD AUTO: 0.14 K/UL (ref 0–0.04)
IMM GRANULOCYTES NFR BLD AUTO: 1.6 % (ref 0–0.5)
LYMPHOCYTES # BLD AUTO: 1 K/UL (ref 1–4.8)
LYMPHOCYTES NFR BLD: 11 % (ref 18–48)
MAGNESIUM SERPL-MCNC: 2 MG/DL (ref 1.6–2.6)
MCH RBC QN AUTO: 27 PG (ref 27–31)
MCHC RBC AUTO-ENTMCNC: 30.2 G/DL (ref 32–36)
MCV RBC AUTO: 90 FL (ref 82–98)
MODE: ABNORMAL
MONOCYTES # BLD AUTO: 1 K/UL (ref 0.3–1)
MONOCYTES NFR BLD: 11.7 % (ref 4–15)
NEUTROPHILS # BLD AUTO: 6.6 K/UL (ref 1.8–7.7)
NEUTROPHILS NFR BLD: 74.9 % (ref 38–73)
NRBC BLD-RTO: 0 /100 WBC
PCO2 BLDA: 43.1 MMHG (ref 35–45)
PEEP: 8
PH SMN: 7.39 [PH] (ref 7.35–7.45)
PHOSPHATE SERPL-MCNC: 3.6 MG/DL (ref 2.7–4.5)
PLATELET # BLD AUTO: 291 K/UL (ref 150–350)
PMV BLD AUTO: 11.4 FL (ref 9.2–12.9)
PO2 BLDA: 49 MMHG (ref 40–60)
POC BE: 1 MMOL/L
POC SATURATED O2: 84 % (ref 95–100)
POC TCO2: 27 MMOL/L (ref 24–29)
POCT GLUCOSE: 322 MG/DL (ref 70–110)
POCT GLUCOSE: 333 MG/DL (ref 70–110)
POCT GLUCOSE: 358 MG/DL (ref 70–110)
POCT GLUCOSE: 368 MG/DL (ref 70–110)
POCT GLUCOSE: 374 MG/DL (ref 70–110)
POCT GLUCOSE: 377 MG/DL (ref 70–110)
POCT GLUCOSE: 392 MG/DL (ref 70–110)
POTASSIUM SERPL-SCNC: 4.8 MMOL/L (ref 3.5–5.1)
POTASSIUM SERPL-SCNC: 4.8 MMOL/L (ref 3.5–5.1)
PROT SERPL-MCNC: 6.7 G/DL (ref 6–8.4)
RBC # BLD AUTO: 4.11 M/UL (ref 4.6–6.2)
SAMPLE: ABNORMAL
SITE: ABNORMAL
SODIUM SERPL-SCNC: 137 MMOL/L (ref 136–145)
SODIUM SERPL-SCNC: 140 MMOL/L (ref 136–145)
TRIGL SERPL-MCNC: 168 MG/DL (ref 30–150)
VT: 380
WBC # BLD AUTO: 8.74 K/UL (ref 3.9–12.7)

## 2020-08-04 PROCEDURE — 27200966 HC CLOSED SUCTION SYSTEM

## 2020-08-04 PROCEDURE — 84478 ASSAY OF TRIGLYCERIDES: CPT

## 2020-08-04 PROCEDURE — 82803 BLOOD GASES ANY COMBINATION: CPT

## 2020-08-04 PROCEDURE — 25000003 PHARM REV CODE 250: Performed by: STUDENT IN AN ORGANIZED HEALTH CARE EDUCATION/TRAINING PROGRAM

## 2020-08-04 PROCEDURE — 85025 COMPLETE CBC W/AUTO DIFF WBC: CPT

## 2020-08-04 PROCEDURE — 99233 SBSQ HOSP IP/OBS HIGH 50: CPT | Mod: ,,, | Performed by: INTERNAL MEDICINE

## 2020-08-04 PROCEDURE — 25000003 PHARM REV CODE 250: Performed by: PHYSICIAN ASSISTANT

## 2020-08-04 PROCEDURE — 94003 VENT MGMT INPAT SUBQ DAY: CPT

## 2020-08-04 PROCEDURE — 99900035 HC TECH TIME PER 15 MIN (STAT)

## 2020-08-04 PROCEDURE — 63600175 PHARM REV CODE 636 W HCPCS

## 2020-08-04 PROCEDURE — 80048 BASIC METABOLIC PNL TOTAL CA: CPT

## 2020-08-04 PROCEDURE — 25000003 PHARM REV CODE 250: Performed by: NURSE PRACTITIONER

## 2020-08-04 PROCEDURE — 99233 PR SUBSEQUENT HOSPITAL CARE,LEVL III: ICD-10-PCS | Mod: ,,, | Performed by: INTERNAL MEDICINE

## 2020-08-04 PROCEDURE — C9399 UNCLASSIFIED DRUGS OR BIOLOG: HCPCS | Performed by: STUDENT IN AN ORGANIZED HEALTH CARE EDUCATION/TRAINING PROGRAM

## 2020-08-04 PROCEDURE — 94761 N-INVAS EAR/PLS OXIMETRY MLT: CPT

## 2020-08-04 PROCEDURE — 83735 ASSAY OF MAGNESIUM: CPT

## 2020-08-04 PROCEDURE — 63600175 PHARM REV CODE 636 W HCPCS: Performed by: STUDENT IN AN ORGANIZED HEALTH CARE EDUCATION/TRAINING PROGRAM

## 2020-08-04 PROCEDURE — 20000000 HC ICU ROOM

## 2020-08-04 PROCEDURE — 85520 HEPARIN ASSAY: CPT

## 2020-08-04 PROCEDURE — 82800 BLOOD PH: CPT

## 2020-08-04 PROCEDURE — 27000221 HC OXYGEN, UP TO 24 HOURS

## 2020-08-04 PROCEDURE — 84100 ASSAY OF PHOSPHORUS: CPT

## 2020-08-04 PROCEDURE — 80053 COMPREHEN METABOLIC PANEL: CPT

## 2020-08-04 PROCEDURE — 99900026 HC AIRWAY MAINTENANCE (STAT)

## 2020-08-04 PROCEDURE — 82010 KETONE BODYS QUAN: CPT

## 2020-08-04 PROCEDURE — 25000003 PHARM REV CODE 250: Performed by: INTERNAL MEDICINE

## 2020-08-04 RX ORDER — PROPOFOL 10 MG/ML
5 INJECTION, EMULSION INTRAVENOUS CONTINUOUS
Status: DISCONTINUED | OUTPATIENT
Start: 2020-08-04 | End: 2020-08-08

## 2020-08-04 RX ORDER — NOREPINEPHRINE BITARTRATE/D5W 4MG/250ML
PLASTIC BAG, INJECTION (ML) INTRAVENOUS
Status: DISPENSED
Start: 2020-08-04 | End: 2020-08-05

## 2020-08-04 RX ORDER — ENOXAPARIN SODIUM 150 MG/ML
1 INJECTION SUBCUTANEOUS
Status: DISCONTINUED | OUTPATIENT
Start: 2020-08-04 | End: 2020-08-06

## 2020-08-04 RX ORDER — PROPOFOL 10 MG/ML
INJECTION, EMULSION INTRAVENOUS
Status: COMPLETED
Start: 2020-08-04 | End: 2020-08-04

## 2020-08-04 RX ADMIN — FAMOTIDINE 20 MG: 20 TABLET ORAL at 08:08

## 2020-08-04 RX ADMIN — STANDARDIZED SENNA CONCENTRATE AND DOCUSATE SODIUM 1 TABLET: 8.6; 5 TABLET ORAL at 08:08

## 2020-08-04 RX ADMIN — PROPOFOL 10 MCG/KG/MIN: 10 INJECTION, EMULSION INTRAVENOUS at 04:08

## 2020-08-04 RX ADMIN — ENOXAPARIN SODIUM 105 MG: 150 INJECTION SUBCUTANEOUS at 09:08

## 2020-08-04 RX ADMIN — MINERAL OIL AND PETROLATUM: 150; 830 OINTMENT OPHTHALMIC at 12:08

## 2020-08-04 RX ADMIN — INSULIN ASPART 5 UNITS: 100 INJECTION, SOLUTION INTRAVENOUS; SUBCUTANEOUS at 01:08

## 2020-08-04 RX ADMIN — DEXMEDETOMIDINE HYDROCHLORIDE 1.4 MCG/KG/HR: 4 INJECTION, SOLUTION INTRAVENOUS at 12:08

## 2020-08-04 RX ADMIN — PROPOFOL 10 MCG/KG/MIN: 10 INJECTION, EMULSION INTRAVENOUS at 10:08

## 2020-08-04 RX ADMIN — MIDAZOLAM HYDROCHLORIDE 5 MG/HR: 5 INJECTION, SOLUTION INTRAMUSCULAR; INTRAVENOUS at 06:08

## 2020-08-04 RX ADMIN — MINERAL OIL AND PETROLATUM: 150; 830 OINTMENT OPHTHALMIC at 09:08

## 2020-08-04 RX ADMIN — ROSUVASTATIN CALCIUM 10 MG: 10 TABLET, FILM COATED ORAL at 08:08

## 2020-08-04 RX ADMIN — DEXMEDETOMIDINE HYDROCHLORIDE 1.4 MCG/KG/HR: 4 INJECTION, SOLUTION INTRAVENOUS at 11:08

## 2020-08-04 RX ADMIN — INSULIN ASPART 8 UNITS: 100 INJECTION, SOLUTION INTRAVENOUS; SUBCUTANEOUS at 04:08

## 2020-08-04 RX ADMIN — ENOXAPARIN SODIUM 105 MG: 150 INJECTION SUBCUTANEOUS at 10:08

## 2020-08-04 RX ADMIN — POLYETHYLENE GLYCOL 3350 17 G: 17 POWDER, FOR SOLUTION ORAL at 08:08

## 2020-08-04 RX ADMIN — INSULIN ASPART 5 UNITS: 100 INJECTION, SOLUTION INTRAVENOUS; SUBCUTANEOUS at 09:08

## 2020-08-04 RX ADMIN — DEXMEDETOMIDINE HYDROCHLORIDE 1.4 MCG/KG/HR: 4 INJECTION, SOLUTION INTRAVENOUS at 07:08

## 2020-08-04 RX ADMIN — Medication 300 MCG/HR: at 09:08

## 2020-08-04 RX ADMIN — INSULIN DETEMIR 20 UNITS: 100 INJECTION, SOLUTION SUBCUTANEOUS at 09:08

## 2020-08-04 RX ADMIN — INSULIN DETEMIR 20 UNITS: 100 INJECTION, SOLUTION SUBCUTANEOUS at 08:08

## 2020-08-04 RX ADMIN — MINERAL OIL AND PETROLATUM: 150; 830 OINTMENT OPHTHALMIC at 05:08

## 2020-08-04 RX ADMIN — PROPOFOL 10 MCG/KG/MIN: 10 INJECTION, EMULSION INTRAVENOUS at 08:08

## 2020-08-04 RX ADMIN — Medication 25 MCG/HR: at 03:08

## 2020-08-04 RX ADMIN — KETAMINE HYDROCHLORIDE 5 MCG/KG/MIN: 100 INJECTION INTRAMUSCULAR; INTRAVENOUS at 03:08

## 2020-08-04 RX ADMIN — LOSARTAN POTASSIUM 50 MG: 25 TABLET ORAL at 08:08

## 2020-08-04 RX ADMIN — INSULIN ASPART 8 UNITS: 100 INJECTION, SOLUTION INTRAVENOUS; SUBCUTANEOUS at 12:08

## 2020-08-04 RX ADMIN — INSULIN ASPART 10 UNITS: 100 INJECTION, SOLUTION INTRAVENOUS; SUBCUTANEOUS at 05:08

## 2020-08-04 RX ADMIN — DEXMEDETOMIDINE HYDROCHLORIDE 1.4 MCG/KG/HR: 4 INJECTION, SOLUTION INTRAVENOUS at 09:08

## 2020-08-04 RX ADMIN — DEXMEDETOMIDINE HYDROCHLORIDE 1.4 MCG/KG/HR: 4 INJECTION, SOLUTION INTRAVENOUS at 05:08

## 2020-08-04 RX ADMIN — INSULIN ASPART 10 UNITS: 100 INJECTION, SOLUTION INTRAVENOUS; SUBCUTANEOUS at 09:08

## 2020-08-04 RX ADMIN — DEXMEDETOMIDINE HYDROCHLORIDE 1.4 MCG/KG/HR: 4 INJECTION, SOLUTION INTRAVENOUS at 04:08

## 2020-08-04 RX ADMIN — DEXMEDETOMIDINE HYDROCHLORIDE 1.4 MCG/KG/HR: 4 INJECTION, SOLUTION INTRAVENOUS at 03:08

## 2020-08-04 RX ADMIN — DEXAMETHASONE SODIUM PHOSPHATE 6 MG: 4 INJECTION, SOLUTION INTRA-ARTICULAR; INTRALESIONAL; INTRAMUSCULAR; INTRAVENOUS; SOFT TISSUE at 08:08

## 2020-08-04 RX ADMIN — KETAMINE HYDROCHLORIDE 10 MCG/KG/MIN: 100 INJECTION INTRAMUSCULAR; INTRAVENOUS at 07:08

## 2020-08-04 RX ADMIN — Medication 300 MCG/HR: at 12:08

## 2020-08-04 NOTE — ASSESSMENT & PLAN NOTE
- A1c 8.3%  - will increase accucheck to q4  - Increased detemir to 20 BID  - LDSII, but will add insulin gtt if glucose is not controlled  - BG goal 140-180

## 2020-08-04 NOTE — PLAN OF CARE
POC reviewed with pts wife via. Pt wife verbalized understanding and questions/ concerns addressed. Facetime with patient and pts wife.  Nimbex continued throughout night.  BIS and TOF monitoring. Ketamine, Fentanyl, Precedex, Versed, Levo, Heparin gtts continued.  Heparin remains therapeutic.  Tube feeds remain at goal with no residuals. No acute events overnight. Pt progressing toward goals. Will continue to monitor. See flowsheets for full assessment and VS info

## 2020-08-04 NOTE — SUBJECTIVE & OBJECTIVE
Interval History/Significant Events: No acute events overnight. Patient remains on nimbex    Review of Systems   Unable to perform ROS: Intubated     Objective:     Vital Signs (Most Recent):  Temp: 97.5 °F (36.4 °C) (08/04/20 0600)  Pulse: 60 (08/04/20 0600)  Resp: (!) 36 (08/04/20 0600)  BP: (!) 148/81 (08/04/20 0600)  SpO2: 98 % (08/04/20 0600) Vital Signs (24h Range):  Temp:  [97.5 °F (36.4 °C)-99.9 °F (37.7 °C)] 97.5 °F (36.4 °C)  Pulse:  [] 60  Resp:  [25-38] 36  SpO2:  [85 %-100 %] 98 %  BP: ()/(54-89) 148/81  Arterial Line BP: ()/(43-71) 162/66   Weight: 110.7 kg (244 lb)  Body mass index is 36.03 kg/m².      Intake/Output Summary (Last 24 hours) at 8/4/2020 0619  Last data filed at 8/4/2020 0600  Gross per 24 hour   Intake 4673.8 ml   Output 2440 ml   Net 2233.8 ml       Physical Exam  Nursing note reviewed.   Constitutional:       Interventions: He is sedated, intubated and restrained.      Comments: Intubated, sedated, and paralyzed.   HENT:      Head: Normocephalic and atraumatic.   Eyes:      Conjunctiva/sclera: Conjunctivae normal.   Neck:      Musculoskeletal: Neck supple.   Cardiovascular:      Rate and Rhythm: Normal rate.      Heart sounds: No murmur.   Pulmonary:      Effort: He is intubated.      Breath sounds: No stridor. Examination of the right-lower field reveals rhonchi and rales. Examination of the left-lower field reveals rhonchi and rales. Rhonchi and rales present. No wheezing.   Abdominal:      General: There is no distension.      Palpations: Abdomen is soft.      Tenderness: There is no abdominal tenderness.   Musculoskeletal:      Right lower leg: No edema.      Left lower leg: No edema.   Skin:     General: Skin is warm and dry.   Neurological:      General: No focal deficit present.      Comments: Intubated, sedated         Vents:  Vent Mode: A/C (08/04/20 0043)  Ventilator Initiated: Yes(chart correction) (07/26/20 2108)  Set Rate: 36 BPM (08/04/20 0043)  Vt Set:  380 mL (08/04/20 0043)  Pressure Support: 0 cmH20 (08/04/20 0043)  PEEP/CPAP: 8 cmH20 (08/04/20 0043)  Oxygen Concentration (%): 60 (08/04/20 0600)  Peak Airway Pressure: 33 cmH2O (08/04/20 0043)  Plateau Pressure: 22 cmH20 (08/04/20 0043)  Total Ve: 14.2 mL (08/04/20 0043)  F/VT Ratio<105 (RSBI): (!) 94.49 (08/04/20 0043)  Lines/Drains/Airways     Central Venous Catheter Line            Percutaneous Central Line Insertion/Assessment - Triple Lumen  07/29/20 1729 right internal jugular 5 days          Drain                 NG/OG Tube 07/26/20 1756 [place] sump;orogastric 18 Fr. Center mouth 8 days         Urethral Catheter 07/26/20 2205 16 Fr. 8 days          Airway                 Airway - Non-Surgical 07/26/20 1724 Endotracheal Tube 8 days          Arterial Line                 Arterial Line 07/29/20 1700 Right Radial 5 days          Peripheral Intravenous Line                 Peripheral IV - Single Lumen 07/31/20 1508 18 G Anterior;Right Forearm 3 days              Significant Labs:    CBC/Anemia Profile:  Recent Labs   Lab 08/03/20  0412 08/04/20  0308   WBC 10.37 8.74   HGB 12.1* 11.1*   HCT 38.4* 36.8*    291   MCV 88 90   RDW 14.0 13.8        Chemistries:  Recent Labs   Lab 08/03/20  0412 08/03/20  1506 08/04/20  0308    138 140   K 4.6 5.2* 4.8    106 106   CO2 26 26 24   BUN 52* 53* 44*   CREATININE 1.2 1.3 1.1   CALCIUM 9.5 9.4 9.4   ALBUMIN 1.9*  --  1.8*   PROT 6.9  --  6.7   BILITOT 0.5  --  0.4   ALKPHOS 68  --  71   ALT 73*  --  79*   AST 53*  --  48*   MG 2.3  --  2.0   PHOS 3.3  --  3.6       All pertinent labs within the past 24 hours have been reviewed.    Significant Imaging:  I have reviewed all pertinent imaging results/findings within the past 24 hours.

## 2020-08-04 NOTE — PROGRESS NOTES
[facility] - ICU 16   Critical Care Medicine  Progress Note    Patient Name: Anup Miller  MRN: [id number]  Admission Date: 7/26/2020  Hospital Length of Stay: 9 days  Code Status: Full Code  Attending Provider: Joseph Rees MD  Primary Care Provider: Bryce Gonzales MD   Principal Problem: Pneumonia due to COVID-19 virus    Subjective:     HPI:  Anup Miller is a 70 y.o. male patient with a PMHx of HTN, HLD, and DM who presents to the [place] [facility] for evaluation of SOB which  1 week ago. Pt became more SOB today and has an O2 sat of low 70s upon arrival on RA. Symptoms are worsening and moderate in severity. No mitigating or exacerbating factors reported. Associated sxs include cough and fever. Patient denies any congestion, sore throat, CP, abd pain, N/V, back pain, HA, weakness, and all other sxs at this time. No prior Tx reported. No further complaints or concerns at this time. Patient was placed on Bipap for a few hours and subsequently intubated. COVID positive. Was started on IV dexamethasone, Ceftriaxone, and Azithromycin. [place] [facility] of 7/26/20 for higher level of care.     Hospital/ICU Course:  As of 7/29, the patient's oxygenation continued to worsen with P:F <150. R IJ and Arterial Line placed. Pt sedated, paralyzed, and proned at 9:30pm with improved oxygenation. Repeat AM gas on 7/30 Arterial Blood Gas result:  pO2 112; pCO2 58.9; pH 7.256;  HCO3 26.2, %O2 Sat 97%. FiO2 60, 8 PEEP. Pt supinated at 4:10 pm on 7/30 with Arterial Blood Gas result:  pO2 106; pCO2 52.2; pH 7.349;  HCO3 28.7, %O2 Sat 106. (P:F 212). FiO2 50, 10 PEEP.    As of 7/31, paralytic d/c'ed and sedation slowly weaned. Propofol d/c'ed on 8/1 2/2 triglycerides with ketamine and versed initiated. Now requiring higher vent settings due to dyssynchrony, will continue to increase sedation. Patient paralyzed again on 8/1 for vent dyssynchrony. Nimbex was  discontinued on 08/03 and sedative medications were increased, however, patient struggled to pull enough tidal volume and was desatting even on maximum sedation so nimbex was restarted. Will continue to try to wean nimbex, restarted propofol.    Interval History/Significant Events: No acute events overnight. Patient remains on nimbex    Review of Systems   Unable to perform ROS: Intubated     Objective:     Vital Signs (Most Recent):  Temp: 97.5 °F (36.4 °C) (08/04/20 0600)  Pulse: 60 (08/04/20 0600)  Resp: (!) 36 (08/04/20 0600)  BP: (!) 148/81 (08/04/20 0600)  SpO2: 98 % (08/04/20 0600) Vital Signs (24h Range):  Temp:  [97.5 °F (36.4 °C)-99.9 °F (37.7 °C)] 97.5 °F (36.4 °C)  Pulse:  [] 60  Resp:  [25-38] 36  SpO2:  [85 %-100 %] 98 %  BP: ()/(54-89) 148/81  Arterial Line BP: ()/(43-71) 162/66   Weight: 110.7 kg (244 lb)  Body mass index is 36.03 kg/m².      Intake/Output Summary (Last 24 hours) at 8/4/2020 0619  Last data filed at 8/4/2020 0600  Gross per 24 hour   Intake 4673.8 ml   Output 2440 ml   Net 2233.8 ml       Physical Exam  Nursing note reviewed.   Constitutional:       Interventions: He is sedated, intubated and restrained.      Comments: Intubated, sedated, and paralyzed.   HENT:      Head: Normocephalic and atraumatic.   Eyes:      Conjunctiva/sclera: Conjunctivae normal.   Neck:      Musculoskeletal: Neck supple.   Cardiovascular:      Rate and Rhythm: Normal rate.      Heart sounds: No murmur.   Pulmonary:      Effort: He is intubated.      Breath sounds: No stridor. Examination of the right-lower field reveals rhonchi and rales. Examination of the left-lower field reveals rhonchi and rales. Rhonchi and rales present. No wheezing.   Abdominal:      General: There is no distension.      Palpations: Abdomen is soft.      Tenderness: There is no abdominal tenderness.   Musculoskeletal:      Right lower leg: No edema.      Left lower leg: No edema.   Skin:     General: Skin is warm and  dry.   Neurological:      General: No focal deficit present.      Comments: Intubated, sedated         Vents:  Vent Mode: A/C (08/04/20 0043)  Ventilator Initiated: Yes(chart correction) (07/26/20 2108)  Set Rate: 36 BPM (08/04/20 0043)  Vt Set: 380 mL (08/04/20 0043)  Pressure Support: 0 cmH20 (08/04/20 0043)  PEEP/CPAP: 8 cmH20 (08/04/20 0043)  Oxygen Concentration (%): 60 (08/04/20 0600)  Peak Airway Pressure: 33 cmH2O (08/04/20 0043)  Plateau Pressure: 22 cmH20 (08/04/20 0043)  Total Ve: 14.2 mL (08/04/20 0043)  F/VT Ratio<105 (RSBI): (!) 94.49 (08/04/20 0043)  Lines/Drains/Airways     Central Venous Catheter Line            Percutaneous Central Line Insertion/Assessment - Triple Lumen  07/29/20 1729 right internal jugular 5 days          Drain                 NG/OG Tube 07/26/20 1756 [place] sump;orogastric 18 Fr. Center mouth 8 days         Urethral Catheter 07/26/20 2205 16 Fr. 8 days          Airway                 Airway - Non-Surgical 07/26/20 1724 Endotracheal Tube 8 days          Arterial Line                 Arterial Line 07/29/20 1700 Right Radial 5 days          Peripheral Intravenous Line                 Peripheral IV - Single Lumen 07/31/20 1508 18 G Anterior;Right Forearm 3 days              Significant Labs:    CBC/Anemia Profile:  Recent Labs   Lab 08/03/20  0412 08/04/20  0308   WBC 10.37 8.74   HGB 12.1* 11.1*   HCT 38.4* 36.8*    291   MCV 88 90   RDW 14.0 13.8        Chemistries:  Recent Labs   Lab 08/03/20  0412 08/03/20  1506 08/04/20  0308    138 140   K 4.6 5.2* 4.8    106 106   CO2 26 26 24   BUN 52* 53* 44*   CREATININE 1.2 1.3 1.1   CALCIUM 9.5 9.4 9.4   ALBUMIN 1.9*  --  1.8*   PROT 6.9  --  6.7   BILITOT 0.5  --  0.4   ALKPHOS 68  --  71   ALT 73*  --  79*   AST 53*  --  48*   MG 2.3  --  2.0   PHOS 3.3  --  3.6       All pertinent labs within the past 24 hours have been reviewed.    Significant Imaging:  I have reviewed all pertinent imaging results/findings within  the past 24 hours.      ABG  Recent Labs   Lab 08/03/20 1956   PH 7.381   PO2 109*   PCO2 43.9   HCO3 26.0   BE 1     Assessment/Plan:     Pulmonary  Acute hypoxemic respiratory failure  - see Pneumonia due to COVID 19    Cardiac/Vascular  Hyperlipidemia associated with type 2 diabetes mellitus  - Restart Crestor once extubated     Hypertension associated with diabetes  - Hold antihypertensives in setting of shock  - Resume as tolerated    Endocrine  Uncontrolled type 2 diabetes mellitus  - A1c 8.3%  - will increase accucheck to q4  - Increased detemir to 20 BID  - LDSII, but will add insulin gtt if glucose is not controlled  - BG goal 140-180    GI  Elevated liver enzymes  - Hx of elevated LFTs dating back to 2015  - Acute hepatitis panel and HIV - negative  - Trend CMP daily    Other  * Pneumonia due to COVID-19 virus  Anup Miller is a 70 y.o. male patient with a PMHx of HTN, HLD, and DM who presents to the [place] [facility] for shortness of breath and found to be COVID positive. Was started on IV dexamethasone, Ceftriaxone, and Azithromycin. Patient transferred to [facility] on evening of 7/26/20 for higher level of care. Labs on arrival to [facility] remarkable for WBC 9.8, Procal 1.3, D-dimer 1.1, Ferritin 2,300, , and . Given 1 L LR on arrival to [facility]    - Remdesivir complete  - Heparin gtt for hypercoagulable state in COVID  - IV Dexamethasone continues (total of 10 days, end date 08/05)  - Completed Ceftriaxone and Azithromycin for possible superimposed bacterial pneumonia  - f/u blood and sputum cx - NGTD  - trend lactate - nl  - Was previously paralyzed, proned, sedated. Nimbex restarted on 8/1 and attempted trial to discontinued on 8/3 was unsuccessful. Will continue to try to wean       Critical Care Daily Checklist:    A: Awake: RASS Goal/Actual Goal: RASS Goal: -5-->unarousable  Actual: Hackett Agitation Sedation Scale (RASS): Unarousable   B: Spontaneous Breathing Trial  Performed? Spon. Breathing Trial Initiated?: Not initiated (07/28/20 1318)   C: SAT & SBT Coordinated?  No                      D: Delirium: CAM-ICU Overall CAM-ICU: Positive   E: Early Mobility Performed? No   F: Feeding Goal: Goals: Meet % EEN, EPN by RD f/u date  Status: Nutrition Goal Status: progressing towards goal, goal not met   Current Diet Order   Procedures    Diet NPO      AS: Analgesia/Sedation yes   T: Thromboembolic Prophylaxis lovenox   H: HOB > 300 Yes   U: Stress Ulcer Prophylaxis (if needed) famotidine   G: Glucose Control insulin   B: Bowel Function     I: Indwelling Catheter (Lines & Harris) Necessity A cristina, RIJ, NG/OG, harris   D: De-escalation of Antimicrobials/Pharmacotherapies none    Plan for the day/ETD Attempt to wean nimbex, added propofol    Code Status:  Family/Goals of Care: Full Code         Critical secondary to Patient has a condition that poses threat to life and bodily function: Severe Respiratory Distress      Critical care was time spent personally by me on the following activities: development of treatment plan with patient or surrogate and bedside caregivers, discussions with consultants, evaluation of patient's response to treatment, examination of patient, ordering and performing treatments and interventions, ordering and review of laboratory studies, ordering and review of radiographic studies, pulse oximetry, re-evaluation of patient's condition. This critical care time did not overlap with that of any other provider or involve time for any procedures.     Ramon Meyer MD  Critical Care Medicine  [facility] - ICU 16 [facility]

## 2020-08-04 NOTE — CARE UPDATE
Spoke to patients wife, Zhou. Updated her on her husbands care. Questions answered and concerns addressed.

## 2020-08-04 NOTE — PROGRESS NOTES
[facility] - ICU 16   Critical Care Medicine  Progress Note    Patient Name: Anup Miller  MRN: [id number]  Admission Date: 7/26/2020  Hospital Length of Stay: 8 days  Code Status: Full Code  Attending Provider: Joseph Rees MD  Primary Care Provider: Bryce Gonzales MD   Principal Problem: Pneumonia due to COVID-19 virus    Subjective:     HPI:  Anup Miller is a 70 y.o. male patient with a PMHx of HTN, HLD, and DM who presents to the [place] [facility] for evaluation of SOB which  1 week ago. Pt became more SOB today and has an O2 sat of low 70s upon arrival on RA. Symptoms are worsening and moderate in severity. No mitigating or exacerbating factors reported. Associated sxs include cough and fever. Patient denies any congestion, sore throat, CP, abd pain, N/V, back pain, HA, weakness, and all other sxs at this time. No prior Tx reported. No further complaints or concerns at this time. Patient was placed on Bipap for a few hours and subsequently intubated. COVID positive. Was started on IV dexamethasone, Ceftriaxone, and Azithromycin. [place] [facility] [place] higher level of care.     Hospital/ICU Course:  As of 7/29, the patient's oxygenation continued to worsen with P:F <150. R IJ and Arterial Line placed. Pt sedated, paralyzed, and proned at 9:30pm with improved oxygenation. Repeat AM gas on 7/30 Arterial Blood Gas result:  pO2 112; pCO2 58.9; pH 7.256;  HCO3 26.2, %O2 Sat 97%. FiO2 60, 8 PEEP. Pt supinated at 4:10 pm on 7/30 with Arterial Blood Gas result:  pO2 106; pCO2 52.2; pH 7.349;  HCO3 28.7, %O2 Sat 106. (P:F 212). FiO2 50, 10 PEEP.    As of 7/31, paralytic d/c'ed and sedation slowly weaned. Propofol d/c'ed on 8/1 2/2 triglycerides with ketamine and versed initiated. Now requiring higher vent settings due to dyssynchrony, will continue to increase sedation. Patient paralyzed again on 8/1 for vent dyssynchrony. Nimbex was  discontinued on 08/03 and sedative medications were increased, however, patient struggled to pull enough tidal volume and was desatting even on maximum sedation so nimbex was restarted.    Interval History/Significant Events: No acute overnight events. Patient remained on sedation and nimbex this morning but nimbex was discontinued with an increase in sedative medications, however, patient was not able to tolerate this and nimbex has to be restarted.    Review of Systems   Unable to perform ROS: Intubated     Objective:     Vital Signs (Most Recent):  Temp: 98.6 °F (37 °C) (08/03/20 1600)  Pulse: 69 (08/03/20 1600)  Resp: (!) 25 (08/03/20 1600)  BP: 127/66 (08/03/20 1600)  SpO2: 96 % (08/03/20 1600) Vital Signs (24h Range):  Temp:  [95.7 °F (35.4 °C)-99.9 °F (37.7 °C)] 98.6 °F (37 °C)  Pulse:  [] 69  Resp:  [25-38] 25  SpO2:  [85 %-100 %] 96 %  BP: ()/(53-89) 127/66  Arterial Line BP: ()/(47-69) 114/54   Weight: 110.7 kg (244 lb)  Body mass index is 36.03 kg/m².      Intake/Output Summary (Last 24 hours) at 8/3/2020 1638  Last data filed at 8/3/2020 1100  Gross per 24 hour   Intake 2527.82 ml   Output 1835 ml   Net 692.82 ml       Physical Exam  Nursing note reviewed.   Constitutional:       Interventions: He is sedated, intubated and restrained.      Comments: Intubated, sedated, and paralyzed.   HENT:      Head: Normocephalic and atraumatic.   Eyes:      Conjunctiva/sclera: Conjunctivae normal.   Neck:      Musculoskeletal: Neck supple.   Cardiovascular:      Rate and Rhythm: Normal rate.      Heart sounds: No murmur.   Pulmonary:      Effort: He is intubated.      Breath sounds: No stridor. Examination of the right-lower field reveals rhonchi and rales. Examination of the left-lower field reveals rhonchi and rales. Rhonchi and rales present. No wheezing.   Abdominal:      General: There is no distension.      Palpations: Abdomen is soft.      Tenderness: There is no abdominal tenderness.    Musculoskeletal:      Right lower leg: No edema.      Left lower leg: No edema.   Skin:     General: Skin is warm and dry.   Neurological:      General: No focal deficit present.      Comments: Intubated, sedated         Vents:  Vent Mode: A/C (08/03/20 1242)  Ventilator Initiated: Yes(chart correction) (07/26/20 2108)  Set Rate: 36 BPM (08/03/20 1242)  Vt Set: 380 mL (08/03/20 1242)  Pressure Support: 0 cmH20 (08/03/20 1242)  PEEP/CPAP: 8 cmH20 (08/03/20 1242)  Oxygen Concentration (%): 60 (08/03/20 1600)  Peak Airway Pressure: 35 cmH2O (08/03/20 1242)  Plateau Pressure: 25 cmH20 (08/03/20 1242)  Total Ve: 14 mL (08/03/20 1242)  F/VT Ratio<105 (RSBI): (!) 92.78 (08/03/20 1242)  Lines/Drains/Airways     Central Venous Catheter Line            Percutaneous Central Line Insertion/Assessment - Triple Lumen  07/29/20 1729 right internal jugular 4 days          Drain                 NG/OG Tube 07/26/20 1756 [place] sump;orogastric 18 Fr. Center mouth 7 days         Urethral Catheter 07/26/20 2205 16 Fr. 7 days          Airway                 Airway - Non-Surgical 07/26/20 1724 Endotracheal Tube 7 days          Arterial Line                 Arterial Line 07/29/20 1700 Right Radial 4 days          Peripheral Intravenous Line                 Peripheral IV - Single Lumen 07/31/20 1508 18 G Anterior;Right Forearm 3 days              Significant Labs:    CBC/Anemia Profile:  Recent Labs   Lab 08/02/20  0349 08/03/20  0412   WBC 13.21* 10.37   HGB 12.8* 12.1*   HCT 42.7 38.4*    313   MCV 92 88   RDW 14.3 14.0        Chemistries:  Recent Labs   Lab 08/02/20  0349 08/02/20  0748 08/03/20  0412 08/03/20  1506    144 141 138   K 5.6* 5.4* 4.6 5.2*    110 106 106   CO2 23 26 26 26   BUN 42* 43* 52* 53*   CREATININE 1.1 1.1 1.2 1.3   CALCIUM 9.2 9.4 9.5 9.4   ALBUMIN 2.1*  --  1.9*  --    PROT 7.2  --  6.9  --    BILITOT 0.5  --  0.5  --    ALKPHOS 65  --  68  --    ALT 68*  --  73*  --    AST 50*  --  53*  --     MG 2.4  --  2.3  --    PHOS 4.4  --  3.3  --        Significant Imaging:  I have reviewed all pertinent imaging results/findings within the past 24 hours.      ABG  Recent Labs   Lab 08/03/20 1956   PH 7.381   PO2 109*   PCO2 43.9   HCO3 26.0   BE 1     Assessment/Plan:     Pulmonary  Acute hypoxemic respiratory failure  - see Pneumonia due to COVID 19    Cardiac/Vascular  Hyperlipidemia associated with type 2 diabetes mellitus  - Restart Crestor once extubated     Hypertension associated with diabetes  - Hold antihypertensives in setting of shock  - Resume as tolerated    Endocrine  Uncontrolled type 2 diabetes mellitus  - A1c 8.3%  - accuchecks q6h  - Detemir 15U BID  - LDSII  - BG goal 140-180    GI  Elevated liver enzymes  - Hx of elevated LFTs dating back to 2015  - Acute hepatitis panel and HIV - negative  - Trend CMP daily    Other  * Pneumonia due to COVID-19 virus  Anup Miller is a 70 y.o. male patient with a PMHx of HTN, HLD, and DM who presents to the [place] [facility] for shortness of breath and found to be COVID positive. Was started on IV dexamethasone, Ceftriaxone, and Azithromycin. Patient transferred to [facility] [place] on evening of 7/26/20 for higher level of care. Labs on arrival to [facility] remarkable for WBC 9.8, Procal 1.3, D-dimer 1.1, Ferritin 2,300, , and . Given 1 L LR on arrival to [facility] [place]    - Remdesivir complete  - Heparin gtt for hypercoagulable state in COVID  - IV Dexamethasone continues (total of 10 days, end date 08/05)  - Completed Ceftriaxone and Azithromycin for possible superimposed bacterial pneumonia  - f/u blood and sputum cx - NGTD  - trend lactate - nl  - Was previously paralyzed, proned, sedated. Nimbex restarted on 8/1 and attempted trial to discontinued on 8/3 was unsuccessful. Possibly trial nimbex off again as tolerated.       Critical Care Daily Checklist:    A: Awake: RASS Goal/Actual Goal: RASS Goal: -5-->unarousable  Actual: Hackett Agitation  Sedation Scale (RASS): Unarousable   B: Spontaneous Breathing Trial Performed? Spon. Breathing Trial Initiated?: Not initiated (07/28/20 1318)   C: SAT & SBT Coordinated?  No                      D: Delirium: CAM-ICU Overall CAM-ICU: Positive   E: Early Mobility Performed? No   F: Feeding Goal: Goals: Meet % EEN, EPN by RD f/u date  Status: Nutrition Goal Status: progressing towards goal, goal not met   Current Diet Order   Procedures    Diet NPO      AS: Analgesia/Sedation Yes   T: Thromboembolic Prophylaxis Heparin gtt   H: HOB > 300 Yes   U: Stress Ulcer Prophylaxis (if needed) famotidine   G: Glucose Control Adjusting insulin   B: Bowel Function     I: Indwelling Catheter (Lines & Boothe) Necessity A line, RIJ, NG/OG, Boothe   D: De-escalation of Antimicrobials/Pharmacotherapies None    Plan for the day/ETD Unsuccessfully trial to stop nimbex, attempt possibly tomorrow    Code Status:  Family/Goals of Care: Full Code  Updated patient's wife       Critical secondary to Patient has a condition that poses threat to life and bodily function: Severe Respiratory Distress      Critical care was time spent personally by me on the following activities: development of treatment plan with patient or surrogate and bedside caregivers, discussions with consultants, evaluation of patient's response to treatment, examination of patient, ordering and performing treatments and interventions, ordering and review of laboratory studies, ordering and review of radiographic studies, pulse oximetry, re-evaluation of patient's condition. This critical care time did not overlap with that of any other provider or involve time for any procedures.     Munira Croft MD  Critical Care Medicine  [facility] - ICU 16 [facility]    Agree with the note above. I have also examined the patient.     Joseph Rees MD

## 2020-08-04 NOTE — ASSESSMENT & PLAN NOTE
Anup Miller is a 70 y.o. male patient with a PMHx of HTN, HLD, and DM who presents to the [place] [facility] for shortness of breath and found to be COVID positive. Was started on IV dexamethasone, Ceftriaxone, and Azithromycin. Patient transferred to [facility] [place] on evening of 7/26/20 for higher level of care. Labs on arrival to [facility] remarkable for WBC 9.8, Procal 1.3, D-dimer 1.1, Ferritin 2,300, , and . Given 1 L LR on arrival to [facility] [place]    - Remdesivir complete  - Heparin gtt for hypercoagulable state in COVID  - IV Dexamethasone continues (total of 10 days, end date 08/05)  - Completed Ceftriaxone and Azithromycin for possible superimposed bacterial pneumonia  - f/u blood and sputum cx - NGTD  - trend lactate - nl  - Was previously paralyzed, proned, sedated. Nimbex restarted on 8/1 and attempted trial to discontinued on 8/3 was unsuccessful. Will continue to try to wean

## 2020-08-04 NOTE — PLAN OF CARE
Pt remains intubated and mechanically ventilated at this time. Paralytic gtt stopped during shift; sedation remains infusing at this time. Propofol, versed, ketamine, precedex, and fentanyl gtts infusing. Levophed and heparin gtts stopped. Plan to continue to wean sedation as tolerant. No acute events throughout day, VS and assessment per flow sheet, patient progressing towards goals as tolerated, plan of care reviewed with Anup Miller and family, all concerns addressed, will continue to monitor.    Problem: Adult Inpatient Plan of Care  Goal: Plan of Care Review  Outcome: Ongoing, Progressing  Flowsheets (Taken 8/4/2020 1534)  Plan of Care Reviewed With:   patient   spouse  Goal: Patient-Specific Goal (Individualization)  Outcome: Ongoing, Progressing  Flowsheets (Taken 8/4/2020 1534)  Individualized Care Needs: Keep pt's personal phone at bedside for video chat with spouse  Anxieties, Fears or Concerns:   ROCIO   intubated and medically sedated  Patient-Specific Goals (Include Timeframe):   Wean ventilator settings and sedation as tolerant   discontinue paralytic as tolerant  Goal: Absence of Hospital-Acquired Illness or Injury  Outcome: Ongoing, Progressing  Goal: Optimal Comfort and Wellbeing  Outcome: Ongoing, Progressing     Problem: Diabetes Comorbidity  Goal: Blood Glucose Level Within Desired Range  Outcome: Ongoing, Progressing     Problem: Skin Injury Risk Increased  Goal: Skin Health and Integrity  Outcome: Ongoing, Progressing

## 2020-08-05 LAB
ALBUMIN SERPL BCP-MCNC: 1.7 G/DL (ref 3.5–5.2)
ALLENS TEST: ABNORMAL
ALP SERPL-CCNC: 67 U/L (ref 55–135)
ALT SERPL W/O P-5'-P-CCNC: 86 U/L (ref 10–44)
ANION GAP SERPL CALC-SCNC: 7 MMOL/L (ref 8–16)
AST SERPL-CCNC: 46 U/L (ref 10–40)
BASOPHILS # BLD AUTO: 0.01 K/UL (ref 0–0.2)
BASOPHILS NFR BLD: 0.1 % (ref 0–1.9)
BILIRUB SERPL-MCNC: 0.3 MG/DL (ref 0.1–1)
BUN SERPL-MCNC: 37 MG/DL (ref 8–23)
CALCIUM SERPL-MCNC: 8.9 MG/DL (ref 8.7–10.5)
CHLORIDE SERPL-SCNC: 104 MMOL/L (ref 95–110)
CO2 SERPL-SCNC: 25 MMOL/L (ref 23–29)
CREAT SERPL-MCNC: 0.9 MG/DL (ref 0.5–1.4)
DELSYS: ABNORMAL
DIFFERENTIAL METHOD: ABNORMAL
EOSINOPHIL # BLD AUTO: 0.1 K/UL (ref 0–0.5)
EOSINOPHIL NFR BLD: 0.7 % (ref 0–8)
ERYTHROCYTE [DISTWIDTH] IN BLOOD BY AUTOMATED COUNT: 13.5 % (ref 11.5–14.5)
ERYTHROCYTE [SEDIMENTATION RATE] IN BLOOD BY WESTERGREN METHOD: 36 MM/H
EST. GFR  (AFRICAN AMERICAN): >60 ML/MIN/1.73 M^2
EST. GFR  (NON AFRICAN AMERICAN): >60 ML/MIN/1.73 M^2
FIO2: 50
GLUCOSE SERPL-MCNC: 342 MG/DL (ref 70–110)
HCO3 UR-SCNC: 24.8 MMOL/L (ref 24–28)
HCT VFR BLD AUTO: 35.6 % (ref 40–54)
HGB BLD-MCNC: 11 G/DL (ref 14–18)
IMM GRANULOCYTES # BLD AUTO: 0.1 K/UL (ref 0–0.04)
IMM GRANULOCYTES NFR BLD AUTO: 1.4 % (ref 0–0.5)
LYMPHOCYTES # BLD AUTO: 1.2 K/UL (ref 1–4.8)
LYMPHOCYTES NFR BLD: 16.8 % (ref 18–48)
MAGNESIUM SERPL-MCNC: 1.9 MG/DL (ref 1.6–2.6)
MCH RBC QN AUTO: 27.4 PG (ref 27–31)
MCHC RBC AUTO-ENTMCNC: 30.9 G/DL (ref 32–36)
MCV RBC AUTO: 89 FL (ref 82–98)
MODE: ABNORMAL
MONOCYTES # BLD AUTO: 0.8 K/UL (ref 0.3–1)
MONOCYTES NFR BLD: 11.7 % (ref 4–15)
NEUTROPHILS # BLD AUTO: 4.9 K/UL (ref 1.8–7.7)
NEUTROPHILS NFR BLD: 69.3 % (ref 38–73)
NRBC BLD-RTO: 0 /100 WBC
PCO2 BLDA: 38 MMHG (ref 35–45)
PEEP: 8
PH SMN: 7.42 [PH] (ref 7.35–7.45)
PHOSPHATE SERPL-MCNC: 3.3 MG/DL (ref 2.7–4.5)
PLATELET # BLD AUTO: 259 K/UL (ref 150–350)
PMV BLD AUTO: 11.2 FL (ref 9.2–12.9)
PO2 BLDA: 68 MMHG (ref 80–100)
POC BE: 0 MMOL/L
POC SATURATED O2: 94 % (ref 95–100)
POC TCO2: 26 MMOL/L (ref 23–27)
POCT GLUCOSE: 262 MG/DL (ref 70–110)
POCT GLUCOSE: 270 MG/DL (ref 70–110)
POCT GLUCOSE: 308 MG/DL (ref 70–110)
POCT GLUCOSE: 327 MG/DL (ref 70–110)
POCT GLUCOSE: 330 MG/DL (ref 70–110)
POCT GLUCOSE: 344 MG/DL (ref 70–110)
POCT GLUCOSE: 361 MG/DL (ref 70–110)
POCT GLUCOSE: 363 MG/DL (ref 70–110)
POTASSIUM SERPL-SCNC: 4.7 MMOL/L (ref 3.5–5.1)
PROT SERPL-MCNC: 6.5 G/DL (ref 6–8.4)
RBC # BLD AUTO: 4.02 M/UL (ref 4.6–6.2)
SAMPLE: ABNORMAL
SITE: ABNORMAL
SODIUM SERPL-SCNC: 136 MMOL/L (ref 136–145)
SP02: 96
VT: 380
WBC # BLD AUTO: 7.07 K/UL (ref 3.9–12.7)

## 2020-08-05 PROCEDURE — 25000003 PHARM REV CODE 250: Performed by: INTERNAL MEDICINE

## 2020-08-05 PROCEDURE — 94761 N-INVAS EAR/PLS OXIMETRY MLT: CPT

## 2020-08-05 PROCEDURE — 63600175 PHARM REV CODE 636 W HCPCS: Performed by: INTERNAL MEDICINE

## 2020-08-05 PROCEDURE — 85025 COMPLETE CBC W/AUTO DIFF WBC: CPT

## 2020-08-05 PROCEDURE — 84100 ASSAY OF PHOSPHORUS: CPT

## 2020-08-05 PROCEDURE — 99900026 HC AIRWAY MAINTENANCE (STAT)

## 2020-08-05 PROCEDURE — 99900035 HC TECH TIME PER 15 MIN (STAT)

## 2020-08-05 PROCEDURE — 27000221 HC OXYGEN, UP TO 24 HOURS

## 2020-08-05 PROCEDURE — 25000003 PHARM REV CODE 250: Performed by: STUDENT IN AN ORGANIZED HEALTH CARE EDUCATION/TRAINING PROGRAM

## 2020-08-05 PROCEDURE — 37799 UNLISTED PX VASCULAR SURGERY: CPT

## 2020-08-05 PROCEDURE — 25000003 PHARM REV CODE 250: Performed by: PHYSICIAN ASSISTANT

## 2020-08-05 PROCEDURE — 80053 COMPREHEN METABOLIC PANEL: CPT

## 2020-08-05 PROCEDURE — 63600175 PHARM REV CODE 636 W HCPCS: Performed by: NURSE PRACTITIONER

## 2020-08-05 PROCEDURE — 25000003 PHARM REV CODE 250: Performed by: NURSE PRACTITIONER

## 2020-08-05 PROCEDURE — 99291 PR CRITICAL CARE, E/M 30-74 MINUTES: ICD-10-PCS | Mod: ,,, | Performed by: INTERNAL MEDICINE

## 2020-08-05 PROCEDURE — 83735 ASSAY OF MAGNESIUM: CPT

## 2020-08-05 PROCEDURE — 20000000 HC ICU ROOM

## 2020-08-05 PROCEDURE — 63600175 PHARM REV CODE 636 W HCPCS: Performed by: STUDENT IN AN ORGANIZED HEALTH CARE EDUCATION/TRAINING PROGRAM

## 2020-08-05 PROCEDURE — C9399 UNCLASSIFIED DRUGS OR BIOLOG: HCPCS | Performed by: STUDENT IN AN ORGANIZED HEALTH CARE EDUCATION/TRAINING PROGRAM

## 2020-08-05 PROCEDURE — 99291 CRITICAL CARE FIRST HOUR: CPT | Mod: ,,, | Performed by: INTERNAL MEDICINE

## 2020-08-05 PROCEDURE — 94003 VENT MGMT INPAT SUBQ DAY: CPT

## 2020-08-05 PROCEDURE — 82803 BLOOD GASES ANY COMBINATION: CPT

## 2020-08-05 RX ORDER — FUROSEMIDE 10 MG/ML
40 INJECTION INTRAMUSCULAR; INTRAVENOUS ONCE
Status: COMPLETED | OUTPATIENT
Start: 2020-08-05 | End: 2020-08-05

## 2020-08-05 RX ORDER — ATROPINE SULFATE 0.1 MG/ML
INJECTION INTRAVENOUS
Status: DISPENSED
Start: 2020-08-05 | End: 2020-08-06

## 2020-08-05 RX ORDER — NOREPINEPHRINE BITARTRATE/D5W 4MG/250ML
0.02 PLASTIC BAG, INJECTION (ML) INTRAVENOUS CONTINUOUS
Status: DISCONTINUED | OUTPATIENT
Start: 2020-08-05 | End: 2020-08-11

## 2020-08-05 RX ADMIN — DEXMEDETOMIDINE HYDROCHLORIDE 1 MCG/KG/HR: 4 INJECTION, SOLUTION INTRAVENOUS at 08:08

## 2020-08-05 RX ADMIN — ENOXAPARIN SODIUM 105 MG: 150 INJECTION SUBCUTANEOUS at 10:08

## 2020-08-05 RX ADMIN — INSULIN ASPART 8 UNITS: 100 INJECTION, SOLUTION INTRAVENOUS; SUBCUTANEOUS at 11:08

## 2020-08-05 RX ADMIN — Medication 0.02 MCG/KG/MIN: at 09:08

## 2020-08-05 RX ADMIN — Medication 0.02 MCG/KG/MIN: at 07:08

## 2020-08-05 RX ADMIN — Medication 300 MCG/HR: at 05:08

## 2020-08-05 RX ADMIN — ROSUVASTATIN CALCIUM 10 MG: 10 TABLET, FILM COATED ORAL at 08:08

## 2020-08-05 RX ADMIN — DEXMEDETOMIDINE HYDROCHLORIDE 0.8 MCG/KG/HR: 4 INJECTION, SOLUTION INTRAVENOUS at 05:08

## 2020-08-05 RX ADMIN — DEXMEDETOMIDINE HYDROCHLORIDE 0.9 MCG/KG/HR: 4 INJECTION, SOLUTION INTRAVENOUS at 01:08

## 2020-08-05 RX ADMIN — INSULIN ASPART 4 UNITS: 100 INJECTION, SOLUTION INTRAVENOUS; SUBCUTANEOUS at 01:08

## 2020-08-05 RX ADMIN — Medication 300 MCG/HR: at 08:08

## 2020-08-05 RX ADMIN — INSULIN DETEMIR 22 UNITS: 100 INJECTION, SOLUTION SUBCUTANEOUS at 09:08

## 2020-08-05 RX ADMIN — FAMOTIDINE 20 MG: 20 TABLET ORAL at 08:08

## 2020-08-05 RX ADMIN — SODIUM CHLORIDE 10 UNITS/HR: 9 INJECTION, SOLUTION INTRAVENOUS at 11:08

## 2020-08-05 RX ADMIN — ALTEPLASE 2 MG: 2.2 INJECTION, POWDER, LYOPHILIZED, FOR SOLUTION INTRAVENOUS at 11:08

## 2020-08-05 RX ADMIN — FUROSEMIDE 40 MG: 10 INJECTION, SOLUTION INTRAMUSCULAR; INTRAVENOUS at 11:08

## 2020-08-05 RX ADMIN — PROPOFOL 5 MCG/KG/MIN: 10 INJECTION, EMULSION INTRAVENOUS at 05:08

## 2020-08-05 RX ADMIN — PROPOFOL 30 MCG/KG/MIN: 10 INJECTION, EMULSION INTRAVENOUS at 08:08

## 2020-08-05 RX ADMIN — INSULIN DETEMIR 25 UNITS: 100 INJECTION, SOLUTION SUBCUTANEOUS at 09:08

## 2020-08-05 RX ADMIN — STANDARDIZED SENNA CONCENTRATE AND DOCUSATE SODIUM 1 TABLET: 8.6; 5 TABLET ORAL at 08:08

## 2020-08-05 RX ADMIN — INSULIN ASPART 10 UNITS: 100 INJECTION, SOLUTION INTRAVENOUS; SUBCUTANEOUS at 06:08

## 2020-08-05 RX ADMIN — Medication 300 MCG/HR: at 12:08

## 2020-08-05 RX ADMIN — MIDAZOLAM HYDROCHLORIDE 4 MG/HR: 5 INJECTION, SOLUTION INTRAMUSCULAR; INTRAVENOUS at 05:08

## 2020-08-05 RX ADMIN — DEXMEDETOMIDINE HYDROCHLORIDE 1.4 MCG/KG/HR: 4 INJECTION, SOLUTION INTRAVENOUS at 09:08

## 2020-08-05 RX ADMIN — POLYETHYLENE GLYCOL 3350 17 G: 17 POWDER, FOR SOLUTION ORAL at 08:08

## 2020-08-05 RX ADMIN — INSULIN ASPART 10 UNITS: 100 INJECTION, SOLUTION INTRAVENOUS; SUBCUTANEOUS at 07:08

## 2020-08-05 RX ADMIN — DEXMEDETOMIDINE HYDROCHLORIDE 1.4 MCG/KG/HR: 4 INJECTION, SOLUTION INTRAVENOUS at 03:08

## 2020-08-05 RX ADMIN — MINERAL OIL AND PETROLATUM: 150; 830 OINTMENT OPHTHALMIC at 05:08

## 2020-08-05 RX ADMIN — INSULIN ASPART 8 UNITS: 100 INJECTION, SOLUTION INTRAVENOUS; SUBCUTANEOUS at 05:08

## 2020-08-05 NOTE — PLAN OF CARE
CMICU DAILY GOALS       A: Awake    RASS: Goal - RASS Goal: -3-->moderate sedation  Actual - RASS (Hackett Agitation-Sedation Scale): -4-->deep sedation   Restraint necessity: n/a  B: Breath   SBT: Not attempted   C: Coordinate A & B, analgesics/sedatives   Pain: managed    SAT: Not attempted  D: Delirium   CAM-ICU: Overall CAM-ICU: Positive  E: Early(intubated/ Progressive (non-intubated) Mobility   MOVE Screen: Fail   Activity: Activity Management: activity clustered for rest period, activity adjusted per tolerance  FAS: Feeding/Nutrition   Diet order: Diet/Nutrition Received: NPO, tube feeding, Specialty Diet/Nutrition Received: renal diet Fluid restriction:  none  T: Thrombus   DVT prophylaxis: lovenox  H: HOB Elevation   Head of Bed (HOB): HOB at 30-45 degrees  U: Ulcer Prophylaxis   GI: yes  G: Glucose control   managed Glycemic Management: blood glucose monitoring  S: Skin   Bundle compliance: yes   Bathing/Skin Care: bath, chlorhexidine, linen changed Date: night bath  B: Bowel Function   constipation   I: Indwelling Catheters   Boothe necessity:      Urethral Catheter 07/26/20 2205 16 Fr.-Reason for Continuing Urinary Catheterization: Critically ill in ICU requiring intensive monitoring   CVC necessity: Yes   IPAD offered: not appropriate  D: De-escalation Antibx   Not on antibiotics  Plan for the day   Wean sedation and oxygen as tolerated  Family/Goals of care/Code Status   Code Status: Full Code     No acute events throughout day, VS and assessment per flow sheet, patient progressing towards goals as tolerated, plan of care reviewed with Anup Miller and family, all concerns addressed, will continue to monitor.

## 2020-08-05 NOTE — NURSING
Critical care notified of patient pts .  Pt has 20u scheduled detemir and sliding scale prn.  Beta-hydroxy, VBG, and chem panel ordered.  Ordered to continue with administering  scheduled insulin and sliding scale.

## 2020-08-05 NOTE — PROGRESS NOTES
[facility] - ICU 16   Critical Care Medicine  Progress Note    Patient Name: Anup Miller  MRN: [id number]  Admission Date: 7/26/2020  Hospital Length of Stay: 10 days  Code Status: Full Code  Attending Provider: Joseph Rees MD  Primary Care Provider: Bryce Gonzales MD   Principal Problem: Pneumonia due to COVID-19 virus    Subjective:     HPI:  Anup Miller is a 70 y.o. male patient with a PMHx of HTN, HLD, and DM who presents to the [place] [facility] for evaluation of SOB which  1 week ago. Pt became more SOB today and has an O2 sat of low 70s upon arrival on RA. Symptoms are worsening and moderate in severity. No mitigating or exacerbating factors reported. Associated sxs include cough and fever. Patient denies any congestion, sore throat, CP, abd pain, N/V, back pain, HA, weakness, and all other sxs at this time. No prior Tx reported. No further complaints or concerns at this time. Patient was placed on Bipap for a few hours and subsequently intubated. COVID positive. Was started on IV dexamethasone, Ceftriaxone, and Azithromycin. [place] [facility] [place] higher level of care.     Hospital/ICU Course:  As of 7/29, the patient's oxygenation continued to worsen with P:F <150. R IJ and Arterial Line placed. Pt sedated, paralyzed, and proned at 9:30pm with improved oxygenation. Repeat AM gas on 7/30 Arterial Blood Gas result:  pO2 112; pCO2 58.9; pH 7.256;  HCO3 26.2, %O2 Sat 97%. FiO2 60, 8 PEEP. Pt supinated at 4:10 pm on 7/30 with Arterial Blood Gas result:  pO2 106; pCO2 52.2; pH 7.349;  HCO3 28.7, %O2 Sat 106. (P:F 212). FiO2 50, 10 PEEP.    As of 7/31, paralytic d/c'ed and sedation slowly weaned. Propofol d/c'ed on 8/1 2/2 triglycerides with ketamine and versed initiated. Now requiring higher vent settings due to dyssynchrony, will continue to increase sedation. Patient paralyzed again on 8/1 for vent dyssynchrony. Nimbex was  discontinued on 08/03 and sedative medications were increased, however, patient struggled to pull enough tidal volume and was desatting even on maximum sedation so nimbex was restarted. Nimbex was stopped on 08/04 and restarted propofol.    Interval History/Significant Events: Patient has been off nimbex for past day. Attempted to wean sedation but patient became agitated.     Review of Systems   Unable to perform ROS: Intubated     Objective:     Vital Signs (Most Recent):  Temp: 99.1 °F (37.3 °C) (08/05/20 1100)  Pulse: 68 (08/05/20 1100)  Resp: (!) 36 (08/05/20 1100)  BP: (!) 105/59 (08/05/20 1000)  SpO2: 97 % (08/05/20 1100) Vital Signs (24h Range):  Temp:  [96.3 °F (35.7 °C)-99.1 °F (37.3 °C)] 99.1 °F (37.3 °C)  Pulse:  [] 68  Resp:  [35-43] 36  SpO2:  [88 %-97 %] 97 %  BP: ()/(54-80) 105/59  Arterial Line BP: (109-179)/(47-69) 127/53   Weight: 110.7 kg (244 lb)  Body mass index is 36.03 kg/m².      Intake/Output Summary (Last 24 hours) at 8/5/2020 1206  Last data filed at 8/5/2020 1100  Gross per 24 hour   Intake 4063.37 ml   Output 3515 ml   Net 548.37 ml       Physical Exam  Nursing note reviewed.   Constitutional:       Interventions: He is sedated, intubated and restrained.      Comments: Intubated, sedated   HENT:      Head: Normocephalic and atraumatic.   Eyes:      Conjunctiva/sclera: Conjunctivae normal.   Neck:      Musculoskeletal: Neck supple.   Cardiovascular:      Rate and Rhythm: Normal rate.      Heart sounds: No murmur.   Pulmonary:      Effort: He is intubated.      Breath sounds: No stridor. Examination of the right-lower field reveals rhonchi and rales. Examination of the left-lower field reveals rhonchi and rales. Rhonchi and rales present. No wheezing.   Abdominal:      General: There is no distension.      Palpations: Abdomen is soft.      Tenderness: There is no abdominal tenderness.   Musculoskeletal:      Right lower leg: No edema.      Left lower leg: No edema.   Skin:      General: Skin is warm and dry.   Neurological:      General: No focal deficit present.      Comments: Intubated, sedated         Vents:  Vent Mode: A/C (08/05/20 0744)  Ventilator Initiated: Yes(chart correction) (07/26/20 2108)  Set Rate: 36 BPM (08/05/20 0744)  Vt Set: 380 mL (08/05/20 0744)  Pressure Support: 0 cmH20 (08/05/20 0744)  PEEP/CPAP: 8 cmH20 (08/05/20 0744)  Oxygen Concentration (%): 50 (08/05/20 1100)  Peak Airway Pressure: 28 cmH2O (08/05/20 0744)  Plateau Pressure: 19 cmH20 (08/05/20 0744)  Total Ve: 14 mL (08/05/20 0744)  F/VT Ratio<105 (RSBI): (!) 84.91 (08/05/20 0744)  Lines/Drains/Airways     Central Venous Catheter Line            Percutaneous Central Line Insertion/Assessment - Triple Lumen  07/29/20 1729 right internal jugular 6 days          Drain                 NG/OG Tube 07/26/20 1756 [place] sump;orogastric 18 Fr. Center mouth 9 days         Urethral Catheter 07/26/20 2205 16 Fr. 9 days          Airway                 Airway - Non-Surgical 07/26/20 1724 Endotracheal Tube 9 days          Arterial Line                 Arterial Line 07/29/20 1700 Right Radial 6 days          Peripheral Intravenous Line                 Peripheral IV - Single Lumen 07/31/20 1508 18 G Anterior;Right Forearm 4 days              Significant Labs:    CBC/Anemia Profile:  Recent Labs   Lab 08/04/20 0308 08/05/20 0307   WBC 8.74 7.07   HGB 11.1* 11.0*   HCT 36.8* 35.6*    259   MCV 90 89   RDW 13.8 13.5        Chemistries:  Recent Labs   Lab 08/04/20 0308 08/04/20 2051 08/05/20 0307    137 136   K 4.8 4.8 4.7    105 104   CO2 24 25 25   BUN 44* 39* 37*   CREATININE 1.1 1.0 0.9   CALCIUM 9.4 9.0 8.9   ALBUMIN 1.8*  --  1.7*   PROT 6.7  --  6.5   BILITOT 0.4  --  0.3   ALKPHOS 71  --  67   ALT 79*  --  86*   AST 48*  --  46*   MG 2.0  --  1.9   PHOS 3.6  --  3.3       Significant Imaging:  I have reviewed all pertinent imaging results/findings within the past 24 hours.      ABG  Recent Labs    Lab 08/05/20  1027   PH 7.424   PO2 68*   PCO2 38.0   HCO3 24.8   BE 0     Assessment/Plan:     Pulmonary  Acute hypoxemic respiratory failure  - see Pneumonia due to COVID 19    Cardiac/Vascular  Hyperlipidemia associated with type 2 diabetes mellitus  - Restart Crestor once extubated     Hypertension associated with diabetes  - Hold antihypertensives in setting of shock  - Resume as tolerated    Endocrine  Uncontrolled type 2 diabetes mellitus  - A1c 8.3%  - will increase accucheck to q4  - Increased detemir to 22 BID  - LDSII, but will add insulin gtt if glucose is not controlled  - BG goal 140-180    GI  Elevated liver enzymes  - Hx of elevated LFTs dating back to 2015  - Acute hepatitis panel and HIV - negative  - Trend CMP daily    Other  * Pneumonia due to COVID-19 virus  Anup Miller is a 70 y.o. male patient with a PMHx of HTN, HLD, and DM who presents to the [place] [facility] for shortness of breath and found to be COVID positive. Was started on IV dexamethasone, Ceftriaxone, and Azithromycin. Patient transferred to [facility] [place] on evening of 7/26/20 for higher level of care. Labs on arrival to [facility] remarkable for WBC 9.8, Procal 1.3, D-dimer 1.1, Ferritin 2,300, , and . Given 1 L LR on arrival to [facility] [place]    - Remdesivir complete  - Heparin gtt for hypercoagulable state in COVID  - IV Dexamethasone continues (total of 10 days, end date 08/05)  - Completed Ceftriaxone and Azithromycin for possible superimposed bacterial pneumonia  - f/u blood and sputum cx - NGTD  - trend lactate - nl  - Was previously paralyzed, proned, sedated. Nimbex restarted on 8/1 and discontinued on 08/04. Wean sedation as tolerated.  - Consider PEG/trach as patient day 11 of On license of [facility]     Critical Care Daily Checklist:    A: Awake: RASS Goal/Actual Goal: RASS Goal: -3-->moderate sedation  Actual: [place]ett Agitation Sedation Scale (RASS): Deep sedation   B: Spontaneous Breathing Trial Performed? Spon. Breathing Trial  Initiated?: Not initiated (08/05/20 0744)   C: SAT & SBT Coordinated?  No                     D: Delirium: CAM-ICU Overall CAM-ICU: Positive   E: Early Mobility Performed? No   F: Feeding Goal: Goals: Meet % EEN, EPN by RD f/u date  Status: Nutrition Goal Status: progressing towards goal, goal not met   Current Diet Order   Procedures    Diet NPO      AS: Analgesia/Sedation Yes   T: Thromboembolic Prophylaxis Lovenox   H: HOB > 300 Yes   U: Stress Ulcer Prophylaxis (if needed) Famotidine   G: Glucose Control Adjusting insulin   B: Bowel Function     I: Indwelling Catheter (Lines & Harris) Necessity A line, RIJ, NG/OG, harris   D: De-escalation of Antimicrobials/Pharmacotherapies None    Plan for the day/ETD Wean sedation as tolerated    Code Status:  Family/Goals of Care: Full Code  Will discuss with family       Critical secondary to Patient has a condition that poses threat to life and bodily function: Severe Respiratory Distress      Critical care was time spent personally by me on the following activities: development of treatment plan with patient or surrogate and bedside caregivers, discussions with consultants, evaluation of patient's response to treatment, examination of patient, ordering and performing treatments and interventions, ordering and review of laboratory studies, ordering and review of radiographic studies, pulse oximetry, re-evaluation of patient's condition. This critical care time did not overlap with that of any other provider or involve time for any procedures.     Munira Croft MD  Critical Care Medicine  [facility] - ICU 16 WT

## 2020-08-05 NOTE — ASSESSMENT & PLAN NOTE
Anup Miller is a 70 y.o. male patient with a PMHx of HTN, HLD, and DM who presents to the [place] [facility] for shortness of breath and found to be COVID positive. Was started on IV dexamethasone, Ceftriaxone, and Azithromycin. Patient transferred to [facility] [place] on evening of 7/26/20 for higher level of care. Labs on arrival to [facility] [place] remarkable for WBC 9.8, Procal 1.3, D-dimer 1.1, Ferritin 2,300, , and . Given 1 L LR on arrival to [facility] [place]    - Remdesivir complete  - Heparin gtt for hypercoagulable state in COVID  - IV Dexamethasone continues (total of 10 days, end date 08/05)  - Completed Ceftriaxone and Azithromycin for possible superimposed bacterial pneumonia  - f/u blood and sputum cx - NGTD  - trend lactate - nl  - Was previously paralyzed, proned, sedated. Nimbex restarted on 8/1 and discontinued on 08/04. Wean sedation as tolerated.  - Consider PEG/trach as patient day 11 of vent

## 2020-08-05 NOTE — CARE UPDATE
Spoke to patients wife, Zhou and updated her on her husbands care. Discussed possibility of PEG/trach as it has been vent day 11, and she stated that she will talk with rest of family. Questions answered and concerns addressed.      Munira Croft MD PGY3  Critical Care  Department of Internal Medicine  [facility]

## 2020-08-05 NOTE — PLAN OF CARE
POC reviewed with pts wife and pt. Pts wife verbalized understanding and questions/ concerns addressed. Facetime call with pts wife and pt. Fentanyl, propofol, versed, and precedex gtts continued.  Plan to continue weaning sedation as tolerated.  TF remain at goal without residuals. CC team aware of pt continuing to have high blood glucose. No acute events overnight. Pt progressing toward goals. Will continue to monitor. See flowsheets for full assessment and VS info

## 2020-08-05 NOTE — ASSESSMENT & PLAN NOTE
- A1c 8.3%  - will increase accucheck to q4  - Increased detemir to 22 BID  - LDSII, but will add insulin gtt if glucose is not controlled  - BG goal 140-180

## 2020-08-05 NOTE — SUBJECTIVE & OBJECTIVE
Interval History/Significant Events: Patient has been off nimbex for past day. Attempted to wean sedation but patient became agitated.     Review of Systems   Unable to perform ROS: Intubated     Objective:     Vital Signs (Most Recent):  Temp: 99.1 °F (37.3 °C) (08/05/20 1100)  Pulse: 68 (08/05/20 1100)  Resp: (!) 36 (08/05/20 1100)  BP: (!) 105/59 (08/05/20 1000)  SpO2: 97 % (08/05/20 1100) Vital Signs (24h Range):  Temp:  [96.3 °F (35.7 °C)-99.1 °F (37.3 °C)] 99.1 °F (37.3 °C)  Pulse:  [] 68  Resp:  [35-43] 36  SpO2:  [88 %-97 %] 97 %  BP: ()/(54-80) 105/59  Arterial Line BP: (109-179)/(47-69) 127/53   Weight: 110.7 kg (244 lb)  Body mass index is 36.03 kg/m².      Intake/Output Summary (Last 24 hours) at 8/5/2020 1206  Last data filed at 8/5/2020 1100  Gross per 24 hour   Intake 4063.37 ml   Output 3515 ml   Net 548.37 ml       Physical Exam  Nursing note reviewed.   Constitutional:       Interventions: He is sedated, intubated and restrained.      Comments: Intubated, sedated   HENT:      Head: Normocephalic and atraumatic.   Eyes:      Conjunctiva/sclera: Conjunctivae normal.   Neck:      Musculoskeletal: Neck supple.   Cardiovascular:      Rate and Rhythm: Normal rate.      Heart sounds: No murmur.   Pulmonary:      Effort: He is intubated.      Breath sounds: No stridor. Examination of the right-lower field reveals rhonchi and rales. Examination of the left-lower field reveals rhonchi and rales. Rhonchi and rales present. No wheezing.   Abdominal:      General: There is no distension.      Palpations: Abdomen is soft.      Tenderness: There is no abdominal tenderness.   Musculoskeletal:      Right lower leg: No edema.      Left lower leg: No edema.   Skin:     General: Skin is warm and dry.   Neurological:      General: No focal deficit present.      Comments: Intubated, sedated         Vents:  Vent Mode: A/C (08/05/20 0746)  Ventilator Initiated: Yes(chart correction) (07/26/20 2108)  Set Rate:  36 BPM (08/05/20 0744)  Vt Set: 380 mL (08/05/20 0744)  Pressure Support: 0 cmH20 (08/05/20 0744)  PEEP/CPAP: 8 cmH20 (08/05/20 0744)  Oxygen Concentration (%): 50 (08/05/20 1100)  Peak Airway Pressure: 28 cmH2O (08/05/20 0744)  Plateau Pressure: 19 cmH20 (08/05/20 0744)  Total Ve: 14 mL (08/05/20 0744)  F/VT Ratio<105 (RSBI): (!) 84.91 (08/05/20 0744)  Lines/Drains/Airways     Central Venous Catheter Line            Percutaneous Central Line Insertion/Assessment - Triple Lumen  07/29/20 1729 right internal jugular 6 days          Drain                 NG/OG Tube 07/26/20 1756 [place] sump;orogastric 18 Fr. Center mouth 9 days         Urethral Catheter 07/26/20 2205 16 Fr. 9 days          Airway                 Airway - Non-Surgical 07/26/20 1724 Endotracheal Tube 9 days          Arterial Line                 Arterial Line 07/29/20 1700 Right Radial 6 days          Peripheral Intravenous Line                 Peripheral IV - Single Lumen 07/31/20 1508 18 G Anterior;Right Forearm 4 days              Significant Labs:    CBC/Anemia Profile:  Recent Labs   Lab 08/04/20  0308 08/05/20  0307   WBC 8.74 7.07   HGB 11.1* 11.0*   HCT 36.8* 35.6*    259   MCV 90 89   RDW 13.8 13.5        Chemistries:  Recent Labs   Lab 08/04/20  0308 08/04/20 2051 08/05/20  0307    137 136   K 4.8 4.8 4.7    105 104   CO2 24 25 25   BUN 44* 39* 37*   CREATININE 1.1 1.0 0.9   CALCIUM 9.4 9.0 8.9   ALBUMIN 1.8*  --  1.7*   PROT 6.7  --  6.5   BILITOT 0.4  --  0.3   ALKPHOS 71  --  67   ALT 79*  --  86*   AST 48*  --  46*   MG 2.0  --  1.9   PHOS 3.6  --  3.3       Significant Imaging:  I have reviewed all pertinent imaging results/findings within the past 24 hours.

## 2020-08-06 ENCOUNTER — RESEARCH ENCOUNTER (OUTPATIENT)
Dept: RESEARCH | Facility: HOSPITAL | Age: 70
End: 2020-08-06

## 2020-08-06 LAB
ALBUMIN SERPL BCP-MCNC: 1.9 G/DL (ref 3.5–5.2)
ALLENS TEST: ABNORMAL
ALP SERPL-CCNC: 75 U/L (ref 55–135)
ALT SERPL W/O P-5'-P-CCNC: 112 U/L (ref 10–44)
ANION GAP SERPL CALC-SCNC: 9 MMOL/L (ref 8–16)
AST SERPL-CCNC: 59 U/L (ref 10–40)
BASOPHILS # BLD AUTO: 0.03 K/UL (ref 0–0.2)
BASOPHILS NFR BLD: 0.3 % (ref 0–1.9)
BILIRUB SERPL-MCNC: 0.3 MG/DL (ref 0.1–1)
BUN SERPL-MCNC: 35 MG/DL (ref 8–23)
CALCIUM SERPL-MCNC: 9.2 MG/DL (ref 8.7–10.5)
CHLORIDE SERPL-SCNC: 102 MMOL/L (ref 95–110)
CO2 SERPL-SCNC: 26 MMOL/L (ref 23–29)
CREAT SERPL-MCNC: 1 MG/DL (ref 0.5–1.4)
DELSYS: ABNORMAL
DIFFERENTIAL METHOD: ABNORMAL
EOSINOPHIL # BLD AUTO: 0.1 K/UL (ref 0–0.5)
EOSINOPHIL NFR BLD: 0.6 % (ref 0–8)
ERYTHROCYTE [DISTWIDTH] IN BLOOD BY AUTOMATED COUNT: 13.6 % (ref 11.5–14.5)
ERYTHROCYTE [SEDIMENTATION RATE] IN BLOOD BY WESTERGREN METHOD: 36 MM/H
EST. GFR  (AFRICAN AMERICAN): >60 ML/MIN/1.73 M^2
EST. GFR  (NON AFRICAN AMERICAN): >60 ML/MIN/1.73 M^2
FACT X PPP CHRO-ACNC: 1.5 IU/ML (ref 0.3–0.7)
FACT X PPP CHRO-ACNC: 1.5 IU/ML (ref 0.3–0.7)
FIO2: 50
GLUCOSE SERPL-MCNC: 247 MG/DL (ref 70–110)
HCO3 UR-SCNC: 27.3 MMOL/L (ref 24–28)
HCT VFR BLD AUTO: 37.8 % (ref 40–54)
HGB BLD-MCNC: 11.7 G/DL (ref 14–18)
IMM GRANULOCYTES # BLD AUTO: 0.1 K/UL (ref 0–0.04)
IMM GRANULOCYTES NFR BLD AUTO: 0.8 % (ref 0–0.5)
LYMPHOCYTES # BLD AUTO: 2.1 K/UL (ref 1–4.8)
LYMPHOCYTES NFR BLD: 17.8 % (ref 18–48)
MAGNESIUM SERPL-MCNC: 1.9 MG/DL (ref 1.6–2.6)
MCH RBC QN AUTO: 27.3 PG (ref 27–31)
MCHC RBC AUTO-ENTMCNC: 31 G/DL (ref 32–36)
MCV RBC AUTO: 88 FL (ref 82–98)
MODE: ABNORMAL
MONOCYTES # BLD AUTO: 1.2 K/UL (ref 0.3–1)
MONOCYTES NFR BLD: 10.2 % (ref 4–15)
NEUTROPHILS # BLD AUTO: 8.4 K/UL (ref 1.8–7.7)
NEUTROPHILS NFR BLD: 70.3 % (ref 38–73)
NRBC BLD-RTO: 0 /100 WBC
PCO2 BLDA: 40.4 MMHG (ref 35–45)
PEEP: 8
PH SMN: 7.44 [PH] (ref 7.35–7.45)
PHOSPHATE SERPL-MCNC: 3.9 MG/DL (ref 2.7–4.5)
PLATELET # BLD AUTO: 286 K/UL (ref 150–350)
PMV BLD AUTO: 11.3 FL (ref 9.2–12.9)
PO2 BLDA: 77 MMHG (ref 80–100)
POC BE: 3 MMOL/L
POC SATURATED O2: 96 % (ref 95–100)
POC TCO2: 29 MMOL/L (ref 23–27)
POCT GLUCOSE: 100 MG/DL (ref 70–110)
POCT GLUCOSE: 111 MG/DL (ref 70–110)
POCT GLUCOSE: 120 MG/DL (ref 70–110)
POCT GLUCOSE: 120 MG/DL (ref 70–110)
POCT GLUCOSE: 130 MG/DL (ref 70–110)
POCT GLUCOSE: 141 MG/DL (ref 70–110)
POCT GLUCOSE: 143 MG/DL (ref 70–110)
POCT GLUCOSE: 146 MG/DL (ref 70–110)
POCT GLUCOSE: 158 MG/DL (ref 70–110)
POCT GLUCOSE: 181 MG/DL (ref 70–110)
POCT GLUCOSE: 181 MG/DL (ref 70–110)
POCT GLUCOSE: 195 MG/DL (ref 70–110)
POCT GLUCOSE: 203 MG/DL (ref 70–110)
POCT GLUCOSE: 219 MG/DL (ref 70–110)
POCT GLUCOSE: 232 MG/DL (ref 70–110)
POCT GLUCOSE: 236 MG/DL (ref 70–110)
POCT GLUCOSE: 253 MG/DL (ref 70–110)
POCT GLUCOSE: 267 MG/DL (ref 70–110)
POCT GLUCOSE: 294 MG/DL (ref 70–110)
POTASSIUM SERPL-SCNC: 3.8 MMOL/L (ref 3.5–5.1)
PROT SERPL-MCNC: 6.9 G/DL (ref 6–8.4)
RBC # BLD AUTO: 4.29 M/UL (ref 4.6–6.2)
SAMPLE: ABNORMAL
SITE: ABNORMAL
SODIUM SERPL-SCNC: 137 MMOL/L (ref 136–145)
TROPONIN I SERPL DL<=0.01 NG/ML-MCNC: 0.01 NG/ML (ref 0–0.03)
TROPONIN I SERPL DL<=0.01 NG/ML-MCNC: 0.03 NG/ML (ref 0–0.03)
VT: 380
WBC # BLD AUTO: 11.91 K/UL (ref 3.9–12.7)

## 2020-08-06 PROCEDURE — 82803 BLOOD GASES ANY COMBINATION: CPT

## 2020-08-06 PROCEDURE — 84484 ASSAY OF TROPONIN QUANT: CPT

## 2020-08-06 PROCEDURE — 99900035 HC TECH TIME PER 15 MIN (STAT)

## 2020-08-06 PROCEDURE — 25000003 PHARM REV CODE 250: Performed by: STUDENT IN AN ORGANIZED HEALTH CARE EDUCATION/TRAINING PROGRAM

## 2020-08-06 PROCEDURE — 99900026 HC AIRWAY MAINTENANCE (STAT)

## 2020-08-06 PROCEDURE — 80053 COMPREHEN METABOLIC PANEL: CPT

## 2020-08-06 PROCEDURE — 27200966 HC CLOSED SUCTION SYSTEM

## 2020-08-06 PROCEDURE — 84484 ASSAY OF TROPONIN QUANT: CPT | Mod: 91

## 2020-08-06 PROCEDURE — 63600175 PHARM REV CODE 636 W HCPCS: Performed by: INTERNAL MEDICINE

## 2020-08-06 PROCEDURE — 63600175 PHARM REV CODE 636 W HCPCS: Performed by: STUDENT IN AN ORGANIZED HEALTH CARE EDUCATION/TRAINING PROGRAM

## 2020-08-06 PROCEDURE — 94761 N-INVAS EAR/PLS OXIMETRY MLT: CPT

## 2020-08-06 PROCEDURE — 93005 ELECTROCARDIOGRAM TRACING: CPT

## 2020-08-06 PROCEDURE — 25000003 PHARM REV CODE 250: Performed by: PHYSICIAN ASSISTANT

## 2020-08-06 PROCEDURE — 25000003 PHARM REV CODE 250: Performed by: INTERNAL MEDICINE

## 2020-08-06 PROCEDURE — 94003 VENT MGMT INPAT SUBQ DAY: CPT

## 2020-08-06 PROCEDURE — 63600175 PHARM REV CODE 636 W HCPCS: Performed by: NURSE PRACTITIONER

## 2020-08-06 PROCEDURE — 85025 COMPLETE CBC W/AUTO DIFF WBC: CPT

## 2020-08-06 PROCEDURE — 84100 ASSAY OF PHOSPHORUS: CPT

## 2020-08-06 PROCEDURE — 93010 EKG 12-LEAD: ICD-10-PCS | Mod: ,,, | Performed by: INTERNAL MEDICINE

## 2020-08-06 PROCEDURE — 20000000 HC ICU ROOM

## 2020-08-06 PROCEDURE — 85520 HEPARIN ASSAY: CPT | Mod: 91

## 2020-08-06 PROCEDURE — 99291 PR CRITICAL CARE, E/M 30-74 MINUTES: ICD-10-PCS | Mod: ,,, | Performed by: INTERNAL MEDICINE

## 2020-08-06 PROCEDURE — 27000221 HC OXYGEN, UP TO 24 HOURS

## 2020-08-06 PROCEDURE — 99291 CRITICAL CARE FIRST HOUR: CPT | Mod: ,,, | Performed by: INTERNAL MEDICINE

## 2020-08-06 PROCEDURE — 37799 UNLISTED PX VASCULAR SURGERY: CPT

## 2020-08-06 PROCEDURE — 93010 ELECTROCARDIOGRAM REPORT: CPT | Mod: ,,, | Performed by: INTERNAL MEDICINE

## 2020-08-06 PROCEDURE — 36415 COLL VENOUS BLD VENIPUNCTURE: CPT

## 2020-08-06 PROCEDURE — 25000003 PHARM REV CODE 250: Performed by: NURSE PRACTITIONER

## 2020-08-06 PROCEDURE — 83735 ASSAY OF MAGNESIUM: CPT

## 2020-08-06 RX ORDER — ENOXAPARIN SODIUM 100 MG/ML
90 INJECTION SUBCUTANEOUS
Status: COMPLETED | OUTPATIENT
Start: 2020-08-06 | End: 2020-08-19

## 2020-08-06 RX ORDER — POTASSIUM CHLORIDE 7.45 MG/ML
10 INJECTION INTRAVENOUS
Status: COMPLETED | OUTPATIENT
Start: 2020-08-06 | End: 2020-08-06

## 2020-08-06 RX ADMIN — KETAMINE HYDROCHLORIDE 2.5 MCG/KG/MIN: 50 INJECTION INTRAMUSCULAR; INTRAVENOUS at 03:08

## 2020-08-06 RX ADMIN — POLYETHYLENE GLYCOL 3350 17 G: 17 POWDER, FOR SOLUTION ORAL at 08:08

## 2020-08-06 RX ADMIN — ALTEPLASE 2 MG: 2.2 INJECTION, POWDER, LYOPHILIZED, FOR SOLUTION INTRAVENOUS at 12:08

## 2020-08-06 RX ADMIN — Medication 300 MCG/HR: at 09:08

## 2020-08-06 RX ADMIN — STANDARDIZED SENNA CONCENTRATE AND DOCUSATE SODIUM 1 TABLET: 8.6; 5 TABLET ORAL at 08:08

## 2020-08-06 RX ADMIN — PROPOFOL 20 MCG/KG/MIN: 10 INJECTION, EMULSION INTRAVENOUS at 11:08

## 2020-08-06 RX ADMIN — Medication 300 MCG/HR: at 12:08

## 2020-08-06 RX ADMIN — DEXMEDETOMIDINE HYDROCHLORIDE 0.6 MCG/KG/HR: 4 INJECTION, SOLUTION INTRAVENOUS at 03:08

## 2020-08-06 RX ADMIN — SODIUM CHLORIDE 11 UNITS/HR: 9 INJECTION, SOLUTION INTRAVENOUS at 03:08

## 2020-08-06 RX ADMIN — KETAMINE HYDROCHLORIDE 7.51 MCG/KG/MIN: 50 INJECTION INTRAMUSCULAR; INTRAVENOUS at 11:08

## 2020-08-06 RX ADMIN — KETAMINE HYDROCHLORIDE 7.5 MCG/KG/MIN: 50 INJECTION INTRAMUSCULAR; INTRAVENOUS at 12:08

## 2020-08-06 RX ADMIN — POTASSIUM CHLORIDE 10 MEQ: 7.46 INJECTION, SOLUTION INTRAVENOUS at 11:08

## 2020-08-06 RX ADMIN — POTASSIUM CHLORIDE 10 MEQ: 7.46 INJECTION, SOLUTION INTRAVENOUS at 10:08

## 2020-08-06 RX ADMIN — FAMOTIDINE 20 MG: 20 TABLET ORAL at 08:08

## 2020-08-06 RX ADMIN — DEXMEDETOMIDINE HYDROCHLORIDE 0.4 MCG/KG/HR: 4 INJECTION, SOLUTION INTRAVENOUS at 06:08

## 2020-08-06 RX ADMIN — PROPOFOL 20 MCG/KG/MIN: 10 INJECTION, EMULSION INTRAVENOUS at 04:08

## 2020-08-06 RX ADMIN — POTASSIUM CHLORIDE 10 MEQ: 7.46 INJECTION, SOLUTION INTRAVENOUS at 12:08

## 2020-08-06 RX ADMIN — MIDAZOLAM HYDROCHLORIDE 5 MG/HR: 5 INJECTION, SOLUTION INTRAMUSCULAR; INTRAVENOUS at 10:08

## 2020-08-06 RX ADMIN — Medication 300 MCG/HR: at 05:08

## 2020-08-06 RX ADMIN — ENOXAPARIN SODIUM 105 MG: 150 INJECTION SUBCUTANEOUS at 09:08

## 2020-08-06 RX ADMIN — PROPOFOL 50 MCG/KG/MIN: 10 INJECTION, EMULSION INTRAVENOUS at 03:08

## 2020-08-06 RX ADMIN — ROSUVASTATIN CALCIUM 10 MG: 10 TABLET, FILM COATED ORAL at 08:08

## 2020-08-06 RX ADMIN — Medication 0.04 MCG/KG/MIN: at 10:08

## 2020-08-06 RX ADMIN — SODIUM CHLORIDE 10 UNITS/HR: 9 INJECTION, SOLUTION INTRAVENOUS at 04:08

## 2020-08-06 RX ADMIN — PROPOFOL 30 MCG/KG/MIN: 10 INJECTION, EMULSION INTRAVENOUS at 09:08

## 2020-08-06 RX ADMIN — DEXMEDETOMIDINE HYDROCHLORIDE 0.6 MCG/KG/HR: 4 INJECTION, SOLUTION INTRAVENOUS at 12:08

## 2020-08-06 RX ADMIN — POTASSIUM CHLORIDE 10 MEQ: 7.46 INJECTION, SOLUTION INTRAVENOUS at 09:08

## 2020-08-06 RX ADMIN — ENOXAPARIN SODIUM 90 MG: 100 INJECTION SUBCUTANEOUS at 08:08

## 2020-08-06 RX ADMIN — PROPOFOL 30 MCG/KG/MIN: 10 INJECTION, EMULSION INTRAVENOUS at 06:08

## 2020-08-06 NOTE — PLAN OF CARE
Pt not medically stable for discharge.  Remains intubated and sedated.  ?peg/trach placement.  When medically stable, anticipate discharge plan A - LTAC or plan B - SNF.  CM to follow for discharge planning needs.     08/06/20 8403   Discharge Reassessment   Assessment Type Discharge Planning Reassessment   Do you have any problems affording any of your prescribed medications? TBD   Discharge Plan A Long-term acute care facility (LTAC)   Discharge Plan B Skilled Nursing Facility   DME Needed Upon Discharge  other (see comments)  (TBD)   Anticipated Discharge Disposition LTAC   Can the patient/caregiver answer the patient profile reliably?   (Intubated)   Describe the patient's ability to walk at the present time. Major restrictions/daily assistance from another person   Number of comorbid conditions (as recorded on the chart) Four   Post-Acute Status   Post-Acute Placement Status Awaiting Internal Medical Clearance     Vandana García RN   [id number]

## 2020-08-06 NOTE — PLAN OF CARE
Sats > 88% on AC/VC 50 %, 8 PEEP  Afebrile  MAP > 65  HR 60-120s-  EKG obtained  Does not follow commands, withdraws from pain  Gtts include insulin, levo, fentanyl, precedex, propofol, ketamine, versed  TF at 60 cc/hr., goal  Uop avg. 75 cc/hr.  No BM  Lytes monitored per order  Labs monitored per order  Plan to wean sedation  POC reviewed c pt.  See flowsheets for more details  Will continue to monitor

## 2020-08-06 NOTE — PROGRESS NOTES
"[facility] - ICU [facility] WT  Adult Nutrition  Progress Note    SUMMARY       Recommendations    Recommendation:   1. Continue TF of Peptamen Intense VHP, decrease to 40 mL/hr to provide pt with 960 kcal, 88 g protein and 806 mL free water.    - As propofol is decrease increase TF to goal rate of 60 mL/hr to provide pt with 1440 kcal, 132 g protein and 1210 mL free water.   - Additional water per MD. Hold for residuals >500 mL.   RD to monitor and follow up    Goals: Meet % EEN, EPN by RD f/u date  Nutrition Goal Status: goal met  Communication of RD Recs: other (comment)(POC)    Reason for Assessment    Reason For Assessment: RD follow-up  Diagnosis: other (see comments)(PNA 2/2 COVID19)  Relevant Medical History: DM, HTN, HLD  Interdisciplinary Rounds: did not attend  General Information Comments: Pt continues intubated, possible trach/peg discussion per chart. Pt continues on TF at goal rate of 60 mL/hr at this time, tolerating well. Noted LBM , pt with constipation. Wt loss of 4 lbs since admit, unsure of accuracy or UBW. Noted +2 edema. Pt with no indications of malnutrition at this time. NFPE not performed, patient has been screened for possible COVID-19 and has been placed on airborne and contact precautions. Patient is noted as being positive for COVID-19.  Nutrition Discharge Planning: Unable to determine    Nutrition Risk Screen    Nutrition Risk Screen: tube feeding or parenteral nutrition    Nutrition/Diet History    Food Allergies: NKFA  Factors Affecting Nutritional Intake: NPO, on mechanical ventilation    Anthropometrics    Temp: 97 °F (36.1 °C)  Height: 5' 9" (175.3 cm)  Height (inches): 69 in  Weight Method: Bed Scale  Weight: 109 kg (240 lb 4.8 oz)(Bed scale)  Weight (lb): 240.3 lb  Ideal Body Weight (IBW), Male: 160 lb  % Ideal Body Weight, Male (lb): 152.5 %  BMI (Calculated): 35.5  BMI Grade: 35 - 39.9 - obesity - grade II  Usual Body Weight (UBW), k.6 kg(Per chart " review)  % Usual Body Weight: 97.92  % Weight Change From Usual Weight: -2.29 %       Lab/Procedures/Meds    Pertinent Labs Reviewed: reviewed  Pertinent Labs Comments: BUN 35; Glucose 247  Pertinent Medications Reviewed: reviewed  Pertinent Medications Comments: enoxaparin; famotidine; polyethylene glycol; statin; senna-docusate     Estimated/Assessed Needs    Weight Used For Calorie Calculations: 111 kg (244 lb 11.4 oz)  Energy Calorie Requirements (kcal): 7624-3142 kcal/d  Energy Need Method: Kcal/kg(11-14 kcal/kg, BMI > 35)  Protein Requirements: 146-182 g/d (2-2.5 g/kg IBW)  Weight Used For Protein Calculations: 72.7 kg (160 lb 4.4 oz)  Estimated Fluid Requirement Method: other (see comments)(Per MD or 1 mL/kcal)  RDA Method (mL): 1221  CHO Requirement: 50% total kcals    Nutrition Prescription Ordered    Current Diet Order: NPO  Current Nutrition Support Formula Ordered: Peptamen Intense VHP  Current Nutrition Support Rate Ordered: 60 (ml)  Current Nutrition Support Frequency Ordered: mL/hr    Evaluation of Received Nutrient/Fluid Intake    Enteral Calories (kcal): 1440  Enteral Protein (gm): 132  Enteral (Free Water) Fluid (mL): 1210  Other Calories (kcal): 528  Total Calories (kcal): 1968  % Kcal Needs: 161  % Protein Needs: 90  I/O: -1.4 L since admit  Energy Calories Required: exceeds needs  Protein Required: meeting needs  Fluid Required: meeting needs  Comments: LBM 7/25  Tolerance: tolerating  % Intake of Estimated Energy Needs: 75 - 100 %  % Meal Intake: NPO    Nutrition Risk    Level of Risk/Frequency of Follow-up: low     Assessment and Plan  Nutrition Problem  Inadequate oral intake     Related to (etiology):   Inability to consume sufficient energy     Signs and Symptoms (as evidenced by):   NPO, Intubated      Interventions(treatment strategy):  Collaboration of nutrition care w/ other providers   Enteral nutrition      Nutrition Diagnosis Status:   Resolving     Monitor and Evaluation    Food and  Nutrient Intake: energy intake, enteral nutrition intake  Food and Nutrient Adminstration: enteral and parenteral nutrition administration  Physical Activity and Function: nutrition-related ADLs and IADLs  Anthropometric Measurements: weight, weight change  Biochemical Data, Medical Tests and Procedures: electrolyte and renal panel, glucose/endocrine profile, gastrointestinal profile, lipid profile, inflammatory profile  Nutrition-Focused Physical Findings: overall appearance     Nutrition Follow-Up    RD Follow-up?: Yes

## 2020-08-06 NOTE — PLAN OF CARE
Problem: Aspiration (Enteral Nutrition)  Goal: Absence of Aspiration Signs/Symptoms  Outcome: Ongoing, Progressing   Recommendations    Recommendation:   1. Continue TF of Peptamen Intense VHP, decrease to 40 mL/hr to provide pt with 960 kcal, 88 g protein and 806 mL free water.    - As propofol is decrease increase TF to goal rate of 60 mL/hr to provide pt with 1440 kcal, 132 g protein and 1210 mL free water.   - Additional water per MD. Hold for residuals >500 mL.   RD to monitor and follow up    Goals: Meet % EEN, EPN by RD f/u date  Nutrition Goal Status: goal met  Communication of RD Recs: other (comment)(POC)

## 2020-08-06 NOTE — ASSESSMENT & PLAN NOTE
Anup Miller is a 70 y.o. male patient with a PMHx of HTN, HLD, and DM who presents to the [place] [facility] Department for shortness of breath and found to be COVID positive. Was started on IV dexamethasone, Ceftriaxone, and Azithromycin. Patient transferred to [facility] [place] [facility] on evening of 7/26/20 for higher level of care. Labs on arrival to [facility] remarkable for WBC 9.8, Procal 1.3, D-dimer 1.1, Ferritin 2,300, , and . Given 1 L LR on arrival to [facility] [place] [facility]    - Remdesivir complete  - Therapeutic lovenox for hypercoagulable state in COVID  - IV Dexamethasone continues (total of 10 days, end date 08/05)  - Completed Ceftriaxone and Azithromycin for possible superimposed bacterial pneumonia  - f/u blood and sputum cx - NGTD  - trend lactate - nl  - Was previously paralyzed, proned, sedated. Nimbex restarted on 8/1 and discontinued on 08/04. Wean sedation as tolerated. Currently on precedex, fentanyl, ketamine, versed, propofol. On a little levophed due to heavy sedation.  - Consider PEG/trach as patient day 12 of vent

## 2020-08-06 NOTE — PROGRESS NOTES
"08/05/2020 - 21:03:  Received a call from the patient's wife Zhou about participating in the [facility] COVID study. She stated she spoke to a family member in [place] who is in the health care field and stated the "stem cells are safe".  Stated the [facility] COVID is an investigation study with a 72 hour window from the initial intubation to randomize patients for this trial. Zhou stated she understood he is no longer a candidate for the study, but want to ask if this was still an option. She thanked me for my time.   "

## 2020-08-06 NOTE — PROGRESS NOTES
[facility] - ICU 16   Critical Care Medicine  Progress Note    Patient Name: Anup Miller  MRN: [id number]  Admission Date: 7/26/2020  Hospital Length of Stay: 11 days  Code Status: Full Code  Attending Provider: Joseph Rees MD  Primary Care Provider: Bryce Gonzales MD   Principal Problem: Pneumonia due to COVID-19 virus    Subjective:     HPI:  Anup Miller is a 70 y.o. male patient with a PMHx of HTN, HLD, and DM who presents to the [place] [facility] for evaluation of SOB which  1 week ago. Pt became more SOB today and has an O2 sat of low 70s upon arrival on RA. Symptoms are worsening and moderate in severity. No mitigating or exacerbating factors reported. Associated sxs include cough and fever. Patient denies any congestion, sore throat, CP, abd pain, N/V, back pain, HA, weakness, and all other sxs at this time. No prior Tx reported. No further complaints or concerns at this time. Patient was placed on Bipap for a few hours and subsequently intubated. COVID positive. Was started on IV dexamethasone, Ceftriaxone, and Azithromycin. Patient transferred to [facility] on evening of 7/26/20 for higher level of care.     Hospital/ICU Course:  As of 7/29, the patient's oxygenation continued to worsen with P:F <150. R IJ and Arterial Line placed. Pt sedated, paralyzed, and proned at 9:30pm with improved oxygenation. Repeat AM gas on 7/30 Arterial Blood Gas result:  pO2 112; pCO2 58.9; pH 7.256;  HCO3 26.2, %O2 Sat 97%. FiO2 60, 8 PEEP. Pt supinated at 4:10 pm on 7/30 with Arterial Blood Gas result:  pO2 106; pCO2 52.2; pH 7.349;  HCO3 28.7, %O2 Sat 106. (P:F 212). FiO2 50, 10 PEEP.    As of 7/31, paralytic d/c'ed and sedation slowly weaned. Propofol d/c'ed on 8/1 2/2 triglycerides with ketamine and versed initiated. Now requiring higher vent settings due to dyssynchrony, will continue to increase sedation. Patient paralyzed again on 8/1 for vent dyssynchrony. Nimbex was  discontinued on 08/03 and sedative medications were increased, however, patient struggled to pull enough tidal volume and was desatting even on maximum sedation so nimbex was restarted. Nimbex was stopped on 08/04 and restarted propofol. As patient became more agitated, ketamine was added on 08/06. It was discussed with family of possible PEG/trach.    Interval History/Significant Events: Overnight, ketamine was added as patient was agitated. Because of this, levophed was re-started due to low BPs.    Review of Systems   Unable to perform ROS: Intubated     Objective:     Vital Signs (Most Recent):  Temp: 97 °F (36.1 °C) (08/06/20 1200)  Pulse: 65 (08/06/20 1200)  Resp: (!) 36 (08/06/20 1200)  BP: 106/68 (08/06/20 1200)  SpO2: 98 % (08/06/20 1200) Vital Signs (24h Range):  Temp:  [97 °F (36.1 °C)-99.7 °F (37.6 °C)] 97 °F (36.1 °C)  Pulse:  [] 65  Resp:  [22-46] 36  SpO2:  [91 %-98 %] 98 %  BP: ()/(57-80) 106/68  Arterial Line BP: (107-185)/(41-63) 109/50   Weight: 109 kg (240 lb 4.8 oz)(Bed scale)  Body mass index is 35.49 kg/m².      Intake/Output Summary (Last 24 hours) at 8/6/2020 1241  Last data filed at 8/6/2020 1200  Gross per 24 hour   Intake 4495.31 ml   Output 2825 ml   Net 1670.31 ml       Physical Exam  Nursing note reviewed.   Constitutional:       Interventions: He is sedated, intubated and restrained.      Comments: Intubated, sedated   HENT:      Head: Normocephalic and atraumatic.   Eyes:      Conjunctiva/sclera: Conjunctivae normal.   Neck:      Musculoskeletal: Neck supple.   Cardiovascular:      Rate and Rhythm: Normal rate.      Heart sounds: No murmur.   Pulmonary:      Effort: He is intubated.      Breath sounds: No stridor. No wheezing.      Comments: Rales noted bilaterally  Abdominal:      General: There is no distension.      Palpations: Abdomen is soft.      Tenderness: There is no abdominal tenderness.   Musculoskeletal:      Right lower leg: No edema.      Left lower leg: No  edema.   Skin:     General: Skin is warm and dry.   Neurological:      General: No focal deficit present.      Comments: Intubated, sedated         Vents:  Vent Mode: A/C (08/06/20 0821)  Ventilator Initiated: Yes(chart correction) (07/26/20 2108)  Set Rate: 36 BPM (08/06/20 0821)  Vt Set: 380 mL (08/06/20 0821)  Pressure Support: 0 cmH20 (08/06/20 0821)  PEEP/CPAP: 8 cmH20 (08/06/20 0821)  Oxygen Concentration (%): 50 (08/06/20 1200)  Peak Airway Pressure: 26 cmH2O (08/06/20 0821)  Plateau Pressure: 19 cmH20 (08/06/20 0821)  Total Ve: 11.4 mL (08/06/20 0821)  F/VT Ratio<105 (RSBI): (!) 102.78 (08/06/20 0821)  Lines/Drains/Airways     Central Venous Catheter Line            Percutaneous Central Line Insertion/Assessment - Triple Lumen  07/29/20 1729 right internal jugular 7 days          Drain                 NG/OG Tube 07/26/20 1756 [place] sump;orogastric 18 Fr. Center mouth 10 days         Urethral Catheter 07/26/20 2205 16 Fr. 10 days          Airway                 Airway - Non-Surgical 07/26/20 1724 Endotracheal Tube 10 days          Arterial Line                 Arterial Line 07/29/20 1700 Right Radial 7 days          Peripheral Intravenous Line                 Peripheral IV - Single Lumen 07/31/20 1508 18 G Anterior;Right Forearm 5 days         Peripheral IV - Single Lumen 08/06/20 0552 20 G Anterior;Left Wrist less than 1 day              Significant Labs:    CBC/Anemia Profile:  Recent Labs   Lab 08/05/20 0307 08/06/20 0229   WBC 7.07 11.91   HGB 11.0* 11.7*   HCT 35.6* 37.8*    286   MCV 89 88   RDW 13.5 13.6        Chemistries:  Recent Labs   Lab 08/04/20 2051 08/05/20 0307 08/06/20 0229    136 137   K 4.8 4.7 3.8    104 102   CO2 25 25 26   BUN 39* 37* 35*   CREATININE 1.0 0.9 1.0   CALCIUM 9.0 8.9 9.2   ALBUMIN  --  1.7* 1.9*   PROT  --  6.5 6.9   BILITOT  --  0.3 0.3   ALKPHOS  --  67 75   ALT  --  86* 112*   AST  --  46* 59*   MG  --  1.9 1.9   PHOS  --  3.3 3.9        Significant Imaging:  I have reviewed all pertinent imaging results/findings within the past 24 hours.      ABG  Recent Labs   Lab 08/06/20  0402   PH 7.438   PO2 77*   PCO2 40.4   HCO3 27.3   BE 3     Assessment/Plan:     Pulmonary  Acute hypoxemic respiratory failure  - see Pneumonia due to COVID 19    Cardiac/Vascular  Hyperlipidemia associated with type 2 diabetes mellitus  - Restart Crestor once extubated     Hypertension associated with diabetes  - Hold antihypertensives in setting of shock  - Resume as tolerated    Endocrine  Uncontrolled type 2 diabetes mellitus  - A1c 8.3%  - started on insulin gtt on 08/05 due to elevated BG with POCT glucose checks Q1H    GI  Elevated liver enzymes  - Hx of elevated LFTs dating back to 2015  - Acute hepatitis panel and HIV - negative  - Trend CMP daily    Other  * Pneumonia due to COVID-19 virus  Anup Miller is a 70 y.o. male patient with a PMHx of HTN, HLD, and DM who presents to the [place] [facility] for shortness of breath and found to be COVID positive. Was started on IV dexamethasone, Ceftriaxone, and Azithromycin. [place] [facility] [place]20 for higher level of care. Labs on arrival [place] [facility] remarkable for WBC 9.8, Procal 1.3, D-dimer 1.1, Ferritin 2,300, , and . Given 1 L LR on [place] [facility]    - Remdesivir complete  - Therapeutic lovenox for hypercoagulable state in COVID  - IV Dexamethasone continues (total of 10 days, end date 08/05)  - Completed Ceftriaxone and Azithromycin for possible superimposed bacterial pneumonia  - f/u blood and sputum cx - NGTD  - trend lactate - nl  - Was previously paralyzed, proned, sedated. Nimbex restarted on 8/1 and discontinued on 08/04. Wean sedation as tolerated. Currently on precedex, fentanyl, ketamine, versed, propofol. On a little levophed due to heavy sedation.  - Consider PEG/trach as patient day 12 of [facility]     Critical Care Daily Checklist:    A: Awake: RASS  Goal/Actual Goal: RASS Goal: -3-->moderate sedation  Actual: [place]ett Agitation Sedation Scale (RASS): Light sedation   B: Spontaneous Breathing Trial Performed? Spon. Breathing Trial Initiated?: Not initiated (08/06/20 0821)   C: SAT & SBT Coordinated?  No                      D: Delirium: CAM-ICU Overall CAM-ICU: Positive   E: Early Mobility Performed? No   F: Feeding Goal: Goals: Meet % EEN, EPN by RD f/u date  Status: Nutrition Goal Status: progressing towards goal, goal not met   Current Diet Order   Procedures    Diet NPO      AS: Analgesia/Sedation Precedex, fentanyl, ketamine, versed, propofol   T: Thromboembolic Prophylaxis Lovenox   H: HOB > 300 Yes   U: Stress Ulcer Prophylaxis (if needed) Famotidine   G: Glucose Control Insulin gtt   B: Bowel Function     I: Indwelling Catheter (Lines & Harris) Necessity A line, RIJ, NG/OG, harris   D: De-escalation of Antimicrobials/Pharmacotherapies None    Plan for the day/ETD Wean sedation as tolerated    Code Status:  Family/Goals of Care: Full Code   Updated family       Critical secondary to Patient has a condition that poses threat to life and bodily function: Severe Respiratory Distress      Critical care was time spent personally by me on the following activities: development of treatment plan with patient or surrogate and bedside caregivers, discussions with consultants, evaluation of patient's response to treatment, examination of patient, ordering and performing treatments and interventions, ordering and review of laboratory studies, ordering and review of radiographic studies, pulse oximetry, re-evaluation of patient's condition. This critical care time did not overlap with that of any other provider or involve time for any procedures.     Munira Croft MD  Critical Care Medicine  [facility] - ICU 16 WT

## 2020-08-06 NOTE — SUBJECTIVE & OBJECTIVE
Interval History/Significant Events: Overnight, ketamine was added as patient was agitated. Because of this, levophed was re-started due to low BPs.    Review of Systems   Unable to perform ROS: Intubated     Objective:     Vital Signs (Most Recent):  Temp: 97 °F (36.1 °C) (08/06/20 1200)  Pulse: 65 (08/06/20 1200)  Resp: (!) 36 (08/06/20 1200)  BP: 106/68 (08/06/20 1200)  SpO2: 98 % (08/06/20 1200) Vital Signs (24h Range):  Temp:  [97 °F (36.1 °C)-99.7 °F (37.6 °C)] 97 °F (36.1 °C)  Pulse:  [] 65  Resp:  [22-46] 36  SpO2:  [91 %-98 %] 98 %  BP: ()/(57-80) 106/68  Arterial Line BP: (107-185)/(41-63) 109/50   Weight: 109 kg (240 lb 4.8 oz)(Bed scale)  Body mass index is 35.49 kg/m².      Intake/Output Summary (Last 24 hours) at 8/6/2020 1241  Last data filed at 8/6/2020 1200  Gross per 24 hour   Intake 4495.31 ml   Output 2825 ml   Net 1670.31 ml       Physical Exam  Nursing note reviewed.   Constitutional:       Interventions: He is sedated, intubated and restrained.      Comments: Intubated, sedated   HENT:      Head: Normocephalic and atraumatic.   Eyes:      Conjunctiva/sclera: Conjunctivae normal.   Neck:      Musculoskeletal: Neck supple.   Cardiovascular:      Rate and Rhythm: Normal rate.      Heart sounds: No murmur.   Pulmonary:      Effort: He is intubated.      Breath sounds: No stridor. No wheezing.      Comments: Rales noted bilaterally  Abdominal:      General: There is no distension.      Palpations: Abdomen is soft.      Tenderness: There is no abdominal tenderness.   Musculoskeletal:      Right lower leg: No edema.      Left lower leg: No edema.   Skin:     General: Skin is warm and dry.   Neurological:      General: No focal deficit present.      Comments: Intubated, sedated         Vents:  Vent Mode: A/C (08/06/20 0821)  Ventilator Initiated: Yes(chart correction) (07/26/20 2108)  Set Rate: 36 BPM (08/06/20 0821)  Vt Set: 380 mL (08/06/20 0821)  Pressure Support: 0 cmH20 (08/06/20  0821)  PEEP/CPAP: 8 cmH20 (08/06/20 0821)  Oxygen Concentration (%): 50 (08/06/20 1200)  Peak Airway Pressure: 26 cmH2O (08/06/20 0821)  Plateau Pressure: 19 cmH20 (08/06/20 0821)  Total Ve: 11.4 mL (08/06/20 0821)  F/VT Ratio<105 (RSBI): (!) 102.78 (08/06/20 0821)  Lines/Drains/Airways     Central Venous Catheter Line            Percutaneous Central Line Insertion/Assessment - Triple Lumen  07/29/20 1729 right internal jugular 7 days          Drain                 NG/OG Tube 07/26/20 1756 [place] sump;orogastric 18 Fr. Center mouth 10 days         Urethral Catheter 07/26/20 2205 16 Fr. 10 days          Airway                 Airway - Non-Surgical 07/26/20 1724 Endotracheal Tube 10 days          Arterial Line                 Arterial Line 07/29/20 1700 Right Radial 7 days          Peripheral Intravenous Line                 Peripheral IV - Single Lumen 07/31/20 1508 18 G Anterior;Right Forearm 5 days         Peripheral IV - Single Lumen 08/06/20 0552 20 G Anterior;Left Wrist less than 1 day              Significant Labs:    CBC/Anemia Profile:  Recent Labs   Lab 08/05/20 0307 08/06/20 0229   WBC 7.07 11.91   HGB 11.0* 11.7*   HCT 35.6* 37.8*    286   MCV 89 88   RDW 13.5 13.6        Chemistries:  Recent Labs   Lab 08/04/20 2051 08/05/20 0307 08/06/20 0229    136 137   K 4.8 4.7 3.8    104 102   CO2 25 25 26   BUN 39* 37* 35*   CREATININE 1.0 0.9 1.0   CALCIUM 9.0 8.9 9.2   ALBUMIN  --  1.7* 1.9*   PROT  --  6.5 6.9   BILITOT  --  0.3 0.3   ALKPHOS  --  67 75   ALT  --  86* 112*   AST  --  46* 59*   MG  --  1.9 1.9   PHOS  --  3.3 3.9       Significant Imaging:  I have reviewed all pertinent imaging results/findings within the past 24 hours.

## 2020-08-07 LAB
ALBUMIN SERPL BCP-MCNC: 1.7 G/DL (ref 3.5–5.2)
ALLENS TEST: ABNORMAL
ALP SERPL-CCNC: 66 U/L (ref 55–135)
ALT SERPL W/O P-5'-P-CCNC: 111 U/L (ref 10–44)
ANION GAP SERPL CALC-SCNC: 7 MMOL/L (ref 8–16)
AST SERPL-CCNC: 69 U/L (ref 10–40)
BASOPHILS # BLD AUTO: 0.03 K/UL (ref 0–0.2)
BASOPHILS NFR BLD: 0.4 % (ref 0–1.9)
BILIRUB SERPL-MCNC: 0.3 MG/DL (ref 0.1–1)
BUN SERPL-MCNC: 23 MG/DL (ref 8–23)
CALCIUM SERPL-MCNC: 8.5 MG/DL (ref 8.7–10.5)
CHLORIDE SERPL-SCNC: 107 MMOL/L (ref 95–110)
CO2 SERPL-SCNC: 23 MMOL/L (ref 23–29)
CREAT SERPL-MCNC: 0.9 MG/DL (ref 0.5–1.4)
DELSYS: ABNORMAL
DIFFERENTIAL METHOD: ABNORMAL
EOSINOPHIL # BLD AUTO: 0.1 K/UL (ref 0–0.5)
EOSINOPHIL NFR BLD: 0.8 % (ref 0–8)
ERYTHROCYTE [DISTWIDTH] IN BLOOD BY AUTOMATED COUNT: 13.6 % (ref 11.5–14.5)
ERYTHROCYTE [SEDIMENTATION RATE] IN BLOOD BY WESTERGREN METHOD: 36 MM/H
EST. GFR  (AFRICAN AMERICAN): >60 ML/MIN/1.73 M^2
EST. GFR  (NON AFRICAN AMERICAN): >60 ML/MIN/1.73 M^2
FIO2: 40
GLUCOSE SERPL-MCNC: 195 MG/DL (ref 70–110)
HCO3 UR-SCNC: 22.7 MMOL/L (ref 24–28)
HCT VFR BLD AUTO: 33.5 % (ref 40–54)
HGB BLD-MCNC: 10.4 G/DL (ref 14–18)
IMM GRANULOCYTES # BLD AUTO: 0.11 K/UL (ref 0–0.04)
IMM GRANULOCYTES NFR BLD AUTO: 1.4 % (ref 0–0.5)
LYMPHOCYTES # BLD AUTO: 1.4 K/UL (ref 1–4.8)
LYMPHOCYTES NFR BLD: 17.9 % (ref 18–48)
MAGNESIUM SERPL-MCNC: 2 MG/DL (ref 1.6–2.6)
MAGNESIUM SERPL-MCNC: 2 MG/DL (ref 1.6–2.6)
MCH RBC QN AUTO: 27.3 PG (ref 27–31)
MCHC RBC AUTO-ENTMCNC: 31 G/DL (ref 32–36)
MCV RBC AUTO: 88 FL (ref 82–98)
MODE: ABNORMAL
MONOCYTES # BLD AUTO: 0.8 K/UL (ref 0.3–1)
MONOCYTES NFR BLD: 10.1 % (ref 4–15)
NEUTROPHILS # BLD AUTO: 5.6 K/UL (ref 1.8–7.7)
NEUTROPHILS NFR BLD: 69.4 % (ref 38–73)
NRBC BLD-RTO: 0 /100 WBC
PCO2 BLDA: 30 MMHG (ref 35–45)
PEEP: 8
PH SMN: 7.49 [PH] (ref 7.35–7.45)
PHOSPHATE SERPL-MCNC: 2.3 MG/DL (ref 2.7–4.5)
PLATELET # BLD AUTO: 246 K/UL (ref 150–350)
PMV BLD AUTO: 12 FL (ref 9.2–12.9)
PO2 BLDA: 89 MMHG (ref 80–100)
POC BE: -1 MMOL/L
POC SATURATED O2: 98 % (ref 95–100)
POC TCO2: 24 MMOL/L (ref 23–27)
POCT GLUCOSE: 116 MG/DL (ref 70–110)
POCT GLUCOSE: 130 MG/DL (ref 70–110)
POCT GLUCOSE: 148 MG/DL (ref 70–110)
POCT GLUCOSE: 163 MG/DL (ref 70–110)
POCT GLUCOSE: 178 MG/DL (ref 70–110)
POCT GLUCOSE: 187 MG/DL (ref 70–110)
POCT GLUCOSE: 188 MG/DL (ref 70–110)
POCT GLUCOSE: 193 MG/DL (ref 70–110)
POCT GLUCOSE: 193 MG/DL (ref 70–110)
POCT GLUCOSE: 195 MG/DL (ref 70–110)
POCT GLUCOSE: 205 MG/DL (ref 70–110)
POCT GLUCOSE: 210 MG/DL (ref 70–110)
POCT GLUCOSE: 214 MG/DL (ref 70–110)
POCT GLUCOSE: 219 MG/DL (ref 70–110)
POTASSIUM SERPL-SCNC: 3.9 MMOL/L (ref 3.5–5.1)
PROT SERPL-MCNC: 6.3 G/DL (ref 6–8.4)
RBC # BLD AUTO: 3.81 M/UL (ref 4.6–6.2)
SAMPLE: ABNORMAL
SITE: ABNORMAL
SODIUM SERPL-SCNC: 137 MMOL/L (ref 136–145)
VT: 400
WBC # BLD AUTO: 8 K/UL (ref 3.9–12.7)

## 2020-08-07 PROCEDURE — 99291 CRITICAL CARE FIRST HOUR: CPT | Mod: ,,, | Performed by: INTERNAL MEDICINE

## 2020-08-07 PROCEDURE — 99900035 HC TECH TIME PER 15 MIN (STAT)

## 2020-08-07 PROCEDURE — 25000003 PHARM REV CODE 250: Performed by: PHYSICIAN ASSISTANT

## 2020-08-07 PROCEDURE — 94003 VENT MGMT INPAT SUBQ DAY: CPT

## 2020-08-07 PROCEDURE — 25000003 PHARM REV CODE 250: Performed by: NURSE PRACTITIONER

## 2020-08-07 PROCEDURE — 85025 COMPLETE CBC W/AUTO DIFF WBC: CPT

## 2020-08-07 PROCEDURE — 82803 BLOOD GASES ANY COMBINATION: CPT

## 2020-08-07 PROCEDURE — 25000003 PHARM REV CODE 250: Performed by: STUDENT IN AN ORGANIZED HEALTH CARE EDUCATION/TRAINING PROGRAM

## 2020-08-07 PROCEDURE — 84100 ASSAY OF PHOSPHORUS: CPT

## 2020-08-07 PROCEDURE — 99900026 HC AIRWAY MAINTENANCE (STAT)

## 2020-08-07 PROCEDURE — 63600175 PHARM REV CODE 636 W HCPCS: Performed by: STUDENT IN AN ORGANIZED HEALTH CARE EDUCATION/TRAINING PROGRAM

## 2020-08-07 PROCEDURE — 80053 COMPREHEN METABOLIC PANEL: CPT

## 2020-08-07 PROCEDURE — 94761 N-INVAS EAR/PLS OXIMETRY MLT: CPT

## 2020-08-07 PROCEDURE — 63600175 PHARM REV CODE 636 W HCPCS: Performed by: INTERNAL MEDICINE

## 2020-08-07 PROCEDURE — 37799 UNLISTED PX VASCULAR SURGERY: CPT

## 2020-08-07 PROCEDURE — 63600175 PHARM REV CODE 636 W HCPCS: Performed by: NURSE PRACTITIONER

## 2020-08-07 PROCEDURE — 99291 PR CRITICAL CARE, E/M 30-74 MINUTES: ICD-10-PCS | Mod: ,,, | Performed by: INTERNAL MEDICINE

## 2020-08-07 PROCEDURE — 27200966 HC CLOSED SUCTION SYSTEM

## 2020-08-07 PROCEDURE — 25000003 PHARM REV CODE 250: Performed by: INTERNAL MEDICINE

## 2020-08-07 PROCEDURE — 27000221 HC OXYGEN, UP TO 24 HOURS

## 2020-08-07 PROCEDURE — 20000000 HC ICU ROOM

## 2020-08-07 PROCEDURE — 83735 ASSAY OF MAGNESIUM: CPT

## 2020-08-07 RX ORDER — OLANZAPINE 2.5 MG/1
5 TABLET ORAL 2 TIMES DAILY
Status: DISCONTINUED | OUTPATIENT
Start: 2020-08-07 | End: 2020-08-08

## 2020-08-07 RX ORDER — GLUCAGON 1 MG
1 KIT INJECTION
Status: DISCONTINUED | OUTPATIENT
Start: 2020-08-07 | End: 2020-08-26 | Stop reason: HOSPADM

## 2020-08-07 RX ORDER — INSULIN ASPART 100 [IU]/ML
1-10 INJECTION, SOLUTION INTRAVENOUS; SUBCUTANEOUS EVERY 4 HOURS PRN
Status: DISCONTINUED | OUTPATIENT
Start: 2020-08-07 | End: 2020-08-26 | Stop reason: HOSPADM

## 2020-08-07 RX ORDER — DEXTROSE MONOHYDRATE 100 MG/ML
INJECTION, SOLUTION INTRAVENOUS CONTINUOUS PRN
Status: DISCONTINUED | OUTPATIENT
Start: 2020-08-07 | End: 2020-08-26 | Stop reason: HOSPADM

## 2020-08-07 RX ORDER — INSULIN ASPART 100 [IU]/ML
10 INJECTION, SOLUTION INTRAVENOUS; SUBCUTANEOUS
Status: DISCONTINUED | OUTPATIENT
Start: 2020-08-07 | End: 2020-08-08

## 2020-08-07 RX ORDER — QUETIAPINE FUMARATE 25 MG/1
100 TABLET, FILM COATED ORAL NIGHTLY
Status: DISCONTINUED | OUTPATIENT
Start: 2020-08-07 | End: 2020-08-08

## 2020-08-07 RX ORDER — QUETIAPINE FUMARATE 25 MG/1
100 TABLET, FILM COATED ORAL NIGHTLY
Status: DISCONTINUED | OUTPATIENT
Start: 2020-08-07 | End: 2020-08-07

## 2020-08-07 RX ADMIN — QUETIAPINE FUMARATE 100 MG: 25 TABLET ORAL at 08:08

## 2020-08-07 RX ADMIN — INSULIN ASPART 10 UNITS: 100 INJECTION, SOLUTION INTRAVENOUS; SUBCUTANEOUS at 04:08

## 2020-08-07 RX ADMIN — INSULIN ASPART 10 UNITS: 100 INJECTION, SOLUTION INTRAVENOUS; SUBCUTANEOUS at 08:08

## 2020-08-07 RX ADMIN — ENOXAPARIN SODIUM 90 MG: 100 INJECTION SUBCUTANEOUS at 08:08

## 2020-08-07 RX ADMIN — DEXMEDETOMIDINE HYDROCHLORIDE 0.6 MCG/KG/HR: 4 INJECTION, SOLUTION INTRAVENOUS at 12:08

## 2020-08-07 RX ADMIN — ENOXAPARIN SODIUM 90 MG: 100 INJECTION SUBCUTANEOUS at 09:08

## 2020-08-07 RX ADMIN — MIDAZOLAM HYDROCHLORIDE 5 MG/HR: 5 INJECTION, SOLUTION INTRAMUSCULAR; INTRAVENOUS at 10:08

## 2020-08-07 RX ADMIN — Medication 300 MCG/HR: at 09:08

## 2020-08-07 RX ADMIN — DEXMEDETOMIDINE HYDROCHLORIDE 0.8 MCG/KG/HR: 4 INJECTION, SOLUTION INTRAVENOUS at 03:08

## 2020-08-07 RX ADMIN — KETAMINE HYDROCHLORIDE 7.5 MCG/KG/MIN: 50 INJECTION INTRAMUSCULAR; INTRAVENOUS at 09:08

## 2020-08-07 RX ADMIN — OLANZAPINE 5 MG: 2.5 TABLET, FILM COATED ORAL at 08:08

## 2020-08-07 RX ADMIN — STANDARDIZED SENNA CONCENTRATE AND DOCUSATE SODIUM 1 TABLET: 8.6; 5 TABLET ORAL at 08:08

## 2020-08-07 RX ADMIN — Medication 300 MCG/HR: at 01:08

## 2020-08-07 RX ADMIN — DEXMEDETOMIDINE HYDROCHLORIDE 0.7 MCG/KG/HR: 4 INJECTION, SOLUTION INTRAVENOUS at 09:08

## 2020-08-07 RX ADMIN — FAMOTIDINE 20 MG: 20 TABLET ORAL at 08:08

## 2020-08-07 RX ADMIN — SODIUM CHLORIDE 8.5 UNITS/HR: 9 INJECTION, SOLUTION INTRAVENOUS at 07:08

## 2020-08-07 RX ADMIN — Medication 300 MCG/HR: at 05:08

## 2020-08-07 RX ADMIN — OLANZAPINE 5 MG: 2.5 TABLET, FILM COATED ORAL at 12:08

## 2020-08-07 RX ADMIN — KETAMINE HYDROCHLORIDE 7.5 MCG/KG/MIN: 50 INJECTION INTRAMUSCULAR; INTRAVENOUS at 10:08

## 2020-08-07 RX ADMIN — PROPOFOL 30 MCG/KG/MIN: 10 INJECTION, EMULSION INTRAVENOUS at 10:08

## 2020-08-07 RX ADMIN — PROPOFOL 30 MCG/KG/MIN: 10 INJECTION, EMULSION INTRAVENOUS at 12:08

## 2020-08-07 RX ADMIN — Medication 0.03 MCG/KG/MIN: at 02:08

## 2020-08-07 RX ADMIN — INSULIN ASPART 10 UNITS: 100 INJECTION, SOLUTION INTRAVENOUS; SUBCUTANEOUS at 12:08

## 2020-08-07 RX ADMIN — PROPOFOL 30 MCG/KG/MIN: 10 INJECTION, EMULSION INTRAVENOUS at 07:08

## 2020-08-07 RX ADMIN — POLYETHYLENE GLYCOL 3350 17 G: 17 POWDER, FOR SOLUTION ORAL at 08:08

## 2020-08-07 RX ADMIN — ROSUVASTATIN CALCIUM 10 MG: 10 TABLET, FILM COATED ORAL at 08:08

## 2020-08-07 RX ADMIN — PROPOFOL 30 MCG/KG/MIN: 10 INJECTION, EMULSION INTRAVENOUS at 02:08

## 2020-08-07 NOTE — PLAN OF CARE
Problem: Adult Inpatient Plan of Care  Goal: Plan of Care Review  Outcome: Ongoing, Progressing  Goal: Patient-Specific Goal (Individualization)  Outcome: Ongoing, Progressing  Goal: Absence of Hospital-Acquired Illness or Injury  Outcome: Ongoing, Progressing  Goal: Optimal Comfort and Wellbeing  Outcome: Ongoing, Progressing  Goal: Readiness for Transition of Care  Outcome: Ongoing, Progressing  Goal: Rounds/Family Conference  Outcome: Ongoing, Progressing     Problem: Diabetes Comorbidity  Goal: Blood Glucose Level Within Desired Range  Outcome: Ongoing, Progressing     Problem: Infection  Goal: Infection Symptom Resolution  Outcome: Ongoing, Progressing     Problem: Fall Injury Risk  Goal: Absence of Fall and Fall-Related Injury  Outcome: Ongoing, Progressing     Problem: Skin Injury Risk Increased  Goal: Skin Health and Integrity  Outcome: Ongoing, Progressing     Problem: Restraint, Nonbehavioral (Nonviolent)  Goal: Discontinuation Criteria Achieved  Outcome: Ongoing, Progressing  Goal: Personal Dignity and Safety Maintained  Outcome: Ongoing, Progressing     Problem: Communication Impairment (Mechanical Ventilation, Invasive)  Goal: Effective Communication  Outcome: Ongoing, Progressing     Problem: Device-Related Complication Risk (Mechanical Ventilation, Invasive)  Goal: Optimal Device Function  Outcome: Ongoing, Progressing     Problem: Inability to Wean (Mechanical Ventilation, Invasive)  Goal: Mechanical Ventilation Liberation  Outcome: Ongoing, Progressing     Problem: Nutrition Impairment (Mechanical Ventilation, Invasive)  Goal: Optimal Nutrition Delivery  Outcome: Ongoing, Progressing     Problem: Skin and Tissue Injury (Mechanical Ventilation, Invasive)  Goal: Absence of Device-Related Skin and Tissue Injury  Outcome: Ongoing, Progressing     Problem: Ventilator-Induced Lung Injury (Mechanical Ventilation, Invasive)  Goal: Absence of Ventilator-Induced Lung Injury  Outcome: Ongoing, Progressing      Problem: Communication Impairment (Artificial Airway)  Goal: Effective Communication  Outcome: Ongoing, Progressing     Problem: Device-Related Complication Risk (Artificial Airway)  Goal: Optimal Device Function  Outcome: Ongoing, Progressing     Problem: Skin and Tissue Injury (Artificial Airway)  Goal: Absence of Device-Related Skin or Tissue Injury  Outcome: Ongoing, Progressing     Problem: Noninvasive Ventilation Acute  Goal: Effective Unassisted Ventilation and Oxygenation  Outcome: Ongoing, Progressing     Problem: Aspiration (Enteral Nutrition)  Goal: Absence of Aspiration Signs/Symptoms  Outcome: Ongoing, Progressing

## 2020-08-07 NOTE — ASSESSMENT & PLAN NOTE
Anup Miller is a 70 y.o. male patient with a PMHx of HTN, HLD, and DM who presents to the [place] [facility] for shortness of breath and found to be COVID positive. Was started on IV dexamethasone, Ceftriaxone, and Azithromycin. Patient transferred to [facility] [place] of 7/26/20 for higher level of care. Labs on arrival to [facility] [place] remarkable for WBC 9.8, Procal 1.3, D-dimer 1.1, Ferritin 2,300, , and . Given 1 L LR on arrival to [facility] [place]    - Remdesivir complete  - Therapeutic lovenox for hypercoagulable state in COVID  - IV Dexamethasone continues (total of 10 days, end date 08/05)  - Completed Ceftriaxone and Azithromycin for possible superimposed bacterial pneumonia  - f/u blood and sputum cx - NGTD  - trend lactate - nl  - Was previously paralyzed, proned, sedated. Nimbex restarted on 8/1 and discontinued on 08/04. Wean sedation as tolerated. Currently on precedex, fentanyl, ketamine, versed, propofol. On a little levophed due to heavy sedation. Added seroquel and zyprexa on 08/07 in attempt to wean sedation.  - Consider PEG/trach as patient day 13 of vent

## 2020-08-07 NOTE — SUBJECTIVE & OBJECTIVE
Interval History/Significant Events: No acute overnight events. Patient remains sedated and intubated. Plan to add seroquel and zyprexa to wean sedation. Possible plan for PEG/trach pending family discussion.    Review of Systems   Unable to perform ROS: Intubated     Objective:     Vital Signs (Most Recent):  Temp: 96.6 °F (35.9 °C) (08/07/20 1025)  Pulse: 67 (08/07/20 1025)  Resp: (!) 36 (08/07/20 1025)  BP: 108/64 (08/07/20 1000)  SpO2: (!) 94 % (08/07/20 1025) Vital Signs (24h Range):  Temp:  [95 °F (35 °C)-97.5 °F (36.4 °C)] 96.6 °F (35.9 °C)  Pulse:  [62-78] 67  Resp:  [36-38] 36  SpO2:  [93 %-100 %] 94 %  BP: ()/(50-73) 108/64  Arterial Line BP: ()/() 117/46   Weight: 109 kg (240 lb 4.8 oz)(Bed scale)  Body mass index is 35.49 kg/m².      Intake/Output Summary (Last 24 hours) at 8/7/2020 1116  Last data filed at 8/7/2020 1000  Gross per 24 hour   Intake 4143.9 ml   Output 3172 ml   Net 971.9 ml       Physical Exam  Nursing note reviewed.   Constitutional:       Interventions: He is sedated, intubated and restrained.      Comments: Intubated, sedated   HENT:      Head: Normocephalic and atraumatic.   Eyes:      Conjunctiva/sclera: Conjunctivae normal.   Neck:      Musculoskeletal: Neck supple.   Cardiovascular:      Rate and Rhythm: Normal rate.      Heart sounds: No murmur.   Pulmonary:      Effort: He is intubated.      Breath sounds: No stridor. No wheezing.      Comments: Rales noted bilaterally  Abdominal:      General: There is no distension.      Palpations: Abdomen is soft.      Tenderness: There is no abdominal tenderness.   Musculoskeletal:      Right lower leg: No edema.      Left lower leg: No edema.   Skin:     General: Skin is warm and dry.   Neurological:      General: No focal deficit present.      Comments: Intubated, sedated         Vents:  Vent Mode: A/C (08/07/20 1025)  Ventilator Initiated: Yes(chart correction) (07/26/20 2108)  Set Rate: 36 BPM (08/07/20 1025)  Vt Set:  400 mL (08/07/20 1025)  Pressure Support: 0 cmH20 (08/07/20 1025)  PEEP/CPAP: 8 cmH20 (08/07/20 1025)  Oxygen Concentration (%): 40 (08/07/20 1025)  Peak Airway Pressure: 29 cmH2O (08/07/20 1025)  Plateau Pressure: 23 cmH20 (08/07/20 1025)  Total Ve: 12.4 mL (08/07/20 1025)  F/VT Ratio<105 (RSBI): (!) 96 (08/07/20 1025)  Lines/Drains/Airways     Central Venous Catheter Line            Percutaneous Central Line Insertion/Assessment - Triple Lumen  07/29/20 1729 right internal jugular 8 days          Drain                 NG/OG Tube 07/26/20 1756 [place] sump;orogastric 18 Fr. Center mouth 11 days         Urethral Catheter 07/26/20 2205 16 Fr. 11 days          Airway                 Airway - Non-Surgical 07/26/20 1724 Endotracheal Tube 11 days          Arterial Line                 Arterial Line 07/29/20 1700 Right Radial 8 days          Peripheral Intravenous Line                 Peripheral IV - Single Lumen 07/31/20 1508 18 G Anterior;Right Forearm 6 days         Peripheral IV - Single Lumen 08/06/20 0552 20 G Anterior;Left Wrist 1 day              Significant Labs:    CBC/Anemia Profile:  Recent Labs   Lab 08/06/20 0229 08/07/20  0430   WBC 11.91 8.00   HGB 11.7* 10.4*   HCT 37.8* 33.5*    246   MCV 88 88   RDW 13.6 13.6        Chemistries:  Recent Labs   Lab 08/06/20 0229 08/07/20  0430    137   K 3.8 3.9    107   CO2 26 23   BUN 35* 23   CREATININE 1.0 0.9   CALCIUM 9.2 8.5*   ALBUMIN 1.9* 1.7*   PROT 6.9 6.3   BILITOT 0.3 0.3   ALKPHOS 75 66   * 111*   AST 59* 69*   MG 1.9 2.0  2.0   PHOS 3.9 2.3*       Significant Imaging:  I have reviewed all pertinent imaging results/findings within the past 24 hours.

## 2020-08-07 NOTE — ASSESSMENT & PLAN NOTE
- A1c 8.3%  - started on insulin gtt on 08/05 due to elevated BG with POCT glucose checks Q1H. Will attempt to take off insulin gtt.

## 2020-08-07 NOTE — PLAN OF CARE
See flowsheet for patient assessment. Drips weined and titrated as ordered, wife updated on plan of care multiple times dureing the shift.

## 2020-08-07 NOTE — PROGRESS NOTES
[facility] - ICU 16   Critical Care Medicine  Progress Note    Patient Name: Anup Miller  MRN: [id number]  Admission Date: 7/26/2020  Hospital Length of Stay: 12 days  Code Status: Full Code  Attending Provider: Joseph Rees MD  Primary Care Provider: Bryce Gonzales MD   Principal Problem: Pneumonia due to COVID-19 virus    Subjective:     HPI:  Anup Miller is a 70 y.o. male patient with a PMHx of HTN, HLD, and DM who presents to the [place] [facility] Department for evaluation of SOB which  1 week ago. Pt became more SOB today and has an O2 sat of low 70s upon arrival on RA. Symptoms are worsening and moderate in severity. No mitigating or exacerbating factors reported. Associated sxs include cough and fever. Patient denies any congestion, sore throat, CP, abd pain, N/V, back pain, HA, weakness, and all other sxs at this time. No prior Tx reported. No further complaints or concerns at this time. Patient was placed on Bipap for a few hours and subsequently intubated. COVID positive. Was started on IV dexamethasone, Ceftriaxone, and Azithromycin. Patient transferred to [facility] [place] [place] on evening of 7/26/20 for higher level of care.     Hospital/ICU Course:  As of 7/29, the patient's oxygenation continued to worsen with P:F <150. R IJ and Arterial Line placed. Pt sedated, paralyzed, and proned at 9:30pm with improved oxygenation. Repeat AM gas on 7/30 Arterial Blood Gas result:  pO2 112; pCO2 58.9; pH 7.256;  HCO3 26.2, %O2 Sat 97%. FiO2 60, 8 PEEP. Pt supinated at 4:10 pm on 7/30 with Arterial Blood Gas result:  pO2 106; pCO2 52.2; pH 7.349;  HCO3 28.7, %O2 Sat 106. (P:F 212). FiO2 50, 10 PEEP.    As of 7/31, paralytic d/c'ed and sedation slowly weaned. Propofol d/c'ed on 8/1 2/2 triglycerides with ketamine and versed initiated. Now requiring higher vent settings due to dyssynchrony, will continue to increase sedation. Patient paralyzed again on 8/1 for vent dyssynchrony. Nimbex was  discontinued on 08/03 and sedative medications were increased, however, patient struggled to pull enough tidal volume and was desatting even on maximum sedation so nimbex was restarted. Nimbex was stopped on 08/04 and restarted propofol. As patient became more agitated, ketamine was added on 08/06. It was discussed with family of possible PEG/trach. Sedation was attempted to be weaned off by adding seroquel and zyprexa.    Interval History/Significant Events: No acute overnight events. Patient remains sedated and intubated. Plan to add seroquel and zyprexa to wean sedation. Possible plan for PEG/trach pending family discussion.    Review of Systems   Unable to perform ROS: Intubated     Objective:     Vital Signs (Most Recent):  Temp: 96.6 °F (35.9 °C) (08/07/20 1025)  Pulse: 67 (08/07/20 1025)  Resp: (!) 36 (08/07/20 1025)  BP: 108/64 (08/07/20 1000)  SpO2: (!) 94 % (08/07/20 1025) Vital Signs (24h Range):  Temp:  [95 °F (35 °C)-97.5 °F (36.4 °C)] 96.6 °F (35.9 °C)  Pulse:  [62-78] 67  Resp:  [36-38] 36  SpO2:  [93 %-100 %] 94 %  BP: ()/(50-73) 108/64  Arterial Line BP: ()/() 117/46   Weight: 109 kg (240 lb 4.8 oz)(Bed scale)  Body mass index is 35.49 kg/m².      Intake/Output Summary (Last 24 hours) at 8/7/2020 1116  Last data filed at 8/7/2020 1000  Gross per 24 hour   Intake 4143.9 ml   Output 3172 ml   Net 971.9 ml       Physical Exam  Nursing note reviewed.   Constitutional:       Interventions: He is sedated, intubated and restrained.      Comments: Intubated, sedated   HENT:      Head: Normocephalic and atraumatic.   Eyes:      Conjunctiva/sclera: Conjunctivae normal.   Neck:      Musculoskeletal: Neck supple.   Cardiovascular:      Rate and Rhythm: Normal rate.      Heart sounds: No murmur.   Pulmonary:      Effort: He is intubated.      Breath sounds: No stridor. No wheezing.      Comments: Rales noted bilaterally  Abdominal:      General: There is no distension.      Palpations: Abdomen  is soft.      Tenderness: There is no abdominal tenderness.   Musculoskeletal:      Right lower leg: No edema.      Left lower leg: No edema.   Skin:     General: Skin is warm and dry.   Neurological:      General: No focal deficit present.      Comments: Intubated, sedated         Vents:  Vent Mode: A/C (08/07/20 1025)  Ventilator Initiated: Yes(chart correction) (07/26/20 2108)  Set Rate: 36 BPM (08/07/20 1025)  Vt Set: 400 mL (08/07/20 1025)  Pressure Support: 0 cmH20 (08/07/20 1025)  PEEP/CPAP: 8 cmH20 (08/07/20 1025)  Oxygen Concentration (%): 40 (08/07/20 1025)  Peak Airway Pressure: 29 cmH2O (08/07/20 1025)  Plateau Pressure: 23 cmH20 (08/07/20 1025)  Total Ve: 12.4 mL (08/07/20 1025)  F/VT Ratio<105 (RSBI): (!) 96 (08/07/20 1025)  Lines/Drains/Airways     Central Venous Catheter Line            Percutaneous Central Line Insertion/Assessment - Triple Lumen  07/29/20 1729 right internal jugular 8 days          Drain                 NG/OG Tube 07/26/20 1756 [place] sump;orogastric 18 Fr. Center mouth 11 days         Urethral Catheter 07/26/20 2205 16 Fr. 11 days          Airway                 Airway - Non-Surgical 07/26/20 1724 Endotracheal Tube 11 days          Arterial Line                 Arterial Line 07/29/20 1700 Right Radial 8 days          Peripheral Intravenous Line                 Peripheral IV - Single Lumen 07/31/20 1508 18 G Anterior;Right Forearm 6 days         Peripheral IV - Single Lumen 08/06/20 0552 20 G Anterior;Left Wrist 1 day              Significant Labs:    CBC/Anemia Profile:  Recent Labs   Lab 08/06/20 0229 08/07/20  0430   WBC 11.91 8.00   HGB 11.7* 10.4*   HCT 37.8* 33.5*    246   MCV 88 88   RDW 13.6 13.6        Chemistries:  Recent Labs   Lab 08/06/20 0229 08/07/20  0430    137   K 3.8 3.9    107   CO2 26 23   BUN 35* 23   CREATININE 1.0 0.9   CALCIUM 9.2 8.5*   ALBUMIN 1.9* 1.7*   PROT 6.9 6.3   BILITOT 0.3 0.3   ALKPHOS 75 66   * 111*   AST 59* 69*   MG  1.9 2.0  2.0   PHOS 3.9 2.3*       Significant Imaging:  I have reviewed all pertinent imaging results/findings within the past 24 hours.      ABG  Recent Labs   Lab 08/07/20  0315   PH 7.487*   PO2 89   PCO2 30.0*   HCO3 22.7*   BE -1     Assessment/Plan:     Pulmonary  Acute hypoxemic respiratory failure  - see Pneumonia due to COVID 19    Cardiac/Vascular  Hyperlipidemia associated with type 2 diabetes mellitus  - Restart Crestor once extubated     Hypertension associated with diabetes  - Hold antihypertensives in setting of shock  - Resume as tolerated    Endocrine  Uncontrolled type 2 diabetes mellitus  - A1c 8.3%  - started on insulin gtt on 08/05 due to elevated BG with POCT glucose checks Q1H. Will attempt to take off insulin gtt.    GI  Elevated liver enzymes  - Hx of elevated LFTs dating back to 2015  - Acute hepatitis panel and HIV - negative  - Trend CMP daily    Other  * Pneumonia due to COVID-19 virus  Anup Miller is a 70 y.o. male patient with a PMHx of HTN, HLD, and DM who presents to the [place] [facility] Department for shortness of breath and found to be COVID positive. Was started on IV dexamethasone, Ceftriaxone, and Azithromycin. [place] [facility] [place] for higher level of care. Labs on arrival to [facility] [place] remarkable for WBC 9.8, Procal 1.3, D-dimer 1.1, Ferritin 2,300, , and . Given 1 L LR on arrival to [facility] [place]    - Remdesivir complete  - Therapeutic lovenox for hypercoagulable state in COVID  - IV Dexamethasone continues (total of 10 days, end date 08/05)  - Completed Ceftriaxone and Azithromycin for possible superimposed bacterial pneumonia  - f/u blood and sputum cx - NGTD  - trend lactate - nl  - Was previously paralyzed, proned, sedated. Nimbex restarted on 8/1 and discontinued on 08/04. Wean sedation as tolerated. Currently on precedex, fentanyl, ketamine, versed, propofol. On a little levophed due to heavy sedation. Added seroquel and zyprexa on  08/07 in attempt to wean sedation.  - Consider PEG/trach as patient day [facility] of [facility]     Critical Care Daily Checklist:    A: Awake: RASS Goal/Actual Goal: RASS Goal: -2-->light sedation  Actual: Hackett Agitation Sedation Scale (RASS): Light sedation   B: Spontaneous Breathing Trial Performed? Spon. Breathing Trial Initiated?: Not initiated (08/06/20 0821)   C: SAT & SBT Coordinated?  No                      D: Delirium: CAM-ICU Overall CAM-ICU: Positive   E: Early Mobility Performed? No   F: Feeding Goal: Goals: Meet % EEN, EPN by RD f/u date  Status: Nutrition Goal Status: goal met   Current Diet Order   Procedures    Diet NPO      AS: Analgesia/Sedation Yes   T: Thromboembolic Prophylaxis Therapeutic lovenox   H: HOB > 300 Yes   U: Stress Ulcer Prophylaxis (if needed) Famotidine   G: Glucose Control Insulin gtt   B: Bowel Function     I: Indwelling Catheter (Lines & Harris) Necessity A line, RIJ, NG/OG, harris   D: De-escalation of Antimicrobials/Pharmacotherapies None    Plan for the day/ETD Wean sedation    Code Status:  Family/Goals of Care: Full Code  Will update family       Critical secondary to Patient has a condition that poses threat to life and bodily function: Severe Respiratory Distress      Critical care was time spent personally by me on the following activities: development of treatment plan with patient or surrogate and bedside caregivers, discussions with consultants, evaluation of patient's response to treatment, examination of patient, ordering and performing treatments and interventions, ordering and review of laboratory studies, ordering and review of radiographic studies, pulse oximetry, re-evaluation of patient's condition. This critical care time did not overlap with that of any other provider or involve time for any procedures.     Munira Croft MD  Critical Care Medicine  [facility] - ICU 16 [facility]

## 2020-08-08 LAB
ALBUMIN SERPL BCP-MCNC: 1.7 G/DL (ref 3.5–5.2)
ALLENS TEST: ABNORMAL
ALP SERPL-CCNC: 71 U/L (ref 55–135)
ALT SERPL W/O P-5'-P-CCNC: 96 U/L (ref 10–44)
ANION GAP SERPL CALC-SCNC: 8 MMOL/L (ref 8–16)
AST SERPL-CCNC: 66 U/L (ref 10–40)
BASOPHILS # BLD AUTO: 0.02 K/UL (ref 0–0.2)
BASOPHILS NFR BLD: 0.2 % (ref 0–1.9)
BILIRUB SERPL-MCNC: 0.3 MG/DL (ref 0.1–1)
BUN SERPL-MCNC: 24 MG/DL (ref 8–23)
CALCIUM SERPL-MCNC: 8.1 MG/DL (ref 8.7–10.5)
CHLORIDE SERPL-SCNC: 107 MMOL/L (ref 95–110)
CO2 SERPL-SCNC: 21 MMOL/L (ref 23–29)
CREAT SERPL-MCNC: 1 MG/DL (ref 0.5–1.4)
DELSYS: ABNORMAL
DIFFERENTIAL METHOD: ABNORMAL
EOSINOPHIL # BLD AUTO: 0.1 K/UL (ref 0–0.5)
EOSINOPHIL NFR BLD: 0.7 % (ref 0–8)
ERYTHROCYTE [DISTWIDTH] IN BLOOD BY AUTOMATED COUNT: 13.9 % (ref 11.5–14.5)
ERYTHROCYTE [SEDIMENTATION RATE] IN BLOOD BY WESTERGREN METHOD: 36 MM/H
EST. GFR  (AFRICAN AMERICAN): >60 ML/MIN/1.73 M^2
EST. GFR  (NON AFRICAN AMERICAN): >60 ML/MIN/1.73 M^2
FIO2: 40
GLUCOSE SERPL-MCNC: 314 MG/DL (ref 70–110)
HCO3 UR-SCNC: 25.3 MMOL/L (ref 24–28)
HCT VFR BLD AUTO: 34.4 % (ref 40–54)
HGB BLD-MCNC: 10.4 G/DL (ref 14–18)
IMM GRANULOCYTES # BLD AUTO: 0.1 K/UL (ref 0–0.04)
IMM GRANULOCYTES NFR BLD AUTO: 1.2 % (ref 0–0.5)
LYMPHOCYTES # BLD AUTO: 2.2 K/UL (ref 1–4.8)
LYMPHOCYTES NFR BLD: 25.4 % (ref 18–48)
MAGNESIUM SERPL-MCNC: 1.9 MG/DL (ref 1.6–2.6)
MAGNESIUM SERPL-MCNC: 1.9 MG/DL (ref 1.6–2.6)
MCH RBC QN AUTO: 27 PG (ref 27–31)
MCHC RBC AUTO-ENTMCNC: 30.2 G/DL (ref 32–36)
MCV RBC AUTO: 89 FL (ref 82–98)
MODE: ABNORMAL
MONOCYTES # BLD AUTO: 1.1 K/UL (ref 0.3–1)
MONOCYTES NFR BLD: 13.3 % (ref 4–15)
NEUTROPHILS # BLD AUTO: 5.1 K/UL (ref 1.8–7.7)
NEUTROPHILS NFR BLD: 59.2 % (ref 38–73)
NRBC BLD-RTO: 0 /100 WBC
PCO2 BLDA: 44.8 MMHG (ref 35–45)
PEEP: 8
PH SMN: 7.36 [PH] (ref 7.35–7.45)
PHOSPHATE SERPL-MCNC: 2.7 MG/DL (ref 2.7–4.5)
PLATELET # BLD AUTO: 244 K/UL (ref 150–350)
PMV BLD AUTO: 12.3 FL (ref 9.2–12.9)
PO2 BLDA: 61 MMHG (ref 80–100)
POC BE: 0 MMOL/L
POC SATURATED O2: 90 % (ref 95–100)
POC TCO2: 27 MMOL/L (ref 23–27)
POCT GLUCOSE: 148 MG/DL (ref 70–110)
POCT GLUCOSE: 151 MG/DL (ref 70–110)
POCT GLUCOSE: 259 MG/DL (ref 70–110)
POCT GLUCOSE: 268 MG/DL (ref 70–110)
POCT GLUCOSE: 295 MG/DL (ref 70–110)
POCT GLUCOSE: 384 MG/DL (ref 70–110)
POCT GLUCOSE: 71 MG/DL (ref 70–110)
POTASSIUM SERPL-SCNC: 4.8 MMOL/L (ref 3.5–5.1)
PROT SERPL-MCNC: 6.7 G/DL (ref 6–8.4)
RBC # BLD AUTO: 3.85 M/UL (ref 4.6–6.2)
SAMPLE: ABNORMAL
SITE: ABNORMAL
SODIUM SERPL-SCNC: 136 MMOL/L (ref 136–145)
VT: 400
WBC # BLD AUTO: 8.59 K/UL (ref 3.9–12.7)

## 2020-08-08 PROCEDURE — 25000003 PHARM REV CODE 250: Performed by: INTERNAL MEDICINE

## 2020-08-08 PROCEDURE — 63600175 PHARM REV CODE 636 W HCPCS: Performed by: INTERNAL MEDICINE

## 2020-08-08 PROCEDURE — 25000003 PHARM REV CODE 250: Performed by: STUDENT IN AN ORGANIZED HEALTH CARE EDUCATION/TRAINING PROGRAM

## 2020-08-08 PROCEDURE — 82803 BLOOD GASES ANY COMBINATION: CPT

## 2020-08-08 PROCEDURE — 99900035 HC TECH TIME PER 15 MIN (STAT)

## 2020-08-08 PROCEDURE — 37799 UNLISTED PX VASCULAR SURGERY: CPT

## 2020-08-08 PROCEDURE — 85025 COMPLETE CBC W/AUTO DIFF WBC: CPT

## 2020-08-08 PROCEDURE — 84100 ASSAY OF PHOSPHORUS: CPT

## 2020-08-08 PROCEDURE — 25000003 PHARM REV CODE 250: Performed by: NURSE PRACTITIONER

## 2020-08-08 PROCEDURE — 63600175 PHARM REV CODE 636 W HCPCS: Performed by: STUDENT IN AN ORGANIZED HEALTH CARE EDUCATION/TRAINING PROGRAM

## 2020-08-08 PROCEDURE — 94761 N-INVAS EAR/PLS OXIMETRY MLT: CPT

## 2020-08-08 PROCEDURE — 99291 CRITICAL CARE FIRST HOUR: CPT | Mod: ,,, | Performed by: INTERNAL MEDICINE

## 2020-08-08 PROCEDURE — 94003 VENT MGMT INPAT SUBQ DAY: CPT

## 2020-08-08 PROCEDURE — 25000003 PHARM REV CODE 250: Performed by: PHYSICIAN ASSISTANT

## 2020-08-08 PROCEDURE — 83735 ASSAY OF MAGNESIUM: CPT

## 2020-08-08 PROCEDURE — 27000221 HC OXYGEN, UP TO 24 HOURS

## 2020-08-08 PROCEDURE — 20000000 HC ICU ROOM

## 2020-08-08 PROCEDURE — 99291 PR CRITICAL CARE, E/M 30-74 MINUTES: ICD-10-PCS | Mod: ,,, | Performed by: INTERNAL MEDICINE

## 2020-08-08 PROCEDURE — 99900026 HC AIRWAY MAINTENANCE (STAT)

## 2020-08-08 PROCEDURE — 80053 COMPREHEN METABOLIC PANEL: CPT

## 2020-08-08 RX ORDER — OLANZAPINE 2.5 MG/1
5 TABLET ORAL 2 TIMES DAILY
Status: DISCONTINUED | OUTPATIENT
Start: 2020-08-08 | End: 2020-08-26 | Stop reason: HOSPADM

## 2020-08-08 RX ORDER — INSULIN ASPART 100 [IU]/ML
12 INJECTION, SOLUTION INTRAVENOUS; SUBCUTANEOUS
Status: DISCONTINUED | OUTPATIENT
Start: 2020-08-08 | End: 2020-08-10

## 2020-08-08 RX ORDER — FENTANYL CITRATE-0.9 % NACL/PF 10 MCG/ML
25 PLASTIC BAG, INJECTION (ML) INTRAVENOUS CONTINUOUS
Status: DISCONTINUED | OUTPATIENT
Start: 2020-08-08 | End: 2020-08-21

## 2020-08-08 RX ORDER — PROPOFOL 10 MG/ML
5 INJECTION, EMULSION INTRAVENOUS CONTINUOUS
Status: DISCONTINUED | OUTPATIENT
Start: 2020-08-08 | End: 2020-08-19

## 2020-08-08 RX ORDER — DEXMEDETOMIDINE HYDROCHLORIDE 4 UG/ML
0.2 INJECTION, SOLUTION INTRAVENOUS CONTINUOUS
Status: DISCONTINUED | OUTPATIENT
Start: 2020-08-08 | End: 2020-08-19

## 2020-08-08 RX ORDER — FENTANYL CITRATE-0.9 % NACL/PF 10 MCG/ML
25 PLASTIC BAG, INJECTION (ML) INTRAVENOUS CONTINUOUS
Status: DISCONTINUED | OUTPATIENT
Start: 2020-08-08 | End: 2020-08-08

## 2020-08-08 RX ORDER — OLANZAPINE 2.5 MG/1
7.5 TABLET ORAL 2 TIMES DAILY
Status: DISCONTINUED | OUTPATIENT
Start: 2020-08-08 | End: 2020-08-08

## 2020-08-08 RX ADMIN — DEXMEDETOMIDINE HYDROCHLORIDE 0.6 MCG/KG/HR: 100 INJECTION, SOLUTION, CONCENTRATE INTRAVENOUS at 05:08

## 2020-08-08 RX ADMIN — INSULIN ASPART 12 UNITS: 100 INJECTION, SOLUTION INTRAVENOUS; SUBCUTANEOUS at 08:08

## 2020-08-08 RX ADMIN — Medication 0.04 MCG/KG/MIN: at 11:08

## 2020-08-08 RX ADMIN — DEXMEDETOMIDINE HYDROCHLORIDE 0.3 MCG/KG/HR: 4 INJECTION, SOLUTION INTRAVENOUS at 04:08

## 2020-08-08 RX ADMIN — POLYETHYLENE GLYCOL 3350 17 G: 17 POWDER, FOR SOLUTION ORAL at 08:08

## 2020-08-08 RX ADMIN — KETAMINE HYDROCHLORIDE 12.5 MCG/KG/MIN: 50 INJECTION INTRAMUSCULAR; INTRAVENOUS at 06:08

## 2020-08-08 RX ADMIN — PROPOFOL 30 MCG/KG/MIN: 10 INJECTION, EMULSION INTRAVENOUS at 06:08

## 2020-08-08 RX ADMIN — INSULIN ASPART 12 UNITS: 100 INJECTION, SOLUTION INTRAVENOUS; SUBCUTANEOUS at 12:08

## 2020-08-08 RX ADMIN — OLANZAPINE 5 MG: 2.5 TABLET, FILM COATED ORAL at 08:08

## 2020-08-08 RX ADMIN — FAMOTIDINE 20 MG: 20 TABLET ORAL at 08:08

## 2020-08-08 RX ADMIN — INSULIN ASPART 12 UNITS: 100 INJECTION, SOLUTION INTRAVENOUS; SUBCUTANEOUS at 11:08

## 2020-08-08 RX ADMIN — PROPOFOL 30 MCG/KG/MIN: 10 INJECTION, EMULSION INTRAVENOUS at 04:08

## 2020-08-08 RX ADMIN — PROPOFOL 40 MCG/KG/MIN: 10 INJECTION, EMULSION INTRAVENOUS at 05:08

## 2020-08-08 RX ADMIN — INSULIN ASPART 10 UNITS: 100 INJECTION, SOLUTION INTRAVENOUS; SUBCUTANEOUS at 04:08

## 2020-08-08 RX ADMIN — PROPOFOL 30 MCG/KG/MIN: 10 INJECTION, EMULSION INTRAVENOUS at 12:08

## 2020-08-08 RX ADMIN — Medication 250 MCG/HR: at 05:08

## 2020-08-08 RX ADMIN — ENOXAPARIN SODIUM 90 MG: 100 INJECTION SUBCUTANEOUS at 08:08

## 2020-08-08 RX ADMIN — DEXMEDETOMIDINE HYDROCHLORIDE 0.6 MCG/KG/HR: 100 INJECTION, SOLUTION, CONCENTRATE INTRAVENOUS at 10:08

## 2020-08-08 RX ADMIN — Medication 25 MCG/HR: at 03:08

## 2020-08-08 RX ADMIN — STANDARDIZED SENNA CONCENTRATE AND DOCUSATE SODIUM 1 TABLET: 8.6; 5 TABLET ORAL at 08:08

## 2020-08-08 RX ADMIN — PROPOFOL 40 MCG/KG/MIN: 10 INJECTION, EMULSION INTRAVENOUS at 08:08

## 2020-08-08 RX ADMIN — INSULIN ASPART 10 UNITS: 100 INJECTION, SOLUTION INTRAVENOUS; SUBCUTANEOUS at 08:08

## 2020-08-08 RX ADMIN — INSULIN ASPART 12 UNITS: 100 INJECTION, SOLUTION INTRAVENOUS; SUBCUTANEOUS at 04:08

## 2020-08-08 RX ADMIN — DEXMEDETOMIDINE HYDROCHLORIDE 0.6 MCG/KG/HR: 4 INJECTION, SOLUTION INTRAVENOUS at 11:08

## 2020-08-08 RX ADMIN — ROSUVASTATIN CALCIUM 10 MG: 10 TABLET, FILM COATED ORAL at 08:08

## 2020-08-08 NOTE — PROGRESS NOTES
[facility] - ICU 16   Critical Care Medicine  Progress Note    Patient Name: Anup Miller  MRN: [id number]  Admission Date: 7/26/2020  Hospital Length of Stay: 13 days  Code Status: Full Code  Attending Provider: Jimenez Frank MD  Primary Care Provider: Bryce Gonzales MD   Principal Problem: Pneumonia due to COVID-19 virus    Subjective:     HPI:  Anup Miller is a 70 y.o. male patient with a PMHx of HTN, HLD, and DM who presents to the [place] [facility] Department for evaluation of SOB which  1 week ago. Pt became more SOB today and has an O2 sat of low 70s upon arrival on RA. Symptoms are worsening and moderate in severity. No mitigating or exacerbating factors reported. Associated sxs include cough and fever. Patient denies any congestion, sore throat, CP, abd pain, N/V, back pain, HA, weakness, and all other sxs at this time. No prior Tx reported. No further complaints or concerns at this time. Patient was placed on Bipap for a few hours and subsequently intubated. COVID positive. Was started on IV dexamethasone, Ceftriaxone, and Azithromycin. [place] [facility] [place] for higher level of care.     Hospital/ICU Course:  As of 7/29, the patient's oxygenation continued to worsen with P:F <150. R IJ and Arterial Line placed. Pt sedated, paralyzed, and proned at 9:30pm with improved oxygenation. Repeat AM gas on 7/30 Arterial Blood Gas result:  pO2 112; pCO2 58.9; pH 7.256;  HCO3 26.2, %O2 Sat 97%. FiO2 60, 8 PEEP. Pt supinated at 4:10 pm on 7/30 with Arterial Blood Gas result:  pO2 106; pCO2 52.2; pH 7.349;  HCO3 28.7, %O2 Sat 106. (P:F 212). FiO2 50, 10 PEEP.    As of 7/31, paralytic d/c'ed and sedation slowly weaned. Propofol d/c'ed on 8/1 2/2 triglycerides with ketamine and versed initiated. Now requiring higher vent settings due to dyssynchrony, will continue to increase sedation. Patient paralyzed again on 8/1 for vent dyssynchrony. Nimbex was  discontinued on 08/03 and sedative medications were increased, however, patient struggled to pull enough tidal volume and was desatting even on maximum sedation so nimbex was restarted. Nimbex was stopped on 08/04 and restarted propofol. As patient became more agitated, ketamine was added on 08/06. It was discussed with family of possible PEG/trach. Sedation was attempted to be weaned off by adding seroquel and zyprexa. Versed and ketamine were weaned off.    Interval History/Significant Events: No acute overnight events. Some sedation was weaned off (versed, ketamine). Continues to make good urine.     Review of Systems   Unable to perform ROS: Intubated     Objective:     Vital Signs (Most Recent):  Temp: 99.5 °F (37.5 °C) (08/08/20 0900)  Pulse: 72 (08/08/20 0900)  Resp: (!) 36 (08/08/20 0900)  BP: 123/76 (08/08/20 0800)  SpO2: 98 % (08/08/20 0900) Vital Signs (24h Range):  Temp:  [93.2 °F (34 °C)-99.7 °F (37.6 °C)] 99.5 °F (37.5 °C)  Pulse:  [] 72  Resp:  [36-38] 36  SpO2:  [89 %-99 %] 98 %  BP: ()/(59-76) 123/76  Arterial Line BP: ()/(43-73) 114/47   Weight: 109 kg (240 lb 4.8 oz)(Bed scale)  Body mass index is 35.49 kg/m².      Intake/Output Summary (Last 24 hours) at 8/8/2020 0928  Last data filed at 8/8/2020 0900  Gross per 24 hour   Intake 3739.71 ml   Output 4180 ml   Net -440.29 ml       Physical Exam  Nursing note reviewed.   Constitutional:       Interventions: He is sedated, intubated and restrained.      Comments: Intubated, sedated   HENT:      Head: Normocephalic and atraumatic.   Eyes:      Conjunctiva/sclera: Conjunctivae normal.   Neck:      Musculoskeletal: Neck supple.   Cardiovascular:      Rate and Rhythm: Normal rate.      Heart sounds: No murmur.   Pulmonary:      Effort: He is intubated.      Breath sounds: No stridor. No wheezing.      Comments: Rales noted bilaterally  Abdominal:      General: There is no distension.      Palpations: Abdomen is soft.      Tenderness:  There is no abdominal tenderness.   Musculoskeletal:      Right lower leg: No edema.      Left lower leg: No edema.   Skin:     General: Skin is warm and dry.   Neurological:      General: No focal deficit present.      Comments: Intubated, sedated         Vents:  Vent Mode: A/C (08/08/20 0900)  Ventilator Initiated: Yes(chart correction) (07/26/20 2108)  Set Rate: 36 BPM (08/08/20 0900)  Vt Set: 400 mL (08/08/20 0900)  Pressure Support: 0 cmH20 (08/08/20 0900)  PEEP/CPAP: 8 cmH20 (08/08/20 0900)  Oxygen Concentration (%): 50 (08/08/20 0900)  Peak Airway Pressure: 26 cmH2O (08/08/20 0900)  Plateau Pressure: 23 cmH20 (08/08/20 0900)  Total Ve: 15 mL (08/08/20 0900)  F/VT Ratio<105 (RSBI): (!) 86.33 (08/08/20 0900)  Lines/Drains/Airways     Central Venous Catheter Line            Percutaneous Central Line Insertion/Assessment - Triple Lumen  07/29/20 1729 right internal jugular 9 days          Drain                 NG/OG Tube 07/26/20 1756 [place] sump;orogastric 18 Fr. Center mouth 12 days         Urethral Catheter 07/26/20 2205 16 Fr. 12 days          Airway                 Airway - Non-Surgical 07/26/20 1724 Endotracheal Tube 12 days          Arterial Line                 Arterial Line 07/29/20 1700 Right Radial 9 days          Peripheral Intravenous Line                 Peripheral IV - Single Lumen 07/31/20 1508 18 G Anterior;Right Forearm 7 days         Peripheral IV - Single Lumen 08/06/20 0552 20 G Anterior;Left Wrist 2 days              Significant Labs:    CBC/Anemia Profile:  Recent Labs   Lab 08/07/20  0430 08/08/20  0330   WBC 8.00 8.59   HGB 10.4* 10.4*   HCT 33.5* 34.4*    244   MCV 88 89   RDW 13.6 13.9        Chemistries:  Recent Labs   Lab 08/07/20  0430 08/08/20  0330    136   K 3.9 4.8    107   CO2 23 21*   BUN 23 24*   CREATININE 0.9 1.0   CALCIUM 8.5* 8.1*   ALBUMIN 1.7* 1.7*   PROT 6.3 6.7   BILITOT 0.3 0.3   ALKPHOS 66 71   * 96*   AST 69* 66*   MG 2.0  2.0 1.9  1.9    PHOS 2.3* 2.7         Significant Imaging:  I have reviewed all pertinent imaging results/findings within the past 24 hours.      ABG  Recent Labs   Lab 08/08/20  0427   PH 7.359   PO2 61*   PCO2 44.8   HCO3 25.3   BE 0     Assessment/Plan:     Pulmonary  Acute hypoxemic respiratory failure  - see Pneumonia due to COVID 19    Cardiac/Vascular  Hyperlipidemia associated with type 2 diabetes mellitus  - Restart Crestor once extubated     Hypertension associated with diabetes  - Hold antihypertensives in setting of shock  - Resume as tolerated    Endocrine  Uncontrolled type 2 diabetes mellitus  - A1c 8.3%  - started on insulin gtt on 08/05 due to elevated BG but discontinued 08/07  - Aspart 12U Q4H  - BG goal 140-180    GI  Elevated liver enzymes  - Hx of elevated LFTs dating back to 2015  - Acute hepatitis panel and HIV - negative  - Trend CMP daily    Other  * Pneumonia due to COVID-19 virus  Anup Miller is a 70 y.o. male patient with a PMHx of HTN, HLD, and DM who presents to the [place] [facility] Department for shortness of breath and found to be COVID positive. Was started on IV dexamethasone, Ceftriaxone, and Azithromycin. Patient transferred to [facility] [place] on evening of 7/26/20 for higher level of care. Labs on arrival to [facility] [place] remarkable for WBC 9.8, Procal 1.3, D-dimer 1.1, Ferritin 2,300, , and . Given 1 L LR on arrival to [facility] [place]    - Remdesivir complete  - Therapeutic lovenox for hypercoagulable state in COVID  - IV Dexamethasone continues (total of 10 days, end date 08/05)  - Completed Ceftriaxone and Azithromycin for possible superimposed bacterial pneumonia  - f/u blood and sputum cx - NGTD  - trend lactate - nl  - Was previously paralyzed, proned, sedated. Nimbex restarted on 8/1 and discontinued on 08/04. Wean sedation as tolerated. Currently on fentanyl, propofol, precedex. On a little levophed due to heavy sedation. Added zyprexa on 08/07 in attempt to wean sedation.  - Consider  PEG/trach as patient day 14 of [facility]     Critical Care Daily Checklist:     A: Awake: RASS Goal/Actual Goal: RASS Goal: -2-->light sedation  Actual: Hackett Agitation Sedation Scale (RASS): Light sedation   B: Spontaneous Breathing Trial Performed? Spon. Breathing Trial Initiated?: Not initiated (08/06/20 0821)   C: SAT & SBT Coordinated?  No                      D: Delirium: CAM-ICU Overall CAM-ICU: Positive   E: Early Mobility Performed? No   F: Feeding Goal: Goals: Meet % EEN, EPN by RD f/u date  Status: Nutrition Goal Status: goal met       Current Diet Order   Procedures    Diet NPO      AS: Analgesia/Sedation Yes   T: Thromboembolic Prophylaxis Therapeutic lovenox   H: HOB > 300 Yes   U: Stress Ulcer Prophylaxis (if needed) Famotidine   G: Glucose Control subQ insulin   B: Bowel Function     I: Indwelling Catheter (Lines & Harris) Necessity A line, RIJ, NG/OG, harris   D: De-escalation of Antimicrobials/Pharmacotherapies None     Plan for the day/ETD Wean sedation     Code Status:  Family/Goals of Care: Full Code  Updated family         Critical secondary to Patient has a condition that poses threat to life and bodily function: Severe Respiratory Distress      Critical care was time spent personally by me on the following activities: development of treatment plan with patient or surrogate and bedside caregivers, discussions with consultants, evaluation of patient's response to treatment, examination of patient, ordering and performing treatments and interventions, ordering and review of laboratory studies, ordering and review of radiographic studies, pulse oximetry, re-evaluation of patient's condition. This critical care time did not overlap with that of any other provider or involve time for any procedures.     Munira Croft MD  Critical Care Medicine  [facility] - ICU 16 [facility]

## 2020-08-08 NOTE — ASSESSMENT & PLAN NOTE
- A1c 8.3%  - started on insulin gtt on 08/05 due to elevated BG but discontinued 08/07  - Aspart 12U Q4H  - BG goal 140-180

## 2020-08-08 NOTE — SUBJECTIVE & OBJECTIVE
Interval History/Significant Events: No acute overnight events. Some sedation was weaned off (versed, ketamine). Continues to make good urine.     Review of Systems   Unable to perform ROS: Intubated     Objective:     Vital Signs (Most Recent):  Temp: 99.5 °F (37.5 °C) (08/08/20 0900)  Pulse: 72 (08/08/20 0900)  Resp: (!) 36 (08/08/20 0900)  BP: 123/76 (08/08/20 0800)  SpO2: 98 % (08/08/20 0900) Vital Signs (24h Range):  Temp:  [93.2 °F (34 °C)-99.7 °F (37.6 °C)] 99.5 °F (37.5 °C)  Pulse:  [] 72  Resp:  [36-38] 36  SpO2:  [89 %-99 %] 98 %  BP: ()/(59-76) 123/76  Arterial Line BP: ()/(43-73) 114/47   Weight: 109 kg (240 lb 4.8 oz)(Bed scale)  Body mass index is 35.49 kg/m².      Intake/Output Summary (Last 24 hours) at 8/8/2020 0928  Last data filed at 8/8/2020 0900  Gross per 24 hour   Intake 3739.71 ml   Output 4180 ml   Net -440.29 ml       Physical Exam  Nursing note reviewed.   Constitutional:       Interventions: He is sedated, intubated and restrained.      Comments: Intubated, sedated   HENT:      Head: Normocephalic and atraumatic.   Eyes:      Conjunctiva/sclera: Conjunctivae normal.   Neck:      Musculoskeletal: Neck supple.   Cardiovascular:      Rate and Rhythm: Normal rate.      Heart sounds: No murmur.   Pulmonary:      Effort: He is intubated.      Breath sounds: No stridor. No wheezing.      Comments: Rales noted bilaterally  Abdominal:      General: There is no distension.      Palpations: Abdomen is soft.      Tenderness: There is no abdominal tenderness.   Musculoskeletal:      Right lower leg: No edema.      Left lower leg: No edema.   Skin:     General: Skin is warm and dry.   Neurological:      General: No focal deficit present.      Comments: Intubated, sedated         Vents:  Vent Mode: A/C (08/08/20 0900)  Ventilator Initiated: Yes(chart correction) (07/26/20 2108)  Set Rate: 36 BPM (08/08/20 0900)  Vt Set: 400 mL (08/08/20 0900)  Pressure Support: 0 cmH20 (08/08/20  0900)  PEEP/CPAP: 8 cmH20 (08/08/20 0900)  Oxygen Concentration (%): 50 (08/08/20 0900)  Peak Airway Pressure: 26 cmH2O (08/08/20 0900)  Plateau Pressure: 23 cmH20 (08/08/20 0900)  Total Ve: 15 mL (08/08/20 0900)  F/VT Ratio<105 (RSBI): (!) 86.33 (08/08/20 0900)  Lines/Drains/Airways     Central Venous Catheter Line            Percutaneous Central Line Insertion/Assessment - Triple Lumen  07/29/20 1729 right internal jugular 9 days          Drain                 NG/OG Tube 07/26/20 1756 [place] sump;orogastric 18 Fr. Center mouth 12 days         Urethral Catheter 07/26/20 2205 16 Fr. 12 days          Airway                 Airway - Non-Surgical 07/26/20 1724 Endotracheal Tube 12 days          Arterial Line                 Arterial Line 07/29/20 1700 Right Radial 9 days          Peripheral Intravenous Line                 Peripheral IV - Single Lumen 07/31/20 1508 18 G Anterior;Right Forearm 7 days         Peripheral IV - Single Lumen 08/06/20 0552 20 G Anterior;Left Wrist 2 days              Significant Labs:    CBC/Anemia Profile:  Recent Labs   Lab 08/07/20  0430 08/08/20  0330   WBC 8.00 8.59   HGB 10.4* 10.4*   HCT 33.5* 34.4*    244   MCV 88 89   RDW 13.6 13.9        Chemistries:  Recent Labs   Lab 08/07/20  0430 08/08/20  0330    136   K 3.9 4.8    107   CO2 23 21*   BUN 23 24*   CREATININE 0.9 1.0   CALCIUM 8.5* 8.1*   ALBUMIN 1.7* 1.7*   PROT 6.3 6.7   BILITOT 0.3 0.3   ALKPHOS 66 71   * 96*   AST 69* 66*   MG 2.0  2.0 1.9  1.9   PHOS 2.3* 2.7         Significant Imaging:  I have reviewed all pertinent imaging results/findings within the past 24 hours.

## 2020-08-08 NOTE — PLAN OF CARE
Ketamine weined off, titrate prop, fentanyl and precedex to updated orders.  Tolerating tube feeds, vent settins weined from rate of 36 to rate of 30, wife updated multiple times.

## 2020-08-08 NOTE — ASSESSMENT & PLAN NOTE
Anup Miller is a 70 y.o. male patient with a PMHx of HTN, HLD, and DM who presents to the [place] [facility] for shortness of breath and found to be COVID positive. Was started on IV dexamethasone, Ceftriaxone, and Azithromycin. Patient transferred to [facility] on evening of 7/26/20 for higher level of care. Labs on arrival to [facility] remarkable for WBC 9.8, Procal 1.3, D-dimer 1.1, Ferritin 2,300, , and . Given 1 L LR on arrival to [facility]    - Remdesivir complete  - Therapeutic lovenox for hypercoagulable state in COVID  - IV Dexamethasone continues (total of 10 days, end date 08/05)  - Completed Ceftriaxone and Azithromycin for possible superimposed bacterial pneumonia  - f/u blood and sputum cx - NGTD  - trend lactate - nl  - Was previously paralyzed, proned, sedated. Nimbex restarted on 8/1 and discontinued on 08/04. Wean sedation as tolerated. Currently on fentanyl, propofol, precedex. On a little levophed due to heavy sedation. Added zyprexa on 08/07 in attempt to wean sedation.  - Consider PEG/trach as patient day 14 of vent

## 2020-08-09 LAB
ALBUMIN SERPL BCP-MCNC: 1.6 G/DL (ref 3.5–5.2)
ALLENS TEST: ABNORMAL
ALP SERPL-CCNC: 67 U/L (ref 55–135)
ALT SERPL W/O P-5'-P-CCNC: 79 U/L (ref 10–44)
ANION GAP SERPL CALC-SCNC: 6 MMOL/L (ref 8–16)
AST SERPL-CCNC: 42 U/L (ref 10–40)
BASOPHILS # BLD AUTO: 0.01 K/UL (ref 0–0.2)
BASOPHILS NFR BLD: 0.2 % (ref 0–1.9)
BILIRUB SERPL-MCNC: 0.2 MG/DL (ref 0.1–1)
BUN SERPL-MCNC: 23 MG/DL (ref 8–23)
CALCIUM SERPL-MCNC: 8.2 MG/DL (ref 8.7–10.5)
CHLORIDE SERPL-SCNC: 108 MMOL/L (ref 95–110)
CO2 SERPL-SCNC: 24 MMOL/L (ref 23–29)
CREAT SERPL-MCNC: 0.8 MG/DL (ref 0.5–1.4)
DELSYS: ABNORMAL
DIFFERENTIAL METHOD: ABNORMAL
EOSINOPHIL # BLD AUTO: 0.1 K/UL (ref 0–0.5)
EOSINOPHIL NFR BLD: 1.5 % (ref 0–8)
ERYTHROCYTE [DISTWIDTH] IN BLOOD BY AUTOMATED COUNT: 14.1 % (ref 11.5–14.5)
ERYTHROCYTE [SEDIMENTATION RATE] IN BLOOD BY WESTERGREN METHOD: 30 MM/H
EST. GFR  (AFRICAN AMERICAN): >60 ML/MIN/1.73 M^2
EST. GFR  (NON AFRICAN AMERICAN): >60 ML/MIN/1.73 M^2
FIO2: 50
GLUCOSE SERPL-MCNC: 312 MG/DL (ref 70–110)
HCO3 UR-SCNC: 26.3 MMOL/L (ref 24–28)
HCT VFR BLD AUTO: 31 % (ref 40–54)
HGB BLD-MCNC: 9.5 G/DL (ref 14–18)
IMM GRANULOCYTES # BLD AUTO: 0.05 K/UL (ref 0–0.04)
IMM GRANULOCYTES NFR BLD AUTO: 0.9 % (ref 0–0.5)
LYMPHOCYTES # BLD AUTO: 1.2 K/UL (ref 1–4.8)
LYMPHOCYTES NFR BLD: 19.6 % (ref 18–48)
MAGNESIUM SERPL-MCNC: 1.9 MG/DL (ref 1.6–2.6)
MCH RBC QN AUTO: 27.4 PG (ref 27–31)
MCHC RBC AUTO-ENTMCNC: 30.6 G/DL (ref 32–36)
MCV RBC AUTO: 89 FL (ref 82–98)
MIN VOL: 12.6
MODE: ABNORMAL
MONOCYTES # BLD AUTO: 0.8 K/UL (ref 0.3–1)
MONOCYTES NFR BLD: 14 % (ref 4–15)
NEUTROPHILS # BLD AUTO: 3.8 K/UL (ref 1.8–7.7)
NEUTROPHILS NFR BLD: 63.8 % (ref 38–73)
NRBC BLD-RTO: 0 /100 WBC
PCO2 BLDA: 38.3 MMHG (ref 35–45)
PEEP: 8
PH SMN: 7.45 [PH] (ref 7.35–7.45)
PHOSPHATE SERPL-MCNC: 2.9 MG/DL (ref 2.7–4.5)
PLATELET # BLD AUTO: 245 K/UL (ref 150–350)
PMV BLD AUTO: 11.8 FL (ref 9.2–12.9)
PO2 BLDA: 79 MMHG (ref 80–100)
POC BE: 2 MMOL/L
POC SATURATED O2: 96 % (ref 95–100)
POC TCO2: 27 MMOL/L (ref 23–27)
POCT GLUCOSE: 180 MG/DL (ref 70–110)
POCT GLUCOSE: 272 MG/DL (ref 70–110)
POCT GLUCOSE: 296 MG/DL (ref 70–110)
POCT GLUCOSE: 297 MG/DL (ref 70–110)
POCT GLUCOSE: 332 MG/DL (ref 70–110)
POTASSIUM SERPL-SCNC: 4 MMOL/L (ref 3.5–5.1)
PROT SERPL-MCNC: 6 G/DL (ref 6–8.4)
RBC # BLD AUTO: 3.47 M/UL (ref 4.6–6.2)
SAMPLE: ABNORMAL
SITE: ABNORMAL
SODIUM SERPL-SCNC: 138 MMOL/L (ref 136–145)
SP02: 100
VT: 400
WBC # BLD AUTO: 5.87 K/UL (ref 3.9–12.7)

## 2020-08-09 PROCEDURE — 99291 CRITICAL CARE FIRST HOUR: CPT | Mod: ,,, | Performed by: INTERNAL MEDICINE

## 2020-08-09 PROCEDURE — 25000003 PHARM REV CODE 250: Performed by: STUDENT IN AN ORGANIZED HEALTH CARE EDUCATION/TRAINING PROGRAM

## 2020-08-09 PROCEDURE — 82803 BLOOD GASES ANY COMBINATION: CPT

## 2020-08-09 PROCEDURE — 63600175 PHARM REV CODE 636 W HCPCS: Performed by: STUDENT IN AN ORGANIZED HEALTH CARE EDUCATION/TRAINING PROGRAM

## 2020-08-09 PROCEDURE — 63600175 PHARM REV CODE 636 W HCPCS: Performed by: INTERNAL MEDICINE

## 2020-08-09 PROCEDURE — 99900026 HC AIRWAY MAINTENANCE (STAT)

## 2020-08-09 PROCEDURE — 83735 ASSAY OF MAGNESIUM: CPT

## 2020-08-09 PROCEDURE — 25000003 PHARM REV CODE 250: Performed by: PHYSICIAN ASSISTANT

## 2020-08-09 PROCEDURE — 37799 UNLISTED PX VASCULAR SURGERY: CPT

## 2020-08-09 PROCEDURE — 27000221 HC OXYGEN, UP TO 24 HOURS

## 2020-08-09 PROCEDURE — 20000000 HC ICU ROOM

## 2020-08-09 PROCEDURE — 80053 COMPREHEN METABOLIC PANEL: CPT

## 2020-08-09 PROCEDURE — 85025 COMPLETE CBC W/AUTO DIFF WBC: CPT

## 2020-08-09 PROCEDURE — 25000003 PHARM REV CODE 250: Performed by: INTERNAL MEDICINE

## 2020-08-09 PROCEDURE — 94003 VENT MGMT INPAT SUBQ DAY: CPT

## 2020-08-09 PROCEDURE — 94761 N-INVAS EAR/PLS OXIMETRY MLT: CPT

## 2020-08-09 PROCEDURE — 99900035 HC TECH TIME PER 15 MIN (STAT)

## 2020-08-09 PROCEDURE — 99291 PR CRITICAL CARE, E/M 30-74 MINUTES: ICD-10-PCS | Mod: ,,, | Performed by: INTERNAL MEDICINE

## 2020-08-09 PROCEDURE — 84100 ASSAY OF PHOSPHORUS: CPT

## 2020-08-09 RX ADMIN — INSULIN ASPART 12 UNITS: 100 INJECTION, SOLUTION INTRAVENOUS; SUBCUTANEOUS at 08:08

## 2020-08-09 RX ADMIN — INSULIN ASPART 12 UNITS: 100 INJECTION, SOLUTION INTRAVENOUS; SUBCUTANEOUS at 12:08

## 2020-08-09 RX ADMIN — PROPOFOL 20 MCG/KG/MIN: 10 INJECTION, EMULSION INTRAVENOUS at 12:08

## 2020-08-09 RX ADMIN — DEXMEDETOMIDINE HYDROCHLORIDE 0.8 MCG/KG/HR: 100 INJECTION, SOLUTION, CONCENTRATE INTRAVENOUS at 05:08

## 2020-08-09 RX ADMIN — INSULIN ASPART 12 UNITS: 100 INJECTION, SOLUTION INTRAVENOUS; SUBCUTANEOUS at 09:08

## 2020-08-09 RX ADMIN — ROSUVASTATIN CALCIUM 10 MG: 10 TABLET, FILM COATED ORAL at 08:08

## 2020-08-09 RX ADMIN — DEXMEDETOMIDINE HYDROCHLORIDE 0.6 MCG/KG/HR: 100 INJECTION, SOLUTION, CONCENTRATE INTRAVENOUS at 07:08

## 2020-08-09 RX ADMIN — INSULIN ASPART 12 UNITS: 100 INJECTION, SOLUTION INTRAVENOUS; SUBCUTANEOUS at 03:08

## 2020-08-09 RX ADMIN — FAMOTIDINE 20 MG: 20 TABLET ORAL at 08:08

## 2020-08-09 RX ADMIN — Medication 250 MCG/HR: at 07:08

## 2020-08-09 RX ADMIN — OLANZAPINE 5 MG: 2.5 TABLET, FILM COATED ORAL at 08:08

## 2020-08-09 RX ADMIN — Medication 250 MCG/HR: at 12:08

## 2020-08-09 RX ADMIN — PROPOFOL 40 MCG/KG/MIN: 10 INJECTION, EMULSION INTRAVENOUS at 12:08

## 2020-08-09 RX ADMIN — DEXMEDETOMIDINE HYDROCHLORIDE 0.6 MCG/KG/HR: 100 INJECTION, SOLUTION, CONCENTRATE INTRAVENOUS at 12:08

## 2020-08-09 RX ADMIN — Medication 250 MCG/HR: at 11:08

## 2020-08-09 RX ADMIN — PROPOFOL 40 MCG/KG/MIN: 10 INJECTION, EMULSION INTRAVENOUS at 07:08

## 2020-08-09 RX ADMIN — PROPOFOL 40 MCG/KG/MIN: 10 INJECTION, EMULSION INTRAVENOUS at 04:08

## 2020-08-09 RX ADMIN — STANDARDIZED SENNA CONCENTRATE AND DOCUSATE SODIUM 1 TABLET: 8.6; 5 TABLET ORAL at 08:08

## 2020-08-09 RX ADMIN — DEXMEDETOMIDINE HYDROCHLORIDE 0.6 MCG/KG/HR: 100 INJECTION, SOLUTION, CONCENTRATE INTRAVENOUS at 10:08

## 2020-08-09 RX ADMIN — ENOXAPARIN SODIUM 90 MG: 100 INJECTION SUBCUTANEOUS at 08:08

## 2020-08-09 RX ADMIN — PROPOFOL 40 MCG/KG/MIN: 10 INJECTION, EMULSION INTRAVENOUS at 08:08

## 2020-08-09 RX ADMIN — POLYETHYLENE GLYCOL 3350 17 G: 17 POWDER, FOR SOLUTION ORAL at 08:08

## 2020-08-09 NOTE — PROGRESS NOTES
[facility] - ICU 16   Critical Care Medicine  Progress Note    Patient Name: Anup Miller  MRN: [id number]  Admission Date: 7/26/2020  Hospital Length of Stay: 14 days  Code Status: Full Code  Attending Provider: Jimenez Frank MD  Primary Care Provider: Bryce Gonzales MD   Principal Problem: Pneumonia due to COVID-19 virus    Subjective:     HPI:  Anup Miller is a 70 y.o. male patient with a PMHx of HTN, HLD, and DM who presents to the [place] [facility] for evaluation of SOB which  1 week ago. Pt became more SOB today and has an O2 sat of low 70s upon arrival on RA. Symptoms are worsening and moderate in severity. No mitigating or exacerbating factors reported. Associated sxs include cough and fever. Patient denies any congestion, sore throat, CP, abd pain, N/V, back pain, HA, weakness, and all other sxs at this time. No prior Tx reported. No further complaints or concerns at this time. Patient was placed on Bipap for a few hours and subsequently intubated. COVID positive. Was started on IV dexamethasone, Ceftriaxone, and Azithromycin. [place] [facility] [place] for higher level of care.     Hospital/ICU Course:  As of 7/29, the patient's oxygenation continued to worsen with P:F <150. R IJ and Arterial Line placed. Pt sedated, paralyzed, and proned at 9:30pm with improved oxygenation. Repeat AM gas on 7/30 Arterial Blood Gas result:  pO2 112; pCO2 58.9; pH 7.256;  HCO3 26.2, %O2 Sat 97%. FiO2 60, 8 PEEP. Pt supinated at 4:10 pm on 7/30 with Arterial Blood Gas result:  pO2 106; pCO2 52.2; pH 7.349;  HCO3 28.7, %O2 Sat 106. (P:F 212). FiO2 50, 10 PEEP.    As of 7/31, paralytic d/c'ed and sedation slowly weaned. Propofol d/c'ed on 8/1 2/2 triglycerides with ketamine and versed initiated. Now requiring higher vent settings due to dyssynchrony, will continue to increase sedation. Patient paralyzed again on 8/1 for vent dyssynchrony. Nimbex was  discontinued on 08/03 and sedative medications were increased, however, patient struggled to pull enough tidal volume and was desatting even on maximum sedation so nimbex was restarted. Nimbex was stopped on 08/04 and restarted propofol. As patient became more agitated, ketamine was added on 08/06. It was discussed with family of possible PEG/trach. Sedation was attempted to be weaned off by adding seroquel and zyprexa. Versed and ketamine were weaned off.    8/9: remains off of ketamine and versed after addition and zyprexa. Only on precedex and fentanyl for sedation. Off of levo as of this morning. No other overnight events.     Interval History/Significant Events: See hospital course    Review of Systems   Unable to perform ROS: Intubated     Objective:     Vital Signs (Most Recent):  Temp: 98 °F (36.7 °C) (08/09/20 1500)  Pulse: 79 (08/09/20 1500)  Resp: (!) 32 (08/09/20 1500)  BP: (!) 98/55 (08/09/20 1500)  SpO2: 99 % (08/09/20 1500) Vital Signs (24h Range):  Temp:  [94.6 °F (34.8 °C)-98.4 °F (36.9 °C)] 98 °F (36.7 °C)  Pulse:  [59-84] 79  Resp:  [30-38] 32  SpO2:  [93 %-100 %] 99 %  BP: ()/(54-68) 98/55  Arterial Line BP: ()/(46-61) 128/56   Weight: 109 kg (240 lb 4.8 oz)(Bed scale)  Body mass index is 35.49 kg/m².      Intake/Output Summary (Last 24 hours) at 8/9/2020 1517  Last data filed at 8/9/2020 1500  Gross per 24 hour   Intake 3306.1 ml   Output 2400 ml   Net 906.1 ml       Physical Exam  Vitals signs and nursing note reviewed.   Constitutional:       Appearance: He is obese.   HENT:      Head: Normocephalic.      Mouth/Throat:      Comments: intubated  Eyes:      General:         Right eye: No discharge.         Left eye: No discharge.   Cardiovascular:      Rate and Rhythm: Tachycardia present.      Comments: Did not auscultate due to covid precautions  Pulmonary:      Comments: Did not auscultate due to covid precautions  Abdominal:      Palpations: Abdomen is soft.      Tenderness:  There is no guarding.   Musculoskeletal:         General: No swelling.   Skin:     General: Skin is warm and dry.   Neurological:      Comments: sedated         Vents:  Vent Mode: A/C (08/09/20 1232)  Ventilator Initiated: Yes(chart correction) (07/26/20 2108)  Set Rate: 30 BPM (08/09/20 1232)  Vt Set: 400 mL (08/09/20 1232)  Pressure Support: 0 cmH20 (08/09/20 1232)  PEEP/CPAP: 8 cmH20 (08/09/20 1232)  Oxygen Concentration (%): 50 (08/09/20 1500)  Peak Airway Pressure: 29 cmH2O (08/09/20 1232)  Plateau Pressure: 25 cmH20 (08/09/20 1232)  Total Ve: 12.6 mL (08/09/20 1232)  F/VT Ratio<105 (RSBI): (!) 66.82 (08/09/20 1232)  Lines/Drains/Airways     Central Venous Catheter Line            Percutaneous Central Line Insertion/Assessment - Triple Lumen  07/29/20 1729 right internal jugular 10 days          Drain                 NG/OG Tube 07/26/20 1756 [place] sump;orogastric 18 Fr. Center mouth 13 days         Urethral Catheter 07/26/20 2205 16 Fr. 13 days          Airway                 Airway - Non-Surgical 07/26/20 1724 Endotracheal Tube 13 days          Arterial Line                 Arterial Line 07/29/20 1700 Right Radial 10 days          Peripheral Intravenous Line                 Peripheral IV - Single Lumen 07/31/20 1508 18 G Anterior;Right Forearm 9 days         Peripheral IV - Single Lumen 08/06/20 0552 20 G Anterior;Left Wrist 3 days              Significant Labs:    CBC/Anemia Profile:  Recent Labs   Lab 08/08/20  0330 08/09/20  0330   WBC 8.59 5.87   HGB 10.4* 9.5*   HCT 34.4* 31.0*    245   MCV 89 89   RDW 13.9 14.1        Chemistries:  Recent Labs   Lab 08/08/20  0330 08/09/20  0330    138   K 4.8 4.0    108   CO2 21* 24   BUN 24* 23   CREATININE 1.0 0.8   CALCIUM 8.1* 8.2*   ALBUMIN 1.7* 1.6*   PROT 6.7 6.0   BILITOT 0.3 0.2   ALKPHOS 71 67   ALT 96* 79*   AST 66* 42*   MG 1.9  1.9 1.9   PHOS 2.7 2.9             ABG  Recent Labs   Lab 08/09/20  1559   PH 7.445   PO2 79*   PCO2 38.3    HCO3 26.3   BE 2     Assessment/Plan:     Pulmonary  Acute hypoxemic respiratory failure  - see Pneumonia due to COVID 19    Cardiac/Vascular  Hyperlipidemia associated with type 2 diabetes mellitus  - Restart Crestor once extubated     Hypertension associated with diabetes  - Hold antihypertensives in setting of shock  - Resume as tolerated    Endocrine  Uncontrolled type 2 diabetes mellitus  - A1c 8.3%  - started on insulin gtt on 08/05 due to elevated BG but discontinued 08/07  - Aspart 12U Q4H  - BG goal 140-180    GI  Elevated liver enzymes  - Hx of elevated LFTs dating back to 2015  - Acute hepatitis panel and HIV - negative  - Trend CMP daily    Other  * Pneumonia due to COVID-19 virus  Anup Miller is a 70 y.o. male patient with a PMHx of HTN, HLD, and DM who presents to the [place] [facility] for shortness of breath and found to be COVID positive. Was started on IV dexamethasone, Ceftriaxone, and Azithromycin. Patient [place] [facility] [place] of 7/26/20 for higher level of care. Labs on arrival to [facility] remarkable for WBC 9.8, Procal 1.3, D-dimer 1.1, Ferritin 2,300, , and . Given 1 L LR on arrival to [facility]    - Remdesivir complete  - Therapeutic lovenox for hypercoagulable state in COVID  - IV Dexamethasone continues (total of 10 days, end date 08/05)  - Completed Ceftriaxone and Azithromycin for possible superimposed bacterial pneumonia  - f/u blood and sputum cx - NGTD  - trend lactate - nl  - Was previously paralyzed, proned, sedated. Nimbex restarted on 8/1 and discontinued on 08/04. Wean sedation as tolerated. Currently weaned down to fentanyl (250), dex (0.8), prop 40.  Added zyprexa on 08/07 in attempt to wean sedation. No levo today with good MAPS in the 60's this morning.   - Will try to turn off propofol and assess pt's effort. He is currently obtunded but vent support is coming down and pt may be a candidate for extubation.         Critical secondary to  Patient has a condition that poses threat to life and bodily function: acute respiratory failure secondary to covid     Critical care was time spent personally by me on the following activities: development of treatment plan with patient or surrogate and bedside caregivers, discussions with consultants, evaluation of patient's response to treatment, examination of patient, ordering and performing treatments and interventions, ordering and review of laboratory studies, ordering and review of radiographic studies, pulse oximetry, re-evaluation of patient's condition. This critical care time did not overlap with that of any other provider or involve time for any procedures.     Yanet Arriola MD  Critical Care Medicine  [facility] - ICU 16 [facility]

## 2020-08-09 NOTE — SUBJECTIVE & OBJECTIVE
Interval History/Significant Events: See hospital course    Review of Systems   Unable to perform ROS: Intubated     Objective:     Vital Signs (Most Recent):  Temp: 98 °F (36.7 °C) (08/09/20 1500)  Pulse: 79 (08/09/20 1500)  Resp: (!) 32 (08/09/20 1500)  BP: (!) 98/55 (08/09/20 1500)  SpO2: 99 % (08/09/20 1500) Vital Signs (24h Range):  Temp:  [94.6 °F (34.8 °C)-98.4 °F (36.9 °C)] 98 °F (36.7 °C)  Pulse:  [59-84] 79  Resp:  [30-38] 32  SpO2:  [93 %-100 %] 99 %  BP: ()/(54-68) 98/55  Arterial Line BP: ()/(46-61) 128/56   Weight: 109 kg (240 lb 4.8 oz)(Bed scale)  Body mass index is 35.49 kg/m².      Intake/Output Summary (Last 24 hours) at 8/9/2020 1517  Last data filed at 8/9/2020 1500  Gross per 24 hour   Intake 3306.1 ml   Output 2400 ml   Net 906.1 ml       Physical Exam  Vitals signs and nursing note reviewed.   Constitutional:       Appearance: He is obese.   HENT:      Head: Normocephalic.      Mouth/Throat:      Comments: intubated  Eyes:      General:         Right eye: No discharge.         Left eye: No discharge.   Cardiovascular:      Rate and Rhythm: Tachycardia present.      Comments: Did not auscultate due to covid precautions  Pulmonary:      Comments: Did not auscultate due to covid precautions  Abdominal:      Palpations: Abdomen is soft.      Tenderness: There is no guarding.   Musculoskeletal:         General: No swelling.   Skin:     General: Skin is warm and dry.   Neurological:      Comments: sedated         Vents:  Vent Mode: A/C (08/09/20 1232)  Ventilator Initiated: Yes(chart correction) (07/26/20 2108)  Set Rate: 30 BPM (08/09/20 1232)  Vt Set: 400 mL (08/09/20 1232)  Pressure Support: 0 cmH20 (08/09/20 1232)  PEEP/CPAP: 8 cmH20 (08/09/20 1232)  Oxygen Concentration (%): 50 (08/09/20 1500)  Peak Airway Pressure: 29 cmH2O (08/09/20 1232)  Plateau Pressure: 25 cmH20 (08/09/20 1232)  Total Ve: 12.6 mL (08/09/20 1232)  F/VT Ratio<105 (RSBI): (!) 66.82 (08/09/20  1232)  Lines/Drains/Airways     Central Venous Catheter Line            Percutaneous Central Line Insertion/Assessment - Triple Lumen  07/29/20 1729 right internal jugular 10 days          Drain                 NG/OG Tube 07/26/20 1756 [place] sump;orogastric 18 Fr. Center mouth 13 days         Urethral Catheter 07/26/20 2205 16 Fr. 13 days          Airway                 Airway - Non-Surgical 07/26/20 1724 Endotracheal Tube 13 days          Arterial Line                 Arterial Line 07/29/20 1700 Right Radial 10 days          Peripheral Intravenous Line                 Peripheral IV - Single Lumen 07/31/20 1508 18 G Anterior;Right Forearm 9 days         Peripheral IV - Single Lumen 08/06/20 0552 20 G Anterior;Left Wrist 3 days              Significant Labs:    CBC/Anemia Profile:  Recent Labs   Lab 08/08/20  0330 08/09/20  0330   WBC 8.59 5.87   HGB 10.4* 9.5*   HCT 34.4* 31.0*    245   MCV 89 89   RDW 13.9 14.1        Chemistries:  Recent Labs   Lab 08/08/20  0330 08/09/20  0330    138   K 4.8 4.0    108   CO2 21* 24   BUN 24* 23   CREATININE 1.0 0.8   CALCIUM 8.1* 8.2*   ALBUMIN 1.7* 1.6*   PROT 6.7 6.0   BILITOT 0.3 0.2   ALKPHOS 71 67   ALT 96* 79*   AST 66* 42*   MG 1.9  1.9 1.9   PHOS 2.7 2.9

## 2020-08-09 NOTE — NURSING
1650 proprofol turned off as instructed by MD for possible extubation. 1717   Dr. Arriola notified patient was awake, BP increased, eyes open and starting to cough.  1720 Dr. Arriola to bedside, patient with strong cough, follows commands SBP  Noted to be as high as 230. Attending MD with family and not available at this time.  Dr. Arriola remained at bedside. Patient cough became for violent,  proprofol turned back on at 1750.

## 2020-08-09 NOTE — ASSESSMENT & PLAN NOTE
Anup Miller is a 70 y.o. male patient with a PMHx of HTN, HLD, and DM who presents to the [place] [facility] for shortness of breath and found to be COVID positive. Was started on IV dexamethasone, Ceftriaxone, and Azithromycin. Patient transferred to [facility] [place] on evening of 7/26/20 for higher level of care. Labs on arrival to [facility] remarkable for WBC 9.8, Procal 1.3, D-dimer 1.1, Ferritin 2,300, , and . Given 1 L LR on arrival to [facility]    - Remdesivir complete  - Therapeutic lovenox for hypercoagulable state in COVID  - IV Dexamethasone continues (total of 10 days, end date 08/05)  - Completed Ceftriaxone and Azithromycin for possible superimposed bacterial pneumonia  - f/u blood and sputum cx - NGTD  - trend lactate - nl  - Was previously paralyzed, proned, sedated. Nimbex restarted on 8/1 and discontinued on 08/04. Wean sedation as tolerated. Currently weaned down to fentanyl (250), dex (0.8), prop 40.  Added zyprexa on 08/07 in attempt to wean sedation. No levo today with good MAPS in the 60's this morning.   - Will try to turn off propofol and assess pt's effort. He is currently obtunded but vent support is coming down and pt may be a candidate for extubation.

## 2020-08-09 NOTE — PLAN OF CARE
Able to jonathan sedation slightly, tolerating tube feeds, AC q 4 hours with insulin coverage, see flowsheet for assessment and titrations

## 2020-08-10 LAB
ALBUMIN SERPL BCP-MCNC: 1.6 G/DL (ref 3.5–5.2)
ALLENS TEST: ABNORMAL
ALP SERPL-CCNC: 68 U/L (ref 55–135)
ALT SERPL W/O P-5'-P-CCNC: 65 U/L (ref 10–44)
ANION GAP SERPL CALC-SCNC: 8 MMOL/L (ref 8–16)
AST SERPL-CCNC: 42 U/L (ref 10–40)
BASOPHILS # BLD AUTO: 0.01 K/UL (ref 0–0.2)
BASOPHILS NFR BLD: 0.1 % (ref 0–1.9)
BILIRUB SERPL-MCNC: 0.2 MG/DL (ref 0.1–1)
BUN SERPL-MCNC: 17 MG/DL (ref 8–23)
CALCIUM SERPL-MCNC: 8 MG/DL (ref 8.7–10.5)
CHLORIDE SERPL-SCNC: 107 MMOL/L (ref 95–110)
CO2 SERPL-SCNC: 22 MMOL/L (ref 23–29)
CREAT SERPL-MCNC: 0.8 MG/DL (ref 0.5–1.4)
DELSYS: ABNORMAL
DIFFERENTIAL METHOD: ABNORMAL
EOSINOPHIL # BLD AUTO: 0.1 K/UL (ref 0–0.5)
EOSINOPHIL NFR BLD: 0.9 % (ref 0–8)
ERYTHROCYTE [DISTWIDTH] IN BLOOD BY AUTOMATED COUNT: 13.7 % (ref 11.5–14.5)
ERYTHROCYTE [SEDIMENTATION RATE] IN BLOOD BY WESTERGREN METHOD: 26 MM/H
EST. GFR  (AFRICAN AMERICAN): >60 ML/MIN/1.73 M^2
EST. GFR  (NON AFRICAN AMERICAN): >60 ML/MIN/1.73 M^2
FIO2: 40
GLUCOSE SERPL-MCNC: 218 MG/DL (ref 70–110)
HCO3 UR-SCNC: 16.6 MMOL/L (ref 24–28)
HCT VFR BLD AUTO: 29.6 % (ref 40–54)
HGB BLD-MCNC: 9.1 G/DL (ref 14–18)
IMM GRANULOCYTES # BLD AUTO: 0.04 K/UL (ref 0–0.04)
IMM GRANULOCYTES NFR BLD AUTO: 0.5 % (ref 0–0.5)
LYMPHOCYTES # BLD AUTO: 0.9 K/UL (ref 1–4.8)
LYMPHOCYTES NFR BLD: 11.3 % (ref 18–48)
MAGNESIUM SERPL-MCNC: 1.8 MG/DL (ref 1.6–2.6)
MCH RBC QN AUTO: 27.5 PG (ref 27–31)
MCHC RBC AUTO-ENTMCNC: 30.7 G/DL (ref 32–36)
MCV RBC AUTO: 89 FL (ref 82–98)
MODE: ABNORMAL
MONOCYTES # BLD AUTO: 1.1 K/UL (ref 0.3–1)
MONOCYTES NFR BLD: 14.4 % (ref 4–15)
NEUTROPHILS # BLD AUTO: 5.5 K/UL (ref 1.8–7.7)
NEUTROPHILS NFR BLD: 72.8 % (ref 38–73)
NRBC BLD-RTO: 0 /100 WBC
PCO2 BLDA: 28.1 MMHG (ref 35–45)
PEEP: 8
PH SMN: 7.38 [PH] (ref 7.35–7.45)
PHOSPHATE SERPL-MCNC: 2.9 MG/DL (ref 2.7–4.5)
PLATELET # BLD AUTO: 266 K/UL (ref 150–350)
PMV BLD AUTO: 11.7 FL (ref 9.2–12.9)
PO2 BLDA: 76 MMHG (ref 80–100)
POC BE: -9 MMOL/L
POC SATURATED O2: 95 % (ref 95–100)
POC TCO2: 17 MMOL/L (ref 23–27)
POCT GLUCOSE: 183 MG/DL (ref 70–110)
POCT GLUCOSE: 214 MG/DL (ref 70–110)
POCT GLUCOSE: 215 MG/DL (ref 70–110)
POCT GLUCOSE: 222 MG/DL (ref 70–110)
POCT GLUCOSE: 248 MG/DL (ref 70–110)
POCT GLUCOSE: 263 MG/DL (ref 70–110)
POTASSIUM SERPL-SCNC: 3.9 MMOL/L (ref 3.5–5.1)
PROT SERPL-MCNC: 5.9 G/DL (ref 6–8.4)
RBC # BLD AUTO: 3.31 M/UL (ref 4.6–6.2)
SAMPLE: ABNORMAL
SITE: ABNORMAL
SODIUM SERPL-SCNC: 137 MMOL/L (ref 136–145)
VT: 400
WBC # BLD AUTO: 7.58 K/UL (ref 3.9–12.7)

## 2020-08-10 PROCEDURE — 99900035 HC TECH TIME PER 15 MIN (STAT)

## 2020-08-10 PROCEDURE — 25000003 PHARM REV CODE 250: Performed by: STUDENT IN AN ORGANIZED HEALTH CARE EDUCATION/TRAINING PROGRAM

## 2020-08-10 PROCEDURE — 82800 BLOOD PH: CPT

## 2020-08-10 PROCEDURE — 94761 N-INVAS EAR/PLS OXIMETRY MLT: CPT

## 2020-08-10 PROCEDURE — 99900026 HC AIRWAY MAINTENANCE (STAT)

## 2020-08-10 PROCEDURE — 27200966 HC CLOSED SUCTION SYSTEM

## 2020-08-10 PROCEDURE — 37799 UNLISTED PX VASCULAR SURGERY: CPT

## 2020-08-10 PROCEDURE — 63600175 PHARM REV CODE 636 W HCPCS: Performed by: STUDENT IN AN ORGANIZED HEALTH CARE EDUCATION/TRAINING PROGRAM

## 2020-08-10 PROCEDURE — 63600175 PHARM REV CODE 636 W HCPCS: Performed by: INTERNAL MEDICINE

## 2020-08-10 PROCEDURE — 99291 PR CRITICAL CARE, E/M 30-74 MINUTES: ICD-10-PCS | Mod: ,,, | Performed by: INTERNAL MEDICINE

## 2020-08-10 PROCEDURE — 80053 COMPREHEN METABOLIC PANEL: CPT

## 2020-08-10 PROCEDURE — 82803 BLOOD GASES ANY COMBINATION: CPT

## 2020-08-10 PROCEDURE — 27000221 HC OXYGEN, UP TO 24 HOURS

## 2020-08-10 PROCEDURE — 85025 COMPLETE CBC W/AUTO DIFF WBC: CPT

## 2020-08-10 PROCEDURE — 94003 VENT MGMT INPAT SUBQ DAY: CPT

## 2020-08-10 PROCEDURE — 25000003 PHARM REV CODE 250: Performed by: PHYSICIAN ASSISTANT

## 2020-08-10 PROCEDURE — 84100 ASSAY OF PHOSPHORUS: CPT

## 2020-08-10 PROCEDURE — 99291 CRITICAL CARE FIRST HOUR: CPT | Mod: ,,, | Performed by: INTERNAL MEDICINE

## 2020-08-10 PROCEDURE — 83735 ASSAY OF MAGNESIUM: CPT

## 2020-08-10 PROCEDURE — 31615 TRCHEOBRNCHSC EST TRACHS INC: CPT

## 2020-08-10 PROCEDURE — 20000000 HC ICU ROOM

## 2020-08-10 PROCEDURE — 25000003 PHARM REV CODE 250: Performed by: INTERNAL MEDICINE

## 2020-08-10 RX ORDER — POTASSIUM CHLORIDE 29.8 MG/ML
40 INJECTION INTRAVENOUS ONCE
Status: COMPLETED | OUTPATIENT
Start: 2020-08-10 | End: 2020-08-10

## 2020-08-10 RX ORDER — MAGNESIUM SULFATE HEPTAHYDRATE 40 MG/ML
2 INJECTION, SOLUTION INTRAVENOUS ONCE
Status: COMPLETED | OUTPATIENT
Start: 2020-08-10 | End: 2020-08-10

## 2020-08-10 RX ORDER — INSULIN ASPART 100 [IU]/ML
13 INJECTION, SOLUTION INTRAVENOUS; SUBCUTANEOUS
Status: DISCONTINUED | OUTPATIENT
Start: 2020-08-10 | End: 2020-08-11

## 2020-08-10 RX ADMIN — PROPOFOL 35 MCG/KG/MIN: 10 INJECTION, EMULSION INTRAVENOUS at 05:08

## 2020-08-10 RX ADMIN — DEXMEDETOMIDINE HYDROCHLORIDE 0.6 MCG/KG/HR: 100 INJECTION, SOLUTION, CONCENTRATE INTRAVENOUS at 05:08

## 2020-08-10 RX ADMIN — Medication 250 MCG/HR: at 05:08

## 2020-08-10 RX ADMIN — OLANZAPINE 5 MG: 2.5 TABLET, FILM COATED ORAL at 08:08

## 2020-08-10 RX ADMIN — INSULIN ASPART 12 UNITS: 100 INJECTION, SOLUTION INTRAVENOUS; SUBCUTANEOUS at 12:08

## 2020-08-10 RX ADMIN — POLYETHYLENE GLYCOL 3350 17 G: 17 POWDER, FOR SOLUTION ORAL at 10:08

## 2020-08-10 RX ADMIN — FAMOTIDINE 20 MG: 20 TABLET ORAL at 08:08

## 2020-08-10 RX ADMIN — MAGNESIUM SULFATE 2 G: 2 INJECTION INTRAVENOUS at 08:08

## 2020-08-10 RX ADMIN — ENOXAPARIN SODIUM 90 MG: 100 INJECTION SUBCUTANEOUS at 08:08

## 2020-08-10 RX ADMIN — PROPOFOL 30 MCG/KG/MIN: 10 INJECTION, EMULSION INTRAVENOUS at 12:08

## 2020-08-10 RX ADMIN — PROPOFOL 40 MCG/KG/MIN: 10 INJECTION, EMULSION INTRAVENOUS at 10:08

## 2020-08-10 RX ADMIN — PROPOFOL 45 MCG/KG/MIN: 10 INJECTION, EMULSION INTRAVENOUS at 03:08

## 2020-08-10 RX ADMIN — INSULIN ASPART 6 UNITS: 100 INJECTION, SOLUTION INTRAVENOUS; SUBCUTANEOUS at 02:08

## 2020-08-10 RX ADMIN — PROPOFOL 35 MCG/KG/MIN: 10 INJECTION, EMULSION INTRAVENOUS at 07:08

## 2020-08-10 RX ADMIN — STANDARDIZED SENNA CONCENTRATE AND DOCUSATE SODIUM 1 TABLET: 8.6; 5 TABLET ORAL at 10:08

## 2020-08-10 RX ADMIN — DEXMEDETOMIDINE HYDROCHLORIDE 0.6 MCG/KG/HR: 100 INJECTION, SOLUTION, CONCENTRATE INTRAVENOUS at 07:08

## 2020-08-10 RX ADMIN — ENOXAPARIN SODIUM 90 MG: 100 INJECTION SUBCUTANEOUS at 10:08

## 2020-08-10 RX ADMIN — POTASSIUM CHLORIDE 40 MEQ: 400 INJECTION, SOLUTION INTRAVENOUS at 04:08

## 2020-08-10 RX ADMIN — ROSUVASTATIN CALCIUM 10 MG: 10 TABLET, FILM COATED ORAL at 08:08

## 2020-08-10 RX ADMIN — FAMOTIDINE 20 MG: 20 TABLET ORAL at 10:08

## 2020-08-10 RX ADMIN — INSULIN ASPART 4 UNITS: 100 INJECTION, SOLUTION INTRAVENOUS; SUBCUTANEOUS at 11:08

## 2020-08-10 RX ADMIN — OLANZAPINE 5 MG: 2.5 TABLET, FILM COATED ORAL at 10:08

## 2020-08-10 RX ADMIN — INSULIN ASPART 12 UNITS: 100 INJECTION, SOLUTION INTRAVENOUS; SUBCUTANEOUS at 04:08

## 2020-08-10 RX ADMIN — INSULIN ASPART 13 UNITS: 100 INJECTION, SOLUTION INTRAVENOUS; SUBCUTANEOUS at 08:08

## 2020-08-10 RX ADMIN — INSULIN ASPART 4 UNITS: 100 INJECTION, SOLUTION INTRAVENOUS; SUBCUTANEOUS at 06:08

## 2020-08-10 RX ADMIN — DEXMEDETOMIDINE HYDROCHLORIDE 0.6 MCG/KG/HR: 100 INJECTION, SOLUTION, CONCENTRATE INTRAVENOUS at 03:08

## 2020-08-10 RX ADMIN — Medication 250 MCG/HR: at 08:08

## 2020-08-10 RX ADMIN — DEXMEDETOMIDINE HYDROCHLORIDE 0.9 MCG/KG/HR: 100 INJECTION, SOLUTION, CONCENTRATE INTRAVENOUS at 11:08

## 2020-08-10 RX ADMIN — STANDARDIZED SENNA CONCENTRATE AND DOCUSATE SODIUM 1 TABLET: 8.6; 5 TABLET ORAL at 08:08

## 2020-08-10 NOTE — PROGRESS NOTES
[facility] - ICU 16   Critical Care Medicine  Progress Note    Patient Name: Anup Miller  MRN: [id number]  Admission Date: 7/26/2020  Hospital Length of Stay: 15 days  Code Status: Full Code  Attending Provider: Jimenez Frank MD  Primary Care Provider: Bryce Gonzales MD   Principal Problem: Pneumonia due to COVID-19 virus    Subjective:     HPI:  Anup Miller is a 70 y.o. male patient with a PMHx of HTN, HLD, and DM who presents to the [place] [facility] for evaluation of SOB which  1 week ago. Pt became more SOB today and has an O2 sat of low 70s upon arrival on RA. Symptoms are worsening and moderate in severity. No mitigating or exacerbating factors reported. Associated sxs include cough and fever. Patient denies any congestion, sore throat, CP, abd pain, N/V, back pain, HA, weakness, and all other sxs at this time. No prior Tx reported. No further complaints or concerns at this time. Patient was placed on Bipap for a few hours and subsequently intubated. COVID positive. Was started on IV dexamethasone, Ceftriaxone, and Azithromycin. [place] [facility] [place] for higher level of care.     Hospital/ICU Course:  As of 7/29, the patient's oxygenation continued to worsen with P:F <150. R IJ and Arterial Line placed. Pt sedated, paralyzed, and proned at 9:30pm with improved oxygenation. Repeat AM gas on 7/30 Arterial Blood Gas result:  pO2 112; pCO2 58.9; pH 7.256;  HCO3 26.2, %O2 Sat 97%. FiO2 60, 8 PEEP. Pt supinated at 4:10 pm on 7/30 with Arterial Blood Gas result:  pO2 106; pCO2 52.2; pH 7.349;  HCO3 28.7, %O2 Sat 106. (P:F 212). FiO2 50, 10 PEEP.    As of 7/31, paralytic d/c'ed and sedation slowly weaned. Propofol d/c'ed on 8/1 2/2 triglycerides with ketamine and versed initiated. Now requiring higher vent settings due to dyssynchrony, will continue to increase sedation. Patient paralyzed again on 8/1 for vent dyssynchrony. Nimbex was  discontinued on 08/03 and sedative medications were increased, however, patient struggled to pull enough tidal volume and was desatting even on maximum sedation so nimbex was restarted. Nimbex was stopped on 08/04 and restarted propofol. As patient became more agitated, ketamine was added on 08/06. It was discussed with family of possible PEG/trach. Sedation was attempted to be weaned off by adding seroquel and zyprexa. Versed and ketamine were weaned off. Patient was weaned off of levo on morning of 08/09.     Interval History/Significant Events: No acute overnight events. Patient remains on precedex, fentanyl, propofol.    Review of Systems   Unable to perform ROS: Intubated     Objective:     Vital Signs (Most Recent):  Temp: 99.5 °F (37.5 °C) (08/10/20 0400)  Pulse: 76 (08/10/20 0726)  Resp: (!) 28 (08/10/20 0726)  BP: 131/69 (08/10/20 0600)  SpO2: 100 % (08/10/20 0726) Vital Signs (24h Range):  Temp:  [97.9 °F (36.6 °C)-99.9 °F (37.7 °C)] 99.5 °F (37.5 °C)  Pulse:  [] 76  Resp:  [28-59] 28  SpO2:  [90 %-100 %] 100 %  BP: ()/(53-70) 131/69  Arterial Line BP: ()/(48-72) 127/55   Weight: 109 kg (240 lb 4.8 oz)(Bed scale)  Body mass index is 35.49 kg/m².      Intake/Output Summary (Last 24 hours) at 8/10/2020 0818  Last data filed at 8/10/2020 0600  Gross per 24 hour   Intake 2012.56 ml   Output 3125 ml   Net -1112.44 ml       Physical Exam  Vitals signs and nursing note reviewed.   Constitutional:       Appearance: He is obese.   HENT:      Head: Normocephalic.      Mouth/Throat:      Comments: intubated  Eyes:      General:         Right eye: No discharge.         Left eye: No discharge.   Cardiovascular:      Rate and Rhythm: Regular rhythm. Tachycardia present.   Pulmonary:      Effort: No respiratory distress.      Comments: Intubated.  Abdominal:      Palpations: Abdomen is soft.      Tenderness: There is no abdominal tenderness.   Musculoskeletal:         General: No swelling.   Skin:      General: Skin is warm and dry.   Neurological:      Comments: sedated         Vents:  Vent Mode: A/C (08/10/20 0726)  Ventilator Initiated: Yes(chart correction) (07/26/20 2108)  Set Rate: 26 BPM (08/10/20 0726)  Vt Set: 400 mL (08/10/20 0726)  Pressure Support: 0 cmH20 (08/10/20 0726)  PEEP/CPAP: 8 cmH20 (08/10/20 0726)  Oxygen Concentration (%): 40 (08/10/20 0726)  Peak Airway Pressure: 29 cmH2O (08/10/20 0726)  Plateau Pressure: 26 cmH20 (08/10/20 0726)  Total Ve: 10.9 mL (08/10/20 0726)  F/VT Ratio<105 (RSBI): (!) 67.63 (08/10/20 0726)  Lines/Drains/Airways     Central Venous Catheter Line            Percutaneous Central Line Insertion/Assessment - Triple Lumen  07/29/20 1729 right internal jugular 11 days          Drain                 NG/OG Tube 07/26/20 1756 [place] sump;orogastric 18 Fr. Center mouth 14 days         Urethral Catheter 07/26/20 2205 16 Fr. 14 days          Airway                 Airway - Non-Surgical 07/26/20 1724 Endotracheal Tube 14 days          Arterial Line                 Arterial Line 07/29/20 1700 Right Radial 11 days          Peripheral Intravenous Line                 Peripheral IV - Single Lumen 07/31/20 1508 18 G Anterior;Right Forearm 9 days         Peripheral IV - Single Lumen 08/06/20 0552 20 G Anterior;Left Wrist 4 days              Significant Labs:    CBC/Anemia Profile:  Recent Labs   Lab 08/09/20  0330 08/10/20  0400   WBC 5.87 7.58   HGB 9.5* 9.1*   HCT 31.0* 29.6*    266   MCV 89 89   RDW 14.1 13.7        Chemistries:  Recent Labs   Lab 08/09/20  0330 08/10/20  0400    137   K 4.0 3.9    107   CO2 24 22*   BUN 23 17   CREATININE 0.8 0.8   CALCIUM 8.2* 8.0*   ALBUMIN 1.6* 1.6*   PROT 6.0 5.9*   BILITOT 0.2 0.2   ALKPHOS 67 68   ALT 79* 65*   AST 42* 42*   MG 1.9  --    PHOS 2.9 2.9       Significant Imaging:  I have reviewed all pertinent imaging results/findings within the past 24 hours.      AB  Recent Labs   Lab 08/10/20  0419   PH 7.379   PO2 76*    PCO2 28.1*   HCO3 16.6*   BE -9     Assessment/Plan:     Pulmonary  Acute hypoxemic respiratory failure  - see Pneumonia due to COVID 19    Cardiac/Vascular  Hyperlipidemia associated with type 2 diabetes mellitus  - Restart Crestor once extubated     Hypertension associated with diabetes  - Hold antihypertensives in setting of shock  - Resume as tolerated    Endocrine  Uncontrolled type 2 diabetes mellitus  - A1c 8.3%  - started on insulin gtt on 08/05 due to elevated BG but discontinued 08/07  - Aspart 13U Q4H  - BG goal 140-180    GI  Elevated liver enzymes  - Hx of elevated LFTs dating back to 2015  - Acute hepatitis panel and HIV - negative  - Trend CMP daily    Other  * Pneumonia due to COVID-19 virus  Anup Miller is a 70 y.o. male patient with a PMHx of HTN, HLD, and DM who presents to the [place] [facility] for shortness of breath and found to be COVID positive. Was started on IV dexamethasone, Ceftriaxone, and Azithromycin. Patient transferred to [facility] on evening of 7/26/20 for higher level of care. Labs on arrival to [facility] remarkable for WBC 9.8, Procal 1.3, D-dimer 1.1, Ferritin 2,300, , and . Given 1 L LR on arrival to [facility]    - Remdesivir complete  - Therapeutic lovenox for hypercoagulable state in COVID  - IV Dexamethasone continues (total of 10 days, end date 08/05)  - Completed Ceftriaxone and Azithromycin for possible superimposed bacterial pneumonia  - f/u blood and sputum cx - NGTD  - trend lactate - nl  - Was previously paralyzed, proned, sedated. Nimbex restarted on 8/1 and discontinued on 08/04. Wean sedation as tolerated. Currently weaned down to fentanyl, versed, propofol. Added zyprexa on 08/07 in attempt to wean sedation. Has been off levo since 08/09.  - Will try to turn down sedation and assess mental status. He is currently obtunded but vent support is coming down and pt may be a candidate for extubation.       Possible SBT today pending mental status upon  weaning sedation.      Critical Care Daily Checklist:    A: Awake: RASS Goal/Actual Goal: RASS Goal: -2-->light sedation  Actual: [place] Agitation Sedation Scale (RASS): Light sedation   B: Spontaneous Breathing Trial Performed? Spon. Breathing Trial Initiated?: Not initiated (08/06/20 0821)   C: SAT & SBT Coordinated?  No                      D: Delirium: CAM-ICU Overall CAM-ICU: Positive   E: Early Mobility Performed? No   F: Feeding Goal: Goals: Meet % EEN, EPN by RD f/u date  Status: Nutrition Goal Status: goal met   Current Diet Order   Procedures    Diet NPO      AS: Analgesia/Sedation Propofol, fentanyl, precedex   T: Thromboembolic Prophylaxis Therapeutic lovenox   H: HOB > 300 Yes   U: Stress Ulcer Prophylaxis (if needed) Famotidine   G: Glucose Control Monitor   B: Bowel Function     I: Indwelling Catheter (Lines & Harris) Necessity A line, RIJ, NG/OG, harris   D: De-escalation of Antimicrobials/Pharmacotherapies None    Plan for the day/ETD Wean sedation, SBT    Code Status:  Family/Goals of Care: Full Code  Will update family       Critical secondary to Patient has a condition that poses threat to life and bodily function: Severe Respiratory Distress      Critical care was time spent personally by me on the following activities: development of treatment plan with patient or surrogate and bedside caregivers, discussions with consultants, evaluation of patient's response to treatment, examination of patient, ordering and performing treatments and interventions, ordering and review of laboratory studies, ordering and review of radiographic studies, pulse oximetry, re-evaluation of patient's condition. This critical care time did not overlap with that of any other provider or involve time for any procedures.     Munira Croft MD  Critical Care Medicine  [facility] - ICU 16 [facility]

## 2020-08-10 NOTE — SUBJECTIVE & OBJECTIVE
Interval History/Significant Events: No acute overnight events. Patient remains on precedex, fentanyl, propofol.    Review of Systems   Unable to perform ROS: Intubated     Objective:     Vital Signs (Most Recent):  Temp: 99.5 °F (37.5 °C) (08/10/20 0400)  Pulse: 76 (08/10/20 0726)  Resp: (!) 28 (08/10/20 0726)  BP: 131/69 (08/10/20 0600)  SpO2: 100 % (08/10/20 0726) Vital Signs (24h Range):  Temp:  [97.9 °F (36.6 °C)-99.9 °F (37.7 °C)] 99.5 °F (37.5 °C)  Pulse:  [] 76  Resp:  [28-59] 28  SpO2:  [90 %-100 %] 100 %  BP: ()/(53-70) 131/69  Arterial Line BP: ()/(48-72) 127/55   Weight: 109 kg (240 lb 4.8 oz)(Bed scale)  Body mass index is 35.49 kg/m².      Intake/Output Summary (Last 24 hours) at 8/10/2020 0818  Last data filed at 8/10/2020 0600  Gross per 24 hour   Intake 2012.56 ml   Output 3125 ml   Net -1112.44 ml       Physical Exam  Vitals signs and nursing note reviewed.   Constitutional:       Appearance: He is obese.   HENT:      Head: Normocephalic.      Mouth/Throat:      Comments: intubated  Eyes:      General:         Right eye: No discharge.         Left eye: No discharge.   Cardiovascular:      Rate and Rhythm: Regular rhythm. Tachycardia present.   Pulmonary:      Effort: No respiratory distress.      Comments: Intubated.  Abdominal:      Palpations: Abdomen is soft.      Tenderness: There is no abdominal tenderness.   Musculoskeletal:         General: No swelling.   Skin:     General: Skin is warm and dry.   Neurological:      Comments: sedated         Vents:  Vent Mode: A/C (08/10/20 0726)  Ventilator Initiated: Yes(chart correction) (07/26/20 2108)  Set Rate: 26 BPM (08/10/20 0726)  Vt Set: 400 mL (08/10/20 0726)  Pressure Support: 0 cmH20 (08/10/20 0726)  PEEP/CPAP: 8 cmH20 (08/10/20 0726)  Oxygen Concentration (%): 40 (08/10/20 0726)  Peak Airway Pressure: 29 cmH2O (08/10/20 0726)  Plateau Pressure: 26 cmH20 (08/10/20 0726)  Total Ve: 10.9 mL (08/10/20 0726)  F/VT Ratio<105  (RSBI): (!) 67.63 (08/10/20 0726)  Lines/Drains/Airways     Central Venous Catheter Line            Percutaneous Central Line Insertion/Assessment - Triple Lumen  07/29/20 1729 right internal jugular 11 days          Drain                 NG/OG Tube 07/26/20 1756 [place] sump;orogastric 18 Fr. Center mouth 14 days         Urethral Catheter 07/26/20 2205 16 Fr. 14 days          Airway                 Airway - Non-Surgical 07/26/20 1724 Endotracheal Tube 14 days          Arterial Line                 Arterial Line 07/29/20 1700 Right Radial 11 days          Peripheral Intravenous Line                 Peripheral IV - Single Lumen 07/31/20 1508 18 G Anterior;Right Forearm 9 days         Peripheral IV - Single Lumen 08/06/20 0552 20 G Anterior;Left Wrist 4 days              Significant Labs:    CBC/Anemia Profile:  Recent Labs   Lab 08/09/20  0330 08/10/20  0400   WBC 5.87 7.58   HGB 9.5* 9.1*   HCT 31.0* 29.6*    266   MCV 89 89   RDW 14.1 13.7        Chemistries:  Recent Labs   Lab 08/09/20  0330 08/10/20  0400    137   K 4.0 3.9    107   CO2 24 22*   BUN 23 17   CREATININE 0.8 0.8   CALCIUM 8.2* 8.0*   ALBUMIN 1.6* 1.6*   PROT 6.0 5.9*   BILITOT 0.2 0.2   ALKPHOS 67 68   ALT 79* 65*   AST 42* 42*   MG 1.9  --    PHOS 2.9 2.9       Significant Imaging:  I have reviewed all pertinent imaging results/findings within the past 24 hours.

## 2020-08-10 NOTE — CARE UPDATE
Spoke to patients wife, Zhou and updated her on her husbands care. Discussed patient needing PEG/trach as patient has been failing SBT in past couple of days, and she stated that she will talk with Tim, patient's daughter. Questions answered and concerns addressed.        Munira Croft MD PGY3  Critical Care  Department of Internal Medicine  [facility]

## 2020-08-10 NOTE — ASSESSMENT & PLAN NOTE
- A1c 8.3%  - started on insulin gtt on 08/05 due to elevated BG but discontinued 08/07  - Aspart 13U Q4H  - BG goal 140-180

## 2020-08-10 NOTE — ASSESSMENT & PLAN NOTE
Anup Miller is a 70 y.o. male patient with a PMHx of HTN, HLD, and DM who presents to the [place] [facility] for shortness of breath and found to be COVID positive. Was started on IV dexamethasone, Ceftriaxone, and Azithromycin. Patient transferred to [facility] on evening of 7/26/20 for higher level of care. Labs on arrival to [facility] remarkable for WBC 9.8, Procal 1.3, D-dimer 1.1, Ferritin 2,300, , and . Given 1 L LR on arrival to [facility]    - Remdesivir complete  - Therapeutic lovenox for hypercoagulable state in COVID  - IV Dexamethasone continues (total of 10 days, end date 08/05)  - Completed Ceftriaxone and Azithromycin for possible superimposed bacterial pneumonia  - f/u blood and sputum cx - NGTD  - trend lactate - nl  - Was previously paralyzed, proned, sedated. Nimbex restarted on 8/1 and discontinued on 08/04. Wean sedation as tolerated. Currently weaned down to fentanyl, versed, propofol. Added zyprexa on 08/07 in attempt to wean sedation. Has been off levo since 08/09.  - Will try to turn down sedation and assess mental status. He is currently obtunded but vent support is coming down and pt may be a candidate for extubation.

## 2020-08-10 NOTE — PLAN OF CARE
POC reviewed with pt and family at 1400. Pt's family verbalized understanding. Questions and concerns addressed. Pt sustained a 5-run beat of VT, ordered to replace electrolytes, precedex gtt at 0.6, fentanyl gtt at 250, propofol temporarily turned off to assess breathing trial, breathing trial unsuccessful, propofol restarted at 35, talk about a trach and peg procedure to be had with the pt's wife. Pt progressing toward goals. Will continue to monitor. See flowsheets for full assessment and VS info.

## 2020-08-10 NOTE — PLAN OF CARE
Problem: Adult Inpatient Plan of Care  Goal: Plan of Care Review  Outcome: Ongoing, Not Progressing  Goal: Patient-Specific Goal (Individualization)  Outcome: Ongoing, Not Progressing  Goal: Absence of Hospital-Acquired Illness or Injury  Outcome: Ongoing, Not Progressing  Goal: Optimal Comfort and Wellbeing  Outcome: Ongoing, Not Progressing  Goal: Readiness for Transition of Care  Outcome: Ongoing, Not Progressing  Goal: Rounds/Family Conference  Outcome: Ongoing, Not Progressing     Problem: Diabetes Comorbidity  Goal: Blood Glucose Level Within Desired Range  Outcome: Ongoing, Not Progressing     Problem: Infection  Goal: Infection Symptom Resolution  Outcome: Ongoing, Not Progressing     Problem: Fall Injury Risk  Goal: Absence of Fall and Fall-Related Injury  Outcome: Ongoing, Not Progressing     Problem: Skin Injury Risk Increased  Goal: Skin Health and Integrity  Outcome: Ongoing, Not Progressing     Problem: Restraint, Nonbehavioral (Nonviolent)  Goal: Discontinuation Criteria Achieved  Outcome: Ongoing, Not Progressing  Goal: Personal Dignity and Safety Maintained  Outcome: Ongoing, Not Progressing     Problem: Communication Impairment (Mechanical Ventilation, Invasive)  Goal: Effective Communication  Outcome: Ongoing, Not Progressing     Problem: Device-Related Complication Risk (Mechanical Ventilation, Invasive)  Goal: Optimal Device Function  Outcome: Ongoing, Not Progressing     Problem: Inability to Wean (Mechanical Ventilation, Invasive)  Goal: Mechanical Ventilation Liberation  Outcome: Ongoing, Not Progressing     Problem: Nutrition Impairment (Mechanical Ventilation, Invasive)  Goal: Optimal Nutrition Delivery  Outcome: Ongoing, Not Progressing     Problem: Skin and Tissue Injury (Mechanical Ventilation, Invasive)  Goal: Absence of Device-Related Skin and Tissue Injury  Outcome: Ongoing, Not Progressing     Problem: Ventilator-Induced Lung Injury (Mechanical Ventilation, Invasive)  Goal: Absence  of Ventilator-Induced Lung Injury  Outcome: Ongoing, Not Progressing     Problem: Communication Impairment (Artificial Airway)  Goal: Effective Communication  Outcome: Ongoing, Not Progressing     Problem: Device-Related Complication Risk (Artificial Airway)  Goal: Optimal Device Function  Outcome: Ongoing, Not Progressing     Problem: Skin and Tissue Injury (Artificial Airway)  Goal: Absence of Device-Related Skin or Tissue Injury  Outcome: Ongoing, Not Progressing     Problem: Noninvasive Ventilation Acute  Goal: Effective Unassisted Ventilation and Oxygenation  Outcome: Ongoing, Not Progressing     Problem: Aspiration (Enteral Nutrition)  Goal: Absence of Aspiration Signs/Symptoms  Outcome: Ongoing, Not Progressing     Problem: Wound  Goal: Optimal Wound Healing  Outcome: Ongoing, Not Progressing

## 2020-08-10 NOTE — NURSING
Pt had a 5 beat run of VT on the cardiac monitor, strip obtained, MD notified, ordered to replace Mg and potassium, no further orders given, will continue to monitor.

## 2020-08-11 LAB
ALBUMIN SERPL BCP-MCNC: 1.7 G/DL (ref 3.5–5.2)
ALLENS TEST: ABNORMAL
ALP SERPL-CCNC: 87 U/L (ref 55–135)
ALT SERPL W/O P-5'-P-CCNC: 74 U/L (ref 10–44)
ANION GAP SERPL CALC-SCNC: 8 MMOL/L (ref 8–16)
AST SERPL-CCNC: 56 U/L (ref 10–40)
BASOPHILS # BLD AUTO: 0.01 K/UL (ref 0–0.2)
BASOPHILS NFR BLD: 0.1 % (ref 0–1.9)
BILIRUB SERPL-MCNC: 0.3 MG/DL (ref 0.1–1)
BUN SERPL-MCNC: 20 MG/DL (ref 8–23)
CALCIUM SERPL-MCNC: 8 MG/DL (ref 8.7–10.5)
CHLORIDE SERPL-SCNC: 103 MMOL/L (ref 95–110)
CO2 SERPL-SCNC: 21 MMOL/L (ref 23–29)
CREAT SERPL-MCNC: 0.8 MG/DL (ref 0.5–1.4)
DELSYS: ABNORMAL
DIFFERENTIAL METHOD: ABNORMAL
EOSINOPHIL # BLD AUTO: 0.1 K/UL (ref 0–0.5)
EOSINOPHIL NFR BLD: 1.5 % (ref 0–8)
ERYTHROCYTE [DISTWIDTH] IN BLOOD BY AUTOMATED COUNT: 13.8 % (ref 11.5–14.5)
ERYTHROCYTE [SEDIMENTATION RATE] IN BLOOD BY WESTERGREN METHOD: 24 MM/H
EST. GFR  (AFRICAN AMERICAN): >60 ML/MIN/1.73 M^2
EST. GFR  (NON AFRICAN AMERICAN): >60 ML/MIN/1.73 M^2
FACT X PPP CHRO-ACNC: 0.94 IU/ML (ref 0.3–0.7)
FIO2: 50
GLUCOSE SERPL-MCNC: 365 MG/DL (ref 70–110)
HCO3 UR-SCNC: 21.8 MMOL/L (ref 24–28)
HCT VFR BLD AUTO: 32.2 % (ref 40–54)
HGB BLD-MCNC: 10 G/DL (ref 14–18)
IMM GRANULOCYTES # BLD AUTO: 0.04 K/UL (ref 0–0.04)
IMM GRANULOCYTES NFR BLD AUTO: 0.4 % (ref 0–0.5)
LYMPHOCYTES # BLD AUTO: 0.6 K/UL (ref 1–4.8)
LYMPHOCYTES NFR BLD: 6.3 % (ref 18–48)
MAGNESIUM SERPL-MCNC: 2.5 MG/DL (ref 1.6–2.6)
MCH RBC QN AUTO: 27.7 PG (ref 27–31)
MCHC RBC AUTO-ENTMCNC: 31.1 G/DL (ref 32–36)
MCV RBC AUTO: 89 FL (ref 82–98)
MODE: ABNORMAL
MONOCYTES # BLD AUTO: 1.2 K/UL (ref 0.3–1)
MONOCYTES NFR BLD: 13 % (ref 4–15)
NEUTROPHILS # BLD AUTO: 7.4 K/UL (ref 1.8–7.7)
NEUTROPHILS NFR BLD: 78.7 % (ref 38–73)
NRBC BLD-RTO: 0 /100 WBC
PCO2 BLDA: 34 MMHG (ref 35–45)
PEEP: 5
PH SMN: 7.41 [PH] (ref 7.35–7.45)
PHOSPHATE SERPL-MCNC: 2.6 MG/DL (ref 2.7–4.5)
PLATELET # BLD AUTO: 260 K/UL (ref 150–350)
PMV BLD AUTO: 12.1 FL (ref 9.2–12.9)
PO2 BLDA: 65 MMHG (ref 80–100)
POC BE: -3 MMOL/L
POC SATURATED O2: 93 % (ref 95–100)
POC TCO2: 23 MMOL/L (ref 23–27)
POCT GLUCOSE: 254 MG/DL (ref 70–110)
POCT GLUCOSE: 262 MG/DL (ref 70–110)
POCT GLUCOSE: 274 MG/DL (ref 70–110)
POCT GLUCOSE: 337 MG/DL (ref 70–110)
POCT GLUCOSE: 344 MG/DL (ref 70–110)
POCT GLUCOSE: 352 MG/DL (ref 70–110)
POCT GLUCOSE: 381 MG/DL (ref 70–110)
POTASSIUM SERPL-SCNC: 4.4 MMOL/L (ref 3.5–5.1)
PROT SERPL-MCNC: 6.6 G/DL (ref 6–8.4)
RBC # BLD AUTO: 3.61 M/UL (ref 4.6–6.2)
SAMPLE: ABNORMAL
SITE: ABNORMAL
SODIUM SERPL-SCNC: 132 MMOL/L (ref 136–145)
VT: 430
WBC # BLD AUTO: 9.44 K/UL (ref 3.9–12.7)

## 2020-08-11 PROCEDURE — C9399 UNCLASSIFIED DRUGS OR BIOLOG: HCPCS | Performed by: STUDENT IN AN ORGANIZED HEALTH CARE EDUCATION/TRAINING PROGRAM

## 2020-08-11 PROCEDURE — 63600175 PHARM REV CODE 636 W HCPCS: Performed by: STUDENT IN AN ORGANIZED HEALTH CARE EDUCATION/TRAINING PROGRAM

## 2020-08-11 PROCEDURE — 80053 COMPREHEN METABOLIC PANEL: CPT

## 2020-08-11 PROCEDURE — A4216 STERILE WATER/SALINE, 10 ML: HCPCS | Performed by: STUDENT IN AN ORGANIZED HEALTH CARE EDUCATION/TRAINING PROGRAM

## 2020-08-11 PROCEDURE — 63600175 PHARM REV CODE 636 W HCPCS: Performed by: INTERNAL MEDICINE

## 2020-08-11 PROCEDURE — 83735 ASSAY OF MAGNESIUM: CPT

## 2020-08-11 PROCEDURE — 25000003 PHARM REV CODE 250: Performed by: STUDENT IN AN ORGANIZED HEALTH CARE EDUCATION/TRAINING PROGRAM

## 2020-08-11 PROCEDURE — 85025 COMPLETE CBC W/AUTO DIFF WBC: CPT

## 2020-08-11 PROCEDURE — 37799 UNLISTED PX VASCULAR SURGERY: CPT

## 2020-08-11 PROCEDURE — 82800 BLOOD PH: CPT

## 2020-08-11 PROCEDURE — 94761 N-INVAS EAR/PLS OXIMETRY MLT: CPT

## 2020-08-11 PROCEDURE — 25000003 PHARM REV CODE 250: Performed by: PHYSICIAN ASSISTANT

## 2020-08-11 PROCEDURE — 99900035 HC TECH TIME PER 15 MIN (STAT)

## 2020-08-11 PROCEDURE — 94003 VENT MGMT INPAT SUBQ DAY: CPT

## 2020-08-11 PROCEDURE — 84100 ASSAY OF PHOSPHORUS: CPT

## 2020-08-11 PROCEDURE — 27200966 HC CLOSED SUCTION SYSTEM

## 2020-08-11 PROCEDURE — 82803 BLOOD GASES ANY COMBINATION: CPT

## 2020-08-11 PROCEDURE — 25000003 PHARM REV CODE 250: Performed by: NURSE PRACTITIONER

## 2020-08-11 PROCEDURE — 27000221 HC OXYGEN, UP TO 24 HOURS

## 2020-08-11 PROCEDURE — 99900026 HC AIRWAY MAINTENANCE (STAT)

## 2020-08-11 PROCEDURE — 85520 HEPARIN ASSAY: CPT

## 2020-08-11 PROCEDURE — 20000000 HC ICU ROOM

## 2020-08-11 PROCEDURE — 25000003 PHARM REV CODE 250: Performed by: INTERNAL MEDICINE

## 2020-08-11 RX ORDER — MIDAZOLAM HYDROCHLORIDE 1 MG/ML
2 INJECTION INTRAMUSCULAR; INTRAVENOUS ONCE
Status: COMPLETED | OUTPATIENT
Start: 2020-08-11 | End: 2020-08-11

## 2020-08-11 RX ORDER — LABETALOL HCL 20 MG/4 ML
10 SYRINGE (ML) INTRAVENOUS ONCE
Status: COMPLETED | OUTPATIENT
Start: 2020-08-11 | End: 2020-08-11

## 2020-08-11 RX ORDER — CARVEDILOL 6.25 MG/1
6.25 TABLET ORAL 2 TIMES DAILY
Status: DISCONTINUED | OUTPATIENT
Start: 2020-08-11 | End: 2020-08-11

## 2020-08-11 RX ORDER — SODIUM,POTASSIUM PHOSPHATES 280-250MG
2 POWDER IN PACKET (EA) ORAL ONCE
Status: COMPLETED | OUTPATIENT
Start: 2020-08-11 | End: 2020-08-11

## 2020-08-11 RX ORDER — LABETALOL HCL 20 MG/4 ML
10 SYRINGE (ML) INTRAVENOUS ONCE
Status: DISCONTINUED | OUTPATIENT
Start: 2020-08-11 | End: 2020-08-11

## 2020-08-11 RX ORDER — INSULIN ASPART 100 [IU]/ML
15 INJECTION, SOLUTION INTRAVENOUS; SUBCUTANEOUS
Status: DISPENSED | OUTPATIENT
Start: 2020-08-11 | End: 2020-08-21

## 2020-08-11 RX ORDER — CARVEDILOL 6.25 MG/1
6.25 TABLET ORAL 2 TIMES DAILY
Status: DISCONTINUED | OUTPATIENT
Start: 2020-08-11 | End: 2020-08-14

## 2020-08-11 RX ADMIN — ROSUVASTATIN CALCIUM 10 MG: 10 TABLET, FILM COATED ORAL at 08:08

## 2020-08-11 RX ADMIN — DEXMEDETOMIDINE HYDROCHLORIDE 1 MCG/KG/HR: 100 INJECTION, SOLUTION, CONCENTRATE INTRAVENOUS at 04:08

## 2020-08-11 RX ADMIN — ACETAMINOPHEN 650 MG: 160 SOLUTION ORAL at 08:08

## 2020-08-11 RX ADMIN — ENOXAPARIN SODIUM 90 MG: 100 INJECTION SUBCUTANEOUS at 08:08

## 2020-08-11 RX ADMIN — Medication 300 MCG/HR: at 02:08

## 2020-08-11 RX ADMIN — PROPOFOL 35 MCG/KG/MIN: 10 INJECTION, EMULSION INTRAVENOUS at 04:08

## 2020-08-11 RX ADMIN — INSULIN ASPART 10 UNITS: 100 INJECTION, SOLUTION INTRAVENOUS; SUBCUTANEOUS at 06:08

## 2020-08-11 RX ADMIN — INSULIN DETEMIR 15 UNITS: 100 INJECTION, SOLUTION SUBCUTANEOUS at 09:08

## 2020-08-11 RX ADMIN — OLANZAPINE 5 MG: 2.5 TABLET, FILM COATED ORAL at 08:08

## 2020-08-11 RX ADMIN — PROPOFOL 50 MCG/KG/MIN: 10 INJECTION, EMULSION INTRAVENOUS at 04:08

## 2020-08-11 RX ADMIN — STANDARDIZED SENNA CONCENTRATE AND DOCUSATE SODIUM 1 TABLET: 8.6; 5 TABLET ORAL at 08:08

## 2020-08-11 RX ADMIN — CARVEDILOL 6.25 MG: 6.25 TABLET, FILM COATED ORAL at 05:08

## 2020-08-11 RX ADMIN — PROPOFOL 40 MCG/KG/MIN: 10 INJECTION, EMULSION INTRAVENOUS at 08:08

## 2020-08-11 RX ADMIN — INSULIN ASPART 13 UNITS: 100 INJECTION, SOLUTION INTRAVENOUS; SUBCUTANEOUS at 04:08

## 2020-08-11 RX ADMIN — STANDARDIZED SENNA CONCENTRATE AND DOCUSATE SODIUM 1 TABLET: 8.6; 5 TABLET ORAL at 09:08

## 2020-08-11 RX ADMIN — INSULIN DETEMIR 15 UNITS: 100 INJECTION, SOLUTION SUBCUTANEOUS at 08:08

## 2020-08-11 RX ADMIN — DEXMEDETOMIDINE HYDROCHLORIDE 1 MCG/KG/HR: 100 INJECTION, SOLUTION, CONCENTRATE INTRAVENOUS at 07:08

## 2020-08-11 RX ADMIN — POLYETHYLENE GLYCOL 3350 17 G: 17 POWDER, FOR SOLUTION ORAL at 09:08

## 2020-08-11 RX ADMIN — INSULIN ASPART 13 UNITS: 100 INJECTION, SOLUTION INTRAVENOUS; SUBCUTANEOUS at 01:08

## 2020-08-11 RX ADMIN — INSULIN ASPART 15 UNITS: 100 INJECTION, SOLUTION INTRAVENOUS; SUBCUTANEOUS at 08:08

## 2020-08-11 RX ADMIN — INSULIN ASPART 10 UNITS: 100 INJECTION, SOLUTION INTRAVENOUS; SUBCUTANEOUS at 01:08

## 2020-08-11 RX ADMIN — DEXMEDETOMIDINE HYDROCHLORIDE 1.4 MCG/KG/HR: 100 INJECTION, SOLUTION, CONCENTRATE INTRAVENOUS at 04:08

## 2020-08-11 RX ADMIN — ENOXAPARIN SODIUM 90 MG: 100 INJECTION SUBCUTANEOUS at 09:08

## 2020-08-11 RX ADMIN — Medication 10 ML: at 08:08

## 2020-08-11 RX ADMIN — Medication 250 MCG/HR: at 12:08

## 2020-08-11 RX ADMIN — FAMOTIDINE 20 MG: 20 TABLET ORAL at 08:08

## 2020-08-11 RX ADMIN — POTASSIUM & SODIUM PHOSPHATES POWDER PACK 280-160-250 MG 2 PACKET: 280-160-250 PACK at 05:08

## 2020-08-11 RX ADMIN — OLANZAPINE 5 MG: 2.5 TABLET, FILM COATED ORAL at 09:08

## 2020-08-11 RX ADMIN — PROPOFOL 35 MCG/KG/MIN: 10 INJECTION, EMULSION INTRAVENOUS at 11:08

## 2020-08-11 RX ADMIN — Medication 10 MG: at 01:08

## 2020-08-11 RX ADMIN — PROPOFOL 35 MCG/KG/MIN: 10 INJECTION, EMULSION INTRAVENOUS at 12:08

## 2020-08-11 RX ADMIN — INSULIN ASPART 15 UNITS: 100 INJECTION, SOLUTION INTRAVENOUS; SUBCUTANEOUS at 06:08

## 2020-08-11 RX ADMIN — INSULIN ASPART 6 UNITS: 100 INJECTION, SOLUTION INTRAVENOUS; SUBCUTANEOUS at 09:08

## 2020-08-11 RX ADMIN — DEXMEDETOMIDINE HYDROCHLORIDE 1 MCG/KG/HR: 100 INJECTION, SOLUTION, CONCENTRATE INTRAVENOUS at 08:08

## 2020-08-11 RX ADMIN — PROPOFOL 50 MCG/KG/MIN: 10 INJECTION, EMULSION INTRAVENOUS at 01:08

## 2020-08-11 RX ADMIN — FAMOTIDINE 20 MG: 20 TABLET ORAL at 09:08

## 2020-08-11 RX ADMIN — MIDAZOLAM 2 MG: 1 INJECTION INTRAMUSCULAR; INTRAVENOUS at 03:08

## 2020-08-11 RX ADMIN — INSULIN ASPART 13 UNITS: 100 INJECTION, SOLUTION INTRAVENOUS; SUBCUTANEOUS at 09:08

## 2020-08-11 RX ADMIN — DEXMEDETOMIDINE HYDROCHLORIDE 0.6 MCG/KG/HR: 100 INJECTION, SOLUTION, CONCENTRATE INTRAVENOUS at 12:08

## 2020-08-11 RX ADMIN — PROPOFOL 30 MCG/KG/MIN: 10 INJECTION, EMULSION INTRAVENOUS at 12:08

## 2020-08-11 RX ADMIN — Medication 250 MCG/HR: at 08:08

## 2020-08-11 RX ADMIN — INSULIN ASPART 13 UNITS: 100 INJECTION, SOLUTION INTRAVENOUS; SUBCUTANEOUS at 12:08

## 2020-08-11 NOTE — PROGRESS NOTES
[facility] - ICU 16   Critical Care Medicine  Progress Note    Patient Name: Anup Miller  MRN: [id number]  Admission Date: 7/26/2020  Hospital Length of Stay: 16 days  Code Status: Full Code  Attending Provider: Jimenez Frank MD  Primary Care Provider: Bryce Gonzales MD   Principal Problem: Pneumonia due to COVID-19 virus    Subjective:     HPI:  Anup Miller is a 70 y.o. male patient with a PMHx of HTN, HLD, and DM who presents to the [place] [facility] for evaluation of SOB which  1 week ago. Pt became more SOB today and has an O2 sat of low 70s upon arrival on RA. Symptoms are worsening and moderate in severity. No mitigating or exacerbating factors reported. Associated sxs include cough and fever. Patient denies any congestion, sore throat, CP, abd pain, N/V, back pain, HA, weakness, and all other sxs at this time. No prior Tx reported. No further complaints or concerns at this time. Patient was placed on Bipap for a few hours and subsequently intubated. COVID positive. Was started on IV dexamethasone, Ceftriaxone, and Azithromycin. [place] [facility] [place] for higher level of care.     Hospital/ICU Course:  As of 7/29, the patient's oxygenation continued to worsen with P:F <150. R IJ and Arterial Line placed. Pt sedated, paralyzed, and proned at 9:30pm with improved oxygenation. Repeat AM gas on 7/30 Arterial Blood Gas result:  pO2 112; pCO2 58.9; pH 7.256;  HCO3 26.2, %O2 Sat 97%. FiO2 60, 8 PEEP. Pt supinated at 4:10 pm on 7/30 with Arterial Blood Gas result:  pO2 106; pCO2 52.2; pH 7.349;  HCO3 28.7, %O2 Sat 106. (P:F 212). FiO2 50, 10 PEEP.    As of 7/31, paralytic d/c'ed and sedation slowly weaned. Propofol d/c'ed on 8/1 2/2 triglycerides with ketamine and versed initiated. Now requiring higher vent settings due to dyssynchrony, will continue to increase sedation. Patient paralyzed again on 8/1 for vent dyssynchrony. Nimbex was  discontinued on 08/03 and sedative medications were increased, however, patient struggled to pull enough tidal volume and was desatting even on maximum sedation so nimbex was restarted. Nimbex was stopped on 08/04 and restarted propofol. As patient became more agitated, ketamine was added on 08/06. Sedation was attempted to be weaned off by adding seroquel and zyprexa. Versed and ketamine were weaned off. Patient was weaned off of levo on morning of 08/09. Patient failed multiple SBT and it was discussed with family of likely PEG/trach.     Interval History/Significant Events: Overnight, patient's SBP was greater than 200, and he received labetalol, which did not help. He got a dose of versed, which improved BP.    Review of Systems   Unable to perform ROS: Intubated     Objective:     Vital Signs (Most Recent):  Temp: 98.4 °F (36.9 °C) (08/11/20 1200)  Pulse: 65 (08/11/20 1342)  Resp: (!) 23 (08/11/20 1342)  BP: 112/63 (08/11/20 0500)  SpO2: 100 % (08/11/20 1342) Vital Signs (24h Range):  Temp:  [98.2 °F (36.8 °C)-100.1 °F (37.8 °C)] 98.4 °F (36.9 °C)  Pulse:  [63-78] 65  Resp:  [20-38] 23  SpO2:  [93 %-100 %] 100 %  BP: ()/(58-79) 112/63  Arterial Line BP: (108-197)/(54-78) 119/56   Weight: 109 kg (240 lb 4.8 oz)(Bed scale)  Body mass index is 35.49 kg/m².      Intake/Output Summary (Last 24 hours) at 8/11/2020 1423  Last data filed at 8/11/2020 1300  Gross per 24 hour   Intake 2266.28 ml   Output 2175 ml   Net 91.28 ml       Physical Exam  Vitals signs and nursing note reviewed.   Constitutional:       Appearance: He is obese.   HENT:      Head: Normocephalic.      Mouth/Throat:      Comments: intubated  Eyes:      General:         Right eye: No discharge.         Left eye: No discharge.   Cardiovascular:      Rate and Rhythm: Normal rate and regular rhythm.   Pulmonary:      Effort: No respiratory distress.      Comments: Intubated.  Abdominal:      Palpations: Abdomen is soft.      Tenderness: There is no  abdominal tenderness.   Musculoskeletal:         General: No swelling.   Skin:     General: Skin is warm and dry.   Neurological:      Comments: sedated         Vents:  Vent Mode: A/C (08/11/20 1342)  Ventilator Initiated: Yes(chart correction) (07/26/20 2108)  Set Rate: 24 BPM (08/11/20 1342)  Vt Set: 430 mL (08/11/20 1342)  Pressure Support: 0 cmH20 (08/11/20 1342)  PEEP/CPAP: 5 cmH20 (08/11/20 1342)  Oxygen Concentration (%): 40 (08/11/20 1342)  Peak Airway Pressure: 29 cmH2O (08/11/20 1342)  Plateau Pressure: 26 cmH20 (08/11/20 1342)  Total Ve: 10.9 mL (08/11/20 1342)  F/VT Ratio<105 (RSBI): (!) 51.45 (08/11/20 1342)  Lines/Drains/Airways     Central Venous Catheter Line            Percutaneous Central Line Insertion/Assessment - Triple Lumen  07/29/20 1729 right internal jugular 12 days          Drain                 NG/OG Tube 07/26/20 1756 [place] sump;orogastric 18 Fr. Center mouth 15 days         Urethral Catheter 07/26/20 2205 16 Fr. 15 days          Airway                 Airway - Non-Surgical 07/26/20 1724 Endotracheal Tube 15 days          Arterial Line                 Arterial Line 07/29/20 1700 Right Radial 12 days          Peripheral Intravenous Line                 Peripheral IV - Single Lumen 07/31/20 1508 18 G Anterior;Right Forearm 10 days         Peripheral IV - Single Lumen 08/06/20 0552 20 G Anterior;Left Wrist 5 days              Significant Labs:    CBC/Anemia Profile:  Recent Labs   Lab 08/10/20  0400 08/11/20  0400   WBC 7.58 9.44   HGB 9.1* 10.0*   HCT 29.6* 32.2*    260   MCV 89 89   RDW 13.7 13.8        Chemistries:  Recent Labs   Lab 08/10/20  0400 08/11/20  0400    132*   K 3.9 4.4    103   CO2 22* 21*   BUN 17 20   CREATININE 0.8 0.8   CALCIUM 8.0* 8.0*   ALBUMIN 1.6* 1.7*   PROT 5.9* 6.6   BILITOT 0.2 0.3   ALKPHOS 68 87   ALT 65* 74*   AST 42* 56*   MG 1.8 2.5   PHOS 2.9 2.6*       Significant Imaging:  I have reviewed all pertinent imaging results/findings within  the past 24 hours.      ABG  Recent Labs   Lab 08/11/20  0434   PH 7.414   PO2 65*   PCO2 34.0*   HCO3 21.8*   BE -3     Assessment/Plan:     Pulmonary  Acute hypoxemic respiratory failure  - see Pneumonia due to COVID 19    Cardiac/Vascular  Hyperlipidemia associated with type 2 diabetes mellitus  - Restart Crestor once extubated     Hypertension associated with diabetes  - Hold antihypertensives in setting of shock  - Resume as tolerated    Endocrine  Uncontrolled type 2 diabetes mellitus  - A1c 8.3%  - started on insulin gtt on 08/05 due to elevated BG but discontinued 08/07  - Levemir 15U BID  - Aspart 15U Q4H  - BG goal 140-180    GI  Elevated liver enzymes  - Hx of elevated LFTs dating back to 2015  - Acute hepatitis panel and HIV - negative  - Stable. Trend CMP daily    Other  * Pneumonia due to COVID-19 virus  70M  with HTN, HLD, and DM who presents to the [place] ED for SOB and found to be COVID positive.  Patient transferred to [facility] on 7/26/20 for higher level of care.     - Remdesivir complete  - Therapeutic lovenox for hypercoagulable state in COVID  - IV Dexamethasone continues (total of 10 days, end date 08/05)  - Completed Ceftriaxone and Azithromycin for possible superimposed bacterial pneumonia  - Blood and sputum cx showed no growth.  - Was previously paralyzed, proned, sedated. Nimbex restarted on 8/1 and discontinued on 08/04. Wean sedation as tolerated. Currently weaned down to fentanyl, versed, propofol. Added zyprexa on 08/07 in attempt to wean sedation. Has been off levo since 08/09.  - Have turned off sedation and patient awake and nods appropriately to questions, however, has failed SBTs. Discussing with family about PEG/trach.     Critical Care Daily Checklist:    A: Awake: RASS Goal/Actual Goal: RASS Goal: -2-->light sedation  Actual: Hackett Agitation Sedation Scale (RASS): Moderate sedation   B: Spontaneous Breathing Trial Performed? Spon. Breathing Trial Initiated?: Not initiated  (08/06/20 0821)   C: SAT & SBT Coordinated?  No                      D: Delirium: CAM-ICU Overall CAM-ICU: Positive   E: Early Mobility Performed? No   F: Feeding Goal: Goals: Meet % EEN, EPN by RD f/u date  Status: Nutrition Goal Status: goal met   Current Diet Order   Procedures    Diet NPO      AS: Analgesia/Sedation Precedex, fentanyl, propofol   T: Thromboembolic Prophylaxis Therapeutic lovenox   H: HOB > 300 Yes   U: Stress Ulcer Prophylaxis (if needed) Famotidine   G: Glucose Control Adjusting insulin   B: Bowel Function     I: Indwelling Catheter (Lines & Harris) Necessity A line, RIJ, NG/OG, harris   D: De-escalation of Antimicrobials/Pharmacotherapies None    Plan for the day/ETD Continue PEG/trach discussion with family, adjust insulin    Code Status:  Family/Goals of Care: Full Code  Will update family       Critical secondary to Patient has a condition that poses threat to life and bodily function: Severe Respiratory Distress      Critical care was time spent personally by me on the following activities: development of treatment plan with patient or surrogate and bedside caregivers, discussions with consultants, evaluation of patient's response to treatment, examination of patient, ordering and performing treatments and interventions, ordering and review of laboratory studies, ordering and review of radiographic studies, pulse oximetry, re-evaluation of patient's condition. This critical care time did not overlap with that of any other provider or involve time for any procedures.     Munira Croft MD  Critical Care Medicine  [facility] - ICU 16 [facility]

## 2020-08-11 NOTE — SUBJECTIVE & OBJECTIVE
Interval History/Significant Events: Overnight, patient's SBP was greater than 200, and he received labetalol, which did not help. He got a dose of versed, which improved BP.    Review of Systems   Unable to perform ROS: Intubated     Objective:     Vital Signs (Most Recent):  Temp: 98.4 °F (36.9 °C) (08/11/20 1200)  Pulse: 65 (08/11/20 1342)  Resp: (!) 23 (08/11/20 1342)  BP: 112/63 (08/11/20 0500)  SpO2: 100 % (08/11/20 1342) Vital Signs (24h Range):  Temp:  [98.2 °F (36.8 °C)-100.1 °F (37.8 °C)] 98.4 °F (36.9 °C)  Pulse:  [63-78] 65  Resp:  [20-38] 23  SpO2:  [93 %-100 %] 100 %  BP: ()/(58-79) 112/63  Arterial Line BP: (108-197)/(54-78) 119/56   Weight: 109 kg (240 lb 4.8 oz)(Bed scale)  Body mass index is 35.49 kg/m².      Intake/Output Summary (Last 24 hours) at 8/11/2020 1423  Last data filed at 8/11/2020 1300  Gross per 24 hour   Intake 2266.28 ml   Output 2175 ml   Net 91.28 ml       Physical Exam  Vitals signs and nursing note reviewed.   Constitutional:       Appearance: He is obese.   HENT:      Head: Normocephalic.      Mouth/Throat:      Comments: intubated  Eyes:      General:         Right eye: No discharge.         Left eye: No discharge.   Cardiovascular:      Rate and Rhythm: Normal rate and regular rhythm.   Pulmonary:      Effort: No respiratory distress.      Comments: Intubated.  Abdominal:      Palpations: Abdomen is soft.      Tenderness: There is no abdominal tenderness.   Musculoskeletal:         General: No swelling.   Skin:     General: Skin is warm and dry.   Neurological:      Comments: sedated         Vents:  Vent Mode: A/C (08/11/20 1342)  Ventilator Initiated: Yes(chart correction) (07/26/20 2108)  Set Rate: 24 BPM (08/11/20 1342)  Vt Set: 430 mL (08/11/20 1342)  Pressure Support: 0 cmH20 (08/11/20 1342)  PEEP/CPAP: 5 cmH20 (08/11/20 1342)  Oxygen Concentration (%): 40 (08/11/20 1342)  Peak Airway Pressure: 29 cmH2O (08/11/20 1342)  Plateau Pressure: 26 cmH20 (08/11/20  1342)  Total Ve: 10.9 mL (08/11/20 1342)  F/VT Ratio<105 (RSBI): (!) 51.45 (08/11/20 1342)  Lines/Drains/Airways     Central Venous Catheter Line            Percutaneous Central Line Insertion/Assessment - Triple Lumen  07/29/20 1729 right internal jugular 12 days          Drain                 NG/OG Tube 07/26/20 1756 [place] sump;orogastric 18 Fr. Center mouth 15 days         Urethral Catheter 07/26/20 2205 16 Fr. 15 days          Airway                 Airway - Non-Surgical 07/26/20 1724 Endotracheal Tube 15 days          Arterial Line                 Arterial Line 07/29/20 1700 Right Radial 12 days          Peripheral Intravenous Line                 Peripheral IV - Single Lumen 07/31/20 1508 18 G Anterior;Right Forearm 10 days         Peripheral IV - Single Lumen 08/06/20 0552 20 G Anterior;Left Wrist 5 days              Significant Labs:    CBC/Anemia Profile:  Recent Labs   Lab 08/10/20  0400 08/11/20  0400   WBC 7.58 9.44   HGB 9.1* 10.0*   HCT 29.6* 32.2*    260   MCV 89 89   RDW 13.7 13.8        Chemistries:  Recent Labs   Lab 08/10/20  0400 08/11/20  0400    132*   K 3.9 4.4    103   CO2 22* 21*   BUN 17 20   CREATININE 0.8 0.8   CALCIUM 8.0* 8.0*   ALBUMIN 1.6* 1.7*   PROT 5.9* 6.6   BILITOT 0.2 0.3   ALKPHOS 68 87   ALT 65* 74*   AST 42* 56*   MG 1.8 2.5   PHOS 2.9 2.6*       Significant Imaging:  I have reviewed all pertinent imaging results/findings within the past 24 hours.

## 2020-08-11 NOTE — ASSESSMENT & PLAN NOTE
- A1c 8.3%  - started on insulin gtt on 08/05 due to elevated BG but discontinued 08/07  - Levemir 15U BID  - Aspart 15U Q4H  - BG goal 140-180

## 2020-08-11 NOTE — ASSESSMENT & PLAN NOTE
70M  with HTN, HLD, and DM who presents to the [place] ED for SOB and found to be COVID positive.  Patient transferred to [facility] on 7/26/20 for higher level of care.     - Remdesivir complete  - Therapeutic lovenox for hypercoagulable state in COVID  - IV Dexamethasone continues (total of 10 days, end date 08/05)  - Completed Ceftriaxone and Azithromycin for possible superimposed bacterial pneumonia  - Blood and sputum cx showed no growth.  - Was previously paralyzed, proned, sedated. Nimbex restarted on 8/1 and discontinued on 08/04. Wean sedation as tolerated. Currently weaned down to fentanyl, versed, propofol. Added zyprexa on 08/07 in attempt to wean sedation. Has been off levo since 08/09.  - Have turned off sedation and patient awake and nods appropriately to questions, however, has failed SBTs. Discussing with family about PEG/trach.

## 2020-08-11 NOTE — ASSESSMENT & PLAN NOTE
- Hx of elevated LFTs dating back to 2015  - Acute hepatitis panel and HIV - negative  - Stable. Trend CMP daily

## 2020-08-12 LAB
ALBUMIN SERPL BCP-MCNC: 1.7 G/DL (ref 3.5–5.2)
ALLENS TEST: ABNORMAL
ALP SERPL-CCNC: 79 U/L (ref 55–135)
ALT SERPL W/O P-5'-P-CCNC: 62 U/L (ref 10–44)
ANION GAP SERPL CALC-SCNC: 8 MMOL/L (ref 8–16)
AST SERPL-CCNC: 47 U/L (ref 10–40)
BASOPHILS # BLD AUTO: 0.01 K/UL (ref 0–0.2)
BASOPHILS NFR BLD: 0.2 % (ref 0–1.9)
BILIRUB SERPL-MCNC: 0.2 MG/DL (ref 0.1–1)
BUN SERPL-MCNC: 18 MG/DL (ref 8–23)
CALCIUM SERPL-MCNC: 8.7 MG/DL (ref 8.7–10.5)
CHLORIDE SERPL-SCNC: 102 MMOL/L (ref 95–110)
CO2 SERPL-SCNC: 22 MMOL/L (ref 23–29)
CREAT SERPL-MCNC: 0.8 MG/DL (ref 0.5–1.4)
DELSYS: ABNORMAL
DIFFERENTIAL METHOD: ABNORMAL
EOSINOPHIL # BLD AUTO: 0.2 K/UL (ref 0–0.5)
EOSINOPHIL NFR BLD: 3.4 % (ref 0–8)
ERYTHROCYTE [DISTWIDTH] IN BLOOD BY AUTOMATED COUNT: 13.9 % (ref 11.5–14.5)
ERYTHROCYTE [SEDIMENTATION RATE] IN BLOOD BY WESTERGREN METHOD: 24 MM/H
EST. GFR  (AFRICAN AMERICAN): >60 ML/MIN/1.73 M^2
EST. GFR  (NON AFRICAN AMERICAN): >60 ML/MIN/1.73 M^2
FIO2: 40
GLUCOSE SERPL-MCNC: 292 MG/DL (ref 70–110)
HCO3 UR-SCNC: 22.6 MMOL/L (ref 24–28)
HCT VFR BLD AUTO: 31.5 % (ref 40–54)
HGB BLD-MCNC: 9.7 G/DL (ref 14–18)
IMM GRANULOCYTES # BLD AUTO: 0.04 K/UL (ref 0–0.04)
IMM GRANULOCYTES NFR BLD AUTO: 0.6 % (ref 0–0.5)
LYMPHOCYTES # BLD AUTO: 0.9 K/UL (ref 1–4.8)
LYMPHOCYTES NFR BLD: 13.6 % (ref 18–48)
MAGNESIUM SERPL-MCNC: 2 MG/DL (ref 1.6–2.6)
MCH RBC QN AUTO: 27.2 PG (ref 27–31)
MCHC RBC AUTO-ENTMCNC: 30.8 G/DL (ref 32–36)
MCV RBC AUTO: 89 FL (ref 82–98)
MIN VOL: 12.2
MODE: ABNORMAL
MONOCYTES # BLD AUTO: 1 K/UL (ref 0.3–1)
MONOCYTES NFR BLD: 15.7 % (ref 4–15)
NEUTROPHILS # BLD AUTO: 4.3 K/UL (ref 1.8–7.7)
NEUTROPHILS NFR BLD: 66.5 % (ref 38–73)
NRBC BLD-RTO: 0 /100 WBC
PCO2 BLDA: 33.8 MMHG (ref 35–45)
PEEP: 5
PH SMN: 7.43 [PH] (ref 7.35–7.45)
PHOSPHATE SERPL-MCNC: 2.8 MG/DL (ref 2.7–4.5)
PIP: 30
PLATELET # BLD AUTO: 302 K/UL (ref 150–350)
PMV BLD AUTO: 11.5 FL (ref 9.2–12.9)
PO2 BLDA: 71 MMHG (ref 80–100)
POC BE: -2 MMOL/L
POC SATURATED O2: 95 % (ref 95–100)
POC TCO2: 24 MMOL/L (ref 23–27)
POCT GLUCOSE: 254 MG/DL (ref 70–110)
POCT GLUCOSE: 291 MG/DL (ref 70–110)
POCT GLUCOSE: 292 MG/DL (ref 70–110)
POCT GLUCOSE: 298 MG/DL (ref 70–110)
POCT GLUCOSE: 300 MG/DL (ref 70–110)
POCT GLUCOSE: 307 MG/DL (ref 70–110)
POCT GLUCOSE: 309 MG/DL (ref 70–110)
POCT GLUCOSE: 313 MG/DL (ref 70–110)
POTASSIUM SERPL-SCNC: 4.5 MMOL/L (ref 3.5–5.1)
PROT SERPL-MCNC: 6.8 G/DL (ref 6–8.4)
RBC # BLD AUTO: 3.56 M/UL (ref 4.6–6.2)
SAMPLE: ABNORMAL
SITE: ABNORMAL
SODIUM SERPL-SCNC: 132 MMOL/L (ref 136–145)
SP02: 98
TRIGL SERPL-MCNC: 153 MG/DL (ref 30–150)
VT: 490
WBC # BLD AUTO: 6.42 K/UL (ref 3.9–12.7)

## 2020-08-12 PROCEDURE — C9399 UNCLASSIFIED DRUGS OR BIOLOG: HCPCS | Performed by: STUDENT IN AN ORGANIZED HEALTH CARE EDUCATION/TRAINING PROGRAM

## 2020-08-12 PROCEDURE — 94761 N-INVAS EAR/PLS OXIMETRY MLT: CPT

## 2020-08-12 PROCEDURE — 25000003 PHARM REV CODE 250: Performed by: STUDENT IN AN ORGANIZED HEALTH CARE EDUCATION/TRAINING PROGRAM

## 2020-08-12 PROCEDURE — 83735 ASSAY OF MAGNESIUM: CPT

## 2020-08-12 PROCEDURE — 99291 CRITICAL CARE FIRST HOUR: CPT | Mod: ,,, | Performed by: INTERNAL MEDICINE

## 2020-08-12 PROCEDURE — 94003 VENT MGMT INPAT SUBQ DAY: CPT

## 2020-08-12 PROCEDURE — 37799 UNLISTED PX VASCULAR SURGERY: CPT

## 2020-08-12 PROCEDURE — 63600175 PHARM REV CODE 636 W HCPCS: Performed by: INTERNAL MEDICINE

## 2020-08-12 PROCEDURE — 84478 ASSAY OF TRIGLYCERIDES: CPT

## 2020-08-12 PROCEDURE — 99291 PR CRITICAL CARE, E/M 30-74 MINUTES: ICD-10-PCS | Mod: ,,, | Performed by: INTERNAL MEDICINE

## 2020-08-12 PROCEDURE — 25000003 PHARM REV CODE 250: Performed by: INTERNAL MEDICINE

## 2020-08-12 PROCEDURE — 63600175 PHARM REV CODE 636 W HCPCS: Performed by: STUDENT IN AN ORGANIZED HEALTH CARE EDUCATION/TRAINING PROGRAM

## 2020-08-12 PROCEDURE — 20000000 HC ICU ROOM

## 2020-08-12 PROCEDURE — 27000221 HC OXYGEN, UP TO 24 HOURS

## 2020-08-12 PROCEDURE — 84100 ASSAY OF PHOSPHORUS: CPT

## 2020-08-12 PROCEDURE — 63600175 PHARM REV CODE 636 W HCPCS

## 2020-08-12 PROCEDURE — 85347 COAGULATION TIME ACTIVATED: CPT

## 2020-08-12 PROCEDURE — 99900035 HC TECH TIME PER 15 MIN (STAT)

## 2020-08-12 PROCEDURE — 80053 COMPREHEN METABOLIC PANEL: CPT

## 2020-08-12 PROCEDURE — 85025 COMPLETE CBC W/AUTO DIFF WBC: CPT

## 2020-08-12 PROCEDURE — 27200966 HC CLOSED SUCTION SYSTEM

## 2020-08-12 PROCEDURE — 25000003 PHARM REV CODE 250: Performed by: PHYSICIAN ASSISTANT

## 2020-08-12 RX ORDER — DIAZEPAM 5 MG/1
5 TABLET ORAL EVERY 6 HOURS
Status: DISCONTINUED | OUTPATIENT
Start: 2020-08-12 | End: 2020-08-14

## 2020-08-12 RX ORDER — MIDAZOLAM HYDROCHLORIDE 1 MG/ML
4 INJECTION INTRAMUSCULAR; INTRAVENOUS ONCE
Status: COMPLETED | OUTPATIENT
Start: 2020-08-12 | End: 2020-08-12

## 2020-08-12 RX ORDER — MIDAZOLAM HYDROCHLORIDE 1 MG/ML
4 INJECTION INTRAMUSCULAR; INTRAVENOUS ONCE
Status: DISCONTINUED | OUTPATIENT
Start: 2020-08-12 | End: 2020-08-12

## 2020-08-12 RX ORDER — HYDRALAZINE HYDROCHLORIDE 20 MG/ML
10 INJECTION INTRAMUSCULAR; INTRAVENOUS ONCE
Status: COMPLETED | OUTPATIENT
Start: 2020-08-12 | End: 2020-08-12

## 2020-08-12 RX ORDER — LABETALOL HCL 20 MG/4 ML
10 SYRINGE (ML) INTRAVENOUS ONCE
Status: COMPLETED | OUTPATIENT
Start: 2020-08-12 | End: 2020-08-12

## 2020-08-12 RX ORDER — MIDAZOLAM HYDROCHLORIDE 5 MG/ML
4 INJECTION INTRAMUSCULAR; INTRAVENOUS EVERY 6 HOURS PRN
Status: DISCONTINUED | OUTPATIENT
Start: 2020-08-12 | End: 2020-08-26 | Stop reason: HOSPADM

## 2020-08-12 RX ORDER — MIDAZOLAM HYDROCHLORIDE 1 MG/ML
INJECTION INTRAMUSCULAR; INTRAVENOUS
Status: COMPLETED
Start: 2020-08-12 | End: 2020-08-12

## 2020-08-12 RX ADMIN — FAMOTIDINE 20 MG: 20 TABLET ORAL at 09:08

## 2020-08-12 RX ADMIN — OLANZAPINE 5 MG: 2.5 TABLET, FILM COATED ORAL at 09:08

## 2020-08-12 RX ADMIN — INSULIN ASPART 15 UNITS: 100 INJECTION, SOLUTION INTRAVENOUS; SUBCUTANEOUS at 11:08

## 2020-08-12 RX ADMIN — CARVEDILOL 6.25 MG: 6.25 TABLET, FILM COATED ORAL at 09:08

## 2020-08-12 RX ADMIN — MIDAZOLAM HYDROCHLORIDE 4 MG: 1 INJECTION INTRAMUSCULAR; INTRAVENOUS at 11:08

## 2020-08-12 RX ADMIN — INSULIN ASPART 15 UNITS: 100 INJECTION, SOLUTION INTRAVENOUS; SUBCUTANEOUS at 07:08

## 2020-08-12 RX ADMIN — MIDAZOLAM 4 MG: 1 INJECTION INTRAMUSCULAR; INTRAVENOUS at 11:08

## 2020-08-12 RX ADMIN — ENOXAPARIN SODIUM 90 MG: 100 INJECTION SUBCUTANEOUS at 09:08

## 2020-08-12 RX ADMIN — ROSUVASTATIN CALCIUM 10 MG: 10 TABLET, FILM COATED ORAL at 09:08

## 2020-08-12 RX ADMIN — POLYETHYLENE GLYCOL 3350 17 G: 17 POWDER, FOR SOLUTION ORAL at 09:08

## 2020-08-12 RX ADMIN — DEXMEDETOMIDINE HYDROCHLORIDE 1 MCG/KG/HR: 100 INJECTION, SOLUTION, CONCENTRATE INTRAVENOUS at 01:08

## 2020-08-12 RX ADMIN — INSULIN ASPART 15 UNITS: 100 INJECTION, SOLUTION INTRAVENOUS; SUBCUTANEOUS at 08:08

## 2020-08-12 RX ADMIN — DEXMEDETOMIDINE HYDROCHLORIDE 1.4 MCG/KG/HR: 100 INJECTION, SOLUTION, CONCENTRATE INTRAVENOUS at 10:08

## 2020-08-12 RX ADMIN — HYDRALAZINE HYDROCHLORIDE 10 MG: 20 INJECTION INTRAMUSCULAR; INTRAVENOUS at 10:08

## 2020-08-12 RX ADMIN — Medication 250 MCG/HR: at 06:08

## 2020-08-12 RX ADMIN — INSULIN ASPART 6 UNITS: 100 INJECTION, SOLUTION INTRAVENOUS; SUBCUTANEOUS at 01:08

## 2020-08-12 RX ADMIN — Medication 350 MCG/HR: at 09:08

## 2020-08-12 RX ADMIN — PROPOFOL 50 MCG/KG/MIN: 10 INJECTION, EMULSION INTRAVENOUS at 07:08

## 2020-08-12 RX ADMIN — PROPOFOL 50 MCG/KG/MIN: 10 INJECTION, EMULSION INTRAVENOUS at 11:08

## 2020-08-12 RX ADMIN — STANDARDIZED SENNA CONCENTRATE AND DOCUSATE SODIUM 1 TABLET: 8.6; 5 TABLET ORAL at 09:08

## 2020-08-12 RX ADMIN — INSULIN ASPART 8 UNITS: 100 INJECTION, SOLUTION INTRAVENOUS; SUBCUTANEOUS at 07:08

## 2020-08-12 RX ADMIN — INSULIN ASPART 15 UNITS: 100 INJECTION, SOLUTION INTRAVENOUS; SUBCUTANEOUS at 03:08

## 2020-08-12 RX ADMIN — INSULIN DETEMIR 15 UNITS: 100 INJECTION, SOLUTION SUBCUTANEOUS at 08:08

## 2020-08-12 RX ADMIN — DEXMEDETOMIDINE HYDROCHLORIDE 1 MCG/KG/HR: 100 INJECTION, SOLUTION, CONCENTRATE INTRAVENOUS at 09:08

## 2020-08-12 RX ADMIN — INSULIN DETEMIR 20 UNITS: 100 INJECTION, SOLUTION SUBCUTANEOUS at 09:08

## 2020-08-12 RX ADMIN — MIDAZOLAM 4 MG: 5 INJECTION INTRAMUSCULAR; INTRAVENOUS at 07:08

## 2020-08-12 RX ADMIN — INSULIN ASPART 15 UNITS: 100 INJECTION, SOLUTION INTRAVENOUS; SUBCUTANEOUS at 01:08

## 2020-08-12 RX ADMIN — INSULIN ASPART 15 UNITS: 100 INJECTION, SOLUTION INTRAVENOUS; SUBCUTANEOUS at 12:08

## 2020-08-12 RX ADMIN — INSULIN ASPART 3 UNITS: 100 INJECTION, SOLUTION INTRAVENOUS; SUBCUTANEOUS at 02:08

## 2020-08-12 RX ADMIN — INSULIN ASPART 15 UNITS: 100 INJECTION, SOLUTION INTRAVENOUS; SUBCUTANEOUS at 04:08

## 2020-08-12 RX ADMIN — INSULIN ASPART 6 UNITS: 100 INJECTION, SOLUTION INTRAVENOUS; SUBCUTANEOUS at 03:08

## 2020-08-12 RX ADMIN — PROPOFOL 50 MCG/KG/MIN: 10 INJECTION, EMULSION INTRAVENOUS at 01:08

## 2020-08-12 RX ADMIN — PROPOFOL 35 MCG/KG/MIN: 10 INJECTION, EMULSION INTRAVENOUS at 06:08

## 2020-08-12 RX ADMIN — Medication 10 MG: at 07:08

## 2020-08-12 RX ADMIN — PROPOFOL 50 MCG/KG/MIN: 10 INJECTION, EMULSION INTRAVENOUS at 09:08

## 2020-08-12 RX ADMIN — DIAZEPAM 5 MG: 5 TABLET ORAL at 11:08

## 2020-08-12 RX ADMIN — DIAZEPAM 5 MG: 5 TABLET ORAL at 06:08

## 2020-08-12 NOTE — ASSESSMENT & PLAN NOTE
- A1c 8.3%  - started on insulin gtt on 08/05 due to elevated BG but discontinued 08/07  - Increased from 15U to Levemir 20U BID  - Aspart 15U Q4H  - BG goal 140-180

## 2020-08-12 NOTE — PROGRESS NOTES
[facility] - ICU 16   Critical Care Medicine  Progress Note    Patient Name: Anup Miller  MRN: [id number]  Admission Date: 7/26/2020  Hospital Length of Stay: 17 days  Code Status: Full Code  Attending Provider: Jimenez Frank MD  Primary Care Provider: Bryce Gonzales MD   Principal Problem: Pneumonia due to COVID-19 virus    Subjective:     HPI:  Anup Miller is a 70 y.o. male patient with a PMHx of HTN, HLD, and DM who presents to the [place] [facility] for evaluation of SOB which  1 week ago. Pt became more SOB and has an O2 sat of low 70s upon arrival on RA. Symptoms are worsening and moderate in severity. No mitigating or exacerbating factors reported. Associated sxs include cough and fever. Patient denies any congestion, sore throat, CP, abd pain, N/V, back pain, HA, weakness, and all other sxs at this time. No prior Tx reported. No further complaints or concerns at this time. Patient was placed on Bipap for a few hours and subsequently intubated. COVID positive. Was started on IV dexamethasone, Ceftriaxone, and Azithromycin. Patient transferred to [facility] [place] [facility] on evening of 7/26/20 for higher level of care.     Hospital/ICU Course:  As of 7/29, the patient's oxygenation continued to worsen with P:F <150. R IJ and Arterial Line placed. Pt sedated, paralyzed, and proned at 9:30pm with improved oxygenation. Repeat AM gas on 7/30 Arterial Blood Gas result:  pO2 112; pCO2 58.9; pH 7.256;  HCO3 26.2, %O2 Sat 97%. FiO2 60, 8 PEEP. Pt supinated at 4:10 pm on 7/30 with Arterial Blood Gas result:  pO2 106; pCO2 52.2; pH 7.349;  HCO3 28.7, %O2 Sat 106. (P:F 212). FiO2 50, 10 PEEP.    As of 7/31, paralytic d/c'ed and sedation slowly weaned. Propofol d/c'ed on 8/1 2/2 triglycerides with ketamine and versed initiated. Now requiring higher vent settings due to dyssynchrony, will continue to increase sedation. Patient paralyzed again on 8/1 for vent dyssynchrony. Nimbex was discontinued on  08/03 and sedative medications were increased, however, patient struggled to pull enough tidal volume and was desatting even on maximum sedation so nimbex was restarted. Nimbex was stopped on 08/04 and restarted propofol. As patient became more agitated, ketamine was added on 08/06. Sedation was attempted to be weaned off by adding seroquel and zyprexa. Versed and ketamine were weaned off. Patient was weaned off of levo on morning of 08/09. Patient failed multiple SBTs and it was discussed with family of likely PEG/trach, pending their decision. Patient has continued agitation with coughing/ increased BPs requiring propofol, fentanyl, precedex with PRN versed.     Interval History/Significant Events:   Overnight patient had episode of SBP in 200s requiring 10 mg of labetalol. During rounds, patient had increased agitation and increasing peak pressures. No PNX on CXR. Started on prn versed for agitation.     Review of Systems   Unable to perform ROS: Intubated     Objective:     Vital Signs (Most Recent):  Temp: 98.7 °F (37.1 °C) (08/12/20 0728)  Pulse: 90 (08/12/20 0831)  Resp: (!) 36 (08/12/20 0831)  BP: 122/68 (08/12/20 0600)  SpO2: (!) 91 % (08/12/20 0831) Vital Signs (24h Range):  Temp:  [98.2 °F (36.8 °C)-99.6 °F (37.6 °C)] 98.7 °F (37.1 °C)  Pulse:  [65-90] 90  Resp:  [24-45] 36  SpO2:  [89 %-100 %] 91 %  BP: ()/(61-73) 122/68  Arterial Line BP: (103-230)/() 189/75   Weight: 109 kg (240 lb 4.8 oz)(Bed scale)  Body mass index is 35.49 kg/m².      Intake/Output Summary (Last 24 hours) at 8/12/2020 1352  Last data filed at 8/12/2020 1000  Gross per 24 hour   Intake 2618.3 ml   Output 1855 ml   Net 763.3 ml       Physical Exam  Vitals signs and nursing note reviewed.   Constitutional:       Appearance: He is obese.   HENT:      Head: Normocephalic.      Mouth/Throat:      Comments: intubated  Eyes:      General:         Right eye: No discharge.         Left eye: No discharge.   Cardiovascular:       Rate and Rhythm: Normal rate and regular rhythm.   Pulmonary:      Effort: No respiratory distress.      Comments: Intubated.  Abdominal:      Palpations: Abdomen is soft.      Tenderness: There is no abdominal tenderness.   Musculoskeletal:         General: No swelling.   Skin:     General: Skin is warm and dry.   Neurological:      Comments: sedated         Vents:  Vent Mode: A/C (08/12/20 0831)  Ventilator Initiated: Yes(chart correction) (07/26/20 2108)  Set Rate: 24 BPM (08/12/20 0831)  Vt Set: 430 mL (08/12/20 0831)  Pressure Support: 0 cmH20 (08/12/20 0831)  PEEP/CPAP: 5 cmH20 (08/12/20 0831)  Oxygen Concentration (%): 40 (08/12/20 0831)  Peak Airway Pressure: 30 cmH2O (08/12/20 0831)  Plateau Pressure: 26 cmH20 (08/12/20 0831)  Total Ve: 14.9 mL (08/12/20 0831)  F/VT Ratio<105 (RSBI): (!) 83.14 (08/12/20 0831)  Lines/Drains/Airways     Central Venous Catheter Line            Percutaneous Central Line Insertion/Assessment - Triple Lumen  07/29/20 1729 right internal jugular 13 days          Drain                 NG/OG Tube 07/26/20 1756 [place] sump;orogastric 18 Fr. Center mouth 16 days         Urethral Catheter 07/26/20 2205 16 Fr. 16 days          Airway                 Airway - Non-Surgical 07/26/20 1724 Endotracheal Tube 16 days          Arterial Line                 Arterial Line 07/29/20 1700 Right Radial 13 days          Peripheral Intravenous Line                 Peripheral IV - Single Lumen 07/31/20 1508 18 G Anterior;Right Forearm 11 days         Peripheral IV - Single Lumen 08/06/20 0552 20 G Anterior;Left Wrist 6 days              Significant Labs:    CBC/Anemia Profile:  Recent Labs   Lab 08/11/20  0400 08/12/20  0305   WBC 9.44 6.42   HGB 10.0* 9.7*   HCT 32.2* 31.5*    302   MCV 89 89   RDW 13.8 13.9        Chemistries:  Recent Labs   Lab 08/11/20  0400 08/12/20  0305   * 132*   K 4.4 4.5    102   CO2 21* 22*   BUN 20 18   CREATININE 0.8 0.8   CALCIUM 8.0* 8.7   ALBUMIN 1.7*  1.7*   PROT 6.6 6.8   BILITOT 0.3 0.2   ALKPHOS 87 79   ALT 74* 62*   AST 56* 47*   MG 2.5 2.0   PHOS 2.6* 2.8       All pertinent labs within the past 24 hours have been reviewed.    Significant Imaging:  I have reviewed all pertinent imaging results/findings within the past 24 hours.      ABG  Recent Labs   Lab 08/11/20  0434   PH 7.414   PO2 65*   PCO2 34.0*   HCO3 21.8*   BE -3     Assessment/Plan:     Pulmonary  Acute hypoxemic respiratory failure  - see Pneumonia due to COVID 19    Cardiac/Vascular  Hyperlipidemia associated with type 2 diabetes mellitus  - Restart Crestor once extubated     Hypertension associated with diabetes  - Hold antihypertensives in setting of shock  - Resume as tolerated    Endocrine  Uncontrolled type 2 diabetes mellitus  - A1c 8.3%  - started on insulin gtt on 08/05 due to elevated BG but discontinued 08/07  - Increased from 15U to Levemir 20U BID  - Aspart 15U Q4H  - BG goal 140-180    GI  Elevated liver enzymes  - Hx of elevated LFTs dating back to 2015  - Acute hepatitis panel and HIV - negative  - Stable. Trend CMP daily    Other  * Pneumonia due to COVID-19 virus  70M  with HTN, HLD, and DM who presents to the [place] ED for SOB and found to be COVID positive.  Patient transferred to [facility] on 7/26/20 for higher level of care.     - Remdesivir complete  - Therapeutic lovenox for hypercoagulable state in COVID  - IV Dexamethasone continues (total of 10 days, end date 08/05)  - Completed Ceftriaxone and Azithromycin for possible superimposed bacterial pneumonia  - Blood and sputum cx showed no growth.  - Was previously paralyzed, proned, sedated. Nimbex restarted on 8/1 and discontinued on 08/04. Continue weaning down fentanyl, propofol, precedex. Added zyprexa on 08/07 to help wean sedation. Has been off levo since 08/09.  - Added prn versed for agitation and scheduled valium  - Continues to fail SBTs. Discussing with family about PEG/trach.     Critical Care Daily Checklist:     A: Awake: RASS Goal/Actual Goal: RASS Goal: -2-->light sedation  Actual: Hackett Agitation Sedation Scale (RASS): Moderate sedation   B: Spontaneous Breathing Trial Performed? Spon. Breathing Trial Initiated?: Not initiated (08/06/20 0821)   C: SAT & SBT Coordinated?  N/A                      D: Delirium: CAM-ICU Overall CAM-ICU: Positive   E: Early Mobility Performed? No   F: Feeding Goal: Goals: Meet % EEN, EPN by RD f/u date  Status: Nutrition Goal Status: goal met   Current Diet Order   Procedures    Diet NPO      AS: Analgesia/Sedation Precedex, fentanyl, propofol, valium   T: Thromboembolic Prophylaxis lovanox   H: HOB > 300 No   U: Stress Ulcer Prophylaxis (if needed) N/A   G: Glucose Control levemir 20U BID, aspart 15U Q4h   B: Bowel Function Stool Occurrence: 0   I: Indwelling Catheter (Lines & [place]) Necessity PIV, OG, ET   D: De-escalation of Antimicrobials/Pharmacotherapies N/A    Plan for the day/ETD Continue course, speak with family    Code Status:  Family/Goals of Care: Full Code         Critical secondary to Patient has a condition that poses threat to life and bodily function: COVID 19, intubated      Critical care was time spent personally by me on the following activities: development of treatment plan with patient or surrogate and bedside caregivers, discussions with consultants, evaluation of patient's response to treatment, examination of patient, ordering and performing treatments and interventions, ordering and review of laboratory studies, ordering and review of radiographic studies, pulse oximetry, re-evaluation of patient's condition. This critical care time did not overlap with that of any other provider or involve time for any procedures.     Kusum Merino MD  Critical Care Medicine  [facility] - ICU 16 [facility]

## 2020-08-12 NOTE — PLAN OF CARE
No significant changes in patient's condition.     R.N. attempts to wean sedation result in elevated B/P with SBP ranging from 180 - 240's.  Patient also begins to cough incessantly resulting in decreased oxygen saturation.  Once changes are made to sedation medications, patient then becomes normotensive and oxygen saturation increases.     New orders today per M.D. - Carvedilol added to assist with suppression of sympathetic activity r/t attempts to wean sedation.     Otherwise, patient remains hemodynamically stable.      M.D. notified and changes made to water bolus r/t decreased sodium levels.     Phosphorus replacement for decreased phosphorus levels.     Will continue to monitor and assess.

## 2020-08-12 NOTE — ASSESSMENT & PLAN NOTE
70M  with HTN, HLD, and DM who presents to the [facility] ED for SOB and found to be COVID positive.  Patient transferred to [facility] on 7/26/20 for higher level of care.     - Remdesivir complete  - Therapeutic lovenox for hypercoagulable state in COVID  - IV Dexamethasone continues (total of 10 days, end date 08/05)  - Completed Ceftriaxone and Azithromycin for possible superimposed bacterial pneumonia  - Blood and sputum cx showed no growth.  - Was previously paralyzed, proned, sedated. Nimbex restarted on 8/1 and discontinued on 08/04. Continue weaning down fentanyl, propofol, precedex. Added zyprexa on 08/07 to help wean sedation. Has been off levo since 08/09.  - Added prn versed for agitation and scheduled valium  - Continues to fail SBTs. Discussing with family about PEG/trach.

## 2020-08-12 NOTE — SUBJECTIVE & OBJECTIVE
Interval History/Significant Events:   Overnight patient had episode of SBP in 200s requiring 10 mg of labetalol. During rounds, patient had increased agitation and increasing peak pressures. No PNX on CXR. Started on prn versed for agitation.     Review of Systems   Unable to perform ROS: Intubated     Objective:     Vital Signs (Most Recent):  Temp: 98.7 °F (37.1 °C) (08/12/20 0728)  Pulse: 90 (08/12/20 0831)  Resp: (!) 36 (08/12/20 0831)  BP: 122/68 (08/12/20 0600)  SpO2: (!) 91 % (08/12/20 0831) Vital Signs (24h Range):  Temp:  [98.2 °F (36.8 °C)-99.6 °F (37.6 °C)] 98.7 °F (37.1 °C)  Pulse:  [65-90] 90  Resp:  [24-45] 36  SpO2:  [89 %-100 %] 91 %  BP: ()/(61-73) 122/68  Arterial Line BP: (103-230)/() 189/75   Weight: 109 kg (240 lb 4.8 oz)(Bed scale)  Body mass index is 35.49 kg/m².      Intake/Output Summary (Last 24 hours) at 8/12/2020 1352  Last data filed at 8/12/2020 1000  Gross per 24 hour   Intake 2618.3 ml   Output 1855 ml   Net 763.3 ml       Physical Exam  Vitals signs and nursing note reviewed.   Constitutional:       Appearance: He is obese.   HENT:      Head: Normocephalic.      Mouth/Throat:      Comments: intubated  Eyes:      General:         Right eye: No discharge.         Left eye: No discharge.   Cardiovascular:      Rate and Rhythm: Normal rate and regular rhythm.   Pulmonary:      Effort: No respiratory distress.      Comments: Intubated.  Abdominal:      Palpations: Abdomen is soft.      Tenderness: There is no abdominal tenderness.   Musculoskeletal:         General: No swelling.   Skin:     General: Skin is warm and dry.   Neurological:      Comments: sedated         Vents:  Vent Mode: A/C (08/12/20 0831)  Ventilator Initiated: Yes(chart correction) (07/26/20 2108)  Set Rate: 24 BPM (08/12/20 0831)  Vt Set: 430 mL (08/12/20 0831)  Pressure Support: 0 cmH20 (08/12/20 0831)  PEEP/CPAP: 5 cmH20 (08/12/20 0831)  Oxygen Concentration (%): 40 (08/12/20 0831)  Peak Airway Pressure:  30 cmH2O (08/12/20 0831)  Plateau Pressure: 26 cmH20 (08/12/20 0831)  Total Ve: 14.9 mL (08/12/20 0831)  F/VT Ratio<105 (RSBI): (!) 83.14 (08/12/20 0831)  Lines/Drains/Airways     Central Venous Catheter Line            Percutaneous Central Line Insertion/Assessment - Triple Lumen  07/29/20 1729 right internal jugular 13 days          Drain                 NG/OG Tube 07/26/20 1756 [place] sump;orogastric 18 Fr. Center mouth 16 days         Urethral Catheter 07/26/20 2205 16 Fr. 16 days          Airway                 Airway - Non-Surgical 07/26/20 1724 Endotracheal Tube 16 days          Arterial Line                 Arterial Line 07/29/20 1700 Right Radial 13 days          Peripheral Intravenous Line                 Peripheral IV - Single Lumen 07/31/20 1508 18 G Anterior;Right Forearm 11 days         Peripheral IV - Single Lumen 08/06/20 0552 20 G Anterior;Left Wrist 6 days              Significant Labs:    CBC/Anemia Profile:  Recent Labs   Lab 08/11/20  0400 08/12/20  0305   WBC 9.44 6.42   HGB 10.0* 9.7*   HCT 32.2* 31.5*    302   MCV 89 89   RDW 13.8 13.9        Chemistries:  Recent Labs   Lab 08/11/20  0400 08/12/20  0305   * 132*   K 4.4 4.5    102   CO2 21* 22*   BUN 20 18   CREATININE 0.8 0.8   CALCIUM 8.0* 8.7   ALBUMIN 1.7* 1.7*   PROT 6.6 6.8   BILITOT 0.3 0.2   ALKPHOS 87 79   ALT 74* 62*   AST 56* 47*   MG 2.5 2.0   PHOS 2.6* 2.8       All pertinent labs within the past 24 hours have been reviewed.    Significant Imaging:  I have reviewed all pertinent imaging results/findings within the past 24 hours.

## 2020-08-12 NOTE — PLAN OF CARE
Patient not medically stable for discharge, remains intubated and sedated.  Failed multiple SBT.  ?peg/trach.  When medically stable, anticipate discharge plan - LTAC.  CM to follow.  Vandana García RN CM  [id number]         08/12/20 1556   Discharge Reassessment   Assessment Type Discharge Planning Reassessment   Do you have any problems affording any of your prescribed medications? TBD   Discharge Plan A Long-term acute care facility (LTAC)   Discharge Plan B Skilled Nursing Facility   DME Needed Upon Discharge  other (see comments)  (TBD)   Anticipated Discharge Disposition LTAC   Can the patient/caregiver answer the patient profile reliably?   (Pt intubated)   Describe the patient's ability to walk at the present time. Major restrictions/daily assistance from another person   Number of comorbid conditions (as recorded on the chart) Four   Post-Acute Status   Post-Acute Authorization Placement   Post-Acute Placement Status Awaiting Internal Medical Clearance

## 2020-08-13 LAB
ALBUMIN SERPL BCP-MCNC: 1.7 G/DL (ref 3.5–5.2)
ALLENS TEST: ABNORMAL
ALP SERPL-CCNC: 73 U/L (ref 55–135)
ALT SERPL W/O P-5'-P-CCNC: 50 U/L (ref 10–44)
ANION GAP SERPL CALC-SCNC: 6 MMOL/L (ref 8–16)
AST SERPL-CCNC: 33 U/L (ref 10–40)
BASOPHILS # BLD AUTO: 0.01 K/UL (ref 0–0.2)
BASOPHILS NFR BLD: 0.2 % (ref 0–1.9)
BILIRUB SERPL-MCNC: 0.2 MG/DL (ref 0.1–1)
BUN SERPL-MCNC: 19 MG/DL (ref 8–23)
CALCIUM SERPL-MCNC: 8.7 MG/DL (ref 8.7–10.5)
CHLORIDE SERPL-SCNC: 100 MMOL/L (ref 95–110)
CO2 SERPL-SCNC: 23 MMOL/L (ref 23–29)
CREAT SERPL-MCNC: 0.8 MG/DL (ref 0.5–1.4)
DELSYS: ABNORMAL
DIFFERENTIAL METHOD: ABNORMAL
EOSINOPHIL # BLD AUTO: 0.3 K/UL (ref 0–0.5)
EOSINOPHIL NFR BLD: 5.3 % (ref 0–8)
ERYTHROCYTE [DISTWIDTH] IN BLOOD BY AUTOMATED COUNT: 14 % (ref 11.5–14.5)
ERYTHROCYTE [SEDIMENTATION RATE] IN BLOOD BY WESTERGREN METHOD: 24 MM/H
EST. GFR  (AFRICAN AMERICAN): >60 ML/MIN/1.73 M^2
EST. GFR  (NON AFRICAN AMERICAN): >60 ML/MIN/1.73 M^2
FIO2: 40
GLUCOSE SERPL-MCNC: 282 MG/DL (ref 70–110)
HCO3 UR-SCNC: 20.9 MMOL/L (ref 24–28)
HCT VFR BLD AUTO: 31 % (ref 40–54)
HGB BLD-MCNC: 9.7 G/DL (ref 14–18)
IMM GRANULOCYTES # BLD AUTO: 0.04 K/UL (ref 0–0.04)
IMM GRANULOCYTES NFR BLD AUTO: 0.8 % (ref 0–0.5)
LYMPHOCYTES # BLD AUTO: 0.8 K/UL (ref 1–4.8)
LYMPHOCYTES NFR BLD: 14.8 % (ref 18–48)
MAGNESIUM SERPL-MCNC: 1.9 MG/DL (ref 1.6–2.6)
MCH RBC QN AUTO: 27.4 PG (ref 27–31)
MCHC RBC AUTO-ENTMCNC: 31.3 G/DL (ref 32–36)
MCV RBC AUTO: 88 FL (ref 82–98)
MODE: ABNORMAL
MONOCYTES # BLD AUTO: 0.8 K/UL (ref 0.3–1)
MONOCYTES NFR BLD: 14.5 % (ref 4–15)
NEUTROPHILS # BLD AUTO: 3.4 K/UL (ref 1.8–7.7)
NEUTROPHILS NFR BLD: 64.4 % (ref 38–73)
NRBC BLD-RTO: 0 /100 WBC
PCO2 BLDA: 33.4 MMHG (ref 35–45)
PEEP: 5
PH SMN: 7.4 [PH] (ref 7.35–7.45)
PHOSPHATE SERPL-MCNC: 2.8 MG/DL (ref 2.7–4.5)
PLATELET # BLD AUTO: 270 K/UL (ref 150–350)
PMV BLD AUTO: 10.8 FL (ref 9.2–12.9)
PO2 BLDA: 61 MMHG (ref 80–100)
POC BE: -4 MMOL/L
POC SATURATED O2: 91 % (ref 95–100)
POC TCO2: 22 MMOL/L (ref 23–27)
POCT GLUCOSE: 227 MG/DL (ref 70–110)
POCT GLUCOSE: 249 MG/DL (ref 70–110)
POCT GLUCOSE: 273 MG/DL (ref 70–110)
POCT GLUCOSE: 282 MG/DL (ref 70–110)
POCT GLUCOSE: 329 MG/DL (ref 70–110)
POCT GLUCOSE: 350 MG/DL (ref 70–110)
POTASSIUM SERPL-SCNC: 3.9 MMOL/L (ref 3.5–5.1)
PROT SERPL-MCNC: 6.6 G/DL (ref 6–8.4)
RBC # BLD AUTO: 3.54 M/UL (ref 4.6–6.2)
SAMPLE: ABNORMAL
SITE: ABNORMAL
SODIUM SERPL-SCNC: 129 MMOL/L (ref 136–145)
VT: 490
WBC # BLD AUTO: 5.32 K/UL (ref 3.9–12.7)

## 2020-08-13 PROCEDURE — 80053 COMPREHEN METABOLIC PANEL: CPT

## 2020-08-13 PROCEDURE — 25000003 PHARM REV CODE 250: Performed by: INTERNAL MEDICINE

## 2020-08-13 PROCEDURE — 99291 PR CRITICAL CARE, E/M 30-74 MINUTES: ICD-10-PCS | Mod: ,,, | Performed by: INTERNAL MEDICINE

## 2020-08-13 PROCEDURE — 37799 UNLISTED PX VASCULAR SURGERY: CPT

## 2020-08-13 PROCEDURE — 63600175 PHARM REV CODE 636 W HCPCS: Performed by: INTERNAL MEDICINE

## 2020-08-13 PROCEDURE — 27200966 HC CLOSED SUCTION SYSTEM

## 2020-08-13 PROCEDURE — 27000221 HC OXYGEN, UP TO 24 HOURS

## 2020-08-13 PROCEDURE — 63600175 PHARM REV CODE 636 W HCPCS: Performed by: STUDENT IN AN ORGANIZED HEALTH CARE EDUCATION/TRAINING PROGRAM

## 2020-08-13 PROCEDURE — 94003 VENT MGMT INPAT SUBQ DAY: CPT

## 2020-08-13 PROCEDURE — 82803 BLOOD GASES ANY COMBINATION: CPT

## 2020-08-13 PROCEDURE — 84100 ASSAY OF PHOSPHORUS: CPT

## 2020-08-13 PROCEDURE — 99900035 HC TECH TIME PER 15 MIN (STAT)

## 2020-08-13 PROCEDURE — 82800 BLOOD PH: CPT

## 2020-08-13 PROCEDURE — 99291 CRITICAL CARE FIRST HOUR: CPT | Mod: ,,, | Performed by: INTERNAL MEDICINE

## 2020-08-13 PROCEDURE — 25000003 PHARM REV CODE 250: Performed by: STUDENT IN AN ORGANIZED HEALTH CARE EDUCATION/TRAINING PROGRAM

## 2020-08-13 PROCEDURE — C9399 UNCLASSIFIED DRUGS OR BIOLOG: HCPCS | Performed by: INTERNAL MEDICINE

## 2020-08-13 PROCEDURE — 25000003 PHARM REV CODE 250: Performed by: PHYSICIAN ASSISTANT

## 2020-08-13 PROCEDURE — 83735 ASSAY OF MAGNESIUM: CPT

## 2020-08-13 PROCEDURE — 20000000 HC ICU ROOM

## 2020-08-13 PROCEDURE — 94761 N-INVAS EAR/PLS OXIMETRY MLT: CPT

## 2020-08-13 PROCEDURE — 85025 COMPLETE CBC W/AUTO DIFF WBC: CPT

## 2020-08-13 RX ADMIN — PROPOFOL 50 MCG/KG/MIN: 10 INJECTION, EMULSION INTRAVENOUS at 08:08

## 2020-08-13 RX ADMIN — POLYETHYLENE GLYCOL 3350 17 G: 17 POWDER, FOR SOLUTION ORAL at 08:08

## 2020-08-13 RX ADMIN — DIAZEPAM 5 MG: 5 TABLET ORAL at 06:08

## 2020-08-13 RX ADMIN — PROPOFOL 50 MCG/KG/MIN: 10 INJECTION, EMULSION INTRAVENOUS at 05:08

## 2020-08-13 RX ADMIN — CARVEDILOL 6.25 MG: 6.25 TABLET, FILM COATED ORAL at 08:08

## 2020-08-13 RX ADMIN — PROPOFOL 50 MCG/KG/MIN: 10 INJECTION, EMULSION INTRAVENOUS at 10:08

## 2020-08-13 RX ADMIN — PROPOFOL 50 MCG/KG/MIN: 10 INJECTION, EMULSION INTRAVENOUS at 02:08

## 2020-08-13 RX ADMIN — STANDARDIZED SENNA CONCENTRATE AND DOCUSATE SODIUM 1 TABLET: 8.6; 5 TABLET ORAL at 08:08

## 2020-08-13 RX ADMIN — ROSUVASTATIN CALCIUM 10 MG: 10 TABLET, FILM COATED ORAL at 08:08

## 2020-08-13 RX ADMIN — FAMOTIDINE 20 MG: 20 TABLET ORAL at 08:08

## 2020-08-13 RX ADMIN — DEXMEDETOMIDINE HYDROCHLORIDE 0.2 MCG/KG/HR: 100 INJECTION, SOLUTION, CONCENTRATE INTRAVENOUS at 05:08

## 2020-08-13 RX ADMIN — INSULIN DETEMIR 20 UNITS: 100 INJECTION, SOLUTION SUBCUTANEOUS at 09:08

## 2020-08-13 RX ADMIN — Medication 250 MCG/HR: at 07:08

## 2020-08-13 RX ADMIN — DEXMEDETOMIDINE HYDROCHLORIDE 1 MCG/KG/HR: 100 INJECTION, SOLUTION, CONCENTRATE INTRAVENOUS at 12:08

## 2020-08-13 RX ADMIN — DIAZEPAM 5 MG: 5 TABLET ORAL at 12:08

## 2020-08-13 RX ADMIN — OLANZAPINE 5 MG: 2.5 TABLET, FILM COATED ORAL at 08:08

## 2020-08-13 RX ADMIN — INSULIN DETEMIR 25 UNITS: 100 INJECTION, SOLUTION SUBCUTANEOUS at 08:08

## 2020-08-13 RX ADMIN — INSULIN ASPART 15 UNITS: 100 INJECTION, SOLUTION INTRAVENOUS; SUBCUTANEOUS at 04:08

## 2020-08-13 RX ADMIN — INSULIN ASPART 15 UNITS: 100 INJECTION, SOLUTION INTRAVENOUS; SUBCUTANEOUS at 08:08

## 2020-08-13 RX ADMIN — INSULIN ASPART 15 UNITS: 100 INJECTION, SOLUTION INTRAVENOUS; SUBCUTANEOUS at 03:08

## 2020-08-13 RX ADMIN — Medication 3000 MCG/HR: at 05:08

## 2020-08-13 RX ADMIN — DEXMEDETOMIDINE HYDROCHLORIDE 1 MCG/KG/HR: 100 INJECTION, SOLUTION, CONCENTRATE INTRAVENOUS at 10:08

## 2020-08-13 RX ADMIN — PROPOFOL 5 MCG/KG/MIN: 10 INJECTION, EMULSION INTRAVENOUS at 05:08

## 2020-08-13 RX ADMIN — INSULIN ASPART 15 UNITS: 100 INJECTION, SOLUTION INTRAVENOUS; SUBCUTANEOUS at 12:08

## 2020-08-13 RX ADMIN — DIAZEPAM 5 MG: 5 TABLET ORAL at 05:08

## 2020-08-13 RX ADMIN — PROPOFOL 50 MCG/KG/MIN: 10 INJECTION, EMULSION INTRAVENOUS at 11:08

## 2020-08-13 RX ADMIN — ENOXAPARIN SODIUM 90 MG: 100 INJECTION SUBCUTANEOUS at 08:08

## 2020-08-13 RX ADMIN — MIDAZOLAM 4 MG: 5 INJECTION INTRAMUSCULAR; INTRAVENOUS at 05:08

## 2020-08-13 RX ADMIN — INSULIN ASPART 15 UNITS: 100 INJECTION, SOLUTION INTRAVENOUS; SUBCUTANEOUS at 11:08

## 2020-08-13 RX ADMIN — INSULIN ASPART 15 UNITS: 100 INJECTION, SOLUTION INTRAVENOUS; SUBCUTANEOUS at 09:08

## 2020-08-13 RX ADMIN — Medication 300 MCG/HR: at 10:08

## 2020-08-13 RX ADMIN — DEXMEDETOMIDINE HYDROCHLORIDE 1 MCG/KG/HR: 100 INJECTION, SOLUTION, CONCENTRATE INTRAVENOUS at 05:08

## 2020-08-13 RX ADMIN — Medication 262.5 MCG/HR: at 03:08

## 2020-08-13 NOTE — PROGRESS NOTES
"[facility] - ICU 16 WT  Adult Nutrition  Progress Note    SUMMARY       Recommendations    Recommendation:   1. Continue TF of Peptamen Intense VHP, decrease to 40 mL/hr to provide pt with 960 kcal, 88 g protein and 806 mL free water.   - As propofol is decrease increase TF to goal rate of 60 mL/hr to provide pt with 1440 kcal, 132 g protein and 1210 mL free water.    -Additional water per MD. Hold for residuals >500 mL.   RD to monitor and follow up    Goals: Meet % EEN, EPN by RD f/u date  Nutrition Goal Status: goal met  Communication of RD Recs: other (comment)(POC)    Reason for Assessment    Reason For Assessment: RD follow-up  Diagnosis: other (see comments)(PNA 2/2 COVID19)  Relevant Medical History: DM, HTN, HLD  Interdisciplinary Rounds: did not attend  General Information Comments: Pt intubated and sedated. Possible trach/PEG placement. TF running at goal rate of 60 mL/hr, tolerating well. Constipation resolved. Wt stabel since admit. Noted +2 edema. No indications of malnutrition at this time. NFPE not performed, patient has been screened for possible COVID-19 and has been placed on airborne and contact precautions. Patient is noted as being positive for COVID-19.  Nutrition Discharge Planning: Unable to determine    Nutrition Risk Screen    Nutrition Risk Screen: tube feeding or parenteral nutrition    Nutrition/Diet History    Food Allergies: NKFA  Factors Affecting Nutritional Intake: NPO, on mechanical ventilation    Anthropometrics    Temp: 98.1 °F (36.7 °C)  Height: 5' 9" (175.3 cm)  Height (inches): 69 in  Weight Method: Bed Scale  Weight: 109 kg (240 lb 4.8 oz)(Bed scale)  Weight (lb): 240.3 lb  Ideal Body Weight (IBW), Male: 160 lb  % Ideal Body Weight, Male (lb): 152.5 %  BMI (Calculated): 35.5  BMI Grade: 35 - 39.9 - obesity - grade II  Usual Body Weight (UBW), k.6 kg(Per chart review)  % Usual Body Weight: 97.92  % Weight Change From Usual Weight: -2.29 " %    Lab/Procedures/Meds    Pertinent Labs Reviewed: reviewed  Pertinent Labs Comments: Na 129; glucose 282  Pertinent Medications Reviewed: reviewed  Pertinent Medications Comments: senna-docusate; famotidine; enoxaparin; insulin     Estimated/Assessed Needs    Weight Used For Calorie Calculations: 111 kg (244 lb 11.4 oz)  Energy Calorie Requirements (kcal): 9718-6042 kcal/d  Energy Need Method: Kcal/kg(11-14 kcal/kg, BMI > 35)  Protein Requirements: 146-182 g/d (2-2.5 g/kg IBW)  Weight Used For Protein Calculations: 72.7 kg (160 lb 4.4 oz)  Estimated Fluid Requirement Method: other (see comments)(Per MD or 1 mL/kcal)  RDA Method (mL): 1221  CHO Requirement: 50% total kcals    Nutrition Prescription Ordered    Current Diet Order: NPO  Current Nutrition Support Formula Ordered: Peptamen Intense VHP  Current Nutrition Support Rate Ordered: 60 (ml)  Current Nutrition Support Frequency Ordered: mL/hr    Evaluation of Received Nutrient/Fluid Intake    Enteral Calories (kcal): 1440  Enteral Protein (gm): 132  Enteral (Free Water) Fluid (mL): 1210  Other Calories (kcal): 879  Total Calories (kcal): 2319  % Kcal Needs: 189  % Protein Needs: 90  I/O: +5.4 L since admit  Energy Calories Required: exceeds needs  Protein Required: meeting needs  Fluid Required: meeting needs  Comments: LBM 8/12  Tolerance: tolerating  % Intake of Estimated Energy Needs: 75 - 100 %  % Meal Intake: NPO    Nutrition Risk    Level of Risk/Frequency of Follow-up: low     Assessment and Plan  Nutrition Problem  Inadequate oral intake     Related to (etiology):   Inability to consume sufficient energy     Signs and Symptoms (as evidenced by):   NPO, Intubated      Interventions(treatment strategy):  Collaboration of nutrition care w/ other providers   Enteral nutrition      Nutrition Diagnosis Status:   Resolving     Monitor and Evaluation    Food and Nutrient Intake: energy intake, enteral nutrition intake  Food and Nutrient Adminstration: enteral  and parenteral nutrition administration  Physical Activity and Function: nutrition-related ADLs and IADLs  Anthropometric Measurements: weight, weight change  Biochemical Data, Medical Tests and Procedures: electrolyte and renal panel, glucose/endocrine profile, gastrointestinal profile, lipid profile, inflammatory profile  Nutrition-Focused Physical Findings: overall appearance     Nutrition Follow-Up    RD Follow-up?: Yes

## 2020-08-13 NOTE — PLAN OF CARE
Recommendations    Recommendation:   1. Continue TF of Peptamen Intense VHP, decrease to 40 mL/hr to provide pt with 960 kcal, 88 g protein and 806 mL free water.   - As propofol is decrease increase TF to goal rate of 60 mL/hr to provide pt with 1440 kcal, 132 g protein and 1210 mL free water.    -Additional water per MD. Hold for residuals >500 mL.   RD to monitor and follow up    Goals: Meet % EEN, EPN by RD f/u date  Nutrition Goal Status: goal met  Communication of RD Recs: other (comment)(POC)

## 2020-08-13 NOTE — SUBJECTIVE & OBJECTIVE
Interval History/Significant Events:   No acute events overnight, patient did not have any periods of agitation.     Review of Systems   Unable to perform ROS: Intubated     Objective:     Vital Signs (Most Recent):  Temp: 98.1 °F (36.7 °C) (08/13/20 1100)  Pulse: 60 (08/13/20 1322)  Resp: (!) 24 (08/13/20 1322)  BP: (!) 90/54 (08/13/20 1322)  SpO2: 100 % (08/13/20 1322) Vital Signs (24h Range):  Temp:  [97.9 °F (36.6 °C)-100 °F (37.8 °C)] 98.1 °F (36.7 °C)  Pulse:  [60-90] 60  Resp:  [24-56] 24  SpO2:  [92 %-100 %] 100 %  BP: ()/(54-88) 90/54  Arterial Line BP: ()/(51-77) 111/51   Weight: 109 kg (240 lb 4.8 oz)(Bed scale)  Body mass index is 35.49 kg/m².      Intake/Output Summary (Last 24 hours) at 8/13/2020 1351  Last data filed at 8/13/2020 1300  Gross per 24 hour   Intake 3580.01 ml   Output 2490 ml   Net 1090.01 ml       Physical Exam  Vitals signs and nursing note reviewed.   Constitutional:       Appearance: He is obese.   HENT:      Head: Normocephalic.      Mouth/Throat:      Comments: intubated  Eyes:      General:         Right eye: No discharge.         Left eye: No discharge.   Cardiovascular:      Rate and Rhythm: Normal rate and regular rhythm.   Pulmonary:      Effort: No respiratory distress.      Comments: Intubated.  Abdominal:      Palpations: Abdomen is soft.      Tenderness: There is no abdominal tenderness.   Musculoskeletal:         General: No swelling.   Skin:     General: Skin is warm and dry.   Neurological:      Comments: sedated         Vents:  Vent Mode: A/C (08/13/20 1322)  Ventilator Initiated: Yes(chart correction) (07/26/20 2108)  Set Rate: 24 BPM (08/13/20 1322)  Vt Set: 490 mL (08/13/20 1322)  Pressure Support: 0 cmH20 (08/13/20 1322)  PEEP/CPAP: 5 cmH20 (08/13/20 1322)  Oxygen Concentration (%): 40 (08/13/20 1322)  Peak Airway Pressure: 34 cmH2O (08/13/20 1322)  Plateau Pressure: 32 cmH20 (08/13/20 1322)  Total Ve: 12.2 mL (08/13/20 1322)  F/VT Ratio<105 (RSBI): (!)  47.15 (08/13/20 1322)  Lines/Drains/Airways     Central Venous Catheter Line            Percutaneous Central Line Insertion/Assessment - Triple Lumen  07/29/20 1729 right internal jugular 14 days          Drain                 NG/OG Tube 07/26/20 1756 [place] sump;orogastric 18 Fr. Center mouth 17 days         Urethral Catheter 07/26/20 2205 16 Fr. 17 days          Airway                 Airway - Non-Surgical 07/26/20 1724 Endotracheal Tube 17 days          Arterial Line                 Arterial Line 07/29/20 1700 Right Radial 14 days          Peripheral Intravenous Line                 Peripheral IV - Single Lumen 07/31/20 1508 18 G Anterior;Right Forearm 12 days         Peripheral IV - Single Lumen 08/06/20 0552 20 G Anterior;Left Wrist 7 days              Significant Labs:    CBC/Anemia Profile:  Recent Labs   Lab 08/12/20  0305 08/13/20  0347   WBC 6.42 5.32   HGB 9.7* 9.7*   HCT 31.5* 31.0*    270   MCV 89 88   RDW 13.9 14.0        Chemistries:  Recent Labs   Lab 08/12/20  0305 08/13/20  0347   * 129*   K 4.5 3.9    100   CO2 22* 23   BUN 18 19   CREATININE 0.8 0.8   CALCIUM 8.7 8.7   ALBUMIN 1.7* 1.7*   PROT 6.8 6.6   BILITOT 0.2 0.2   ALKPHOS 79 73   ALT 62* 50*   AST 47* 33   MG 2.0 1.9   PHOS 2.8 2.8       All pertinent labs within the past 24 hours have been reviewed.    Significant Imaging:  I have reviewed all pertinent imaging results/findings within the past 24 hours.

## 2020-08-13 NOTE — PROGRESS NOTES
[facility] - ICU 16   Critical Care Medicine  Progress Note    Patient Name: Anup Miller  MRN: [id number]  Admission Date: 7/26/2020  Hospital Length of Stay: 18 days  Code Status: Full Code  Attending Provider: Jimenez Frank MD  Primary Care Provider: Bryce Gonzales MD   Principal Problem: Pneumonia due to COVID-19 virus    Subjective:     HPI:  Anup Miller is a 70 y.o. male patient with a PMHx of HTN, HLD, and DM who presents to the [place] [facility] for evaluation of SOB which  1 week ago. Pt became more SOB and has an O2 sat of low 70s upon arrival on RA. Symptoms are worsening and moderate in severity. No mitigating or exacerbating factors reported. Associated sxs include cough and fever. Patient denies any congestion, sore throat, CP, abd pain, N/V, back pain, HA, weakness, and all other sxs at this time. No prior Tx reported. No further complaints or concerns at this time. Patient was placed on Bipap for a few hours and subsequently intubated. COVID positive. Was started on IV dexamethasone, Ceftriaxone, and Azithromycin. Patient transferred to [facility] on evening of 7/26/20 for higher level of care.     Hospital/ICU Course:  As of 7/29, the patient's oxygenation continued to worsen with P:F <150. R IJ and Arterial Line placed. Pt sedated, paralyzed, and proned at 9:30pm with improved oxygenation. Repeat AM gas on 7/30 Arterial Blood Gas result:  pO2 112; pCO2 58.9; pH 7.256;  HCO3 26.2, %O2 Sat 97%. FiO2 60, 8 PEEP. Pt supinated at 4:10 pm on 7/30 with Arterial Blood Gas result:  pO2 106; pCO2 52.2; pH 7.349;  HCO3 28.7, %O2 Sat 106. (P:F 212). FiO2 50, 10 PEEP.    As of 7/31, paralytic d/c'ed and sedation slowly weaned. Propofol d/c'ed on 8/1 2/2 triglycerides with ketamine and versed initiated. Now requiring higher vent settings due to dyssynchrony, will continue to increase sedation. Patient paralyzed again on 8/1 for vent dyssynchrony. Nimbex was discontinued on  08/03 and sedative medications were increased, however, patient struggled to pull enough tidal volume and was desatting even on maximum sedation so nimbex was restarted. Nimbex was stopped on 08/04 and restarted propofol. As patient became more agitated, ketamine was added on 08/06. Sedation was attempted to be weaned off by adding seroquel and zyprexa. Versed and ketamine were weaned off. Patient was weaned off of levo on morning of 08/09. Patient failed multiple SBTs and it was discussed with family of likely PEG/trach, pending their decision. Patient has continued agitation with coughing/ increased BPs requiring propofol, fentanyl, and precedex with scheduled valium.     Interval History/Significant Events:   No acute events overnight, patient did not have any periods of agitation.     Review of Systems   Unable to perform ROS: Intubated     Objective:     Vital Signs (Most Recent):  Temp: 98.1 °F (36.7 °C) (08/13/20 1100)  Pulse: 60 (08/13/20 1322)  Resp: (!) 24 (08/13/20 1322)  BP: (!) 90/54 (08/13/20 1322)  SpO2: 100 % (08/13/20 1322) Vital Signs (24h Range):  Temp:  [97.9 °F (36.6 °C)-100 °F (37.8 °C)] 98.1 °F (36.7 °C)  Pulse:  [60-90] 60  Resp:  [24-56] 24  SpO2:  [92 %-100 %] 100 %  BP: ()/(54-88) 90/54  Arterial Line BP: ()/(51-77) 111/51   Weight: 109 kg (240 lb 4.8 oz)(Bed scale)  Body mass index is 35.49 kg/m².      Intake/Output Summary (Last 24 hours) at 8/13/2020 1351  Last data filed at 8/13/2020 1300  Gross per 24 hour   Intake 3580.01 ml   Output 2490 ml   Net 1090.01 ml       Physical Exam  Vitals signs and nursing note reviewed.   Constitutional:       Appearance: He is obese.   HENT:      Head: Normocephalic.      Mouth/Throat:      Comments: intubated  Eyes:      General:         Right eye: No discharge.         Left eye: No discharge.   Cardiovascular:      Rate and Rhythm: Normal rate and regular rhythm.   Pulmonary:      Effort: No respiratory distress.      Comments:  Intubated.  Abdominal:      Palpations: Abdomen is soft.      Tenderness: There is no abdominal tenderness.   Musculoskeletal:         General: No swelling.   Skin:     General: Skin is warm and dry.   Neurological:      Comments: sedated         Vents:  Vent Mode: A/C (08/13/20 1322)  Ventilator Initiated: Yes(chart correction) (07/26/20 2108)  Set Rate: 24 BPM (08/13/20 1322)  Vt Set: 490 mL (08/13/20 1322)  Pressure Support: 0 cmH20 (08/13/20 1322)  PEEP/CPAP: 5 cmH20 (08/13/20 1322)  Oxygen Concentration (%): 40 (08/13/20 1322)  Peak Airway Pressure: 34 cmH2O (08/13/20 1322)  Plateau Pressure: 32 cmH20 (08/13/20 1322)  Total Ve: 12.2 mL (08/13/20 1322)  F/VT Ratio<105 (RSBI): (!) 47.15 (08/13/20 1322)  Lines/Drains/Airways     Central Venous Catheter Line            Percutaneous Central Line Insertion/Assessment - Triple Lumen  07/29/20 1729 right internal jugular 14 days          Drain                 NG/OG Tube 07/26/20 1756 [place] sump;orogastric 18 Fr. Center mouth 17 days         Urethral Catheter 07/26/20 2205 16 Fr. 17 days          Airway                 Airway - Non-Surgical 07/26/20 1724 Endotracheal Tube 17 days          Arterial Line                 Arterial Line 07/29/20 1700 Right Radial 14 days          Peripheral Intravenous Line                 Peripheral IV - Single Lumen 07/31/20 1508 18 G Anterior;Right Forearm 12 days         Peripheral IV - Single Lumen 08/06/20 0552 20 G Anterior;Left Wrist 7 days              Significant Labs:    CBC/Anemia Profile:  Recent Labs   Lab 08/12/20  0305 08/13/20  0347   WBC 6.42 5.32   HGB 9.7* 9.7*   HCT 31.5* 31.0*    270   MCV 89 88   RDW 13.9 14.0        Chemistries:  Recent Labs   Lab 08/12/20  0305 08/13/20  0347   * 129*   K 4.5 3.9    100   CO2 22* 23   BUN 18 19   CREATININE 0.8 0.8   CALCIUM 8.7 8.7   ALBUMIN 1.7* 1.7*   PROT 6.8 6.6   BILITOT 0.2 0.2   ALKPHOS 79 73   ALT 62* 50*   AST 47* 33   MG 2.0 1.9   PHOS 2.8 2.8       All  pertinent labs within the past 24 hours have been reviewed.    Significant Imaging:  I have reviewed all pertinent imaging results/findings within the past 24 hours.      ABG  Recent Labs   Lab 08/13/20  0431   PH 7.404   PO2 61*   PCO2 33.4*   HCO3 20.9*   BE -4     Assessment/Plan:     Pulmonary  Acute hypoxemic respiratory failure  - see Pneumonia due to COVID 19    Cardiac/Vascular  Hyperlipidemia associated with type 2 diabetes mellitus  - Restart Crestor once extubated     Hypertension associated with diabetes  - Hold antihypertensives in setting of shock  - Resume as tolerated    Endocrine  Uncontrolled type 2 diabetes mellitus  - A1c 8.3%  - started on insulin gtt on 08/05 due to elevated BG but discontinued 08/07  - Increased from 15U to Levemir 20U BID  - Aspart 15U Q4H  - BG goal 140-180    GI  Elevated liver enzymes  - Hx of elevated LFTs dating back to 2015  - Acute hepatitis panel and HIV - negative  - Stable. Trend CMP daily    Other  * Pneumonia due to COVID-19 virus  70M  with HTN, HLD, and DM who presents to the [place] ED for SOB and found to be COVID positive.  Patient [place] [facility] [place] 7[place]26/20 for higher level of care.     - Remdesivir complete  - Therapeutic lovenox for hypercoagulable state in COVID  - IV Dexamethasone continues (total of 10 days, end date 08/05)  - Completed Ceftriaxone and Azithromycin for possible superimposed bacterial pneumonia  - Blood and sputum cx showed no growth.  - Was previously paralyzed, proned, sedated. Nimbex restarted on 8/1 and discontinued on 08/04. Continue weaning down fentanyl, propofol, precedex. Added zyprexa on 08/07 to help wean sedation. Has been off levo since 08/09.  - Added prn versed for agitation and scheduled valium  - Continues to fail SBTs. Discussing with family about PEG/trach.       Critical Care Daily Checklist:    A: Awake: RASS Goal/Actual Goal: RASS Goal: -2-->light sedation  Actual: [place]ett Agitation Sedation Scale (RASS):  Light sedation   B: Spontaneous Breathing Trial Performed? Spon. Breathing Trial Initiated?: Not initiated (08/06/20 0821)   C: SAT & SBT Coordinated?  Yes, failed                      D: Delirium: CAM-ICU Overall CAM-ICU: Positive   E: Early Mobility Performed? No   F: Feeding Goal: Goals: Meet % EEN, EPN by RD f/u date  Status: Nutrition Goal Status: goal met   Current Diet Order   Procedures    Diet NPO      AS: Analgesia/Sedation Precedex, fentanyl, propofol   T: Thromboembolic Prophylaxis lovanox   H: HOB > 300 No   U: Stress Ulcer Prophylaxis (if needed) yes   G: Glucose Control Detrimir, aspart   B: Bowel Function Stool Occurrence: 0   I: Indwelling Catheter (Lines & Harris) Necessity PIV, NG, ET, harris   D: De-escalation of Antimicrobials/Pharmacotherapies N/A    Plan for the day/ETD Continue course    Code Status:  Family/Goals of Care: Full Code       Critical secondary to Patient has a condition that poses threat to life and bodily function: COVID, intubated      Critical care was time spent personally by me on the following activities: development of treatment plan with patient or surrogate and bedside caregivers, discussions with consultants, evaluation of patient's response to treatment, examination of patient, ordering and performing treatments and interventions, ordering and review of laboratory studies, ordering and review of radiographic studies, pulse oximetry, re-evaluation of patient's condition. This critical care time did not overlap with that of any other provider or involve time for any procedures.     Kusum Merino MD  Critical Care Medicine  [facility] - ICU 16 WT

## 2020-08-13 NOTE — PLAN OF CARE
Problem: Adult Inpatient Plan of Care  Goal: Plan of Care Review  8/13/2020 0206 by Isaac Goodman RN  Outcome: Ongoing, Progressing  8/13/2020 0153 by Isaac Goodman RN  Outcome: Ongoing, Progressing  Goal: Patient-Specific Goal (Individualization)  8/13/2020 0206 by Isaac Goodman RN  Outcome: Ongoing, Progressing  8/13/2020 0153 by Isaac Goodman RN  Outcome: Ongoing, Progressing  Goal: Absence of Hospital-Acquired Illness or Injury  8/13/2020 0206 by Isaac Goodman RN  Outcome: Ongoing, Progressing  8/13/2020 0153 by Isaac Goodman RN  Outcome: Ongoing, Progressing  Goal: Optimal Comfort and Wellbeing  8/13/2020 0206 by Isaac Goodman RN  Outcome: Ongoing, Progressing  8/13/2020 0153 by Isaac Goodman RN  Outcome: Ongoing, Progressing  Goal: Readiness for Transition of Care  8/13/2020 0206 by Isaac Goodman RN  Outcome: Ongoing, Progressing  8/13/2020 0153 by Isaac Goodman RN  Outcome: Ongoing, Progressing  Goal: Rounds/Family Conference  8/13/2020 0206 by Isaac Goodman RN  Outcome: Ongoing, Progressing  8/13/2020 0153 by Isaac Goodman RN  Outcome: Ongoing, Progressing     Problem: Diabetes Comorbidity  Goal: Blood Glucose Level Within Desired Range  8/13/2020 0206 by Isaac Goodman RN  Outcome: Ongoing, Not Progressing  8/13/2020 0153 by Isaac Goodman RN  Outcome: Ongoing, Progressing     Problem: Infection  Goal: Infection Symptom Resolution  8/13/2020 0206 by Isaac Goodman RN  Outcome: Ongoing, Progressing  8/13/2020 0153 by Isaac Goodman RN  Outcome: Ongoing, Progressing     Problem: Fall Injury Risk  Goal: Absence of Fall and Fall-Related Injury  8/13/2020 0206 by Isaac Goodman RN  Outcome: Ongoing, Progressing  8/13/2020 0153 by Isaac Goodman RN  Outcome: Ongoing, Progressing     Problem: Skin Injury Risk Increased  Goal: Skin Health and Integrity  8/13/2020 0206 by Isaac Goodman RN  Outcome: Ongoing, Progressing  8/13/2020 0153 by Isaac Goodman RN  Outcome: Ongoing,  Progressing     Problem: Restraint, Nonbehavioral (Nonviolent)  Goal: Discontinuation Criteria Achieved  8/13/2020 0206 by Isaac Goodman RN  Outcome: Ongoing, Progressing  8/13/2020 0153 by Isaac Goodman RN  Outcome: Ongoing, Progressing  Goal: Personal Dignity and Safety Maintained  8/13/2020 0206 by Isaac Goodman RN  Outcome: Ongoing, Progressing  8/13/2020 0153 by Isaac Goodman RN  Outcome: Ongoing, Progressing     Problem: Communication Impairment (Mechanical Ventilation, Invasive)  Goal: Effective Communication  8/13/2020 0206 by Isaac Goodman RN  Outcome: Ongoing, Not Progressing  8/13/2020 0153 by Isaac Goodman RN  Outcome: Ongoing, Progressing     Problem: Device-Related Complication Risk (Mechanical Ventilation, Invasive)  Goal: Optimal Device Function  8/13/2020 0206 by Isaac Goodman RN  Outcome: Ongoing, Not Progressing  8/13/2020 0153 by Isaac Goodman RN  Outcome: Ongoing, Progressing     Problem: Inability to Wean (Mechanical Ventilation, Invasive)  Goal: Mechanical Ventilation Liberation  8/13/2020 0206 by Isaac Goodman RN  Outcome: Ongoing, Not Progressing  8/13/2020 0153 by Isaac Goodman RN  Outcome: Ongoing, Progressing     Problem: Nutrition Impairment (Mechanical Ventilation, Invasive)  Goal: Optimal Nutrition Delivery  8/13/2020 0206 by Isaac Goodman RN  Outcome: Ongoing, Progressing  8/13/2020 0153 by Isaac Goodman RN  Outcome: Ongoing, Progressing     Problem: Skin and Tissue Injury (Mechanical Ventilation, Invasive)  Goal: Absence of Device-Related Skin and Tissue Injury  8/13/2020 0206 by Isaac Goodman RN  Outcome: Ongoing, Progressing  8/13/2020 0153 by Isaac Goodman RN  Outcome: Ongoing, Progressing     Problem: Ventilator-Induced Lung Injury (Mechanical Ventilation, Invasive)  Goal: Absence of Ventilator-Induced Lung Injury  8/13/2020 0206 by Isaac Goodman RN  Outcome: Ongoing, Progressing  8/13/2020 0153 by Isaac Goodman RN  Outcome: Ongoing, Progressing      Problem: Communication Impairment (Artificial Airway)  Goal: Effective Communication  8/13/2020 0206 by Isaac Goodman RN  Outcome: Ongoing, Progressing  8/13/2020 0153 by Isaac Goodman RN  Outcome: Ongoing, Progressing     Problem: Device-Related Complication Risk (Artificial Airway)  Goal: Optimal Device Function  8/13/2020 0206 by Isaac Goodman RN  Outcome: Ongoing, Progressing  8/13/2020 0153 by Isaac Goodman RN  Outcome: Ongoing, Progressing     Problem: Skin and Tissue Injury (Artificial Airway)  Goal: Absence of Device-Related Skin or Tissue Injury  8/13/2020 0206 by Isaac Goodman RN  Outcome: Ongoing, Progressing  8/13/2020 0153 by Isaac Goodman RN  Outcome: Ongoing, Progressing     Problem: Noninvasive Ventilation Acute  Goal: Effective Unassisted Ventilation and Oxygenation  8/13/2020 0206 by Isaac Goodman RN  Outcome: Ongoing, Progressing  8/13/2020 0153 by Isaac Goodman RN  Outcome: Ongoing, Progressing     Problem: Aspiration (Enteral Nutrition)  Goal: Absence of Aspiration Signs/Symptoms  8/13/2020 0206 by Isaac Goodman RN  Outcome: Ongoing, Progressing  8/13/2020 0153 by Isaac Goodman RN  Outcome: Ongoing, Progressing     Problem: Wound  Goal: Optimal Wound Healing  8/13/2020 0206 by Isaac Goodman RN  Outcome: Ongoing, Progressing  8/13/2020 0153 by Isaac Goodman RN  Outcome: Ongoing, Progressing    Mr. Miller restless at beginning of shift on ventilator. Gave one time Ativan and help calm patient and bring blood pressure down. Sedation of Propofol, Precedex and Fentanyl titration over night to keep up with patient demand/comfort level RASS -2. Blood sugars assessed every four hours and given scheduled ordered dose for insulin.     Mrs. Miller updated at beginning of shift of patients status and face-timed her . Will continue to monitor and assess.

## 2020-08-14 LAB
ALBUMIN SERPL BCP-MCNC: 1.7 G/DL (ref 3.5–5.2)
ALLENS TEST: ABNORMAL
ALP SERPL-CCNC: 70 U/L (ref 55–135)
ALT SERPL W/O P-5'-P-CCNC: 53 U/L (ref 10–44)
ANION GAP SERPL CALC-SCNC: 7 MMOL/L (ref 8–16)
ANISOCYTOSIS BLD QL SMEAR: SLIGHT
AST SERPL-CCNC: 39 U/L (ref 10–40)
BASO STIPL BLD QL SMEAR: ABNORMAL
BASOPHILS # BLD AUTO: ABNORMAL K/UL (ref 0–0.2)
BASOPHILS NFR BLD: 0 % (ref 0–1.9)
BILIRUB SERPL-MCNC: 0.2 MG/DL (ref 0.1–1)
BUN SERPL-MCNC: 16 MG/DL (ref 8–23)
BURR CELLS BLD QL SMEAR: ABNORMAL
CALCIUM SERPL-MCNC: 8.7 MG/DL (ref 8.7–10.5)
CHLORIDE SERPL-SCNC: 101 MMOL/L (ref 95–110)
CO2 SERPL-SCNC: 23 MMOL/L (ref 23–29)
CREAT SERPL-MCNC: 0.8 MG/DL (ref 0.5–1.4)
DELSYS: ABNORMAL
DIFFERENTIAL METHOD: ABNORMAL
EOSINOPHIL # BLD AUTO: ABNORMAL K/UL (ref 0–0.5)
EOSINOPHIL NFR BLD: 6.6 % (ref 0–8)
ERYTHROCYTE [DISTWIDTH] IN BLOOD BY AUTOMATED COUNT: 14 % (ref 11.5–14.5)
ERYTHROCYTE [SEDIMENTATION RATE] IN BLOOD BY WESTERGREN METHOD: 24 MM/H
EST. GFR  (AFRICAN AMERICAN): >60 ML/MIN/1.73 M^2
EST. GFR  (NON AFRICAN AMERICAN): >60 ML/MIN/1.73 M^2
FIO2: 40
GLUCOSE SERPL-MCNC: 211 MG/DL (ref 70–110)
HCO3 UR-SCNC: 22.5 MMOL/L (ref 24–28)
HCT VFR BLD AUTO: 31.2 % (ref 40–54)
HGB BLD-MCNC: 10 G/DL (ref 14–18)
HYPOCHROMIA BLD QL SMEAR: ABNORMAL
IMM GRANULOCYTES # BLD AUTO: ABNORMAL K/UL (ref 0–0.04)
IMM GRANULOCYTES NFR BLD AUTO: ABNORMAL % (ref 0–0.5)
LYMPHOCYTES # BLD AUTO: ABNORMAL K/UL (ref 1–4.8)
LYMPHOCYTES NFR BLD: 12 % (ref 18–48)
MAGNESIUM SERPL-MCNC: 1.8 MG/DL (ref 1.6–2.6)
MCH RBC QN AUTO: 28.2 PG (ref 27–31)
MCHC RBC AUTO-ENTMCNC: 32.1 G/DL (ref 32–36)
MCV RBC AUTO: 88 FL (ref 82–98)
METAMYELOCYTES NFR BLD MANUAL: 0.7 %
MODE: ABNORMAL
MONOCYTES # BLD AUTO: ABNORMAL K/UL (ref 0.3–1)
MONOCYTES NFR BLD: 9.3 % (ref 4–15)
MYELOCYTES NFR BLD MANUAL: 0.7 %
NEUTROPHILS NFR BLD: 68 % (ref 38–73)
NEUTS BAND NFR BLD MANUAL: 2.7 %
NRBC BLD-RTO: 0 /100 WBC
PCO2 BLDA: 35 MMHG (ref 35–45)
PEEP: 6
PH SMN: 7.42 [PH] (ref 7.35–7.45)
PHOSPHATE SERPL-MCNC: 2.9 MG/DL (ref 2.7–4.5)
PLATELET # BLD AUTO: 261 K/UL (ref 150–350)
PLATELET BLD QL SMEAR: ABNORMAL
PMV BLD AUTO: 10.7 FL (ref 9.2–12.9)
PO2 BLDA: 65 MMHG (ref 80–100)
POC BE: -2 MMOL/L
POC SATURATED O2: 93 % (ref 95–100)
POC TCO2: 24 MMOL/L (ref 23–27)
POCT GLUCOSE: 157 MG/DL (ref 70–110)
POCT GLUCOSE: 183 MG/DL (ref 70–110)
POCT GLUCOSE: 198 MG/DL (ref 70–110)
POCT GLUCOSE: 202 MG/DL (ref 70–110)
POCT GLUCOSE: 203 MG/DL (ref 70–110)
POCT GLUCOSE: 215 MG/DL (ref 70–110)
POCT GLUCOSE: 283 MG/DL (ref 70–110)
POIKILOCYTOSIS BLD QL SMEAR: SLIGHT
POLYCHROMASIA BLD QL SMEAR: ABNORMAL
POTASSIUM SERPL-SCNC: 3.8 MMOL/L (ref 3.5–5.1)
PROT SERPL-MCNC: 6.6 G/DL (ref 6–8.4)
RBC # BLD AUTO: 3.55 M/UL (ref 4.6–6.2)
SAMPLE: ABNORMAL
SITE: ABNORMAL
SODIUM SERPL-SCNC: 131 MMOL/L (ref 136–145)
VT: 480
WBC # BLD AUTO: 5 K/UL (ref 3.9–12.7)

## 2020-08-14 PROCEDURE — 25000003 PHARM REV CODE 250: Performed by: STUDENT IN AN ORGANIZED HEALTH CARE EDUCATION/TRAINING PROGRAM

## 2020-08-14 PROCEDURE — 85027 COMPLETE CBC AUTOMATED: CPT

## 2020-08-14 PROCEDURE — 99291 CRITICAL CARE FIRST HOUR: CPT | Mod: ,,, | Performed by: INTERNAL MEDICINE

## 2020-08-14 PROCEDURE — C9399 UNCLASSIFIED DRUGS OR BIOLOG: HCPCS | Performed by: INTERNAL MEDICINE

## 2020-08-14 PROCEDURE — 82800 BLOOD PH: CPT

## 2020-08-14 PROCEDURE — 99291 PR CRITICAL CARE, E/M 30-74 MINUTES: ICD-10-PCS | Mod: ,,, | Performed by: INTERNAL MEDICINE

## 2020-08-14 PROCEDURE — 84100 ASSAY OF PHOSPHORUS: CPT

## 2020-08-14 PROCEDURE — 83735 ASSAY OF MAGNESIUM: CPT

## 2020-08-14 PROCEDURE — 85007 BL SMEAR W/DIFF WBC COUNT: CPT

## 2020-08-14 PROCEDURE — 20000000 HC ICU ROOM

## 2020-08-14 PROCEDURE — 99900026 HC AIRWAY MAINTENANCE (STAT)

## 2020-08-14 PROCEDURE — 63600175 PHARM REV CODE 636 W HCPCS: Performed by: INTERNAL MEDICINE

## 2020-08-14 PROCEDURE — 63600175 PHARM REV CODE 636 W HCPCS: Performed by: STUDENT IN AN ORGANIZED HEALTH CARE EDUCATION/TRAINING PROGRAM

## 2020-08-14 PROCEDURE — 80053 COMPREHEN METABOLIC PANEL: CPT

## 2020-08-14 PROCEDURE — 94003 VENT MGMT INPAT SUBQ DAY: CPT

## 2020-08-14 PROCEDURE — 25000003 PHARM REV CODE 250: Performed by: PHYSICIAN ASSISTANT

## 2020-08-14 PROCEDURE — 25000003 PHARM REV CODE 250: Performed by: INTERNAL MEDICINE

## 2020-08-14 PROCEDURE — 99900035 HC TECH TIME PER 15 MIN (STAT)

## 2020-08-14 PROCEDURE — 94761 N-INVAS EAR/PLS OXIMETRY MLT: CPT

## 2020-08-14 PROCEDURE — 27200966 HC CLOSED SUCTION SYSTEM

## 2020-08-14 PROCEDURE — 27000221 HC OXYGEN, UP TO 24 HOURS

## 2020-08-14 PROCEDURE — 37799 UNLISTED PX VASCULAR SURGERY: CPT

## 2020-08-14 PROCEDURE — 82803 BLOOD GASES ANY COMBINATION: CPT

## 2020-08-14 RX ORDER — ALBUTEROL SULFATE 2.5 MG/.5ML
2.5 SOLUTION RESPIRATORY (INHALATION) EVERY 6 HOURS PRN
Status: DISCONTINUED | OUTPATIENT
Start: 2020-08-14 | End: 2020-08-26 | Stop reason: HOSPADM

## 2020-08-14 RX ORDER — NOREPINEPHRINE BITARTRATE/D5W 4MG/250ML
0.02 PLASTIC BAG, INJECTION (ML) INTRAVENOUS CONTINUOUS
Status: DISCONTINUED | OUTPATIENT
Start: 2020-08-14 | End: 2020-08-18

## 2020-08-14 RX ORDER — POTASSIUM CHLORIDE 1.5 G/1.58G
40 POWDER, FOR SOLUTION ORAL 2 TIMES DAILY
Status: COMPLETED | OUTPATIENT
Start: 2020-08-14 | End: 2020-08-14

## 2020-08-14 RX ORDER — DIAZEPAM 5 MG/1
10 TABLET ORAL EVERY 6 HOURS
Status: DISCONTINUED | OUTPATIENT
Start: 2020-08-14 | End: 2020-08-19

## 2020-08-14 RX ORDER — ATROPINE SULFATE 0.1 MG/ML
INJECTION INTRAVENOUS
Status: DISPENSED
Start: 2020-08-14 | End: 2020-08-15

## 2020-08-14 RX ADMIN — INSULIN ASPART 1 UNITS: 100 INJECTION, SOLUTION INTRAVENOUS; SUBCUTANEOUS at 11:08

## 2020-08-14 RX ADMIN — FAMOTIDINE 20 MG: 20 TABLET ORAL at 08:08

## 2020-08-14 RX ADMIN — Medication 0.02 MCG/KG/MIN: at 04:08

## 2020-08-14 RX ADMIN — INSULIN ASPART 15 UNITS: 100 INJECTION, SOLUTION INTRAVENOUS; SUBCUTANEOUS at 03:08

## 2020-08-14 RX ADMIN — PROPOFOL 50 MCG/KG/MIN: 10 INJECTION, EMULSION INTRAVENOUS at 03:08

## 2020-08-14 RX ADMIN — DIAZEPAM 5 MG: 5 TABLET ORAL at 11:08

## 2020-08-14 RX ADMIN — Medication 275 MCG/HR: at 05:08

## 2020-08-14 RX ADMIN — DIAZEPAM 10 MG: 5 TABLET ORAL at 05:08

## 2020-08-14 RX ADMIN — DEXMEDETOMIDINE HYDROCHLORIDE 0.6 MCG/KG/HR: 100 INJECTION, SOLUTION, CONCENTRATE INTRAVENOUS at 11:08

## 2020-08-14 RX ADMIN — PROPOFOL 40 MCG/KG/MIN: 10 INJECTION, EMULSION INTRAVENOUS at 12:08

## 2020-08-14 RX ADMIN — Medication 300 MCG/HR: at 12:08

## 2020-08-14 RX ADMIN — PROPOFOL 50 MCG/KG/MIN: 10 INJECTION, EMULSION INTRAVENOUS at 10:08

## 2020-08-14 RX ADMIN — PROPOFOL 50 MCG/KG/MIN: 10 INJECTION, EMULSION INTRAVENOUS at 04:08

## 2020-08-14 RX ADMIN — DEXMEDETOMIDINE HYDROCHLORIDE 1.2 MCG/KG/HR: 100 INJECTION, SOLUTION, CONCENTRATE INTRAVENOUS at 05:08

## 2020-08-14 RX ADMIN — STANDARDIZED SENNA CONCENTRATE AND DOCUSATE SODIUM 1 TABLET: 8.6; 5 TABLET ORAL at 08:08

## 2020-08-14 RX ADMIN — Medication 350 MCG/HR: at 12:08

## 2020-08-14 RX ADMIN — POTASSIUM CHLORIDE 40 MEQ: 1.5 POWDER, FOR SOLUTION ORAL at 08:08

## 2020-08-14 RX ADMIN — ROSUVASTATIN CALCIUM 10 MG: 10 TABLET, FILM COATED ORAL at 08:08

## 2020-08-14 RX ADMIN — Medication 350 MCG/HR: at 01:08

## 2020-08-14 RX ADMIN — DEXMEDETOMIDINE HYDROCHLORIDE 1.2 MCG/KG/HR: 100 INJECTION, SOLUTION, CONCENTRATE INTRAVENOUS at 07:08

## 2020-08-14 RX ADMIN — OLANZAPINE 5 MG: 2.5 TABLET, FILM COATED ORAL at 08:08

## 2020-08-14 RX ADMIN — DEXMEDETOMIDINE HYDROCHLORIDE 1.4 MCG/KG/HR: 100 INJECTION, SOLUTION, CONCENTRATE INTRAVENOUS at 12:08

## 2020-08-14 RX ADMIN — DIAZEPAM 10 MG: 5 TABLET ORAL at 11:08

## 2020-08-14 RX ADMIN — INSULIN ASPART 2 UNITS: 100 INJECTION, SOLUTION INTRAVENOUS; SUBCUTANEOUS at 11:08

## 2020-08-14 RX ADMIN — ENOXAPARIN SODIUM 90 MG: 100 INJECTION SUBCUTANEOUS at 08:08

## 2020-08-14 RX ADMIN — PROPOFOL 50 MCG/KG/MIN: 10 INJECTION, EMULSION INTRAVENOUS at 12:08

## 2020-08-14 RX ADMIN — PROPOFOL 50 MCG/KG/MIN: 10 INJECTION, EMULSION INTRAVENOUS at 05:08

## 2020-08-14 RX ADMIN — Medication 300 MCG/HR: at 08:08

## 2020-08-14 RX ADMIN — POTASSIUM CHLORIDE 40 MEQ: 1.5 POWDER, FOR SOLUTION ORAL at 05:08

## 2020-08-14 RX ADMIN — DIAZEPAM 5 MG: 5 TABLET ORAL at 12:08

## 2020-08-14 RX ADMIN — PROPOFOL 50 MCG/KG/MIN: 10 INJECTION, EMULSION INTRAVENOUS at 07:08

## 2020-08-14 RX ADMIN — DEXMEDETOMIDINE HYDROCHLORIDE 1 MCG/KG/HR: 100 INJECTION, SOLUTION, CONCENTRATE INTRAVENOUS at 10:08

## 2020-08-14 RX ADMIN — INSULIN DETEMIR 25 UNITS: 100 INJECTION, SOLUTION SUBCUTANEOUS at 08:08

## 2020-08-14 RX ADMIN — MIDAZOLAM 4 MG: 5 INJECTION INTRAMUSCULAR; INTRAVENOUS at 01:08

## 2020-08-14 RX ADMIN — MAGNESIUM SULFATE HEPTAHYDRATE 1 G: 500 INJECTION, SOLUTION INTRAMUSCULAR; INTRAVENOUS at 05:08

## 2020-08-14 RX ADMIN — DEXMEDETOMIDINE HYDROCHLORIDE 0.6 MCG/KG/HR: 100 INJECTION, SOLUTION, CONCENTRATE INTRAVENOUS at 02:08

## 2020-08-14 RX ADMIN — DIAZEPAM 5 MG: 5 TABLET ORAL at 05:08

## 2020-08-14 RX ADMIN — INSULIN ASPART 4 UNITS: 100 INJECTION, SOLUTION INTRAVENOUS; SUBCUTANEOUS at 03:08

## 2020-08-14 NOTE — PROGRESS NOTES
[facility] - ICU 16   Critical Care Medicine  Progress Note    Patient Name: Anup Miller  MRN: [id number]  Admission Date: 7/26/2020  Hospital Length of Stay: 19 days  Code Status: Full Code  Attending Provider: Jimenez Frank MD  Primary Care Provider: Bryce Gonzales MD   Principal Problem: Pneumonia due to COVID-19 virus    Subjective:     HPI:  Anup Miller is a 70 y.o. male patient with a PMHx of HTN, HLD, and DM who presents to the [place] [facility] for evaluation of SOB which  1 week ago. Pt became more SOB and has an O2 sat of low 70s upon arrival on RA. Symptoms are worsening and moderate in severity. No mitigating or exacerbating factors reported. Associated sxs include cough and fever. Patient denies any congestion, sore throat, CP, abd pain, N/V, back pain, HA, weakness, and all other sxs at this time. No prior Tx reported. No further complaints or concerns at this time. Patient was placed on Bipap for a few hours and subsequently intubated. COVID positive. Was started on IV dexamethasone, Ceftriaxone, and Azithromycin. Patient transferred to [facility] on evening of 7/26/20 for higher level of care.     Hospital/ICU Course:  As of 7/29, the patient's oxygenation continued to worsen with P:F <150. R IJ and Arterial Line placed. Pt sedated, paralyzed, and proned at 9:30pm with improved oxygenation. Repeat AM gas on 7/30 Arterial Blood Gas result:  pO2 112; pCO2 58.9; pH 7.256;  HCO3 26.2, %O2 Sat 97%. FiO2 60, 8 PEEP. Pt supinated at 4:10 pm on 7/30 with Arterial Blood Gas result:  pO2 106; pCO2 52.2; pH 7.349;  HCO3 28.7, %O2 Sat 106. (P:F 212). FiO2 50, 10 PEEP.    As of 7/31, paralytic d/c'ed and sedation slowly weaned. Propofol d/c'ed on 8/1 2/2 triglycerides with ketamine and versed initiated. Now requiring higher vent settings due to dyssynchrony, will continue to increase sedation. Patient paralyzed again on 8/1 for vent dyssynchrony. Nimbex was discontinued on  08/03 and sedative medications were increased, however, patient struggled to pull enough tidal volume and was desatting even on maximum sedation so nimbex was restarted. Nimbex was stopped on 08/04 and restarted propofol. As patient became more agitated, ketamine was added on 08/06. Sedation was attempted to be weaned off by adding seroquel and zyprexa. Versed and ketamine were weaned off. Patient was weaned off of levo on morning of 08/09. Patient failed multiple SBTs and it was discussed with family of likely PEG/trach, pending their decision. Patient has continued agitation with coughing/ increased BPs requiring propofol, fentanyl, and precedex with scheduled valium. Attempted to get in touch with family to discuss peg/trach again, no answer on provided phone numbers.    Interval History/Significant Events:   No acute events overnight, patient doing well. Attempted to speak with family about peg/trach. No family members answered the phone.     Review of Systems   Unable to perform ROS: Intubated     Objective:     Vital Signs (Most Recent):  Temp: 97.9 °F (36.6 °C) (08/14/20 0715)  Pulse: 60 (08/14/20 1015)  Resp: (!) 24 (08/14/20 1015)  BP: 101/65 (08/14/20 0900)  SpO2: 100 % (08/14/20 1015) Vital Signs (24h Range):  Temp:  [97.9 °F (36.6 °C)-98.3 °F (36.8 °C)] 97.9 °F (36.6 °C)  Pulse:  [60-78] 60  Resp:  [24-38] 24  SpO2:  [95 %-100 %] 100 %  BP: ()/(52-88) 101/65  Arterial Line BP: ()/(43-80) 91/44   Weight: 112.5 kg (248 lb 0.3 oz)  Body mass index is 36.63 kg/m².      Intake/Output Summary (Last 24 hours) at 8/14/2020 1035  Last data filed at 8/14/2020 1000  Gross per 24 hour   Intake 3258.8 ml   Output 2305 ml   Net 953.8 ml       Physical Exam  Vitals signs and nursing note reviewed.   Constitutional:       Appearance: He is obese.   HENT:      Head: Normocephalic and atraumatic.      Mouth/Throat:      Comments: Intubated  Eyes:      General:         Right eye: No discharge.         Left eye:  No discharge.   Cardiovascular:      Rate and Rhythm: Normal rate and regular rhythm.   Pulmonary:      Effort: No respiratory distress.      Comments: Intubated.  Abdominal:      Palpations: Abdomen is soft.      Tenderness: There is no abdominal tenderness.   Musculoskeletal:         General: No swelling.   Skin:     General: Skin is warm and dry.   Neurological:      Comments: sedated         Vents:  Vent Mode: A/C (08/14/20 0822)  Ventilator Initiated: Yes(chart correction) (07/26/20 2108)  Set Rate: 24 BPM (08/14/20 0822)  Vt Set: 480 mL (08/14/20 0822)  Pressure Support: 0 cmH20 (08/14/20 0822)  PEEP/CPAP: 6 cmH20 (08/14/20 0822)  Oxygen Concentration (%): 40 (08/14/20 1015)  Peak Airway Pressure: 34 cmH2O (08/14/20 0822)  Plateau Pressure: 32 cmH20 (08/14/20 0822)  Total Ve: 12 mL (08/14/20 0822)  F/VT Ratio<105 (RSBI): (!) 48.19 (08/14/20 0822)  Lines/Drains/Airways     Central Venous Catheter Line            Percutaneous Central Line Insertion/Assessment - Triple Lumen  07/29/20 1729 right internal jugular 15 days          Drain                 NG/OG Tube 07/26/20 1756 [place] sump;orogastric 18 Fr. Center mouth 18 days         Urethral Catheter 07/26/20 2205 16 Fr. 18 days          Airway                 Airway - Non-Surgical 07/26/20 1724 Endotracheal Tube 18 days          Arterial Line                 Arterial Line 07/29/20 1700 Right Radial 15 days          Peripheral Intravenous Line                 Peripheral IV - Single Lumen 07/31/20 1508 18 G Anterior;Right Forearm 13 days         Peripheral IV - Single Lumen 08/06/20 0552 20 G Anterior;Left Wrist 8 days              Significant Labs:    CBC/Anemia Profile:  Recent Labs   Lab 08/13/20  0347 08/14/20  0300   WBC 5.32 5.00   HGB 9.7* 10.0*   HCT 31.0* 31.2*    261   MCV 88 88   RDW 14.0 14.0        Chemistries:  Recent Labs   Lab 08/13/20  0347 08/14/20  0300   * 131*   K 3.9 3.8    101   CO2 23 23   BUN 19 16   CREATININE 0.8 0.8    CALCIUM 8.7 8.7   ALBUMIN 1.7* 1.7*   PROT 6.6 6.6   BILITOT 0.2 0.2   ALKPHOS 73 70   ALT 50* 53*   AST 33 39   MG 1.9 1.8   PHOS 2.8 2.9       All pertinent labs within the past 24 hours have been reviewed.    Significant Imaging:  I have reviewed all pertinent imaging results/findings within the past 24 hours.      ABG  Recent Labs   Lab 08/14/20  0307   PH 7.417   PO2 65*   PCO2 35.0   HCO3 22.5*   BE -2     Assessment/Plan:     Pulmonary  Acute hypoxemic respiratory failure  - see Pneumonia due to COVID 19    Cardiac/Vascular  Hyperlipidemia associated with type 2 diabetes mellitus  - Restart Crestor once extubated     Hypertension associated with diabetes  Continue to hold antihypertensives in setting of shock    Endocrine  Uncontrolled type 2 diabetes mellitus  - A1c 8.3%  - started on insulin gtt on 08/05 due to elevated BG but discontinued 08/07  - Increased from 20U to Levemir 25U BID  - Aspart 15U Q4H  - BG goal 140-180    GI  Elevated liver enzymes  - Hx of elevated LFTs dating back to 2015  - Acute hepatitis panel and HIV - negative  - Stable. Trend CMP daily    Other  * Pneumonia due to COVID-19 virus  70M  with HTN, HLD, and DM who presents to the [place] ED for SOB and found to be COVID positive.  [place] [facility] higher level of care.     - Remdesivir and dexamethasone complete  - Ceftriaxone and azithromycin complete  - Blood and sputum cx showed no growth  - Therapeutic lovenox for hypercoagulable state in COVID  - Was previously paralyzed, proned, sedated. Nimbex discontinued on 08/04.  - Continue weaning down fentanyl, propofol, precedex.   - Added zyprexa on 08/07 to help wean sedation.  - Added prn versed for agitation and scheduled valium  - Nursing communication to d/c sedation holidays due to increased agitation with elevated BP on 8/14  - Levophed restarted on 8/14  - Continues to fail SBTs. Discussing with family about PEG/trach.     Critical Care Daily  Checklist:    A: Awake: RASS Goal/Actual Goal: RASS Goal: 0-->alert and calm  Actual: Hackett Agitation Sedation Scale (RASS): Moderate sedation   B: Spontaneous Breathing Trial Performed? Spon. Breathing Trial Initiated?: Not initiated (08/06/20 0821)   C: SAT & SBT Coordinated?  Yes, failed                  D: Delirium: CAM-ICU Overall CAM-ICU: Positive   E: Early Mobility Performed? No   F: Feeding Goal: Goals: Meet % EEN, EPN by RD f/u date  Status: Nutrition Goal Status: goal met   Current Diet Order   Procedures    Diet NPO      AS: Analgesia/Sedation Precedex, fentanyl, propofol   T: Thromboembolic Prophylaxis lovanox   H: HOB > 300 No   U: Stress Ulcer Prophylaxis (if needed) yes   G: Glucose Control Detrimir, aspart    B: Bowel Function Stool Occurrence: 1   I: Indwelling Catheter (Lines & Harris) Necessity PIV, NG, ET, harris   D: De-escalation of Antimicrobials/Pharmacotherapies N/A    Plan for the day/ETD Continue course    Code Status:  Family/Goals of Care: Full Code       Critical secondary to Patient has a condition that poses threat to life and bodily function: Intubated, COVID      Critical care was time spent personally by me on the following activities: development of treatment plan with patient or surrogate and bedside caregivers, discussions with consultants, evaluation of patient's response to treatment, examination of patient, ordering and performing treatments and interventions, ordering and review of laboratory studies, ordering and review of radiographic studies, pulse oximetry, re-evaluation of patient's condition. This critical care time did not overlap with that of any other provider or involve time for any procedures.     Kusum Merino MD  Critical Care Medicine  [facility] - ICU 16 WT

## 2020-08-14 NOTE — NURSING
Patient currently receiving max dosing of Propofol (50 mcg/kg/min), Precedex (1.4 mcg/kg/hr) and Fentanyl (350 mcg/hr) for incessant coughing after sedation vacation. Respiratory has been at the bedside and I have consulted with the critical care resident, Kusum, twice. Aforementioned medications were up titrated per order; additionally a 4mg bolus of Midazolam was administered.    Coughing has subsided; however work of breathing is increased, compared to that prior to sedation vacation. All IV's remain patent. IV pump data confirmed. Lung sounds remain grossly coarse; air moves well.       Hypertension during sedation vacation has subsided with increase in sedation/alangesia.     Waiting for critical care team to round at bedside. Will continue to monitor patient.

## 2020-08-14 NOTE — CARE UPDATE
MD notified of the following lab values:  - K: 3.8  - Ma.8  - BUN: 16  - Creatinine: 0.8    UO reviewed with MD. No new orders given at this time. VSS. Will continue to monitor.

## 2020-08-14 NOTE — ASSESSMENT & PLAN NOTE
70M  with HTN, HLD, and DM who presents to the [place] ED for SOB and found to be COVID positive.  [place] [facility] on [place]26/20 for higher level of care.     - Remdesivir and dexamethasone complete  - Ceftriaxone and azithromycin complete  - Blood and sputum cx showed no growth  - Therapeutic lovenox for hypercoagulable state in COVID  - Was previously paralyzed, proned, sedated. Nimbex discontinued on 08/04.  - Continue weaning down fentanyl, propofol, precedex.   - Added zyprexa on 08/07 to help wean sedation.  - Added prn versed for agitation and scheduled valium  - Nursing communication to d/c sedation holidays due to increased agitation with elevated BP on 8/14  - Levophed restarted on 8/14  - Continues to fail SBTs. Discussing with family about PEG/trach.

## 2020-08-14 NOTE — PLAN OF CARE
Pt not medically stable for discharge, remains intubated, sedation titrated.  When medically stable, anticipate discharge plan A - LTAC or plan B - SNF  CM to follow.  Vandana García RN   [id number]       08/14/20 1434   Discharge Reassessment   Assessment Type Discharge Planning Reassessment   Do you have any problems affording any of your prescribed medications? TBD   Discharge Plan A Long-term acute care facility (LTAC)   Discharge Plan B Skilled Nursing Facility   DME Needed Upon Discharge  other (see comments)  (TBD)   Anticipated Discharge Disposition LTAC   Can the patient/caregiver answer the patient profile reliably?   (Intubated)   Describe the patient's ability to walk at the present time. Major restrictions/daily assistance from another person   Number of comorbid conditions (as recorded on the chart) Four   Post-Acute Status   Post-Acute Authorization Placement   Post-Acute Placement Status Awaiting Internal Medical Clearance

## 2020-08-14 NOTE — ASSESSMENT & PLAN NOTE
- A1c 8.3%  - started on insulin gtt on 08/05 due to elevated BG but discontinued 08/07  - Increased from 20U to Levemir 25U BID  - Aspart 15U Q4H  - BG goal 140-180

## 2020-08-14 NOTE — EICU
Potassium 3.8  Magnesium 1.8    Plan:  Potassium chloride 40 meq via OGT BID for 1 day  Magnesium sulfate 1 gm IVPB once over 1 hour

## 2020-08-14 NOTE — PLAN OF CARE
"      RICU PLAN OF CARE NOTE    Dx: Pneumonia due to COVID-19 virus    Shift Events: Sedation titrated. Electrolytes replaced. No acute events overnight.    Neuro: Sedated. Withdraws from pain.     Vital Signs: BP (!) 86/53 (BP Location: Left arm, Patient Position: Lying)   Pulse 65   Temp 98.2 °F (36.8 °C) (Oral)   Resp (!) 24   Ht 5' 9" (1.753 m)   Wt 109 kg (240 lb 4.8 oz) Comment: Bed scale  SpO2 100%   BMI 35.49 kg/m²     Respiratory: Ventilator AC/VC 40% FiO2 with 6.0 of PEEP    Diet: Tube Feeds at goal rate of 40 mL/hr.    Gtts: Propfol, Fentanyl, and Precedex    Urine Output: Urinary Catheter 1300 cc/shift of yellow urine.    Drains: NG/OG Tube 350 cc /  shift of residual collected. Tube feeds paused for 2 hours then restarted. Residual of 250 cc collected. Tube feeds paused for 2 more hours. Residual of 250 cc collected again.     Restraints applied to bilateral upper extremities in order to prevent patient from pulling out lines. No injuries noted.     Labs/Accuchecks:   - WBC: 5.00  - Hgb: 10.0  - Hct: 31.2  - Platelets: 261  - Na: 131  - K: 3.8  - BUN: 16  - Creatinine: 0.8  - Phos: 2.9  - Ma.8  - Ca: 8.7  - AST: 39  - ALT: 53       "

## 2020-08-14 NOTE — NURSING
Sedation vacation completed. Patient  and moves both feet with purpose. Responds appropriately to yes/no questions by shaking head. Hypertensive during sedation vacation. Critical care resident ([id number]) aware.

## 2020-08-14 NOTE — NURSING
Critical care resident notified of high residual and tube feeds held. Request made for provider to to update RASS goals.

## 2020-08-14 NOTE — SUBJECTIVE & OBJECTIVE
Interval History/Significant Events:   No acute events overnight, patient doing well. Attempted to speak with family about peg/trach. No family members answered the phone.     Review of Systems   Unable to perform ROS: Intubated     Objective:     Vital Signs (Most Recent):  Temp: 97.9 °F (36.6 °C) (08/14/20 0715)  Pulse: 60 (08/14/20 1015)  Resp: (!) 24 (08/14/20 1015)  BP: 101/65 (08/14/20 0900)  SpO2: 100 % (08/14/20 1015) Vital Signs (24h Range):  Temp:  [97.9 °F (36.6 °C)-98.3 °F (36.8 °C)] 97.9 °F (36.6 °C)  Pulse:  [60-78] 60  Resp:  [24-38] 24  SpO2:  [95 %-100 %] 100 %  BP: ()/(52-88) 101/65  Arterial Line BP: ()/(43-80) 91/44   Weight: 112.5 kg (248 lb 0.3 oz)  Body mass index is 36.63 kg/m².      Intake/Output Summary (Last 24 hours) at 8/14/2020 1035  Last data filed at 8/14/2020 1000  Gross per 24 hour   Intake 3258.8 ml   Output 2305 ml   Net 953.8 ml       Physical Exam  Vitals signs and nursing note reviewed.   Constitutional:       Appearance: He is obese.   HENT:      Head: Normocephalic and atraumatic.      Mouth/Throat:      Comments: Intubated  Eyes:      General:         Right eye: No discharge.         Left eye: No discharge.   Cardiovascular:      Rate and Rhythm: Normal rate and regular rhythm.   Pulmonary:      Effort: No respiratory distress.      Comments: Intubated.  Abdominal:      Palpations: Abdomen is soft.      Tenderness: There is no abdominal tenderness.   Musculoskeletal:         General: No swelling.   Skin:     General: Skin is warm and dry.   Neurological:      Comments: sedated         Vents:  Vent Mode: A/C (08/14/20 0822)  Ventilator Initiated: Yes(chart correction) (07/26/20 2108)  Set Rate: 24 BPM (08/14/20 0822)  Vt Set: 480 mL (08/14/20 0822)  Pressure Support: 0 cmH20 (08/14/20 0822)  PEEP/CPAP: 6 cmH20 (08/14/20 0822)  Oxygen Concentration (%): 40 (08/14/20 1015)  Peak Airway Pressure: 34 cmH2O (08/14/20 0822)  Plateau Pressure: 32 cmH20 (08/14/20  0822)  Total Ve: 12 mL (08/14/20 0822)  F/VT Ratio<105 (RSBI): (!) 48.19 (08/14/20 0822)  Lines/Drains/Airways     Central Venous Catheter Line            Percutaneous Central Line Insertion/Assessment - Triple Lumen  07/29/20 1729 right internal jugular 15 days          Drain                 NG/OG Tube 07/26/20 1756 [place] sump;orogastric 18 Fr. Center mouth 18 days         Urethral Catheter 07/26/20 2205 16 Fr. 18 days          Airway                 Airway - Non-Surgical 07/26/20 1724 Endotracheal Tube 18 days          Arterial Line                 Arterial Line 07/29/20 1700 Right Radial 15 days          Peripheral Intravenous Line                 Peripheral IV - Single Lumen 07/31/20 1508 18 G Anterior;Right Forearm 13 days         Peripheral IV - Single Lumen 08/06/20 0552 20 G Anterior;Left Wrist 8 days              Significant Labs:    CBC/Anemia Profile:  Recent Labs   Lab 08/13/20  0347 08/14/20  0300   WBC 5.32 5.00   HGB 9.7* 10.0*   HCT 31.0* 31.2*    261   MCV 88 88   RDW 14.0 14.0        Chemistries:  Recent Labs   Lab 08/13/20  0347 08/14/20  0300   * 131*   K 3.9 3.8    101   CO2 23 23   BUN 19 16   CREATININE 0.8 0.8   CALCIUM 8.7 8.7   ALBUMIN 1.7* 1.7*   PROT 6.6 6.6   BILITOT 0.2 0.2   ALKPHOS 73 70   ALT 50* 53*   AST 33 39   MG 1.9 1.8   PHOS 2.8 2.9       All pertinent labs within the past 24 hours have been reviewed.    Significant Imaging:  I have reviewed all pertinent imaging results/findings within the past 24 hours.

## 2020-08-15 LAB
ALBUMIN SERPL BCP-MCNC: 1.6 G/DL (ref 3.5–5.2)
ALLENS TEST: ABNORMAL
ALP SERPL-CCNC: 89 U/L (ref 55–135)
ALT SERPL W/O P-5'-P-CCNC: 64 U/L (ref 10–44)
ANION GAP SERPL CALC-SCNC: 9 MMOL/L (ref 8–16)
AST SERPL-CCNC: 52 U/L (ref 10–40)
BASOPHILS # BLD AUTO: 0.02 K/UL (ref 0–0.2)
BASOPHILS NFR BLD: 0.4 % (ref 0–1.9)
BILIRUB SERPL-MCNC: 0.3 MG/DL (ref 0.1–1)
BUN SERPL-MCNC: 11 MG/DL (ref 8–23)
CALCIUM SERPL-MCNC: 9 MG/DL (ref 8.7–10.5)
CHLORIDE SERPL-SCNC: 106 MMOL/L (ref 95–110)
CO2 SERPL-SCNC: 21 MMOL/L (ref 23–29)
CREAT SERPL-MCNC: 0.7 MG/DL (ref 0.5–1.4)
DELSYS: ABNORMAL
DIFFERENTIAL METHOD: ABNORMAL
EOSINOPHIL # BLD AUTO: 0.7 K/UL (ref 0–0.5)
EOSINOPHIL NFR BLD: 12.3 % (ref 0–8)
ERYTHROCYTE [DISTWIDTH] IN BLOOD BY AUTOMATED COUNT: 14.6 % (ref 11.5–14.5)
ERYTHROCYTE [SEDIMENTATION RATE] IN BLOOD BY WESTERGREN METHOD: 24 MM/H
EST. GFR  (AFRICAN AMERICAN): >60 ML/MIN/1.73 M^2
EST. GFR  (NON AFRICAN AMERICAN): >60 ML/MIN/1.73 M^2
FIO2: 40
GLUCOSE SERPL-MCNC: 190 MG/DL (ref 70–110)
HCO3 UR-SCNC: 19.9 MMOL/L (ref 24–28)
HCT VFR BLD AUTO: 31.7 % (ref 40–54)
HGB BLD-MCNC: 9.8 G/DL (ref 14–18)
IMM GRANULOCYTES # BLD AUTO: 0.09 K/UL (ref 0–0.04)
IMM GRANULOCYTES NFR BLD AUTO: 1.6 % (ref 0–0.5)
LYMPHOCYTES # BLD AUTO: 1.2 K/UL (ref 1–4.8)
LYMPHOCYTES NFR BLD: 21.8 % (ref 18–48)
MAGNESIUM SERPL-MCNC: 1.8 MG/DL (ref 1.6–2.6)
MCH RBC QN AUTO: 27.2 PG (ref 27–31)
MCHC RBC AUTO-ENTMCNC: 30.9 G/DL (ref 32–36)
MCV RBC AUTO: 88 FL (ref 82–98)
MIN VOL: 11.7
MODE: ABNORMAL
MONOCYTES # BLD AUTO: 0.9 K/UL (ref 0.3–1)
MONOCYTES NFR BLD: 16.3 % (ref 4–15)
NEUTROPHILS # BLD AUTO: 2.6 K/UL (ref 1.8–7.7)
NEUTROPHILS NFR BLD: 47.6 % (ref 38–73)
NRBC BLD-RTO: 0 /100 WBC
PCO2 BLDA: 33.1 MMHG (ref 35–45)
PEEP: 5
PH SMN: 7.39 [PH] (ref 7.35–7.45)
PHOSPHATE SERPL-MCNC: 3.4 MG/DL (ref 2.7–4.5)
PIP: 33
PLATELET # BLD AUTO: 265 K/UL (ref 150–350)
PMV BLD AUTO: 11.5 FL (ref 9.2–12.9)
PO2 BLDA: 72 MMHG (ref 80–100)
POC BE: -5 MMOL/L
POC SATURATED O2: 94 % (ref 95–100)
POC TCO2: 21 MMOL/L (ref 23–27)
POCT GLUCOSE: 130 MG/DL (ref 70–110)
POCT GLUCOSE: 154 MG/DL (ref 70–110)
POCT GLUCOSE: 156 MG/DL (ref 70–110)
POCT GLUCOSE: 168 MG/DL (ref 70–110)
POCT GLUCOSE: 169 MG/DL (ref 70–110)
POCT GLUCOSE: 175 MG/DL (ref 70–110)
POCT GLUCOSE: 206 MG/DL (ref 70–110)
POTASSIUM SERPL-SCNC: 4.1 MMOL/L (ref 3.5–5.1)
PROT SERPL-MCNC: 6.2 G/DL (ref 6–8.4)
RBC # BLD AUTO: 3.6 M/UL (ref 4.6–6.2)
SAMPLE: ABNORMAL
SITE: ABNORMAL
SODIUM SERPL-SCNC: 136 MMOL/L (ref 136–145)
SP02: 91
VT: 450
WBC # BLD AUTO: 5.46 K/UL (ref 3.9–12.7)

## 2020-08-15 PROCEDURE — 25000003 PHARM REV CODE 250: Performed by: PHYSICIAN ASSISTANT

## 2020-08-15 PROCEDURE — 27200966 HC CLOSED SUCTION SYSTEM

## 2020-08-15 PROCEDURE — 25000003 PHARM REV CODE 250: Performed by: STUDENT IN AN ORGANIZED HEALTH CARE EDUCATION/TRAINING PROGRAM

## 2020-08-15 PROCEDURE — 99291 CRITICAL CARE FIRST HOUR: CPT | Mod: ,,, | Performed by: INTERNAL MEDICINE

## 2020-08-15 PROCEDURE — 85025 COMPLETE CBC W/AUTO DIFF WBC: CPT

## 2020-08-15 PROCEDURE — 25000003 PHARM REV CODE 250: Performed by: INTERNAL MEDICINE

## 2020-08-15 PROCEDURE — 27000221 HC OXYGEN, UP TO 24 HOURS

## 2020-08-15 PROCEDURE — 99291 PR CRITICAL CARE, E/M 30-74 MINUTES: ICD-10-PCS | Mod: ,,, | Performed by: INTERNAL MEDICINE

## 2020-08-15 PROCEDURE — 99900035 HC TECH TIME PER 15 MIN (STAT)

## 2020-08-15 PROCEDURE — 94003 VENT MGMT INPAT SUBQ DAY: CPT

## 2020-08-15 PROCEDURE — 99900026 HC AIRWAY MAINTENANCE (STAT)

## 2020-08-15 PROCEDURE — 80053 COMPREHEN METABOLIC PANEL: CPT

## 2020-08-15 PROCEDURE — 94761 N-INVAS EAR/PLS OXIMETRY MLT: CPT

## 2020-08-15 PROCEDURE — 63600175 PHARM REV CODE 636 W HCPCS: Performed by: STUDENT IN AN ORGANIZED HEALTH CARE EDUCATION/TRAINING PROGRAM

## 2020-08-15 PROCEDURE — 84100 ASSAY OF PHOSPHORUS: CPT

## 2020-08-15 PROCEDURE — 63600175 PHARM REV CODE 636 W HCPCS: Performed by: INTERNAL MEDICINE

## 2020-08-15 PROCEDURE — 20000000 HC ICU ROOM

## 2020-08-15 PROCEDURE — 83735 ASSAY OF MAGNESIUM: CPT

## 2020-08-15 RX ORDER — FUROSEMIDE 10 MG/ML
40 INJECTION INTRAMUSCULAR; INTRAVENOUS ONCE
Status: COMPLETED | OUTPATIENT
Start: 2020-08-16 | End: 2020-08-15

## 2020-08-15 RX ORDER — CLONIDINE HYDROCHLORIDE 0.1 MG/1
0.1 TABLET ORAL 3 TIMES DAILY
Status: DISCONTINUED | OUTPATIENT
Start: 2020-08-15 | End: 2020-08-16

## 2020-08-15 RX ORDER — FUROSEMIDE 10 MG/ML
40 INJECTION INTRAMUSCULAR; INTRAVENOUS DAILY
Status: DISCONTINUED | OUTPATIENT
Start: 2020-08-15 | End: 2020-08-21

## 2020-08-15 RX ADMIN — INSULIN ASPART 2 UNITS: 100 INJECTION, SOLUTION INTRAVENOUS; SUBCUTANEOUS at 08:08

## 2020-08-15 RX ADMIN — PROPOFOL 50 MCG/KG/MIN: 10 INJECTION, EMULSION INTRAVENOUS at 01:08

## 2020-08-15 RX ADMIN — INSULIN DETEMIR 14 UNITS: 100 INJECTION, SOLUTION SUBCUTANEOUS at 08:08

## 2020-08-15 RX ADMIN — ACETAMINOPHEN 650 MG: 160 SOLUTION ORAL at 06:08

## 2020-08-15 RX ADMIN — FAMOTIDINE 20 MG: 20 TABLET ORAL at 08:08

## 2020-08-15 RX ADMIN — INSULIN ASPART 15 UNITS: 100 INJECTION, SOLUTION INTRAVENOUS; SUBCUTANEOUS at 04:08

## 2020-08-15 RX ADMIN — FUROSEMIDE 40 MG: 10 INJECTION, SOLUTION INTRAMUSCULAR; INTRAVENOUS at 03:08

## 2020-08-15 RX ADMIN — INSULIN ASPART 15 UNITS: 100 INJECTION, SOLUTION INTRAVENOUS; SUBCUTANEOUS at 11:08

## 2020-08-15 RX ADMIN — DIAZEPAM 10 MG: 5 TABLET ORAL at 05:08

## 2020-08-15 RX ADMIN — DIAZEPAM 10 MG: 5 TABLET ORAL at 11:08

## 2020-08-15 RX ADMIN — OLANZAPINE 5 MG: 2.5 TABLET, FILM COATED ORAL at 08:08

## 2020-08-15 RX ADMIN — Medication 225 MCG/HR: at 04:08

## 2020-08-15 RX ADMIN — PROPOFOL 50 MCG/KG/MIN: 10 INJECTION, EMULSION INTRAVENOUS at 07:08

## 2020-08-15 RX ADMIN — INSULIN DETEMIR 25 UNITS: 100 INJECTION, SOLUTION SUBCUTANEOUS at 08:08

## 2020-08-15 RX ADMIN — FUROSEMIDE 40 MG: 10 INJECTION, SOLUTION INTRAMUSCULAR; INTRAVENOUS at 11:08

## 2020-08-15 RX ADMIN — PROPOFOL 50 MCG/KG/MIN: 10 INJECTION, EMULSION INTRAVENOUS at 04:08

## 2020-08-15 RX ADMIN — STANDARDIZED SENNA CONCENTRATE AND DOCUSATE SODIUM 1 TABLET: 8.6; 5 TABLET ORAL at 08:08

## 2020-08-15 RX ADMIN — INSULIN ASPART 7 UNITS: 100 INJECTION, SOLUTION INTRAVENOUS; SUBCUTANEOUS at 08:08

## 2020-08-15 RX ADMIN — CLONIDINE HYDROCHLORIDE 0.1 MG: 0.1 TABLET ORAL at 03:08

## 2020-08-15 RX ADMIN — ROSUVASTATIN CALCIUM 10 MG: 10 TABLET, FILM COATED ORAL at 08:08

## 2020-08-15 RX ADMIN — Medication 225 MCG/HR: at 06:08

## 2020-08-15 RX ADMIN — PROPOFOL 50 MCG/KG/MIN: 10 INJECTION, EMULSION INTRAVENOUS at 09:08

## 2020-08-15 RX ADMIN — ENOXAPARIN SODIUM 90 MG: 100 INJECTION SUBCUTANEOUS at 08:08

## 2020-08-15 RX ADMIN — INSULIN ASPART 15 UNITS: 100 INJECTION, SOLUTION INTRAVENOUS; SUBCUTANEOUS at 08:08

## 2020-08-15 RX ADMIN — DEXMEDETOMIDINE HYDROCHLORIDE 0.4 MCG/KG/HR: 100 INJECTION, SOLUTION, CONCENTRATE INTRAVENOUS at 06:08

## 2020-08-15 RX ADMIN — PROPOFOL 50 MCG/KG/MIN: 10 INJECTION, EMULSION INTRAVENOUS at 10:08

## 2020-08-15 RX ADMIN — Medication 25 MCG/HR: at 04:08

## 2020-08-15 RX ADMIN — Medication 0.02 MCG/KG/MIN: at 05:08

## 2020-08-15 RX ADMIN — INSULIN ASPART 2 UNITS: 100 INJECTION, SOLUTION INTRAVENOUS; SUBCUTANEOUS at 11:08

## 2020-08-15 RX ADMIN — MIDAZOLAM 5 MG: 5 INJECTION INTRAMUSCULAR; INTRAVENOUS at 04:08

## 2020-08-15 RX ADMIN — DEXMEDETOMIDINE HYDROCHLORIDE 0.6 MCG/KG/HR: 100 INJECTION, SOLUTION, CONCENTRATE INTRAVENOUS at 10:08

## 2020-08-15 NOTE — PLAN OF CARE
Pt respirations continues 35-42. Notified critical care via phone and informed md pt was receiving precedex, diprivan and fentanyl for sedation at this time. MD stated he would consult his fellow and call me back.

## 2020-08-15 NOTE — PLAN OF CARE
"    SICU PLAN OF CARE NOTE    Dx: Pneumonia due to COVID-19 virus    Shift Events: no acute events or changes overnight. Pt did continue with high residuals >300. Held TF for most of the night, didn't restart until 4am. As a result I did not cover with scheduled humalog ordered until 4am as well, didn't not want to bottom pt out. Pt was beginning to get bradycardic so I weaned down the precedex throughout the night, let the team know as well, no new orders. The lowest he got was HR 52. Stayed most of the night @ 55-60. I also came down on Fentanyl a little bit from 300 to 225. Was able to come off Levo around 0400. Pt did had a small cuff leak and respiratory was notified and fixed it. He said that's been going on. It happened again in the am around 0500, and respiratory instilled more air and it resolved.     Goals of Care: comfort, hemodynamic stability, adequate oxygenation.    Neuro: Sedated    Vital Signs: /71   Pulse 67   Temp 98.1 °F (36.7 °C) (Axillary)   Resp (!) 25   Ht 5' 9" (1.753 m)   Wt 112.5 kg (248 lb 0.3 oz)   SpO2 (!) 94%   BMI 36.63 kg/m²     Respiratory: Ventilator    Diet: NPO and Tube Feeds    Gtts: Propfol, Fentanyl, and Precedex    Urine Output: Urinary Catheter 2045 cc/shift    Drains: NG/OG Tube 900 cc /  shift in residuals for shift.     Accuchecks q4    Skin: skin intact other than healing lip. Foam to sacrum, heels and elbows. Heel boots on. Pt turned and repostioned q2. On inflated waffle mattress.       "

## 2020-08-15 NOTE — NURSING
Notified critical care resident, Dr. Colon, of discrepancies in invasive v cuff pressures through the shift. 170's SBP invasive v 150's NIBP. States he will review the chart.

## 2020-08-15 NOTE — SUBJECTIVE & OBJECTIVE
Interval History/Significant Events:   No acute events overnight    Review of Systems   Unable to perform ROS: Intubated     Objective:     Vital Signs (Most Recent):  Temp: 98.1 °F (36.7 °C) (08/15/20 0305)  Pulse: 67 (08/15/20 0700)  Resp: (!) 25 (08/15/20 0700)  BP: 121/71 (08/15/20 0700)  SpO2: (!) 94 % (08/15/20 0700) Vital Signs (24h Range):  Temp:  [97.5 °F (36.4 °C)-98.1 °F (36.7 °C)] 98.1 °F (36.7 °C)  Pulse:  [49-78] 67  Resp:  [24-43] 25  SpO2:  [87 %-100 %] 94 %  BP: ()/(52-83) 121/71  Arterial Line BP: ()/(38-66) 149/55   Weight: 112.5 kg (248 lb 0.3 oz)  Body mass index is 36.63 kg/m².      Intake/Output Summary (Last 24 hours) at 8/15/2020 0749  Last data filed at 8/15/2020 0700  Gross per 24 hour   Intake 1863.14 ml   Output 4000 ml   Net -2136.86 ml       Physical Exam  Vitals signs and nursing note reviewed.   Constitutional:       Appearance: He is obese.   HENT:      Head: Normocephalic and atraumatic.      Mouth/Throat:      Comments: Intubated  Eyes:      General:         Right eye: No discharge.         Left eye: No discharge.   Cardiovascular:      Rate and Rhythm: Normal rate and regular rhythm.   Pulmonary:      Effort: No respiratory distress.      Comments: Intubated.  Abdominal:      Palpations: Abdomen is soft.      Tenderness: There is no abdominal tenderness.   Musculoskeletal:         General: No swelling.   Skin:     General: Skin is warm and dry.   Neurological:      Comments: sedated         Vents:  Vent Mode: A/C (08/15/20 0040)  Ventilator Initiated: Yes(chart correction) (07/26/20 2108)  Set Rate: 24 BPM (08/15/20 0040)  Vt Set: 450 mL (08/15/20 0040)  Pressure Support: 0 cmH20 (08/15/20 0040)  PEEP/CPAP: 5 cmH20 (08/15/20 0040)  Oxygen Concentration (%): 40 (08/15/20 0700)  Peak Airway Pressure: 32 cmH2O (08/15/20 0040)  Plateau Pressure: 32 cmH20 (08/15/20 0040)  Total Ve: 10.9 mL (08/15/20 0040)  F/VT Ratio<105 (RSBI): (!) 52.63 (08/15/20  0040)  Lines/Drains/Airways     Central Venous Catheter Line            Percutaneous Central Line Insertion/Assessment - Triple Lumen  07/29/20 1729 right internal jugular 16 days          Drain                 NG/OG Tube 07/26/20 1756 [place] sump;orogastric 18 Fr. Center mouth 19 days         Urethral Catheter 07/26/20 2205 16 Fr. 19 days          Airway                 Airway - Non-Surgical 07/26/20 1724 Endotracheal Tube 19 days          Arterial Line                 Arterial Line 07/29/20 1700 Right Radial 16 days          Peripheral Intravenous Line                 Peripheral IV - Single Lumen 07/31/20 1508 18 G Anterior;Right Forearm 14 days         Peripheral IV - Single Lumen 08/06/20 0552 20 G Anterior;Left Wrist 9 days              Significant Labs:    CBC/Anemia Profile:  Recent Labs   Lab 08/14/20  0300 08/15/20  0340   WBC 5.00 5.46   HGB 10.0* 9.8*   HCT 31.2* 31.7*    265   MCV 88 88   RDW 14.0 14.6*        Chemistries:  Recent Labs   Lab 08/14/20  0300 08/15/20  0340   * 136   K 3.8 4.1    106   CO2 23 21*   BUN 16 11   CREATININE 0.8 0.7   CALCIUM 8.7 9.0   ALBUMIN 1.7* 1.6*   PROT 6.6 6.2   BILITOT 0.2 0.3   ALKPHOS 70 89   ALT 53* 64*   AST 39 52*   MG 1.8 1.8   PHOS 2.9 3.4       All pertinent labs within the past 24 hours have been reviewed.    Significant Imaging:  I have reviewed all pertinent imaging results/findings within the past 24 hours.

## 2020-08-15 NOTE — PROGRESS NOTES
[facility] - ICU 16   Critical Care Medicine  Progress Note    Patient Name: Anup Miller  MRN: [id number]  Admission Date: 7/26/2020  Hospital Length of Stay: 20 days  Code Status: Full Code  Attending Provider: Marina Chandler MD  Primary Care Provider: Bryce Gonzales MD   Principal Problem: Pneumonia due to COVID-19 virus    Subjective:     HPI:  Anup Miller is a 70 y.o. male patient with a PMHx of HTN, HLD, and DM who presents to the [facility] for evaluation of SOB which  1 week ago. Pt became more SOB and has an O2 sat of low 70s upon arrival on RA. Symptoms are worsening and moderate in severity. No mitigating or exacerbating factors reported. Associated sxs include cough and fever. Patient denies any congestion, sore throat, CP, abd pain, N/V, back pain, HA, weakness, and all other sxs at this time. No prior Tx reported. No further complaints or concerns at this time. Patient was placed on Bipap for a few hours and subsequently intubated. COVID positive. Was started on IV dexamethasone, Ceftriaxone, and Azithromycin. Patient transferred to [facility] on evening of 7/26/20 for higher level of care.     Hospital/ICU Course:  As of 7/29, the patient's oxygenation continued to worsen with P:F <150. R IJ and Arterial Line placed. Pt sedated, paralyzed, and proned at 9:30pm with improved oxygenation. Repeat AM gas on 7/30 Arterial Blood Gas result:  pO2 112; pCO2 58.9; pH 7.256;  HCO3 26.2, %O2 Sat 97%. FiO2 60, 8 PEEP. Pt supinated at 4:10 pm on 7/30 with Arterial Blood Gas result:  pO2 106; pCO2 52.2; pH 7.349;  HCO3 28.7, %O2 Sat 106. (P:F 212). FiO2 50, 10 PEEP.    As of 7/31, paralytic d/c'ed and sedation slowly weaned. Propofol d/c'ed on 8/1 2/2 triglycerides with ketamine and versed initiated. Now requiring higher vent settings due to dyssynchrony, will continue to increase sedation. Patient paralyzed again on 8/1 for vent dyssynchrony. Nimbex was discontinued on 08/03  and sedative medications were increased, however, patient struggled to pull enough tidal volume and was desatting even on maximum sedation so nimbex was restarted. Nimbex was stopped on 08/04 and restarted propofol. As patient became more agitated, ketamine was added on 08/06. Sedation was attempted to be weaned off by adding seroquel and zyprexa. Versed and ketamine were weaned off. Patient was weaned off of levo on morning of 08/09. Patient failed multiple SBTs and it was discussed with family of likely PEG/trach, pending their decision. Patient has continued agitation with coughing/ increased BPs requiring propofol, fentanyl, and precedex with scheduled valium. Attempted to get in touch with family to discuss peg/trach again, no answer on provided phone numbers.    Interval History/Significant Events:   No acute events overnight    Review of Systems   Unable to perform ROS: Intubated     Objective:     Vital Signs (Most Recent):  Temp: 98.1 °F (36.7 °C) (08/15/20 0305)  Pulse: 67 (08/15/20 0700)  Resp: (!) 25 (08/15/20 0700)  BP: 121/71 (08/15/20 0700)  SpO2: (!) 94 % (08/15/20 0700) Vital Signs (24h Range):  Temp:  [97.5 °F (36.4 °C)-98.1 °F (36.7 °C)] 98.1 °F (36.7 °C)  Pulse:  [49-78] 67  Resp:  [24-43] 25  SpO2:  [87 %-100 %] 94 %  BP: ()/(52-83) 121/71  Arterial Line BP: ()/(38-66) 149/55   Weight: 112.5 kg (248 lb 0.3 oz)  Body mass index is 36.63 kg/m².      Intake/Output Summary (Last 24 hours) at 8/15/2020 0749  Last data filed at 8/15/2020 0700  Gross per 24 hour   Intake 1863.14 ml   Output 4000 ml   Net -2136.86 ml       Physical Exam  Vitals signs and nursing note reviewed.   Constitutional:       Appearance: He is obese.   HENT:      Head: Normocephalic and atraumatic.      Mouth/Throat:      Comments: Intubated  Eyes:      General:         Right eye: No discharge.         Left eye: No discharge.   Cardiovascular:      Rate and Rhythm: Normal rate and regular rhythm.   Pulmonary:       Effort: No respiratory distress.      Comments: Intubated.  Abdominal:      Palpations: Abdomen is soft.      Tenderness: There is no abdominal tenderness.   Musculoskeletal:         General: No swelling.   Skin:     General: Skin is warm and dry.   Neurological:      Comments: sedated         Vents:  Vent Mode: A/C (08/15/20 0040)  Ventilator Initiated: Yes(chart correction) (07/26/20 2108)  Set Rate: 24 BPM (08/15/20 0040)  Vt Set: 450 mL (08/15/20 0040)  Pressure Support: 0 cmH20 (08/15/20 0040)  PEEP/CPAP: 5 cmH20 (08/15/20 0040)  Oxygen Concentration (%): 40 (08/15/20 0700)  Peak Airway Pressure: 32 cmH2O (08/15/20 0040)  Plateau Pressure: 32 cmH20 (08/15/20 0040)  Total Ve: 10.9 mL (08/15/20 0040)  F/VT Ratio<105 (RSBI): (!) 52.63 (08/15/20 0040)  Lines/Drains/Airways     Central Venous Catheter Line            Percutaneous Central Line Insertion/Assessment - Triple Lumen  07/29/20 1729 right internal jugular 16 days          Drain                 NG/OG Tube 07/26/20 1756 [place] sump;orogastric 18 Fr. Center mouth 19 days         Urethral Catheter 07/26/20 2205 16 Fr. 19 days          Airway                 Airway - Non-Surgical 07/26/20 1724 Endotracheal Tube 19 days          Arterial Line                 Arterial Line 07/29/20 1700 Right Radial 16 days          Peripheral Intravenous Line                 Peripheral IV - Single Lumen 07/31/20 1508 18 G Anterior;Right Forearm 14 days         Peripheral IV - Single Lumen 08/06/20 0552 20 G Anterior;Left Wrist 9 days              Significant Labs:    CBC/Anemia Profile:  Recent Labs   Lab 08/14/20  0300 08/15/20  0340   WBC 5.00 5.46   HGB 10.0* 9.8*   HCT 31.2* 31.7*    265   MCV 88 88   RDW 14.0 14.6*        Chemistries:  Recent Labs   Lab 08/14/20  0300 08/15/20  0340   * 136   K 3.8 4.1    106   CO2 23 21*   BUN 16 11   CREATININE 0.8 0.7   CALCIUM 8.7 9.0   ALBUMIN 1.7* 1.6*   PROT 6.6 6.2   BILITOT 0.2 0.3   ALKPHOS 70 89   ALT 53* 64*    AST 39 52*   MG 1.8 1.8   PHOS 2.9 3.4       All pertinent labs within the past 24 hours have been reviewed.    Significant Imaging:  I have reviewed all pertinent imaging results/findings within the past 24 hours.      ABG  Recent Labs   Lab 08/15/20  0553   PH 7.388   PO2 72*   PCO2 33.1*   HCO3 19.9*   BE -5     Assessment/Plan:     Pulmonary  Acute hypoxemic respiratory failure  - see Pneumonia due to COVID 19    Cardiac/Vascular  Hyperlipidemia associated with type 2 diabetes mellitus  - Restart Crestor once extubated     Hypertension associated with diabetes  Continue to hold antihypertensives in setting of shock    Endocrine  Uncontrolled type 2 diabetes mellitus  - A1c 8.3%  - started on insulin gtt on 08/05 due to elevated BG but discontinued 08/07  - Increased from 20U to Levemir 25U BID  - Aspart 15U Q4H  - BG goal 140-180    GI  Elevated liver enzymes  - Hx of elevated LFTs dating back to 2015  - Acute hepatitis panel and HIV - negative  - Stable. Trend CMP daily    Other  * Pneumonia due to COVID-19 virus  70M  with HTN, HLD, and DM who presents to the [facility] ED for SOB and found to be COVID positive.  Patient transferred to [facility] on 7/26/20 for higher level of care.     - Remdesivir and dexamethasone complete  - Ceftriaxone and azithromycin complete  - Blood and sputum cx showed no growth  - Therapeutic lovenox for hypercoagulable state in COVID  - Was previously paralyzed, proned, sedated. Nimbex discontinued on 08/04.  - Continue weaning down fentanyl, propofol, precedex.   - Added zyprexa on 08/07 to help wean sedation.  - Added prn versed for agitation and scheduled valium  - Nursing communication to d/c sedation holidays due to increased agitation with elevated BP on 8/14  - Levophed restarted on 8/14  - Continues to fail SBTs. Discussing with family about PEG/trach.       Critical Care Daily Checklist:    A: Awake: RASS Goal/Actual Goal: RASS Goal: -3-->moderate sedation  Actual: Lew  Agitation Sedation Scale (RASS): Moderate sedation   B: Spontaneous Breathing Trial Performed? Spon. Breathing Trial Initiated?: Not initiated (08/06/20 0821)   C: SAT & SBT Coordinated?  yes                 D: Delirium: CAM-ICU Overall CAM-ICU: Negative   E: Early Mobility Performed? No   F: Feeding Goal: Goals: Meet % EEN, EPN by RD f/u date  Status: Nutrition Goal Status: goal met   Current Diet Order   Procedures    Diet NPO      AS: Analgesia/Sedation Propfol, Fentanyl, and Precedex   T: Thromboembolic Prophylaxis yes   H: HOB > 300 No   U: Stress Ulcer Prophylaxis (if needed) yes   G: Glucose Control yes   B: Bowel Function Stool Occurrence: 1   I: Indwelling Catheter (Lines & Boothe) Necessity Boothe, NG, ET   D: De-escalation of Antimicrobials/Pharmacotherapies N/A    Plan for the day/ETD Continue course    Code Status:  Family/Goals of Care: Full Code         Critical secondary to Patient has a condition that poses threat to life and bodily function: Intubated due to COVID      Critical care was time spent personally by me on the following activities: development of treatment plan with patient or surrogate and bedside caregivers, discussions with consultants, evaluation of patient's response to treatment, examination of patient, ordering and performing treatments and interventions, ordering and review of laboratory studies, ordering and review of radiographic studies, pulse oximetry, re-evaluation of patient's condition. This critical care time did not overlap with that of any other provider or involve time for any procedures.     Kusum Merino MD  Critical Care Medicine  [facility] - ICU 16 [facility]

## 2020-08-16 LAB
ALBUMIN SERPL BCP-MCNC: 1.8 G/DL (ref 3.5–5.2)
ALLENS TEST: ABNORMAL
ALP SERPL-CCNC: 115 U/L (ref 55–135)
ALT SERPL W/O P-5'-P-CCNC: 73 U/L (ref 10–44)
ANION GAP SERPL CALC-SCNC: 8 MMOL/L (ref 8–16)
AST SERPL-CCNC: 50 U/L (ref 10–40)
BASOPHILS # BLD AUTO: 0.03 K/UL (ref 0–0.2)
BASOPHILS NFR BLD: 0.4 % (ref 0–1.9)
BILIRUB SERPL-MCNC: 0.2 MG/DL (ref 0.1–1)
BUN SERPL-MCNC: 11 MG/DL (ref 8–23)
CALCIUM SERPL-MCNC: 9.4 MG/DL (ref 8.7–10.5)
CHLORIDE SERPL-SCNC: 102 MMOL/L (ref 95–110)
CO2 SERPL-SCNC: 26 MMOL/L (ref 23–29)
CREAT SERPL-MCNC: 0.9 MG/DL (ref 0.5–1.4)
DELSYS: ABNORMAL
DIFFERENTIAL METHOD: ABNORMAL
EOSINOPHIL # BLD AUTO: 1.1 K/UL (ref 0–0.5)
EOSINOPHIL NFR BLD: 12.7 % (ref 0–8)
ERYTHROCYTE [DISTWIDTH] IN BLOOD BY AUTOMATED COUNT: 14.6 % (ref 11.5–14.5)
ERYTHROCYTE [SEDIMENTATION RATE] IN BLOOD BY WESTERGREN METHOD: 24 MM/H
EST. GFR  (AFRICAN AMERICAN): >60 ML/MIN/1.73 M^2
EST. GFR  (NON AFRICAN AMERICAN): >60 ML/MIN/1.73 M^2
FIO2: 50
GLUCOSE SERPL-MCNC: 139 MG/DL (ref 70–110)
HCO3 UR-SCNC: 24.4 MMOL/L (ref 24–28)
HCT VFR BLD AUTO: 34.6 % (ref 40–54)
HGB BLD-MCNC: 10.5 G/DL (ref 14–18)
IMM GRANULOCYTES # BLD AUTO: 0.12 K/UL (ref 0–0.04)
IMM GRANULOCYTES NFR BLD AUTO: 1.5 % (ref 0–0.5)
IP: 20
IT: 0.8
LYMPHOCYTES # BLD AUTO: 1.9 K/UL (ref 1–4.8)
LYMPHOCYTES NFR BLD: 22.4 % (ref 18–48)
MAGNESIUM SERPL-MCNC: 1.8 MG/DL (ref 1.6–2.6)
MCH RBC QN AUTO: 27.1 PG (ref 27–31)
MCHC RBC AUTO-ENTMCNC: 30.3 G/DL (ref 32–36)
MCV RBC AUTO: 89 FL (ref 82–98)
MODE: ABNORMAL
MONOCYTES # BLD AUTO: 1.3 K/UL (ref 0.3–1)
MONOCYTES NFR BLD: 15.5 % (ref 4–15)
NEUTROPHILS # BLD AUTO: 3.9 K/UL (ref 1.8–7.7)
NEUTROPHILS NFR BLD: 47.5 % (ref 38–73)
NRBC BLD-RTO: 0 /100 WBC
PCO2 BLDA: 42.5 MMHG (ref 35–45)
PEEP: 5
PH SMN: 7.37 [PH] (ref 7.35–7.45)
PHOSPHATE SERPL-MCNC: 4.9 MG/DL (ref 2.7–4.5)
PIP: 26
PLATELET # BLD AUTO: 312 K/UL (ref 150–350)
PMV BLD AUTO: 10.8 FL (ref 9.2–12.9)
PO2 BLDA: 105 MMHG (ref 80–100)
POC BE: -1 MMOL/L
POC SATURATED O2: 98 % (ref 95–100)
POC TCO2: 26 MMOL/L (ref 23–27)
POCT GLUCOSE: 142 MG/DL (ref 70–110)
POCT GLUCOSE: 151 MG/DL (ref 70–110)
POCT GLUCOSE: 164 MG/DL (ref 70–110)
POCT GLUCOSE: 179 MG/DL (ref 70–110)
POCT GLUCOSE: 196 MG/DL (ref 70–110)
POCT GLUCOSE: 201 MG/DL (ref 70–110)
POTASSIUM SERPL-SCNC: 4 MMOL/L (ref 3.5–5.1)
PROT SERPL-MCNC: 7.1 G/DL (ref 6–8.4)
RBC # BLD AUTO: 3.87 M/UL (ref 4.6–6.2)
SAMPLE: ABNORMAL
SITE: ABNORMAL
SODIUM SERPL-SCNC: 136 MMOL/L (ref 136–145)
SP02: 100
WBC # BLD AUTO: 8.25 K/UL (ref 3.9–12.7)

## 2020-08-16 PROCEDURE — 25000003 PHARM REV CODE 250: Performed by: STUDENT IN AN ORGANIZED HEALTH CARE EDUCATION/TRAINING PROGRAM

## 2020-08-16 PROCEDURE — 37799 UNLISTED PX VASCULAR SURGERY: CPT

## 2020-08-16 PROCEDURE — 25000003 PHARM REV CODE 250: Performed by: INTERNAL MEDICINE

## 2020-08-16 PROCEDURE — 99291 CRITICAL CARE FIRST HOUR: CPT | Mod: ,,, | Performed by: INTERNAL MEDICINE

## 2020-08-16 PROCEDURE — 82803 BLOOD GASES ANY COMBINATION: CPT

## 2020-08-16 PROCEDURE — 85025 COMPLETE CBC W/AUTO DIFF WBC: CPT

## 2020-08-16 PROCEDURE — 94761 N-INVAS EAR/PLS OXIMETRY MLT: CPT

## 2020-08-16 PROCEDURE — 99900026 HC AIRWAY MAINTENANCE (STAT)

## 2020-08-16 PROCEDURE — 27000221 HC OXYGEN, UP TO 24 HOURS

## 2020-08-16 PROCEDURE — 83735 ASSAY OF MAGNESIUM: CPT

## 2020-08-16 PROCEDURE — 94003 VENT MGMT INPAT SUBQ DAY: CPT

## 2020-08-16 PROCEDURE — 63600175 PHARM REV CODE 636 W HCPCS: Performed by: STUDENT IN AN ORGANIZED HEALTH CARE EDUCATION/TRAINING PROGRAM

## 2020-08-16 PROCEDURE — 84100 ASSAY OF PHOSPHORUS: CPT

## 2020-08-16 PROCEDURE — 20000000 HC ICU ROOM

## 2020-08-16 PROCEDURE — 99291 PR CRITICAL CARE, E/M 30-74 MINUTES: ICD-10-PCS | Mod: ,,, | Performed by: INTERNAL MEDICINE

## 2020-08-16 PROCEDURE — 63600175 PHARM REV CODE 636 W HCPCS: Performed by: INTERNAL MEDICINE

## 2020-08-16 PROCEDURE — 27200966 HC CLOSED SUCTION SYSTEM

## 2020-08-16 PROCEDURE — 99900035 HC TECH TIME PER 15 MIN (STAT)

## 2020-08-16 PROCEDURE — 80053 COMPREHEN METABOLIC PANEL: CPT

## 2020-08-16 RX ORDER — MAGNESIUM SULFATE HEPTAHYDRATE 40 MG/ML
2 INJECTION, SOLUTION INTRAVENOUS ONCE
Status: COMPLETED | OUTPATIENT
Start: 2020-08-16 | End: 2020-08-16

## 2020-08-16 RX ORDER — OXYCODONE HYDROCHLORIDE 5 MG/1
5 TABLET ORAL EVERY 6 HOURS
Status: DISCONTINUED | OUTPATIENT
Start: 2020-08-16 | End: 2020-08-21

## 2020-08-16 RX ADMIN — INSULIN ASPART 15 UNITS: 100 INJECTION, SOLUTION INTRAVENOUS; SUBCUTANEOUS at 04:08

## 2020-08-16 RX ADMIN — MIDAZOLAM 4 MG: 5 INJECTION INTRAMUSCULAR; INTRAVENOUS at 05:08

## 2020-08-16 RX ADMIN — OXYCODONE HYDROCHLORIDE 5 MG: 5 TABLET ORAL at 11:08

## 2020-08-16 RX ADMIN — FAMOTIDINE 20 MG: 20 TABLET ORAL at 08:08

## 2020-08-16 RX ADMIN — Medication 225 MCG/HR: at 09:08

## 2020-08-16 RX ADMIN — DIAZEPAM 10 MG: 5 TABLET ORAL at 05:08

## 2020-08-16 RX ADMIN — DIAZEPAM 10 MG: 5 TABLET ORAL at 11:08

## 2020-08-16 RX ADMIN — DEXMEDETOMIDINE HYDROCHLORIDE 0.6 MCG/KG/HR: 100 INJECTION, SOLUTION, CONCENTRATE INTRAVENOUS at 02:08

## 2020-08-16 RX ADMIN — Medication 0.04 MCG/KG/MIN: at 09:08

## 2020-08-16 RX ADMIN — OLANZAPINE 5 MG: 2.5 TABLET, FILM COATED ORAL at 08:08

## 2020-08-16 RX ADMIN — PROPOFOL 50 MCG/KG/MIN: 10 INJECTION, EMULSION INTRAVENOUS at 09:08

## 2020-08-16 RX ADMIN — PROPOFOL 40 MCG/KG/MIN: 10 INJECTION, EMULSION INTRAVENOUS at 09:08

## 2020-08-16 RX ADMIN — Medication 250 MCG/HR: at 02:08

## 2020-08-16 RX ADMIN — PROPOFOL 40 MCG/KG/MIN: 10 INJECTION, EMULSION INTRAVENOUS at 12:08

## 2020-08-16 RX ADMIN — PROPOFOL 50 MCG/KG/MIN: 10 INJECTION, EMULSION INTRAVENOUS at 05:08

## 2020-08-16 RX ADMIN — INSULIN ASPART 15 UNITS: 100 INJECTION, SOLUTION INTRAVENOUS; SUBCUTANEOUS at 08:08

## 2020-08-16 RX ADMIN — DEXMEDETOMIDINE HYDROCHLORIDE 0.9 MCG/KG/HR: 100 INJECTION, SOLUTION, CONCENTRATE INTRAVENOUS at 12:08

## 2020-08-16 RX ADMIN — DEXMEDETOMIDINE HYDROCHLORIDE 1 MCG/KG/HR: 100 INJECTION, SOLUTION, CONCENTRATE INTRAVENOUS at 08:08

## 2020-08-16 RX ADMIN — ENOXAPARIN SODIUM 90 MG: 100 INJECTION SUBCUTANEOUS at 08:08

## 2020-08-16 RX ADMIN — DEXMEDETOMIDINE HYDROCHLORIDE 0.9 MCG/KG/HR: 100 INJECTION, SOLUTION, CONCENTRATE INTRAVENOUS at 09:08

## 2020-08-16 RX ADMIN — MAGNESIUM SULFATE IN WATER 2 G: 40 INJECTION, SOLUTION INTRAVENOUS at 06:08

## 2020-08-16 RX ADMIN — INSULIN ASPART 15 UNITS: 100 INJECTION, SOLUTION INTRAVENOUS; SUBCUTANEOUS at 12:08

## 2020-08-16 RX ADMIN — INSULIN ASPART 2 UNITS: 100 INJECTION, SOLUTION INTRAVENOUS; SUBCUTANEOUS at 04:08

## 2020-08-16 RX ADMIN — INSULIN DETEMIR 25 UNITS: 100 INJECTION, SOLUTION SUBCUTANEOUS at 08:08

## 2020-08-16 RX ADMIN — INSULIN ASPART 15 UNITS: 100 INJECTION, SOLUTION INTRAVENOUS; SUBCUTANEOUS at 11:08

## 2020-08-16 RX ADMIN — ROSUVASTATIN CALCIUM 10 MG: 10 TABLET, FILM COATED ORAL at 08:08

## 2020-08-16 RX ADMIN — PROPOFOL 50 MCG/KG/MIN: 10 INJECTION, EMULSION INTRAVENOUS at 02:08

## 2020-08-16 RX ADMIN — OXYCODONE HYDROCHLORIDE 5 MG: 5 TABLET ORAL at 05:08

## 2020-08-16 RX ADMIN — MIDAZOLAM 4 MG: 5 INJECTION INTRAMUSCULAR; INTRAVENOUS at 06:08

## 2020-08-16 RX ADMIN — PROPOFOL 50 MCG/KG/MIN: 10 INJECTION, EMULSION INTRAVENOUS at 12:08

## 2020-08-16 RX ADMIN — INSULIN ASPART 4 UNITS: 100 INJECTION, SOLUTION INTRAVENOUS; SUBCUTANEOUS at 12:08

## 2020-08-16 RX ADMIN — FUROSEMIDE 40 MG: 10 INJECTION, SOLUTION INTRAMUSCULAR; INTRAVENOUS at 08:08

## 2020-08-16 NOTE — SUBJECTIVE & OBJECTIVE
Interval History/Significant Events:   No acute events overnight. Intermittently agitated or desynchronous from vent, requiring additional anxiolytics or sedation.     Review of Systems   Unable to perform ROS: Intubated     Objective:     Vital Signs (Most Recent):  Temp: 96.8 °F (36 °C) (08/16/20 0545)  Pulse: (!) 114 (08/16/20 0615)  Resp: (!) 35 (08/16/20 0615)  BP: 125/66 (08/16/20 0615)  SpO2: 97 % (08/16/20 0615) Vital Signs (24h Range):  Temp:  [95.5 °F (35.3 °C)-101.8 °F (38.8 °C)] 96.8 °F (36 °C)  Pulse:  [] 114  Resp:  [2-59] 35  SpO2:  [92 %-100 %] 97 %  BP: ()/(57-91) 125/66  Arterial Line BP: ()/(45-71) 146/56   Weight: 112.5 kg (248 lb 0.3 oz)  Body mass index is 36.63 kg/m².      Intake/Output Summary (Last 24 hours) at 8/16/2020 0655  Last data filed at 8/16/2020 0601  Gross per 24 hour   Intake 3129.57 ml   Output 4566 ml   Net -1436.43 ml       Physical Exam  Vitals signs and nursing note reviewed.   Constitutional:       Appearance: He is obese.   HENT:      Head: Normocephalic and atraumatic.      Mouth/Throat:      Comments: Intubated  Eyes:      General:         Right eye: No discharge.         Left eye: No discharge.   Cardiovascular:      Rate and Rhythm: Normal rate and regular rhythm.   Pulmonary:      Effort: No respiratory distress.      Comments: Intubated.  Abdominal:      Palpations: Abdomen is soft.      Tenderness: There is no abdominal tenderness.   Musculoskeletal:         General: No swelling.   Skin:     General: Skin is warm and dry.   Neurological:      Comments: sedated         Vents:  Vent Mode: A/C (08/16/20 0033)  Ventilator Initiated: Yes(chart correction) (07/26/20 2108)  Set Rate: 24 BPM (08/16/20 0033)  Vt Set: 0 mL (08/16/20 0033)  Pressure Support: 0 cmH20 (08/16/20 0033)  PEEP/CPAP: 5 cmH20 (08/16/20 0033)  Oxygen Concentration (%): 50 (08/16/20 0615)  Peak Airway Pressure: 26 cmH2O (08/16/20 0033)  Plateau Pressure: 0 cmH20 (08/16/20 0033)  Total  Ve: 9.91 mL (08/16/20 0033)  F/VT Ratio<105 (RSBI): (!) 70.08 (08/16/20 0033)  Lines/Drains/Airways     Central Venous Catheter Line            Percutaneous Central Line Insertion/Assessment - Triple Lumen  07/29/20 1729 right internal jugular 17 days          Drain                 NG/OG Tube 07/26/20 1756 [place] sump;orogastric 18 Fr. Center mouth 20 days         Urethral Catheter 07/26/20 2205 16 Fr. 20 days          Airway                 Airway - Non-Surgical 07/26/20 1724 Endotracheal Tube 20 days          Arterial Line                 Arterial Line 07/29/20 1700 Right Radial 17 days          Peripheral Intravenous Line                 Peripheral IV - Single Lumen 08/15/20 1418 20 G Left Hand less than 1 day              Significant Labs:    CBC/Anemia Profile:  Recent Labs   Lab 08/15/20  0340 08/16/20  0359   WBC 5.46 8.25   HGB 9.8* 10.5*   HCT 31.7* 34.6*    312   MCV 88 89   RDW 14.6* 14.6*        Chemistries:  Recent Labs   Lab 08/15/20  0340 08/16/20  0359    136   K 4.1 4.0    102   CO2 21* 26   BUN 11 11   CREATININE 0.7 0.9   CALCIUM 9.0 9.4   ALBUMIN 1.6* 1.8*   PROT 6.2 7.1   BILITOT 0.3 0.2   ALKPHOS 89 115   ALT 64* 73*   AST 52* 50*   MG 1.8 1.8   PHOS 3.4 4.9*       All pertinent labs within the past 24 hours have been reviewed.    Significant Imaging:  I have reviewed all pertinent imaging results/findings within the past 24 hours.

## 2020-08-16 NOTE — PROGRESS NOTES
[facility] - ICU 16   Critical Care Medicine  Progress Note    Patient Name: Anup Miller  MRN: [id number]  Admission Date: 7/26/2020  Hospital Length of Stay: 21 days  Code Status: Full Code  Attending Provider: Marina Chandler MD  Primary Care Provider: Bryce Gonzales MD   Principal Problem: Pneumonia due to COVID-19 virus    Subjective:     HPI:  Anup Miller is a 70 y.o. male patient with a PMHx of HTN, HLD, and DM who presents to the [place] [facility] for evaluation of SOB which  1 week ago. Pt became more SOB and has an O2 sat of low 70s upon arrival on RA. Symptoms are worsening and moderate in severity. No mitigating or exacerbating factors reported. Associated sxs include cough and fever. Patient denies any congestion, sore throat, CP, abd pain, N/V, back pain, HA, weakness, and all other sxs at this time. No prior Tx reported. No further complaints or concerns at this time. Patient was placed on Bipap for a few hours and subsequently intubated. COVID positive. Was started on IV dexamethasone, Ceftriaxone, and Azithromycin. Patient transferred to [facility] on evening of 7/26/20 for higher level of care.     Hospital/ICU Course:  As of 7/29, the patient's oxygenation continued to worsen with P:F <150. R IJ and Arterial Line placed. Pt sedated, paralyzed, and proned at 9:30pm with improved oxygenation. Repeat AM gas on 7/30 Arterial Blood Gas result:  pO2 112; pCO2 58.9; pH 7.256;  HCO3 26.2, %O2 Sat 97%. FiO2 60, 8 PEEP. Pt supinated at 4:10 pm on 7/30 with Arterial Blood Gas result:  pO2 106; pCO2 52.2; pH 7.349;  HCO3 28.7, %O2 Sat 106. (P:F 212). FiO2 50, 10 PEEP.    As of 7/31, paralytic d/c'ed and sedation slowly weaned. Propofol d/c'ed on 8/1 2/2 triglycerides with ketamine and versed initiated. Now requiring higher vent settings due to dyssynchrony, will continue to increase sedation. Patient paralyzed again on 8/1 for vent dyssynchrony. Nimbex was discontinued on 08/03  and sedative medications were increased, however, patient struggled to pull enough tidal volume and was desatting even on maximum sedation so nimbex was restarted. Nimbex was stopped on 08/04 and restarted propofol. As patient became more agitated, ketamine was added on 08/06. Sedation was attempted to be weaned off by adding seroquel and zyprexa. Versed and ketamine were weaned off. Patient was weaned off of levo on morning of 08/09. Patient failed multiple SBTs and it was discussed with family of likely PEG/trach, pending their decision. Patient has continued agitation with coughing/ increased BPs requiring propofol, fentanyl, and precedex with scheduled valium. Attempted to get in touch with family to discuss peg/trach again, no answer on provided phone numbers.    Interval History/Significant Events:   No acute events overnight. Intermittently agitated or desynchronous from vent, requiring additional anxiolytics or sedation.     Review of Systems   Unable to perform ROS: Intubated     Objective:     Vital Signs (Most Recent):  Temp: 96.8 °F (36 °C) (08/16/20 0545)  Pulse: (!) 114 (08/16/20 0615)  Resp: (!) 35 (08/16/20 0615)  BP: 125/66 (08/16/20 0615)  SpO2: 97 % (08/16/20 0615) Vital Signs (24h Range):  Temp:  [95.5 °F (35.3 °C)-101.8 °F (38.8 °C)] 96.8 °F (36 °C)  Pulse:  [] 114  Resp:  [2-59] 35  SpO2:  [92 %-100 %] 97 %  BP: ()/(57-91) 125/66  Arterial Line BP: ()/(45-71) 146/56   Weight: 112.5 kg (248 lb 0.3 oz)  Body mass index is 36.63 kg/m².      Intake/Output Summary (Last 24 hours) at 8/16/2020 0655  Last data filed at 8/16/2020 0601  Gross per 24 hour   Intake 3129.57 ml   Output 4566 ml   Net -1436.43 ml       Physical Exam  Vitals signs and nursing note reviewed.   Constitutional:       Appearance: He is obese.   HENT:      Head: Normocephalic and atraumatic.      Mouth/Throat:      Comments: Intubated  Eyes:      General:         Right eye: No discharge.         Left eye: No  discharge.   Cardiovascular:      Rate and Rhythm: Normal rate and regular rhythm.   Pulmonary:      Effort: No respiratory distress.      Comments: Intubated.  Abdominal:      Palpations: Abdomen is soft.      Tenderness: There is no abdominal tenderness.   Musculoskeletal:         General: No swelling.   Skin:     General: Skin is warm and dry.   Neurological:      Comments: sedated         Vents:  Vent Mode: A/C (08/16/20 0033)  Ventilator Initiated: Yes(chart correction) (07/26/20 2108)  Set Rate: 24 BPM (08/16/20 0033)  Vt Set: 0 mL (08/16/20 0033)  Pressure Support: 0 cmH20 (08/16/20 0033)  PEEP/CPAP: 5 cmH20 (08/16/20 0033)  Oxygen Concentration (%): 50 (08/16/20 0615)  Peak Airway Pressure: 26 cmH2O (08/16/20 0033)  Plateau Pressure: 0 cmH20 (08/16/20 0033)  Total Ve: 9.91 mL (08/16/20 0033)  F/VT Ratio<105 (RSBI): (!) 70.08 (08/16/20 0033)  Lines/Drains/Airways     Central Venous Catheter Line            Percutaneous Central Line Insertion/Assessment - Triple Lumen  07/29/20 1729 right internal jugular 17 days          Drain                 NG/OG Tube 07/26/20 1756 [place] sump;orogastric 18 Fr. Center mouth 20 days         Urethral Catheter 07/26/20 2205 16 Fr. 20 days          Airway                 Airway - Non-Surgical 07/26/20 1724 Endotracheal Tube 20 days          Arterial Line                 Arterial Line 07/29/20 1700 Right Radial 17 days          Peripheral Intravenous Line                 Peripheral IV - Single Lumen 08/15/20 1418 20 G Left Hand less than 1 day              Significant Labs:    CBC/Anemia Profile:  Recent Labs   Lab 08/15/20  0340 08/16/20  0359   WBC 5.46 8.25   HGB 9.8* 10.5*   HCT 31.7* 34.6*    312   MCV 88 89   RDW 14.6* 14.6*        Chemistries:  Recent Labs   Lab 08/15/20  0340 08/16/20  0359    136   K 4.1 4.0    102   CO2 21* 26   BUN 11 11   CREATININE 0.7 0.9   CALCIUM 9.0 9.4   ALBUMIN 1.6* 1.8*   PROT 6.2 7.1   BILITOT 0.3 0.2   ALKPHOS 89 115   ALT  64* 73*   AST 52* 50*   MG 1.8 1.8   PHOS 3.4 4.9*       All pertinent labs within the past 24 hours have been reviewed.    Significant Imaging:  I have reviewed all pertinent imaging results/findings within the past 24 hours.      ABG  Recent Labs   Lab 08/16/20  0033   PH 7.367   PO2 105*   PCO2 42.5   HCO3 24.4   BE -1     Assessment/Plan:     Pulmonary  Acute hypoxemic respiratory failure  - see Pneumonia due to COVID 19    Cardiac/Vascular  Hyperlipidemia associated with type 2 diabetes mellitus  - Restart Crestor once extubated     Hypertension associated with diabetes  Continue to hold antihypertensives in setting of shock    Endocrine  Uncontrolled type 2 diabetes mellitus  - A1c 8.3%  - started on insulin gtt on 08/05 due to elevated BG but discontinued 08/07  - Increased from 20U to Levemir 25U BID  - Aspart 15U Q4H  - BG goal 140-180    GI  Elevated liver enzymes  - Hx of elevated LFTs dating back to 2015  - Acute hepatitis panel and HIV - negative  - Stable. Trend CMP daily    Other  * Pneumonia due to COVID-19 virus  70M  with HTN, HLD, and DM who presents to the [place] ED for SOB and found to be COVID positive.  Patient transferred to [facility] on 7/26/20 for higher level of care.     - Remdesivir and dexamethasone complete  - Ceftriaxone and azithromycin complete  - Blood and sputum cx showed no growth  - Therapeutic lovenox for hypercoagulable state in COVID  - Was previously paralyzed, proned, sedated. Nimbex discontinued on 08/04.  - Continue weaning down fentanyl, propofol, precedex.   - Added zyprexa on 08/07 to help wean sedation.  - Added prn versed for agitation and scheduled valium  - Nursing communication to d/c sedation holidays due to increased agitation with elevated BP on 8/14  - Levophed restarted on 8/14  - Continues to fail SBTs. Discussing with family about PEG/trach.       Critical Care Daily Checklist:    A: Awake: RASS Goal/Actual Goal: RASS Goal: -3-->moderate sedation  Actual:  [place] Agitation Sedation Scale (RASS): Moderate sedation   B: Spontaneous Breathing Trial Performed? Spon. Breathing Trial Initiated?: Not initiated (08/06/20 0821)   C: SAT & SBT Coordinated?  fail                      D: Delirium: CAM-ICU Overall CAM-ICU: Negative   E: Early Mobility Performed? No   F: Feeding Goal: Goals: Meet % EEN, EPN by RD f/u date  Status: Nutrition Goal Status: goal met   Current Diet Order   Procedures    Diet NPO      AS: Analgesia/Sedation Propofol, fentanyl, precedex   T: Thromboembolic Prophylaxis enoxaparin   H: HOB > 300 Yes   U: Stress Ulcer Prophylaxis (if needed) famotidine   G: Glucose Control 25U detemir + 15U TIDWM aspart + SSI   B: Bowel Function Stool Occurrence: 1   I: Indwelling Catheter (Lines & Harris) Necessity RIJ CVC, R radial arterial line, PIVC, NG/OG, harris   D: De-escalation of Antimicrobials/Pharmacotherapies nil    Plan for the day/ETD Continue to wean vent as tolerated    Code Status:  Family/Goals of Care: Full Code  Attempt family conversation re: Trach/PEG       Critical secondary to Patient has a condition that poses threat to life and bodily function: Severe Respiratory Distress      Critical care was time spent personally by me on the following activities: development of treatment plan with patient or surrogate and bedside caregivers, discussions with consultants, evaluation of patient's response to treatment, examination of patient, ordering and performing treatments and interventions, ordering and review of laboratory studies, ordering and review of radiographic studies, pulse oximetry, re-evaluation of patient's condition. This critical care time did not overlap with that of any other provider or involve time for any procedures.     Tara Cobos MD  Critical Care Medicine  [facility] - ICU 16 WT

## 2020-08-16 NOTE — EICU
eICU Note :    Called by the [facility] eRN:    Problem: [place] [id number] UOP is starting to taper down. last hourly check was 35cc/hr. last night he was well over /hr. i know he got lasix added to his regimen today, but it is just daily, should we maybe do another supplemental dose? i know they want is UOP greater than 35cc/hr. we can see what midnight check brings first, but he may need it.      Pertinent History and labs reviewed : 71 y/o male    Pneumonia due to COVID-19 virus    Uncontrolled type 2 diabetes mellitus    Elevated liver enzymes    Hypertension associated with diabetes    Hyperlipidemia associated with type 2 diabetes mellitus    Acute hypoxemic respiratory failure    COVID-19 virus infection        Treatment /Intervention given: Lasix 40 mg IVPx1   Elizabet Ashby M.D  eICU Physician  5:46 AM  Mag 1.8 , will give Magnesium 1gm IVPB x1

## 2020-08-16 NOTE — NURSING
Pt turned, began coughing violently. SBP up to 200s, HR up to 90s.Peak pressures up to 60s, suctioned to no effect. Precedex and Propofol maxed. MD aware, per MD give versed rather than fentanyl. WCTM.

## 2020-08-16 NOTE — PLAN OF CARE
"      RICU PLAN OF CARE NOTE    Dx: Pneumonia due to COVID-19 virus    Shift Events: Continue to wean down sedation and fentanyl as tolerated. On/off levo. Started on 5mg Oxycodone per OG q6h. Vent weaned to 40% and 5 PEEP. Tolerating TF at goal of 40cc/hr. SS insulin coverage per order.     Goals of Care: RASS 0     Neuro: Sedated    Vital Signs: /60 (BP Location: Left arm, Patient Position: Lying)   Pulse 91   Temp 98 °F (36.7 °C) (Oral)   Resp (!) 29   Ht 5' 9" (1.753 m)   Wt 112.5 kg (248 lb 0.3 oz)   SpO2 96%   BMI 36.63 kg/m²     Respiratory: Ventilator    Diet: Tube Feeds    Gtts: Propfol, Fentanyl, Precedex and Norepinephrine    Urine Output: Urinary Catheter  cc/hour    Drains: NG/OG Tube 0 cc /  shift    Restraints order good until 8/16 @1400. No injuries noted. WCTM.      Labs/Accuchecks: Accuchecks q4h, daily labs    Skin: Skin remains intact other than small tear to gluteal area. Foams to heels and elbows. Remians on overlay mattress with waffle inflated and bed plugged in. Heels remain elevated off bed this shift. T&R side to side with wedge as tolerated by pt. WCTM.    "

## 2020-08-16 NOTE — ASSESSMENT & PLAN NOTE
70M  with HTN, HLD, and DM who presents to the [place] ED for SOB and found to be COVID positive.  Patient transferred to [facility] on 7/26/20 for higher level of care.     - Remdesivir and dexamethasone complete  - Ceftriaxone and azithromycin complete  - Blood and sputum cx showed no growth  - Therapeutic lovenox for hypercoagulable state in COVID  - Was previously paralyzed, proned, sedated. Nimbex discontinued on 08/04.  - Continue weaning down fentanyl, propofol, precedex.   - Added zyprexa on 08/07 to help wean sedation.  - Added prn versed for agitation and scheduled valium  - Nursing communication to d/c sedation holidays due to increased agitation with elevated BP on 8/14  - Levophed restarted on 8/14  - Continues to fail SBTs. Discussing with family about PEG/trach.

## 2020-08-16 NOTE — PLAN OF CARE
RICU PLAN OF CARE NOTE    Dx: Pneumonia due to COVID-19 virus    Shift Events: no acute changes or events on shift. Pt vent settings weaned down from when shift first started. It was at 80% fiO2 and he is now at 50% fiO2. All dressings changed, [place] and TLC. Shaved pt. Partial bath. Changed all tubings, including all gtts and [place] tubing. At beginning of shift pt was slightly febrile at 100, offgoing nurse had given Tylenol per orders, and his temperature has since declined, where he has been 97-98 degrees the rest of the night. Switched temp probe from rectal, which was placed yesterday on day shift to core esophogeal. Family got to FT with patient for about an hour last night on pt phone that is at bedside. Family updated. UOP started to taper off around 2300, notified JHOAN ALMODOVAR and they ordered for x1 dose of 40mg lasix, which he responded nicely to. Yesterday they had added that into his regimen, but only daily. VSS, NAD RR even and unlabored and synchronous with vent. Had to restart pressors at start of shift d/t increase in sedation from episode that occurred during the day with changes in vent settings, pt was asynchronous, not pulling full volumes, and tachypneic. So with increased sedation to settle pt and reach synchrony with vent, pressor support was once again needed.    Addendum--around 0600, pt had another episode like yesterday morning where all of the sudden his SBP will get into the 160s, -120, when hes been soft MAPS all night and HR in 60s all night. Stopped Levo and went up on pt sedation to max settings. Gave him about 30min to see if he would settle out, but he didn't. Ended up giving 4mg Versed as per PRN orders. SBP and MAPS responded immediately and HR and resp are declinging slowly back to his baseline comfort zone that he has been in all night. Did have to turn Levo back on after Versed to 0.02mcg/kg/hr to maintain MAPS as they got a little soft; also went down on previously  "maxed sedation settings that were adjusted initially at beginning of episode.     Goals of Care: comfort, hemodynamic stability, adequate oxygenation, wean vent, eventually pass SBT without issues. Possible TREG?    Neuro: Sedated    Vital Signs: /61   Pulse 67   Temp 97.9 °F (36.6 °C) (Core Esophageal)   Resp (!) 27   Ht 5' 9" (1.753 m)   Wt 112.5 kg (248 lb 0.3 oz)   SpO2 100%   BMI 36.63 kg/m²     Respiratory: Ventilator --now on pressure support of 20 vs ACVC. 50%/5/24/20    Diet: NPO and Tube Feeds    Gtts: Propfol, Fentanyl, Precedex, and Norepinephrine    Urine Output: Urinary Catheter 2375 cc/shift     Accuchecks Q4    Skin: skin intact other than small tear in gluteal area. Mepilex in place. Pt lip is healing nicely. No other skin breakdown. Foams to heels, elbows, and sacrum. Heel boots in place. Pressure points protected. Pt on inflated waffle mattress and is turned and repositioned q2.   '  "

## 2020-08-17 LAB
ALBUMIN SERPL BCP-MCNC: 1.8 G/DL (ref 3.5–5.2)
ALLENS TEST: ABNORMAL
ALP SERPL-CCNC: 111 U/L (ref 55–135)
ALT SERPL W/O P-5'-P-CCNC: 68 U/L (ref 10–44)
ANION GAP SERPL CALC-SCNC: 9 MMOL/L (ref 8–16)
AST SERPL-CCNC: 39 U/L (ref 10–40)
BASOPHILS # BLD AUTO: 0.02 K/UL (ref 0–0.2)
BASOPHILS NFR BLD: 0.3 % (ref 0–1.9)
BILIRUB SERPL-MCNC: 0.2 MG/DL (ref 0.1–1)
BUN SERPL-MCNC: 12 MG/DL (ref 8–23)
CALCIUM SERPL-MCNC: 8.9 MG/DL (ref 8.7–10.5)
CHLORIDE SERPL-SCNC: 103 MMOL/L (ref 95–110)
CO2 SERPL-SCNC: 26 MMOL/L (ref 23–29)
CREAT SERPL-MCNC: 0.8 MG/DL (ref 0.5–1.4)
DELSYS: ABNORMAL
DIFFERENTIAL METHOD: ABNORMAL
EOSINOPHIL # BLD AUTO: 1.2 K/UL (ref 0–0.5)
EOSINOPHIL NFR BLD: 16 % (ref 0–8)
ERYTHROCYTE [DISTWIDTH] IN BLOOD BY AUTOMATED COUNT: 14.6 % (ref 11.5–14.5)
ERYTHROCYTE [SEDIMENTATION RATE] IN BLOOD BY WESTERGREN METHOD: 24 MM/H
EST. GFR  (AFRICAN AMERICAN): >60 ML/MIN/1.73 M^2
EST. GFR  (NON AFRICAN AMERICAN): >60 ML/MIN/1.73 M^2
FIO2: 40
GLUCOSE SERPL-MCNC: 153 MG/DL (ref 70–110)
HCO3 UR-SCNC: 28.4 MMOL/L (ref 24–28)
HCT VFR BLD AUTO: 34.5 % (ref 40–54)
HGB BLD-MCNC: 10.6 G/DL (ref 14–18)
IMM GRANULOCYTES # BLD AUTO: 0.07 K/UL (ref 0–0.04)
IMM GRANULOCYTES NFR BLD AUTO: 0.9 % (ref 0–0.5)
LYMPHOCYTES # BLD AUTO: 1.4 K/UL (ref 1–4.8)
LYMPHOCYTES NFR BLD: 19 % (ref 18–48)
MAGNESIUM SERPL-MCNC: 2.1 MG/DL (ref 1.6–2.6)
MCH RBC QN AUTO: 27.4 PG (ref 27–31)
MCHC RBC AUTO-ENTMCNC: 30.7 G/DL (ref 32–36)
MCV RBC AUTO: 89 FL (ref 82–98)
MODE: ABNORMAL
MONOCYTES # BLD AUTO: 1 K/UL (ref 0.3–1)
MONOCYTES NFR BLD: 13.5 % (ref 4–15)
NEUTROPHILS # BLD AUTO: 3.8 K/UL (ref 1.8–7.7)
NEUTROPHILS NFR BLD: 50.3 % (ref 38–73)
NRBC BLD-RTO: 0 /100 WBC
PCO2 BLDA: 41.2 MMHG (ref 35–45)
PEEP: 5
PH SMN: 7.45 [PH] (ref 7.35–7.45)
PHOSPHATE SERPL-MCNC: 3.7 MG/DL (ref 2.7–4.5)
PLATELET # BLD AUTO: 317 K/UL (ref 150–350)
PMV BLD AUTO: 10.5 FL (ref 9.2–12.9)
PO2 BLDA: 81 MMHG (ref 80–100)
POC BE: 4 MMOL/L
POC SATURATED O2: 96 % (ref 95–100)
POC TCO2: 30 MMOL/L (ref 23–27)
POCT GLUCOSE: 137 MG/DL (ref 70–110)
POCT GLUCOSE: 148 MG/DL (ref 70–110)
POCT GLUCOSE: 157 MG/DL (ref 70–110)
POCT GLUCOSE: 201 MG/DL (ref 70–110)
POTASSIUM SERPL-SCNC: 4.1 MMOL/L (ref 3.5–5.1)
PROT SERPL-MCNC: 7.1 G/DL (ref 6–8.4)
RBC # BLD AUTO: 3.87 M/UL (ref 4.6–6.2)
SAMPLE: ABNORMAL
SITE: ABNORMAL
SODIUM SERPL-SCNC: 138 MMOL/L (ref 136–145)
TRIGL SERPL-MCNC: 162 MG/DL (ref 30–150)
VT: 440
WBC # BLD AUTO: 7.48 K/UL (ref 3.9–12.7)

## 2020-08-17 PROCEDURE — 25000003 PHARM REV CODE 250: Performed by: PHYSICIAN ASSISTANT

## 2020-08-17 PROCEDURE — 63600175 PHARM REV CODE 636 W HCPCS: Performed by: INTERNAL MEDICINE

## 2020-08-17 PROCEDURE — 25000003 PHARM REV CODE 250: Performed by: STUDENT IN AN ORGANIZED HEALTH CARE EDUCATION/TRAINING PROGRAM

## 2020-08-17 PROCEDURE — 84478 ASSAY OF TRIGLYCERIDES: CPT

## 2020-08-17 PROCEDURE — 99291 PR CRITICAL CARE, E/M 30-74 MINUTES: ICD-10-PCS | Mod: ,,, | Performed by: INTERNAL MEDICINE

## 2020-08-17 PROCEDURE — 85025 COMPLETE CBC W/AUTO DIFF WBC: CPT

## 2020-08-17 PROCEDURE — 25000242 PHARM REV CODE 250 ALT 637 W/ HCPCS: Performed by: STUDENT IN AN ORGANIZED HEALTH CARE EDUCATION/TRAINING PROGRAM

## 2020-08-17 PROCEDURE — 87070 CULTURE OTHR SPECIMN AEROBIC: CPT

## 2020-08-17 PROCEDURE — 99291 CRITICAL CARE FIRST HOUR: CPT | Mod: ,,, | Performed by: INTERNAL MEDICINE

## 2020-08-17 PROCEDURE — 25000003 PHARM REV CODE 250: Performed by: INTERNAL MEDICINE

## 2020-08-17 PROCEDURE — 99900026 HC AIRWAY MAINTENANCE (STAT)

## 2020-08-17 PROCEDURE — 63600175 PHARM REV CODE 636 W HCPCS: Performed by: STUDENT IN AN ORGANIZED HEALTH CARE EDUCATION/TRAINING PROGRAM

## 2020-08-17 PROCEDURE — 94761 N-INVAS EAR/PLS OXIMETRY MLT: CPT

## 2020-08-17 PROCEDURE — 27000221 HC OXYGEN, UP TO 24 HOURS

## 2020-08-17 PROCEDURE — 87205 SMEAR GRAM STAIN: CPT

## 2020-08-17 PROCEDURE — 82803 BLOOD GASES ANY COMBINATION: CPT

## 2020-08-17 PROCEDURE — 94003 VENT MGMT INPAT SUBQ DAY: CPT

## 2020-08-17 PROCEDURE — 27200966 HC CLOSED SUCTION SYSTEM

## 2020-08-17 PROCEDURE — 20000000 HC ICU ROOM

## 2020-08-17 PROCEDURE — 37799 UNLISTED PX VASCULAR SURGERY: CPT

## 2020-08-17 PROCEDURE — 99900035 HC TECH TIME PER 15 MIN (STAT)

## 2020-08-17 PROCEDURE — 83735 ASSAY OF MAGNESIUM: CPT

## 2020-08-17 PROCEDURE — 80053 COMPREHEN METABOLIC PANEL: CPT

## 2020-08-17 PROCEDURE — 84100 ASSAY OF PHOSPHORUS: CPT

## 2020-08-17 RX ORDER — GUAIFENESIN 100 MG/5ML
200 SOLUTION ORAL EVERY 4 HOURS
Status: DISCONTINUED | OUTPATIENT
Start: 2020-08-17 | End: 2020-08-21

## 2020-08-17 RX ORDER — ACETYLCYSTEINE 100 MG/ML
4 SOLUTION ORAL; RESPIRATORY (INHALATION)
Status: DISCONTINUED | OUTPATIENT
Start: 2020-08-17 | End: 2020-08-26 | Stop reason: HOSPADM

## 2020-08-17 RX ADMIN — ENOXAPARIN SODIUM 90 MG: 100 INJECTION SUBCUTANEOUS at 08:08

## 2020-08-17 RX ADMIN — ALBUTEROL SULFATE 2.5 MG: 2.5 SOLUTION RESPIRATORY (INHALATION) at 07:08

## 2020-08-17 RX ADMIN — PROPOFOL 50 MCG/KG/MIN: 10 INJECTION, EMULSION INTRAVENOUS at 05:08

## 2020-08-17 RX ADMIN — INSULIN ASPART 15 UNITS: 100 INJECTION, SOLUTION INTRAVENOUS; SUBCUTANEOUS at 04:08

## 2020-08-17 RX ADMIN — DEXMEDETOMIDINE HYDROCHLORIDE 0.5 MCG/KG/HR: 100 INJECTION, SOLUTION, CONCENTRATE INTRAVENOUS at 10:08

## 2020-08-17 RX ADMIN — DEXMEDETOMIDINE HYDROCHLORIDE 1 MCG/KG/HR: 100 INJECTION, SOLUTION, CONCENTRATE INTRAVENOUS at 09:08

## 2020-08-17 RX ADMIN — PROPOFOL 50 MCG/KG/MIN: 10 INJECTION, EMULSION INTRAVENOUS at 08:08

## 2020-08-17 RX ADMIN — PROPOFOL 35 MCG/KG/MIN: 10 INJECTION, EMULSION INTRAVENOUS at 01:08

## 2020-08-17 RX ADMIN — DIAZEPAM 10 MG: 5 TABLET ORAL at 05:08

## 2020-08-17 RX ADMIN — STANDARDIZED SENNA CONCENTRATE AND DOCUSATE SODIUM 1 TABLET: 8.6; 5 TABLET ORAL at 08:08

## 2020-08-17 RX ADMIN — OXYCODONE HYDROCHLORIDE 5 MG: 5 TABLET ORAL at 11:08

## 2020-08-17 RX ADMIN — FAMOTIDINE 20 MG: 20 TABLET ORAL at 08:08

## 2020-08-17 RX ADMIN — INSULIN DETEMIR 25 UNITS: 100 INJECTION, SOLUTION SUBCUTANEOUS at 08:08

## 2020-08-17 RX ADMIN — PROPOFOL 50 MCG/KG/MIN: 10 INJECTION, EMULSION INTRAVENOUS at 11:08

## 2020-08-17 RX ADMIN — ACETAMINOPHEN 650 MG: 160 SOLUTION ORAL at 08:08

## 2020-08-17 RX ADMIN — INSULIN ASPART 15 UNITS: 100 INJECTION, SOLUTION INTRAVENOUS; SUBCUTANEOUS at 11:08

## 2020-08-17 RX ADMIN — ACETYLCYSTEINE 4 ML: 100 SOLUTION ORAL; RESPIRATORY (INHALATION) at 02:08

## 2020-08-17 RX ADMIN — GUAIFENESIN 200 MG: 200 SOLUTION ORAL at 01:08

## 2020-08-17 RX ADMIN — GUAIFENESIN 200 MG: 200 SOLUTION ORAL at 05:08

## 2020-08-17 RX ADMIN — OXYCODONE HYDROCHLORIDE 5 MG: 5 TABLET ORAL at 05:08

## 2020-08-17 RX ADMIN — INSULIN ASPART 15 UNITS: 100 INJECTION, SOLUTION INTRAVENOUS; SUBCUTANEOUS at 08:08

## 2020-08-17 RX ADMIN — OLANZAPINE 5 MG: 2.5 TABLET, FILM COATED ORAL at 08:08

## 2020-08-17 RX ADMIN — FUROSEMIDE 40 MG: 10 INJECTION, SOLUTION INTRAMUSCULAR; INTRAVENOUS at 08:08

## 2020-08-17 RX ADMIN — GUAIFENESIN 200 MG: 200 SOLUTION ORAL at 09:08

## 2020-08-17 RX ADMIN — DIAZEPAM 10 MG: 5 TABLET ORAL at 11:08

## 2020-08-17 RX ADMIN — INSULIN ASPART 2 UNITS: 100 INJECTION, SOLUTION INTRAVENOUS; SUBCUTANEOUS at 08:08

## 2020-08-17 RX ADMIN — ROSUVASTATIN CALCIUM 10 MG: 10 TABLET, FILM COATED ORAL at 08:08

## 2020-08-17 RX ADMIN — DEXMEDETOMIDINE HYDROCHLORIDE 1 MCG/KG/HR: 100 INJECTION, SOLUTION, CONCENTRATE INTRAVENOUS at 01:08

## 2020-08-17 RX ADMIN — DEXMEDETOMIDINE HYDROCHLORIDE 1 MCG/KG/HR: 100 INJECTION, SOLUTION, CONCENTRATE INTRAVENOUS at 02:08

## 2020-08-17 RX ADMIN — POLYETHYLENE GLYCOL 3350 17 G: 17 POWDER, FOR SOLUTION ORAL at 08:08

## 2020-08-17 RX ADMIN — DEXMEDETOMIDINE HYDROCHLORIDE 1.2 MCG/KG/HR: 100 INJECTION, SOLUTION, CONCENTRATE INTRAVENOUS at 05:08

## 2020-08-17 RX ADMIN — ACETYLCYSTEINE 4 ML: 100 SOLUTION ORAL; RESPIRATORY (INHALATION) at 07:08

## 2020-08-17 RX ADMIN — ALBUTEROL SULFATE 2.5 MG: 2.5 SOLUTION RESPIRATORY (INHALATION) at 02:08

## 2020-08-17 NOTE — PLAN OF CARE
"   RICU PLAN OF CARE NOTE:    Dx: Pneumonia due to COVID-19 virus    Shift Events: pt has had a great night, doing well with the fentanyl gtt coming off yesterday afternoon, team replaced with oxy po scheduled with his valium. The combo seems to work well for him, as before he wouldn't tolerate a wean from the fentanyl or sedation at all. I have also progressively slow weaned his propofol throughout the night as well. It was at 50 when I came on and he is now at 20mcg/kg/hr. Levo has been off for the majority of the night. Maintained adequate pressures and MAPS on his own. Every now and then when he has a coughing spell his pressure will drop to MAPS of 50s but he immediately recovers on his own quickly. Pt still having moderate amount of secretions when suctioning orally and tracheally. HR hasnt been an issue all night whether high or low, he has been NSR 60-80s. Whereas a couple nights ago he was more bradycardic with the precedex, however, it has not been an issue this shift.     Goals of Care: comfort, hemodynamic stability, adequate oxygenation, wean sedation. Attempt another SAT/SBT soon?     Neuro: Sedated and [place] to Voice    Vital Signs: BP (!) 102/57   Pulse 83   Temp 98 °F (36.7 °C) (Oral)   Resp (!) 25   Ht 5' 9" (1.753 m)   Wt 112.5 kg (248 lb 0.3 oz)   SpO2 100%   BMI 36.63 kg/m²     Respiratory: Ventilator ACVC40%/5/24/440    Diet: NPO and Tube Feeds    Gtts: Propfol and Precedex    Urine Output: Urinary Catheter 1250 cc/shift     Accuchecks Q4 w/ scheduled coverage.    Skin: see skin flowsheet for more details. ST to gluteal buttock area, R lip healing up well. Foams to chi heels, chi elbows, and sacrum. Pt on inflated waffle mattress, pt turned and repositioned q2, protecting pressure points and padded areas where devices and tubes are. Chi heel boots on as well.        "

## 2020-08-17 NOTE — PROGRESS NOTES
Per Dr. Pinon weaned of precedex and went up on propofol. Pt started to use abdominal accessory muscle and become tachycardic. Order to restart precedex at 0.5 (a lower dose). WCTM.

## 2020-08-17 NOTE — ASSESSMENT & PLAN NOTE
- A1c 8.3%  - started on insulin gtt on 08/05 due to elevated BG but discontinued 08/07  - Levemir 25U BID  - Aspart 15U Q4H  - BG at goal of 140-180

## 2020-08-17 NOTE — PROGRESS NOTES
[facility] - ICU 16   Critical Care Medicine  Progress Note    Patient Name: Anup Miller  MRN: [id number]  Admission Date: 7/26/2020  Hospital Length of Stay: 22 days  Code Status: Full Code  Attending Provider: Chantell Davis MD  Primary Care Provider: Bryce Gonzales MD   Principal Problem: Pneumonia due to COVID-19 virus    Subjective:     HPI:  Anup Miller is a 70 y.o. male patient with a PMHx of HTN, HLD, and DM who presents to the [place] [facility] for evaluation of SOB which  1 week ago. Pt became more SOB and has an O2 sat of low 70s upon arrival on RA. Symptoms are worsening and moderate in severity. No mitigating or exacerbating factors reported. Associated sxs include cough and fever. Patient denies any congestion, sore throat, CP, abd pain, N/V, back pain, HA, weakness, and all other sxs at this time. No prior Tx reported. No further complaints or concerns at this time. Patient was placed on Bipap for a few hours and subsequently intubated. COVID positive. Was started on IV dexamethasone, Ceftriaxone, and Azithromycin. Patient transferred to [facility] on evening of 7/26/20 for higher level of care.     Hospital/ICU Course:  As of 7/29, the patient's oxygenation continued to worsen with P:F <150. R IJ and Arterial Line placed. Pt sedated, paralyzed, and proned at 9:30pm with improved oxygenation. Repeat AM gas on 7/30 Arterial Blood Gas result:  pO2 112; pCO2 58.9; pH 7.256;  HCO3 26.2, %O2 Sat 97%. FiO2 60, 8 PEEP. Pt supinated at 4:10 pm on 7/30 with Arterial Blood Gas result:  pO2 106; pCO2 52.2; pH 7.349;  HCO3 28.7, %O2 Sat 106. (P:F 212). FiO2 50, 10 PEEP.    As of 7/31, paralytic d/c'ed and sedation slowly weaned. Propofol d/c'ed on 8/1 2/2 triglycerides with ketamine and versed initiated. Now requiring higher vent settings due to dyssynchrony, will continue to increase sedation. Patient paralyzed again on 8/1 for vent dyssynchrony. Nimbex was discontinued on 08/03  and sedative medications were increased, however, patient struggled to pull enough tidal volume and was desatting even on maximum sedation so nimbex was restarted. Nimbex was stopped on 08/04 and restarted propofol. As patient became more agitated, ketamine was added on 08/06. Sedation was attempted to be weaned off by adding seroquel and zyprexa. Versed and ketamine were weaned off. Patient was weaned off of levo on morning of 08/09. Patient failed multiple SBTs and it was discussed with family of likely PEG/trach, pending their decision. Patient has continued agitation with coughing/ increased BPs requiring propofol, fentanyl, and precedex with scheduled valium. Attempted to get in touch with family to discuss peg/trach again, no answer on provided phone numbers.    Interval History/Significant Events:   No acute events overnight. Off pressors since 2100. Alert and follows commands during sbt, but fails 2/2 high volume of secretions.     Review of Systems   Unable to perform ROS: Intubated     Objective:     Vital Signs (Most Recent):  Temp: 98.3 °F (36.8 °C) (08/17/20 1500)  Pulse: 89 (08/17/20 1530)  Resp: (!) 24 (08/17/20 1530)  BP: 119/64 (08/17/20 1530)  SpO2: 100 % (08/17/20 1530) Vital Signs (24h Range):  Temp:  [98 °F (36.7 °C)-98.3 °F (36.8 °C)] 98.3 °F (36.8 °C)  Pulse:  [61-91] 89  Resp:  [23-62] 24  SpO2:  [91 %-100 %] 100 %  BP: ()/(53-72) 119/64  Arterial Line BP: ()/(45-65) 126/57   Weight: 112.5 kg (248 lb 0.3 oz)  Body mass index is 36.63 kg/m².      Intake/Output Summary (Last 24 hours) at 8/17/2020 1613  Last data filed at 8/17/2020 1554  Gross per 24 hour   Intake 3193.32 ml   Output 2850 ml   Net 343.32 ml       Physical Exam  Vitals signs and nursing note reviewed.   Constitutional:       Appearance: He is obese.   HENT:      Head: Normocephalic and atraumatic.      Mouth/Throat:      Comments: Intubated  Eyes:      General:         Right eye: No discharge.         Left eye: No  discharge.   Cardiovascular:      Rate and Rhythm: Normal rate and regular rhythm.   Pulmonary:      Effort: No respiratory distress.      Comments: Intubated.  Abdominal:      Palpations: Abdomen is soft.      Tenderness: There is no abdominal tenderness.   Musculoskeletal:         General: No swelling.   Skin:     General: Skin is warm and dry.   Neurological:      Comments: sedated         Vents:  Vent Mode: A/C (08/17/20 1448)  Ventilator Initiated: Yes(chart correction) (07/26/20 2108)  Set Rate: 16 BPM (08/17/20 1448)  Vt Set: 440 mL (08/17/20 1448)  Pressure Support: 0 cmH20 (08/17/20 1448)  PEEP/CPAP: 5 cmH20 (08/17/20 1448)  Oxygen Concentration (%): 40 (08/17/20 1530)  Peak Airway Pressure: 43 cmH2O (08/17/20 1448)  Plateau Pressure: 28 cmH20 (08/17/20 1448)  Total Ve: 10.9 mL (08/17/20 1448)  F/VT Ratio<105 (RSBI): (!) 64.9 (08/17/20 1448)  Lines/Drains/Airways     Central Venous Catheter Line            Percutaneous Central Line Insertion/Assessment - Triple Lumen  07/29/20 1729 right internal jugular 18 days          Drain                 NG/OG Tube 07/26/20 1756 [place] sump;orogastric 18 Fr. Center mouth 21 days         Urethral Catheter 08/17/20 1553 less than 1 day          Airway                 Airway - Non-Surgical 07/26/20 1724 Endotracheal Tube 21 days          Arterial Line                 Arterial Line 07/29/20 1700 Right Radial 18 days          Peripheral Intravenous Line                 Peripheral IV - Single Lumen 08/15/20 1418 20 G Left Hand 2 days              Significant Labs:    CBC/Anemia Profile:  Recent Labs   Lab 08/16/20  0359 08/17/20  0419   WBC 8.25 7.48   HGB 10.5* 10.6*   HCT 34.6* 34.5*    317   MCV 89 89   RDW 14.6* 14.6*        Chemistries:  Recent Labs   Lab 08/16/20  0359 08/17/20  0419    138   K 4.0 4.1    103   CO2 26 26   BUN 11 12   CREATININE 0.9 0.8   CALCIUM 9.4 8.9   ALBUMIN 1.8* 1.8*   PROT 7.1 7.1   BILITOT 0.2 0.2   ALKPHOS 115 111   ALT 73*  68*   AST 50* 39   MG 1.8 2.1   PHOS 4.9* 3.7       All pertinent labs within the past 24 hours have been reviewed.    Significant Imaging:  I have reviewed all pertinent imaging results/findings within the past 24 hours.      ABG  Recent Labs   Lab 08/17/20  0457   PH 7.446   PO2 81   PCO2 41.2   HCO3 28.4*   BE 4     Assessment/Plan:     Pulmonary  Acute hypoxemic respiratory failure  - see Pneumonia due to COVID 19    Cardiac/Vascular  Hyperlipidemia associated with type 2 diabetes mellitus  - Restart Crestor once extubated     Hypertension associated with diabetes  Continue to hold antihypertensives in setting of shock    Endocrine  Uncontrolled type 2 diabetes mellitus  - A1c 8.3%  - started on insulin gtt on 08/05 due to elevated BG but discontinued 08/07  - Levemir 25U BID  - Aspart 15U Q4H  - BG at goal of 140-180    GI  Elevated liver enzymes  - Hx of elevated LFTs dating back to 2015  - Acute hepatitis panel and HIV - negative  - Stable. Trend CMP daily    Other  * Pneumonia due to COVID-19 virus  70M  with HTN, HLD, and DM who presents to the [place] ED for SOB and found to be COVID positive.  Patient transferred to [facility] on 7/26/20 for higher level of care.     - Remdesivir and dexamethasone complete  - Ceftriaxone and azithromycin complete  - Blood and sputum cx showed no growth  - Therapeutic lovenox for hypercoagulable state in COVID  - Was previously paralyzed, proned, sedated. Nimbex discontinued on 08/04.  - Continue weaning down fentanyl, propofol, precedex.   - Added zyprexa on 08/07 to help wean sedation.  - Added prn versed for agitation and scheduled valium  - Levophed restarted on 8/14, discontinued that evening  - Continues to fail SBTs, but promising that only reason for failure 8/17 2/2 secretions. Will order mucolytics and attempt SBT tomorrow  -Discussed with pt's Wife and daughter, state they would like to pursue PEG/trach if unable to wean from vent.        Critical Care Daily  Checklist:    A: Awake: RASS Goal/Actual Goal: RASS Goal: -2-->light sedation  Actual: [place]ett Agitation Sedation Scale (RASS): Light sedation   B: Spontaneous Breathing Trial Performed? Spon. Breathing Trial Initiated?: Not initiated (08/17/20 0720)   C: SAT & SBT Coordinated?  yes                      D: Delirium: CAM-ICU Overall CAM-ICU: Negative   E: Early Mobility Performed? No   F: Feeding Goal: Goals: Meet % EEN, EPN by RD f/u date  Status: Nutrition Goal Status: goal met   Current Diet Order   Procedures    Diet NPO      AS: Analgesia/Sedation Weaning precedex and propofol   T: Thromboembolic Prophylaxis Enoxaparin 90mg BID   H: HOB > 300 Yes   U: Stress Ulcer Prophylaxis (if needed) famodidine   G: Glucose Control insulin   B: Bowel Function Stool Occurrence: 1   I: Indwelling Catheter (Lines & Boothe) Necessity R IJ CVC, will pull if off pressors x 24hours.    D: De-escalation of Antimicrobials/Pharmacotherapies n/a    Plan for the day/ETD Wean sedation, mucolytic, D/C art line    Code Status:  Family/Goals of Care: Full Code  Pursue PEG if indicated       Critical secondary to Patient has a condition that poses threat to life and bodily function: Severe Respiratory Distress      Critical care was time spent personally by me on the following activities: development of treatment plan with patient or surrogate and bedside caregivers, discussions with consultants, evaluation of patient's response to treatment, examination of patient, ordering and performing treatments and interventions, ordering and review of laboratory studies, ordering and review of radiographic studies, pulse oximetry, re-evaluation of patient's condition. This critical care time did not overlap with that of any other provider or involve time for any procedures.     Wallace Pinon, DO  Critical Care Medicine  [facility] - ICU 16 WT

## 2020-08-17 NOTE — PROGRESS NOTES
Propofol shut off per MD. Pt continuously coughing with copious white thick secretions. Suction multiple times. SBP greater than 170 sustained do to coughing.

## 2020-08-17 NOTE — PROGRESS NOTES
Pt sedation paused per MD. Pt tolerated being off sedation other than copious secretions and constant coughing. SBP stable and SATs stable. Pt followed commands. Sedation restarted.

## 2020-08-17 NOTE — ASSESSMENT & PLAN NOTE
70M  with HTN, HLD, and DM who presents to the [facility] ED for SOB and found to be COVID positive.  Patient transferred to [facility] on 7/26/20 for higher level of care.     - Remdesivir and dexamethasone complete  - Ceftriaxone and azithromycin complete  - Blood and sputum cx showed no growth  - Therapeutic lovenox for hypercoagulable state in COVID  - Was previously paralyzed, proned, sedated. Nimbex discontinued on 08/04.  - Continue weaning down fentanyl, propofol, precedex.   - Added zyprexa on 08/07 to help wean sedation.  - Added prn versed for agitation and scheduled valium  - Levophed restarted on 8/14, discontinued that evening  - Continues to fail SBTs, but promising that only reason for failure 8/17 2/2 secretions. Will order mucolytics and attempt SBT tomorrow  -Discussed with pt's Wife and daughter, state they would like to pursue PEG/trach if unable to wean from vent.

## 2020-08-17 NOTE — CONSULTS
Wound care consult received.  Pt with history of HTN, HLD, and DM admitted with pneumonia due to COVID-19 virus.  Upon assessment, noted moist skin with mild maceration to gluteal cleft. Dry upper lip with scabs intact.  Nursing has pressure injury prevention interventions in place, including waffle overlay.  Spoke with nurse and discussed recommendations for Clear moisture barrier ointment to gluteal cleft and Sween moisturizer to upper lip.   Wound care team to follow pt prn  [phone]

## 2020-08-17 NOTE — SUBJECTIVE & OBJECTIVE
Interval History/Significant Events:   No acute events overnight. Off pressors since 2100. Alert and follows commands during sbt, but fails 2/2 high volume of secretions.     Review of Systems   Unable to perform ROS: Intubated     Objective:     Vital Signs (Most Recent):  Temp: 98.3 °F (36.8 °C) (08/17/20 1500)  Pulse: 89 (08/17/20 1530)  Resp: (!) 24 (08/17/20 1530)  BP: 119/64 (08/17/20 1530)  SpO2: 100 % (08/17/20 1530) Vital Signs (24h Range):  Temp:  [98 °F (36.7 °C)-98.3 °F (36.8 °C)] 98.3 °F (36.8 °C)  Pulse:  [61-91] 89  Resp:  [23-62] 24  SpO2:  [91 %-100 %] 100 %  BP: ()/(53-72) 119/64  Arterial Line BP: ()/(45-65) 126/57   Weight: 112.5 kg (248 lb 0.3 oz)  Body mass index is 36.63 kg/m².      Intake/Output Summary (Last 24 hours) at 8/17/2020 1613  Last data filed at 8/17/2020 1554  Gross per 24 hour   Intake 3193.32 ml   Output 2850 ml   Net 343.32 ml       Physical Exam  Vitals signs and nursing note reviewed.   Constitutional:       Appearance: He is obese.   HENT:      Head: Normocephalic and atraumatic.      Mouth/Throat:      Comments: Intubated  Eyes:      General:         Right eye: No discharge.         Left eye: No discharge.   Cardiovascular:      Rate and Rhythm: Normal rate and regular rhythm.   Pulmonary:      Effort: No respiratory distress.      Comments: Intubated.  Abdominal:      Palpations: Abdomen is soft.      Tenderness: There is no abdominal tenderness.   Musculoskeletal:         General: No swelling.   Skin:     General: Skin is warm and dry.   Neurological:      Comments: sedated         Vents:  Vent Mode: A/C (08/17/20 1448)  Ventilator Initiated: Yes(chart correction) (07/26/20 2108)  Set Rate: 16 BPM (08/17/20 1448)  Vt Set: 440 mL (08/17/20 1448)  Pressure Support: 0 cmH20 (08/17/20 1448)  PEEP/CPAP: 5 cmH20 (08/17/20 1448)  Oxygen Concentration (%): 40 (08/17/20 1530)  Peak Airway Pressure: 43 cmH2O (08/17/20 1448)  Plateau Pressure: 28 cmH20 (08/17/20  1448)  Total Ve: 10.9 mL (08/17/20 1448)  F/VT Ratio<105 (RSBI): (!) 64.9 (08/17/20 1448)  Lines/Drains/Airways     Central Venous Catheter Line            Percutaneous Central Line Insertion/Assessment - Triple Lumen  07/29/20 1729 right internal jugular 18 days          Drain                 NG/OG Tube 07/26/20 1756 [place] sump;orogastric 18 Fr. Center mouth 21 days         Urethral Catheter 08/17/20 1553 less than 1 day          Airway                 Airway - Non-Surgical 07/26/20 1724 Endotracheal Tube 21 days          Arterial Line                 Arterial Line 07/29/20 1700 Right Radial 18 days          Peripheral Intravenous Line                 Peripheral IV - Single Lumen 08/15/20 1418 20 G Left Hand 2 days              Significant Labs:    CBC/Anemia Profile:  Recent Labs   Lab 08/16/20  0359 08/17/20  0419   WBC 8.25 7.48   HGB 10.5* 10.6*   HCT 34.6* 34.5*    317   MCV 89 89   RDW 14.6* 14.6*        Chemistries:  Recent Labs   Lab 08/16/20  0359 08/17/20  0419    138   K 4.0 4.1    103   CO2 26 26   BUN 11 12   CREATININE 0.9 0.8   CALCIUM 9.4 8.9   ALBUMIN 1.8* 1.8*   PROT 7.1 7.1   BILITOT 0.2 0.2   ALKPHOS 115 111   ALT 73* 68*   AST 50* 39   MG 1.8 2.1   PHOS 4.9* 3.7       All pertinent labs within the past 24 hours have been reviewed.    Significant Imaging:  I have reviewed all pertinent imaging results/findings within the past 24 hours.

## 2020-08-18 LAB
ALBUMIN SERPL BCP-MCNC: 1.9 G/DL (ref 3.5–5.2)
ALLENS TEST: ABNORMAL
ALP SERPL-CCNC: 108 U/L (ref 55–135)
ALT SERPL W/O P-5'-P-CCNC: 64 U/L (ref 10–44)
ANION GAP SERPL CALC-SCNC: 9 MMOL/L (ref 8–16)
AST SERPL-CCNC: 52 U/L (ref 10–40)
BASOPHILS # BLD AUTO: 0.03 K/UL (ref 0–0.2)
BASOPHILS NFR BLD: 0.4 % (ref 0–1.9)
BILIRUB SERPL-MCNC: 0.3 MG/DL (ref 0.1–1)
BUN SERPL-MCNC: 18 MG/DL (ref 8–23)
CALCIUM SERPL-MCNC: 9.2 MG/DL (ref 8.7–10.5)
CHLORIDE SERPL-SCNC: 98 MMOL/L (ref 95–110)
CO2 SERPL-SCNC: 30 MMOL/L (ref 23–29)
CREAT SERPL-MCNC: 1 MG/DL (ref 0.5–1.4)
DELSYS: ABNORMAL
DIFFERENTIAL METHOD: ABNORMAL
EOSINOPHIL # BLD AUTO: 1.3 K/UL (ref 0–0.5)
EOSINOPHIL NFR BLD: 15.7 % (ref 0–8)
ERYTHROCYTE [DISTWIDTH] IN BLOOD BY AUTOMATED COUNT: 14.7 % (ref 11.5–14.5)
ERYTHROCYTE [SEDIMENTATION RATE] IN BLOOD BY WESTERGREN METHOD: 16 MM/H
EST. GFR  (AFRICAN AMERICAN): >60 ML/MIN/1.73 M^2
EST. GFR  (NON AFRICAN AMERICAN): >60 ML/MIN/1.73 M^2
FIO2: 40
GLUCOSE SERPL-MCNC: 172 MG/DL (ref 70–110)
HCO3 UR-SCNC: 28 MMOL/L (ref 24–28)
HCT VFR BLD AUTO: 36.5 % (ref 40–54)
HGB BLD-MCNC: 10.9 G/DL (ref 14–18)
IMM GRANULOCYTES # BLD AUTO: 0.09 K/UL (ref 0–0.04)
IMM GRANULOCYTES NFR BLD AUTO: 1.1 % (ref 0–0.5)
LYMPHOCYTES # BLD AUTO: 2 K/UL (ref 1–4.8)
LYMPHOCYTES NFR BLD: 24.3 % (ref 18–48)
MAGNESIUM SERPL-MCNC: 2.3 MG/DL (ref 1.6–2.6)
MCH RBC QN AUTO: 26.9 PG (ref 27–31)
MCHC RBC AUTO-ENTMCNC: 29.9 G/DL (ref 32–36)
MCV RBC AUTO: 90 FL (ref 82–98)
MODE: ABNORMAL
MONOCYTES # BLD AUTO: 1.7 K/UL (ref 0.3–1)
MONOCYTES NFR BLD: 20 % (ref 4–15)
NEUTROPHILS # BLD AUTO: 3.2 K/UL (ref 1.8–7.7)
NEUTROPHILS NFR BLD: 38.5 % (ref 38–73)
NRBC BLD-RTO: 0 /100 WBC
PCO2 BLDA: 38.6 MMHG (ref 35–45)
PEEP: 5
PH SMN: 7.47 [PH] (ref 7.35–7.45)
PHOSPHATE SERPL-MCNC: 4.1 MG/DL (ref 2.7–4.5)
PLATELET # BLD AUTO: 297 K/UL (ref 150–350)
PMV BLD AUTO: 11.2 FL (ref 9.2–12.9)
PO2 BLDA: 92 MMHG (ref 80–100)
POC BE: 4 MMOL/L
POC SATURATED O2: 98 % (ref 95–100)
POC TCO2: 29 MMOL/L (ref 23–27)
POCT GLUCOSE: 166 MG/DL (ref 70–110)
POCT GLUCOSE: 174 MG/DL (ref 70–110)
POCT GLUCOSE: 179 MG/DL (ref 70–110)
POCT GLUCOSE: 199 MG/DL (ref 70–110)
POCT GLUCOSE: 199 MG/DL (ref 70–110)
POCT GLUCOSE: 214 MG/DL (ref 70–110)
POCT GLUCOSE: 217 MG/DL (ref 70–110)
POTASSIUM SERPL-SCNC: 4.5 MMOL/L (ref 3.5–5.1)
PROT SERPL-MCNC: 7.3 G/DL (ref 6–8.4)
RBC # BLD AUTO: 4.05 M/UL (ref 4.6–6.2)
SAMPLE: ABNORMAL
SITE: ABNORMAL
SODIUM SERPL-SCNC: 137 MMOL/L (ref 136–145)
VT: 440
WBC # BLD AUTO: 8.24 K/UL (ref 3.9–12.7)

## 2020-08-18 PROCEDURE — 99900035 HC TECH TIME PER 15 MIN (STAT)

## 2020-08-18 PROCEDURE — 25000003 PHARM REV CODE 250: Performed by: INTERNAL MEDICINE

## 2020-08-18 PROCEDURE — 63600175 PHARM REV CODE 636 W HCPCS: Performed by: STUDENT IN AN ORGANIZED HEALTH CARE EDUCATION/TRAINING PROGRAM

## 2020-08-18 PROCEDURE — 25000003 PHARM REV CODE 250: Performed by: STUDENT IN AN ORGANIZED HEALTH CARE EDUCATION/TRAINING PROGRAM

## 2020-08-18 PROCEDURE — 25000242 PHARM REV CODE 250 ALT 637 W/ HCPCS: Performed by: STUDENT IN AN ORGANIZED HEALTH CARE EDUCATION/TRAINING PROGRAM

## 2020-08-18 PROCEDURE — 27000221 HC OXYGEN, UP TO 24 HOURS

## 2020-08-18 PROCEDURE — 20000000 HC ICU ROOM

## 2020-08-18 PROCEDURE — 85025 COMPLETE CBC W/AUTO DIFF WBC: CPT

## 2020-08-18 PROCEDURE — 94003 VENT MGMT INPAT SUBQ DAY: CPT

## 2020-08-18 PROCEDURE — 25000003 PHARM REV CODE 250: Performed by: PHYSICIAN ASSISTANT

## 2020-08-18 PROCEDURE — 99291 CRITICAL CARE FIRST HOUR: CPT | Mod: ,,, | Performed by: INTERNAL MEDICINE

## 2020-08-18 PROCEDURE — 37799 UNLISTED PX VASCULAR SURGERY: CPT

## 2020-08-18 PROCEDURE — 82803 BLOOD GASES ANY COMBINATION: CPT

## 2020-08-18 PROCEDURE — 94761 N-INVAS EAR/PLS OXIMETRY MLT: CPT

## 2020-08-18 PROCEDURE — 27200966 HC CLOSED SUCTION SYSTEM

## 2020-08-18 PROCEDURE — 63600175 PHARM REV CODE 636 W HCPCS: Performed by: INTERNAL MEDICINE

## 2020-08-18 PROCEDURE — 80053 COMPREHEN METABOLIC PANEL: CPT

## 2020-08-18 PROCEDURE — 94640 AIRWAY INHALATION TREATMENT: CPT

## 2020-08-18 PROCEDURE — 99291 PR CRITICAL CARE, E/M 30-74 MINUTES: ICD-10-PCS | Mod: ,,, | Performed by: INTERNAL MEDICINE

## 2020-08-18 PROCEDURE — 99900026 HC AIRWAY MAINTENANCE (STAT)

## 2020-08-18 PROCEDURE — 84100 ASSAY OF PHOSPHORUS: CPT

## 2020-08-18 PROCEDURE — 36600 WITHDRAWAL OF ARTERIAL BLOOD: CPT

## 2020-08-18 PROCEDURE — 83735 ASSAY OF MAGNESIUM: CPT

## 2020-08-18 RX ADMIN — DIAZEPAM 10 MG: 5 TABLET ORAL at 11:08

## 2020-08-18 RX ADMIN — INSULIN ASPART 15 UNITS: 100 INJECTION, SOLUTION INTRAVENOUS; SUBCUTANEOUS at 11:08

## 2020-08-18 RX ADMIN — GUAIFENESIN 200 MG: 200 SOLUTION ORAL at 05:08

## 2020-08-18 RX ADMIN — GUAIFENESIN 200 MG: 200 SOLUTION ORAL at 02:08

## 2020-08-18 RX ADMIN — POLYETHYLENE GLYCOL 3350 17 G: 17 POWDER, FOR SOLUTION ORAL at 08:08

## 2020-08-18 RX ADMIN — INSULIN ASPART 15 UNITS: 100 INJECTION, SOLUTION INTRAVENOUS; SUBCUTANEOUS at 08:08

## 2020-08-18 RX ADMIN — FAMOTIDINE 20 MG: 20 TABLET ORAL at 08:08

## 2020-08-18 RX ADMIN — ENOXAPARIN SODIUM 90 MG: 100 INJECTION SUBCUTANEOUS at 08:08

## 2020-08-18 RX ADMIN — INSULIN ASPART 15 UNITS: 100 INJECTION, SOLUTION INTRAVENOUS; SUBCUTANEOUS at 04:08

## 2020-08-18 RX ADMIN — INSULIN DETEMIR 25 UNITS: 100 INJECTION, SOLUTION SUBCUTANEOUS at 08:08

## 2020-08-18 RX ADMIN — OXYCODONE HYDROCHLORIDE 5 MG: 5 TABLET ORAL at 11:08

## 2020-08-18 RX ADMIN — INSULIN ASPART 15 UNITS: 100 INJECTION, SOLUTION INTRAVENOUS; SUBCUTANEOUS at 03:08

## 2020-08-18 RX ADMIN — ROSUVASTATIN CALCIUM 10 MG: 10 TABLET, FILM COATED ORAL at 08:08

## 2020-08-18 RX ADMIN — DEXMEDETOMIDINE HYDROCHLORIDE 0.5 MCG/KG/HR: 100 INJECTION, SOLUTION, CONCENTRATE INTRAVENOUS at 08:08

## 2020-08-18 RX ADMIN — PROPOFOL 50 MCG/KG/MIN: 10 INJECTION, EMULSION INTRAVENOUS at 02:08

## 2020-08-18 RX ADMIN — GUAIFENESIN 200 MG: 200 SOLUTION ORAL at 10:08

## 2020-08-18 RX ADMIN — FUROSEMIDE 40 MG: 10 INJECTION, SOLUTION INTRAMUSCULAR; INTRAVENOUS at 08:08

## 2020-08-18 RX ADMIN — OXYCODONE HYDROCHLORIDE 5 MG: 5 TABLET ORAL at 05:08

## 2020-08-18 RX ADMIN — PROPOFOL 40 MCG/KG/MIN: 10 INJECTION, EMULSION INTRAVENOUS at 06:08

## 2020-08-18 RX ADMIN — ALBUTEROL SULFATE 2.5 MG: 2.5 SOLUTION RESPIRATORY (INHALATION) at 08:08

## 2020-08-18 RX ADMIN — OLANZAPINE 5 MG: 2.5 TABLET, FILM COATED ORAL at 08:08

## 2020-08-18 RX ADMIN — ALBUTEROL SULFATE 2.5 MG: 2.5 SOLUTION RESPIRATORY (INHALATION) at 12:08

## 2020-08-18 RX ADMIN — INSULIN ASPART 2 UNITS: 100 INJECTION, SOLUTION INTRAVENOUS; SUBCUTANEOUS at 04:08

## 2020-08-18 RX ADMIN — DIAZEPAM 10 MG: 5 TABLET ORAL at 05:08

## 2020-08-18 RX ADMIN — INSULIN ASPART 15 UNITS: 100 INJECTION, SOLUTION INTRAVENOUS; SUBCUTANEOUS at 09:08

## 2020-08-18 RX ADMIN — INSULIN DETEMIR 25 UNITS: 100 INJECTION, SOLUTION SUBCUTANEOUS at 09:08

## 2020-08-18 RX ADMIN — ALBUTEROL SULFATE 2.5 MG: 2.5 SOLUTION RESPIRATORY (INHALATION) at 07:08

## 2020-08-18 RX ADMIN — GUAIFENESIN 200 MG: 200 SOLUTION ORAL at 11:08

## 2020-08-18 RX ADMIN — MIDAZOLAM 4 MG: 5 INJECTION INTRAMUSCULAR; INTRAVENOUS at 08:08

## 2020-08-18 RX ADMIN — ACETYLCYSTEINE 4 ML: 100 SOLUTION ORAL; RESPIRATORY (INHALATION) at 08:08

## 2020-08-18 RX ADMIN — DEXMEDETOMIDINE HYDROCHLORIDE 0.2 MCG/KG/HR: 100 INJECTION, SOLUTION, CONCENTRATE INTRAVENOUS at 08:08

## 2020-08-18 RX ADMIN — GUAIFENESIN 200 MG: 200 SOLUTION ORAL at 08:08

## 2020-08-18 RX ADMIN — ACETAMINOPHEN 650 MG: 160 SOLUTION ORAL at 07:08

## 2020-08-18 RX ADMIN — STANDARDIZED SENNA CONCENTRATE AND DOCUSATE SODIUM 1 TABLET: 8.6; 5 TABLET ORAL at 08:08

## 2020-08-18 RX ADMIN — ACETYLCYSTEINE 4 ML: 100 SOLUTION ORAL; RESPIRATORY (INHALATION) at 07:08

## 2020-08-18 RX ADMIN — ACETYLCYSTEINE 4 ML: 100 SOLUTION ORAL; RESPIRATORY (INHALATION) at 12:08

## 2020-08-18 RX ADMIN — INSULIN ASPART 4 UNITS: 100 INJECTION, SOLUTION INTRAVENOUS; SUBCUTANEOUS at 11:08

## 2020-08-18 RX ADMIN — INSULIN ASPART 2 UNITS: 100 INJECTION, SOLUTION INTRAVENOUS; SUBCUTANEOUS at 08:08

## 2020-08-18 NOTE — PROGRESS NOTES
[facility] - ICU 16   Critical Care Medicine  Progress Note    Patient Name: Anup Miller  MRN: [id number]  Admission Date: 7/26/2020  Hospital Length of Stay: 23 days  Code Status: Full Code  Attending Provider: Chantell Davis MD  Primary Care Provider: Bryce Gonzales MD   Principal Problem: Pneumonia due to COVID-19 virus    Subjective:     HPI:  Anup Miller is a 70 y.o. male patient with a PMHx of HTN, HLD, and DM who presents to the [place] [facility] for evaluation of SOB which  1 week ago. Pt became more SOB and has an O2 sat of low 70s upon arrival on RA. Symptoms are worsening and moderate in severity. No mitigating or exacerbating factors reported. Associated sxs include cough and fever. Patient denies any congestion, sore throat, CP, abd pain, N/V, back pain, HA, weakness, and all other sxs at this time. No prior Tx reported. No further complaints or concerns at this time. Patient was placed on Bipap for a few hours and subsequently intubated. COVID positive. Was started on IV dexamethasone, Ceftriaxone, and Azithromycin. Patient transferred to [facility] [place] on evening of 7/26/20 for higher level of care.     Hospital/ICU Course:  As of 7/29, the patient's oxygenation continued to worsen with P:F <150. R IJ and Arterial Line placed. Pt sedated, paralyzed, and proned at 9:30pm with improved oxygenation. Repeat AM gas on 7/30 Arterial Blood Gas result:  pO2 112; pCO2 58.9; pH 7.256;  HCO3 26.2, %O2 Sat 97%. FiO2 60, 8 PEEP. Pt supinated at 4:10 pm on 7/30 with Arterial Blood Gas result:  pO2 106; pCO2 52.2; pH 7.349;  HCO3 28.7, %O2 Sat 106. (P:F 212). FiO2 50, 10 PEEP.    As of 7/31, paralytic d/c'ed and sedation slowly weaned. Propofol d/c'ed on 8/1 2/2 triglycerides with ketamine and versed initiated. Now requiring higher vent settings due to dyssynchrony, will continue to increase sedation. Patient paralyzed again on 8/1 for vent dyssynchrony. Nimbex was discontinued on 08/03  and sedative medications were increased, however, patient struggled to pull enough tidal volume and was desatting even on maximum sedation so nimbex was restarted. Nimbex was stopped on 08/04 and restarted propofol. As patient became more agitated, ketamine was added on 08/06. Sedation was attempted to be weaned off by adding seroquel and zyprexa. Versed and ketamine were weaned off. Patient was weaned off of levo on morning of 08/09. Patient failed multiple SBTs and it was discussed with family of likely PEG/trach, pending their decision. Patient has continued agitation with coughing/ increased BPs requiring propofol, fentanyl, and precedex with scheduled valium. Attempted to get in touch with family to discuss peg/trach again, no answer on provided phone numbers.    Interval History/Significant Events:   Pt remains off pressors. Failed SBT this afternoon due to Rapid shallow breathing after 2 minutes.       Review of Systems   Unable to perform ROS: Intubated     Objective:     Vital Signs (Most Recent):  Temp: 99.8 °F (37.7 °C) (08/18/20 1500)  Pulse: 108 (08/18/20 1700)  Resp: (!) 28 (08/18/20 1700)  BP: (!) 163/75 (08/18/20 1700)  SpO2: 100 % (08/18/20 1700) Vital Signs (24h Range):  Temp:  [98.4 °F (36.9 °C)-100.4 °F (38 °C)] 99.8 °F (37.7 °C)  Pulse:  [] 108  Resp:  [16-84] 28  SpO2:  [92 %-100 %] 100 %  BP: ()/(57-88) 163/75  Arterial Line BP: (122-143)/(55-58) 127/56   Weight: 112.5 kg (248 lb 0.3 oz)  Body mass index is 36.63 kg/m².      Intake/Output Summary (Last 24 hours) at 8/18/2020 1711  Last data filed at 8/18/2020 1700  Gross per 24 hour   Intake 1689.96 ml   Output 1380 ml   Net 309.96 ml       Physical Exam  Vitals signs and nursing note reviewed.   Constitutional:       Appearance: He is obese.   HENT:      Head: Normocephalic and atraumatic.      Mouth/Throat:      Comments: Intubated  Eyes:      General:         Right eye: No discharge.         Left eye: No discharge.    Cardiovascular:      Rate and Rhythm: Normal rate and regular rhythm.   Pulmonary:      Effort: No respiratory distress.      Comments: Intubated.  Abdominal:      Palpations: Abdomen is soft.      Tenderness: There is no abdominal tenderness.   Musculoskeletal:         General: No swelling.   Skin:     General: Skin is warm and dry.   Neurological:      Comments: sedated         Vents:  Vent Mode: A/C (08/18/20 1246)  Ventilator Initiated: Yes(chart correction) (07/26/20 2108)  Set Rate: 16 BPM (08/18/20 1246)  Vt Set: 440 mL (08/18/20 1246)  Pressure Support: 0 cmH20 (08/18/20 1246)  PEEP/CPAP: 5 cmH20 (08/18/20 1246)  Oxygen Concentration (%): 30 (08/18/20 1700)  Peak Airway Pressure: 37 cmH2O (08/18/20 1246)  Plateau Pressure: 27 cmH20 (08/18/20 1246)  Total Ve: 12.6 mL (08/18/20 1246)  F/VT Ratio<105 (RSBI): (!) 89.13 (08/18/20 1246)  Lines/Drains/Airways     Central Venous Catheter Line            Percutaneous Central Line Insertion/Assessment - Triple Lumen  07/29/20 1729 right internal jugular 19 days          Drain                 NG/OG Tube 07/26/20 1756 Swisher sump;orogastric 18 Fr. Center mouth 22 days         Urethral Catheter 08/17/20 1553 1 day          Airway                 Airway - Non-Surgical 07/26/20 1724 Endotracheal Tube 22 days          Peripheral Intravenous Line                 Peripheral IV - Single Lumen 08/15/20 1418 20 G Left Hand 3 days              Significant Labs:    CBC/Anemia Profile:  Recent Labs   Lab 08/17/20  0419 08/18/20  0318   WBC 7.48 8.24   HGB 10.6* 10.9*   HCT 34.5* 36.5*    297   MCV 89 90   RDW 14.6* 14.7*        Chemistries:  Recent Labs   Lab 08/17/20 0419 08/18/20  0318    137   K 4.1 4.5    98   CO2 26 30*   BUN 12 18   CREATININE 0.8 1.0   CALCIUM 8.9 9.2   ALBUMIN 1.8* 1.9*   PROT 7.1 7.3   BILITOT 0.2 0.3   ALKPHOS 111 108   ALT 68* 64*   AST 39 52*   MG 2.1 2.3   PHOS 3.7 4.1       All pertinent labs within the past 24 hours have been  reviewed.    Significant Imaging:  I have reviewed all pertinent imaging results/findings within the past 24 hours.      ABG  Recent Labs   Lab 08/18/20  0621   PH 7.469*   PO2 92   PCO2 38.6   HCO3 28.0   BE 4     Assessment/Plan:     Pulmonary  Acute hypoxemic respiratory failure  - see Pneumonia due to COVID 19    Cardiac/Vascular  Hyperlipidemia associated with type 2 diabetes mellitus  - Restart Crestor once extubated     Hypertension associated with diabetes  Continue to hold antihypertensives in setting of shock    Endocrine  Uncontrolled type 2 diabetes mellitus  - A1c 8.3%  - started on insulin gtt on 08/05 due to elevated BG but discontinued 08/07  - Levemir 25U BID  - Aspart 15U Q4H  - BG at goal of 140-180    GI  Elevated liver enzymes  - Hx of elevated LFTs dating back to 2015  - Acute hepatitis panel and HIV - negative  - Stable. Trend CMP daily    Other  * Pneumonia due to COVID-19 virus  70M  with HTN, HLD, and DM who presents to the [place] ED for SOB and found to be COVID positive.  Patient transferred to [facility] [place] on [place]/26/20 for higher level of care.     - Remdesivir and dexamethasone complete  - Ceftriaxone and azithromycin complete  - Blood and sputum cx showed no growth  - Therapeutic lovenox for hypercoagulable state in COVID  - Was previously paralyzed, proned, sedated. Nimbex discontinued on 08/04.  - Continue weaning down fentanyl, propofol, precedex.   - Added zyprexa on 08/07 to help wean sedation.  - Added prn versed for agitation and scheduled valium  - Levophed restarted on 8/14, discontinued that evening  -Discussed with pt's Wife and daughter, state they would like to pursue PEG/trach if unable to wean from vent.   - Continues to fail SBTs, general surgery consulted for trach/peg placement.        Critical Care Daily Checklist:    A: Awake: RASS Goal/Actual Goal: RASS Goal: 0-->alert and calm  Actual: Hackett Agitation Sedation Scale (RASS): Light sedation   B: Spontaneous Breathing  Trial Performed? Spon. Breathing Trial Initiated?: Not initiated(patient being weined off of sedation) (08/18/20 0801)   C: SAT & SBT Coordinated?  yes                      D: Delirium: CAM-ICU Overall CAM-ICU: Negative   E: Early Mobility Performed? Yes   F: Feeding Goal: Goals: Meet % EEN, EPN by RD f/u date  Status: Nutrition Goal Status: goal met   Current Diet Order   Procedures    Diet NPO      AS: Analgesia/Sedation Precedex/propafol, weaning   T: Thromboembolic Prophylaxis Enoxaparin 90mg q12   H: HOB > 300 Yes   U: Stress Ulcer Prophylaxis (if needed) famotidine   G: Glucose Control Basal Bolus insulin   B: Bowel Function Stool Occurrence: 1   I: Indwelling Catheter (Lines & Boothe) Necessity Discontinue CVC   D: De-escalation of Antimicrobials/Pharmacotherapies n/a    Plan for the day/ETD Consult general surgery    Code Status:  Family/Goals of Care: Full Code         Critical secondary to Patient has a condition that poses threat to life and bodily function: Severe Respiratory Distress      Critical care was time spent personally by me on the following activities: development of treatment plan with patient or surrogate and bedside caregivers, discussions with consultants, evaluation of patient's response to treatment, examination of patient, ordering and performing treatments and interventions, ordering and review of laboratory studies, ordering and review of radiographic studies, pulse oximetry, re-evaluation of patient's condition. This critical care time did not overlap with that of any other provider or involve time for any procedures.     Wallace Pinon, DO  Critical Care Medicine  [facility] - ICU 16 WT

## 2020-08-18 NOTE — PLAN OF CARE
08/18/20 1618   Discharge Reassessment   Assessment Type Discharge Planning Reassessment   Do you have any problems affording any of your prescribed medications? TBD   Discharge Plan A Long-term acute care facility (LTAC)   Discharge Plan B Skilled Nursing Facility   DME Needed Upon Discharge  other (see comments)  (TBD)   Anticipated Discharge Disposition LTAC   Post-Acute Status   Post-Acute Authorization Placement   Post-Acute Placement Status Awaiting Internal Medical Clearance   Discharge Delays None known at this time       Carl Egan LMSW  [facility]  [phone] [phone]

## 2020-08-18 NOTE — PLAN OF CARE
Problem: Adult Inpatient Plan of Care  Goal: Plan of Care Review  Outcome: Ongoing, Progressing  Goal: Patient-Specific Goal (Individualization)  Outcome: Ongoing, Progressing  Goal: Absence of Hospital-Acquired Illness or Injury  Outcome: Ongoing, Progressing  Goal: Optimal Comfort and Wellbeing  Outcome: Ongoing, Progressing  Goal: Readiness for Transition of Care  Outcome: Ongoing, Progressing  Goal: Rounds/Family Conference  Outcome: Ongoing, Progressing     Problem: Diabetes Comorbidity  Goal: Blood Glucose Level Within Desired Range  Outcome: Ongoing, Progressing     Problem: Infection  Goal: Infection Symptom Resolution  Outcome: Ongoing, Progressing     Problem: Fall Injury Risk  Goal: Absence of Fall and Fall-Related Injury  Outcome: Ongoing, Progressing     Problem: Skin Injury Risk Increased  Goal: Skin Health and Integrity  Outcome: Ongoing, Progressing     Problem: Restraint, Nonbehavioral (Nonviolent)  Goal: Discontinuation Criteria Achieved  Outcome: Ongoing, Progressing  Goal: Personal Dignity and Safety Maintained  Outcome: Ongoing, Progressing     Problem: Communication Impairment (Mechanical Ventilation, Invasive)  Goal: Effective Communication  Outcome: Ongoing, Progressing     Problem: Device-Related Complication Risk (Mechanical Ventilation, Invasive)  Goal: Optimal Device Function  Outcome: Ongoing, Progressing     Problem: Inability to Wean (Mechanical Ventilation, Invasive)  Goal: Mechanical Ventilation Liberation  Outcome: Ongoing, Progressing     Problem: Nutrition Impairment (Mechanical Ventilation, Invasive)  Goal: Optimal Nutrition Delivery  Outcome: Ongoing, Progressing     Problem: Skin and Tissue Injury (Mechanical Ventilation, Invasive)  Goal: Absence of Device-Related Skin and Tissue Injury  Outcome: Ongoing, Progressing     Problem: Ventilator-Induced Lung Injury (Mechanical Ventilation, Invasive)  Goal: Absence of Ventilator-Induced Lung Injury  Outcome: Ongoing, Progressing      Problem: Communication Impairment (Artificial Airway)  Goal: Effective Communication  Outcome: Ongoing, Progressing     Problem: Device-Related Complication Risk (Artificial Airway)  Goal: Optimal Device Function  Outcome: Ongoing, Progressing     Problem: Skin and Tissue Injury (Artificial Airway)  Goal: Absence of Device-Related Skin or Tissue Injury  Outcome: Ongoing, Progressing     Problem: Noninvasive Ventilation Acute  Goal: Effective Unassisted Ventilation and Oxygenation  Outcome: Ongoing, Progressing     Problem: Aspiration (Enteral Nutrition)  Goal: Absence of Aspiration Signs/Symptoms  Outcome: Ongoing, Progressing     Problem: Wound  Goal: Optimal Wound Healing  Outcome: Ongoing, Progressing       Mr. Miller did generally well overnight. Patient is well sedated at Precedex 0.5 mcg and Propofol of 50mcg. Patient does not locoalize to pain or follows commands, Pupils are 2mm round brisk and reactive to light, gag reflex is intact. Minor fever at start of shift of 100.4, gave tylenol once.    A/C mode on ventilator of rate of 16, Tidal volume 440, FiO2 40% and PEEP of 5, tolerating well in oxygen saturations of 100%. Respiratory rate has averaged 23-27 breaths per min. Thick small secreations from mouth and inline suction. Sputum cultured onbtained.    No issues with Heart rate 90's and B/P WDLs.    OG feeds tolerating well with less than 3ml of residuals. Accu checks between 190-220 scheduled coverage in place.    Shyann has less urine output, will continue to monitor and assess.     Mrs. Miller updated at the beginning of night shift patient status.

## 2020-08-18 NOTE — ASSESSMENT & PLAN NOTE
70M  with HTN, HLD, and DM who presents to the [place] ED for SOB and found to be COVID positive.  Patient transferred to [facility] [place] on 7/26/20 for higher level of care.     - Remdesivir and dexamethasone complete  - Ceftriaxone and azithromycin complete  - Blood and sputum cx showed no growth  - Therapeutic lovenox for hypercoagulable state in COVID  - Was previously paralyzed, proned, sedated. Nimbex discontinued on 08/04.  - Continue weaning down fentanyl, propofol, precedex.   - Added zyprexa on 08/07 to help wean sedation.  - Added prn versed for agitation and scheduled valium  - Levophed restarted on 8/14, discontinued that evening  -Discussed with pt's Wife and daughter, state they would like to pursue PEG/trach if unable to wean from vent.   - Continues to fail SBTs, general surgery consulted for trach/peg placement.

## 2020-08-18 NOTE — SUBJECTIVE & OBJECTIVE
Interval History/Significant Events:   Pt remains off pressors. Failed SBT this afternoon due to Rapid shallow breathing after 2 minutes.       Review of Systems   Unable to perform ROS: Intubated     Objective:     Vital Signs (Most Recent):  Temp: 99.8 °F (37.7 °C) (08/18/20 1500)  Pulse: 108 (08/18/20 1700)  Resp: (!) 28 (08/18/20 1700)  BP: (!) 163/75 (08/18/20 1700)  SpO2: 100 % (08/18/20 1700) Vital Signs (24h Range):  Temp:  [98.4 °F (36.9 °C)-100.4 °F (38 °C)] 99.8 °F (37.7 °C)  Pulse:  [] 108  Resp:  [16-84] 28  SpO2:  [92 %-100 %] 100 %  BP: ()/(57-88) 163/75  Arterial Line BP: (122-143)/(55-58) 127/56   Weight: 112.5 kg (248 lb 0.3 oz)  Body mass index is 36.63 kg/m².      Intake/Output Summary (Last 24 hours) at 8/18/2020 1711  Last data filed at 8/18/2020 1700  Gross per 24 hour   Intake 1689.96 ml   Output 1380 ml   Net 309.96 ml       Physical Exam  Vitals signs and nursing note reviewed.   Constitutional:       Appearance: He is obese.   HENT:      Head: Normocephalic and atraumatic.      Mouth/Throat:      Comments: Intubated  Eyes:      General:         Right eye: No discharge.         Left eye: No discharge.   Cardiovascular:      Rate and Rhythm: Normal rate and regular rhythm.   Pulmonary:      Effort: No respiratory distress.      Comments: Intubated.  Abdominal:      Palpations: Abdomen is soft.      Tenderness: There is no abdominal tenderness.   Musculoskeletal:         General: No swelling.   Skin:     General: Skin is warm and dry.   Neurological:      Comments: sedated         Vents:  Vent Mode: A/C (08/18/20 1246)  Ventilator Initiated: Yes(chart correction) (07/26/20 2108)  Set Rate: 16 BPM (08/18/20 1246)  Vt Set: 440 mL (08/18/20 1246)  Pressure Support: 0 cmH20 (08/18/20 1246)  PEEP/CPAP: 5 cmH20 (08/18/20 1246)  Oxygen Concentration (%): 30 (08/18/20 1700)  Peak Airway Pressure: 37 cmH2O (08/18/20 1246)  Plateau Pressure: 27 cmH20 (08/18/20 1246)  Total Ve: 12.6 mL  (08/18/20 1246)  F/VT Ratio<105 (RSBI): (!) 89.13 (08/18/20 1246)  Lines/Drains/Airways     Central Venous Catheter Line            Percutaneous Central Line Insertion/Assessment - Triple Lumen  07/29/20 1729 right internal jugular 19 days          Drain                 NG/OG Tube 07/26/20 1756 Arthur sump;orogastric 18 Fr. Center mouth 22 days         Urethral Catheter 08/17/20 1553 1 day          Airway                 Airway - Non-Surgical 07/26/20 1724 Endotracheal Tube 22 days          Peripheral Intravenous Line                 Peripheral IV - Single Lumen 08/15/20 1418 20 G Left Hand 3 days              Significant Labs:    CBC/Anemia Profile:  Recent Labs   Lab 08/17/20  0419 08/18/20  0318   WBC 7.48 8.24   HGB 10.6* 10.9*   HCT 34.5* 36.5*    297   MCV 89 90   RDW 14.6* 14.7*        Chemistries:  Recent Labs   Lab 08/17/20 0419 08/18/20  0318    137   K 4.1 4.5    98   CO2 26 30*   BUN 12 18   CREATININE 0.8 1.0   CALCIUM 8.9 9.2   ALBUMIN 1.8* 1.9*   PROT 7.1 7.3   BILITOT 0.2 0.3   ALKPHOS 111 108   ALT 68* 64*   AST 39 52*   MG 2.1 2.3   PHOS 3.7 4.1       All pertinent labs within the past 24 hours have been reviewed.    Significant Imaging:  I have reviewed all pertinent imaging results/findings within the past 24 hours.

## 2020-08-18 NOTE — PHYSICIAN QUERY
PT Name: Anup Miller  MR #: [id number]    CAUSE AND EFFECT RELATIONSHIP CLARIFICATION     CDS: Aurelio Verdugo RN CCDS               Contact information: [email or web address]     This form is a permanent document in the medical record.     Query Date: August 18, 2020    By submitting this query, we are merely seeking further clarification of documentation. Please utilize your independent clinical judgment when addressing the question(s) below.    Supporting Clinical Findings   Location in Medical Record   hold antihypertensives in setting of shock    COVID 19 Pneumonia         7/27/2020 H&P Adam Felipe MD/Jong Morrell MD   Sepsis due to COVID-19                                                                          7/30/2020 Physician query Marina Chandler MD   Acute hypoxemic respiratory failure/ARDS secondary to COVID-19 infection. 8/17/2020 Critical Care progress notes Wallace Pinon DO/ Chantell Davis MD   BP 81/56 (MAP 64)  BP 74/47 (MAP 59) 7/27/2020 Vitals   8/3/2020 Vitals    lactated ringers bolus 1,000 mL  norepinephrine 4 mg infusion (titrating)  norepinephrine 4 mg infusion (titrating)  norepinephrine 4 mg infusion (titrating) 7/26/2020 Medication  8/3-8/4/2020 Medication  8/5-8/11/2020 Medication  8/14-8/17/2020 Medication         Provider, please clarify if there is any clinical correlation between Shock and Sepsis.           Are the conditions:      [  x ] Due to or associated with each other   [   ] Unrelated to each other   [   ] Other explanation (Please Specify): ______________   [  ] Clinically Undetermined                                                                               Please document in your progress notes daily for the duration of treatment until resolved and include in your discharge summary.

## 2020-08-19 LAB
ALBUMIN SERPL BCP-MCNC: 2 G/DL (ref 3.5–5.2)
ALLENS TEST: ABNORMAL
ALP SERPL-CCNC: 119 U/L (ref 55–135)
ALT SERPL W/O P-5'-P-CCNC: 66 U/L (ref 10–44)
ANION GAP SERPL CALC-SCNC: 11 MMOL/L (ref 8–16)
AST SERPL-CCNC: 49 U/L (ref 10–40)
BACTERIA SPEC AEROBE CULT: NORMAL
BACTERIA SPEC AEROBE CULT: NORMAL
BASOPHILS # BLD AUTO: 0.03 K/UL (ref 0–0.2)
BASOPHILS NFR BLD: 0.3 % (ref 0–1.9)
BILIRUB SERPL-MCNC: 0.3 MG/DL (ref 0.1–1)
BUN SERPL-MCNC: 20 MG/DL (ref 8–23)
CALCIUM SERPL-MCNC: 9.8 MG/DL (ref 8.7–10.5)
CHLORIDE SERPL-SCNC: 100 MMOL/L (ref 95–110)
CO2 SERPL-SCNC: 29 MMOL/L (ref 23–29)
CREAT SERPL-MCNC: 1 MG/DL (ref 0.5–1.4)
DELSYS: ABNORMAL
DIFFERENTIAL METHOD: ABNORMAL
EOSINOPHIL # BLD AUTO: 1.3 K/UL (ref 0–0.5)
EOSINOPHIL NFR BLD: 13.8 % (ref 0–8)
ERYTHROCYTE [DISTWIDTH] IN BLOOD BY AUTOMATED COUNT: 14.5 % (ref 11.5–14.5)
ERYTHROCYTE [SEDIMENTATION RATE] IN BLOOD BY WESTERGREN METHOD: 16 MM/H
EST. GFR  (AFRICAN AMERICAN): >60 ML/MIN/1.73 M^2
EST. GFR  (NON AFRICAN AMERICAN): >60 ML/MIN/1.73 M^2
FIO2: 30
GLUCOSE SERPL-MCNC: 124 MG/DL (ref 70–110)
GRAM STN SPEC: NORMAL
HCO3 UR-SCNC: 34.6 MMOL/L (ref 24–28)
HCT VFR BLD AUTO: 36 % (ref 40–54)
HGB BLD-MCNC: 10.9 G/DL (ref 14–18)
IMM GRANULOCYTES # BLD AUTO: 0.08 K/UL (ref 0–0.04)
IMM GRANULOCYTES NFR BLD AUTO: 0.8 % (ref 0–0.5)
LYMPHOCYTES # BLD AUTO: 2 K/UL (ref 1–4.8)
LYMPHOCYTES NFR BLD: 20.8 % (ref 18–48)
MAGNESIUM SERPL-MCNC: 2.2 MG/DL (ref 1.6–2.6)
MCH RBC QN AUTO: 26.8 PG (ref 27–31)
MCHC RBC AUTO-ENTMCNC: 30.3 G/DL (ref 32–36)
MCV RBC AUTO: 89 FL (ref 82–98)
MODE: ABNORMAL
MONOCYTES # BLD AUTO: 1.9 K/UL (ref 0.3–1)
MONOCYTES NFR BLD: 20.2 % (ref 4–15)
NEUTROPHILS # BLD AUTO: 4.2 K/UL (ref 1.8–7.7)
NEUTROPHILS NFR BLD: 44.1 % (ref 38–73)
NRBC BLD-RTO: 0 /100 WBC
PCO2 BLDA: 39 MMHG (ref 35–45)
PEEP: 5
PH SMN: 7.56 [PH] (ref 7.35–7.45)
PHOSPHATE SERPL-MCNC: 4.4 MG/DL (ref 2.7–4.5)
PLATELET # BLD AUTO: 364 K/UL (ref 150–350)
PMV BLD AUTO: 10.5 FL (ref 9.2–12.9)
PO2 BLDA: 86 MMHG (ref 80–100)
POC BE: 12 MMOL/L
POC SATURATED O2: 98 % (ref 95–100)
POC TCO2: 36 MMOL/L (ref 23–27)
POCT GLUCOSE: 143 MG/DL (ref 70–110)
POCT GLUCOSE: 145 MG/DL (ref 70–110)
POCT GLUCOSE: 154 MG/DL (ref 70–110)
POCT GLUCOSE: 176 MG/DL (ref 70–110)
POCT GLUCOSE: 179 MG/DL (ref 70–110)
POCT GLUCOSE: 205 MG/DL (ref 70–110)
POTASSIUM SERPL-SCNC: 3.9 MMOL/L (ref 3.5–5.1)
PROT SERPL-MCNC: 7.6 G/DL (ref 6–8.4)
RBC # BLD AUTO: 4.06 M/UL (ref 4.6–6.2)
SAMPLE: ABNORMAL
SITE: ABNORMAL
SODIUM SERPL-SCNC: 140 MMOL/L (ref 136–145)
VT: 440
WBC # BLD AUTO: 9.54 K/UL (ref 3.9–12.7)

## 2020-08-19 PROCEDURE — 80053 COMPREHEN METABOLIC PANEL: CPT

## 2020-08-19 PROCEDURE — 25000003 PHARM REV CODE 250: Performed by: INTERNAL MEDICINE

## 2020-08-19 PROCEDURE — 99223 1ST HOSP IP/OBS HIGH 75: CPT | Mod: ,,, | Performed by: SURGERY

## 2020-08-19 PROCEDURE — 63600175 PHARM REV CODE 636 W HCPCS: Performed by: STUDENT IN AN ORGANIZED HEALTH CARE EDUCATION/TRAINING PROGRAM

## 2020-08-19 PROCEDURE — 99223 PR INITIAL HOSPITAL CARE,LEVL III: ICD-10-PCS | Mod: ,,, | Performed by: SURGERY

## 2020-08-19 PROCEDURE — 85025 COMPLETE CBC W/AUTO DIFF WBC: CPT

## 2020-08-19 PROCEDURE — 94761 N-INVAS EAR/PLS OXIMETRY MLT: CPT

## 2020-08-19 PROCEDURE — 25000242 PHARM REV CODE 250 ALT 637 W/ HCPCS: Performed by: STUDENT IN AN ORGANIZED HEALTH CARE EDUCATION/TRAINING PROGRAM

## 2020-08-19 PROCEDURE — 36600 WITHDRAWAL OF ARTERIAL BLOOD: CPT

## 2020-08-19 PROCEDURE — 99900026 HC AIRWAY MAINTENANCE (STAT)

## 2020-08-19 PROCEDURE — 25000003 PHARM REV CODE 250: Performed by: STUDENT IN AN ORGANIZED HEALTH CARE EDUCATION/TRAINING PROGRAM

## 2020-08-19 PROCEDURE — 82803 BLOOD GASES ANY COMBINATION: CPT

## 2020-08-19 PROCEDURE — 25000003 PHARM REV CODE 250: Performed by: PHYSICIAN ASSISTANT

## 2020-08-19 PROCEDURE — 84100 ASSAY OF PHOSPHORUS: CPT

## 2020-08-19 PROCEDURE — 94640 AIRWAY INHALATION TREATMENT: CPT

## 2020-08-19 PROCEDURE — 94003 VENT MGMT INPAT SUBQ DAY: CPT

## 2020-08-19 PROCEDURE — 27000221 HC OXYGEN, UP TO 24 HOURS

## 2020-08-19 PROCEDURE — C9399 UNCLASSIFIED DRUGS OR BIOLOG: HCPCS | Performed by: STUDENT IN AN ORGANIZED HEALTH CARE EDUCATION/TRAINING PROGRAM

## 2020-08-19 PROCEDURE — 99900035 HC TECH TIME PER 15 MIN (STAT)

## 2020-08-19 PROCEDURE — 20000000 HC ICU ROOM

## 2020-08-19 PROCEDURE — 83735 ASSAY OF MAGNESIUM: CPT

## 2020-08-19 RX ORDER — DEXMEDETOMIDINE HYDROCHLORIDE 4 UG/ML
0.2 INJECTION, SOLUTION INTRAVENOUS CONTINUOUS
Status: DISCONTINUED | OUTPATIENT
Start: 2020-08-19 | End: 2020-08-21

## 2020-08-19 RX ORDER — DIAZEPAM 5 MG/1
5 TABLET ORAL EVERY 6 HOURS
Status: DISCONTINUED | OUTPATIENT
Start: 2020-08-19 | End: 2020-08-20

## 2020-08-19 RX ADMIN — STANDARDIZED SENNA CONCENTRATE AND DOCUSATE SODIUM 1 TABLET: 8.6; 5 TABLET ORAL at 09:08

## 2020-08-19 RX ADMIN — ROSUVASTATIN CALCIUM 10 MG: 10 TABLET, FILM COATED ORAL at 09:08

## 2020-08-19 RX ADMIN — GUAIFENESIN 200 MG: 200 SOLUTION ORAL at 09:08

## 2020-08-19 RX ADMIN — ALBUTEROL SULFATE 2.5 MG: 2.5 SOLUTION RESPIRATORY (INHALATION) at 07:08

## 2020-08-19 RX ADMIN — DIAZEPAM 10 MG: 5 TABLET ORAL at 11:08

## 2020-08-19 RX ADMIN — GUAIFENESIN 200 MG: 200 SOLUTION ORAL at 05:08

## 2020-08-19 RX ADMIN — DIAZEPAM 10 MG: 5 TABLET ORAL at 05:08

## 2020-08-19 RX ADMIN — INSULIN ASPART 15 UNITS: 100 INJECTION, SOLUTION INTRAVENOUS; SUBCUTANEOUS at 11:08

## 2020-08-19 RX ADMIN — INSULIN DETEMIR 25 UNITS: 100 INJECTION, SOLUTION SUBCUTANEOUS at 08:08

## 2020-08-19 RX ADMIN — GUAIFENESIN 200 MG: 200 SOLUTION ORAL at 11:08

## 2020-08-19 RX ADMIN — OXYCODONE HYDROCHLORIDE 5 MG: 5 TABLET ORAL at 05:08

## 2020-08-19 RX ADMIN — INSULIN ASPART 15 UNITS: 100 INJECTION, SOLUTION INTRAVENOUS; SUBCUTANEOUS at 05:08

## 2020-08-19 RX ADMIN — INSULIN ASPART 15 UNITS: 100 INJECTION, SOLUTION INTRAVENOUS; SUBCUTANEOUS at 03:08

## 2020-08-19 RX ADMIN — STANDARDIZED SENNA CONCENTRATE AND DOCUSATE SODIUM 1 TABLET: 8.6; 5 TABLET ORAL at 08:08

## 2020-08-19 RX ADMIN — OXYCODONE HYDROCHLORIDE 5 MG: 5 TABLET ORAL at 11:08

## 2020-08-19 RX ADMIN — DIAZEPAM 5 MG: 5 TABLET ORAL at 05:08

## 2020-08-19 RX ADMIN — ACETYLCYSTEINE 4 ML: 100 SOLUTION ORAL; RESPIRATORY (INHALATION) at 01:08

## 2020-08-19 RX ADMIN — GUAIFENESIN 200 MG: 200 SOLUTION ORAL at 03:08

## 2020-08-19 RX ADMIN — ALBUTEROL SULFATE 2.5 MG: 2.5 SOLUTION RESPIRATORY (INHALATION) at 01:08

## 2020-08-19 RX ADMIN — ENOXAPARIN SODIUM 90 MG: 100 INJECTION SUBCUTANEOUS at 09:08

## 2020-08-19 RX ADMIN — INSULIN ASPART 15 UNITS: 100 INJECTION, SOLUTION INTRAVENOUS; SUBCUTANEOUS at 09:08

## 2020-08-19 RX ADMIN — ENOXAPARIN SODIUM 90 MG: 100 INJECTION SUBCUTANEOUS at 08:08

## 2020-08-19 RX ADMIN — DIAZEPAM 5 MG: 5 TABLET ORAL at 11:08

## 2020-08-19 RX ADMIN — POLYETHYLENE GLYCOL 3350 17 G: 17 POWDER, FOR SOLUTION ORAL at 08:08

## 2020-08-19 RX ADMIN — FUROSEMIDE 40 MG: 10 INJECTION, SOLUTION INTRAMUSCULAR; INTRAVENOUS at 08:08

## 2020-08-19 RX ADMIN — INSULIN DETEMIR 25 UNITS: 100 INJECTION, SOLUTION SUBCUTANEOUS at 09:08

## 2020-08-19 RX ADMIN — OLANZAPINE 5 MG: 2.5 TABLET, FILM COATED ORAL at 08:08

## 2020-08-19 RX ADMIN — MIDAZOLAM 4 MG: 5 INJECTION INTRAMUSCULAR; INTRAVENOUS at 06:08

## 2020-08-19 RX ADMIN — OLANZAPINE 5 MG: 2.5 TABLET, FILM COATED ORAL at 09:08

## 2020-08-19 RX ADMIN — DEXMEDETOMIDINE HYDROCHLORIDE 0.2 MCG/KG/HR: 100 INJECTION, SOLUTION, CONCENTRATE INTRAVENOUS at 08:08

## 2020-08-19 RX ADMIN — ACETYLCYSTEINE 4 ML: 100 SOLUTION ORAL; RESPIRATORY (INHALATION) at 07:08

## 2020-08-19 RX ADMIN — INSULIN ASPART 2 UNITS: 100 INJECTION, SOLUTION INTRAVENOUS; SUBCUTANEOUS at 05:08

## 2020-08-19 RX ADMIN — FAMOTIDINE 20 MG: 20 TABLET ORAL at 09:08

## 2020-08-19 RX ADMIN — DEXMEDETOMIDINE HYDROCHLORIDE 0.2 MCG/KG/HR: 4 INJECTION, SOLUTION INTRAVENOUS at 06:08

## 2020-08-19 RX ADMIN — FAMOTIDINE 20 MG: 20 TABLET ORAL at 08:08

## 2020-08-19 RX ADMIN — GUAIFENESIN 200 MG: 200 SOLUTION ORAL at 08:08

## 2020-08-19 RX ADMIN — INSULIN ASPART 4 UNITS: 100 INJECTION, SOLUTION INTRAVENOUS; SUBCUTANEOUS at 11:08

## 2020-08-19 NOTE — ASSESSMENT & PLAN NOTE
70M  with HTN, HLD, and DM who presents to the [place] ED for SOB and found to be COVID positive.  Patient transferred to [facility] on 7/26/20 for higher level of care.     - Remdesivir and dexamethasone complete  - Ceftriaxone and azithromycin complete  - Blood and sputum cx showed no growth  - Therapeutic lovenox for hypercoagulable state in COVID  - Was previously paralyzed, proned, sedated. Nimbex discontinued on 08/04.  - Continue weaning down fentanyl, propofol, precedex.   - Added zyprexa on 08/07 to help wean sedation.  - weanin valium  - Levophed restarted on 8/14, discontinued that evening  -Discussed with pt's Wife and daughter, state they would like to pursue PEG/trach if unable to wean from vent.   - Continues to fail SBTs, general surgery consulted for trach/peg placement.   -Discussed with CM to begin referral process for LTAC placment

## 2020-08-19 NOTE — HPI
Anup Miller is a 70 y.o. male with PMHx of DM, HTN, KHOI who was admitted on 7/26/20 with COVID. He was intubated and has been treated in the ICU since then. He has gradually progressed, an is now on minimal vent settings (30/5). He is on precedex, but off all other drips. The primary team is still aiming to extubate tonight or tomorrow, but has consulted General Surgery for trach/peg if unable.   He has had prior lap cholecystectomy, but no other abdominal surgeries documented and no other scars. R IJV central line in place, no neck scars. He is on therapeutic Lovenox and TFs.

## 2020-08-19 NOTE — ASSESSMENT & PLAN NOTE
71 yo male admitted to the ICU with COVID. General surgery was consulted for trach/peg.    - Patient seen and examined, labs and imaging reviewed, discussed with staff  - If unable to extubate, plan for trach/peg in OR Friday 8/21/20, consent obtained  - Hold TFs at [place] 8/21  - Hold Lovenox tomorrow and Friday  - Patient is COVID +, no test needed, will require COVID precautions in OR  - Please call with questions

## 2020-08-19 NOTE — PLAN OF CARE
Problem: Adult Inpatient Plan of Care  Goal: Plan of Care Review  Outcome: Ongoing, Progressing  Goal: Patient-Specific Goal (Individualization)  Outcome: Ongoing, Progressing  Goal: Absence of Hospital-Acquired Illness or Injury  Outcome: Ongoing, Progressing  Goal: Optimal Comfort and Wellbeing  Outcome: Ongoing, Progressing  Goal: Readiness for Transition of Care  Outcome: Ongoing, Progressing  Goal: Rounds/Family Conference  Outcome: Ongoing, Progressing     Problem: Diabetes Comorbidity  Goal: Blood Glucose Level Within Desired Range  Outcome: Ongoing, Progressing     Problem: Infection  Goal: Infection Symptom Resolution  Outcome: Ongoing, Progressing     Problem: Fall Injury Risk  Goal: Absence of Fall and Fall-Related Injury  Outcome: Ongoing, Progressing     Problem: Restraint, Nonbehavioral (Nonviolent)  Goal: Discontinuation Criteria Achieved  Outcome: Ongoing, Progressing  Goal: Personal Dignity and Safety Maintained  Outcome: Ongoing, Progressing     Problem: Communication Impairment (Mechanical Ventilation, Invasive)  Goal: Effective Communication  Outcome: Ongoing, Progressing     Problem: Ventilator-Induced Lung Injury (Mechanical Ventilation, Invasive)  Goal: Absence of Ventilator-Induced Lung Injury  Outcome: Ongoing, Progressing     Problem: Aspiration (Enteral Nutrition)  Goal: Absence of Aspiration Signs/Symptoms  Outcome: Ongoing, Progressing     Problem: Wound  Goal: Optimal Wound Healing  Outcome: Ongoing, Progressing     Mr. Miller slight fever at beginning of shift, treated with tylenol one time. Early this morning opening eyes to voice and now following commands, sticks tongue out and squeezes my hand bilaterally but weak. Precedex currently at 0.4 mcg. Mrs. Miller updated at night of patients status, no other questions or concerns at this time.

## 2020-08-19 NOTE — SUBJECTIVE & OBJECTIVE
No current facility-administered medications on file prior to encounter.      Current Outpatient Medications on File Prior to Encounter   Medication Sig    ALREX 0.2 % DrpS Place 1 drop into both eyes 3 (three) times daily as needed.    azithromycin (Z-TEMI) 250 MG tablet     ertugliflozin (STEGLATRO) 15 mg Tab Take 1 tablet by mouth once daily.    glimepiride (AMARYL) 4 MG tablet Take 2 tablets (8 mg total) by mouth once daily.    hydroCHLOROthiazide (HYDRODIURIL) 25 MG tablet TAKE 1 TABLET BY MOUTH ONE TIME DAILY    insulin (LANTUS SOLOSTAR U-100 INSULIN) glargine 100 units/mL (3mL) SubQ pen Inject 10 Units into the skin every evening.    levocetirizine (XYZAL) 5 MG tablet Take 1 tablet (5 mg total) by mouth every morning.    losartan (COZAAR) 100 MG tablet TAKE 1 TABLET BY MOUTH ONE TIME DAILY    metFORMIN (GLUCOPHAGE-XR) 750 MG XR 24hr tablet Take 1 tablet (750 mg total) by mouth 2 (two) times daily with meals.    multivitamin capsule Take 1 capsule by mouth once daily.    pantoprazole (PROTONIX) 40 MG tablet TAKE 1 TABLET BY MOUTH ONE TIME DAILY    pioglitazone (ACTOS) 15 MG tablet Take 1 tablet (15 mg total) by mouth once daily.    rosuvastatin (CRESTOR) 10 MG tablet TAKE 1 TABLET BY MOUTH DAILY       Review of patient's allergies indicates:  No Known Allergies    Past Medical History:   Diagnosis Date    Diabetes mellitus type II Diagnosed 2002    Elevated liver enzymes     Fatty liver disease, nonalcoholic     Hyperlipidemia     Hypertension     Sleep apnea      Past Surgical History:   Procedure Laterality Date    BACK SURGERY      CHOLECYSTECTOMY  2013    COLONOSCOPY N/A 7/19/2018    Procedure: COLONOSCOPY;  Surgeon: Chacorta Lopez III, MD;  Location: [facility];  Service: Endoscopy;  Laterality: N/A;     Family History     Problem Relation (Age of Onset)    Asthma Mother    Cancer Sister, Maternal Grandmother    Diabetes Father, Paternal Aunt, Maternal Grandmother, Paternal Uncle         Tobacco Use    Smoking status: Never Smoker    Smokeless tobacco: Never Used   Substance and Sexual Activity    Alcohol use: No     Alcohol/week: 0.0 standard drinks    Drug use: No    Sexual activity: Yes     Partners: Female     Review of Systems   Unable to perform ROS: Intubated     Objective:     Vital Signs (Most Recent):  Temp: 99.8 °F (37.7 °C) (08/19/20 1500)  Pulse: (!) 120 (08/19/20 1600)  Resp: (!) 28 (08/19/20 1600)  BP: (!) 147/78 (08/19/20 1600)  SpO2: 100 % (08/19/20 1600) Vital Signs (24h Range):  Temp:  [99.3 °F (37.4 °C)-100.5 °F (38.1 °C)] 99.8 °F (37.7 °C)  Pulse:  [] 120  Resp:  [22-40] 28  SpO2:  [98 %-100 %] 100 %  BP: (130-163)/(73-92) 147/78     Weight: 112.5 kg (248 lb 0.3 oz)  Body mass index is 36.63 kg/m².    Physical Exam  Constitutional:       Comments: Intubated and sedated   HENT:      Head: Atraumatic.      Mouth/Throat:      Mouth: Mucous membranes are moist.      Comments: ET tube in place  Eyes:      Conjunctiva/sclera: Conjunctivae normal.   Neck:      Musculoskeletal: Neck supple.   Cardiovascular:      Rate and Rhythm: Normal rate and regular rhythm.   Pulmonary:      Comments: Vent Mode: A/C  Oxygen Concentration (%):  (30) 30  Resp Rate Total:  (23 br/min-61 br/min) 28 br/min  Vt Set:  (440 mL) 440 mL  PEEP/CPAP:  (5 cmH20) 5 cmH20  Pressure Support:  (0 cmH20) 0 cmH20  Mean Airway Pressure:  (12 ndJ40-59 cmH20) 13 cmH20    Abdominal:      General: There is no distension.      Palpations: Abdomen is soft.      Comments: Lap suha scars, well-healed   Musculoskeletal:         General: No deformity.   Skin:     General: Skin is warm and dry.   Neurological:      Comments: Sedated         Significant Labs:  CBC:   Recent Labs   Lab 08/19/20  0358   WBC 9.54   RBC 4.06*   HGB 10.9*   HCT 36.0*   *   MCV 89   MCH 26.8*   MCHC 30.3*     CMP:   Recent Labs   Lab 08/19/20  0358   *   CALCIUM 9.8   ALBUMIN 2.0*   PROT 7.6      K 3.9   CO2 29   CL  100   BUN 20   CREATININE 1.0   ALKPHOS 119   ALT 66*   AST 49*   BILITOT 0.3       Significant Diagnostics:  I have reviewed all pertinent imaging results/findings within the past 24 hours.

## 2020-08-19 NOTE — SUBJECTIVE & OBJECTIVE
Interval History/Significant Events:   Pt remains off pressors. Failed SBT this am due to Rapid shallow breathing after 2 minutes. Weaning sedation with goal do d/c diazepam and precedex in the next days.       Review of Systems   Unable to perform ROS: Intubated     Objective:     Vital Signs (Most Recent):  Temp: 99.8 °F (37.7 °C) (08/19/20 1100)  Pulse: (!) 125 (08/19/20 1307)  Resp: (!) 28 (08/19/20 1307)  BP: 139/88 (08/19/20 1200)  SpO2: 100 % (08/19/20 1200) Vital Signs (24h Range):  Temp:  [99.3 °F (37.4 °C)-100.5 °F (38.1 °C)] 99.8 °F (37.7 °C)  Pulse:  [] 125  Resp:  [22-54] 28  SpO2:  [92 %-100 %] 100 %  BP: (135-163)/(75-92) 139/88   Weight: 112.5 kg (248 lb 0.3 oz)  Body mass index is 36.63 kg/m².      Intake/Output Summary (Last 24 hours) at 8/19/2020 1421  Last data filed at 8/19/2020 1200  Gross per 24 hour   Intake 1074.01 ml   Output 1720 ml   Net -645.99 ml       Physical Exam  Vitals signs and nursing note reviewed.   Constitutional:       Appearance: He is obese.   HENT:      Head: Normocephalic and atraumatic.      Mouth/Throat:      Comments: Intubated  Eyes:      General:         Right eye: No discharge.         Left eye: No discharge.   Cardiovascular:      Rate and Rhythm: Normal rate and regular rhythm.   Pulmonary:      Effort: No respiratory distress.      Comments: Intubated.  Abdominal:      Palpations: Abdomen is soft.      Tenderness: There is no abdominal tenderness.   Musculoskeletal:         General: No swelling.   Skin:     General: Skin is warm and dry.   Neurological:      Comments: sedated         Vents:  Vent Mode: A/C (08/19/20 0801)  Ventilator Initiated: Yes(chart correction) (07/26/20 2108)  Set Rate: 16 BPM (08/19/20 0801)  Vt Set: 440 mL (08/19/20 0801)  Pressure Support: 0 cmH20 (08/19/20 0801)  PEEP/CPAP: 5 cmH20 (08/19/20 0801)  Oxygen Concentration (%): 30 (08/19/20 1200)  Peak Airway Pressure: 28 cmH2O (08/19/20 0801)  Plateau Pressure: 40 cmH20 (08/19/20  0801)  Total Ve: 12.4 mL (08/19/20 0801)  F/VT Ratio<105 (RSBI): (!) 49.68 (08/19/20 0801)  Lines/Drains/Airways     Central Venous Catheter Line            Percutaneous Central Line Insertion/Assessment - Triple Lumen  07/29/20 1729 right internal jugular 20 days          Drain                 NG/OG Tube 07/26/20 1756 [employer] sump;orogastric 18 Fr. Center mouth 23 days         Urethral Catheter 08/17/20 1553 1 day          Airway                 Airway - Non-Surgical 07/26/20 1724 Endotracheal Tube 23 days          Peripheral Intravenous Line                 Peripheral IV - Single Lumen 08/15/20 1418 20 G Left Hand 4 days              Significant Labs:    CBC/Anemia Profile:  Recent Labs   Lab 08/18/20 0318 08/19/20  0358   WBC 8.24 9.54   HGB 10.9* 10.9*   HCT 36.5* 36.0*    364*   MCV 90 89   RDW 14.7* 14.5        Chemistries:  Recent Labs   Lab 08/18/20 0318 08/19/20  0358    140   K 4.5 3.9   CL 98 100   CO2 30* 29   BUN 18 20   CREATININE 1.0 1.0   CALCIUM 9.2 9.8   ALBUMIN 1.9* 2.0*   PROT 7.3 7.6   BILITOT 0.3 0.3   ALKPHOS 108 119   ALT 64* 66*   AST 52* 49*   MG 2.3 2.2   PHOS 4.1 4.4       All pertinent labs within the past 24 hours have been reviewed.    Significant Imaging:  I have reviewed all pertinent imaging results/findings within the past 24 hours.

## 2020-08-19 NOTE — H&P (VIEW-ONLY)
[facility] - ICU 16 WT  General Surgery  Consult Note    Patient Name: Anup Miller  MRN: [id number]  Code Status: Full Code  Admission Date: 7/26/2020  Hospital Length of Stay: 24 days  Attending Physician: Chantell Davis MD  Primary Care Provider: Bryce Gonzales MD    Patient information was obtained from spouse/SO and past medical records.     Inpatient consult to General Surgery  Consult performed by: Efren Palomino MD  Consult ordered by: Wallace Pinon DO        Subjective:     Principal Problem: Pneumonia due to COVID-19 virus    History of Present Illness: Anup Miller is a 70 y.o. male with PMHx of DM, HTN, KHOI who was admitted on 7/26/20 with COVID. He was intubated and has been treated in the ICU since then. He has gradually progressed, an is now on minimal vent settings (30/5). He is on precedex, but off all other drips. The primary team is still aiming to extubate tonight or tomorrow, but has consulted General Surgery for trach/peg if unable.   He has had prior lap cholecystectomy, but no other abdominal surgeries documented and no other scars. R IJV central line in place, no neck scars. He is on therapeutic Lovenox and TFs.        No current facility-administered medications on file prior to encounter.      Current Outpatient Medications on File Prior to Encounter   Medication Sig    ALREX 0.2 % DrpS Place 1 drop into both eyes 3 (three) times daily as needed.    azithromycin (Z-TEMI) 250 MG tablet     ertugliflozin (STEGLATRO) 15 mg Tab Take 1 tablet by mouth once daily.    glimepiride (AMARYL) 4 MG tablet Take 2 tablets (8 mg total) by mouth once daily.    hydroCHLOROthiazide (HYDRODIURIL) 25 MG tablet TAKE 1 TABLET BY MOUTH ONE TIME DAILY    insulin (LANTUS SOLOSTAR U-100 INSULIN) glargine 100 units/mL (3mL) SubQ pen Inject 10 Units into the skin every evening.    levocetirizine (XYZAL) 5 MG tablet Take 1 tablet (5 mg total) by mouth every morning.    losartan  (COZAAR) 100 MG tablet TAKE 1 TABLET BY MOUTH ONE TIME DAILY    metFORMIN (GLUCOPHAGE-XR) 750 MG XR 24hr tablet Take 1 tablet (750 mg total) by mouth 2 (two) times daily with meals.    multivitamin capsule Take 1 capsule by mouth once daily.    pantoprazole (PROTONIX) 40 MG tablet TAKE 1 TABLET BY MOUTH ONE TIME DAILY    pioglitazone (ACTOS) 15 MG tablet Take 1 tablet (15 mg total) by mouth once daily.    rosuvastatin (CRESTOR) 10 MG tablet TAKE 1 TABLET BY MOUTH DAILY       Review of patient's allergies indicates:  No Known Allergies    Past Medical History:   Diagnosis Date    Diabetes mellitus type II Diagnosed 2002    Elevated liver enzymes     Fatty liver disease, nonalcoholic     Hyperlipidemia     Hypertension     Sleep apnea      Past Surgical History:   Procedure Laterality Date    BACK SURGERY      CHOLECYSTECTOMY  2013    COLONOSCOPY N/A 7/19/2018    Procedure: COLONOSCOPY;  Surgeon: Chacorta Lopez III, MD;  Location: [facility];  Service: Endoscopy;  Laterality: N/A;     Family History     Problem Relation (Age of Onset)    Asthma Mother    Cancer Sister, Maternal Grandmother    Diabetes Father, Paternal Aunt, Maternal Grandmother, Paternal Uncle        Tobacco Use    Smoking status: Never Smoker    Smokeless tobacco: Never Used   Substance and Sexual Activity    Alcohol use: No     Alcohol/week: 0.0 standard drinks    Drug use: No    Sexual activity: Yes     Partners: Female     Review of Systems   Unable to perform ROS: Intubated     Objective:     Vital Signs (Most Recent):  Temp: 99.8 °F (37.7 °C) (08/19/20 1500)  Pulse: (!) 120 (08/19/20 1600)  Resp: (!) 28 (08/19/20 1600)  BP: (!) 147/78 (08/19/20 1600)  SpO2: 100 % (08/19/20 1600) Vital Signs (24h Range):  Temp:  [99.3 °F (37.4 °C)-100.5 °F (38.1 °C)] 99.8 °F (37.7 °C)  Pulse:  [] 120  Resp:  [22-40] 28  SpO2:  [98 %-100 %] 100 %  BP: (130-163)/(73-92) 147/78     Weight: 112.5 kg (248 lb 0.3 oz)  Body mass index is 36.63  kg/m².    Physical Exam  Constitutional:       Comments: Intubated and sedated   HENT:      Head: Atraumatic.      Mouth/Throat:      Mouth: Mucous membranes are moist.      Comments: ET tube in place  Eyes:      Conjunctiva/sclera: Conjunctivae normal.   Neck:      Musculoskeletal: Neck supple.   Cardiovascular:      Rate and Rhythm: Normal rate and regular rhythm.   Pulmonary:      Comments: Vent Mode: A/C  Oxygen Concentration (%):  (30) 30  Resp Rate Total:  (23 br/min-61 br/min) 28 br/min  Vt Set:  (440 mL) 440 mL  PEEP/CPAP:  (5 cmH20) 5 cmH20  Pressure Support:  (0 cmH20) 0 cmH20  Mean Airway Pressure:  (12 bdL76-72 cmH20) 13 cmH20    Abdominal:      General: There is no distension.      Palpations: Abdomen is soft.      Comments: Lap suha scars, well-healed   Musculoskeletal:         General: No deformity.   Skin:     General: Skin is warm and dry.   Neurological:      Comments: Sedated         Significant Labs:  CBC:   Recent Labs   Lab 08/19/20  0358   WBC 9.54   RBC 4.06*   HGB 10.9*   HCT 36.0*   *   MCV 89   MCH 26.8*   MCHC 30.3*     CMP:   Recent Labs   Lab 08/19/20  0358   *   CALCIUM 9.8   ALBUMIN 2.0*   PROT 7.6      K 3.9   CO2 29      BUN 20   CREATININE 1.0   ALKPHOS 119   ALT 66*   AST 49*   BILITOT 0.3       Significant Diagnostics:  I have reviewed all pertinent imaging results/findings within the past 24 hours.    Assessment/Plan:     COVID-19 virus infection  71 yo male admitted to the ICU with COVID. General surgery was consulted for trach/peg.    - Patient seen and examined, labs and imaging reviewed, discussed with staff  - If unable to extubate, plan for trach/peg in OR Friday 8/21/20, consent obtained  - Hold TFs at [place] 8/21  - Hold Lovenox tomorrow and Friday  - Patient is COVID +, no test needed, will require COVID precautions in OR  - Please call with questions      VTE Risk Mitigation (From admission, onward)         Ordered     enoxaparin injection 90 mg   Every 12 hours (non-standard times)      08/06/20 1828     Apply sequential compression device to lower extremities (if no contraindications). Medical venous thromboembolus prophylaxis is preferred.  Until discontinued      07/29/20 1535     IP VTE HIGH RISK PATIENT  Once      07/26/20 2146     Place sequential compression device  Until discontinued      07/26/20 2104                Thank you for your consult. I will follow-up with patient. Please contact us if you have any additional questions.    Efren Palomino MD  General Surgery  [facility] - ICU 16 WT

## 2020-08-19 NOTE — CARE UPDATE
Called pt's daughter, informed her that her father remains in stable but critical condition and of our plan to consult general surgery for trach/PEG. Daughter expressed understanding with no additional questions.

## 2020-08-19 NOTE — PROGRESS NOTES
[facility] - ICU 16   Critical Care Medicine  Progress Note    Patient Name: Anup Miller  MRN: [id number]  Admission Date: 7/26/2020  Hospital Length of Stay: 24 days  Code Status: Full Code  Attending Provider: Chantell Davis MD  Primary Care Provider: Bryce Gonzales MD   Principal Problem: Pneumonia due to COVID-19 virus    Subjective:     HPI:  Anup Miller is a 70 y.o. male patient with a PMHx of HTN, HLD, and DM who presents to the [place] [facility] for evaluation of SOB which  1 week ago. Pt became more SOB and has an O2 sat of low 70s upon arrival on RA. Symptoms are worsening and moderate in severity. No mitigating or exacerbating factors reported. Associated sxs include cough and fever. Patient denies any congestion, sore throat, CP, abd pain, N/V, back pain, HA, weakness, and all other sxs at this time. No prior Tx reported. No further complaints or concerns at this time. Patient was placed on Bipap for a few hours and subsequently intubated. COVID positive. Was started on IV dexamethasone, Ceftriaxone, and Azithromycin. Patient transferred to [facility] on evening of 7/26/20 for higher level of care.     Hospital/ICU Course:  As of 7/29, the patient's oxygenation continued to worsen with P:F <150. R IJ and Arterial Line placed. Pt sedated, paralyzed, and proned at 9:30pm with improved oxygenation. Repeat AM gas on 7/30 Arterial Blood Gas result:  pO2 112; pCO2 58.9; pH 7.256;  HCO3 26.2, %O2 Sat 97%. FiO2 60, 8 PEEP. Pt supinated at 4:10 pm on 7/30 with Arterial Blood Gas result:  pO2 106; pCO2 52.2; pH 7.349;  HCO3 28.7, %O2 Sat 106. (P:F 212). FiO2 50, 10 PEEP.    As of 7/31, paralytic d/c'ed and sedation slowly weaned. Propofol d/c'ed on 8/1 2/2 triglycerides with ketamine and versed initiated. Now requiring higher vent settings due to dyssynchrony, will continue to increase sedation. Patient paralyzed again on 8/1 for vent dyssynchrony. Nimbex was discontinued on 08/03  and sedative medications were increased, however, patient struggled to pull enough tidal volume and was desatting even on maximum sedation so nimbex was restarted. Nimbex was stopped on 08/04 and restarted propofol. As patient became more agitated, ketamine was added on 08/06. Sedation was attempted to be weaned off by adding seroquel and zyprexa. Versed and ketamine were weaned off. Patient was weaned off of levo on morning of 08/09. Patient failed multiple SBTs and it was discussed with family of likely PEG/trach, pending their decision. Patient has continued agitation with coughing/ increased BPs requiring propofol, fentanyl, and precedex with scheduled valium. Discussed with family that as patient continues to require ventillator support past 21d, would benefit from trach/peg. Family agrees to pursue trach/peg/LTAC placement. General surgery consulted for trach/peg.     Interval History/Significant Events:   Pt remains off pressors. Failed SBT this am due to Rapid shallow breathing after 2 minutes. Weaning sedation with goal do d/c diazepam and precedex in the next days.       Review of Systems   Unable to perform ROS: Intubated     Objective:     Vital Signs (Most Recent):  Temp: 99.8 °F (37.7 °C) (08/19/20 1100)  Pulse: (!) 125 (08/19/20 1307)  Resp: (!) 28 (08/19/20 1307)  BP: 139/88 (08/19/20 1200)  SpO2: 100 % (08/19/20 1200) Vital Signs (24h Range):  Temp:  [99.3 °F (37.4 °C)-100.5 °F (38.1 °C)] 99.8 °F (37.7 °C)  Pulse:  [] 125  Resp:  [22-54] 28  SpO2:  [92 %-100 %] 100 %  BP: (135-163)/(75-92) 139/88   Weight: 112.5 kg (248 lb 0.3 oz)  Body mass index is 36.63 kg/m².      Intake/Output Summary (Last 24 hours) at 8/19/2020 1421  Last data filed at 8/19/2020 1200  Gross per 24 hour   Intake 1074.01 ml   Output 1720 ml   Net -645.99 ml       Physical Exam  Vitals signs and nursing note reviewed.   Constitutional:       Appearance: He is obese.   HENT:      Head: Normocephalic and atraumatic.       Mouth/Throat:      Comments: Intubated  Eyes:      General:         Right eye: No discharge.         Left eye: No discharge.   Cardiovascular:      Rate and Rhythm: Normal rate and regular rhythm.   Pulmonary:      Effort: No respiratory distress.      Comments: Intubated.  Abdominal:      Palpations: Abdomen is soft.      Tenderness: There is no abdominal tenderness.   Musculoskeletal:         General: No swelling.   Skin:     General: Skin is warm and dry.   Neurological:      Comments: sedated         Vents:  Vent Mode: A/C (08/19/20 0801)  Ventilator Initiated: Yes(chart correction) (07/26/20 2108)  Set Rate: 16 BPM (08/19/20 0801)  Vt Set: 440 mL (08/19/20 0801)  Pressure Support: 0 cmH20 (08/19/20 0801)  PEEP/CPAP: 5 cmH20 (08/19/20 0801)  Oxygen Concentration (%): 30 (08/19/20 1200)  Peak Airway Pressure: 28 cmH2O (08/19/20 0801)  Plateau Pressure: 40 cmH20 (08/19/20 0801)  Total Ve: 12.4 mL (08/19/20 0801)  F/VT Ratio<105 (RSBI): (!) 49.68 (08/19/20 0801)  Lines/Drains/Airways     Central Venous Catheter Line            Percutaneous Central Line Insertion/Assessment - Triple Lumen  07/29/20 1729 right internal jugular 20 days          Drain                 NG/OG Tube 07/26/20 1756 [employer] sump;orogastric 18 Fr. Center mouth 23 days         Urethral Catheter 08/17/20 1553 1 day          Airway                 Airway - Non-Surgical 07/26/20 1724 Endotracheal Tube 23 days          Peripheral Intravenous Line                 Peripheral IV - Single Lumen 08/15/20 1418 20 G Left Hand 4 days              Significant Labs:    CBC/Anemia Profile:  Recent Labs   Lab 08/18/20 0318 08/19/20  0358   WBC 8.24 9.54   HGB 10.9* 10.9*   HCT 36.5* 36.0*    364*   MCV 90 89   RDW 14.7* 14.5        Chemistries:  Recent Labs   Lab 08/18/20  0318 08/19/20  0358    140   K 4.5 3.9   CL 98 100   CO2 30* 29   BUN 18 20   CREATININE 1.0 1.0   CALCIUM 9.2 9.8   ALBUMIN 1.9* 2.0*   PROT 7.3 7.6   BILITOT 0.3 0.3   ALKPHOS  108 119   ALT 64* 66*   AST 52* 49*   MG 2.3 2.2   PHOS 4.1 4.4       All pertinent labs within the past 24 hours have been reviewed.    Significant Imaging:  I have reviewed all pertinent imaging results/findings within the past 24 hours.      ABG  Recent Labs   Lab 08/19/20  0404   PH 7.556*   PO2 86   PCO2 39.0   HCO3 34.6*   BE 12     Assessment/Plan:     Pulmonary  Acute hypoxemic respiratory failure  - see Pneumonia due to COVID 19    Cardiac/Vascular  Hyperlipidemia associated with type 2 diabetes mellitus  - Restart Crestor once extubated     Hypertension associated with diabetes  Continue to hold antihypertensives in setting of recent shock    Endocrine  Uncontrolled type 2 diabetes mellitus  - A1c 8.3%  - started on insulin gtt on 08/05 due to elevated BG but discontinued 08/07  - Levemir 25U BID  - Aspart 15U Q4H  - BG at goal of 140-180    GI  Elevated liver enzymes  - Hx of elevated LFTs dating back to 2015  - Acute hepatitis panel and HIV - negative  - Stable. Trend CMP daily    Other  * Pneumonia due to COVID-19 virus  70M  with HTN, HLD, and DM who presents to the [place] ED for SOB and found to be COVID positive.  Patient transferred to [facility] on 7/26/20 for higher level of care.     - Remdesivir and dexamethasone complete  - Ceftriaxone and azithromycin complete  - Blood and sputum cx showed no growth  - Therapeutic lovenox for hypercoagulable state in COVID  - Was previously paralyzed, proned, sedated. Nimbex discontinued on 08/04.  - Continue weaning down fentanyl, propofol, precedex.   - Added zyprexa on 08/07 to help wean sedation.  - weanin valium  - Levophed restarted on 8/14, discontinued that evening  -Discussed with pt's Wife and daughter, state they would like to pursue PEG/trach if unable to wean from vent.   - Continues to fail SBTs, general surgery consulted for trach/peg placement.   -Discussed with CM to begin referral process for LTAC placment     Critical Care Daily Checklist:     A: Awake: RASS Goal/Actual Goal: RASS Goal: 0-->alert and calm  Actual: [place] Agitation Sedation Scale (RASS): Light sedation   B: Spontaneous Breathing Trial Performed? Spon. Breathing Trial Initiated?: Initiated (08/19/20 0900)   C: SAT & SBT Coordinated?  yes                      D: Delirium: CAM-ICU Overall CAM-ICU: Negative   E: Early Mobility Performed? Yes   F: Feeding Goal: Goals: Meet % EEN, EPN by RD f/u date  Status: Nutrition Goal Status: goal met   Current Diet Order   Procedures    Diet NPO      AS: Analgesia/Sedation Precedex, valium, oxycodone   T: Thromboembolic Prophylaxis Enoxaparin 90mg q12h   H: HOB > 300 Yes   U: Stress Ulcer Prophylaxis (if needed) famotidine   G: Glucose Control Basal/bolus   B: Bowel Function Stool Occurrence: 1   I: Indwelling Catheter (Lines & Harris) Necessity Discontinue harris   D: De-escalation of Antimicrobials/Pharmacotherapies n/a    Plan for the day/ETD Gen surg consult, wean sedation    Code Status:  Family/Goals of Care: Full Code         Critical secondary to Patient has a condition that poses threat to life and bodily function: Severe Respiratory Distress      Critical care was time spent personally by me on the following activities: development of treatment plan with patient or surrogate and bedside caregivers, discussions with consultants, evaluation of patient's response to treatment, examination of patient, ordering and performing treatments and interventions, ordering and review of laboratory studies, ordering and review of radiographic studies, pulse oximetry, re-evaluation of patient's condition. This critical care time did not overlap with that of any other provider or involve time for any procedures.     Wallace Pinon, DO  Critical Care Medicine  [facility] - ICU 16 WT

## 2020-08-19 NOTE — CONSULTS
[facility] - ICU 16 WT  General Surgery  Consult Note    Patient Name: Anup Miller  MRN: [id number]  Code Status: Full Code  Admission Date: 7/26/2020  Hospital Length of Stay: 24 days  Attending Physician: Chantell Davis MD  Primary Care Provider: Bryce Gonzales MD    Patient information was obtained from spouse/SO and past medical records.     Inpatient consult to General Surgery  Consult performed by: Efren Palomino MD  Consult ordered by: Wallace Pinon DO        Subjective:     Principal Problem: Pneumonia due to COVID-19 virus    History of Present Illness: Anup Miller is a 70 y.o. male with PMHx of DM, HTN, KHOI who was admitted on 7/26/20 with COVID. He was intubated and has been treated in the ICU since then. He has gradually progressed, an is now on minimal vent settings (30/5). He is on precedex, but off all other drips. The primary team is still aiming to extubate tonight or tomorrow, but has consulted General Surgery for trach/peg if unable.   He has had prior lap cholecystectomy, but no other abdominal surgeries documented and no other scars. R IJV central line in place, no neck scars. He is on therapeutic Lovenox and TFs.        No current facility-administered medications on file prior to encounter.      Current Outpatient Medications on File Prior to Encounter   Medication Sig    ALREX 0.2 % DrpS Place 1 drop into both eyes 3 (three) times daily as needed.    azithromycin (Z-TEMI) 250 MG tablet     ertugliflozin (STEGLATRO) 15 mg Tab Take 1 tablet by mouth once daily.    glimepiride (AMARYL) 4 MG tablet Take 2 tablets (8 mg total) by mouth once daily.    hydroCHLOROthiazide (HYDRODIURIL) 25 MG tablet TAKE 1 TABLET BY MOUTH ONE TIME DAILY    insulin (LANTUS SOLOSTAR U-100 INSULIN) glargine 100 units/mL (3mL) SubQ pen Inject 10 Units into the skin every evening.    levocetirizine (XYZAL) 5 MG tablet Take 1 tablet (5 mg total) by mouth every morning.    losartan  (COZAAR) 100 MG tablet TAKE 1 TABLET BY MOUTH ONE TIME DAILY    metFORMIN (GLUCOPHAGE-XR) 750 MG XR 24hr tablet Take 1 tablet (750 mg total) by mouth 2 (two) times daily with meals.    multivitamin capsule Take 1 capsule by mouth once daily.    pantoprazole (PROTONIX) 40 MG tablet TAKE 1 TABLET BY MOUTH ONE TIME DAILY    pioglitazone (ACTOS) 15 MG tablet Take 1 tablet (15 mg total) by mouth once daily.    rosuvastatin (CRESTOR) 10 MG tablet TAKE 1 TABLET BY MOUTH DAILY       Review of patient's allergies indicates:  No Known Allergies    Past Medical History:   Diagnosis Date    Diabetes mellitus type II Diagnosed 2002    Elevated liver enzymes     Fatty liver disease, nonalcoholic     Hyperlipidemia     Hypertension     Sleep apnea      Past Surgical History:   Procedure Laterality Date    BACK SURGERY      CHOLECYSTECTOMY  2013    COLONOSCOPY N/A 7/19/2018    Procedure: COLONOSCOPY;  Surgeon: Chacorta Lopez III, MD;  Location: [facility];  Service: Endoscopy;  Laterality: N/A;     Family History     Problem Relation (Age of Onset)    Asthma Mother    Cancer Sister, Maternal Grandmother    Diabetes Father, Paternal Aunt, Maternal Grandmother, Paternal Uncle        Tobacco Use    Smoking status: Never Smoker    Smokeless tobacco: Never Used   Substance and Sexual Activity    Alcohol use: No     Alcohol/week: 0.0 standard drinks    Drug use: No    Sexual activity: Yes     Partners: Female     Review of Systems   Unable to perform ROS: Intubated     Objective:     Vital Signs (Most Recent):  Temp: 99.8 °F (37.7 °C) (08/19/20 1500)  Pulse: (!) 120 (08/19/20 1600)  Resp: (!) 28 (08/19/20 1600)  BP: (!) 147/78 (08/19/20 1600)  SpO2: 100 % (08/19/20 1600) Vital Signs (24h Range):  Temp:  [99.3 °F (37.4 °C)-100.5 °F (38.1 °C)] 99.8 °F (37.7 °C)  Pulse:  [] 120  Resp:  [22-40] 28  SpO2:  [98 %-100 %] 100 %  BP: (130-163)/(73-92) 147/78     Weight: 112.5 kg (248 lb 0.3 oz)  Body mass index is 36.63  kg/m².    Physical Exam  Constitutional:       Comments: Intubated and sedated   HENT:      Head: Atraumatic.      Mouth/Throat:      Mouth: Mucous membranes are moist.      Comments: ET tube in place  Eyes:      Conjunctiva/sclera: Conjunctivae normal.   Neck:      Musculoskeletal: Neck supple.   Cardiovascular:      Rate and Rhythm: Normal rate and regular rhythm.   Pulmonary:      Comments: Vent Mode: A/C  Oxygen Concentration (%):  (30) 30  Resp Rate Total:  (23 br/min-61 br/min) 28 br/min  Vt Set:  (440 mL) 440 mL  PEEP/CPAP:  (5 cmH20) 5 cmH20  Pressure Support:  (0 cmH20) 0 cmH20  Mean Airway Pressure:  (12 oiT50-28 cmH20) 13 cmH20    Abdominal:      General: There is no distension.      Palpations: Abdomen is soft.      Comments: Lap suha scars, well-healed   Musculoskeletal:         General: No deformity.   Skin:     General: Skin is warm and dry.   Neurological:      Comments: Sedated         Significant Labs:  CBC:   Recent Labs   Lab 08/19/20  0358   WBC 9.54   RBC 4.06*   HGB 10.9*   HCT 36.0*   *   MCV 89   MCH 26.8*   MCHC 30.3*     CMP:   Recent Labs   Lab 08/19/20  0358   *   CALCIUM 9.8   ALBUMIN 2.0*   PROT 7.6      K 3.9   CO2 29      BUN 20   CREATININE 1.0   ALKPHOS 119   ALT 66*   AST 49*   BILITOT 0.3       Significant Diagnostics:  I have reviewed all pertinent imaging results/findings within the past 24 hours.    Assessment/Plan:     COVID-19 virus infection  69 yo male admitted to the ICU with COVID. General surgery was consulted for trach/peg.    - Patient seen and examined, labs and imaging reviewed, discussed with staff  - If unable to extubate, plan for trach/peg in OR Friday 8/21/20, consent obtained  - Hold TFs at [place] 8/21  - Hold Lovenox tomorrow and Friday  - Patient is COVID +, no test needed, will require COVID precautions in OR  - Please call with questions      VTE Risk Mitigation (From admission, onward)         Ordered     enoxaparin injection 90 mg   Every 12 hours (non-standard times)      08/06/20 1828     Apply sequential compression device to lower extremities (if no contraindications). Medical venous thromboembolus prophylaxis is preferred.  Until discontinued      07/29/20 1535     IP VTE HIGH RISK PATIENT  Once      07/26/20 2146     Place sequential compression device  Until discontinued      07/26/20 2104                Thank you for your consult. I will follow-up with patient. Please contact us if you have any additional questions.    Efren Palomino MD  General Surgery  [facility] - ICU 16 WT

## 2020-08-20 ENCOUNTER — ANESTHESIA EVENT (OUTPATIENT)
Dept: SURGERY | Facility: HOSPITAL | Age: 70
DRG: 004 | End: 2020-08-20
Payer: COMMERCIAL

## 2020-08-20 LAB
ALBUMIN SERPL BCP-MCNC: 2.2 G/DL (ref 3.5–5.2)
ALLENS TEST: ABNORMAL
ALP SERPL-CCNC: 137 U/L (ref 55–135)
ALT SERPL W/O P-5'-P-CCNC: 78 U/L (ref 10–44)
ANION GAP SERPL CALC-SCNC: 8 MMOL/L (ref 8–16)
ANISOCYTOSIS BLD QL SMEAR: SLIGHT
AST SERPL-CCNC: 58 U/L (ref 10–40)
BASOPHILS # BLD AUTO: 0.05 K/UL (ref 0–0.2)
BASOPHILS NFR BLD: 0.4 % (ref 0–1.9)
BILIRUB SERPL-MCNC: 0.3 MG/DL (ref 0.1–1)
BUN SERPL-MCNC: 27 MG/DL (ref 8–23)
CALCIUM SERPL-MCNC: 9.7 MG/DL (ref 8.7–10.5)
CHLORIDE SERPL-SCNC: 99 MMOL/L (ref 95–110)
CO2 SERPL-SCNC: 33 MMOL/L (ref 23–29)
CREAT SERPL-MCNC: 1 MG/DL (ref 0.5–1.4)
DACRYOCYTES BLD QL SMEAR: ABNORMAL
DELSYS: ABNORMAL
DIFFERENTIAL METHOD: ABNORMAL
EOSINOPHIL # BLD AUTO: 1.3 K/UL (ref 0–0.5)
EOSINOPHIL NFR BLD: 11.1 % (ref 0–8)
ERYTHROCYTE [DISTWIDTH] IN BLOOD BY AUTOMATED COUNT: 14.7 % (ref 11.5–14.5)
ERYTHROCYTE [SEDIMENTATION RATE] IN BLOOD BY WESTERGREN METHOD: 16 MM/H
EST. GFR  (AFRICAN AMERICAN): >60 ML/MIN/1.73 M^2
EST. GFR  (NON AFRICAN AMERICAN): >60 ML/MIN/1.73 M^2
FIO2: 30
GLUCOSE SERPL-MCNC: 154 MG/DL (ref 70–110)
HCO3 UR-SCNC: 34.8 MMOL/L (ref 24–28)
HCT VFR BLD AUTO: 37.1 % (ref 40–54)
HGB BLD-MCNC: 11.3 G/DL (ref 14–18)
HYPOCHROMIA BLD QL SMEAR: ABNORMAL
IMM GRANULOCYTES # BLD AUTO: 0.1 K/UL (ref 0–0.04)
IMM GRANULOCYTES NFR BLD AUTO: 0.8 % (ref 0–0.5)
LYMPHOCYTES # BLD AUTO: 2.5 K/UL (ref 1–4.8)
LYMPHOCYTES NFR BLD: 20.7 % (ref 18–48)
MAGNESIUM SERPL-MCNC: 2.2 MG/DL (ref 1.6–2.6)
MCH RBC QN AUTO: 27.2 PG (ref 27–31)
MCHC RBC AUTO-ENTMCNC: 30.5 G/DL (ref 32–36)
MCV RBC AUTO: 89 FL (ref 82–98)
MODE: ABNORMAL
MONOCYTES # BLD AUTO: 2.6 K/UL (ref 0.3–1)
MONOCYTES NFR BLD: 21.6 % (ref 4–15)
NEUTROPHILS # BLD AUTO: 5.4 K/UL (ref 1.8–7.7)
NEUTROPHILS NFR BLD: 45.4 % (ref 38–73)
NRBC BLD-RTO: 0 /100 WBC
OVALOCYTES BLD QL SMEAR: ABNORMAL
PCO2 BLDA: 40.9 MMHG (ref 35–45)
PEEP: 5
PH SMN: 7.54 [PH] (ref 7.35–7.45)
PHOSPHATE SERPL-MCNC: 4.4 MG/DL (ref 2.7–4.5)
PLATELET # BLD AUTO: 413 K/UL (ref 150–350)
PMV BLD AUTO: 10.7 FL (ref 9.2–12.9)
PO2 BLDA: 78 MMHG (ref 80–100)
POC BE: 12 MMOL/L
POC SATURATED O2: 97 % (ref 95–100)
POC TCO2: 36 MMOL/L (ref 23–27)
POCT GLUCOSE: 115 MG/DL (ref 70–110)
POCT GLUCOSE: 138 MG/DL (ref 70–110)
POCT GLUCOSE: 146 MG/DL (ref 70–110)
POCT GLUCOSE: 166 MG/DL (ref 70–110)
POCT GLUCOSE: 182 MG/DL (ref 70–110)
POCT GLUCOSE: 93 MG/DL (ref 70–110)
POIKILOCYTOSIS BLD QL SMEAR: SLIGHT
POLYCHROMASIA BLD QL SMEAR: ABNORMAL
POTASSIUM SERPL-SCNC: 3.9 MMOL/L (ref 3.5–5.1)
PROT SERPL-MCNC: 8 G/DL (ref 6–8.4)
RBC # BLD AUTO: 4.16 M/UL (ref 4.6–6.2)
SAMPLE: ABNORMAL
SITE: ABNORMAL
SODIUM SERPL-SCNC: 140 MMOL/L (ref 136–145)
VT: 440
WBC # BLD AUTO: 11.85 K/UL (ref 3.9–12.7)

## 2020-08-20 PROCEDURE — 99900035 HC TECH TIME PER 15 MIN (STAT)

## 2020-08-20 PROCEDURE — 63600175 PHARM REV CODE 636 W HCPCS: Performed by: STUDENT IN AN ORGANIZED HEALTH CARE EDUCATION/TRAINING PROGRAM

## 2020-08-20 PROCEDURE — 25000003 PHARM REV CODE 250: Performed by: INTERNAL MEDICINE

## 2020-08-20 PROCEDURE — 25000003 PHARM REV CODE 250: Performed by: STUDENT IN AN ORGANIZED HEALTH CARE EDUCATION/TRAINING PROGRAM

## 2020-08-20 PROCEDURE — 80053 COMPREHEN METABOLIC PANEL: CPT

## 2020-08-20 PROCEDURE — 20000000 HC ICU ROOM

## 2020-08-20 PROCEDURE — 83735 ASSAY OF MAGNESIUM: CPT

## 2020-08-20 PROCEDURE — 94003 VENT MGMT INPAT SUBQ DAY: CPT

## 2020-08-20 PROCEDURE — 27000221 HC OXYGEN, UP TO 24 HOURS

## 2020-08-20 PROCEDURE — 85025 COMPLETE CBC W/AUTO DIFF WBC: CPT

## 2020-08-20 PROCEDURE — 84100 ASSAY OF PHOSPHORUS: CPT

## 2020-08-20 PROCEDURE — 99233 SBSQ HOSP IP/OBS HIGH 50: CPT | Mod: ,,, | Performed by: INTERNAL MEDICINE

## 2020-08-20 PROCEDURE — 36600 WITHDRAWAL OF ARTERIAL BLOOD: CPT

## 2020-08-20 PROCEDURE — 27200966 HC CLOSED SUCTION SYSTEM

## 2020-08-20 PROCEDURE — 25000003 PHARM REV CODE 250: Performed by: PHYSICIAN ASSISTANT

## 2020-08-20 PROCEDURE — 94761 N-INVAS EAR/PLS OXIMETRY MLT: CPT

## 2020-08-20 PROCEDURE — 94640 AIRWAY INHALATION TREATMENT: CPT

## 2020-08-20 PROCEDURE — 82803 BLOOD GASES ANY COMBINATION: CPT

## 2020-08-20 PROCEDURE — 99900026 HC AIRWAY MAINTENANCE (STAT)

## 2020-08-20 PROCEDURE — 99233 PR SUBSEQUENT HOSPITAL CARE,LEVL III: ICD-10-PCS | Mod: ,,, | Performed by: INTERNAL MEDICINE

## 2020-08-20 PROCEDURE — 25000242 PHARM REV CODE 250 ALT 637 W/ HCPCS: Performed by: STUDENT IN AN ORGANIZED HEALTH CARE EDUCATION/TRAINING PROGRAM

## 2020-08-20 RX ORDER — DIAZEPAM 5 MG/1
5 TABLET ORAL EVERY 8 HOURS
Status: DISCONTINUED | OUTPATIENT
Start: 2020-08-20 | End: 2020-08-21

## 2020-08-20 RX ORDER — ENOXAPARIN SODIUM 100 MG/ML
90 INJECTION SUBCUTANEOUS EVERY 12 HOURS
Status: DISPENSED | OUTPATIENT
Start: 2020-08-20 | End: 2020-08-23

## 2020-08-20 RX ORDER — FENTANYL CITRATE 50 UG/ML
25 INJECTION, SOLUTION INTRAMUSCULAR; INTRAVENOUS EVERY 4 HOURS PRN
Status: DISCONTINUED | OUTPATIENT
Start: 2020-08-20 | End: 2020-08-21

## 2020-08-20 RX ADMIN — MIDAZOLAM 4 MG: 5 INJECTION INTRAMUSCULAR; INTRAVENOUS at 11:08

## 2020-08-20 RX ADMIN — POLYETHYLENE GLYCOL 3350 17 G: 17 POWDER, FOR SOLUTION ORAL at 09:08

## 2020-08-20 RX ADMIN — GUAIFENESIN 200 MG: 200 SOLUTION ORAL at 12:08

## 2020-08-20 RX ADMIN — INSULIN ASPART 15 UNITS: 100 INJECTION, SOLUTION INTRAVENOUS; SUBCUTANEOUS at 12:08

## 2020-08-20 RX ADMIN — ACETYLCYSTEINE 4 ML: 100 SOLUTION ORAL; RESPIRATORY (INHALATION) at 07:08

## 2020-08-20 RX ADMIN — OLANZAPINE 5 MG: 2.5 TABLET, FILM COATED ORAL at 09:08

## 2020-08-20 RX ADMIN — GUAIFENESIN 200 MG: 200 SOLUTION ORAL at 09:08

## 2020-08-20 RX ADMIN — GUAIFENESIN 200 MG: 200 SOLUTION ORAL at 03:08

## 2020-08-20 RX ADMIN — DIAZEPAM 5 MG: 5 TABLET ORAL at 05:08

## 2020-08-20 RX ADMIN — ROSUVASTATIN CALCIUM 10 MG: 10 TABLET, FILM COATED ORAL at 08:08

## 2020-08-20 RX ADMIN — OXYCODONE HYDROCHLORIDE 5 MG: 5 TABLET ORAL at 12:08

## 2020-08-20 RX ADMIN — ACETYLCYSTEINE 4 ML: 100 SOLUTION ORAL; RESPIRATORY (INHALATION) at 08:08

## 2020-08-20 RX ADMIN — STANDARDIZED SENNA CONCENTRATE AND DOCUSATE SODIUM 1 TABLET: 8.6; 5 TABLET ORAL at 09:08

## 2020-08-20 RX ADMIN — FAMOTIDINE 20 MG: 20 TABLET ORAL at 09:08

## 2020-08-20 RX ADMIN — OXYCODONE HYDROCHLORIDE 5 MG: 5 TABLET ORAL at 05:08

## 2020-08-20 RX ADMIN — INSULIN DETEMIR 25 UNITS: 100 INJECTION, SOLUTION SUBCUTANEOUS at 09:08

## 2020-08-20 RX ADMIN — DIAZEPAM 5 MG: 5 TABLET ORAL at 03:08

## 2020-08-20 RX ADMIN — DIAZEPAM 5 MG: 5 TABLET ORAL at 10:08

## 2020-08-20 RX ADMIN — INSULIN ASPART 15 UNITS: 100 INJECTION, SOLUTION INTRAVENOUS; SUBCUTANEOUS at 03:08

## 2020-08-20 RX ADMIN — FENTANYL CITRATE 25 MCG: 50 INJECTION INTRAMUSCULAR; INTRAVENOUS at 08:08

## 2020-08-20 RX ADMIN — FUROSEMIDE 40 MG: 10 INJECTION, SOLUTION INTRAMUSCULAR; INTRAVENOUS at 09:08

## 2020-08-20 RX ADMIN — DEXMEDETOMIDINE HYDROCHLORIDE 0.3 MCG/KG/HR: 4 INJECTION, SOLUTION INTRAVENOUS at 05:08

## 2020-08-20 RX ADMIN — OLANZAPINE 5 MG: 2.5 TABLET, FILM COATED ORAL at 08:08

## 2020-08-20 RX ADMIN — MIDAZOLAM 4 MG: 5 INJECTION INTRAMUSCULAR; INTRAVENOUS at 03:08

## 2020-08-20 RX ADMIN — INSULIN ASPART 2 UNITS: 100 INJECTION, SOLUTION INTRAVENOUS; SUBCUTANEOUS at 09:08

## 2020-08-20 RX ADMIN — STANDARDIZED SENNA CONCENTRATE AND DOCUSATE SODIUM 1 TABLET: 8.6; 5 TABLET ORAL at 08:08

## 2020-08-20 RX ADMIN — ENOXAPARIN SODIUM 90 MG: 60 INJECTION SUBCUTANEOUS at 08:08

## 2020-08-20 RX ADMIN — ACETYLCYSTEINE 4 ML: 100 SOLUTION ORAL; RESPIRATORY (INHALATION) at 01:08

## 2020-08-20 RX ADMIN — DEXMEDETOMIDINE HYDROCHLORIDE 0.2 MCG/KG/HR: 4 INJECTION, SOLUTION INTRAVENOUS at 08:08

## 2020-08-20 RX ADMIN — GUAIFENESIN 200 MG: 200 SOLUTION ORAL at 11:08

## 2020-08-20 RX ADMIN — FAMOTIDINE 20 MG: 20 TABLET ORAL at 08:08

## 2020-08-20 RX ADMIN — INSULIN ASPART 15 UNITS: 100 INJECTION, SOLUTION INTRAVENOUS; SUBCUTANEOUS at 11:08

## 2020-08-20 RX ADMIN — ENOXAPARIN SODIUM 90 MG: 60 INJECTION SUBCUTANEOUS at 12:08

## 2020-08-20 RX ADMIN — GUAIFENESIN 200 MG: 200 SOLUTION ORAL at 08:08

## 2020-08-20 RX ADMIN — OXYCODONE HYDROCHLORIDE 5 MG: 5 TABLET ORAL at 11:08

## 2020-08-20 RX ADMIN — OXYCODONE HYDROCHLORIDE 5 MG: 5 TABLET ORAL at 06:08

## 2020-08-20 RX ADMIN — INSULIN ASPART 15 UNITS: 100 INJECTION, SOLUTION INTRAVENOUS; SUBCUTANEOUS at 09:08

## 2020-08-20 NOTE — ASSESSMENT & PLAN NOTE
70M  with HTN, HLD, and DM who presents to the [place] ED for SOB and found to be COVID positive.  Patient transferred to [facility] on 7/26/20 for higher level of care. Vent AC FiO2 30%, PEEP 5 and tidal 440.    - Was previously paralyzed, proned, sedated. Nimbex discontinued on 08/04.  - Remdesivir and dexamethasone complete  - Ceftriaxone and azithromycin complete  - Levophed restarted on 8/14, discontinued that evening  - Blood and sputum cx showed no growth  - Therapeutic lovenox for hypercoagulable state in COVID  - Continue weaning precedex and increasing fentanyl dose to help with coughing  guaifenesin and acetylcysteine for secretions and coughing   - Added zyprexa on 08/07 to help wean sedation.  - valium pushed to Q8    -Discussed with pt's Wife and daughter, state they would like to pursue PEG/trach if unable to wean from vent.   - trial of SBTs but patient continues to desaturate with coughing, general surgery consulted for trach/peg placement on 8/25.   -Discussed with CM to begin referral process for LTAC placement with [facility] under review.

## 2020-08-20 NOTE — PROGRESS NOTES
Patient surgery has been moved to 8/25/20. Please hold feeds the midnight prior and hold anticoagulation Monday.    Ruth Ann Villagran MD  Surgery Resident, PGY-5  Pager [phone]  08/20/2020

## 2020-08-20 NOTE — PLAN OF CARE
08/20/20 1059   Post-Acute Status   Post-Acute Authorization Placement   Post-Acute Placement Status Referrals Sent       SW sent  LTAC referrals via [facility]. Awaiting facility placement.    11:14 AM    Pt under review with [facility]. Requested COVID testing results. SW will send available results.     Alana Diaz LMSW  Case Management Social Worker   [facility] [address]

## 2020-08-20 NOTE — ASSESSMENT & PLAN NOTE
- Hx of elevated LFTs dating back to 2015  - Acute hepatitis panel and HIV - negative  - Stable. Trend CMP Q48

## 2020-08-20 NOTE — ANESTHESIA PREPROCEDURE EVALUATION
[facility]  Anesthesia Pre-Operative Evaluation         Patient Name: Anup Miller  YOB: 1950  MRN: [id number]    SUBJECTIVE:     Pre-operative evaluation for Procedure(s) (LRB):  INSERTION, TRACHEOSTOMY TUBE; COVID + (N/A)  INSERTION, PEG TUBE; COVID + (N/A)     08/20/2020    Anup Miller is a 70 y.o. male w/ a significant PMHx of DM2, HTN, KHOI who was admitted on 7/26/20 with Covid. He was intubated shortly after admission and has remained intubated since then. He has continuously failed his SBTs although he has remained on minimal vent settings. Primary team now is considering trach and PEG.    Wife consented over the phone with 2 witnesses. Physical exam deferred, due to covid status, to day of surgery.     Patient now presents for the above procedure(s).      LDA:   Percutaneous Central Line Insertion/Assessment - Triple Lumen  07/29/20 1729 right internal jugular (Active)   Verification by X-ray Yes 08/19/20 0700   Site Assessment No redness;No drainage 08/19/20 1900   Line Securement Device Secured with sutures 08/19/20 1900   Dressing Type Transparent (Tegaderm) 08/20/20 0400   Dressing Status Clean;Dry;Intact 08/20/20 0400   Dressing Intervention Integrity maintained 08/20/20 0400   Date on Dressing 08/26/20 08/19/20 0300   Dressing Due to be Changed 08/26/20 08/19/20 0300   Distal Patency/Care flushed w/o difficulty 08/20/20 0400   Medial 1 Patency/Care flushed w/o difficulty 08/20/20 0400   Proximal 1 Patency/Care flushed w/o difficulty 08/20/20 0400   Waveform Other (Comments) 08/17/20 0715   Line Necessity Review Incompatible infusions;Poor venous access 08/19/20 0700   Number of days: 21            Peripheral IV - Single Lumen 08/15/20 1418 20 G Left Hand (Active)   Site Assessment Clean;Dry;Intact;No redness;No swelling 08/20/20 0400   Line Status Heparin locked 08/20/20 0400   Dressing Status Clean;Dry;Intact 08/20/20 0400   Dressing Intervention Integrity  "maintained 08/20/20 0400   Dressing Change Due 08/19/20 08/19/20 0300   Site Change Due 08/19/20 08/18/20 1900   Reason Not Rotated Not due 08/19/20 0300   Number of days: 4            NG/OG Tube 07/26/20 1756 [place] sump;orogastric 18 Fr. [place] mouth (Active)   Placement Check placement verified by aspirate characteristics 08/20/20 0400   Tolerance no signs/symptoms of discomfort 08/20/20 0400   Securement secured to cheek 08/20/20 0400   Clamp Status/Tolerance unclamped 08/20/20 0400   Suction Setting/Drainage Method suction at the bedside 08/19/20 1500   Insertion Site Appearance no redness, warmth, tenderness, skin breakdown, drainage 08/20/20 0400   Drainage None 08/20/20 0400   Flush/Irrigation flushed w/;water;no resistance met 08/19/20 1500   Feeding Type continuous 08/20/20 0400   Feeding Action feeding continued 08/20/20 0400   Current Rate (mL/hr) 40 mL/hr 08/19/20 1900   Goal Rate (mL/hr) 40 mL/hr 08/19/20 1900   Intake (mL) 40 mL 08/18/20 1900   Water Bolus (mL) 150 mL 08/18/20 2100   Tube Output(mL)(Include Discarded Residual) 200 mL 08/15/20 0400   Rate Formula Tube Feeding (mL/hr) 60 mL/hr 08/12/20 1900   Formula Name DIABETIC SOURCE 08/19/20 1500   Intake (mL) - Formula Tube Feeding 40 08/20/20 0600   Residual Amount (ml) 10 ml 08/19/20 1900   Number of days: 24            Urethral Catheter 08/17/20 1553 (Active)   Site Assessment Clean;Intact 08/20/20 0400   Collection Container Urimeter 08/20/20 0400   Securement Method secured to top of thigh w/ adhesive device 08/20/20 0400   Catheter Care Performed yes 08/20/20 0400   Reason for Continuing Urinary Catheterization Critically ill in ICU requiring intensive monitoring 08/20/20 0400   CAUTI Prevention Bundle StatLock in place w 1" slack;Intact seal between catheter & drainage tubing;Drainage bag/urimeter off the floor 08/19/20 1900   Output (mL) 30 mL 08/20/20 0600   Number of days: 2       Prev airway: Placement Date: 12/06/13; Method of " Intubation: Direct laryngoscopy; Inserted by: CRNA; Airway Device: Endotracheal Tube; Mask Ventilation: Easy; Intubated: Postinduction; Blade: Young #2; Airway Device Size: 7.0; Style: Cuffed; Cuff Inflation: Minimal occlusive pressure; Placement Verified By: Auscultation, Capnometry; Grade: Grade II; Complicating Factors: None; Intubation Findings: Positive EtCO2, Bilateral breath sounds, Atraumatic/Condition of teeth unchanged;  Depth of Insertion: 23; Securment: Lips; Complications: None; Breath Sounds: Equal Bilateral; Insertion Attempts: 1; Removal Date: 12/06/13;     Drips:    dexmedetomidine (PRECEDEX) infusion 0.3 mcg/kg/hr (08/20/20 0600)    dextrose 10 % in water (D10W)      fentanyl Stopped (08/18/20 2200)       Patient Active Problem List   Diagnosis    Uncontrolled type 2 diabetes mellitus    Elevated liver enzymes    Hypertension associated with diabetes    Hyperlipidemia associated with type 2 diabetes mellitus    Multiple pulmonary nodules determined by computed tomography of lung    KHOI on CPAP    Elevated PSA    Hx of colonic polyp    Cardiac arrhythmia    Acute hypoxemic respiratory failure    Pneumonia due to COVID-19 virus    COVID-19 virus infection       Review of patient's allergies indicates:  No Known Allergies    Current Inpatient Medications:   acetylcysteine 100 mg/ml (10%)  4 mL Nebulization TID WAKE    diazePAM  5 mg Oral Q6H    famotidine  20 mg Per NG tube BID    furosemide (LASIX) IV  40 mg Intravenous Daily    guaifenesin 100 mg/5 ml  200 mg Per OG tube Q4H    insulin aspart U-100  15 Units Subcutaneous Q4H [facility]    insulin detemir U-100  25 Units Subcutaneous BID    OLANZapine  5 mg Per NG tube BID    oxyCODONE  5 mg Oral Q6H    polyethylene glycol  17 g Per NG tube Daily    rosuvastatin  10 mg Per NG tube QHS    senna-docusate 8.6-50 mg  1 tablet Per NG tube BID       No current facility-administered medications on file prior to encounter.      Current  Outpatient Medications on File Prior to Encounter   Medication Sig Dispense Refill    ALREX 0.2 % DrpS Place 1 drop into both eyes 3 (three) times daily as needed.      azithromycin (Z-TEMI) 250 MG tablet       ertugliflozin (STEGLATRO) 15 mg Tab Take 1 tablet by mouth once daily. 30 tablet 11    glimepiride (AMARYL) 4 MG tablet Take 2 tablets (8 mg total) by mouth once daily. 180 tablet 3    hydroCHLOROthiazide (HYDRODIURIL) 25 MG tablet TAKE 1 TABLET BY MOUTH ONE TIME DAILY 90 tablet 0    insulin (LANTUS SOLOSTAR U-100 INSULIN) glargine 100 units/mL (3mL) SubQ pen Inject 10 Units into the skin every evening. 3 mL 11    levocetirizine (XYZAL) 5 MG tablet Take 1 tablet (5 mg total) by mouth every morning. 30 tablet 11    losartan (COZAAR) 100 MG tablet TAKE 1 TABLET BY MOUTH ONE TIME DAILY 90 tablet 3    metFORMIN (GLUCOPHAGE-XR) 750 MG XR 24hr tablet Take 1 tablet (750 mg total) by mouth 2 (two) times daily with meals. 180 tablet 3    multivitamin capsule Take 1 capsule by mouth once daily.      pantoprazole (PROTONIX) 40 MG tablet TAKE 1 TABLET BY MOUTH ONE TIME DAILY 90 tablet 3    pioglitazone (ACTOS) 15 MG tablet Take 1 tablet (15 mg total) by mouth once daily. 90 tablet 1    rosuvastatin (CRESTOR) 10 MG tablet TAKE 1 TABLET BY MOUTH DAILY 90 tablet 3       Past Surgical History:   Procedure Laterality Date    BACK SURGERY      CHOLECYSTECTOMY  2013    COLONOSCOPY N/A 7/19/2018    Procedure: COLONOSCOPY;  Surgeon: Chacorta Lopez III, MD;  Location: [facility];  Service: Endoscopy;  Laterality: N/A;       Social History     Socioeconomic History    Marital status:      Spouse name: yoselin    Number of children: 1    Years of education: Not on file    Highest education level: Not on file   Occupational History    Occupation:    Social Needs    Financial resource strain: Not on file    Food insecurity     Worry: Not on file     Inability: Not on file    Transportation needs      Medical: Not on file     Non-medical: Not on file   Tobacco Use    Smoking status: Never Smoker    Smokeless tobacco: Never Used   Substance and Sexual Activity    Alcohol use: No     Alcohol/week: 0.0 standard drinks    Drug use: No    Sexual activity: Yes     Partners: Female   Lifestyle    Physical activity     Days per week: Not on file     Minutes per session: Not on file    Stress: Not on file   Relationships    Social connections     Talks on phone: Not on file     Gets together: Not on file     Attends Zoroastrianism service: Not on file     Active member of club or organization: Not on file     Attends meetings of clubs or organizations: Not on file     Relationship status: Not on file   Other Topics Concern    Not on file   Social History Narrative    Not on file       OBJECTIVE:     Vital Signs Range (Last 24H):  Temp:  [36.9 °C (98.4 °F)-37.7 °C (99.8 °F)]   Pulse:  [104-129]   Resp:  [6-87]   BP: (122-163)/(71-92)   SpO2:  [91 %-100 %]       Significant Labs:  Lab Results   Component Value Date    WBC 11.85 08/20/2020    HGB 11.3 (L) 08/20/2020    HCT 37.1 (L) 08/20/2020     (H) 08/20/2020    CHOL 145 09/27/2019    TRIG 162 (H) 08/17/2020    HDL 45 09/27/2019    ALT 78 (H) 08/20/2020    AST 58 (H) 08/20/2020     08/20/2020    K 3.9 08/20/2020    CL 99 08/20/2020    CREATININE 1.0 08/20/2020    BUN 27 (H) 08/20/2020    CO2 33 (H) 08/20/2020    TSH 0.177 (L) 07/26/2020    PSA 10.1 (H) 07/25/2019    INR 1.0 07/26/2020    GLUF 123 (H) 02/10/2011    HGBA1C 8.3 (H) 07/26/2020       Diagnostic Studies: No relevant studies.    EKG:   Results for orders placed or performed during the hospital encounter of 07/26/20   EKG 12-lead    Collection Time: 08/02/20  5:28 PM    Narrative    Test Reason : I48.91,    Vent. Rate : 074 BPM     Atrial Rate : 094 BPM     P-R Int : 154 ms          QRS Dur : 090 ms      QT Int : 416 ms       P-R-T Axes : -84 089 097 degrees     QTc Int : 461 ms    Sinus rhythm  with a competing ectopic atrial rhythm  Early transition, increased R/S ratio in V1, consider posterior infarct  ST elevation in the inferior and anterolateral leads - likely pericarditis,  cannot exclude acute infarction  Abnormal ECG  When compared with ECG of 29-JUL-2020 08:31,  Ectopic atrial rhythm has replaced Sinus rhythm  ST elevation is more pronounced  QT has shortened  Confirmed by CHAVA MARTINEZ MD ([id number]) on 8/2/2020 7:33:59 PM    Referred By: PUNEET VAZQUEZ           Confirmed By:CHAVA MARTINEZ MD       2D ECHO:  TTE:  Results for orders placed or performed during the hospital encounter of 07/26/20   Echo Color Flow Doppler? Yes   Result Value Ref Range    Ascending aorta 3.03 cm    STJ 2.60 cm    AV mean gradient 3 mmHg    Ao peak win 1.19 m/s    Ao VTI 19.29 cm    IVRT 71.36 msec    IVS 0.85 0.6 - 1.1 cm    LA size 3.50 cm    Left Atrium Major Axis 5.10 cm    Left Atrium Minor Axis 4.62 cm    LVIDD 3.80 3.5 - 6.0 cm    LVIDS 2.69 2.1 - 4.0 cm    LVOT diameter 2.10 cm    LVOT peak VTI 20.11 cm    PW 0.70 0.6 - 1.1 cm    MV Peak A Win 0.71 m/s    E wave decelartion time 220.44 msec    MV Peak E Win 0.52 m/s    RA Major Axis 4.27 cm    RA Width 3.44 cm    RVDD 3.68 cm    Sinus 3.34 cm    TAPSE 1.44 cm    TDI LATERAL 0.07 m/s    TDI SEPTAL 0.07 m/s    LA WIDTH 3.42 cm    MV stenosis pressure 1/2 time 63.93 ms    LV Diastolic Volume 53.17 mL    LV Systolic Volume 26.78 mL    RV S' 9.31 cm/s    LVOT peak win 1.06 m/s    LV LATERAL E/E' RATIO 7.43 m/s    LV SEPTAL E/E' RATIO 7.43 m/s    FS 29 %    LA volume 49.33 cm3    LV mass 82.35 g    Left Ventricle Relative Wall Thickness 0.37 cm    AV valve area 3.61 cm2    AV Velocity Ratio 0.89     AV index (prosthetic) 1.04     MV valve area p 1/2 method 3.44 cm2    E/A ratio 0.73     Mean e' 0.07 m/s    LVOT area 3.5 cm2    LVOT stroke volume 69.62 cm3    AV peak gradient 6 mmHg    E/E' ratio 7.43 m/s    LV Systolic Volume Index 11.9 mL/m2    LV Diastolic  Volume Index 23.65 mL/m2    LA Volume Index 21.9 mL/m2    LV Mass Index 37 g/m2    BSA 2.32 m2    Narrative    · Normal left ventricular systolic function. The estimated ejection   fraction is 65%.  · No wall motion abnormalities.  · Normal LV diastolic function.  · Normal right ventricular systolic function.  · Mechanically ventilated; cannot use inferior caval vein diameter to   estimate central venous pressure.          JEROD:  No results found for this or any previous visit.    ASSESSMENT/PLAN:         Anesthesia Evaluation    I have reviewed the Patient Summary Reports.    I have reviewed the Nursing Notes.    I have reviewed the Medications.     Review of Systems  Anesthesia Hx:  No problems with previous Anesthesia  Denies Family Hx of Anesthesia complications.   Denies Personal Hx of Anesthesia complications.   Social:  No Alcohol Use, Non-Smoker    Hematology/Oncology:  Hematology Normal   Oncology Normal     Cardiovascular:   Hypertension Denies MI.   Denies CABG/stent.      hyperlipidemia    Pulmonary:   Denies COPD.  Denies Asthma. Sleep Apnea, CPAP Multiple pulmonary nodules determined by computed tomography of lung   Renal/:  Renal/ Normal     Hepatic/GI:   Denies GERD. Liver Disease,  Denies Hepatitis. Fatty liver disease, nonalcoholic  Elevated liver enzymes   Musculoskeletal:  Musculoskeletal Normal    Neurological:   Denies CVA. Denies Seizures.    Endocrine:   Diabetes, type 2 Denies Hypothyroidism. Denies Hyperthyroidism.           Anesthesia Plan  Type of Anesthesia, risks & benefits discussed:  Anesthesia Type:  general  Patient's Preference:   Intra-op Monitoring Plan: standard ASA monitors  Intra-op Monitoring Plan Comments:   Post Op Pain Control Plan: multimodal analgesia  Post Op Pain Control Plan Comments:   Induction:   IV  Beta Blocker:  Patient is not currently on a Beta-Blocker (No further documentation required).       Informed Consent: Patient understands risks and agrees with  Anesthesia plan.  Questions answered. Anesthesia consent signed with patient.  ASA Score: 4     Day of Surgery Review of History & Physical: I have interviewed and examined the patient. I have reviewed the patient's H&P dated:    H&P update referred to the surgeon.         Ready For Surgery From Anesthesia Perspective.

## 2020-08-20 NOTE — PROGRESS NOTES
[facility] - ICU 16   Critical Care Medicine  Progress Note    Patient Name: Anup Miller  MRN: [id number]  Admission Date: 7/26/2020  Hospital Length of Stay: 25 days  Code Status: Full Code  Attending Provider: Chantell Davis MD  Primary Care Provider: Bryce Gonzales MD   Principal Problem: Pneumonia due to COVID-19 virus    Subjective:     HPI:  Anup Miller is a 70 y.o. male patient with a PMHx of HTN, HLD, and DM who presents to the [place] [facility] for evaluation of SOB which  1 week ago. Pt became more SOB and has an O2 sat of low 70s upon arrival on RA. Symptoms are worsening and moderate in severity. No mitigating or exacerbating factors reported. Associated sxs include cough and fever. Patient denies any congestion, sore throat, CP, abd pain, N/V, back pain, HA, weakness, and all other sxs at this time. No prior Tx reported. No further complaints or concerns at this time. Patient was placed on Bipap for a few hours and subsequently intubated. COVID positive. Was started on IV dexamethasone, Ceftriaxone, and Azithromycin. Patient [place] [facility] [place] of [place]26/20 for higher level of care.     Hospital/ICU Course:  As of 7/29, the patient's oxygenation continued to worsen with P:F <150. R IJ and Arterial Line placed. Pt sedated, paralyzed, and proned at 9:30pm with improved oxygenation. Repeat AM gas on 7/30 Arterial Blood Gas result:  pO2 112; pCO2 58.9; pH 7.256;  HCO3 26.2, %O2 Sat 97%. FiO2 60, 8 PEEP. Pt supinated at 4:10 pm on 7/30 with Arterial Blood Gas result:  pO2 106; pCO2 52.2; pH 7.349;  HCO3 28.7, %O2 Sat 106. (P:F 212). FiO2 50, 10 PEEP.    As of 7/31, paralytic d/c'ed and sedation slowly weaned. Propofol d/c'ed on 8/1 2/2 triglycerides with ketamine and versed initiated. Now requiring higher vent settings due to dyssynchrony, will continue to increase sedation. Patient paralyzed again on 8/1 for vent dyssynchrony. Nimbex was discontinued on 08/03  and sedative medications were increased, however, patient struggled to pull enough tidal volume and was desatting even on maximum sedation so nimbex was restarted. Nimbex was stopped on 08/04 and restarted propofol. As patient became more agitated, ketamine was added on 08/06. Sedation was attempted to be weaned off by adding seroquel and zyprexa. Versed and ketamine were weaned off. Patient was weaned off of levo on morning of 08/09. Patient failed multiple SBTs and it was discussed with family of likely PEG/trach, pending their decision. Patient has continued agitation with coughing/ increased BPs requiring propofol, fentanyl, and precedex with scheduled valium. Discussed with family that as patient continues to require ventillator support past 21d, would benefit from trach/peg. Family agrees to pursue trach/peg/LTAC placement. General surgery consulted for trach/peg with plan for 8/25.     Interval History/Significant Events:   Pt remains off pressors. Failed SBT this am due to coughing with desaturation after 1 minute. Weaning sedation with goal to d/c precedex in the next days with patient still on fentanyl and valium.       Review of Systems   Unable to perform ROS: Intubated     Objective:     Vital Signs (Most Recent):  Temp: 99.2 °F (37.3 °C) (08/20/20 1100)  Pulse: 104 (08/20/20 1200)  Resp: (!) 24 (08/20/20 1202)  BP: 100/65 (08/20/20 1200)  SpO2: 98 % (08/20/20 1200) Vital Signs (24h Range):  Temp:  [98.4 °F (36.9 °C)-99.8 °F (37.7 °C)] 99.2 °F (37.3 °C)  Pulse:  [] 104  Resp:  [6-87] 24  SpO2:  [91 %-100 %] 98 %  BP: (100-160)/(65-89) 100/65   Weight: 112.5 kg (248 lb 0.3 oz)  Body mass index is 36.63 kg/m².      Intake/Output Summary (Last 24 hours) at 8/20/2020 1327  Last data filed at 8/20/2020 1200  Gross per 24 hour   Intake 998 ml   Output 2320 ml   Net -1322 ml       Physical Exam  Vitals signs and nursing note reviewed.   Constitutional:       Appearance: He is obese.   HENT:      Head:  Normocephalic and atraumatic.      Mouth/Throat:      Comments: Intubated  Eyes:      General:         Right eye: No discharge.         Left eye: No discharge.   Cardiovascular:      Rate and Rhythm: Normal rate and regular rhythm.      Pulses: Normal pulses.      Heart sounds: Normal heart sounds.   Pulmonary:      Effort: No respiratory distress.      Comments: Intubated.  Abdominal:      General: Bowel sounds are normal.      Palpations: Abdomen is soft.      Tenderness: There is no abdominal tenderness.   Musculoskeletal:         General: No swelling.   Skin:     General: Skin is warm and dry.   Neurological:      Comments: sedated         Vents:  Vent Mode: A/C (08/20/20 0744)  Ventilator Initiated: Yes(chart correction) (07/26/20 2108)  Set Rate: 16 BPM (08/20/20 0744)  Vt Set: 440 mL (08/20/20 0744)  Pressure Support: 0 cmH20 (08/20/20 0744)  PEEP/CPAP: 5 cmH20 (08/20/20 0744)  Oxygen Concentration (%): 30 (08/20/20 1200)  Peak Airway Pressure: 28 cmH2O (08/20/20 0744)  Plateau Pressure: 40 cmH20 (08/20/20 0744)  Total Ve: 11.1 mL (08/20/20 0744)  F/VT Ratio<105 (RSBI): (!) 13.07(Simultaneous filing. User may not have seen previous data.) (08/20/20 0744)  Lines/Drains/Airways     Central Venous Catheter Line            Percutaneous Central Line Insertion/Assessment - Triple Lumen  07/29/20 1729 right internal jugular 21 days          Drain                 NG/OG Tube 07/26/20 1756 [place] sump;orogastric 18 Fr. Center mouth 24 days         Urethral Catheter 08/17/20 1553 2 days          Airway                 Airway - Non-Surgical 07/26/20 1724 Endotracheal Tube 24 days          Peripheral Intravenous Line                 Peripheral IV - Single Lumen 08/15/20 1418 20 G Left Hand 4 days              Significant Labs:    CBC/Anemia Profile:  Recent Labs   Lab 08/19/20  0358 08/20/20  0352   WBC 9.54 11.85   HGB 10.9* 11.3*   HCT 36.0* 37.1*   * 413*   MCV 89 89   RDW 14.5 14.7*        Chemistries:  Recent  Labs   Lab 08/19/20  0358 08/20/20  0352    140   K 3.9 3.9    99   CO2 29 33*   BUN 20 27*   CREATININE 1.0 1.0   CALCIUM 9.8 9.7   ALBUMIN 2.0* 2.2*   PROT 7.6 8.0   BILITOT 0.3 0.3   ALKPHOS 119 137*   ALT 66* 78*   AST 49* 58*   MG 2.2 2.2   PHOS 4.4 4.4       All pertinent labs within the past 24 hours have been reviewed.    Significant Imaging:  I have reviewed all pertinent imaging results/findings within the past 24 hours.      ABG  Recent Labs   Lab 08/20/20  0420   PH 7.538*   PO2 78*   PCO2 40.9   HCO3 34.8*   BE 12     Assessment/Plan:     Pulmonary  Acute hypoxemic respiratory failure  - see Pneumonia due to COVID 19    Cardiac/Vascular  Hyperlipidemia associated with type 2 diabetes mellitus  - Restart Crestor once extubated     Hypertension associated with diabetes  Continue to hold antihypertensives in setting of recent shock and SBP in 120s.    Endocrine  Uncontrolled type 2 diabetes mellitus  - A1c 8.3%  - started on insulin gtt on 08/05 due to elevated BG but discontinued 08/07  - Levemir 25U BID  - Aspart 15U Q4H  - BG at goal of 140-180 currently    GI  Elevated liver enzymes  - Hx of elevated LFTs dating back to 2015  - Acute hepatitis panel and HIV - negative  - Stable. Trend CMP Q48    Other  * Pneumonia due to COVID-19 virus  70M  with HTN, HLD, and DM who presents to the [place] ED for SOB and found to be COVID positive.  [place] [facility] higher level of care. Vent AC FiO2 30%, PEEP 5 and tidal 440.    - Was previously paralyzed, proned, sedated. Nimbex discontinued on 08/04.  - Remdesivir and dexamethasone complete  - Ceftriaxone and azithromycin complete  - Levophed restarted on 8/14, discontinued that evening  - Blood and sputum cx showed no growth  - Therapeutic lovenox for hypercoagulable state in COVID  - Continue weaning precedex and increasing fentanyl dose to help with coughing  guaifenesin and acetylcysteine for secretions and coughing   -  Added zyprexa on 08/07 to help wean sedation.  - valium pushed to Q8    -Discussed with pt's Wife and daughter, state they would like to pursue PEG/trach if unable to wean from vent.   - trial of SBTs but patient continues to desaturate with coughing, general surgery consulted for trach/peg placement on 8/25.   -Discussed with CM to begin referral process for LTAC placement with [facility] under review.         Critical Care Daily Checklist:    A: Awake: RASS Goal/Actual Goal: RASS Goal: -2-->light sedation  Actual: Hackett Agitation Sedation Scale (RASS): Light sedation   B: Spontaneous Breathing Trial Performed? Spon. Breathing Trial Initiated?: Initiated (08/19/20 0900)   C: SAT & SBT Coordinated?  yes                      D: Delirium: CAM-ICU Overall CAM-ICU: Positive   E: Early Mobility Performed? Yes   F: Feeding Goal: Goals: Meet % EEN, EPN by RD f/u date  Status: Nutrition Goal Status: goal met   Current Diet Order   Procedures    Diet NPO      AS: Analgesia/Sedation Fentanyl, precedex and valium   T: Thromboembolic Prophylaxis Enoxaparin 90 mg   H: HOB > 300 Yes   U: Stress Ulcer Prophylaxis (if needed) famotidine   G: Glucose Control detemir 25 and aspart 15 Q4   B: Bowel Function Stool Occurrence: 1   I: Indwelling Catheter (Lines & Harris) Necessity Central line, PIV, NG, ET and harris   D: De-escalation of Antimicrobials/Pharmacotherapies n/a    Plan for the day/ETD Weaning sedation    Code Status:  Family/Goals of Care: Full Code         Critical secondary to Patient has a condition that poses threat to life and bodily function: Severe Respiratory Distress      Critical care was time spent personally by me on the following activities: development of treatment plan with patient or surrogate and bedside caregivers, discussions with consultants, evaluation of patient's response to treatment, examination of patient, ordering and performing treatments and interventions, ordering and review of  laboratory studies, ordering and review of radiographic studies, pulse oximetry, re-evaluation of patient's condition. This critical care time did not overlap with that of any other provider or involve time for any procedures.     Aleta Rivera MD  Critical Care Medicine  [facility] - ICU 16 WT

## 2020-08-20 NOTE — SUBJECTIVE & OBJECTIVE
Interval History/Significant Events:   Pt remains off pressors. Failed SBT this am due to Rapid shallow breathing after 2 minutes. Weaning sedation with goal do d/c diazepam and precedex in the next days.       Review of Systems   Unable to perform ROS: Intubated     Objective:     Vital Signs (Most Recent):  Temp: 99.2 °F (37.3 °C) (08/20/20 1100)  Pulse: 104 (08/20/20 1200)  Resp: (!) 24 (08/20/20 1202)  BP: 100/65 (08/20/20 1200)  SpO2: 98 % (08/20/20 1200) Vital Signs (24h Range):  Temp:  [98.4 °F (36.9 °C)-99.8 °F (37.7 °C)] 99.2 °F (37.3 °C)  Pulse:  [] 104  Resp:  [6-87] 24  SpO2:  [91 %-100 %] 98 %  BP: (100-160)/(65-89) 100/65   Weight: 112.5 kg (248 lb 0.3 oz)  Body mass index is 36.63 kg/m².      Intake/Output Summary (Last 24 hours) at 8/20/2020 1327  Last data filed at 8/20/2020 1200  Gross per 24 hour   Intake 998 ml   Output 2320 ml   Net -1322 ml       Physical Exam  Vitals signs and nursing note reviewed.   Constitutional:       Appearance: He is obese.   HENT:      Head: Normocephalic and atraumatic.      Mouth/Throat:      Comments: Intubated  Eyes:      General:         Right eye: No discharge.         Left eye: No discharge.   Cardiovascular:      Rate and Rhythm: Normal rate and regular rhythm.      Pulses: Normal pulses.      Heart sounds: Normal heart sounds.   Pulmonary:      Effort: No respiratory distress.      Comments: Intubated.  Abdominal:      General: Bowel sounds are normal.      Palpations: Abdomen is soft.      Tenderness: There is no abdominal tenderness.   Musculoskeletal:         General: No swelling.   Skin:     General: Skin is warm and dry.   Neurological:      Comments: sedated         Vents:  Vent Mode: A/C (08/20/20 0744)  Ventilator Initiated: Yes(chart correction) (07/26/20 2108)  Set Rate: 16 BPM (08/20/20 0744)  Vt Set: 440 mL (08/20/20 0744)  Pressure Support: 0 cmH20 (08/20/20 0744)  PEEP/CPAP: 5 cmH20 (08/20/20 0744)  Oxygen Concentration (%): 30 (08/20/20  1200)  Peak Airway Pressure: 28 cmH2O (08/20/20 0744)  Plateau Pressure: 40 cmH20 (08/20/20 0744)  Total Ve: 11.1 mL (08/20/20 0744)  F/VT Ratio<105 (RSBI): (!) 13.07(Simultaneous filing. User may not have seen previous data.) (08/20/20 0744)  Lines/Drains/Airways     Central Venous Catheter Line            Percutaneous Central Line Insertion/Assessment - Triple Lumen  07/29/20 1729 right internal jugular 21 days          Drain                 NG/OG Tube 07/26/20 1756 [place] sump;orogastric 18 Fr. Center mouth 24 days         Urethral Catheter 08/17/20 1553 2 days          Airway                 Airway - Non-Surgical 07/26/20 1724 Endotracheal Tube 24 days          Peripheral Intravenous Line                 Peripheral IV - Single Lumen 08/15/20 1418 20 G Left Hand 4 days              Significant Labs:    CBC/Anemia Profile:  Recent Labs   Lab 08/19/20  0358 08/20/20  0352   WBC 9.54 11.85   HGB 10.9* 11.3*   HCT 36.0* 37.1*   * 413*   MCV 89 89   RDW 14.5 14.7*        Chemistries:  Recent Labs   Lab 08/19/20  0358 08/20/20  0352    140   K 3.9 3.9    99   CO2 29 33*   BUN 20 27*   CREATININE 1.0 1.0   CALCIUM 9.8 9.7   ALBUMIN 2.0* 2.2*   PROT 7.6 8.0   BILITOT 0.3 0.3   ALKPHOS 119 137*   ALT 66* 78*   AST 49* 58*   MG 2.2 2.2   PHOS 4.4 4.4       All pertinent labs within the past 24 hours have been reviewed.    Significant Imaging:  I have reviewed all pertinent imaging results/findings within the past 24 hours.

## 2020-08-20 NOTE — ASSESSMENT & PLAN NOTE
- A1c 8.3%  - started on insulin gtt on 08/05 due to elevated BG but discontinued 08/07  - Levemir 25U BID  - Aspart 15U Q4H  - BG at goal of 140-180 currently

## 2020-08-20 NOTE — ASSESSMENT & PLAN NOTE
Continue to hold antihypertensives in setting of recent shock and SBP in 120s.   Normal rate, regular rhythm.  Heart sounds S1, S2.  No murmurs, rubs or gallops.

## 2020-08-20 NOTE — PT/OT/SLP PROGRESS
Occupational Therapy      Patient Name:  Anup Miller   MRN:  [id number]    Patient not seen today secondary to intubated and sedated with PEG/trach planned for 8/21  . Will follow-up 8/22/20.    BIANKA Marcelo  8/20/2020

## 2020-08-20 NOTE — PROGRESS NOTES
"[facility] - ICU 16 WT  Adult Nutrition  Progress Note    SUMMARY       Recommendations    1.) Change EN. Initiate at 20mL/hr and advance 5-10mL Q8hr as tolerated. EN provides 1440kcal, 132gm of protein, 1209mL of free water. Add 1 packet of beneprotein TID to provide 100% of EPN. Add free water per MD recommendations.   2.) Monitor electrolytes and replace as needed.     Goals: Meet % EEN, EPN by RD f/u date  Nutrition Goal Status: goal not met  Communication of RD Recs: other (comment)(POC)    Reason for Assessment    Reason For Assessment: RD follow-up  Diagnosis: other (see comments)(PNA  COVID19)  Relevant Medical History: DM, HTN, HLD  Interdisciplinary Rounds: did not attend  General Information Comments: Pt intubated, sedated and on mechanical ventilation. GI- LBM . Pt EN was changed to Diabetisource, however nsg staff reports residuals of 10mL and 200mL. Wt remains stable, however pt has skin breakdown to gluteal cleft. To help prevent the spread of COVID-19, NFPE was not completed at this time.   Nutrition Discharge Planning: Unable to determine    Nutrition Risk Screen    Nutrition Risk Screen: tube feeding or parenteral nutrition    Nutrition/Diet History    Food Allergies: NKFA  Factors Affecting Nutritional Intake: NPO, on mechanical ventilation    Anthropometrics    Temp: 99.2 °F (37.3 °C)  Height: 5' 9" (175.3 cm)  Height (inches): 69 in  Weight Method: Bed Scale  Weight: 112.5 kg (248 lb 0.3 oz)  Weight (lb): 248.02 lb  Ideal Body Weight (IBW), Male: 160 lb  % Ideal Body Weight, Male (lb): 152.5 %  BMI (Calculated): 36.6  BMI Grade: 35 - 39.9 - obesity - grade II  Usual Body Weight (UBW), k.6 kg(Per chart review)  % Usual Body Weight: 97.92  % Weight Change From Usual Weight: -2.29 %       Lab/Procedures/Meds    Pertinent Labs Reviewed: reviewed  Pertinent Labs Comments: Hgb 11.3, Hct 37.1, CO2 33, BUN 27, Glucose 154, Alb 2.2, AST 58, ALT 78  Pertinent Medications " Reviewed: reviewed  Pertinent Medications Comments: famotidine, furosemide, insulin, oxycodone, polytheylene glycol, senna, precedex, fentanyl    Physical Findings/Assessment     wound ulcer gluteal cleft  Edema 2+ generalized    Estimated/Assessed Needs    Weight Used For Calorie Calculations: 111 kg (244 lb 11.4 oz)  Energy Calorie Requirements (kcal): 4758-9461 kcal/d  Energy Need Method: Kcal/kg(11-14 kcal/kg, BMI > 35)  Protein Requirements: 146-182 g/d (2-2.5 g/kg IBW)  Weight Used For Protein Calculations: 72.7 kg (160 lb 4.4 oz)     Estimated Fluid Requirement Method: other (see comments)(Per MD or 1 mL/kcal)  RDA Method (mL): 1221  CHO Requirement: 50% total kcals      Nutrition Prescription Ordered    Current Diet Order: NPO  Current Nutrition Support Formula Ordered: Diabetisource AC  Current Nutrition Support Rate Ordered: 40 (ml)  Current Nutrition Support Frequency Ordered: mL/hr (16 hours)    Evaluation of Received Nutrient/Fluid Intake    Enteral Calories (kcal): 768  Enteral Protein (gm): 38  Enteral (Free Water) Fluid (mL): 522  Other Calories (kcal): 0  Total Calories (kcal): 768  % Kcal Needs: 62  Total Protein (gm/kg): 0.3  % Protein Needs: 26  I/O: -2.7L since 8/6  Energy Calories Required: not meeting needs  Protein Required: not meeting needs  Fluid Required: not meeting needs  Comments: LBM 8/20  Tolerance: tolerating  % Intake of Estimated Energy Needs: 62  % Meal Intake: 0    Nutrition Risk    Level of Risk/Frequency of Follow-up: high , 2x weekly    Assessment and Plan        Nutrition Problem  Inadequate energy intake    Related to (etiology):   Enteral nutrition    Signs and Symptoms (as evidenced by):   Enteral nutrition does not meet EEN nor EPN.    Interventions(treatment strategy):  1.) Collaboration with other providers  2.) Enteral nutrition    Nutrition Diagnosis Status:   new     Monitor and Evaluation    Food and Nutrient Intake: energy intake, enteral nutrition intake  Food  and Nutrient Adminstration: enteral and parenteral nutrition administration  Physical Activity and Function: nutrition-related ADLs and IADLs  Anthropometric Measurements: weight, weight change  Biochemical Data, Medical Tests and Procedures: electrolyte and renal panel, glucose/endocrine profile, gastrointestinal profile, lipid profile, inflammatory profile  Nutrition-Focused Physical Findings: overall appearance       Nutrition Follow-Up    RD Follow-up?: Yes

## 2020-08-20 NOTE — PT/OT/SLP PROGRESS
Physical Therapy      Patient Name:  Anup Miller   MRN:  [id number]    Patient not seen today. Pt intubated and with trach/PEG scheduled for Friday 8/20. Per chart review, procedure moved to 8/25. Unable to return on this date. Will follow up when appropriate.    Isabel Zambrano, PT, DPT  8/20/2020  [phone]

## 2020-08-20 NOTE — CARE UPDATE
Called and left message with wife. Spoke with daughter over the phone with updates concerning the patients surgery date, SBT/SAT and plan for continuing to wean sedation. Daughter states that they spoke with 2 representatives from LTAC and plan to proceed with the LTAC in May pending approval. All of Tim's questions were answered.

## 2020-08-20 NOTE — CARE UPDATE
Called patient's daughter Tim, updated her that we planned to continue weaning sedation, daily SBT, and trach/peg on Friday, as well as are sending referrals for LTAC placement. All of Tim's questions answered to her satisfaction.

## 2020-08-21 ENCOUNTER — ANESTHESIA (OUTPATIENT)
Dept: SURGERY | Facility: HOSPITAL | Age: 70
DRG: 004 | End: 2020-08-21
Payer: COMMERCIAL

## 2020-08-21 LAB
ALLENS TEST: ABNORMAL
DELSYS: ABNORMAL
ERYTHROCYTE [SEDIMENTATION RATE] IN BLOOD BY WESTERGREN METHOD: 16 MM/H
FIO2: 30
HCO3 UR-SCNC: 32.3 MMOL/L (ref 24–28)
MIN VOL: 12.1
MODE: ABNORMAL
PCO2 BLDA: 41.7 MMHG (ref 35–45)
PEEP: 5
PH SMN: 7.5 [PH] (ref 7.35–7.45)
PIP: 24
PO2 BLDA: 93 MMHG (ref 80–100)
POC BE: 9 MMOL/L
POC SATURATED O2: 98 % (ref 95–100)
POC TCO2: 34 MMOL/L (ref 23–27)
POCT GLUCOSE: 128 MG/DL (ref 70–110)
POCT GLUCOSE: 133 MG/DL (ref 70–110)
POCT GLUCOSE: 148 MG/DL (ref 70–110)
POCT GLUCOSE: 203 MG/DL (ref 70–110)
POCT GLUCOSE: 218 MG/DL (ref 70–110)
SAMPLE: ABNORMAL
SITE: ABNORMAL
SP02: 100
VT: 440

## 2020-08-21 PROCEDURE — 25000003 PHARM REV CODE 250: Performed by: STUDENT IN AN ORGANIZED HEALTH CARE EDUCATION/TRAINING PROGRAM

## 2020-08-21 PROCEDURE — 25000003 PHARM REV CODE 250: Performed by: INTERNAL MEDICINE

## 2020-08-21 PROCEDURE — 99233 SBSQ HOSP IP/OBS HIGH 50: CPT | Mod: ,,, | Performed by: INTERNAL MEDICINE

## 2020-08-21 PROCEDURE — 63600175 PHARM REV CODE 636 W HCPCS: Performed by: INTERNAL MEDICINE

## 2020-08-21 PROCEDURE — 99233 PR SUBSEQUENT HOSPITAL CARE,LEVL III: ICD-10-PCS | Mod: ,,, | Performed by: INTERNAL MEDICINE

## 2020-08-21 PROCEDURE — 82800 BLOOD PH: CPT

## 2020-08-21 PROCEDURE — 63600175 PHARM REV CODE 636 W HCPCS: Performed by: STUDENT IN AN ORGANIZED HEALTH CARE EDUCATION/TRAINING PROGRAM

## 2020-08-21 PROCEDURE — 20000000 HC ICU ROOM

## 2020-08-21 PROCEDURE — C9399 UNCLASSIFIED DRUGS OR BIOLOG: HCPCS | Performed by: STUDENT IN AN ORGANIZED HEALTH CARE EDUCATION/TRAINING PROGRAM

## 2020-08-21 PROCEDURE — 99900026 HC AIRWAY MAINTENANCE (STAT)

## 2020-08-21 PROCEDURE — 27000221 HC OXYGEN, UP TO 24 HOURS

## 2020-08-21 PROCEDURE — 94003 VENT MGMT INPAT SUBQ DAY: CPT

## 2020-08-21 PROCEDURE — 25000242 PHARM REV CODE 250 ALT 637 W/ HCPCS: Performed by: STUDENT IN AN ORGANIZED HEALTH CARE EDUCATION/TRAINING PROGRAM

## 2020-08-21 PROCEDURE — 94640 AIRWAY INHALATION TREATMENT: CPT

## 2020-08-21 PROCEDURE — 25000003 PHARM REV CODE 250: Performed by: PHYSICIAN ASSISTANT

## 2020-08-21 PROCEDURE — 37799 UNLISTED PX VASCULAR SURGERY: CPT

## 2020-08-21 PROCEDURE — 82803 BLOOD GASES ANY COMBINATION: CPT

## 2020-08-21 PROCEDURE — 99900035 HC TECH TIME PER 15 MIN (STAT)

## 2020-08-21 PROCEDURE — 94761 N-INVAS EAR/PLS OXIMETRY MLT: CPT

## 2020-08-21 RX ORDER — FENTANYL CITRATE-0.9 % NACL/PF 10 MCG/ML
25 PLASTIC BAG, INJECTION (ML) INTRAVENOUS CONTINUOUS
Status: DISCONTINUED | OUTPATIENT
Start: 2020-08-21 | End: 2020-08-26 | Stop reason: SDUPTHER

## 2020-08-21 RX ORDER — FENTANYL CITRATE 50 UG/ML
50 INJECTION, SOLUTION INTRAMUSCULAR; INTRAVENOUS
Status: DISCONTINUED | OUTPATIENT
Start: 2020-08-23 | End: 2020-08-26 | Stop reason: HOSPADM

## 2020-08-21 RX ORDER — DIAZEPAM 2 MG/1
2 TABLET ORAL EVERY 8 HOURS
Status: DISCONTINUED | OUTPATIENT
Start: 2020-08-21 | End: 2020-08-22

## 2020-08-21 RX ORDER — INSULIN ASPART 100 [IU]/ML
15 INJECTION, SOLUTION INTRAVENOUS; SUBCUTANEOUS
Status: DISCONTINUED | OUTPATIENT
Start: 2020-08-21 | End: 2020-08-26 | Stop reason: HOSPADM

## 2020-08-21 RX ORDER — FENTANYL CITRATE 50 UG/ML
50 INJECTION, SOLUTION INTRAMUSCULAR; INTRAVENOUS
Status: DISPENSED | OUTPATIENT
Start: 2020-08-21 | End: 2020-08-22

## 2020-08-21 RX ORDER — SCOLOPAMINE TRANSDERMAL SYSTEM 1 MG/1
1 PATCH, EXTENDED RELEASE TRANSDERMAL
Status: DISCONTINUED | OUTPATIENT
Start: 2020-08-21 | End: 2020-08-26 | Stop reason: HOSPADM

## 2020-08-21 RX ADMIN — SCOPALAMINE 1 PATCH: 1 PATCH, EXTENDED RELEASE TRANSDERMAL at 05:08

## 2020-08-21 RX ADMIN — ENOXAPARIN SODIUM 90 MG: 60 INJECTION SUBCUTANEOUS at 08:08

## 2020-08-21 RX ADMIN — INSULIN DETEMIR 25 UNITS: 100 INJECTION, SOLUTION SUBCUTANEOUS at 08:08

## 2020-08-21 RX ADMIN — INSULIN DETEMIR 25 UNITS: 100 INJECTION, SOLUTION SUBCUTANEOUS at 09:08

## 2020-08-21 RX ADMIN — INSULIN ASPART 15 UNITS: 100 INJECTION, SOLUTION INTRAVENOUS; SUBCUTANEOUS at 07:08

## 2020-08-21 RX ADMIN — SODIUM CHLORIDE, SODIUM LACTATE, POTASSIUM CHLORIDE, AND CALCIUM CHLORIDE 1000 ML: .6; .31; .03; .02 INJECTION, SOLUTION INTRAVENOUS at 02:08

## 2020-08-21 RX ADMIN — INSULIN ASPART 15 UNITS: 100 INJECTION, SOLUTION INTRAVENOUS; SUBCUTANEOUS at 09:08

## 2020-08-21 RX ADMIN — Medication 100 MCG/HR: at 09:08

## 2020-08-21 RX ADMIN — GUAIFENESIN 200 MG: 200 SOLUTION ORAL at 12:08

## 2020-08-21 RX ADMIN — Medication 50 MCG/HR: at 03:08

## 2020-08-21 RX ADMIN — OLANZAPINE 5 MG: 2.5 TABLET, FILM COATED ORAL at 08:08

## 2020-08-21 RX ADMIN — DIAZEPAM 2 MG: 2 TABLET ORAL at 09:08

## 2020-08-21 RX ADMIN — FAMOTIDINE 20 MG: 20 TABLET ORAL at 08:08

## 2020-08-21 RX ADMIN — FENTANYL CITRATE 50 MCG: 50 INJECTION INTRAMUSCULAR; INTRAVENOUS at 08:08

## 2020-08-21 RX ADMIN — OXYCODONE HYDROCHLORIDE 5 MG: 5 TABLET ORAL at 05:08

## 2020-08-21 RX ADMIN — GUAIFENESIN 200 MG: 200 SOLUTION ORAL at 08:08

## 2020-08-21 RX ADMIN — STANDARDIZED SENNA CONCENTRATE AND DOCUSATE SODIUM 1 TABLET: 8.6; 5 TABLET ORAL at 08:08

## 2020-08-21 RX ADMIN — ACETYLCYSTEINE 4 ML: 100 SOLUTION ORAL; RESPIRATORY (INHALATION) at 08:08

## 2020-08-21 RX ADMIN — MIDAZOLAM 4 MG: 5 INJECTION INTRAMUSCULAR; INTRAVENOUS at 07:08

## 2020-08-21 RX ADMIN — ACETYLCYSTEINE 4 ML: 100 SOLUTION ORAL; RESPIRATORY (INHALATION) at 02:08

## 2020-08-21 RX ADMIN — GUAIFENESIN 200 MG: 200 SOLUTION ORAL at 03:08

## 2020-08-21 RX ADMIN — DIAZEPAM 2 MG: 2 TABLET ORAL at 01:08

## 2020-08-21 RX ADMIN — POLYETHYLENE GLYCOL 3350 17 G: 17 POWDER, FOR SOLUTION ORAL at 08:08

## 2020-08-21 RX ADMIN — FENTANYL CITRATE 50 MCG: 50 INJECTION INTRAMUSCULAR; INTRAVENOUS at 09:08

## 2020-08-21 RX ADMIN — MIDAZOLAM 4 MG: 5 INJECTION INTRAMUSCULAR; INTRAVENOUS at 08:08

## 2020-08-21 RX ADMIN — ACETYLCYSTEINE 4 ML: 100 SOLUTION ORAL; RESPIRATORY (INHALATION) at 07:08

## 2020-08-21 RX ADMIN — FENTANYL CITRATE 50 MCG: 50 INJECTION INTRAMUSCULAR; INTRAVENOUS at 11:08

## 2020-08-21 RX ADMIN — INSULIN ASPART 15 UNITS: 100 INJECTION, SOLUTION INTRAVENOUS; SUBCUTANEOUS at 12:08

## 2020-08-21 RX ADMIN — ALBUTEROL SULFATE 2.5 MG: 2.5 SOLUTION RESPIRATORY (INHALATION) at 07:08

## 2020-08-21 RX ADMIN — Medication 50 MCG/HR: at 05:08

## 2020-08-21 RX ADMIN — INSULIN ASPART 15 UNITS: 100 INJECTION, SOLUTION INTRAVENOUS; SUBCUTANEOUS at 04:08

## 2020-08-21 RX ADMIN — FENTANYL CITRATE 25 MCG: 50 INJECTION INTRAMUSCULAR; INTRAVENOUS at 04:08

## 2020-08-21 RX ADMIN — DIAZEPAM 5 MG: 5 TABLET ORAL at 05:08

## 2020-08-21 RX ADMIN — INSULIN ASPART 15 UNITS: 100 INJECTION, SOLUTION INTRAVENOUS; SUBCUTANEOUS at 11:08

## 2020-08-21 RX ADMIN — ROSUVASTATIN CALCIUM 10 MG: 10 TABLET, FILM COATED ORAL at 08:08

## 2020-08-21 RX ADMIN — MIDAZOLAM 4 MG: 5 INJECTION INTRAMUSCULAR; INTRAVENOUS at 02:08

## 2020-08-21 RX ADMIN — FUROSEMIDE 40 MG: 10 INJECTION, SOLUTION INTRAMUSCULAR; INTRAVENOUS at 08:08

## 2020-08-21 NOTE — PROGRESS NOTES
[facility] - ICU 16   Critical Care Medicine  Progress Note    Patient Name: Anup Miller  MRN: [id number]  Admission Date: 7/26/2020  Hospital Length of Stay: 26 days  Code Status: Full Code  Attending Provider: Chantell Davis MD  Primary Care Provider: Bryce Gonzales MD   Principal Problem: Pneumonia due to COVID-19 virus    Subjective:     HPI:  Anup Miller is a 70 y.o. male patient with a PMHx of HTN, HLD, and DM who presents to the [facility] for evaluation of SOB which  1 week ago. Pt became more SOB and has an O2 sat of low 70s upon arrival on RA. Symptoms are worsening and moderate in severity. No mitigating or exacerbating factors reported. Associated sxs include cough and fever. Patient denies any congestion, sore throat, CP, abd pain, N/V, back pain, HA, weakness, and all other sxs at this time. No prior Tx reported. No further complaints or concerns at this time. Patient was placed on Bipap for a few hours and subsequently intubated. COVID positive. Was started on IV dexamethasone, Ceftriaxone, and Azithromycin. Patient [place] [facility] [place] of [place]/26/20 for higher level of care.     Hospital/ICU Course:  As of 7/29, the patient's oxygenation continued to worsen with P:F <150. R IJ and Arterial Line placed. Pt sedated, paralyzed, and proned at 9:30pm with improved oxygenation. Repeat AM gas on 7/30 Arterial Blood Gas result:  pO2 112; pCO2 58.9; pH 7.256;  HCO3 26.2, %O2 Sat 97%. FiO2 60, 8 PEEP. Pt supinated at 4:10 pm on 7/30 with Arterial Blood Gas result:  pO2 106; pCO2 52.2; pH 7.349;  HCO3 28.7, %O2 Sat 106. (P:F 212). FiO2 50, 10 PEEP.    As of 7/31, paralytic d/c'ed and sedation slowly weaned. Propofol d/c'ed on 8/1 2/2 triglycerides with ketamine and versed initiated. Now requiring higher vent settings due to dyssynchrony, will continue to increase sedation. Patient paralyzed again on 8/1 for vent dyssynchrony. Nimbex was discontinued on 08/03  and sedative medications were increased, however, patient struggled to pull enough tidal volume and was desatting even on maximum sedation so nimbex was restarted. Nimbex was stopped on 08/04 and restarted propofol. As patient became more agitated, ketamine was added on 08/06. Sedation was attempted to be weaned off by adding seroquel and zyprexa. Versed and ketamine were weaned off. Patient was weaned off of levo on morning of 08/09. Patient failed multiple SBTs and it was discussed with family of likely PEG/trach, pending their decision. Patient has continued agitation with coughing/ increased BPs requiring propofol, fentanyl, and precedex with scheduled valium. Discussed with family that as patient continues to require ventillator support past 21d, would benefit from trach/peg. Family agrees to pursue trach/peg/LTAC placement. General surgery consulted for trach/peg with plan for 8/25.     Interval History/Significant Events: Coughing spell this AM that resolved with fentanyl and suctioning. Patient is now off of precedex and valium was decreased. Patient is alert and following commands.     Review of Systems   Unable to perform ROS: Intubated     Objective:     Vital Signs (Most Recent):  Temp: 99.4 °F (37.4 °C) (08/21/20 1200)  Pulse: (!) 137 (08/21/20 1200)  Resp: (!) 24 (08/21/20 1200)  BP: 124/89 (08/21/20 1200)  SpO2: 100 % (08/21/20 1200) Vital Signs (24h Range):  Temp:  [98.5 °F (36.9 °C)-100 °F (37.8 °C)] 99.4 °F (37.4 °C)  Pulse:  [] 137  Resp:  [13-95] 24  SpO2:  [94 %-100 %] 100 %  BP: ()/(56-94) 124/89   Weight: 112.5 kg (248 lb 0.3 oz)  Body mass index is 36.63 kg/m².      Intake/Output Summary (Last 24 hours) at 8/21/2020 1258  Last data filed at 8/21/2020 1200  Gross per 24 hour   Intake 1126.69 ml   Output 2575 ml   Net -1448.31 ml       Physical Exam  Vitals signs and nursing note reviewed.   Constitutional:       Appearance: He is obese.   HENT:      Head: Normocephalic and  atraumatic.      Mouth/Throat:      Comments: Intubated  Eyes:      General:         Right eye: No discharge.         Left eye: No discharge.   Cardiovascular:      Rate and Rhythm: Normal rate and regular rhythm.      Pulses: Normal pulses.      Heart sounds: Normal heart sounds.   Pulmonary:      Effort: No respiratory distress.      Comments: Intubated.  Abdominal:      General: Bowel sounds are normal.      Palpations: Abdomen is soft.      Tenderness: There is no abdominal tenderness.   Musculoskeletal:         General: No swelling.   Skin:     General: Skin is warm and dry.   Neurological:      Mental Status: He is alert.      Comments: Alert with mild sedation. Follows commands.         Vents:  Vent Mode: A/C (08/21/20 0108)  Ventilator Initiated: Yes(chart correction) (07/26/20 2108)  Set Rate: 16 BPM (08/21/20 0108)  Vt Set: 440 mL (08/21/20 0108)  Pressure Support: 0 cmH20 (08/21/20 0108)  PEEP/CPAP: 5 cmH20 (08/21/20 0108)  Oxygen Concentration (%): 30 (08/21/20 1200)  Peak Airway Pressure: 25 cmH2O (08/21/20 0108)  Plateau Pressure: 40 cmH20 (08/21/20 0108)  Total Ve: 12.9 mL (08/21/20 0108)  F/VT Ratio<105 (RSBI): (!) 58.44 (08/21/20 0108)  Lines/Drains/Airways     Central Venous Catheter Line            Percutaneous Central Line Insertion/Assessment - Triple Lumen  07/29/20 1729 right internal jugular 22 days          Drain                 NG/OG Tube 07/26/20 1756 [place] sump;orogastric 18 Fr. Center mouth 25 days         Urethral Catheter 08/17/20 1553 3 days          Airway                 Airway - Non-Surgical 07/26/20 1724 Endotracheal Tube 25 days          Peripheral Intravenous Line                 Peripheral IV - Single Lumen 08/15/20 1418 20 G Left Hand 5 days              Significant Labs:    CBC/Anemia Profile:  Recent Labs   Lab 08/20/20 0352   WBC 11.85   HGB 11.3*   HCT 37.1*   *   MCV 89   RDW 14.7*        Chemistries:  Recent Labs   Lab 08/20/20 0352      K 3.9   CL 99    CO2 33*   BUN 27*   CREATININE 1.0   CALCIUM 9.7   ALBUMIN 2.2*   PROT 8.0   BILITOT 0.3   ALKPHOS 137*   ALT 78*   AST 58*   MG 2.2   PHOS 4.4       All pertinent labs within the past 24 hours have been reviewed.    Significant Imaging:  I have reviewed all pertinent imaging results/findings within the past 24 hours.      ABG  Recent Labs   Lab 08/21/20  0601   PH 7.497*   PO2 93   PCO2 41.7   HCO3 32.3*   BE 9     Assessment/Plan:     Pulmonary  Acute hypoxemic respiratory failure  - see Pneumonia due to COVID 19    Cardiac/Vascular  Hyperlipidemia associated with type 2 diabetes mellitus  - Restart Crestor once extubated     Hypertension associated with diabetes  Continue to hold antihypertensives as BP WNL.    Endocrine  Uncontrolled type 2 diabetes mellitus  - A1c 8.3%  - started on insulin gtt on 08/05 due to elevated BG but discontinued 08/07  - Levemir 25U BID  - Aspart 15U Q4H  - BG at goal of 140-180 currently    GI  Elevated liver enzymes  - Hx of elevated LFTs dating back to 2015  - Acute hepatitis panel and HIV - negative  - Stable. Trend CMP Q48    Other  * Pneumonia due to COVID-19 virus  70M  with HTN, HLD, and DM who presents to the [place] ED for SOB and found to be COVID positive.  [place] [facility] [place] for higher level of care. Vent AC FiO2 30%, PEEP 5 and tidal 440.Was previously paralyzed, proned, sedated. Nimbex discontinued on 08/04. Remdesivir and dexamethasone complete    - D/C precedex and continue fentanyl to help with coughing and sedation  - guaifenesin and acetylcysteine for secretions and coughing   - Zyprexa added on 08/07 to help wean sedation.  - valium 2 mg Q8  - Ceftriaxone and azithromycin complete  - Levophed restarted on 8/14, discontinued that evening  - Blood and sputum cx showed no growth  - Therapeutic lovenox for hypercoagulable state in COVID      -Discussed with pt's Wife and daughter, state they would like to pursue PEG/trach if unable to wean  from vent.   - trial of SBTs but patient continues to desaturate with coughing, general surgery consulted for trach/peg placement on 8/25.   -Discussed with CM to begin referral process for LTAC placement with [facility] under review.         Critical Care Daily Checklist:    A: Awake: RASS Goal/Actual Goal: RASS Goal: -2-->light sedation  Actual: [place] Agitation Sedation Scale (RASS): Light sedation   B: Spontaneous Breathing Trial Performed? Spon. Breathing Trial Initiated?: Initiated (08/19/20 0900)   C: SAT & SBT Coordinated?  yes                D: Delirium: CAM-ICU Overall CAM-ICU: Negative   E: Early Mobility Performed? Yes   F: Feeding Goal: Goals: Meet % EEN, EPN by RD f/u date  Status: Nutrition Goal Status: goal not met   Current Diet Order   Procedures    Diet NPO      AS: Analgesia/Sedation Fentanyl and versed PRN   T: Thromboembolic Prophylaxis therapeutic lovenox   H: HOB > 300 Yes   U: Stress Ulcer Prophylaxis (if needed) famotidine   G: Glucose Control 25 detemir and 15 aspart Q4   B: Bowel Function Stool Occurrence: 2   I: Indwelling Catheter (Lines & Boothe) Necessity Central, OG, Boothe and ET   D: De-escalation of Antimicrobials/Pharmacotherapies n/a    Plan for the day/ETD BG control and wean sedation    Code Status:  Family/Goals of Care: Full Code         Critical secondary to Patient has a condition that poses threat to life and bodily function: Severe Respiratory Distress      Critical care was time spent personally by me on the following activities: development of treatment plan with patient or surrogate and bedside caregivers, discussions with consultants, evaluation of patient's response to treatment, examination of patient, ordering and performing treatments and interventions, ordering and review of laboratory studies, ordering and review of radiographic studies, pulse oximetry, re-evaluation of patient's condition. This critical care time did not overlap with that of any other  provider or involve time for any procedures.     Aleta Rivera MD  Critical Care Medicine  [facility] - ICU 16 WT

## 2020-08-21 NOTE — PLAN OF CARE
ROCHELLE Skin Integrity Evaluation      Subjective    ROCHELLE skin integrity champion evaluation of patient as part of the comprehensive skin care team .       Anup Miller is being evaluated as per the Epic  pressure injury skin report.  He has been admitted to CMICU for 23 days. Skin injury was noted on 8/15/2020        Limited Physical exam     Lesion 1: Sacral - full thickness skin breakdown with pink tissue. Surrounding skin blanchable.           Lesion 2: Upper lip - skin breakdown with surrounding redness on the left side of the upper lip near ET tube lyon.           Lesion 3: Upper lip - eschar noted to right upper lip          Assessment :       Lesion 1:  Clinically undetermined  - Patient incontinent with stool so possibly related to MAD vs pressure. Will need close follow up.     POA : no    Consults: Wound Care, Nutrition and Support surfaces as per WOCN recommendations            Lesion 2:  Alteration in Skin Integrity 2/2 to ET lyon    POA : no    Consults:Wound Care and Support surfaces as per WOCN recommendations           Lesion 3:  Alteration in Skin Integrity 2/2 to ET lyon    POA : no    Consults: Wound Care, Nutrition and Support surfaces as per WOCN recommendations          Follow up:    Patient will be re evaluated bi-weekly.       Dorota Leyva PA-C  Critical Care Medicine  8/21/2020   6:32 PM

## 2020-08-21 NOTE — PLAN OF CARE
Pt not medically stable for discharge, remains intubated.  Trach/Peg surgery scheduled for Monday 8/24.  Referrals sent for LTAC placement.  When medically stable, anticipate discharge plans - LTAC.  CM to follow.    Vandana García RN CM  [id number]       08/21/20 160   Discharge Reassessment   Assessment Type Discharge Planning Reassessment   Do you have any problems affording any of your prescribed medications? TBD   Discharge Plan A Long-term acute care facility (LTAC)   Discharge Plan B Long-term acute care facility (LTAC)   DME Needed Upon Discharge  other (see comments)  (TBD)   Anticipated Discharge Disposition LTAC   Can the patient/caregiver answer the patient profile reliably? Yes, cognitively intact   Describe the patient's ability to walk at the present time. Major restrictions/daily assistance from another person   Number of comorbid conditions (as recorded on the chart) Four   Post-Acute Status   Post-Acute Authorization Placement;Other  (LTAC)   Post-Acute Placement Status Awaiting Internal Medical Clearance   Discharge Delays None known at this time

## 2020-08-21 NOTE — PT/OT/SLP PROGRESS
Physical Therapy      Patient Name:  Anup Miller   MRN:  [id number]    Patient not seen today. Pt intubated and tachycardic (HR 138bpm)  on minimal sedation. Will follow-up when appropriate.    Isabel Zambrano, PT, DPT  8/21/2020  [phone]

## 2020-08-21 NOTE — PT/OT/SLP PROGRESS
Occupational Therapy      Patient Name:  Anup Miller   MRN:  [id number]    Patient not seen today secondary to intubated and tachycardic on minimal sedation. Pt not appropriate for OT this date  . Will follow-up 8/24/20.    BIANKA Marcelo  8/21/2020

## 2020-08-21 NOTE — ASSESSMENT & PLAN NOTE
70M  with HTN, HLD, and DM who presents to the [place] ED for SOB and found to be COVID positive.  Patient transferred to [facility] on 7/26/20 for higher level of care. Vent AC FiO2 30%, PEEP 5 and tidal 440.Was previously paralyzed, proned, sedated. Nimbex discontinued on 08/04. Remdesivir and dexamethasone complete    - D/C precedex and continue fentanyl to help with coughing and sedation  - guaifenesin and acetylcysteine for secretions and coughing   - Zyprexa added on 08/07 to help wean sedation.  - valium 2 mg Q8  - Ceftriaxone and azithromycin complete  - Levophed restarted on 8/14, discontinued that evening  - Blood and sputum cx showed no growth  - Therapeutic lovenox for hypercoagulable state in COVID      -Discussed with pt's Wife and daughter, state they would like to pursue PEG/trach if unable to wean from vent.   - trial of SBTs but patient continues to desaturate with coughing, general surgery consulted for trach/peg placement on 8/25.   -Discussed with CM to begin referral process for LTAC placement with [facility] under review.

## 2020-08-21 NOTE — SUBJECTIVE & OBJECTIVE
Interval History/Significant Events: Coughing spell this AM that resolved with fentanyl and suctioning. Patient is now off of precedex and valium was decreased. Patient is alert and following commands.     Review of Systems   Unable to perform ROS: Intubated     Objective:     Vital Signs (Most Recent):  Temp: 99.4 °F (37.4 °C) (08/21/20 1200)  Pulse: (!) 137 (08/21/20 1200)  Resp: (!) 24 (08/21/20 1200)  BP: 124/89 (08/21/20 1200)  SpO2: 100 % (08/21/20 1200) Vital Signs (24h Range):  Temp:  [98.5 °F (36.9 °C)-100 °F (37.8 °C)] 99.4 °F (37.4 °C)  Pulse:  [] 137  Resp:  [13-95] 24  SpO2:  [94 %-100 %] 100 %  BP: ()/(56-94) 124/89   Weight: 112.5 kg (248 lb 0.3 oz)  Body mass index is 36.63 kg/m².      Intake/Output Summary (Last 24 hours) at 8/21/2020 1258  Last data filed at 8/21/2020 1200  Gross per 24 hour   Intake 1126.69 ml   Output 2575 ml   Net -1448.31 ml       Physical Exam  Vitals signs and nursing note reviewed.   Constitutional:       Appearance: He is obese.   HENT:      Head: Normocephalic and atraumatic.      Mouth/Throat:      Comments: Intubated  Eyes:      General:         Right eye: No discharge.         Left eye: No discharge.   Cardiovascular:      Rate and Rhythm: Normal rate and regular rhythm.      Pulses: Normal pulses.      Heart sounds: Normal heart sounds.   Pulmonary:      Effort: No respiratory distress.      Comments: Intubated.  Abdominal:      General: Bowel sounds are normal.      Palpations: Abdomen is soft.      Tenderness: There is no abdominal tenderness.   Musculoskeletal:         General: No swelling.   Skin:     General: Skin is warm and dry.   Neurological:      Mental Status: He is alert.      Comments: Alert with mild sedation. Follows commands.         Vents:  Vent Mode: A/C (08/21/20 0108)  Ventilator Initiated: Yes(chart correction) (07/26/20 2108)  Set Rate: 16 BPM (08/21/20 0108)  Vt Set: 440 mL (08/21/20 0108)  Pressure Support: 0 cmH20 (08/21/20  0108)  PEEP/CPAP: 5 cmH20 (08/21/20 0108)  Oxygen Concentration (%): 30 (08/21/20 1200)  Peak Airway Pressure: 25 cmH2O (08/21/20 0108)  Plateau Pressure: 40 cmH20 (08/21/20 0108)  Total Ve: 12.9 mL (08/21/20 0108)  F/VT Ratio<105 (RSBI): (!) 58.44 (08/21/20 0108)  Lines/Drains/Airways     Central Venous Catheter Line            Percutaneous Central Line Insertion/Assessment - Triple Lumen  07/29/20 1729 right internal jugular 22 days          Drain                 NG/OG Tube 07/26/20 1756 [place] sump;orogastric 18 Fr. Center mouth 25 days         Urethral Catheter 08/17/20 1553 3 days          Airway                 Airway - Non-Surgical 07/26/20 1724 Endotracheal Tube 25 days          Peripheral Intravenous Line                 Peripheral IV - Single Lumen 08/15/20 1418 20 G Left Hand 5 days              Significant Labs:    CBC/Anemia Profile:  Recent Labs   Lab 08/20/20  0352   WBC 11.85   HGB 11.3*   HCT 37.1*   *   MCV 89   RDW 14.7*        Chemistries:  Recent Labs   Lab 08/20/20  0352      K 3.9   CL 99   CO2 33*   BUN 27*   CREATININE 1.0   CALCIUM 9.7   ALBUMIN 2.2*   PROT 8.0   BILITOT 0.3   ALKPHOS 137*   ALT 78*   AST 58*   MG 2.2   PHOS 4.4       All pertinent labs within the past 24 hours have been reviewed.    Significant Imaging:  I have reviewed all pertinent imaging results/findings within the past 24 hours.

## 2020-08-21 NOTE — CARE UPDATE
Spoke to patient's daughter concerning updated status and sedation changes. Patient understands and agrees with the plan for surgery Monday and LTAC placement in the future. All questions were answered.

## 2020-08-22 LAB
ALBUMIN SERPL BCP-MCNC: 2.3 G/DL (ref 3.5–5.2)
ALLENS TEST: ABNORMAL
ALP SERPL-CCNC: 134 U/L (ref 55–135)
ALT SERPL W/O P-5'-P-CCNC: 127 U/L (ref 10–44)
ANION GAP SERPL CALC-SCNC: 10 MMOL/L (ref 8–16)
ANISOCYTOSIS BLD QL SMEAR: SLIGHT
AST SERPL-CCNC: 75 U/L (ref 10–40)
BASOPHILS # BLD AUTO: 0.08 K/UL (ref 0–0.2)
BASOPHILS NFR BLD: 0.7 % (ref 0–1.9)
BILIRUB SERPL-MCNC: 0.4 MG/DL (ref 0.1–1)
BUN SERPL-MCNC: 38 MG/DL (ref 8–23)
CALCIUM SERPL-MCNC: 9.9 MG/DL (ref 8.7–10.5)
CHLORIDE SERPL-SCNC: 99 MMOL/L (ref 95–110)
CO2 SERPL-SCNC: 32 MMOL/L (ref 23–29)
CREAT SERPL-MCNC: 1.1 MG/DL (ref 0.5–1.4)
DELSYS: ABNORMAL
DIFFERENTIAL METHOD: ABNORMAL
EOSINOPHIL # BLD AUTO: 0.9 K/UL (ref 0–0.5)
EOSINOPHIL NFR BLD: 7.8 % (ref 0–8)
ERYTHROCYTE [DISTWIDTH] IN BLOOD BY AUTOMATED COUNT: 14.7 % (ref 11.5–14.5)
ERYTHROCYTE [SEDIMENTATION RATE] IN BLOOD BY WESTERGREN METHOD: 16 MM/H
EST. GFR  (AFRICAN AMERICAN): >60 ML/MIN/1.73 M^2
EST. GFR  (NON AFRICAN AMERICAN): >60 ML/MIN/1.73 M^2
GLUCOSE SERPL-MCNC: 107 MG/DL (ref 70–110)
HCO3 UR-SCNC: 33.4 MMOL/L (ref 24–28)
HCT VFR BLD AUTO: 38.1 % (ref 40–54)
HGB BLD-MCNC: 11.3 G/DL (ref 14–18)
HYPOCHROMIA BLD QL SMEAR: ABNORMAL
IMM GRANULOCYTES # BLD AUTO: 0.15 K/UL (ref 0–0.04)
IMM GRANULOCYTES NFR BLD AUTO: 1.3 % (ref 0–0.5)
LYMPHOCYTES # BLD AUTO: 2.3 K/UL (ref 1–4.8)
LYMPHOCYTES NFR BLD: 19.4 % (ref 18–48)
MAGNESIUM SERPL-MCNC: 2.3 MG/DL (ref 1.6–2.6)
MCH RBC QN AUTO: 26.7 PG (ref 27–31)
MCHC RBC AUTO-ENTMCNC: 29.7 G/DL (ref 32–36)
MCV RBC AUTO: 90 FL (ref 82–98)
MODE: ABNORMAL
MONOCYTES # BLD AUTO: 2.4 K/UL (ref 0.3–1)
MONOCYTES NFR BLD: 20.7 % (ref 4–15)
NEUTROPHILS # BLD AUTO: 5.8 K/UL (ref 1.8–7.7)
NEUTROPHILS NFR BLD: 50.1 % (ref 38–73)
NRBC BLD-RTO: 0 /100 WBC
PCO2 BLDA: 41.6 MMHG (ref 35–45)
PEEP: 5
PH SMN: 7.51 [PH] (ref 7.35–7.45)
PHOSPHATE SERPL-MCNC: 4.7 MG/DL (ref 2.7–4.5)
PLATELET # BLD AUTO: 459 K/UL (ref 150–350)
PLATELET BLD QL SMEAR: ABNORMAL
PMV BLD AUTO: 10.8 FL (ref 9.2–12.9)
PO2 BLDA: 81 MMHG (ref 80–100)
POC BE: 10 MMOL/L
POC SATURATED O2: 97 % (ref 95–100)
POC TCO2: 35 MMOL/L (ref 23–27)
POCT GLUCOSE: 116 MG/DL (ref 70–110)
POCT GLUCOSE: 141 MG/DL (ref 70–110)
POCT GLUCOSE: 158 MG/DL (ref 70–110)
POCT GLUCOSE: 163 MG/DL (ref 70–110)
POCT GLUCOSE: 88 MG/DL (ref 70–110)
POLYCHROMASIA BLD QL SMEAR: ABNORMAL
POTASSIUM SERPL-SCNC: 3.6 MMOL/L (ref 3.5–5.1)
PROT SERPL-MCNC: 8.1 G/DL (ref 6–8.4)
RBC # BLD AUTO: 4.23 M/UL (ref 4.6–6.2)
SAMPLE: ABNORMAL
SITE: ABNORMAL
SODIUM SERPL-SCNC: 141 MMOL/L (ref 136–145)
VT: 440
WBC # BLD AUTO: 11.6 K/UL (ref 3.9–12.7)

## 2020-08-22 PROCEDURE — 25000003 PHARM REV CODE 250: Performed by: STUDENT IN AN ORGANIZED HEALTH CARE EDUCATION/TRAINING PROGRAM

## 2020-08-22 PROCEDURE — 84100 ASSAY OF PHOSPHORUS: CPT

## 2020-08-22 PROCEDURE — 27000221 HC OXYGEN, UP TO 24 HOURS

## 2020-08-22 PROCEDURE — 25000003 PHARM REV CODE 250: Performed by: INTERNAL MEDICINE

## 2020-08-22 PROCEDURE — 82803 BLOOD GASES ANY COMBINATION: CPT

## 2020-08-22 PROCEDURE — 99900035 HC TECH TIME PER 15 MIN (STAT)

## 2020-08-22 PROCEDURE — 63600175 PHARM REV CODE 636 W HCPCS: Performed by: STUDENT IN AN ORGANIZED HEALTH CARE EDUCATION/TRAINING PROGRAM

## 2020-08-22 PROCEDURE — 94003 VENT MGMT INPAT SUBQ DAY: CPT

## 2020-08-22 PROCEDURE — 83735 ASSAY OF MAGNESIUM: CPT

## 2020-08-22 PROCEDURE — 85025 COMPLETE CBC W/AUTO DIFF WBC: CPT

## 2020-08-22 PROCEDURE — 94761 N-INVAS EAR/PLS OXIMETRY MLT: CPT

## 2020-08-22 PROCEDURE — 25000242 PHARM REV CODE 250 ALT 637 W/ HCPCS: Performed by: STUDENT IN AN ORGANIZED HEALTH CARE EDUCATION/TRAINING PROGRAM

## 2020-08-22 PROCEDURE — 25000003 PHARM REV CODE 250: Performed by: PHYSICIAN ASSISTANT

## 2020-08-22 PROCEDURE — 63600175 PHARM REV CODE 636 W HCPCS: Performed by: INTERNAL MEDICINE

## 2020-08-22 PROCEDURE — 99233 SBSQ HOSP IP/OBS HIGH 50: CPT | Mod: ,,, | Performed by: INTERNAL MEDICINE

## 2020-08-22 PROCEDURE — 99900026 HC AIRWAY MAINTENANCE (STAT)

## 2020-08-22 PROCEDURE — 99233 PR SUBSEQUENT HOSPITAL CARE,LEVL III: ICD-10-PCS | Mod: ,,, | Performed by: INTERNAL MEDICINE

## 2020-08-22 PROCEDURE — 20000000 HC ICU ROOM

## 2020-08-22 PROCEDURE — 27200966 HC CLOSED SUCTION SYSTEM

## 2020-08-22 PROCEDURE — 80053 COMPREHEN METABOLIC PANEL: CPT

## 2020-08-22 PROCEDURE — 94640 AIRWAY INHALATION TREATMENT: CPT

## 2020-08-22 PROCEDURE — 37799 UNLISTED PX VASCULAR SURGERY: CPT

## 2020-08-22 RX ORDER — POTASSIUM CHLORIDE 1.5 G/1.58G
20 POWDER, FOR SOLUTION ORAL ONCE
Status: DISCONTINUED | OUTPATIENT
Start: 2020-08-22 | End: 2020-08-22 | Stop reason: SDUPTHER

## 2020-08-22 RX ORDER — LIDOCAINE HYDROCHLORIDE 40 MG/ML
4 INJECTION, SOLUTION RETROBULBAR
Status: DISCONTINUED | OUTPATIENT
Start: 2020-08-22 | End: 2020-08-26 | Stop reason: HOSPADM

## 2020-08-22 RX ORDER — POTASSIUM CHLORIDE 1.5 G/1.58G
40 POWDER, FOR SOLUTION ORAL ONCE
Status: COMPLETED | OUTPATIENT
Start: 2020-08-22 | End: 2020-08-22

## 2020-08-22 RX ORDER — DIAZEPAM 2 MG/1
2 TABLET ORAL EVERY 12 HOURS
Status: DISCONTINUED | OUTPATIENT
Start: 2020-08-22 | End: 2020-08-23

## 2020-08-22 RX ADMIN — SODIUM CHLORIDE, SODIUM LACTATE, POTASSIUM CHLORIDE, AND CALCIUM CHLORIDE 1000 ML: .6; .31; .03; .02 INJECTION, SOLUTION INTRAVENOUS at 12:08

## 2020-08-22 RX ADMIN — POLYETHYLENE GLYCOL 3350 17 G: 17 POWDER, FOR SOLUTION ORAL at 08:08

## 2020-08-22 RX ADMIN — ACETAMINOPHEN 650 MG: 160 SOLUTION ORAL at 03:08

## 2020-08-22 RX ADMIN — ALBUTEROL SULFATE 2.5 MG: 2.5 SOLUTION RESPIRATORY (INHALATION) at 07:08

## 2020-08-22 RX ADMIN — STANDARDIZED SENNA CONCENTRATE AND DOCUSATE SODIUM 1 TABLET: 8.6; 5 TABLET ORAL at 08:08

## 2020-08-22 RX ADMIN — ROSUVASTATIN CALCIUM 10 MG: 10 TABLET, FILM COATED ORAL at 08:08

## 2020-08-22 RX ADMIN — LIDOCAINE HYDROCHLORIDE 4 ML: 40 INJECTION, SOLUTION RETROBULBAR; TOPICAL at 10:08

## 2020-08-22 RX ADMIN — INSULIN ASPART 2 UNITS: 100 INJECTION, SOLUTION INTRAVENOUS; SUBCUTANEOUS at 12:08

## 2020-08-22 RX ADMIN — INSULIN ASPART 15 UNITS: 100 INJECTION, SOLUTION INTRAVENOUS; SUBCUTANEOUS at 12:08

## 2020-08-22 RX ADMIN — ALBUTEROL SULFATE 2.5 MG: 2.5 SOLUTION RESPIRATORY (INHALATION) at 02:08

## 2020-08-22 RX ADMIN — Medication 187.5 MCG/HR: at 10:08

## 2020-08-22 RX ADMIN — ACETAMINOPHEN 650 MG: 160 SOLUTION ORAL at 04:08

## 2020-08-22 RX ADMIN — FAMOTIDINE 20 MG: 20 TABLET ORAL at 08:08

## 2020-08-22 RX ADMIN — INSULIN ASPART 2 UNITS: 100 INJECTION, SOLUTION INTRAVENOUS; SUBCUTANEOUS at 08:08

## 2020-08-22 RX ADMIN — INSULIN ASPART 15 UNITS: 100 INJECTION, SOLUTION INTRAVENOUS; SUBCUTANEOUS at 04:08

## 2020-08-22 RX ADMIN — INSULIN ASPART 15 UNITS: 100 INJECTION, SOLUTION INTRAVENOUS; SUBCUTANEOUS at 08:08

## 2020-08-22 RX ADMIN — ACETYLCYSTEINE 4 ML: 100 SOLUTION ORAL; RESPIRATORY (INHALATION) at 07:08

## 2020-08-22 RX ADMIN — OLANZAPINE 5 MG: 2.5 TABLET, FILM COATED ORAL at 08:08

## 2020-08-22 RX ADMIN — MIDAZOLAM 4 MG: 5 INJECTION INTRAMUSCULAR; INTRAVENOUS at 09:08

## 2020-08-22 RX ADMIN — ACETYLCYSTEINE 4 ML: 100 SOLUTION ORAL; RESPIRATORY (INHALATION) at 02:08

## 2020-08-22 RX ADMIN — MIDAZOLAM 4 MG: 5 INJECTION INTRAMUSCULAR; INTRAVENOUS at 08:08

## 2020-08-22 RX ADMIN — MIDAZOLAM 4 MG: 5 INJECTION INTRAMUSCULAR; INTRAVENOUS at 03:08

## 2020-08-22 RX ADMIN — DIAZEPAM 2 MG: 2 TABLET ORAL at 05:08

## 2020-08-22 RX ADMIN — ENOXAPARIN SODIUM 90 MG: 60 INJECTION SUBCUTANEOUS at 08:08

## 2020-08-22 RX ADMIN — INSULIN DETEMIR 25 UNITS: 100 INJECTION, SOLUTION SUBCUTANEOUS at 08:08

## 2020-08-22 RX ADMIN — POTASSIUM CHLORIDE 40 MEQ: 1.5 POWDER, FOR SOLUTION ORAL at 08:08

## 2020-08-22 RX ADMIN — DIAZEPAM 2 MG: 2 TABLET ORAL at 08:08

## 2020-08-22 NOTE — ASSESSMENT & PLAN NOTE
- Hx of elevated LFTs dating back to 2015 and ranges consistent with this admission. Likely 2/2 fatty liver per PCP note.  - Acute hepatitis panel and HIV - negative  - On 8/22 AST 75 and    Trend CMP Q48

## 2020-08-22 NOTE — ASSESSMENT & PLAN NOTE
70M  with HTN, HLD, and DM who presents to the [place] ED for SOB and found to be COVID positive.  Patient transferred to [facility] [place] on 7/26/20 for higher level of care. Vent AC FiO2 30%, PEEP 5 and tidal 440.Was previously paralyzed, proned, sedated. Nimbex discontinued on 08/04. Remdesivir, dexamethasone, Ceftriaxone and azithromycin complete.    - D/C precedex,  valium 2 mg BID and continue fentanyl to help with coughing and sedation  - guaifenesin, scopolamine and acetylcysteine for secretions and coughing   - Zyprexa added on 08/07 to help wean sedation.  - Levophed restarted on 8/14, discontinued that evening  - Blood and sputum cx showed no growth  - Therapeutic lovenox for hypercoagulable state in COVID      -Discussed with pt's Wife and daughter, state they would like to pursue PEG/trach if unable to wean from vent.   - trial of SBTs but patient continues to desaturate with coughing, general surgery consulted for trach/peg placement on 8/25.   -Discussed with CM to begin referral process for LTAC placement with [facility] under review.

## 2020-08-22 NOTE — PROGRESS NOTES
[facility] - ICU 16   Critical Care Medicine  Progress Note    Patient Name: Anup Miller  MRN: [id number]  Admission Date: 7/26/2020  Hospital Length of Stay: 27 days  Code Status: Full Code  Attending Provider: Chantell Davis MD  Primary Care Provider: Bryce Gonzales MD   Principal Problem: Pneumonia due to COVID-19 virus    Subjective:     HPI:  Anup Miller is a 70 y.o. male patient with a PMHx of HTN, HLD, and DM who presents to the [place] [facility] for evaluation of SOB which  1 week ago. Pt became more SOB and has an O2 sat of low 70s upon arrival on RA. Symptoms are worsening and moderate in severity. No mitigating or exacerbating factors reported. Associated sxs include cough and fever. Patient denies any congestion, sore throat, CP, abd pain, N/V, back pain, HA, weakness, and all other sxs at this time. No prior Tx reported. No further complaints or concerns at this time. Patient was placed on Bipap for a few hours and subsequently intubated. COVID positive. Was started on IV dexamethasone, Ceftriaxone, and Azithromycin. Patient transferred to [facility] on evening of 7/26/20 for higher level of care.     Hospital/ICU Course:  As of 7/29, the patient's oxygenation continued to worsen with P:F <150. R IJ and Arterial Line placed. Pt sedated, paralyzed, and proned at 9:30pm with improved oxygenation. Repeat AM gas on 7/30 Arterial Blood Gas result:  pO2 112; pCO2 58.9; pH 7.256;  HCO3 26.2, %O2 Sat 97%. FiO2 60, 8 PEEP. Pt supinated at 4:10 pm on 7/30 with Arterial Blood Gas result:  pO2 106; pCO2 52.2; pH 7.349;  HCO3 28.7, %O2 Sat 106. (P:F 212). FiO2 50, 10 PEEP.    As of 7/31, paralytic d/c'ed and sedation slowly weaned. Propofol d/c'ed on 8/1 2/2 triglycerides with ketamine and versed initiated. Now requiring higher vent settings due to dyssynchrony, will continue to increase sedation. Patient paralyzed again on 8/1 for vent dyssynchrony. Nimbex was discontinued on 08/03  and sedative medications were increased, however, patient struggled to pull enough tidal volume and was desatting even on maximum sedation so nimbex was restarted. Nimbex was stopped on 08/04 and restarted propofol. As patient became more agitated, ketamine was added on 08/06. Sedation was attempted to be weaned off by adding seroquel and zyprexa. Versed and ketamine were weaned off. Patient was weaned off of levo on morning of 08/09. Patient failed multiple SBTs and it was discussed with family of likely PEG/trach, pending their decision. Patient has continued agitation with coughing/ increased BPs requiring propofol, fentanyl, and precedex with scheduled valium. Discussed with family that as patient continues to require ventillator support past 21d, would benefit from trach/peg. Family agrees to pursue trach/peg/LTAC placement. General surgery consulted for trach/peg with plan for 8/25.     Interval History/Significant Events: Patient continues to have frequent coughing spells despite fentanyl frequent suctioning and scopolamine patch. Patient drowsy but follows commands on exam.     Review of Systems   Unable to perform ROS: Intubated     Objective:     Vital Signs (Most Recent):  Temp: 99.5 °F (37.5 °C) (08/22/20 1129)  Pulse: (!) 131 (08/22/20 1400)  Resp: (!) 23 (08/22/20 1400)  BP: 133/84 (08/22/20 1400)  SpO2: 100 % (08/22/20 1400) Vital Signs (24h Range):  Temp:  [98.5 °F (36.9 °C)-100.3 °F (37.9 °C)] 99.5 °F (37.5 °C)  Pulse:  [109-142] 131  Resp:  [19-70] 23  SpO2:  [86 %-100 %] 100 %  BP: (109-152)/(70-96) 133/84   Weight: 112.5 kg (248 lb 0.3 oz)  Body mass index is 36.63 kg/m².      Intake/Output Summary (Last 24 hours) at 8/22/2020 1414  Last data filed at 8/22/2020 1400  Gross per 24 hour   Intake 2290.67 ml   Output 1895 ml   Net 395.67 ml       Physical Exam  Vitals signs and nursing note reviewed.   Constitutional:       Appearance: He is obese.   HENT:      Head: Normocephalic and atraumatic.       Comments: Thin clear secretions on suctioning.     Mouth/Throat:      Comments: Intubated  Eyes:      General:         Right eye: No discharge.         Left eye: No discharge.   Cardiovascular:      Rate and Rhythm: Normal rate and regular rhythm.      Pulses: Normal pulses.      Heart sounds: Normal heart sounds.   Pulmonary:      Effort: No respiratory distress.      Comments: Intubated.  Abdominal:      General: Bowel sounds are normal.      Palpations: Abdomen is soft.      Tenderness: There is no abdominal tenderness.   Musculoskeletal:         General: No swelling.   Skin:     General: Skin is warm and dry.      Capillary Refill: Capillary refill takes less than 2 seconds.   Neurological:      Mental Status: He is alert.      Comments: Alert with mild sedation. Follows commands.         Vents:  Vent Mode: A/C (08/22/20 0746)  Ventilator Initiated: Yes(chart correction) (07/26/20 2108)  Set Rate: 16 BPM (08/22/20 0746)  Vt Set: 440 mL (08/22/20 0746)  Pressure Support: 0 cmH20 (08/22/20 0746)  PEEP/CPAP: 5 cmH20 (08/22/20 0746)  Oxygen Concentration (%): 30 (08/22/20 1400)  Peak Airway Pressure: 25 cmH2O (08/22/20 0746)  Plateau Pressure: 23 cmH20 (08/22/20 0746)  Total Ve: 11.3 mL (08/22/20 0746)  F/VT Ratio<105 (RSBI): (!) 46.65 (08/22/20 0746)  Lines/Drains/Airways     Central Venous Catheter Line            Percutaneous Central Line Insertion/Assessment - Triple Lumen  07/29/20 1729 right internal jugular 23 days          Drain                 NG/OG Tube 07/26/20 1756 [place] sump;orogastric 18 Fr. Center mouth 26 days         Urethral Catheter 08/17/20 1553 4 days          Airway                 Airway - Non-Surgical 07/26/20 1724 Endotracheal Tube 26 days              Significant Labs:    CBC/Anemia Profile:  Recent Labs   Lab 08/22/20 0421   WBC 11.60   HGB 11.3*   HCT 38.1*   *   MCV 90   RDW 14.7*        Chemistries:  Recent Labs   Lab 08/22/20 0421      K 3.6   CL 99   CO2 32*   BUN 38*    CREATININE 1.1   CALCIUM 9.9   ALBUMIN 2.3*   PROT 8.1   BILITOT 0.4   ALKPHOS 134   *   AST 75*   MG 2.3   PHOS 4.7*       CMP:   Recent Labs   Lab 08/22/20  0421      K 3.6   CL 99   CO2 32*      BUN 38*   CREATININE 1.1   CALCIUM 9.9   PROT 8.1   ALBUMIN 2.3*   BILITOT 0.4   ALKPHOS 134   AST 75*   *   ANIONGAP 10   EGFRNONAA >60.0     POCT Glucose:   Recent Labs   Lab 08/22/20  0316 08/22/20  0810 08/22/20  1247   POCTGLUCOSE 116* 158* 163*       Significant Imaging:  I have reviewed all pertinent imaging results/findings within the past 24 hours.      ABG  Recent Labs   Lab 08/22/20  0403   PH 7.512*   PO2 81   PCO2 41.6   HCO3 33.4*   BE 10     Assessment/Plan:     Pulmonary  Acute hypoxemic respiratory failure  - see Pneumonia due to COVID 19    Cardiac/Vascular  Hyperlipidemia associated with type 2 diabetes mellitus  - Restart Crestor after surgery    Hypertension associated with diabetes  Continue to hold antihypertensives as BP WNL.    Endocrine  Uncontrolled type 2 diabetes mellitus  - A1c 8.3%  - started on insulin gtt on 08/05 due to elevated BG but discontinued 08/07  - Levemir 25U BID  - Aspart 15U Q4H  - BG at goal of 140-180 currently    GI  Elevated liver enzymes  - Hx of elevated LFTs dating back to 2015 and ranges consistent with this admission. Likely 2/2 fatty liver per PCP note.  - Acute hepatitis panel and HIV - negative  - On 8/22 AST 75 and    Trend CMP Q48    Other  * Pneumonia due to COVID-19 virus  70M  with HTN, HLD, and DM who presents to the [place] ED for SOB and found to be COVID positive.  Patient transferred to [facility] [place] on 7/26/20 for higher level of care. Vent AC FiO2 30%, PEEP 5 and tidal 440.Was previously paralyzed, proned, sedated. Nimbex discontinued on 08/04. Remdesivir, dexamethasone, Ceftriaxone and azithromycin complete.    - D/C precedex,  valium 2 mg BID and continue fentanyl to help with coughing and sedation  - guaifenesin,  scopolamine and acetylcysteine for secretions and coughing   - Zyprexa added on 08/07 to help wean sedation.  - Levophed restarted on 8/14, discontinued that evening  - Blood and sputum cx showed no growth  - Therapeutic lovenox for hypercoagulable state in COVID      -Discussed with pt's Wife and daughter, state they would like to pursue PEG/trach if unable to wean from vent.   - trial of SBTs but patient continues to desaturate with coughing, general surgery consulted for trach/peg placement on 8/25.   -Discussed with CM to begin referral process for LTAC placement with [facility] under review.         Critical Care Daily Checklist:    A: Awake: RASS Goal/Actual Goal: RASS Goal: -2-->light sedation  Actual: [place] Agitation Sedation Scale (RASS): Light sedation   B: Spontaneous Breathing Trial Performed? Spon. Breathing Trial Initiated?: Initiated (08/19/20 0900)   C: SAT & SBT Coordinated?  none                      D: Delirium: CAM-ICU Overall CAM-ICU: Negative   E: Early Mobility Performed? Yes   F: Feeding Goal: Goals: Meet % EEN, EPN by RD f/u date  Status: Nutrition Goal Status: goal not met   Current Diet Order   Procedures    Diet NPO      AS: Analgesia/Sedation Fentanyl, diazepam   T: Thromboembolic Prophylaxis enoxaparin   H: HOB > 300 Yes   U: Stress Ulcer Prophylaxis (if needed) famotidine   G: Glucose Control insulin   B: Bowel Function Stool Occurrence: 2   I: Indwelling Catheter (Lines & Harris) Necessity Central, harris, ET tube   D: De-escalation of Antimicrobials/Pharmacotherapies n/a    Plan for the day/ETD Trach, peg    Code Status:  Family/Goals of Care: Full Code         Critical secondary to Patient has a condition that poses threat to life and bodily function: Severe Respiratory Distress      Critical care was time spent personally by me on the following activities: development of treatment plan with patient or surrogate and bedside caregivers, discussions with consultants,  evaluation of patient's response to treatment, examination of patient, ordering and performing treatments and interventions, ordering and review of laboratory studies, ordering and review of radiographic studies, pulse oximetry, re-evaluation of patient's condition. This critical care time did not overlap with that of any other provider or involve time for any procedures.     Aleta Rivera MD  Critical Care Medicine  [facility] - ICU 16 WT

## 2020-08-22 NOTE — SUBJECTIVE & OBJECTIVE
Interval History/Significant Events: Patient continues to have frequent coughing spells despite fentanyl frequent suctioning and scopolamine patch. Patient drowsy but follows commands on exam.     Review of Systems   Unable to perform ROS: Intubated     Objective:     Vital Signs (Most Recent):  Temp: 99.5 °F (37.5 °C) (08/22/20 1129)  Pulse: (!) 131 (08/22/20 1400)  Resp: (!) 23 (08/22/20 1400)  BP: 133/84 (08/22/20 1400)  SpO2: 100 % (08/22/20 1400) Vital Signs (24h Range):  Temp:  [98.5 °F (36.9 °C)-100.3 °F (37.9 °C)] 99.5 °F (37.5 °C)  Pulse:  [109-142] 131  Resp:  [19-70] 23  SpO2:  [86 %-100 %] 100 %  BP: (109-152)/(70-96) 133/84   Weight: 112.5 kg (248 lb 0.3 oz)  Body mass index is 36.63 kg/m².      Intake/Output Summary (Last 24 hours) at 8/22/2020 1414  Last data filed at 8/22/2020 1400  Gross per 24 hour   Intake 2290.67 ml   Output 1895 ml   Net 395.67 ml       Physical Exam  Vitals signs and nursing note reviewed.   Constitutional:       Appearance: He is obese.   HENT:      Head: Normocephalic and atraumatic.      Comments: Thin clear secretions on suctioning.     Mouth/Throat:      Comments: Intubated  Eyes:      General:         Right eye: No discharge.         Left eye: No discharge.   Cardiovascular:      Rate and Rhythm: Normal rate and regular rhythm.      Pulses: Normal pulses.      Heart sounds: Normal heart sounds.   Pulmonary:      Effort: No respiratory distress.      Comments: Intubated.  Abdominal:      General: Bowel sounds are normal.      Palpations: Abdomen is soft.      Tenderness: There is no abdominal tenderness.   Musculoskeletal:         General: No swelling.   Skin:     General: Skin is warm and dry.      Capillary Refill: Capillary refill takes less than 2 seconds.   Neurological:      Mental Status: He is alert.      Comments: Alert with mild sedation. Follows commands.         Vents:  Vent Mode: A/C (08/22/20 0746)  Ventilator Initiated: Yes(chart correction) (07/26/20  2108)  Set Rate: 16 BPM (08/22/20 0746)  Vt Set: 440 mL (08/22/20 0746)  Pressure Support: 0 cmH20 (08/22/20 0746)  PEEP/CPAP: 5 cmH20 (08/22/20 0746)  Oxygen Concentration (%): 30 (08/22/20 1400)  Peak Airway Pressure: 25 cmH2O (08/22/20 0746)  Plateau Pressure: 23 cmH20 (08/22/20 0746)  Total Ve: 11.3 mL (08/22/20 0746)  F/VT Ratio<105 (RSBI): (!) 46.65 (08/22/20 0746)  Lines/Drains/Airways     Central Venous Catheter Line            Percutaneous Central Line Insertion/Assessment - Triple Lumen  07/29/20 1729 right internal jugular 23 days          Drain                 NG/OG Tube 07/26/20 1756 [place] sump;orogastric 18 Fr. Center mouth 26 days         Urethral Catheter 08/17/20 1553 4 days          Airway                 Airway - Non-Surgical 07/26/20 1724 Endotracheal Tube 26 days              Significant Labs:    CBC/Anemia Profile:  Recent Labs   Lab 08/22/20  0421   WBC 11.60   HGB 11.3*   HCT 38.1*   *   MCV 90   RDW 14.7*        Chemistries:  Recent Labs   Lab 08/22/20  0421      K 3.6   CL 99   CO2 32*   BUN 38*   CREATININE 1.1   CALCIUM 9.9   ALBUMIN 2.3*   PROT 8.1   BILITOT 0.4   ALKPHOS 134   *   AST 75*   MG 2.3   PHOS 4.7*       CMP:   Recent Labs   Lab 08/22/20  0421      K 3.6   CL 99   CO2 32*      BUN 38*   CREATININE 1.1   CALCIUM 9.9   PROT 8.1   ALBUMIN 2.3*   BILITOT 0.4   ALKPHOS 134   AST 75*   *   ANIONGAP 10   EGFRNONAA >60.0     POCT Glucose:   Recent Labs   Lab 08/22/20  0316 08/22/20  0810 08/22/20  1247   POCTGLUCOSE 116* 158* 163*       Significant Imaging:  I have reviewed all pertinent imaging results/findings within the past 24 hours.

## 2020-08-22 NOTE — CARE UPDATE
Spoke to patient's daughter concerning status, plans in surgery and all questions were answered. Plan for group call with mother tomorrow just to give updates.

## 2020-08-22 NOTE — PLAN OF CARE
Problem: Adult Inpatient Plan of Care  Goal: Plan of Care Review  Outcome: Ongoing, Progressing  Goal: Absence of Hospital-Acquired Illness or Injury  Outcome: Ongoing, Progressing  Goal: Optimal Comfort and Wellbeing  Outcome: Ongoing, Progressing  Goal: Readiness for Transition of Care  Outcome: Ongoing, Progressing  Goal: Rounds/Family Conference  Outcome: Ongoing, Progressing     Problem: Diabetes Comorbidity  Goal: Blood Glucose Level Within Desired Range  Outcome: Ongoing, Progressing     Problem: Infection  Goal: Infection Symptom Resolution  Outcome: Ongoing, Progressing     Problem: Fall Injury Risk  Goal: Absence of Fall and Fall-Related Injury  Outcome: Ongoing, Progressing     Problem: Skin Injury Risk Increased  Goal: Skin Health and Integrity  Outcome: Ongoing, Progressing     Problem: Restraint, Nonbehavioral (Nonviolent)  Goal: Personal Dignity and Safety Maintained  Outcome: Ongoing, Progressing     Problem: Device-Related Complication Risk (Artificial Airway)  Goal: Optimal Device Function  Outcome: Ongoing, Progressing     Problem: Skin and Tissue Injury (Artificial Airway)  Goal: Absence of Device-Related Skin or Tissue Injury  Outcome: Ongoing, Progressing     Problem: Noninvasive Ventilation Acute  Goal: Effective Unassisted Ventilation and Oxygenation  Outcome: Ongoing, Progressing     Problem: Aspiration (Enteral Nutrition)  Goal: Absence of Aspiration Signs/Symptoms  Outcome: Ongoing, Progressing     Problem: Wound  Goal: Optimal Wound Healing  Outcome: Ongoing, Progressing

## 2020-08-23 PROBLEM — N39.0 UTI (URINARY TRACT INFECTION) DUE TO URINARY INDWELLING FOLEY CATHETER: Status: ACTIVE | Noted: 2020-08-23

## 2020-08-23 PROBLEM — T83.511A UTI (URINARY TRACT INFECTION) DUE TO URINARY INDWELLING FOLEY CATHETER: Status: ACTIVE | Noted: 2020-08-23

## 2020-08-23 LAB
ALBUMIN SERPL BCP-MCNC: 2.1 G/DL (ref 3.5–5.2)
ALLENS TEST: ABNORMAL
ALP SERPL-CCNC: 120 U/L (ref 55–135)
ALT SERPL W/O P-5'-P-CCNC: 103 U/L (ref 10–44)
ANION GAP SERPL CALC-SCNC: 9 MMOL/L (ref 8–16)
AST SERPL-CCNC: 50 U/L (ref 10–40)
BACTERIA #/AREA URNS AUTO: ABNORMAL /HPF
BASOPHILS # BLD AUTO: 0.1 K/UL (ref 0–0.2)
BASOPHILS NFR BLD: 1 % (ref 0–1.9)
BILIRUB SERPL-MCNC: 0.4 MG/DL (ref 0.1–1)
BILIRUB UR QL STRIP: NEGATIVE
BUN SERPL-MCNC: 37 MG/DL (ref 8–23)
CALCIUM SERPL-MCNC: 9 MG/DL (ref 8.7–10.5)
CHLORIDE SERPL-SCNC: 100 MMOL/L (ref 95–110)
CLARITY UR REFRACT.AUTO: ABNORMAL
CO2 SERPL-SCNC: 30 MMOL/L (ref 23–29)
COLOR UR AUTO: YELLOW
CREAT SERPL-MCNC: 1 MG/DL (ref 0.5–1.4)
CREAT UR-MCNC: 113 MG/DL (ref 23–375)
DELSYS: ABNORMAL
DIFFERENTIAL METHOD: ABNORMAL
EOSINOPHIL # BLD AUTO: 0.9 K/UL (ref 0–0.5)
EOSINOPHIL NFR BLD: 8.4 % (ref 0–8)
ERYTHROCYTE [DISTWIDTH] IN BLOOD BY AUTOMATED COUNT: 14.5 % (ref 11.5–14.5)
ERYTHROCYTE [SEDIMENTATION RATE] IN BLOOD BY WESTERGREN METHOD: 16 MM/H
EST. GFR  (AFRICAN AMERICAN): >60 ML/MIN/1.73 M^2
EST. GFR  (NON AFRICAN AMERICAN): >60 ML/MIN/1.73 M^2
FIO2: 30
GLUCOSE SERPL-MCNC: 230 MG/DL (ref 70–110)
GLUCOSE UR QL STRIP: NEGATIVE
HCO3 UR-SCNC: 31.2 MMOL/L (ref 24–28)
HCT VFR BLD AUTO: 36.1 % (ref 40–54)
HGB BLD-MCNC: 10.7 G/DL (ref 14–18)
HGB UR QL STRIP: ABNORMAL
IMM GRANULOCYTES # BLD AUTO: 0.09 K/UL (ref 0–0.04)
IMM GRANULOCYTES NFR BLD AUTO: 0.9 % (ref 0–0.5)
KETONES UR QL STRIP: NEGATIVE
LACTATE SERPL-SCNC: 1.2 MMOL/L (ref 0.5–2.2)
LEUKOCYTE ESTERASE UR QL STRIP: ABNORMAL
LYMPHOCYTES # BLD AUTO: 2.5 K/UL (ref 1–4.8)
LYMPHOCYTES NFR BLD: 24.6 % (ref 18–48)
MAGNESIUM SERPL-MCNC: 2.1 MG/DL (ref 1.6–2.6)
MCH RBC QN AUTO: 27.2 PG (ref 27–31)
MCHC RBC AUTO-ENTMCNC: 29.6 G/DL (ref 32–36)
MCV RBC AUTO: 92 FL (ref 82–98)
MICROSCOPIC COMMENT: ABNORMAL
MODE: ABNORMAL
MONOCYTES # BLD AUTO: 2 K/UL (ref 0.3–1)
MONOCYTES NFR BLD: 19.4 % (ref 4–15)
NEUTROPHILS # BLD AUTO: 4.6 K/UL (ref 1.8–7.7)
NEUTROPHILS NFR BLD: 45.7 % (ref 38–73)
NITRITE UR QL STRIP: NEGATIVE
NON-SQ EPI CELLS #/AREA URNS AUTO: 4 /HPF
NRBC BLD-RTO: 0 /100 WBC
PCO2 BLDA: 47.3 MMHG (ref 35–45)
PEEP: 5
PH SMN: 7.43 [PH] (ref 7.35–7.45)
PH UR STRIP: 5 [PH] (ref 5–8)
PLATELET # BLD AUTO: 452 K/UL (ref 150–350)
PMV BLD AUTO: 10.5 FL (ref 9.2–12.9)
PO2 BLDA: 84 MMHG (ref 80–100)
POC BE: 7 MMOL/L
POC SATURATED O2: 96 % (ref 95–100)
POC TCO2: 33 MMOL/L (ref 23–27)
POCT GLUCOSE: 115 MG/DL (ref 70–110)
POCT GLUCOSE: 179 MG/DL (ref 70–110)
POCT GLUCOSE: 193 MG/DL (ref 70–110)
POCT GLUCOSE: 219 MG/DL (ref 70–110)
POCT GLUCOSE: 99 MG/DL (ref 70–110)
POTASSIUM SERPL-SCNC: 4.1 MMOL/L (ref 3.5–5.1)
PROT SERPL-MCNC: 7.1 G/DL (ref 6–8.4)
PROT UR QL STRIP: NEGATIVE
RBC # BLD AUTO: 3.93 M/UL (ref 4.6–6.2)
RBC #/AREA URNS AUTO: >100 /HPF (ref 0–4)
SAMPLE: ABNORMAL
SITE: ABNORMAL
SODIUM SERPL-SCNC: 139 MMOL/L (ref 136–145)
SODIUM UR-SCNC: 39 MMOL/L (ref 20–250)
SP GR UR STRIP: 1.02 (ref 1–1.03)
URN SPEC COLLECT METH UR: ABNORMAL
VT: 440
WBC # BLD AUTO: 10.09 K/UL (ref 3.9–12.7)
WBC #/AREA URNS AUTO: 88 /HPF (ref 0–5)

## 2020-08-23 PROCEDURE — 83605 ASSAY OF LACTIC ACID: CPT

## 2020-08-23 PROCEDURE — 82800 BLOOD PH: CPT

## 2020-08-23 PROCEDURE — 82570 ASSAY OF URINE CREATININE: CPT

## 2020-08-23 PROCEDURE — 80053 COMPREHEN METABOLIC PANEL: CPT

## 2020-08-23 PROCEDURE — 25000242 PHARM REV CODE 250 ALT 637 W/ HCPCS: Performed by: STUDENT IN AN ORGANIZED HEALTH CARE EDUCATION/TRAINING PROGRAM

## 2020-08-23 PROCEDURE — 99900026 HC AIRWAY MAINTENANCE (STAT)

## 2020-08-23 PROCEDURE — 63600175 PHARM REV CODE 636 W HCPCS: Performed by: STUDENT IN AN ORGANIZED HEALTH CARE EDUCATION/TRAINING PROGRAM

## 2020-08-23 PROCEDURE — 25000003 PHARM REV CODE 250: Performed by: PHYSICIAN ASSISTANT

## 2020-08-23 PROCEDURE — 87077 CULTURE AEROBIC IDENTIFY: CPT

## 2020-08-23 PROCEDURE — 36600 WITHDRAWAL OF ARTERIAL BLOOD: CPT

## 2020-08-23 PROCEDURE — 25000003 PHARM REV CODE 250: Performed by: INTERNAL MEDICINE

## 2020-08-23 PROCEDURE — 93005 ELECTROCARDIOGRAM TRACING: CPT

## 2020-08-23 PROCEDURE — 94640 AIRWAY INHALATION TREATMENT: CPT

## 2020-08-23 PROCEDURE — 99233 PR SUBSEQUENT HOSPITAL CARE,LEVL III: ICD-10-PCS | Mod: ,,, | Performed by: INTERNAL MEDICINE

## 2020-08-23 PROCEDURE — 63600175 PHARM REV CODE 636 W HCPCS: Performed by: INTERNAL MEDICINE

## 2020-08-23 PROCEDURE — 87086 URINE CULTURE/COLONY COUNT: CPT

## 2020-08-23 PROCEDURE — 84300 ASSAY OF URINE SODIUM: CPT

## 2020-08-23 PROCEDURE — 83735 ASSAY OF MAGNESIUM: CPT

## 2020-08-23 PROCEDURE — 93010 ELECTROCARDIOGRAM REPORT: CPT | Mod: ,,, | Performed by: INTERNAL MEDICINE

## 2020-08-23 PROCEDURE — 94761 N-INVAS EAR/PLS OXIMETRY MLT: CPT

## 2020-08-23 PROCEDURE — 25000003 PHARM REV CODE 250: Performed by: STUDENT IN AN ORGANIZED HEALTH CARE EDUCATION/TRAINING PROGRAM

## 2020-08-23 PROCEDURE — 87088 URINE BACTERIA CULTURE: CPT

## 2020-08-23 PROCEDURE — 27000221 HC OXYGEN, UP TO 24 HOURS

## 2020-08-23 PROCEDURE — 82803 BLOOD GASES ANY COMBINATION: CPT

## 2020-08-23 PROCEDURE — 81001 URINALYSIS AUTO W/SCOPE: CPT

## 2020-08-23 PROCEDURE — 94003 VENT MGMT INPAT SUBQ DAY: CPT

## 2020-08-23 PROCEDURE — 20000000 HC ICU ROOM

## 2020-08-23 PROCEDURE — 93010 EKG 12-LEAD: ICD-10-PCS | Mod: ,,, | Performed by: INTERNAL MEDICINE

## 2020-08-23 PROCEDURE — 87186 SC STD MICRODIL/AGAR DIL: CPT

## 2020-08-23 PROCEDURE — 99900035 HC TECH TIME PER 15 MIN (STAT)

## 2020-08-23 PROCEDURE — 85025 COMPLETE CBC W/AUTO DIFF WBC: CPT

## 2020-08-23 PROCEDURE — 99233 SBSQ HOSP IP/OBS HIGH 50: CPT | Mod: ,,, | Performed by: INTERNAL MEDICINE

## 2020-08-23 PROCEDURE — 87040 BLOOD CULTURE FOR BACTERIA: CPT | Mod: 59

## 2020-08-23 RX ORDER — MORPHINE SULFATE 2 MG/ML
1 INJECTION, SOLUTION INTRAMUSCULAR; INTRAVENOUS ONCE AS NEEDED
Status: DISCONTINUED | OUTPATIENT
Start: 2020-08-23 | End: 2020-08-23

## 2020-08-23 RX ORDER — CEFEPIME HYDROCHLORIDE 1 G/1
1 INJECTION, POWDER, FOR SOLUTION INTRAMUSCULAR; INTRAVENOUS EVERY 8 HOURS
Status: DISCONTINUED | OUTPATIENT
Start: 2020-08-23 | End: 2020-08-26 | Stop reason: HOSPADM

## 2020-08-23 RX ORDER — DIAZEPAM 2 MG/1
2 TABLET ORAL DAILY
Status: DISCONTINUED | OUTPATIENT
Start: 2020-08-24 | End: 2020-08-26 | Stop reason: HOSPADM

## 2020-08-23 RX ADMIN — DIAZEPAM 2 MG: 2 TABLET ORAL at 08:08

## 2020-08-23 RX ADMIN — ACETYLCYSTEINE 4 ML: 100 SOLUTION ORAL; RESPIRATORY (INHALATION) at 07:08

## 2020-08-23 RX ADMIN — INSULIN ASPART 15 UNITS: 100 INJECTION, SOLUTION INTRAVENOUS; SUBCUTANEOUS at 12:08

## 2020-08-23 RX ADMIN — VANCOMYCIN HYDROCHLORIDE 1750 MG: 750 INJECTION, POWDER, LYOPHILIZED, FOR SOLUTION INTRAVENOUS at 06:08

## 2020-08-23 RX ADMIN — MIDAZOLAM 4 MG: 5 INJECTION INTRAMUSCULAR; INTRAVENOUS at 08:08

## 2020-08-23 RX ADMIN — Medication 200 MCG/HR: at 08:08

## 2020-08-23 RX ADMIN — STANDARDIZED SENNA CONCENTRATE AND DOCUSATE SODIUM 1 TABLET: 8.6; 5 TABLET ORAL at 08:08

## 2020-08-23 RX ADMIN — ACETAMINOPHEN 650 MG: 160 SOLUTION ORAL at 08:08

## 2020-08-23 RX ADMIN — ACETAMINOPHEN 650 MG: 160 SOLUTION ORAL at 12:08

## 2020-08-23 RX ADMIN — FAMOTIDINE 20 MG: 20 TABLET ORAL at 08:08

## 2020-08-23 RX ADMIN — ALBUTEROL SULFATE 2.5 MG: 2.5 SOLUTION RESPIRATORY (INHALATION) at 08:08

## 2020-08-23 RX ADMIN — MIDAZOLAM 4 MG: 5 INJECTION INTRAMUSCULAR; INTRAVENOUS at 03:08

## 2020-08-23 RX ADMIN — ACETYLCYSTEINE 4 ML: 100 SOLUTION ORAL; RESPIRATORY (INHALATION) at 01:08

## 2020-08-23 RX ADMIN — ALBUTEROL SULFATE 2.5 MG: 2.5 SOLUTION RESPIRATORY (INHALATION) at 01:08

## 2020-08-23 RX ADMIN — SODIUM CHLORIDE, SODIUM LACTATE, POTASSIUM CHLORIDE, AND CALCIUM CHLORIDE 1000 ML: .6; .31; .03; .02 INJECTION, SOLUTION INTRAVENOUS at 04:08

## 2020-08-23 RX ADMIN — Medication 200 MCG/HR: at 03:08

## 2020-08-23 RX ADMIN — CEFEPIME 1 G: 1 INJECTION, POWDER, FOR SOLUTION INTRAMUSCULAR; INTRAVENOUS at 09:08

## 2020-08-23 RX ADMIN — ALTEPLASE 2 MG: 2.2 INJECTION, POWDER, LYOPHILIZED, FOR SOLUTION INTRAVENOUS at 02:08

## 2020-08-23 RX ADMIN — INSULIN DETEMIR 25 UNITS: 100 INJECTION, SOLUTION SUBCUTANEOUS at 08:08

## 2020-08-23 RX ADMIN — ALBUTEROL SULFATE 2.5 MG: 2.5 SOLUTION RESPIRATORY (INHALATION) at 07:08

## 2020-08-23 RX ADMIN — ROSUVASTATIN CALCIUM 10 MG: 10 TABLET, FILM COATED ORAL at 08:08

## 2020-08-23 RX ADMIN — ACETYLCYSTEINE 4 ML: 100 SOLUTION ORAL; RESPIRATORY (INHALATION) at 08:08

## 2020-08-23 RX ADMIN — LIDOCAINE HYDROCHLORIDE 4 ML: 40 INJECTION, SOLUTION RETROBULBAR; TOPICAL at 06:08

## 2020-08-23 RX ADMIN — INSULIN ASPART 15 UNITS: 100 INJECTION, SOLUTION INTRAVENOUS; SUBCUTANEOUS at 08:08

## 2020-08-23 RX ADMIN — INSULIN ASPART 4 UNITS: 100 INJECTION, SOLUTION INTRAVENOUS; SUBCUTANEOUS at 03:08

## 2020-08-23 RX ADMIN — LIDOCAINE HYDROCHLORIDE 4 ML: 40 INJECTION, SOLUTION RETROBULBAR; TOPICAL at 07:08

## 2020-08-23 RX ADMIN — ENOXAPARIN SODIUM 90 MG: 60 INJECTION SUBCUTANEOUS at 08:08

## 2020-08-23 RX ADMIN — OLANZAPINE 5 MG: 2.5 TABLET, FILM COATED ORAL at 08:08

## 2020-08-23 RX ADMIN — INSULIN ASPART 15 UNITS: 100 INJECTION, SOLUTION INTRAVENOUS; SUBCUTANEOUS at 04:08

## 2020-08-23 RX ADMIN — POLYETHYLENE GLYCOL 3350 17 G: 17 POWDER, FOR SOLUTION ORAL at 08:08

## 2020-08-23 RX ADMIN — ACETAMINOPHEN 650 MG: 160 SOLUTION ORAL at 03:08

## 2020-08-23 NOTE — PLAN OF CARE
Problem: Adult Inpatient Plan of Care  Goal: Plan of Care Review  Outcome: Ongoing, Progressing  Goal: Patient-Specific Goal (Individualization)  Outcome: Ongoing, Progressing  Goal: Absence of Hospital-Acquired Illness or Injury  Outcome: Ongoing, Progressing  Goal: Optimal Comfort and Wellbeing  Outcome: Ongoing, Progressing  Goal: Readiness for Transition of Care  Outcome: Ongoing, Progressing  Goal: Rounds/Family Conference  Outcome: Ongoing, Progressing     Problem: Diabetes Comorbidity  Goal: Blood Glucose Level Within Desired Range  Outcome: Ongoing, Progressing     Problem: Infection  Goal: Infection Symptom Resolution  Outcome: Ongoing, Progressing     Problem: Fall Injury Risk  Goal: Absence of Fall and Fall-Related Injury  Outcome: Ongoing, Progressing     Problem: Skin Injury Risk Increased  Goal: Skin Health and Integrity  Outcome: Ongoing, Progressing     Problem: Communication Impairment (Mechanical Ventilation, Invasive)  Goal: Effective Communication  Outcome: Ongoing, Progressing     Problem: Device-Related Complication Risk (Mechanical Ventilation, Invasive)  Goal: Optimal Device Function  Outcome: Ongoing, Progressing     Problem: Inability to Wean (Mechanical Ventilation, Invasive)  Goal: Mechanical Ventilation Liberation  Outcome: Ongoing, Progressing     Problem: Nutrition Impairment (Mechanical Ventilation, Invasive)  Goal: Optimal Nutrition Delivery  Outcome: Ongoing, Progressing     Problem: Skin and Tissue Injury (Mechanical Ventilation, Invasive)  Goal: Absence of Device-Related Skin and Tissue Injury  Outcome: Ongoing, Progressing     Problem: Ventilator-Induced Lung Injury (Mechanical Ventilation, Invasive)  Goal: Absence of Ventilator-Induced Lung Injury  Outcome: Ongoing, Progressing     Problem: Communication Impairment (Artificial Airway)  Goal: Effective Communication  Outcome: Ongoing, Progressing     Problem: Device-Related Complication Risk (Artificial Airway)  Goal: Optimal  Device Function  Outcome: Ongoing, Progressing     Problem: Skin and Tissue Injury (Artificial Airway)  Goal: Absence of Device-Related Skin or Tissue Injury  Outcome: Ongoing, Progressing     Problem: Aspiration (Enteral Nutrition)  Goal: Absence of Aspiration Signs/Symptoms  Outcome: Ongoing, Progressing     Problem: Wound  Goal: Optimal Wound Healing  Outcome: Ongoing, Progressing     Mr. Miller remained tachycardic overnight, oxygen saturation 100%, and blood pressure within normal limits. Tylenol was given one time for fever of 100.8 and resolved to 99. Continues to follow commands. Numerus cough episodes overnight. Fentanyl increased to 187.5 mcg to increase comfort, one time of Versed given at beginning of shift, and one time lidocaine given for tracheal tube. Patient did not receive their Detemir blood sugar 88, held insulin. Second blood sugar at 0400 was 198 and gave 15 units of Novolog. Good urine output overnight dayron color with sediment.

## 2020-08-23 NOTE — PLAN OF CARE
No acute events throughout day, VS and assessment per flow sheet, see below for updates:    Pulmonary:Remains on vent with minimal support, , FiO2 30%, PEEP 5.  Continues to have aggressive coughing episodes requiring very frequent oral suctioning.  Desats at times but recovers easily.  Scheduled, continuous, and prn meds given for relief. Plan for trach and peg tomorrow.    Cardiovascular: ST into 140s, maintaining normal BP during shift. EKG ST per team.  LR bolus given and cultures and additional labs sent.      Neurological: Easy to arouse, interacts and follows simple commands with fentanyl and prn meds given.      Gastrointestinal: Tolerating TF, LBM 8/22.  No residuals noted.  TF to be stopped at midnight for scheduled peg.    Genitourinary: Boothe changed to obtain new specimens. Catheter care provided. UOP 30-40 mL/hr.     Endocrine: Labs scheduled every 48 hrs, new orders placed d/t increased tachycardia and temp.  BG borderline low, scheduled levemir held as well as aspart.  Last  and prn given for coverage.  Insulin to be adjusted to prevent hypoglycemia while in OR per Dr. Parker.    Skin/Bath: No new skin breakdown noted. Turned q 2 hours.   Date of last CHG bath given: 8/22/2020    Infusions: Fentanyl, kvo, lr bolus  Patient progressing towards goals as tolerated, plan of care communicated and reviewed with Anup Miller and family, all concerns addressed, will continue to monitor. Spoke with Wife earlier in shift to provide update.  Questions asked and answered, verbalized understanding of information provided.  See notes, orders, labs, and flowsheets for additional information.  Report given to LESLIE Valencia at this time, care assumed.     Pari De Anda RN

## 2020-08-23 NOTE — PROGRESS NOTES
[facility] - ICU 16   Critical Care Medicine  Progress Note    Patient Name: Anup Miller  MRN: [id number]  Admission Date: 7/26/2020  Hospital Length of Stay: 28 days  Code Status: Full Code  Attending Provider: Chantell Davis MD  Primary Care Provider: Bryce Gonzales MD   Principal Problem: Pneumonia due to COVID-19 virus    Subjective:     HPI:  Anup Miller is a 70 y.o. male patient with a PMHx of HTN, HLD, and DM who presents to the [facility] for evaluation of SOB which  1 week ago. Pt became more SOB and has an O2 sat of low 70s upon arrival on RA. Symptoms are worsening and moderate in severity. No mitigating or exacerbating factors reported. Associated sxs include cough and fever. Patient denies any congestion, sore throat, CP, abd pain, N/V, back pain, HA, weakness, and all other sxs at this time. No prior Tx reported. No further complaints or concerns at this time. Patient was placed on Bipap for a few hours and subsequently intubated. COVID positive. Was started on IV dexamethasone, Ceftriaxone, and Azithromycin. [place] [facility] [place] for higher level of care.     Hospital/ICU Course:  As of 7/29, the patient's oxygenation continued to worsen with P:F <150. R IJ and Arterial Line placed. Pt sedated, paralyzed, and proned at 9:30pm with improved oxygenation. Repeat AM gas on 7/30 Arterial Blood Gas result:  pO2 112; pCO2 58.9; pH 7.256;  HCO3 26.2, %O2 Sat 97%. FiO2 60, 8 PEEP. Pt supinated at 4:10 pm on 7/30 with Arterial Blood Gas result:  pO2 106; pCO2 52.2; pH 7.349;  HCO3 28.7, %O2 Sat 106. (P:F 212). FiO2 50, 10 PEEP.    As of 7/31, paralytic d/c'ed and sedation slowly weaned. Propofol d/c'ed on 8/1 2/2 triglycerides with ketamine and versed initiated. Now requiring higher vent settings due to dyssynchrony, will continue to increase sedation. Patient paralyzed again on 8/1 for vent dyssynchrony. Nimbex was discontinued on 08/03  and sedative medications were increased, however, patient struggled to pull enough tidal volume and was desatting even on maximum sedation so nimbex was restarted. Nimbex was stopped on 08/04 and restarted propofol. As patient became more agitated, ketamine was added on 08/06. Sedation was attempted to be weaned off by adding seroquel and zyprexa. Versed and ketamine were weaned off. Patient was weaned off of levo on morning of 08/09. Patient failed multiple SBTs and it was discussed with family of likely PEG/trach, pending their decision. Patient has continued agitation with coughing/ increased BPs requiring propofol, fentanyl, and precedex with scheduled valium. Discussed with family that as patient continues to require ventillator support past 21d, would benefit from trach/peg. Family agrees to pursue trach/peg/LTAC placement. General surgery consulted for trach/peg with plan for 8/25.     Interval History/Significant Events: Patient was febrile overnight with low grade temperature of 100.8 and has continued with low grade fever this AM for which he received tylenol. Patient has also shown slow increase in Cr from 0.8 to 1.1 in past 4 days. Holding lovenox and tube feeds for surgery on 8/24.     Review of Systems   Unable to perform ROS: Intubated     Objective:     Vital Signs (Most Recent):  Temp: (!) 100.8 °F (38.2 °C) (08/23/20 1524)  Pulse: (!) 132 (08/23/20 1500)  Resp: 16 (08/23/20 1500)  BP: 111/70 (08/23/20 1500)  SpO2: 100 % (08/23/20 1500) Vital Signs (24h Range):  Temp:  [99 °F (37.2 °C)-100.8 °F (38.2 °C)] 100.8 °F (38.2 °C)  Pulse:  [121-145] 132  Resp:  [16-34] 16  SpO2:  [99 %-100 %] 100 %  BP: ()/(58-86) 111/70   Weight: 112.5 kg (248 lb 0.3 oz)  Body mass index is 36.63 kg/m².      Intake/Output Summary (Last 24 hours) at 8/23/2020 1556  Last data filed at 8/23/2020 1512  Gross per 24 hour   Intake 3116.66 ml   Output 1540 ml   Net 1576.66 ml       Physical Exam  Vitals signs and nursing  note reviewed.   Constitutional:       Appearance: He is obese.   HENT:      Head: Normocephalic and atraumatic.      Comments: Thin clear secretions on suctioning.     Mouth/Throat:      Comments: Intubated  Eyes:      General:         Right eye: No discharge.         Left eye: No discharge.   Cardiovascular:      Rate and Rhythm: Regular rhythm. Tachycardia present.      Pulses: Normal pulses.      Heart sounds: Normal heart sounds.   Pulmonary:      Effort: No respiratory distress.      Comments: Intubated.  Abdominal:      General: Bowel sounds are normal.      Palpations: Abdomen is soft.      Tenderness: There is no abdominal tenderness.   Musculoskeletal:         General: No swelling.   Skin:     General: Skin is warm and dry.      Capillary Refill: Capillary refill takes less than 2 seconds.      Comments: B/l UE swelling and lip swelling   Neurological:      Mental Status: He is alert.      Comments: Alert with mild sedation. Follows commands.         Vents:  Vent Mode: A/C (08/23/20 1336)  Ventilator Initiated: Yes(chart correction) (07/26/20 2108)  Set Rate: 16 BPM (08/23/20 1336)  Vt Set: 440 mL (08/23/20 1336)  Pressure Support: 0 cmH20 (08/23/20 1336)  PEEP/CPAP: 5 cmH20 (08/23/20 1336)  Oxygen Concentration (%): 30 (08/23/20 1500)  Peak Airway Pressure: 25 cmH2O (08/23/20 1336)  Plateau Pressure: 23 cmH20 (08/23/20 1336)  Total Ve: 9.07 mL (08/23/20 1336)  F/VT Ratio<105 (RSBI): (!) 32.08 (08/23/20 1336)  Lines/Drains/Airways     Central Venous Catheter Line            Percutaneous Central Line Insertion/Assessment - Triple Lumen  07/29/20 1729 right internal jugular 24 days          Drain                 NG/OG Tube 07/26/20 1756 [place] sump;orogastric 18 Fr. Center mouth 27 days         Urethral Catheter 08/23/20 1430 less than 1 day          Airway                 Airway - Non-Surgical 07/26/20 1724 Endotracheal Tube 27 days              Significant Labs:    CBC/Anemia Profile:  Recent Labs   Lab  08/22/20  0421   WBC 11.60   HGB 11.3*   HCT 38.1*   *   MCV 90   RDW 14.7*        Chemistries:  Recent Labs   Lab 08/22/20  0421      K 3.6   CL 99   CO2 32*   BUN 38*   CREATININE 1.1   CALCIUM 9.9   ALBUMIN 2.3*   PROT 8.1   BILITOT 0.4   ALKPHOS 134   *   AST 75*   MG 2.3   PHOS 4.7*       ABGs:   Recent Labs   Lab 08/23/20  0631   PH 7.427   PCO2 47.3*   HCO3 31.2*   POCSATURATED 96   BE 7     Blood Culture: No results for input(s): LABBLOO in the last 48 hours.  CMP:   Recent Labs   Lab 08/22/20  0421      K 3.6   CL 99   CO2 32*      BUN 38*   CREATININE 1.1   CALCIUM 9.9   PROT 8.1   ALBUMIN 2.3*   BILITOT 0.4   ALKPHOS 134   AST 75*   *   ANIONGAP 10   EGFRNONAA >60.0       Significant Imaging:  I have reviewed all pertinent imaging results/findings within the past 24 hours.      ABG  Recent Labs   Lab 08/23/20  0631   PH 7.427   PO2 84   PCO2 47.3*   HCO3 31.2*   BE 7     Assessment/Plan:     Pulmonary  Acute hypoxemic respiratory failure  - see Pneumonia due to COVID 19    Cardiac/Vascular  Hyperlipidemia associated with type 2 diabetes mellitus  - Restart Crestor after surgery    Hypertension associated with diabetes  Continue to hold antihypertensives as BP WNL.    Renal/  UTI (urinary tract infection) due to urinary indwelling Boothe catheter  On 8/23 patient was febrile with low grade temperature and infectious workup was done. UA shows 3+ leukocytes and many bacteria. Source control was done with replacing the cather and further work up. Patient WBC and lactate WNL.    -Started on Vancomycin and cefepime   -F/u urine cultures    Endocrine  Uncontrolled type 2 diabetes mellitus  A1c 8.3%, was started on insulin gtt on 08/05 due to elevated BG but discontinued 08/07.    - Given 12U levemir for 8/24 trach and peg    - Levemir 25U BID  - Aspart 15U Q4H  - BG at goal of 140-180 currently    GI  Elevated liver enzymes  Hx of elevated LFTs dating back to 2015 and  ranges consistent with this admission. Likely 2/2 fatty liver per PCP note. Acute hepatitis panel and HIV were both negative    - On 8/22 AST 75 and  that are now downtrending  -Trend CMP     Other  * Pneumonia due to COVID-19 virus  70M  with HTN, HLD, and DM who presents to the [place] ED for SOB and found to be COVID positive.  [place] [facility] [place]20 for higher level of care. Vent AC FiO2 30%, PEEP 5 and tidal 440.Was previously paralyzed, proned, sedated. Nimbex discontinued on 08/04. Remdesivir, dexamethasone, Ceftriaxone and azithromycin complete.    - Patient febrile on 8/23, CXR shows no changes and respiratory culture pending  - empiric antibiotics started   -  valium 2 mg BID and continue fentanyl to help with coughing and sedation  - guaifenesin, scopolamine and acetylcysteine for secretions and coughing   - Zyprexa added on 08/07 to help wean sedation.  - Therapeutic lovenox for hypercoagulable state in COVID      -Discussed with pt's Wife and daughter, state they would like to pursue PEG/trach if unable to wean from vent.   - trial of SBTs but patient continues to desaturate with coughing, general surgery consulted for trach/peg placement on 8/25.   -Discussed with CM to begin referral process for LTAC placement with [facility] under review.         Critical Care Daily Checklist:    A: Awake: RASS Goal/Actual Goal: RASS Goal: -2-->light sedation  Actual: [place] Agitation Sedation Scale (RASS): Restless   B: Spontaneous Breathing Trial Performed? Spon. Breathing Trial Initiated?: Initiated (08/19/20 0900)   C: SAT & SBT Coordinated?  yes                      D: Delirium: CAM-ICU Overall CAM-ICU: Positive   E: Early Mobility Performed? Yes   F: Feeding Goal: Goals: Meet % EEN, EPN by RD f/u date  Status: Nutrition Goal Status: goal not met   Current Diet Order   Procedures    Diet NPO    Diet NPO      AS: Analgesia/Sedation Fentanyl, diazepam   T: Thromboembolic  Prophylaxis Held this PM   H: HOB > 300 Yes   U: Stress Ulcer Prophylaxis (if needed) famotidine   G: Glucose Control detemir 25 BID aspart 15   B: Bowel Function Stool Occurrence: 0   I: Indwelling Catheter (Lines & Boothe) Necessity Boothe, central line 24 days   D: De-escalation of Antimicrobials/Pharmacotherapies n/a    Plan for the day/ETD Infectious source    Code Status:  Family/Goals of Care: Full Code         Critical secondary to Patient has a condition that poses threat to life and bodily function: Severe Respiratory Distress      Critical care was time spent personally by me on the following activities: development of treatment plan with patient or surrogate and bedside caregivers, discussions with consultants, evaluation of patient's response to treatment, examination of patient, ordering and performing treatments and interventions, ordering and review of laboratory studies, ordering and review of radiographic studies, pulse oximetry, re-evaluation of patient's condition. This critical care time did not overlap with that of any other provider or involve time for any procedures.     Aleta Rivera MD  Critical Care Medicine  [facility] - ICU 16 WT

## 2020-08-23 NOTE — ASSESSMENT & PLAN NOTE
On 8/23 patient was febrile with low grade temperature and infectious workup was done. UA shows 3+ leukocytes and many bacteria. Source control was done with replacing the cather and further work up. Patient WBC and lactate WNL.    -Started on Vancomycin and cefepime   -F/u urine cultures

## 2020-08-23 NOTE — NURSING
MD Critical Care notified at bedside of blood glucose of 88. No new orders but to recheck in 4 accu-check hours are prior order. Held both Detemir and Novolog.

## 2020-08-23 NOTE — NURSING
Notified Dr. Stephenson with Critical Care pt continues to have temps requiring prn tylenol for fever control.  Cultures and labs ordered, harris changed and urine specimens sent, CXR ordered as well.  MD also placed order for x1 LR Liter bolus. No additional orders received.  Will continue to monitor.

## 2020-08-23 NOTE — ASSESSMENT & PLAN NOTE
Hx of elevated LFTs dating back to 2015 and ranges consistent with this admission. Likely 2/2 fatty liver per PCP note. Acute hepatitis panel and HIV were both negative    - On 8/22 AST 75 and  that are now downtrending  -Trend CMP

## 2020-08-23 NOTE — PROGRESS NOTES
Pharmacokinetic Initial Assessment: IV Vancomycin    Assessment/Plan:  Initiate intravenous vancomycin with loading dose of 1,750 mg once followed by a maintenance dose of vancomycin 1,250 mg IV every 12 hours.  Desired empiric serum trough concentration is 10 to 20 mcg/mL  Draw vancomycin trough level 30 min prior to fourth dose on 8/25/20 at approximately 06:00.  Pharmacy will continue to follow and monitor vancomycin.      Please contact pharmacy at extension [phone] with any questions regarding this assessment.     Thank you for the consult,   Kary Jurado       Patient brief summary:  Anup Miller is a 70 y.o. male initiated on antimicrobial therapy with IV Vancomycin for treatment of suspected urinary tract infection    Drug Allergies:   Review of patient's allergies indicates:  No Known Allergies    Actual Body Weight:   112.5 kg    Renal Function:   Estimated Creatinine Clearance: 87.4 mL/min (based on SCr of 1 mg/dL).,     Dialysis Method (if applicable):  N/A    CBC (last 72 hours):  Recent Labs   Lab Result Units 08/22/20  0421 08/23/20  1506   WBC K/uL 11.60 10.09   Hemoglobin g/dL 11.3* 10.7*   Hematocrit % 38.1* 36.1*   Platelets K/uL 459* 452*   Gran% % 50.1 45.7   Lymph% % 19.4 24.6   Mono% % 20.7* 19.4*   Eosinophil% % 7.8 8.4*   Basophil% % 0.7 1.0   Differential Method  Automated Automated       Metabolic Panel (last 72 hours):  Recent Labs   Lab Result Units 08/22/20  0421 08/23/20  1411 08/23/20  1507   Sodium mmol/L 141  --  139   Sodium Urine Random mmol/L  --  39  --    Potassium mmol/L 3.6  --  4.1   Chloride mmol/L 99  --  100   CO2 mmol/L 32*  --  30*   Glucose mg/dL 107  --  230*   Glucose, UA   --  Negative  --    BUN, Bld mg/dL 38*  --  37*   Creatinine mg/dL 1.1  --  1.0   Creatinine, Random Ur mg/dL  --  113.0  --    Albumin g/dL 2.3*  --  2.1*   Total Bilirubin mg/dL 0.4  --  0.4   Alkaline Phosphatase U/L 134  --  120   AST U/L 75*  --  50*   ALT U/L 127*  --  103*   Magnesium  mg/dL 2.3  --  2.1   Phosphorus mg/dL 4.7*  --   --        Drug levels (last 3 results):  No results for input(s): VANCOMYCINRA, VANCOMYCINPE, VANCOMYCINTR in the last 72 hours.    Microbiologic Results:  Microbiology Results (last 7 days)     Procedure Component Value Units Date/Time    Culture, Respiratory with Gram Stain [[id number]]     Order Status: No result Specimen: Respiratory from Endotracheal Aspirate     Blood culture [[id number]] Collected: 08/23/20 1555    Order Status: Sent Specimen: Blood from Peripheral, Hand, Right Updated: 08/23/20 1623    Blood culture [[id number]] Collected: 08/23/20 1605    Order Status: Sent Specimen: Blood from Peripheral, Hand, Left Updated: 08/23/20 1622    Urine culture [[id number]] Collected: 08/23/20 1411    Order Status: No result Specimen: Urine Updated: 08/23/20 1604    Culture, Respiratory with Gram Stain [[id number]] Collected: 08/17/20 1932    Order Status: Completed Specimen: Respiratory from Sputum Updated: 08/19/20 0733     Respiratory Culture Normal respiratory ayana      No S aureus or Pseudomonas isolated.     Gram Stain (Respiratory) <10 epithelial cells per low power field.     Gram Stain (Respiratory) Rare WBC's     Gram Stain (Respiratory) Few Gram positive rods

## 2020-08-23 NOTE — ASSESSMENT & PLAN NOTE
70M  with HTN, HLD, and DM who presents to the [place] ED for SOB and found to be COVID positive.  Patient transferred to [facility] on 7/26/20 for higher level of care. Vent AC FiO2 30%, PEEP 5 and tidal 440.Was previously paralyzed, proned, sedated. Nimbex discontinued on 08/04. Remdesivir, dexamethasone, Ceftriaxone and azithromycin complete.    - Patient febrile on 8/23, CXR shows no changes and respiratory culture pending.  - empiric antibiotics started on 8/23 with resolution of fevers and patient's WBC WNL.  -  valium 2 mg daily and continue fentanyl to help with coughing and sedation  - guaifenesin, scopolamine and acetylcysteine for secretions and coughing   - Zyprexa added on 08/07 to help wean sedation.  - Therapeutic lovenox for hypercoagulable state in COVID      -Discussed with pt's Wife and daughter, state they would like to pursue PEG/trach.   - trial of SBTs but patient continues to desaturate with coughing, general surgery consulted for trach/peg placement on 8/26.   -Discussed with CM to begin referral process for LTAC placement with [facility] under review.

## 2020-08-23 NOTE — ASSESSMENT & PLAN NOTE
A1c 8.3%, was started on insulin gtt on 08/05 due to elevated BG but discontinued 08/07.    - Given 12U levemir for 8/24 trach and peg    - Levemir 12U for surgery then continue 25U BID  - Aspart 15U Q4H  - BG at goal of 140-180 currently

## 2020-08-23 NOTE — SUBJECTIVE & OBJECTIVE
Interval History/Significant Events: Patient was febrile overnight with low grade temperature of 100.8 and has continued with low grade fever this AM for which he received tylenol. Patient has also shown slow increase in Cr from 0.8 to 1.1 in past 4 days. Holding lovenox and tube feeds for surgery on 8/24.     Review of Systems   Unable to perform ROS: Intubated     Objective:     Vital Signs (Most Recent):  Temp: (!) 100.8 °F (38.2 °C) (08/23/20 1524)  Pulse: (!) 132 (08/23/20 1500)  Resp: 16 (08/23/20 1500)  BP: 111/70 (08/23/20 1500)  SpO2: 100 % (08/23/20 1500) Vital Signs (24h Range):  Temp:  [99 °F (37.2 °C)-100.8 °F (38.2 °C)] 100.8 °F (38.2 °C)  Pulse:  [121-145] 132  Resp:  [16-34] 16  SpO2:  [99 %-100 %] 100 %  BP: ()/(58-86) 111/70   Weight: 112.5 kg (248 lb 0.3 oz)  Body mass index is 36.63 kg/m².      Intake/Output Summary (Last 24 hours) at 8/23/2020 1556  Last data filed at 8/23/2020 1512  Gross per 24 hour   Intake 3116.66 ml   Output 1540 ml   Net 1576.66 ml       Physical Exam  Vitals signs and nursing note reviewed.   Constitutional:       Appearance: He is obese.   HENT:      Head: Normocephalic and atraumatic.      Comments: Thin clear secretions on suctioning.     Mouth/Throat:      Comments: Intubated  Eyes:      General:         Right eye: No discharge.         Left eye: No discharge.   Cardiovascular:      Rate and Rhythm: Regular rhythm. Tachycardia present.      Pulses: Normal pulses.      Heart sounds: Normal heart sounds.   Pulmonary:      Effort: No respiratory distress.      Comments: Intubated.  Abdominal:      General: Bowel sounds are normal.      Palpations: Abdomen is soft.      Tenderness: There is no abdominal tenderness.   Musculoskeletal:         General: No swelling.   Skin:     General: Skin is warm and dry.      Capillary Refill: Capillary refill takes less than 2 seconds.      Comments: B/l UE swelling and lip swelling   Neurological:      Mental Status: He is alert.       Comments: Alert with mild sedation. Follows commands.         Vents:  Vent Mode: A/C (08/23/20 1336)  Ventilator Initiated: Yes(chart correction) (07/26/20 2108)  Set Rate: 16 BPM (08/23/20 1336)  Vt Set: 440 mL (08/23/20 1336)  Pressure Support: 0 cmH20 (08/23/20 1336)  PEEP/CPAP: 5 cmH20 (08/23/20 1336)  Oxygen Concentration (%): 30 (08/23/20 1500)  Peak Airway Pressure: 25 cmH2O (08/23/20 1336)  Plateau Pressure: 23 cmH20 (08/23/20 1336)  Total Ve: 9.07 mL (08/23/20 1336)  F/VT Ratio<105 (RSBI): (!) 32.08 (08/23/20 1336)  Lines/Drains/Airways     Central Venous Catheter Line            Percutaneous Central Line Insertion/Assessment - Triple Lumen  07/29/20 1729 right internal jugular 24 days          Drain                 NG/OG Tube 07/26/20 1756 [place] sump;orogastric 18 Fr. Center mouth 27 days         Urethral Catheter 08/23/20 1430 less than 1 day          Airway                 Airway - Non-Surgical 07/26/20 1724 Endotracheal Tube 27 days              Significant Labs:    CBC/Anemia Profile:  Recent Labs   Lab 08/22/20  0421   WBC 11.60   HGB 11.3*   HCT 38.1*   *   MCV 90   RDW 14.7*        Chemistries:  Recent Labs   Lab 08/22/20  0421      K 3.6   CL 99   CO2 32*   BUN 38*   CREATININE 1.1   CALCIUM 9.9   ALBUMIN 2.3*   PROT 8.1   BILITOT 0.4   ALKPHOS 134   *   AST 75*   MG 2.3   PHOS 4.7*       ABGs:   Recent Labs   Lab 08/23/20  0631   PH 7.427   PCO2 47.3*   HCO3 31.2*   POCSATURATED 96   BE 7     Blood Culture: No results for input(s): LABBLOO in the last 48 hours.  CMP:   Recent Labs   Lab 08/22/20  0421      K 3.6   CL 99   CO2 32*      BUN 38*   CREATININE 1.1   CALCIUM 9.9   PROT 8.1   ALBUMIN 2.3*   BILITOT 0.4   ALKPHOS 134   AST 75*   *   ANIONGAP 10   EGFRNONAA >60.0       Significant Imaging:  I have reviewed all pertinent imaging results/findings within the past 24 hours.

## 2020-08-23 NOTE — ASSESSMENT & PLAN NOTE
A1c 8.3%, was started on insulin gtt on 08/05 due to elevated BG but discontinued 08/07.    - Given 12U levemir for 8/24 trach and peg    - Levemir 25U BID  - Aspart 15U Q4H  - BG at goal of 140-180 currently

## 2020-08-23 NOTE — ASSESSMENT & PLAN NOTE
Hx of elevated LFTs dating back to 2015 and ranges consistent with this admission. Likely 2/2 fatty liver per PCP note. Acute hepatitis panel and HIV were both negative    - On 8/22 AST 75 and  that are now back at patient's baseline.   -Trend CMP

## 2020-08-23 NOTE — NURSING
Notified Dr. Stephenson with [facility] of persistent tachycardia in 140s, other VS stable.  EKG ordered.  Will continue to monitor.

## 2020-08-23 NOTE — ASSESSMENT & PLAN NOTE
70M  with HTN, HLD, and DM who presents to the [place] ED for SOB and found to be COVID positive.  Patient transferred to [facility] [place] 7/26/20 for higher level of care. Vent AC FiO2 30%, PEEP 5 and tidal 440.Was previously paralyzed, proned, sedated. Nimbex discontinued on 08/04. Remdesivir, dexamethasone, Ceftriaxone and azithromycin complete.    - Patient febrile on 8/23, CXR shows no changes and respiratory culture pending  - empiric antibiotics started   -  valium 2 mg BID and continue fentanyl to help with coughing and sedation  - guaifenesin, scopolamine and acetylcysteine for secretions and coughing   - Zyprexa added on 08/07 to help wean sedation.  - Therapeutic lovenox for hypercoagulable state in COVID      -Discussed with pt's Wife and daughter, state they would like to pursue PEG/trach if unable to wean from vent.   - trial of SBTs but patient continues to desaturate with coughing, general surgery consulted for trach/peg placement on 8/25.   -Discussed with CM to begin referral process for LTAC placement with [facility] under review.

## 2020-08-24 PROBLEM — S31.000A SACRAL WOUND: Status: ACTIVE | Noted: 2020-08-24

## 2020-08-24 LAB
ALLENS TEST: ABNORMAL
ANION GAP SERPL CALC-SCNC: 9 MMOL/L (ref 8–16)
BASOPHILS # BLD AUTO: 0.06 K/UL (ref 0–0.2)
BASOPHILS NFR BLD: 0.6 % (ref 0–1.9)
BUN SERPL-MCNC: 27 MG/DL (ref 8–23)
CALCIUM SERPL-MCNC: 8.6 MG/DL (ref 8.7–10.5)
CHLORIDE SERPL-SCNC: 101 MMOL/L (ref 95–110)
CO2 SERPL-SCNC: 28 MMOL/L (ref 23–29)
CREAT SERPL-MCNC: 0.8 MG/DL (ref 0.5–1.4)
DELSYS: ABNORMAL
DIFFERENTIAL METHOD: ABNORMAL
EOSINOPHIL # BLD AUTO: 0.8 K/UL (ref 0–0.5)
EOSINOPHIL NFR BLD: 7.5 % (ref 0–8)
ERYTHROCYTE [DISTWIDTH] IN BLOOD BY AUTOMATED COUNT: 14.4 % (ref 11.5–14.5)
ERYTHROCYTE [SEDIMENTATION RATE] IN BLOOD BY WESTERGREN METHOD: 16 MM/H
EST. GFR  (AFRICAN AMERICAN): >60 ML/MIN/1.73 M^2
EST. GFR  (NON AFRICAN AMERICAN): >60 ML/MIN/1.73 M^2
FIO2: 30
GLUCOSE SERPL-MCNC: 197 MG/DL (ref 70–110)
HCO3 UR-SCNC: 29.4 MMOL/L (ref 24–28)
HCT VFR BLD AUTO: 34.2 % (ref 40–54)
HGB BLD-MCNC: 10.1 G/DL (ref 14–18)
IMM GRANULOCYTES # BLD AUTO: 0.07 K/UL (ref 0–0.04)
IMM GRANULOCYTES NFR BLD AUTO: 0.7 % (ref 0–0.5)
LYMPHOCYTES # BLD AUTO: 2.1 K/UL (ref 1–4.8)
LYMPHOCYTES NFR BLD: 20 % (ref 18–48)
MAGNESIUM SERPL-MCNC: 2.1 MG/DL (ref 1.6–2.6)
MCH RBC QN AUTO: 26.8 PG (ref 27–31)
MCHC RBC AUTO-ENTMCNC: 29.5 G/DL (ref 32–36)
MCV RBC AUTO: 91 FL (ref 82–98)
MODE: ABNORMAL
MONOCYTES # BLD AUTO: 1.6 K/UL (ref 0.3–1)
MONOCYTES NFR BLD: 15.7 % (ref 4–15)
NEUTROPHILS # BLD AUTO: 5.7 K/UL (ref 1.8–7.7)
NEUTROPHILS NFR BLD: 55.5 % (ref 38–73)
NRBC BLD-RTO: 0 /100 WBC
PCO2 BLDA: 39.9 MMHG (ref 35–45)
PEEP: 5
PH SMN: 7.48 [PH] (ref 7.35–7.45)
PHOSPHATE SERPL-MCNC: 3.1 MG/DL (ref 2.7–4.5)
PLATELET # BLD AUTO: 451 K/UL (ref 150–350)
PMV BLD AUTO: 10.1 FL (ref 9.2–12.9)
PO2 BLDA: 86 MMHG (ref 80–100)
POC BE: 6 MMOL/L
POC SATURATED O2: 97 % (ref 95–100)
POC TCO2: 31 MMOL/L (ref 23–27)
POCT GLUCOSE: 109 MG/DL (ref 70–110)
POCT GLUCOSE: 118 MG/DL (ref 70–110)
POCT GLUCOSE: 135 MG/DL (ref 70–110)
POCT GLUCOSE: 152 MG/DL (ref 70–110)
POCT GLUCOSE: 164 MG/DL (ref 70–110)
POCT GLUCOSE: 196 MG/DL (ref 70–110)
POCT GLUCOSE: 249 MG/DL (ref 70–110)
POCT GLUCOSE: 289 MG/DL (ref 70–110)
POTASSIUM SERPL-SCNC: 3.5 MMOL/L (ref 3.5–5.1)
RBC # BLD AUTO: 3.77 M/UL (ref 4.6–6.2)
SAMPLE: ABNORMAL
SITE: ABNORMAL
SODIUM SERPL-SCNC: 138 MMOL/L (ref 136–145)
VANCOMYCIN TROUGH SERPL-MCNC: 12.7 UG/ML (ref 10–22)
VT: 440
WBC # BLD AUTO: 10.28 K/UL (ref 3.9–12.7)

## 2020-08-24 PROCEDURE — 25000003 PHARM REV CODE 250: Performed by: STUDENT IN AN ORGANIZED HEALTH CARE EDUCATION/TRAINING PROGRAM

## 2020-08-24 PROCEDURE — 25000003 PHARM REV CODE 250: Performed by: INTERNAL MEDICINE

## 2020-08-24 PROCEDURE — 80048 BASIC METABOLIC PNL TOTAL CA: CPT

## 2020-08-24 PROCEDURE — 80202 ASSAY OF VANCOMYCIN: CPT

## 2020-08-24 PROCEDURE — 63600175 PHARM REV CODE 636 W HCPCS: Performed by: STUDENT IN AN ORGANIZED HEALTH CARE EDUCATION/TRAINING PROGRAM

## 2020-08-24 PROCEDURE — 27000221 HC OXYGEN, UP TO 24 HOURS

## 2020-08-24 PROCEDURE — 94003 VENT MGMT INPAT SUBQ DAY: CPT

## 2020-08-24 PROCEDURE — 83735 ASSAY OF MAGNESIUM: CPT

## 2020-08-24 PROCEDURE — 25000242 PHARM REV CODE 250 ALT 637 W/ HCPCS: Performed by: STUDENT IN AN ORGANIZED HEALTH CARE EDUCATION/TRAINING PROGRAM

## 2020-08-24 PROCEDURE — 82800 BLOOD PH: CPT

## 2020-08-24 PROCEDURE — 25000003 PHARM REV CODE 250: Performed by: PHYSICIAN ASSISTANT

## 2020-08-24 PROCEDURE — 99900026 HC AIRWAY MAINTENANCE (STAT)

## 2020-08-24 PROCEDURE — 63600175 PHARM REV CODE 636 W HCPCS: Performed by: INTERNAL MEDICINE

## 2020-08-24 PROCEDURE — 20000000 HC ICU ROOM

## 2020-08-24 PROCEDURE — 94640 AIRWAY INHALATION TREATMENT: CPT

## 2020-08-24 PROCEDURE — 99900035 HC TECH TIME PER 15 MIN (STAT)

## 2020-08-24 PROCEDURE — 99233 PR SUBSEQUENT HOSPITAL CARE,LEVL III: ICD-10-PCS | Mod: ,,, | Performed by: INTERNAL MEDICINE

## 2020-08-24 PROCEDURE — 99233 SBSQ HOSP IP/OBS HIGH 50: CPT | Mod: ,,, | Performed by: INTERNAL MEDICINE

## 2020-08-24 PROCEDURE — 94761 N-INVAS EAR/PLS OXIMETRY MLT: CPT

## 2020-08-24 PROCEDURE — 82803 BLOOD GASES ANY COMBINATION: CPT

## 2020-08-24 PROCEDURE — 85025 COMPLETE CBC W/AUTO DIFF WBC: CPT

## 2020-08-24 PROCEDURE — 84100 ASSAY OF PHOSPHORUS: CPT

## 2020-08-24 PROCEDURE — 36600 WITHDRAWAL OF ARTERIAL BLOOD: CPT

## 2020-08-24 RX ORDER — ENOXAPARIN SODIUM 100 MG/ML
90 INJECTION SUBCUTANEOUS EVERY 12 HOURS
Status: DISCONTINUED | OUTPATIENT
Start: 2020-08-24 | End: 2020-08-26

## 2020-08-24 RX ADMIN — ACETAMINOPHEN 650 MG: 160 SOLUTION ORAL at 03:08

## 2020-08-24 RX ADMIN — INSULIN ASPART 15 UNITS: 100 INJECTION, SOLUTION INTRAVENOUS; SUBCUTANEOUS at 12:08

## 2020-08-24 RX ADMIN — FAMOTIDINE 20 MG: 20 TABLET ORAL at 08:08

## 2020-08-24 RX ADMIN — OLANZAPINE 5 MG: 2.5 TABLET, FILM COATED ORAL at 08:08

## 2020-08-24 RX ADMIN — SCOPALAMINE 1 PATCH: 1 PATCH, EXTENDED RELEASE TRANSDERMAL at 06:08

## 2020-08-24 RX ADMIN — LIDOCAINE HYDROCHLORIDE 4 ML: 40 INJECTION, SOLUTION RETROBULBAR; TOPICAL at 08:08

## 2020-08-24 RX ADMIN — ACETYLCYSTEINE 4 ML: 100 SOLUTION ORAL; RESPIRATORY (INHALATION) at 09:08

## 2020-08-24 RX ADMIN — VANCOMYCIN HYDROCHLORIDE 1250 MG: 1.25 INJECTION, POWDER, LYOPHILIZED, FOR SOLUTION INTRAVENOUS at 08:08

## 2020-08-24 RX ADMIN — INSULIN ASPART 2 UNITS: 100 INJECTION, SOLUTION INTRAVENOUS; SUBCUTANEOUS at 04:08

## 2020-08-24 RX ADMIN — MIDAZOLAM 4 MG: 5 INJECTION INTRAMUSCULAR; INTRAVENOUS at 02:08

## 2020-08-24 RX ADMIN — STANDARDIZED SENNA CONCENTRATE AND DOCUSATE SODIUM 1 TABLET: 8.6; 5 TABLET ORAL at 09:08

## 2020-08-24 RX ADMIN — Medication 175 MCG/HR: at 08:08

## 2020-08-24 RX ADMIN — CEFEPIME 1 G: 1 INJECTION, POWDER, FOR SOLUTION INTRAMUSCULAR; INTRAVENOUS at 05:08

## 2020-08-24 RX ADMIN — ACETAMINOPHEN 650 MG: 160 SOLUTION ORAL at 04:08

## 2020-08-24 RX ADMIN — LIDOCAINE HYDROCHLORIDE 4 ML: 40 INJECTION, SOLUTION RETROBULBAR; TOPICAL at 12:08

## 2020-08-24 RX ADMIN — ACETYLCYSTEINE 4 ML: 100 SOLUTION ORAL; RESPIRATORY (INHALATION) at 07:08

## 2020-08-24 RX ADMIN — ROSUVASTATIN CALCIUM 10 MG: 10 TABLET, FILM COATED ORAL at 08:08

## 2020-08-24 RX ADMIN — POLYETHYLENE GLYCOL 3350 17 G: 17 POWDER, FOR SOLUTION ORAL at 09:08

## 2020-08-24 RX ADMIN — INSULIN ASPART 15 UNITS: 100 INJECTION, SOLUTION INTRAVENOUS; SUBCUTANEOUS at 04:08

## 2020-08-24 RX ADMIN — ENOXAPARIN SODIUM 90 MG: 60 INJECTION SUBCUTANEOUS at 12:08

## 2020-08-24 RX ADMIN — INSULIN ASPART 15 UNITS: 100 INJECTION, SOLUTION INTRAVENOUS; SUBCUTANEOUS at 08:08

## 2020-08-24 RX ADMIN — CEFEPIME 1 G: 1 INJECTION, POWDER, FOR SOLUTION INTRAMUSCULAR; INTRAVENOUS at 09:08

## 2020-08-24 RX ADMIN — ALBUTEROL SULFATE 2.5 MG: 2.5 SOLUTION RESPIRATORY (INHALATION) at 09:08

## 2020-08-24 RX ADMIN — ALBUTEROL SULFATE 2.5 MG: 2.5 SOLUTION RESPIRATORY (INHALATION) at 07:08

## 2020-08-24 RX ADMIN — CEFEPIME 1 G: 1 INJECTION, POWDER, FOR SOLUTION INTRAMUSCULAR; INTRAVENOUS at 02:08

## 2020-08-24 RX ADMIN — INSULIN ASPART 2 UNITS: 100 INJECTION, SOLUTION INTRAVENOUS; SUBCUTANEOUS at 12:08

## 2020-08-24 RX ADMIN — STANDARDIZED SENNA CONCENTRATE AND DOCUSATE SODIUM 1 TABLET: 8.6; 5 TABLET ORAL at 08:08

## 2020-08-24 RX ADMIN — ALBUTEROL SULFATE 2.5 MG: 2.5 SOLUTION RESPIRATORY (INHALATION) at 12:08

## 2020-08-24 RX ADMIN — DIAZEPAM 2 MG: 2 TABLET ORAL at 08:08

## 2020-08-24 RX ADMIN — ACETYLCYSTEINE 4 ML: 100 SOLUTION ORAL; RESPIRATORY (INHALATION) at 12:08

## 2020-08-24 NOTE — CONSULTS
Wound care consult received.  Pt known to wound care team and was last assessed 8/17 noting moisture skin breakdown to gluteal cleft.  Today upon assessment of sacrum, noted the affected area to gluteal cleft has now been further compromised by pressure to coccyx causing a shallow full thickness ulceration, Stage 3. See photo below.  Recommend nursing to continue with pressure injury prevention interventions and transfer pt to MARTINE surface.  Nursing states pt has been repositioned every 2 hours. Will notify MD team of decline in wound status and modify orders to begin Triad ointment to coccyx and gluteal cleft. Triad will provide moisture barrier as well as a hydrophilic environment to promote healing.    Notified nurse of modification in orders and expectation of delivery of MARTINE.  Wound care team to follow pt prn  [id number]    Coccyx- Stage 3 pressure injury- hospital acquired. 3.5x2x0.2cm with small amount of serosanguinous. No odor.  Wound bed 95%granular/5% slough.

## 2020-08-24 NOTE — PT/OT/SLP PROGRESS
Occupational Therapy      Patient Name:  Anup Miller   MRN:  [id number]    Patient not seen today secondary to remains intubated on 10 PEEP and tachycardic. Pt scheduled for PEG/trach tomorrow  . Will follow-up 8/25/20.    BIANKA Marcelo  8/24/2020

## 2020-08-24 NOTE — PLAN OF CARE
Patient still experiencing spikes in temperature.  Max temperature today 100.7.  Patient received Acetaminophen 650 mg.  Post medication temp 99.8    UOP adequate.  TF's and anticoagulation resumed today.     Plans for trach and PEG tomorrow.  RN tonight to hold TF's at midnight.  Also, Enoxaparin scheduled for 2100 should also be held.     Patient continues to experience uncontrollable coughing episodes.  Medications administered.     Otherwise, patient remains hemodynamically stable.  HR & B/P stable.      Will continue to monitor and assess.

## 2020-08-24 NOTE — PT/OT/SLP PROGRESS
Physical Therapy      Patient Name:  Anup Miller   MRN:  [id number]    Patient not seen today secondary to pt with medical instability, awaiting trach/PEG placement tomorrow. Will follow-up as pt is medically appropriate.    Kusum Pearson, PT

## 2020-08-24 NOTE — SUBJECTIVE & OBJECTIVE
Interval History/Significant Events: Patient seen this AM and was more alert and responsive but still shows weakness in extremities. Patient's febrile episodes have resolved but will continue antibiotic therapy. Patient has new sacral wound that is being evaluated by wound care.      Review of Systems   Unable to perform ROS: Intubated     Objective:     Vital Signs (Most Recent):  Temp: 100.3 °F (37.9 °C) (08/24/20 1225)  Pulse: (!) 137 (08/24/20 1500)  Resp: (!) 24 (08/24/20 1500)  BP: (!) 128/93 (08/24/20 1500)  SpO2: 100 % (08/24/20 1500) Vital Signs (24h Range):  Temp:  [99.1 °F (37.3 °C)-100.5 °F (38.1 °C)] 100.3 °F (37.9 °C)  Pulse:  [110-139] 137  Resp:  [17-40] 24  SpO2:  [95 %-100 %] 100 %  BP: (102-162)/(62-93) 128/93   Weight: 112.5 kg (248 lb 0.3 oz)  Body mass index is 36.63 kg/m².      Intake/Output Summary (Last 24 hours) at 8/24/2020 1552  Last data filed at 8/24/2020 1500  Gross per 24 hour   Intake 3058.75 ml   Output 1755 ml   Net 1303.75 ml       Physical Exam  Vitals signs and nursing note reviewed.   Constitutional:       Comments: Intubated and sedated    Eyes:      Extraocular Movements: Extraocular movements intact.   Cardiovascular:      Rate and Rhythm: Normal rate and regular rhythm.      Pulses: Normal pulses.      Heart sounds: Normal heart sounds. No murmur.   Pulmonary:      Effort: Pulmonary effort is normal. No respiratory distress.      Breath sounds: Normal breath sounds. No wheezing.   Abdominal:      General: Abdomen is flat. There is no distension.      Tenderness: There is no abdominal tenderness.   Skin:     General: Skin is warm.   Neurological:      Mental Status: He is alert.      Comments: Intubated and sedated but alert          Vents:  Vent Mode: A/C (08/24/20 1235)  Ventilator Initiated: Yes(chart correction) (07/26/20 2108)  Set Rate: 16 BPM (08/24/20 1235)  Vt Set: 440 mL (08/24/20 1235)  Pressure Support: 0 cmH20 (08/24/20 1235)  PEEP/CPAP: 5 cmH20 (08/24/20  1235)  Oxygen Concentration (%): 30 (08/24/20 1500)  Peak Airway Pressure: 22 cmH2O (08/24/20 1235)  Plateau Pressure: 23 cmH20 (08/24/20 1235)  Total Ve: 13 mL (08/24/20 1235)  F/VT Ratio<105 (RSBI): (!) 77.82 (08/24/20 1235)  Lines/Drains/Airways     Central Venous Catheter Line            Percutaneous Central Line Insertion/Assessment - Triple Lumen  07/29/20 1729 right internal jugular 25 days          Drain                 NG/OG Tube 07/26/20 1756 [place] sump;orogastric 18 Fr. Center mouth 28 days         Urethral Catheter 08/23/20 1430 1 day          Airway                 Airway - Non-Surgical 07/26/20 1724 Endotracheal Tube 28 days              Significant Labs:    CBC/Anemia Profile:  Recent Labs   Lab 08/23/20  1506 08/24/20  1239   WBC 10.09 10.28   HGB 10.7* 10.1*   HCT 36.1* 34.2*   * 451*   MCV 92 91   RDW 14.5 14.4        Chemistries:  Recent Labs   Lab 08/23/20  1507 08/24/20  0337 08/24/20  1239     --  138   K 4.1  --  3.5     --  101   CO2 30*  --  28   BUN 37*  --  27*   CREATININE 1.0  --  0.8   CALCIUM 9.0  --  8.6*   ALBUMIN 2.1*  --   --    PROT 7.1  --   --    BILITOT 0.4  --   --    ALKPHOS 120  --   --    *  --   --    AST 50*  --   --    MG 2.1 2.1  --    PHOS  --  3.1  --        ABGs:   Recent Labs   Lab 08/24/20  0423   PH 7.476*   PCO2 39.9   HCO3 29.4*   POCSATURATED 97   BE 6     CMP:   Recent Labs   Lab 08/23/20  1507 08/24/20  1239    138   K 4.1 3.5    101   CO2 30* 28   * 197*   BUN 37* 27*   CREATININE 1.0 0.8   CALCIUM 9.0 8.6*   PROT 7.1  --    ALBUMIN 2.1*  --    BILITOT 0.4  --    ALKPHOS 120  --    AST 50*  --    *  --    ANIONGAP 9 9   EGFRNONAA >60.0 >60.0     Urine Culture: No results for input(s): LABURIN in the last 48 hours.  Urine Studies:   Recent Labs   Lab 08/23/20  1411   COLORU Yellow   APPEARANCEUA Cloudy*   PHUR 5.0   SPECGRAV 1.025   PROTEINUA Negative   GLUCUA Negative   KETONESU Negative   BILIRUBINUA  Negative   OCCULTUA 3+*   NITRITE Negative   LEUKOCYTESUR 3+*   RBCUA >100*   WBCUA 88*   BACTERIA Many*       Significant Imaging:  I have reviewed all pertinent imaging results/findings within the past 24 hours.

## 2020-08-24 NOTE — PROGRESS NOTES
General Surgery Progress Note    Patient on OR schedule for planned Trach/PEG on 8/25/20.   - Please hold tube feeds starting at midnight  - Please hold PM and tomorrow AM Lovenox    Contact general surgery team with any questions or concerns.     Maddi Marshall MD  General Surgery, PGY-II  Pager: [phone]  8/24/2020 4:12 PM

## 2020-08-24 NOTE — ASSESSMENT & PLAN NOTE
Wound care assessed patient and shows stage 3 hospital acquired pressure sacral ulcer. Reposition q2 hours has been upheld by nursing staff.     -appreciate wound care recommendations  -will continue to assess

## 2020-08-24 NOTE — PLAN OF CARE
Pt not medically stable for discharge, remains intubated.  Scheduled for peg/Trach 8/25.    Vandana García RN   [id number]       08/24/20 8457   Discharge Reassessment   Assessment Type Discharge Planning Reassessment   Do you have any problems affording any of your prescribed medications? TBD   Discharge Plan A Long-term acute care facility (LTAC)   Discharge Plan B Skilled Nursing Facility   DME Needed Upon Discharge  other (see comments)  (TBD)   Anticipated Discharge Disposition LTAC   Can the patient/caregiver answer the patient profile reliably?   (Pt intubated)   Describe the patient's ability to walk at the present time. Major restrictions/daily assistance from another person   Number of comorbid conditions (as recorded on the chart) Four   Post-Acute Status   Post-Acute Authorization Placement   Post-Acute Placement Status Awaiting Internal Medical Clearance   Discharge Delays None known at this time

## 2020-08-24 NOTE — PROGRESS NOTES
[facility] - ICU 16   Critical Care Medicine  Progress Note    Patient Name: Anup Miller  MRN: [id number]  Admission Date: 7/26/2020  Hospital Length of Stay: 29 days  Code Status: Full Code  Attending Provider: Karl Hilliard MD  Primary Care Provider: Bryce Gonzales MD   Principal Problem: Pneumonia due to COVID-19 virus    Subjective:     HPI:  Anup Miller is a 70 y.o. male patient with a PMHx of HTN, HLD, and DM who presents to the [facility] for evaluation of SOB which  1 week ago. Pt became more SOB and has an O2 sat of low 70s upon arrival on RA. Symptoms are worsening and moderate in severity. No mitigating or exacerbating factors reported. Associated sxs include cough and fever. Patient denies any congestion, sore throat, CP, abd pain, N/V, back pain, HA, weakness, and all other sxs at this time. No prior Tx reported. No further complaints or concerns at this time. Patient was placed on Bipap for a few hours and subsequently intubated. COVID positive. Was started on IV dexamethasone, Ceftriaxone, and Azithromycin. Patient transferred to [facility] on evening of 7/26/20 for higher level of care.     Hospital/ICU Course:  As of 7/29, the patient's oxygenation continued to worsen with P:F <150. R IJ and Arterial Line placed. Pt sedated, paralyzed, and proned at 9:30pm with improved oxygenation. Repeat AM gas on 7/30 Arterial Blood Gas result:  pO2 112; pCO2 58.9; pH 7.256;  HCO3 26.2, %O2 Sat 97%. FiO2 60, 8 PEEP. Pt supinated at 4:10 pm on 7/30 with Arterial Blood Gas result:  pO2 106; pCO2 52.2; pH 7.349;  HCO3 28.7, %O2 Sat 106. (P:F 212). FiO2 50, 10 PEEP.    As of 7/31, paralytic d/c'ed and sedation slowly weaned. Propofol d/c'ed on 8/1 2/2 triglycerides with ketamine and versed initiated. Now requiring higher vent settings due to dyssynchrony, will continue to increase sedation. Patient paralyzed again on 8/1 for vent dyssynchrony. Nimbex was discontinued on  08/03 and sedative medications were increased, however, patient struggled to pull enough tidal volume and was desatting even on maximum sedation so nimbex was restarted. Nimbex was stopped on 08/04 and restarted propofol. As patient became more agitated, ketamine was added on 08/06. Sedation was attempted to be weaned off by adding seroquel and zyprexa. Versed and ketamine were weaned off. Patient was weaned off of levo on morning of 08/09. Patient failed multiple SBTs and it was discussed with family of likely PEG/trach, pending their decision. Patient has continued agitation with coughing/ increased BPs requiring propofol, fentanyl, and precedex with scheduled valium. Discussed with family that as patient continues to require ventillator support past 21d, would benefit from trach/peg. Family agrees to pursue trach/peg/LTAC placement. General surgery consulted for trach/peg with plan for 8/26.     Interval History/Significant Events: Patient seen this AM and was more alert and responsive but still shows weakness in extremities. Patient's febrile episodes have resolved but will continue antibiotic therapy. Patient has new sacral wound that is being evaluated by wound care.      Review of Systems   Unable to perform ROS: Intubated     Objective:     Vital Signs (Most Recent):  Temp: 100.3 °F (37.9 °C) (08/24/20 1225)  Pulse: (!) 137 (08/24/20 1500)  Resp: (!) 24 (08/24/20 1500)  BP: (!) 128/93 (08/24/20 1500)  SpO2: 100 % (08/24/20 1500) Vital Signs (24h Range):  Temp:  [99.1 °F (37.3 °C)-100.5 °F (38.1 °C)] 100.3 °F (37.9 °C)  Pulse:  [110-139] 137  Resp:  [17-40] 24  SpO2:  [95 %-100 %] 100 %  BP: (102-162)/(62-93) 128/93   Weight: 112.5 kg (248 lb 0.3 oz)  Body mass index is 36.63 kg/m².      Intake/Output Summary (Last 24 hours) at 8/24/2020 1552  Last data filed at 8/24/2020 1500  Gross per 24 hour   Intake 3058.75 ml   Output 1755 ml   Net 1303.75 ml       Physical Exam  Vitals signs and nursing note reviewed.    Constitutional:       Comments: Intubated and sedated    Eyes:      Extraocular Movements: Extraocular movements intact.   Cardiovascular:      Rate and Rhythm: Normal rate and regular rhythm.      Pulses: Normal pulses.      Heart sounds: Normal heart sounds. No murmur.   Pulmonary:      Effort: Pulmonary effort is normal. No respiratory distress.      Breath sounds: Normal breath sounds. No wheezing.   Abdominal:      General: Abdomen is flat. There is no distension.      Tenderness: There is no abdominal tenderness.   Skin:     General: Skin is warm.   Neurological:      Mental Status: He is alert.      Comments: Intubated and sedated but alert          Vents:  Vent Mode: A/C (08/24/20 1235)  Ventilator Initiated: Yes(chart correction) (07/26/20 2108)  Set Rate: 16 BPM (08/24/20 1235)  Vt Set: 440 mL (08/24/20 1235)  Pressure Support: 0 cmH20 (08/24/20 1235)  PEEP/CPAP: 5 cmH20 (08/24/20 1235)  Oxygen Concentration (%): 30 (08/24/20 1500)  Peak Airway Pressure: 22 cmH2O (08/24/20 1235)  Plateau Pressure: 23 cmH20 (08/24/20 1235)  Total Ve: 13 mL (08/24/20 1235)  F/VT Ratio<105 (RSBI): (!) 77.82 (08/24/20 1235)  Lines/Drains/Airways     Central Venous Catheter Line            Percutaneous Central Line Insertion/Assessment - Triple Lumen  07/29/20 1729 right internal jugular 25 days          Drain                 NG/OG Tube 07/26/20 1756 [place] sump;orogastric 18 Fr. Center mouth 28 days         Urethral Catheter 08/23/20 1430 1 day          Airway                 Airway - Non-Surgical 07/26/20 1724 Endotracheal Tube 28 days              Significant Labs:    CBC/Anemia Profile:  Recent Labs   Lab 08/23/20  1506 08/24/20  1239   WBC 10.09 10.28   HGB 10.7* 10.1*   HCT 36.1* 34.2*   * 451*   MCV 92 91   RDW 14.5 14.4        Chemistries:  Recent Labs   Lab 08/23/20  1507 08/24/20  0337 08/24/20  1239     --  138   K 4.1  --  3.5     --  101   CO2 30*  --  28   BUN 37*  --  27*   CREATININE 1.0  --   0.8   CALCIUM 9.0  --  8.6*   ALBUMIN 2.1*  --   --    PROT 7.1  --   --    BILITOT 0.4  --   --    ALKPHOS 120  --   --    *  --   --    AST 50*  --   --    MG 2.1 2.1  --    PHOS  --  3.1  --        ABGs:   Recent Labs   Lab 08/24/20  0423   PH 7.476*   PCO2 39.9   HCO3 29.4*   POCSATURATED 97   BE 6     CMP:   Recent Labs   Lab 08/23/20  1507 08/24/20  1239    138   K 4.1 3.5    101   CO2 30* 28   * 197*   BUN 37* 27*   CREATININE 1.0 0.8   CALCIUM 9.0 8.6*   PROT 7.1  --    ALBUMIN 2.1*  --    BILITOT 0.4  --    ALKPHOS 120  --    AST 50*  --    *  --    ANIONGAP 9 9   EGFRNONAA >60.0 >60.0     Urine Culture: No results for input(s): LABURIN in the last 48 hours.  Urine Studies:   Recent Labs   Lab 08/23/20  1411   COLORU Yellow   APPEARANCEUA Cloudy*   PHUR 5.0   SPECGRAV 1.025   PROTEINUA Negative   GLUCUA Negative   KETONESU Negative   BILIRUBINUA Negative   OCCULTUA 3+*   NITRITE Negative   LEUKOCYTESUR 3+*   RBCUA >100*   WBCUA 88*   BACTERIA Many*       Significant Imaging:  I have reviewed all pertinent imaging results/findings within the past 24 hours.      ABG  Recent Labs   Lab 08/24/20  0423   PH 7.476*   PO2 86   PCO2 39.9   HCO3 29.4*   BE 6     Assessment/Plan:     Pulmonary  Acute hypoxemic respiratory failure  - see Pneumonia due to COVID 19    Cardiac/Vascular  Hyperlipidemia associated with type 2 diabetes mellitus  - Restart Crestor after surgery    Hypertension associated with diabetes  Continue to hold antihypertensives as BP WNL.    Renal/  UTI (urinary tract infection) due to urinary indwelling [place] catheter  On 8/23 patient was febrile with low grade temperature and infectious workup was done. UA shows 3+ leukocytes and many bacteria. Source control was done with replacing the cather and further work up. Patient WBC and lactate WNL.    -Started on Vancomycin and cefepime   -F/u urine cultures    Endocrine  Uncontrolled type 2 diabetes mellitus  A1c  8.3%, was started on insulin gtt on 08/05 due to elevated BG but discontinued 08/07.    - Given 12U levemir for 8/24 trach and peg    - Levemir 12U for surgery then continue 25U BID  - Aspart 15U Q4H  - BG at goal of 140-180 currently    GI  Elevated liver enzymes  Hx of elevated LFTs dating back to 2015 and ranges consistent with this admission. Likely 2/2 fatty liver per PCP note. Acute hepatitis panel and HIV were both negative    - On 8/22 AST 75 and  that are now back at patient's baseline.   -Trend CMP     Orthopedic  Sacral wound  Wound care assessed patient and shows stage 3 hospital acquired pressure sacral ulcer. Reposition q2 hours has been upheld by nursing staff.     -appreciate wound care recommendations  -will continue to assess     Other  * Pneumonia due to COVID-19 virus  70M  with HTN, HLD, and DM who presents to the [facility] ED for SOB and found to be COVID positive.  Patient transferred to [facility] on 7/26/20 for higher level of care. Vent AC FiO2 30%, PEEP 5 and tidal 440.Was previously paralyzed, proned, sedated. Nimbex discontinued on 08/04. Remdesivir, dexamethasone, Ceftriaxone and azithromycin complete.    - Patient febrile on 8/23, CXR shows no changes and respiratory culture pending.  - empiric antibiotics started on 8/23 with resolution of fevers and patient's WBC WNL.  -  valium 2 mg daily and continue fentanyl to help with coughing and sedation  - guaifenesin, scopolamine and acetylcysteine for secretions and coughing   - Zyprexa added on 08/07 to help wean sedation.  - Therapeutic lovenox for hypercoagulable state in COVID      -Discussed with pt's Wife and daughter, state they would like to pursue PEG/trach.   - trial of SBTs but patient continues to desaturate with coughing, general surgery consulted for trach/peg placement on 8/26.   -Discussed with CM to begin referral process for LTAC placement with [facility] under review.         Critical Care Daily Checklist:    A:  Awake: RASS Goal/Actual Goal: RASS Goal: 0-->alert and calm  Actual: [place] Agitation Sedation Scale (RASS): Drowsy   B: Spontaneous Breathing Trial Performed? Spon. Breathing Trial Initiated?: Initiated (08/19/20 0900)   C: SAT & SBT Coordinated?  n/a                 D: Delirium: CAM-ICU Overall CAM-ICU: Positive   E: Early Mobility Performed? Yes   F: Feeding Goal: Goals: Meet % EEN, EPN by RD f/u date  Status: Nutrition Goal Status: goal not met   Current Diet Order   Procedures    Diet NPO      AS: Analgesia/Sedation Fentanyl and diazepam   T: Thromboembolic Prophylaxis Enoxaparin    H: HOB > 300 Yes   U: Stress Ulcer Prophylaxis (if needed) famotidine   G: Glucose Control detemir and aspart   B: Bowel Function Stool Occurrence: 0   I: Indwelling Catheter (Lines & Harris) Necessity Central line 25 days, OG, harris    D: De-escalation of Antimicrobials/Pharmacotherapies vanc and zosyn-reassess    Plan for the day/ETD Surgery tomorrow    Code Status:  Family/Goals of Care: Full Code         Critical secondary to Patient has a condition that poses threat to life and bodily function: Severe Respiratory Distress      Critical care was time spent personally by me on the following activities: development of treatment plan with patient or surrogate and bedside caregivers, discussions with consultants, evaluation of patient's response to treatment, examination of patient, ordering and performing treatments and interventions, ordering and review of laboratory studies, ordering and review of radiographic studies, pulse oximetry, re-evaluation of patient's condition. This critical care time did not overlap with that of any other provider or involve time for any procedures.     Aleta Rivera MD  Critical Care Medicine  [facility] - ICU 16 WT

## 2020-08-24 NOTE — PLAN OF CARE
Problem: Adult Inpatient Plan of Care  Goal: Plan of Care Review  Outcome: Ongoing, Progressing  Goal: Patient-Specific Goal (Individualization)  Outcome: Ongoing, Progressing  Goal: Absence of Hospital-Acquired Illness or Injury  Outcome: Ongoing, Progressing  Goal: Optimal Comfort and Wellbeing  Outcome: Ongoing, Progressing  Goal: Readiness for Transition of Care  Outcome: Ongoing, Progressing  Goal: Rounds/Family Conference  Outcome: Ongoing, Progressing     Problem: Diabetes Comorbidity  Goal: Blood Glucose Level Within Desired Range  Outcome: Ongoing, Progressing     Problem: Infection  Goal: Infection Symptom Resolution  Outcome: Ongoing, Progressing     Problem: Fall Injury Risk  Goal: Absence of Fall and Fall-Related Injury  Outcome: Ongoing, Progressing     Problem: Skin Injury Risk Increased  Goal: Skin Health and Integrity  Outcome: Ongoing, Progressing     Problem: Communication Impairment (Mechanical Ventilation, Invasive)  Goal: Effective Communication  Outcome: Ongoing, Progressing     Problem: Device-Related Complication Risk (Mechanical Ventilation, Invasive)  Goal: Optimal Device Function  Outcome: Ongoing, Progressing     Problem: Inability to Wean (Mechanical Ventilation, Invasive)  Goal: Mechanical Ventilation Liberation  Outcome: Ongoing, Progressing     Problem: Nutrition Impairment (Mechanical Ventilation, Invasive)  Goal: Optimal Nutrition Delivery  Outcome: Ongoing, Progressing     Problem: Skin and Tissue Injury (Mechanical Ventilation, Invasive)  Goal: Absence of Device-Related Skin and Tissue Injury  Outcome: Ongoing, Progressing     Problem: Ventilator-Induced Lung Injury (Mechanical Ventilation, Invasive)  Goal: Absence of Ventilator-Induced Lung Injury  Outcome: Ongoing, Progressing     Problem: Communication Impairment (Artificial Airway)  Goal: Effective Communication  Outcome: Ongoing, Progressing     Problem: Device-Related Complication Risk (Artificial Airway)  Goal: Optimal  Device Function  Outcome: Ongoing, Progressing     Problem: Skin and Tissue Injury (Artificial Airway)  Goal: Absence of Device-Related Skin or Tissue Injury  Outcome: Ongoing, Progressing     Problem: Noninvasive Ventilation Acute  Goal: Effective Unassisted Ventilation and Oxygenation  Outcome: Ongoing, Progressing     Problem: Aspiration (Enteral Nutrition)  Goal: Absence of Aspiration Signs/Symptoms  Outcome: Ongoing, Progressing     Problem: Wound  Goal: Optimal Wound Healing  Outcome: Ongoing, Progressing       Mr. Miller had fever overnight 100.5 gave tylenol twice and day shift one time. NPO as of midnight for surgery today 08/24. Held the Lovenox per order. HCG baths wipes done twice. Fentanyl going at 175 mcg. Vancomycin and Cefepime as scheduled on MAR. Will continue to monitor and assess.

## 2020-08-25 PROBLEM — R50.9 FEVER: Status: ACTIVE | Noted: 2020-08-25

## 2020-08-25 LAB
ABO + RH BLD: NORMAL
ALLENS TEST: ABNORMAL
ANION GAP SERPL CALC-SCNC: 7 MMOL/L (ref 8–16)
BASOPHILS # BLD AUTO: 0.05 K/UL (ref 0–0.2)
BASOPHILS NFR BLD: 0.7 % (ref 0–1.9)
BLD GP AB SCN CELLS X3 SERPL QL: NORMAL
BUN SERPL-MCNC: 23 MG/DL (ref 8–23)
CALCIUM SERPL-MCNC: 8.3 MG/DL (ref 8.7–10.5)
CHLORIDE SERPL-SCNC: 101 MMOL/L (ref 95–110)
CO2 SERPL-SCNC: 28 MMOL/L (ref 23–29)
CREAT SERPL-MCNC: 0.7 MG/DL (ref 0.5–1.4)
DELSYS: ABNORMAL
DIFFERENTIAL METHOD: ABNORMAL
EOSINOPHIL # BLD AUTO: 0.6 K/UL (ref 0–0.5)
EOSINOPHIL NFR BLD: 7.8 % (ref 0–8)
ERYTHROCYTE [DISTWIDTH] IN BLOOD BY AUTOMATED COUNT: 14.2 % (ref 11.5–14.5)
ERYTHROCYTE [SEDIMENTATION RATE] IN BLOOD BY WESTERGREN METHOD: 16 MM/H
EST. GFR  (AFRICAN AMERICAN): >60 ML/MIN/1.73 M^2
EST. GFR  (NON AFRICAN AMERICAN): >60 ML/MIN/1.73 M^2
FIO2: 30
GLUCOSE SERPL-MCNC: 91 MG/DL (ref 70–110)
HCO3 UR-SCNC: 27.4 MMOL/L (ref 24–28)
HCT VFR BLD AUTO: 31.9 % (ref 40–54)
HGB BLD-MCNC: 9.5 G/DL (ref 14–18)
IMM GRANULOCYTES # BLD AUTO: 0.05 K/UL (ref 0–0.04)
IMM GRANULOCYTES NFR BLD AUTO: 0.7 % (ref 0–0.5)
LYMPHOCYTES # BLD AUTO: 2 K/UL (ref 1–4.8)
LYMPHOCYTES NFR BLD: 25.9 % (ref 18–48)
MCH RBC QN AUTO: 27 PG (ref 27–31)
MCHC RBC AUTO-ENTMCNC: 29.8 G/DL (ref 32–36)
MCV RBC AUTO: 91 FL (ref 82–98)
MODE: ABNORMAL
MONOCYTES # BLD AUTO: 1.4 K/UL (ref 0.3–1)
MONOCYTES NFR BLD: 18.5 % (ref 4–15)
NEUTROPHILS # BLD AUTO: 3.5 K/UL (ref 1.8–7.7)
NEUTROPHILS NFR BLD: 46.4 % (ref 38–73)
NRBC BLD-RTO: 0 /100 WBC
PCO2 BLDA: 34.5 MMHG (ref 35–45)
PEEP: 5
PH SMN: 7.51 [PH] (ref 7.35–7.45)
PLATELET # BLD AUTO: 417 K/UL (ref 150–350)
PMV BLD AUTO: 10.5 FL (ref 9.2–12.9)
PO2 BLDA: 98 MMHG (ref 80–100)
POC BE: 4 MMOL/L
POC SATURATED O2: 98 % (ref 95–100)
POC TCO2: 28 MMOL/L (ref 23–27)
POCT GLUCOSE: 156 MG/DL (ref 70–110)
POCT GLUCOSE: 202 MG/DL (ref 70–110)
POCT GLUCOSE: 203 MG/DL (ref 70–110)
POCT GLUCOSE: 222 MG/DL (ref 70–110)
POTASSIUM SERPL-SCNC: 3.5 MMOL/L (ref 3.5–5.1)
RBC # BLD AUTO: 3.52 M/UL (ref 4.6–6.2)
SAMPLE: ABNORMAL
SITE: ABNORMAL
SODIUM SERPL-SCNC: 136 MMOL/L (ref 136–145)
VT: 440
WBC # BLD AUTO: 7.58 K/UL (ref 3.9–12.7)

## 2020-08-25 PROCEDURE — 25000003 PHARM REV CODE 250: Performed by: NURSE ANESTHETIST, CERTIFIED REGISTERED

## 2020-08-25 PROCEDURE — 63600175 PHARM REV CODE 636 W HCPCS: Performed by: STUDENT IN AN ORGANIZED HEALTH CARE EDUCATION/TRAINING PROGRAM

## 2020-08-25 PROCEDURE — 25000003 PHARM REV CODE 250: Performed by: PHYSICIAN ASSISTANT

## 2020-08-25 PROCEDURE — 43246 EGD PLACE GASTROSTOMY TUBE: CPT | Mod: 51,,, | Performed by: SURGERY

## 2020-08-25 PROCEDURE — 25000003 PHARM REV CODE 250: Performed by: ANESTHESIOLOGY

## 2020-08-25 PROCEDURE — 99233 SBSQ HOSP IP/OBS HIGH 50: CPT | Mod: ,,, | Performed by: INTERNAL MEDICINE

## 2020-08-25 PROCEDURE — 25000003 PHARM REV CODE 250: Performed by: STUDENT IN AN ORGANIZED HEALTH CARE EDUCATION/TRAINING PROGRAM

## 2020-08-25 PROCEDURE — 27000221 HC OXYGEN, UP TO 24 HOURS

## 2020-08-25 PROCEDURE — 31600 PLANNED TRACHEOSTOMY: CPT | Mod: ,,, | Performed by: SURGERY

## 2020-08-25 PROCEDURE — 63600175 PHARM REV CODE 636 W HCPCS: Performed by: INTERNAL MEDICINE

## 2020-08-25 PROCEDURE — 27200966 HC CLOSED SUCTION SYSTEM

## 2020-08-25 PROCEDURE — U0003 INFECTIOUS AGENT DETECTION BY NUCLEIC ACID (DNA OR RNA); SEVERE ACUTE RESPIRATORY SYNDROME CORONAVIRUS 2 (SARS-COV-2) (CORONAVIRUS DISEASE [COVID-19]), AMPLIFIED PROBE TECHNIQUE, MAKING USE OF HIGH THROUGHPUT TECHNOLOGIES AS DESCRIBED BY CMS-2020-01-R: HCPCS

## 2020-08-25 PROCEDURE — 37000009 HC ANESTHESIA EA ADD 15 MINS: Performed by: SURGERY

## 2020-08-25 PROCEDURE — 99900026 HC AIRWAY MAINTENANCE (STAT)

## 2020-08-25 PROCEDURE — 87205 SMEAR GRAM STAIN: CPT

## 2020-08-25 PROCEDURE — 43246 PR EGD, FLEX, W/PLCMT, GASTROSTOMY TUBE: ICD-10-PCS | Mod: 51,,, | Performed by: SURGERY

## 2020-08-25 PROCEDURE — 25000003 PHARM REV CODE 250: Performed by: INTERNAL MEDICINE

## 2020-08-25 PROCEDURE — D9220A PRA ANESTHESIA: ICD-10-PCS | Mod: CRNA,,, | Performed by: NURSE ANESTHETIST, CERTIFIED REGISTERED

## 2020-08-25 PROCEDURE — 37000008 HC ANESTHESIA 1ST 15 MINUTES: Performed by: SURGERY

## 2020-08-25 PROCEDURE — 25000242 PHARM REV CODE 250 ALT 637 W/ HCPCS: Performed by: STUDENT IN AN ORGANIZED HEALTH CARE EDUCATION/TRAINING PROGRAM

## 2020-08-25 PROCEDURE — 94003 VENT MGMT INPAT SUBQ DAY: CPT

## 2020-08-25 PROCEDURE — 94761 N-INVAS EAR/PLS OXIMETRY MLT: CPT

## 2020-08-25 PROCEDURE — 31600 PR TRACHEOSTOMY, PLANNED: ICD-10-PCS | Mod: ,,, | Performed by: SURGERY

## 2020-08-25 PROCEDURE — 99233 PR SUBSEQUENT HOSPITAL CARE,LEVL III: ICD-10-PCS | Mod: ,,, | Performed by: INTERNAL MEDICINE

## 2020-08-25 PROCEDURE — D9220A PRA ANESTHESIA: Mod: ANES,,, | Performed by: ANESTHESIOLOGY

## 2020-08-25 PROCEDURE — 36000706: Performed by: SURGERY

## 2020-08-25 PROCEDURE — 27201423 OPTIME MED/SURG SUP & DEVICES STERILE SUPPLY: Performed by: SURGERY

## 2020-08-25 PROCEDURE — 85025 COMPLETE CBC W/AUTO DIFF WBC: CPT

## 2020-08-25 PROCEDURE — 80048 BASIC METABOLIC PNL TOTAL CA: CPT

## 2020-08-25 PROCEDURE — 20000000 HC ICU ROOM

## 2020-08-25 PROCEDURE — 86850 RBC ANTIBODY SCREEN: CPT

## 2020-08-25 PROCEDURE — 27800903 OPTIME MED/SURG SUP & DEVICES OTHER IMPLANTS: Performed by: SURGERY

## 2020-08-25 PROCEDURE — 63600175 PHARM REV CODE 636 W HCPCS: Performed by: ANESTHESIOLOGY

## 2020-08-25 PROCEDURE — 99900035 HC TECH TIME PER 15 MIN (STAT)

## 2020-08-25 PROCEDURE — D9220A PRA ANESTHESIA: ICD-10-PCS | Mod: ANES,,, | Performed by: ANESTHESIOLOGY

## 2020-08-25 PROCEDURE — 36000707: Performed by: SURGERY

## 2020-08-25 PROCEDURE — D9220A PRA ANESTHESIA: Mod: CRNA,,, | Performed by: NURSE ANESTHETIST, CERTIFIED REGISTERED

## 2020-08-25 PROCEDURE — 87186 SC STD MICRODIL/AGAR DIL: CPT

## 2020-08-25 PROCEDURE — 87077 CULTURE AEROBIC IDENTIFY: CPT

## 2020-08-25 PROCEDURE — 63600175 PHARM REV CODE 636 W HCPCS: Performed by: NURSE ANESTHETIST, CERTIFIED REGISTERED

## 2020-08-25 PROCEDURE — 87070 CULTURE OTHR SPECIMN AEROBIC: CPT

## 2020-08-25 PROCEDURE — 94640 AIRWAY INHALATION TREATMENT: CPT

## 2020-08-25 RX ORDER — ROCURONIUM BROMIDE 10 MG/ML
INJECTION, SOLUTION INTRAVENOUS
Status: DISCONTINUED | OUTPATIENT
Start: 2020-08-25 | End: 2020-08-25

## 2020-08-25 RX ORDER — MIDAZOLAM HYDROCHLORIDE 1 MG/ML
INJECTION INTRAMUSCULAR; INTRAVENOUS
Status: COMPLETED
Start: 2020-08-25 | End: 2020-08-25

## 2020-08-25 RX ORDER — ESMOLOL HYDROCHLORIDE 10 MG/ML
INJECTION INTRAVENOUS
Status: DISCONTINUED | OUTPATIENT
Start: 2020-08-25 | End: 2020-08-25

## 2020-08-25 RX ORDER — HYDROMORPHONE HYDROCHLORIDE 2 MG/ML
INJECTION, SOLUTION INTRAMUSCULAR; INTRAVENOUS; SUBCUTANEOUS
Status: DISCONTINUED | OUTPATIENT
Start: 2020-08-25 | End: 2020-08-25

## 2020-08-25 RX ORDER — MIDAZOLAM HYDROCHLORIDE 1 MG/ML
INJECTION, SOLUTION INTRAMUSCULAR; INTRAVENOUS
Status: DISCONTINUED | OUTPATIENT
Start: 2020-08-25 | End: 2020-08-25

## 2020-08-25 RX ORDER — ACETAMINOPHEN 10 MG/ML
INJECTION, SOLUTION INTRAVENOUS
Status: DISCONTINUED | OUTPATIENT
Start: 2020-08-25 | End: 2020-08-25

## 2020-08-25 RX ADMIN — ROCURONIUM BROMIDE 50 MG: 10 INJECTION, SOLUTION INTRAVENOUS at 01:08

## 2020-08-25 RX ADMIN — ACETYLCYSTEINE 4 ML: 100 SOLUTION ORAL; RESPIRATORY (INHALATION) at 08:08

## 2020-08-25 RX ADMIN — ROCURONIUM BROMIDE 20 MG: 10 INJECTION, SOLUTION INTRAVENOUS at 01:08

## 2020-08-25 RX ADMIN — HYDROMORPHONE HYDROCHLORIDE 0.4 MG: 2 INJECTION, SOLUTION INTRAMUSCULAR; INTRAVENOUS; SUBCUTANEOUS at 01:08

## 2020-08-25 RX ADMIN — ROSUVASTATIN CALCIUM 10 MG: 10 TABLET, FILM COATED ORAL at 11:08

## 2020-08-25 RX ADMIN — VANCOMYCIN HYDROCHLORIDE 1250 MG: 1.25 INJECTION, POWDER, LYOPHILIZED, FOR SOLUTION INTRAVENOUS at 08:08

## 2020-08-25 RX ADMIN — ESMOLOL HYDROCHLORIDE 10 MG: 100 INJECTION, SOLUTION INTRAVENOUS at 02:08

## 2020-08-25 RX ADMIN — Medication 200 MCG/HR: at 11:08

## 2020-08-25 RX ADMIN — ALBUTEROL SULFATE 2.5 MG: 2.5 SOLUTION RESPIRATORY (INHALATION) at 08:08

## 2020-08-25 RX ADMIN — ACETAMINOPHEN 1000 MG: 10 INJECTION, SOLUTION INTRAVENOUS at 01:08

## 2020-08-25 RX ADMIN — OLANZAPINE 5 MG: 2.5 TABLET, FILM COATED ORAL at 08:08

## 2020-08-25 RX ADMIN — CEFEPIME 1 G: 1 INJECTION, POWDER, FOR SOLUTION INTRAMUSCULAR; INTRAVENOUS at 05:08

## 2020-08-25 RX ADMIN — DIAZEPAM 2 MG: 2 TABLET ORAL at 08:08

## 2020-08-25 RX ADMIN — CEFEPIME 1 G: 1 INJECTION, POWDER, FOR SOLUTION INTRAMUSCULAR; INTRAVENOUS at 01:08

## 2020-08-25 RX ADMIN — SODIUM CHLORIDE, SODIUM GLUCONATE, SODIUM ACETATE, POTASSIUM CHLORIDE, MAGNESIUM CHLORIDE, SODIUM PHOSPHATE, DIBASIC, AND POTASSIUM PHOSPHATE: .53; .5; .37; .037; .03; .012; .00082 INJECTION, SOLUTION INTRAVENOUS at 01:08

## 2020-08-25 RX ADMIN — HYDROMORPHONE HYDROCHLORIDE 0.6 MG: 2 INJECTION, SOLUTION INTRAMUSCULAR; INTRAVENOUS; SUBCUTANEOUS at 12:08

## 2020-08-25 RX ADMIN — MIDAZOLAM HYDROCHLORIDE 2 MG: 1 INJECTION, SOLUTION INTRAMUSCULAR; INTRAVENOUS at 01:08

## 2020-08-25 RX ADMIN — HYDROMORPHONE HYDROCHLORIDE 1 MG: 2 INJECTION, SOLUTION INTRAMUSCULAR; INTRAVENOUS; SUBCUTANEOUS at 02:08

## 2020-08-25 RX ADMIN — CEFEPIME 1 G: 1 INJECTION, POWDER, FOR SOLUTION INTRAMUSCULAR; INTRAVENOUS at 09:08

## 2020-08-25 RX ADMIN — ENOXAPARIN SODIUM 90 MG: 60 INJECTION SUBCUTANEOUS at 08:08

## 2020-08-25 RX ADMIN — FAMOTIDINE 20 MG: 20 TABLET ORAL at 11:08

## 2020-08-25 RX ADMIN — LIDOCAINE HYDROCHLORIDE 4 ML: 40 INJECTION, SOLUTION RETROBULBAR; TOPICAL at 08:08

## 2020-08-25 RX ADMIN — INSULIN ASPART 15 UNITS: 100 INJECTION, SOLUTION INTRAVENOUS; SUBCUTANEOUS at 05:08

## 2020-08-25 RX ADMIN — STANDARDIZED SENNA CONCENTRATE AND DOCUSATE SODIUM 1 TABLET: 8.6; 5 TABLET ORAL at 11:08

## 2020-08-25 RX ADMIN — FENTANYL CITRATE 100 MCG: 50 INJECTION, SOLUTION INTRAMUSCULAR; INTRAVENOUS at 01:08

## 2020-08-25 RX ADMIN — LIDOCAINE HYDROCHLORIDE 4 ML: 40 INJECTION, SOLUTION RETROBULBAR; TOPICAL at 05:08

## 2020-08-25 RX ADMIN — FENTANYL CITRATE 50 MCG: 50 INJECTION, SOLUTION INTRAMUSCULAR; INTRAVENOUS at 01:08

## 2020-08-25 RX ADMIN — OLANZAPINE 5 MG: 2.5 TABLET, FILM COATED ORAL at 11:08

## 2020-08-25 NOTE — OP NOTE
DATE OF PROCEDURE: 08/25/2020    PREOPERATIVE DIAGNOSES:   1. Covid infection  2. Respiratory failure.   3. Dysphagia.    POSTOPERATIVE DIAGNOSES:   1. Covid Infection  2. Respiratory failure.   3. Dysphagia.     PROCEDURES PERFORMED:   1. Open tracheostomy.   2. Percutaneous endoscopic gastrostomy.    ATTENDING SURGEON: Anup Green MD    RESIDENT: Amira Harris MD    ANESTHESIA: General endotracheal.     ESTIMATED BLOOD LOSS: minimal     FINDINGS: A #8 Shiley tracheostomy and 20-Paraguayan percutaneous gastrostomy placed without apparent complication.     SPECIMEN: None.     DRAINS: None.     COMPLICATIONS: None.     INDICATIONS: Anup Miller is a 70 y.o.male admitted to [facility] with covid infection. The patient has failed attempts to wean from the ventilator. The primary team recommended to the family that the patient would benefit from placement of a percutaneous tracheostomy tube for the anticipated prolonged need for mechanical ventilation and a percutaneous gastrostomy tube for the patient's dysphagia. General surgery was consulted for creation of tracheostomy and placement of PEG. Informed consent from the patient's family who expressed understanding of the risks and benefits and gave consent to proceed.     OPERATIVE PROCEDURE:     PROCEDURE: The patient was taken to the operating room and placed supine. After induction of general endotracheal tube anesthesia, the neck was extended, padded, and was prepped and draped in the standard fashion. Timeout was performed.      Tracheal landmarks were identified and a transverse cervical incision was made below the cricoid cartilage. Subcutaneous tissues platysma were divided with cautery. The strap muscles were identified and split in the midline with blunt a cautery dissection. Pretracheal fascia was divided to expose the anterior surface of the trachea. The second tracheal space was sharply incised and the second tracheal ring was divided  inferiorly in the midline to create a lateral incision tracheotomy. 2 stay sutures were placed in the trachea with 2-0 prolene sutures. The endotracheal tube was withdrawn under direct vision to above the tracheotomy and a cuffed #8 tracheostomy tube was placed into the airway. The cuff of the tracheostomy tube was inflated and Bronchoscopy through the tracheostomy confirmed appropriate position with visualization of the nesha. The endotracheal tube was removed and the tracheostomy tube was secured in place using Velcro tracheostomy tape and 2-0 Prolene sutures. The stay sutures were kept in place and taped to the patients chest.    We then directed our attention to the left upper quadrant for gastrostomy tube placement. The patient's abdomen was prepped and draped. An upper endoscope was introduced into the oropharynx and guided down into the esophagus and stomach. The stomach was insufflated with air. Appropriate position for gastrostomy tube placement 2 finger-breadths below the left subcostal margin was chosen. Palpation of the anterior abdominal wall at this point was visualized endoscopically and transillumination from the endoscope was visualized through the anterior abdominal wall. We made a 1.5 cm transverse skin incision. At this point, the stomach was cannulated with a catheter loaded on a needle. This was grasped by a snare which had been passed through the endoscope. The needle was removed, and a guidewire was placed, and the snare was used to grasp the guidewire. The endoscope, snare, and guidewire were all withdrawn from the patient's mouth. A 20-Greek gastrostomy tube was loaded onto the guidewire and pulled through the anterior abdominal wall via Seldinger technique. There was no blanching of the gastric mucosa, and when the tube was twisted, the button did not grab the mucosa. The insufflation in the stomach was evacuated, and the endoscope was removed. The gastrostomy tube was secured in place  using the supplied devices and connected to a bag for gravity drainage.    Amira Harris MD  General Surgery PGY2  Pager: [phone]

## 2020-08-25 NOTE — BRIEF OP NOTE
[facility] - ICU 16 WT  Brief Operative Note    SUMMARY     Surgery Date: 8/25/2020     Surgeon(s) and Role:     * Anup Green MD - Primary     * Amira Harris MD - Resident - Assisting    Pre-op Diagnosis:  COVID-19 virus infection [U07.1]    Post-op Diagnosis:  Post-Op Diagnosis Codes:     * COVID-19 virus infection [U07.1]    Procedure(s) (LRB):  INSERTION, TRACHEOSTOMY TUBE; COVID + (N/A)  INSERTION, PEG TUBE; COVID + (N/A)  EGD (ESOPHAGOGASTRODUODENOSCOPY) (N/A)    Anesthesia: General    Description of Procedure: Creation of tracheostomy and placement of PEG    Description of the findings of the procedure: A #8 Shiley tracheostomy and 20-Arabic percutaneous gastrostomy placed without apparent complication.     Estimated Blood Loss: * No values recorded between 8/25/2020  1:50 PM and 8/25/2020  2:44 PM *    Estimated Blood Loss has been documented.         Specimens:   Specimen (12h ago, onward)    None          [id number]    Amira Harris MD  General Surgery PGY2  Pager: [phone]

## 2020-08-25 NOTE — CARE UPDATE
Patient's wife was update on status and plan for trach and peg on 8/25. All questions were answered.

## 2020-08-25 NOTE — TRANSFER OF CARE
"Anesthesia Transfer of Care Note    Patient: Anup Miller    Procedure(s) Performed: Procedure(s) (LRB):  INSERTION, TRACHEOSTOMY TUBE; COVID + (N/A)  INSERTION, PEG TUBE; COVID + (N/A)  EGD (ESOPHAGOGASTRODUODENOSCOPY) (N/A)    Patient location: ICU    Anesthesia Type: general    Transport from OR: Transported from OR intubated on 100% O2 by AMBU with assisted ventilation. Continuous SpO2 monitoring in transport. Continuos invasive BP monitoring in transport. Continuous ECG monitoring in transport    Post pain: adequate analgesia    Post assessment: no apparent anesthetic complications    Post vital signs: stable    Level of consciousness: responds to stimulation    Nausea/Vomiting: no nausea/vomiting    Complications: none    Transfer of care protocol was followed      Last vitals:   Visit Vitals  BP (!) 145/80 (BP Location: Left arm, Patient Position: Lying)   Pulse (!) 119   Temp 37.9 °C (100.2 °F) (Oral)   Resp 17   Ht 5' 9" (1.753 m)   Wt 112.5 kg (248 lb 0.3 oz)   SpO2 100%   BMI 36.63 kg/m²     "

## 2020-08-25 NOTE — PT/OT/SLP PROGRESS
Occupational Therapy      Patient Name:  Anup Miller   MRN:  [id number]    P/atient not seen today secondary to going for PEG/trach today  . Will follow-up 8/26/20.    BIANKA Marcelo  8/25/2020

## 2020-08-25 NOTE — PROGRESS NOTES
No type & screen on file up to date and no blood consent in chart. Type and screen sent off to lab. Will update OR team of no blood consent in case of emergency.

## 2020-08-25 NOTE — PROGRESS NOTES
During pre-op checklist assessment and chart verification of consents, Trach and PEG consents in chart witnessed via telephone by Registered nurse and Radiology Technician. Verified with house supervisor and charge nurse that patient consents should be witnessed by nurses only. Paged doctor on consent and also called the surgery department to rearrange for MD to come obtain two new consents for Trach and PEG procedures. MD or resident was unavailable to be reached at this time, so high priority message sent to MD. Awaiting callback at this time. Will continue to try other attempts.

## 2020-08-25 NOTE — PROGRESS NOTES
"[facility] - ICU 16 WT  Adult Nutrition  Progress Note    SUMMARY       Recommendations    1. Recommend modifying TF to Peptamen Intense VHP at 55mL/hr.    -Will provide 1320kcal, 121g protein, and 1109mL fluid. Will monitor.    Goals: Meet % EEN, EPN by RD f/u date  Nutrition Goal Status: goal not met  Communication of RD Recs: other (comment)(POC)    Reason for Assessment    Reason For Assessment: RD follow-up  Diagnosis: other (see comments)(PNA 2/2 COVID19)  Relevant Medical History: DM, HTN, HLD  Interdisciplinary Rounds: did not attend  General Information Comments: Pt remains intubated. Was on TF which are held today. Pt going for trach/PEG. Wt remains stable, however pt has skin breakdown to gluteal cleft. To help prevent the spread of COVID-19, NFPE was not completed at this time.   Nutrition Discharge Planning: Unable to determine    Nutrition/Diet History    Food Allergies: NKFA  Factors Affecting Nutritional Intake: NPO, on mechanical ventilation    Anthropometrics    Temp: 98.8 °F (37.1 °C)  Height: 5' 9" (175.3 cm)  Height (inches): 69 in  Weight Method: Bed Scale  Weight: 112.5 kg (248 lb 0.3 oz)  Weight (lb): 248.02 lb  Ideal Body Weight (IBW), Male: 160 lb  % Ideal Body Weight, Male (lb): 152.5 %  BMI (Calculated): 36.6  BMI Grade: 35 - 39.9 - obesity - grade II  Usual Body Weight (UBW), k.6 kg(Per chart review)  % Usual Body Weight: 97.92  % Weight Change From Usual Weight: -2.29 %     Lab/Procedures/Meds    Pertinent Labs Reviewed: reviewed  Pertinent Labs Comments: Ca 8.3  Pertinent Medications Reviewed: reviewed  Pertinent Medications Comments: famotidine, fentanyl, insulin    Estimated/Assessed Needs    Weight Used For Calorie Calculations: 111 kg (244 lb 11.4 oz)  Energy Calorie Requirements (kcal): 8814-8683 kcal/d  Energy Need Method: Kcal/kg(11-14 kcal/kg, BMI > 35)  Protein Requirements: 146-182 g/d (2-2.5 g/kg IBW)  Weight Used For Protein Calculations: 72.7 kg " (160 lb 4.4 oz)  Estimated Fluid Requirement Method: other (see comments)(Per MD or 1 mL/kcal)  RDA Method (mL): 1221  CHO Requirement: 50% total kcals    Nutrition Prescription Ordered    Current Diet Order: NPO  Current Nutrition Support Formula Ordered: Diabetisource AC  Current Nutrition Support Rate Ordered: 40 (ml)  Current Nutrition Support Frequency Ordered: mL/hr    Evaluation of Received Nutrient/Fluid Intake    Enteral Calories (kcal): 1152  Enteral Protein (gm): 58  Enteral (Free Water) Fluid (mL): 785  % Kcal Needs: 94  % Protein Needs: 40  I/O: -2.3L since admit  Energy Calories Required: not meeting needs  Protein Required: not meeting needs  Fluid Required: not meeting needs  Comments: LBM 8/20  Tolerance: other (see comments)(held this AM)  % Intake of Estimated Energy Needs: 0 - 25 %  % Meal Intake: NPO    Nutrition Risk    Level of Risk/Frequency of Follow-up: high     Assessment and Plan    Nutrition Problem  Inadequate energy intake     Related to (etiology):   Enteral nutrition     Signs and Symptoms (as evidenced by):   Enteral nutrition does not meet EEN nor EPN.     Interventions(treatment strategy):  1.) Collaboration with other providers  2.) Enteral nutrition     Nutrition Diagnosis Status:   Continues     Monitor and Evaluation    Food and Nutrient Intake: energy intake, enteral nutrition intake  Food and Nutrient Adminstration: enteral and parenteral nutrition administration  Physical Activity and Function: nutrition-related ADLs and IADLs  Anthropometric Measurements: weight, weight change  Biochemical Data, Medical Tests and Procedures: electrolyte and renal panel, glucose/endocrine profile, gastrointestinal profile, lipid profile, inflammatory profile  Nutrition-Focused Physical Findings: overall appearance     Nutrition Follow-Up    RD Follow-up?: Yes

## 2020-08-25 NOTE — PHYSICIAN QUERY
PT Name: Anup Miller  MR #: [id number]     PHYSICIAN QUERY - INTEGUMENTARY CLARIFICATION     CDS: Aurelio Verdugo RN CCDS               Contact information: [email or web address]   This form is a permanent document in the medical record.     Query Date: August 25, 2020    By submitting this query, we are merely seeking further clarification of documentation.  Please utilize your independent clinical judgment when addressing the question(s) below.    The Medical Record contains the following:   Indicators   Supporting Clinical Findings Location in Medical Record    Redness       x Decubitus, Pressure, Ulcer, etc. Lesion 2: Upper lip - skin breakdown with surrounding redness on the left side of the upper lip near ET tube lyon.  Lesion 2:  Alteration in Skin Integrity 2/2 to ET lyon  POA : no      Lesion 3:  Alteration in Skin Integrity 2/2 to ET lyon  POA : no  Lesion 3: Upper lip - eschar noted to right upper lip     8/21/2020 [facility] Skin Integrity Evaluation Dorota Leyva PA-C/ Margy Magdaleno DPM    Deep Tissue Injury      Wound care consult      Medication:       x Treatment: Consults: Wound Care and Support surfaces as per WOCN recommendations  8/21/2020 [facility] Skin Integrity Evaluation Dorota Leyva PA-C/ Margy Magdaleno DPM    Other:          National Pressure Ulcer Advisory Panel (2007) Pressure Ulcer Definitions & Stages:  Stage I:                     Intact skin with non-blanchable redness of a localized area usually over a bony prominence.   Stage II:                    Partial thickness loss of dermis presenting as a shallow open ulcer with a red pink wound bed, without slough.   Stage III:                   Full thickness tissue loss. Subcutaneous fat may be visible but bone, tendon or muscle is not exposed. Slough may be                                      present but does not obscure the depth of tissue loss. May include undermining and tunneling.  Stage IV:                   Full thickness tissue loss with exposed bone, tendon or muscle. Slough or eschar may be present on some parts of the                                      wound bed. Often include undermining and tunneling.  Unstageable:           Full thickness tissue loss but base of ulcer is covered by slough and/or eschar in the wound bed. Until enough                                        slough and/or eschar is removed to expose wound base, its true depth, and therefore stage, cannot be determined  Deep Tissue Injury: Purple or maroon localized area of discolored intact skin or blood-filled blister due to damage of underlying soft tissue   from pressure and/or shear.  Suspected deep tissue injuries may develop into a stageable ulcer or turn out to be another diagnosis (e.g. an ecchymosis)     Provider, please clarify/ provide the diagnosis related to the documentation:  [   ] Decubitus (Pressure) Ulcer    [   ] Deep Tissue Pressure Injury   [   ] Deep Tissue Pressure Injury that transformed into a Decubitus (Pressure) Ulcer   [ x  ] Other Integumentary Diagnosis (please specify):_____Alteration in skin integrity_________   [  ] Diagnosis Clinically Undetermined       Please document in your progress notes daily for the duration of treatment until resolved and include in your discharge summary.    Droota Leyva PA-C  Critical Care Medicine  8/25/2020   10:38 PM

## 2020-08-25 NOTE — RESPIRATORY THERAPY
1453:    Pt arrived back into room 28262 from OR and placed on the documented vent settings. Vitals are stable and in range. Size 8 cuffed Shiley currently in place.     Will continue to monitor.

## 2020-08-25 NOTE — PLAN OF CARE
YOBANI received call from JOSE CARLOS Shaw. Stated need for updated COVID swab for submission of insurance auth.     YOBANI informed nurse ANNE Ross of request.    Alana Diaz LMSW  Case Management Social Worker   [facility], [address]

## 2020-08-25 NOTE — PROGRESS NOTES
Patient temp 100.9 axillary at this time. Patient intermittently has shiver-like shakes. CC team called and MD Darrick notified at this time. Also notified that patient received Acetaminophen 1000mg IV at 1337 today during trach and peg procedure. Medications not able to be placed in PEG until 2300. No new orders at this time from MD. Continue to trend. Lightweight blanket in use, ice packs applied, and cool cloth applied to forehead. Will inform night shift to continue to trend and monitor.

## 2020-08-25 NOTE — PROGRESS NOTES
Annie, LESLIE UD verified per Charge RN current consent for procedures is ok to use at this time from 08/18/2020,

## 2020-08-25 NOTE — PHYSICIAN QUERY
PT Name: Anup Miller  MR #: [id number]     PRESENT ON ADMISSION (POA) DIAGNOSIS CLARIFICATION     CDS: Aurelio Verdugo RN CCDS               Contact information: [email or web address]   This form is a permanent document in the medical record.     Query Date: August 25, 2020    By submitting this query, we are merely seeking further clarification of documentation.  Please utilize your independent clinical judgment when addressing the question(s) below.     The Medical Record contains following diagnoses documented:    Clinical Information Location in Medical Record   [place]  Patient has also shown slow increase in Cr from 0.8 to 1.1 in past 4 days.    Dehydration    COVID-19 virus     7/26/2020 05:53 7/30/2020 02:58 8/15/2020 03:40 8/23/2020 15:07 8/25/2020 04:00   BUN, Bld 28 (H) 37 (H) 11 37 (H) 23   Creatinine 1.7 (H) 1.1 0.7 1.0 0.7   eGFR if African American 47 (A) >60.0 >60.0 >60.0 >60.0       lactated ringers bolus 1,000 mL  lactated ringers bolus 1,000 mL IV daily  furosemide 60 mg IV  furosemide 40 mg IV  furosemide 40 mg IV  furosemide 40 mg IV daily 8/23/2020 Critical Care progress notes See Rivera MD/ Chantell Davis MD    8/21/2020 Critical Care progress notes See Rivera MD/ Chantell Davis MD  7/27/2020 H&P Adam Felipe MD/ Jong Morrell MD    Lab values                  7/26/2020 Medication  8/21-8/23/2020 Medication  7/30/2020 Medication  8/2/2020 Medication  8/5/2020 Medication  8/15-8/21/2020 Medication       Present on admission (POA) is defined as present at the time inpatient admission occurs. Conditions that develop during an outpatient encounter, including emergency department, observation or outpatient surgery, are considered as present on admission. [facility] 4th Quarter 2008      Please clarify the Present on Admission (POA) status of the diagnosis: KASANDRA    Present on admission (POA) status:   [x   ] Yes (Y)             ] Clinically Undetermined (W)        [   ] No (N)                  [   ] Documentation insufficient to determine if condition is POA (U)

## 2020-08-25 NOTE — ANESTHESIA POSTPROCEDURE EVALUATION
Anesthesia Post Evaluation    Patient: Anup Miller    Procedure(s) Performed: Procedure(s) (LRB):  INSERTION, TRACHEOSTOMY TUBE; COVID + (N/A)  INSERTION, PEG TUBE; COVID + (N/A)  EGD (ESOPHAGOGASTRODUODENOSCOPY) (N/A)    Final Anesthesia Type: general    Patient location during evaluation: ICU  Patient participation: No - Unable to Participate, Sedation  Level of consciousness: sedated  Post-procedure vital signs: reviewed and stable  Pain management: adequate  Airway patency: patent    PONV status at discharge: No PONV  Anesthetic complications: no      Cardiovascular status: blood pressure returned to baseline, hemodynamically stable and tachycardic  Respiratory status: unassisted, spontaneous ventilation and room air  Hydration status: euvolemic  Follow-up not needed.          Vitals Value Taken Time   /72 08/25/20 1524   Temp 36.7 08/25/20 1524   Pulse 126 08/25/20 1524   Resp 28 08/25/20 1524   SpO2 100% 08/25/20 1524         No case tracking events are documented in the log.      Pain/Dudley Score: Pain Rating Prior to Med Admin: 0 (8/25/2020 11:08 AM)  Pain Rating Post Med Admin: 0 (8/25/2020 11:38 AM)

## 2020-08-25 NOTE — PROGRESS NOTES
Pharmacokinetic Assessment Follow Up: IV Vancomycin    Vancomycin serum concentration assessment(s):    The trough level was drawn correctly and can be used to guide therapy at this time. The measurement is within the desired definitive target range of 10 to 20 mcg/mL.    Vancomycin Regimen Plan:    Continue regimen to Vancomycin 1250 mg IV every 12 hours with next serum trough concentration measured at 2000 prior to subsequent 4th dose on 08/26    Drug levels (last 3 results):  Recent Labs   Lab Result Units 08/24/20  2000   Vancomycin-Trough ug/mL 12.7       Pharmacy will continue to follow and monitor vancomycin.    Please contact pharmacy at extension [phone] for questions regarding this assessment.    Thank you for the consult,   Pancho Zavala       Patient brief summary:  Anup Miller is a 70 y.o. male initiated on antimicrobial therapy with IV Vancomycin for treatment of urinary tract infection        Drug Allergies:   Review of patient's allergies indicates:  No Known Allergies    Actual Body Weight:   112.5 kg    Renal Function:   Estimated Creatinine Clearance: 109.3 mL/min (based on SCr of 0.8 mg/dL).,     Dialysis Method (if applicable):  N/A    CBC (last 72 hours):  Recent Labs   Lab Result Units 08/22/20  0421 08/23/20  1506 08/24/20  1239   WBC K/uL 11.60 10.09 10.28   Hemoglobin g/dL 11.3* 10.7* 10.1*   Hematocrit % 38.1* 36.1* 34.2*   Platelets K/uL 459* 452* 451*   Gran% % 50.1 45.7 55.5   Lymph% % 19.4 24.6 20.0   Mono% % 20.7* 19.4* 15.7*   Eosinophil% % 7.8 8.4* 7.5   Basophil% % 0.7 1.0 0.6   Differential Method  Automated Automated Automated       Metabolic Panel (last 72 hours):  Recent Labs   Lab Result Units 08/22/20  0421 08/23/20  1411 08/23/20  1507 08/24/20  0337 08/24/20  1239   Sodium mmol/L 141  --  139  --  138   Sodium Urine Random mmol/L  --  39  --   --   --    Potassium mmol/L 3.6  --  4.1  --  3.5   Chloride mmol/L 99  --  100  --  101   CO2 mmol/L 32*  --  30*  --  28    Glucose mg/dL 107  --  230*  --  197*   Glucose, UA   --  Negative  --   --   --    BUN, Bld mg/dL 38*  --  37*  --  27*   Creatinine mg/dL 1.1  --  1.0  --  0.8   Creatinine, Random Ur mg/dL  --  113.0  --   --   --    Albumin g/dL 2.3*  --  2.1*  --   --    Total Bilirubin mg/dL 0.4  --  0.4  --   --    Alkaline Phosphatase U/L 134  --  120  --   --    AST U/L 75*  --  50*  --   --    ALT U/L 127*  --  103*  --   --    Magnesium mg/dL 2.3  --  2.1 2.1  --    Phosphorus mg/dL 4.7*  --   --  3.1  --        Vancomycin Administrations:  vancomycin given in the last 96 hours                   vancomycin 1.25 g in dextrose 5% 250 mL IVPB (ready to mix) (mg) 1,250 mg New Bag 08/24/20 2006     1,250 mg New Bag  0843    vancomycin 1.75 g in 5 % dextrose 500 mL IVPB (mg) 1,750 mg New Bag 08/23/20 1841                Microbiologic Results:  Microbiology Results (last 7 days)     Procedure Component Value Units Date/Time    Blood culture [[id number]] Collected: 08/23/20 1555    Order Status: Completed Specimen: Blood from Peripheral, Hand, Right Updated: 08/24/20 2012     Blood Culture, Routine No Growth to date      No Growth to date    Blood culture [[id number]] Collected: 08/23/20 1605    Order Status: Completed Specimen: Blood from Peripheral, Hand, Left Updated: 08/24/20 2012     Blood Culture, Routine No Growth to date      No Growth to date    Urine culture [[id number]]  (Abnormal) Collected: 08/23/20 1411    Order Status: Completed Specimen: Urine Updated: 08/24/20 2007     Urine Culture, Routine ENTEROCOCCUS SPECIES  >100,000 cfu/ml  Identification and susceptibility pending      Narrative:      Specimen Source->Urine  2-yellow 1-gray    Culture, Respiratory with Gram Stain [[id number]]     Order Status: No result Specimen: Respiratory from Endotracheal Aspirate     Culture, Respiratory with Gram Stain [[id number]] Collected: 08/17/20 1932    Order Status: Completed Specimen: Respiratory from Sputum Updated: 08/19/20  0733     Respiratory Culture Normal respiratory ayana      No S aureus or Pseudomonas isolated.     Gram Stain (Respiratory) <10 epithelial cells per low power field.     Gram Stain (Respiratory) Rare WBC's     Gram Stain (Respiratory) Few Gram positive rods

## 2020-08-25 NOTE — INTERVAL H&P NOTE
The patient has been examined and the H&P has been reviewed:    I concur with the findings and no changes have occurred since H&P was written.    Surgery risks, benefits and alternative options discussed and understood by patient/family.          Active Hospital Problems    Diagnosis  POA    *Pneumonia due to COVID-19 virus [U07.1, J12.89]  Yes    Sacral wound [S31.000A]  No    UTI (urinary tract infection) due to urinary indwelling Boothe catheter [T83.511A, N39.0]  Unknown    Oral phase dysphagia [R13.11]  No    Acute hypoxemic respiratory failure [J96.01]  Yes    COVID-19 virus infection [U07.1]  Yes    Uncontrolled type 2 diabetes mellitus [E11.65]  Yes    Hypertension associated with diabetes [E11.59, I10]  Yes    Hyperlipidemia associated with type 2 diabetes mellitus [E11.69, E78.5]  Yes    Elevated liver enzymes [R74.8]  Yes      Resolved Hospital Problems   No resolved problems to display.

## 2020-08-26 VITALS
HEART RATE: 122 BPM | BODY MASS INDEX: 36.73 KG/M2 | RESPIRATION RATE: 19 BRPM | HEIGHT: 69 IN | WEIGHT: 248 LBS | DIASTOLIC BLOOD PRESSURE: 86 MMHG | SYSTOLIC BLOOD PRESSURE: 149 MMHG | OXYGEN SATURATION: 100 % | TEMPERATURE: 100 F

## 2020-08-26 PROBLEM — Z93.0 TRACHEOSTOMY IN PLACE: Status: ACTIVE | Noted: 2020-08-26

## 2020-08-26 PROBLEM — Z93.1 PEG (PERCUTANEOUS ENDOSCOPIC GASTROSTOMY) STATUS: Status: ACTIVE | Noted: 2020-08-26

## 2020-08-26 PROBLEM — Z86.16 HISTORY OF 2019 NOVEL CORONAVIRUS DISEASE (COVID-19): Status: ACTIVE | Noted: 2020-08-26

## 2020-08-26 PROBLEM — D62 ACUTE BLOOD LOSS ANEMIA: Status: ACTIVE | Noted: 2020-08-26

## 2020-08-26 PROBLEM — L89.153 PRESSURE INJURY OF SACRAL REGION, STAGE 3: Status: ACTIVE | Noted: 2020-08-24

## 2020-08-26 LAB
ALLENS TEST: ABNORMAL
ANION GAP SERPL CALC-SCNC: 8 MMOL/L (ref 8–16)
BACTERIA UR CULT: ABNORMAL
BASOPHILS # BLD AUTO: 0.03 K/UL (ref 0–0.2)
BASOPHILS NFR BLD: 0.3 % (ref 0–1.9)
BUN SERPL-MCNC: 16 MG/DL (ref 8–23)
CALCIUM SERPL-MCNC: 8.3 MG/DL (ref 8.7–10.5)
CHLORIDE SERPL-SCNC: 101 MMOL/L (ref 95–110)
CO2 SERPL-SCNC: 26 MMOL/L (ref 23–29)
CREAT SERPL-MCNC: 0.7 MG/DL (ref 0.5–1.4)
DELSYS: ABNORMAL
DIFFERENTIAL METHOD: ABNORMAL
EOSINOPHIL # BLD AUTO: 0.1 K/UL (ref 0–0.5)
EOSINOPHIL NFR BLD: 1.6 % (ref 0–8)
ERYTHROCYTE [DISTWIDTH] IN BLOOD BY AUTOMATED COUNT: 14 % (ref 11.5–14.5)
ERYTHROCYTE [SEDIMENTATION RATE] IN BLOOD BY WESTERGREN METHOD: 12 MM/H
EST. GFR  (AFRICAN AMERICAN): >60 ML/MIN/1.73 M^2
EST. GFR  (NON AFRICAN AMERICAN): >60 ML/MIN/1.73 M^2
FIO2: 30
GLUCOSE SERPL-MCNC: 169 MG/DL (ref 70–110)
HCO3 UR-SCNC: 23.9 MMOL/L (ref 24–28)
HCT VFR BLD AUTO: 31.7 % (ref 40–54)
HGB BLD-MCNC: 9.9 G/DL (ref 14–18)
IMM GRANULOCYTES # BLD AUTO: 0.03 K/UL (ref 0–0.04)
IMM GRANULOCYTES NFR BLD AUTO: 0.3 % (ref 0–0.5)
LYMPHOCYTES # BLD AUTO: 1.4 K/UL (ref 1–4.8)
LYMPHOCYTES NFR BLD: 16.5 % (ref 18–48)
MAGNESIUM SERPL-MCNC: 1.9 MG/DL (ref 1.6–2.6)
MCH RBC QN AUTO: 28.1 PG (ref 27–31)
MCHC RBC AUTO-ENTMCNC: 31.2 G/DL (ref 32–36)
MCV RBC AUTO: 90 FL (ref 82–98)
MODE: ABNORMAL
MONOCYTES # BLD AUTO: 1.1 K/UL (ref 0.3–1)
MONOCYTES NFR BLD: 12.3 % (ref 4–15)
NEUTROPHILS # BLD AUTO: 6 K/UL (ref 1.8–7.7)
NEUTROPHILS NFR BLD: 69 % (ref 38–73)
NRBC BLD-RTO: 0 /100 WBC
PCO2 BLDA: 34.6 MMHG (ref 35–45)
PEEP: 5
PH SMN: 7.45 [PH] (ref 7.35–7.45)
PHOSPHATE SERPL-MCNC: 2.3 MG/DL (ref 2.7–4.5)
PIP: 12
PLATELET # BLD AUTO: 441 K/UL (ref 150–350)
PMV BLD AUTO: 9.8 FL (ref 9.2–12.9)
PO2 BLDA: 80 MMHG (ref 80–100)
POC BE: 0 MMOL/L
POC SATURATED O2: 96 % (ref 95–100)
POC TCO2: 25 MMOL/L (ref 23–27)
POCT GLUCOSE: 163 MG/DL (ref 70–110)
POCT GLUCOSE: 194 MG/DL (ref 70–110)
POCT GLUCOSE: 207 MG/DL (ref 70–110)
POCT GLUCOSE: 209 MG/DL (ref 70–110)
POTASSIUM SERPL-SCNC: 3.8 MMOL/L (ref 3.5–5.1)
RBC # BLD AUTO: 3.52 M/UL (ref 4.6–6.2)
SAMPLE: ABNORMAL
SARS-COV-2 RNA RESP QL NAA+PROBE: NOT DETECTED
SITE: ABNORMAL
SODIUM SERPL-SCNC: 135 MMOL/L (ref 136–145)
SP02: 100
VT: 440
WBC # BLD AUTO: 8.75 K/UL (ref 3.9–12.7)

## 2020-08-26 PROCEDURE — 25000003 PHARM REV CODE 250: Performed by: STUDENT IN AN ORGANIZED HEALTH CARE EDUCATION/TRAINING PROGRAM

## 2020-08-26 PROCEDURE — 36600 WITHDRAWAL OF ARTERIAL BLOOD: CPT

## 2020-08-26 PROCEDURE — 27000221 HC OXYGEN, UP TO 24 HOURS

## 2020-08-26 PROCEDURE — 25000242 PHARM REV CODE 250 ALT 637 W/ HCPCS: Performed by: STUDENT IN AN ORGANIZED HEALTH CARE EDUCATION/TRAINING PROGRAM

## 2020-08-26 PROCEDURE — 94003 VENT MGMT INPAT SUBQ DAY: CPT

## 2020-08-26 PROCEDURE — 63600175 PHARM REV CODE 636 W HCPCS: Performed by: INTERNAL MEDICINE

## 2020-08-26 PROCEDURE — 84100 ASSAY OF PHOSPHORUS: CPT

## 2020-08-26 PROCEDURE — 25000003 PHARM REV CODE 250: Performed by: INTERNAL MEDICINE

## 2020-08-26 PROCEDURE — 99900026 HC AIRWAY MAINTENANCE (STAT)

## 2020-08-26 PROCEDURE — 63600175 PHARM REV CODE 636 W HCPCS: Performed by: STUDENT IN AN ORGANIZED HEALTH CARE EDUCATION/TRAINING PROGRAM

## 2020-08-26 PROCEDURE — 80048 BASIC METABOLIC PNL TOTAL CA: CPT

## 2020-08-26 PROCEDURE — 82803 BLOOD GASES ANY COMBINATION: CPT

## 2020-08-26 PROCEDURE — 25000003 PHARM REV CODE 250: Performed by: PHYSICIAN ASSISTANT

## 2020-08-26 PROCEDURE — 94640 AIRWAY INHALATION TREATMENT: CPT

## 2020-08-26 PROCEDURE — 83735 ASSAY OF MAGNESIUM: CPT

## 2020-08-26 PROCEDURE — 99900035 HC TECH TIME PER 15 MIN (STAT)

## 2020-08-26 PROCEDURE — 94761 N-INVAS EAR/PLS OXIMETRY MLT: CPT

## 2020-08-26 PROCEDURE — 27200966 HC CLOSED SUCTION SYSTEM

## 2020-08-26 PROCEDURE — 85025 COMPLETE CBC W/AUTO DIFF WBC: CPT

## 2020-08-26 RX ORDER — ENOXAPARIN SODIUM 100 MG/ML
90 INJECTION SUBCUTANEOUS EVERY 12 HOURS
Status: CANCELLED | OUTPATIENT
Start: 2020-08-26

## 2020-08-26 RX ORDER — SCOLOPAMINE TRANSDERMAL SYSTEM 1 MG/1
1 PATCH, EXTENDED RELEASE TRANSDERMAL
Status: CANCELLED | OUTPATIENT
Start: 2020-08-27

## 2020-08-26 RX ORDER — ROSUVASTATIN CALCIUM 10 MG/1
10 TABLET, COATED ORAL NIGHTLY
Status: CANCELLED | OUTPATIENT
Start: 2020-08-26

## 2020-08-26 RX ORDER — MIDAZOLAM HYDROCHLORIDE 5 MG/ML
4 INJECTION INTRAMUSCULAR; INTRAVENOUS EVERY 6 HOURS PRN
Status: CANCELLED | OUTPATIENT
Start: 2020-08-26

## 2020-08-26 RX ORDER — ALBUTEROL SULFATE 2.5 MG/.5ML
2.5 SOLUTION RESPIRATORY (INHALATION) EVERY 6 HOURS PRN
Status: CANCELLED | OUTPATIENT
Start: 2020-08-26

## 2020-08-26 RX ORDER — CEFEPIME HYDROCHLORIDE 1 G/1
1 INJECTION, POWDER, FOR SOLUTION INTRAMUSCULAR; INTRAVENOUS EVERY 8 HOURS
Status: CANCELLED | OUTPATIENT
Start: 2020-08-26 | End: 2020-08-31

## 2020-08-26 RX ORDER — POLYETHYLENE GLYCOL 3350 17 G/17G
17 POWDER, FOR SOLUTION ORAL DAILY
Status: CANCELLED | OUTPATIENT
Start: 2020-08-27

## 2020-08-26 RX ORDER — DIAZEPAM 2 MG/1
2 TABLET ORAL DAILY
Status: CANCELLED | OUTPATIENT
Start: 2020-08-27

## 2020-08-26 RX ORDER — INSULIN ASPART 100 [IU]/ML
15 INJECTION, SOLUTION INTRAVENOUS; SUBCUTANEOUS
Status: CANCELLED | OUTPATIENT
Start: 2020-08-26

## 2020-08-26 RX ORDER — LIDOCAINE HYDROCHLORIDE 40 MG/ML
2 INJECTION, SOLUTION RETROBULBAR
Status: CANCELLED | OUTPATIENT
Start: 2020-08-26

## 2020-08-26 RX ORDER — DEXTROSE MONOHYDRATE 100 MG/ML
INJECTION, SOLUTION INTRAVENOUS CONTINUOUS PRN
Status: CANCELLED | OUTPATIENT
Start: 2020-08-26

## 2020-08-26 RX ORDER — FENTANYL CITRATE-0.9 % NACL/PF 10 MCG/ML
25 PLASTIC BAG, INJECTION (ML) INTRAVENOUS CONTINUOUS
Status: CANCELLED | OUTPATIENT
Start: 2020-08-26 | Stop reason: SDUPTHER

## 2020-08-26 RX ORDER — INSULIN ASPART 100 [IU]/ML
1-10 INJECTION, SOLUTION INTRAVENOUS; SUBCUTANEOUS EVERY 4 HOURS PRN
Status: CANCELLED | OUTPATIENT
Start: 2020-08-26

## 2020-08-26 RX ORDER — FAMOTIDINE 20 MG/1
20 TABLET, FILM COATED ORAL 2 TIMES DAILY
Status: CANCELLED | OUTPATIENT
Start: 2020-08-26

## 2020-08-26 RX ORDER — OXYCODONE HYDROCHLORIDE 5 MG/1
5 TABLET ORAL EVERY 4 HOURS
Status: DISCONTINUED | OUTPATIENT
Start: 2020-08-26 | End: 2020-08-26 | Stop reason: HOSPADM

## 2020-08-26 RX ORDER — ENOXAPARIN SODIUM 100 MG/ML
90 INJECTION SUBCUTANEOUS EVERY 12 HOURS
Status: DISCONTINUED | OUTPATIENT
Start: 2020-08-26 | End: 2020-08-26 | Stop reason: HOSPADM

## 2020-08-26 RX ORDER — FENTANYL CITRATE 50 UG/ML
50 INJECTION, SOLUTION INTRAMUSCULAR; INTRAVENOUS
Status: CANCELLED | OUTPATIENT
Start: 2020-08-26

## 2020-08-26 RX ORDER — OXYCODONE HYDROCHLORIDE 5 MG/1
5 TABLET ORAL EVERY 4 HOURS
Status: CANCELLED | OUTPATIENT
Start: 2020-08-26

## 2020-08-26 RX ORDER — GLUCAGON 1 MG
1 KIT INJECTION
Status: CANCELLED | OUTPATIENT
Start: 2020-08-26

## 2020-08-26 RX ORDER — AMOXICILLIN 250 MG
1 CAPSULE ORAL 2 TIMES DAILY
Status: CANCELLED | OUTPATIENT
Start: 2020-08-26

## 2020-08-26 RX ORDER — SODIUM CHLORIDE 0.9 % (FLUSH) 0.9 %
10 SYRINGE (ML) INJECTION
Status: CANCELLED | OUTPATIENT
Start: 2020-08-26

## 2020-08-26 RX ORDER — ACETYLCYSTEINE 100 MG/ML
4 SOLUTION ORAL; RESPIRATORY (INHALATION)
Status: CANCELLED | OUTPATIENT
Start: 2020-08-26

## 2020-08-26 RX ORDER — ACETAMINOPHEN 650 MG/20.3ML
650 LIQUID ORAL EVERY 8 HOURS PRN
Status: CANCELLED | OUTPATIENT
Start: 2020-08-26

## 2020-08-26 RX ORDER — OLANZAPINE 2.5 MG/1
5 TABLET ORAL 2 TIMES DAILY
Status: CANCELLED | OUTPATIENT
Start: 2020-08-26

## 2020-08-26 RX ADMIN — ACETYLCYSTEINE 4 ML: 100 SOLUTION ORAL; RESPIRATORY (INHALATION) at 02:08

## 2020-08-26 RX ADMIN — ALBUTEROL SULFATE 2.5 MG: 2.5 SOLUTION RESPIRATORY (INHALATION) at 02:08

## 2020-08-26 RX ADMIN — FAMOTIDINE 20 MG: 20 TABLET ORAL at 08:08

## 2020-08-26 RX ADMIN — VANCOMYCIN HYDROCHLORIDE 1250 MG: 1.25 INJECTION, POWDER, LYOPHILIZED, FOR SOLUTION INTRAVENOUS at 09:08

## 2020-08-26 RX ADMIN — OLANZAPINE 5 MG: 2.5 TABLET, FILM COATED ORAL at 08:08

## 2020-08-26 RX ADMIN — CEFEPIME 1 G: 1 INJECTION, POWDER, FOR SOLUTION INTRAMUSCULAR; INTRAVENOUS at 05:08

## 2020-08-26 RX ADMIN — DIAZEPAM 2 MG: 2 TABLET ORAL at 08:08

## 2020-08-26 RX ADMIN — ACETYLCYSTEINE 4 ML: 100 SOLUTION ORAL; RESPIRATORY (INHALATION) at 07:08

## 2020-08-26 RX ADMIN — STANDARDIZED SENNA CONCENTRATE AND DOCUSATE SODIUM 1 TABLET: 8.6; 5 TABLET ORAL at 08:08

## 2020-08-26 RX ADMIN — CEFEPIME 1 G: 1 INJECTION, POWDER, FOR SOLUTION INTRAMUSCULAR; INTRAVENOUS at 01:08

## 2020-08-26 RX ADMIN — POLYETHYLENE GLYCOL 3350 17 G: 17 POWDER, FOR SOLUTION ORAL at 08:08

## 2020-08-26 RX ADMIN — Medication 200 MCG/HR: at 02:08

## 2020-08-26 RX ADMIN — ENOXAPARIN SODIUM 90 MG: 60 INJECTION SUBCUTANEOUS at 08:08

## 2020-08-26 RX ADMIN — ALBUTEROL SULFATE 2.5 MG: 2.5 SOLUTION RESPIRATORY (INHALATION) at 07:08

## 2020-08-26 NOTE — NURSING
Call from wife and daughter for update and to talk to patient.Patient seems to be aware of faces and voices especially when his daughter came on ph.His eyes opened wide.With his wife he appeared to be attempting to move his mouth a little as in attempt to talk.Update provided.

## 2020-08-26 NOTE — PT/OT/SLP PROGRESS
Occupational Therapy      Patient Name:  Anup Miller   MRN:  [id number]    Patient not seen today secondary to Other (Comment)(Visited pt room this afternoon for OT/PT session. RRT contacted (Sallie [phone]) with referral to contact Martir ([phone]) who was then contacted x3 attempts. RRT Martir spectra not in service on all attempts and unable to be located on unit by room. Will hold 2/2 COVID ICU respiratory procedure as RRT to be present during mobility.). Will follow-up as medically appropriate.    Tisha Dietz, OT  8/26/2020

## 2020-08-26 NOTE — PROGRESS NOTES
General Surgery Progress Note    Patient seen and examined today. Trach sutured in place with surround site clean and dry. No evidence of bleeding. G tube site clean and dry using for meds without difficulty. May start feeds through G tube tonight.     General Surgery will sign off. Please contact us with any questions or concerns.     Maddi Marshall MD   General Surgery, PGY II

## 2020-08-26 NOTE — ASSESSMENT & PLAN NOTE
70M  with HTN, HLD, and DM who presents to the [place] ED for SOB and found to be COVID positive.  Patient transferred to [facility] [place] [facility] on 7/26/20 for higher level of care. Vent AC FiO2 30%, PEEP 5 and tidal 440.Was previously paralyzed, proned, sedated. Nimbex discontinued on 08/04. Remdesivir, dexamethasone, Ceftriaxone and azithromycin complete.    - Patient febrile on 8/23, CXR shows no changes and respiratory culture pending.  - empiric antibiotics started on 8/23 with resolution of fevers and patient's WBC WNL.  -  valium 2 mg daily and continue fentanyl to help with coughing and sedation  - guaifenesin, scopolamine and acetylcysteine for secretions and coughing   - Zyprexa added on 08/07 to help wean sedation.  - Therapeutic lovenox for hypercoagulable state in COVID  - s/p Trach/PEG 8/25, tolerating well on 30% FiO2.       -Discussed with pt's Wife and daughter, state they would like to pursue PEG/trach.   - trial of SBTs but patient continues to desaturate with coughing, general surgery consulted for trach/peg placement on 8/26.   -Discussed with CM to begin referral process for LTAC placement with [facility] under review.

## 2020-08-26 NOTE — SUBJECTIVE & OBJECTIVE
Interval History/Significant Events:  No acute event overnight patient has been afebrile overnight.  Low-grade fever this morning 100.4.  Patient is going for trach and PEG today.   Review of Systems   Unable to perform ROS: Intubated     Objective:     Vital Signs (Most Recent):  Temp: (!) 101.1 °F (38.4 °C) (08/25/20 1930)  Pulse: (!) 126 (08/25/20 1930)  Resp: (!) 35 (08/25/20 1930)  BP: (!) 168/85 (08/25/20 1930)  SpO2: 100 % (08/25/20 1930) Vital Signs (24h Range):  Temp:  [98.8 °F (37.1 °C)-101.1 °F (38.4 °C)] 101.1 °F (38.4 °C)  Pulse:  [103-126] 126  Resp:  [16-61] 35  SpO2:  [99 %-100 %] 100 %  BP: ()/(63-85) 168/85   Weight: 112.5 kg (248 lb 0.3 oz)  Body mass index is 36.63 kg/m².      Intake/Output Summary (Last 24 hours) at 8/25/2020 1946  Last data filed at 8/25/2020 1900  Gross per 24 hour   Intake 1579.42 ml   Output 1940 ml   Net -360.58 ml       Physical Exam  Vitals signs and nursing note reviewed.   Constitutional:       Comments: Intubated and sedated    Eyes:      Extraocular Movements: Extraocular movements intact.   Cardiovascular:      Rate and Rhythm: Normal rate and regular rhythm.      Pulses: Normal pulses.      Heart sounds: Normal heart sounds. No murmur.   Pulmonary:      Effort: Pulmonary effort is normal. No respiratory distress.      Breath sounds: Normal breath sounds. No wheezing.   Abdominal:      General: Abdomen is flat. There is no distension.      Tenderness: There is no abdominal tenderness.   Skin:     General: Skin is warm.   Neurological:      Mental Status: He is alert.      Comments: Intubated and sedated but alert          Vents:  Vent Mode: A/C (08/25/20 1500)  Ventilator Initiated: Yes(chart correction) (07/26/20 2108)  Set Rate: 12 BPM (08/25/20 1500)  Vt Set: 440 mL (08/25/20 1500)  Pressure Support: 0 cmH20 (08/25/20 1500)  PEEP/CPAP: 5 cmH20 (08/25/20 1500)  Oxygen Concentration (%): 30 (08/25/20 1930)  Peak Airway Pressure: 15 cmH2O (08/25/20  1500)  Plateau Pressure: 0 cmH20 (08/25/20 1500)  Total Ve: 17.9 mL (08/25/20 1500)  F/VT Ratio<105 (RSBI): (!) 48.83 (08/25/20 0801)  Lines/Drains/Airways     Central Venous Catheter Line            Percutaneous Central Line Insertion/Assessment - Triple Lumen  07/29/20 1729 right internal jugular 27 days          Drain                 Urethral Catheter 08/23/20 1430 2 days         Gastrostomy/Enterostomy 08/25/20 1421 Percutaneous endoscopic gastrostomy (PEG) LUQ less than 1 day          Airway                 Surgical Airway 08/25/20 1355 Shiley Cuffed less than 1 day              Significant Labs:    CBC/Anemia Profile:  Recent Labs   Lab 08/24/20  1239 08/25/20  0400   WBC 10.28 7.58   HGB 10.1* 9.5*   HCT 34.2* 31.9*   * 417*   MCV 91 91   RDW 14.4 14.2        Chemistries:  Recent Labs   Lab 08/24/20  0337 08/24/20 1239 08/25/20  0400   NA  --  138 136   K  --  3.5 3.5   CL  --  101 101   CO2  --  28 28   BUN  --  27* 23   CREATININE  --  0.8 0.7   CALCIUM  --  8.6* 8.3*   MG 2.1  --   --    PHOS 3.1  --   --        ABGs:   Recent Labs   Lab 08/25/20  0453   PH 7.507*   PCO2 34.5*   HCO3 27.4   POCSATURATED 98   BE 4     CMP:   Recent Labs   Lab 08/24/20  1239 08/25/20  0400    136   K 3.5 3.5    101   CO2 28 28   * 91   BUN 27* 23   CREATININE 0.8 0.7   CALCIUM 8.6* 8.3*   ANIONGAP 9 7*   EGFRNONAA >60.0 >60.0       Significant Imaging:  I have reviewed and interpreted all pertinent imaging results/findings within the past 24 hours.

## 2020-08-26 NOTE — PLAN OF CARE
On Call SW:    Secure chat by Anila, liaison for [facility], to arrange transport ASAP for transfer to LTAC.    SW spoke w/Kathy,nurse [phone], for travel requirements.  SW arranged stretcher transport accordingly.  Transportation has been arranged through [facility] for stretcher transport to [place] room 272; nurse has been given # for report via secure chat. Requested time of transport is 5:30pm.  This time is not guaranteed.    Wife notified of time of pickup and room #.    Carl Egan,LMSW [facility]  [phone]

## 2020-08-26 NOTE — NURSING
Alerted that Anup is transferring to [facility] by .  Spoke with Resident NISHI Subramanian about transfer to facility.  She verbalized with attending MD that Anup is transferring out tonight.  Called report to LESLIE Reynolds.    Anup continues trach to vent with no desats.  Multiple coughing episodes today.  Frequent suctioning of thin/thick secretions.  Slight bleeding at site noted.  Trach ties changed with RT present.  Stitches intact.  R IJ removed by LESLIE Rosas.  Site covered with gauze/tegaderm.  C/D/I.  New 20 g PIV was started in the R forearm prior to removing.  Fentanyl drip was decreased from 200mcg/hr to 100mcg this am.  Dose turned off at 1715 per [facility]ail recommendations for facility.  MD Marilynn wrote an oxycodone Q4hr order since fentanyl turned off.  PEG tube used for meds this morning followed by a water flush.  Attempted a 1545 feed; however, he started coughing (was already HOB elevated 45 degrees), so feeds were stopped and trach suctioned.  Flushed with 5 ml of water to clear the g-tube.  MDs aware.  Different feed ordered to be started tonight.  Large bowel movement while wound team assessing.  Continuing vanc/cefepime for UTI.  Wound team assessing sacral wound at bedside currently.  Blood sugars 194-209 today.  Insulin held since no feeds going.  Will send meds with transport team.  Will continue to monitor closely.

## 2020-08-26 NOTE — PT/OT/SLP PROGRESS
Physical Therapy      Patient Name:  Anup Miller   MRN:  [id number]    Patient not seen today secondary to (unable to reach RT upon attempt to coordinate treatment session). Per respiratory protocol, RT to be notified prior to mobilization of pts with COVID who have trach to vent. PT unable to return for later attempt. Will follow-up at next scheduled session as able.    Fatuma Dahl, PT, DPT   8/26/2020  [phone]

## 2020-08-26 NOTE — PLAN OF CARE
ROCHELLE Skin Integrity Evaluation      Subjective    ROCHELLE skin integrity champion evaluation of patient as part of the comprehensive skin care team .       Anup Miller is being evaluated as per the Epic  pressure injury skin report.  He has been admitted to [facility]ICU for 30 days. Skin injury was noted on 8/15/2020        Limited Physical exam     Lesion 1: Sacral - full thickness skin breakdown with pink tissue. Surrounding skin blanchable. Skin breakdown noted left gluteal likely related to shear trauma.               Assessment :       Lesion 1:  Stage 3 Pressure Ulcer      POA : no    Consults: Wound Care, Nutrition and Support surfaces as per WOCN recommendations              Follow up:    Patient to be discharged today.       Dorota Leyva PA-C  Critical Care Medicine  8/26/2020   6:32 PM

## 2020-08-26 NOTE — ASSESSMENT & PLAN NOTE
-- patient has been spiking fever and since admission   -- started spiking more frequent low-grade fever with a max of 100.8 on 8/21  -- started on vanc and cefepime   -- urine culture from 08/23 grew Enterococcus  -- patient keeps spiking fever despite broad-spectrum antibiotic  -- concern for resistance organism (VRE)   -- consulting ID if fever persist.

## 2020-08-26 NOTE — PROGRESS NOTES
[facility] - ICU 16   Critical Care Medicine  Progress Note    Patient Name: Anup Miller  MRN: [id number]  Admission Date: 7/26/2020  Hospital Length of Stay: 30 days  Code Status: Full Code  Attending Provider: Karl Hilliard MD  Primary Care Provider: Bryce Gonzales MD   Principal Problem: Pneumonia due to COVID-19 virus    Subjective:     HPI:  Anup Miller is a 70 y.o. male patient with a PMHx of HTN, HLD, and DM who presents to the [place] [facility] for evaluation of SOB which  1 week ago. Pt became more SOB and has an O2 sat of low 70s upon arrival on RA. Symptoms are worsening and moderate in severity. No mitigating or exacerbating factors reported. Associated sxs include cough and fever. Patient denies any congestion, sore throat, CP, abd pain, N/V, back pain, HA, weakness, and all other sxs at this time. No prior Tx reported. No further complaints or concerns at this time. Patient was placed on Bipap for a few hours and subsequently intubated. COVID positive. Was started on IV dexamethasone, Ceftriaxone, and Azithromycin. Patient transferred to [facility] [place] [facility] on evening of 7/26/20 for higher level of care.     Hospital/ICU Course:  As of 7/29, the patient's oxygenation continued to worsen with P:F <150. R IJ and Arterial Line placed. Pt sedated, paralyzed, and proned at 9:30pm with improved oxygenation. Repeat AM gas on 7/30 Arterial Blood Gas result:  pO2 112; pCO2 58.9; pH 7.256;  HCO3 26.2, %O2 Sat 97%. FiO2 60, 8 PEEP. Pt supinated at 4:10 pm on 7/30 with Arterial Blood Gas result:  pO2 106; pCO2 52.2; pH 7.349;  HCO3 28.7, %O2 Sat 106. (P:F 212). FiO2 50, 10 PEEP.    As of 7/31, paralytic d/c'ed and sedation slowly weaned. Propofol d/c'ed on 8/1 2/2 triglycerides with ketamine and versed initiated. Now requiring higher vent settings due to dyssynchrony, will continue to increase sedation. Patient paralyzed again on 8/1 for vent dyssynchrony. Nimbex was discontinued on  08/03 and sedative medications were increased, however, patient struggled to pull enough tidal volume and was desatting even on maximum sedation so nimbex was restarted. Nimbex was stopped on 08/04 and restarted propofol. As patient became more agitated, ketamine was added on 08/06. Sedation was attempted to be weaned off by adding seroquel and zyprexa. Versed and ketamine were weaned off. Patient was weaned off of levo on morning of 08/09. Patient failed multiple SBTs and it was discussed with family of likely PEG/trach, pending their decision. Patient has continued agitation with coughing/ increased BPs requiring propofol, fentanyl, and precedex with scheduled valium. Discussed with family that as patient continues to require ventillator support past 21d, would benefit from trach/peg. Family agrees to pursue trach/peg/LTAC placement. General surgery consulted for trach/peg with plan for 8/26.     Interval History/Significant Events:  No acute event overnight patient has been afebrile overnight.  Low-grade fever this morning 100.4.  Patient is going for trach and PEG today.   Review of Systems   Unable to perform ROS: Intubated     Objective:     Vital Signs (Most Recent):  Temp: (!) 101.1 °F (38.4 °C) (08/25/20 1930)  Pulse: (!) 126 (08/25/20 1930)  Resp: (!) 35 (08/25/20 1930)  BP: (!) 168/85 (08/25/20 1930)  SpO2: 100 % (08/25/20 1930) Vital Signs (24h Range):  Temp:  [98.8 °F (37.1 °C)-101.1 °F (38.4 °C)] 101.1 °F (38.4 °C)  Pulse:  [103-126] 126  Resp:  [16-61] 35  SpO2:  [99 %-100 %] 100 %  BP: ()/(63-85) 168/85   Weight: 112.5 kg (248 lb 0.3 oz)  Body mass index is 36.63 kg/m².      Intake/Output Summary (Last 24 hours) at 8/25/2020 1946  Last data filed at 8/25/2020 1900  Gross per 24 hour   Intake 1579.42 ml   Output 1940 ml   Net -360.58 ml       Physical Exam  Vitals signs and nursing note reviewed.   Constitutional:       Comments: Intubated and sedated    Eyes:      Extraocular Movements:  Extraocular movements intact.   Cardiovascular:      Rate and Rhythm: Normal rate and regular rhythm.      Pulses: Normal pulses.      Heart sounds: Normal heart sounds. No murmur.   Pulmonary:      Effort: Pulmonary effort is normal. No respiratory distress.      Breath sounds: Normal breath sounds. No wheezing.   Abdominal:      General: Abdomen is flat. There is no distension.      Tenderness: There is no abdominal tenderness.   Skin:     General: Skin is warm.   Neurological:      Mental Status: He is alert.      Comments: Intubated and sedated but alert          Vents:  Vent Mode: A/C (08/25/20 1500)  Ventilator Initiated: Yes(chart correction) (07/26/20 2108)  Set Rate: 12 BPM (08/25/20 1500)  Vt Set: 440 mL (08/25/20 1500)  Pressure Support: 0 cmH20 (08/25/20 1500)  PEEP/CPAP: 5 cmH20 (08/25/20 1500)  Oxygen Concentration (%): 30 (08/25/20 1930)  Peak Airway Pressure: 15 cmH2O (08/25/20 1500)  Plateau Pressure: 0 cmH20 (08/25/20 1500)  Total Ve: 17.9 mL (08/25/20 1500)  F/VT Ratio<105 (RSBI): (!) 48.83 (08/25/20 0801)  Lines/Drains/Airways     Central Venous Catheter Line            Percutaneous Central Line Insertion/Assessment - Triple Lumen  07/29/20 1729 right internal jugular 27 days          Drain                 Urethral Catheter 08/23/20 1430 2 days         Gastrostomy/Enterostomy 08/25/20 1421 Percutaneous endoscopic gastrostomy (PEG) LUQ less than 1 day          Airway                 Surgical Airway 08/25/20 1355 Shiley Cuffed less than 1 day              Significant Labs:    CBC/Anemia Profile:  Recent Labs   Lab 08/24/20  1239 08/25/20  0400   WBC 10.28 7.58   HGB 10.1* 9.5*   HCT 34.2* 31.9*   * 417*   MCV 91 91   RDW 14.4 14.2        Chemistries:  Recent Labs   Lab 08/24/20  0337 08/24/20  1239 08/25/20  0400   NA  --  138 136   K  --  3.5 3.5   CL  --  101 101   CO2  --  28 28   BUN  --  27* 23   CREATININE  --  0.8 0.7   CALCIUM  --  8.6* 8.3*   MG 2.1  --   --    PHOS 3.1  --   --         ABGs:   Recent Labs   Lab 08/25/20  0453   PH 7.507*   PCO2 34.5*   HCO3 27.4   POCSATURATED 98   BE 4     CMP:   Recent Labs   Lab 08/24/20  1239 08/25/20  0400    136   K 3.5 3.5    101   CO2 28 28   * 91   BUN 27* 23   CREATININE 0.8 0.7   CALCIUM 8.6* 8.3*   ANIONGAP 9 7*   EGFRNONAA >60.0 >60.0       Significant Imaging:  I have reviewed and interpreted all pertinent imaging results/findings within the past 24 hours.      ABG  Recent Labs   Lab 08/25/20  0453   PH 7.507*   PO2 98   PCO2 34.5*   HCO3 27.4   BE 4     Assessment/Plan:     Pulmonary  Acute hypoxemic respiratory failure  - see Pneumonia due to COVID 19    Cardiac/Vascular  Hyperlipidemia associated with type 2 diabetes mellitus  - Restart Crestor after surgery    Hypertension associated with diabetes  Continue to hold antihypertensives as BP WNL.    Renal/  UTI (urinary tract infection) due to urinary indwelling Boothe catheter  On 8/23 patient was febrile with low grade temperature and infectious workup was done. UA shows 3+ leukocytes and many bacteria. Source control was done with replacing the cather and further work up. Patient WBC and lactate WNL.    -Started on Vancomycin and cefepime   -F/u urine cultures    Endocrine  Uncontrolled type 2 diabetes mellitus  A1c 8.3%, was started on insulin gtt on 08/05 due to elevated BG but discontinued 08/07.    - Given 12U levemir for 8/24 trach and peg    - Levemir 12U for surgery then continue 25U BID  - Aspart 15U Q4H  - BG at goal of 140-180 currently    GI  Elevated liver enzymes  Hx of elevated LFTs dating back to 2015 and ranges consistent with this admission. Likely 2/2 fatty liver per PCP note. Acute hepatitis panel and HIV were both negative    - On 8/22 AST 75 and  that are now back at patient's baseline.   -Trend CMP     Orthopedic  Sacral wound  Wound care assessed patient and shows stage 3 hospital acquired pressure sacral ulcer. Reposition q2 hours has been  upheld by nursing staff.     -appreciate wound care recommendations  -will continue to assess     Other  * Pneumonia due to COVID-19 virus  70M  with HTN, HLD, and DM who presents to the [place] ED for SOB and found to be COVID positive.  Patient transferred to [facility] on 7/26/20 for higher level of care. Vent AC FiO2 30%, PEEP 5 and tidal 440.Was previously paralyzed, proned, sedated. Nimbex discontinued on 08/04. Remdesivir, dexamethasone, Ceftriaxone and azithromycin complete.    - Patient febrile on 8/23, CXR shows no changes and respiratory culture pending.  - empiric antibiotics started on 8/23 with resolution of fevers and patient's WBC WNL.  -  valium 2 mg daily and continue fentanyl to help with coughing and sedation  - guaifenesin, scopolamine and acetylcysteine for secretions and coughing   - Zyprexa added on 08/07 to help wean sedation.  - Therapeutic lovenox for hypercoagulable state in COVID  - s/p Trach/PEG 8/25, tolerating well on 30% FiO2.       -Discussed with pt's Wife and daughter, state they would like to pursue PEG/trach.   - trial of SBTs but patient continues to desaturate with coughing, general surgery consulted for trach/peg placement on 8/26.   -Discussed with CM to begin referral process for LTAC placement with [facility] under review.      Fever  -- patient has been spiking fever and since admission   -- started spiking more frequent low-grade fever with a max of 100.8 on 8/21  -- started on vanc and cefepime   -- urine culture from 08/23 grew Enterococcus  -- patient keeps spiking fever despite broad-spectrum antibiotic  -- concern for resistance organism (VRE)   -- consulting ID if fever persist.         Critical Care Daily Checklist:     A: Awake: RASS Goal/Actual Goal: RASS Goal: 0-->alert and calm  Actual: [place]ett Agitation Sedation Scale (RASS): Drowsy   B: Spontaneous Breathing Trial Performed? Spon. Breathing Trial Initiated?: Initiated (08/19/20 0900)   C: SAT & SBT  Coordinated?  n/a                 D: Delirium: CAM-ICU Overall CAM-ICU: Positive   E: Early Mobility Performed? Yes   F: Feeding Goal: Goals: Meet % EEN, EPN by RD f/u date  Status: Nutrition Goal Status: goal not met       Current Diet Order   Procedures    Diet NPO       AS: Analgesia/Sedation Fentanyl and diazepam   T: Thromboembolic Prophylaxis Enoxaparin    H: HOB > 300 Yes   U: Stress Ulcer Prophylaxis (if needed) famotidine   G: Glucose Control detemir and aspart   B: Bowel Function Stool Occurrence: 0   I: Indwelling Catheter (Lines & Harris) Necessity Central line 25 days, OG, harris    D: De-escalation of Antimicrobials/Pharmacotherapies vanc and cefepime     Plan for the day/ETD Surgery tomorrow     Code Status:  Family/Goals of Care: Full Code            Critical secondary to Patient has a condition that poses threat to life and bodily function: Severe Respiratory Distress      Critical care was time spent personally by me on the following activities: development of treatment plan with patient or surrogate and bedside caregivers, discussions with consultants, evaluation of patient's response to treatment, examination of patient, ordering and performing treatments and interventions, ordering and review of laboratory studies, ordering and review of radiographic studies, pulse oximetry, re-evaluation of patient's condition. This critical care time did not overlap with that of any other provider or involve time for any procedures.     Ivette Zamorano MD  Critical Care Medicine  [facility] - ICU 16 WT

## 2020-08-27 PROBLEM — U07.1 ACUTE RESPIRATORY DISTRESS SYNDROME (ARDS) DUE TO COVID-19 VIRUS: Status: ACTIVE | Noted: 2020-08-27

## 2020-08-27 PROBLEM — J80 ACUTE RESPIRATORY DISTRESS SYNDROME (ARDS) DUE TO COVID-19 VIRUS: Status: ACTIVE | Noted: 2020-08-27

## 2020-08-27 NOTE — ASSESSMENT & PLAN NOTE
70M  with HTN, HLD, and DM who presents to the [place] ED for SOB and found to be COVID positive.  [facility] on 7[facility]26/20 for higher level of care. Vent AC FiO2 30%, PEEP 5 and tidal 440.Was previously paralyzed, proned, sedated. Nimbex discontinued on 08/04. Remdesivir, dexamethasone, Ceftriaxone and azithromycin complete.    - Patient febrile on 8/23, CXR shows no changes and respiratory culture pending.  - empiric antibiotics started on 8/23 with resolution of fevers and patient's WBC WNL.  -  valium 2 mg daily and continue fentanyl to help with coughing and sedation  - guaifenesin, scopolamine and acetylcysteine for secretions and coughing   - Zyprexa added on 08/07 to help wean sedation.  - Therapeutic lovenox for hypercoagulable state in COVID  - s/p Trach/PEG 8/25, tolerating well on 30% FiO2.     -Discussed with CM to begin referral process for LTAC placement with [facility] under review.

## 2020-08-27 NOTE — SUBJECTIVE & OBJECTIVE
Interval History/Significant Events: Patient with Tmax of 101.1 overnight, resolved by the morning. Patient with decreased sedation and more alert on exam than past days. Eyes open and nodding head, minimal extremity movement.    Review of Systems   Unable to perform ROS: Acuity of condition   Only able to give some nods to questions s/p trach placement yesterday    Objective:     Vital Signs (Most Recent):  Temp: (!) 100.4 °F (38 °C) (08/26/20 1600)  Pulse: (!) 122 (08/26/20 1700)  Resp: 19 (08/26/20 1700)  BP: (!) 149/86 (08/26/20 1700)  SpO2: 100 % (08/26/20 1700) Vital Signs (24h Range):  Temp:  [99.9 °F (37.7 °C)-100.4 °F (38 °C)] 100.4 °F (38 °C)  Pulse:  [115-124] 122  Resp:  [5-33] 19  SpO2:  [100 %] 100 %  BP: (141-162)/(76-90) 149/86   Weight: 112.5 kg (248 lb 0.3 oz)  Body mass index is 36.63 kg/m².      Intake/Output Summary (Last 24 hours) at 8/26/2020 1954  Last data filed at 8/26/2020 1800  Gross per 24 hour   Intake 1361.33 ml   Output 1535 ml   Net -173.67 ml       Physical Exam  Vitals signs and nursing note reviewed.   Constitutional:       Comments: Eyes open, nods to some questions   HENT:      Head: Normocephalic and atraumatic.   Eyes:      Extraocular Movements: Extraocular movements intact.   Cardiovascular:      Rate and Rhythm: Regular rhythm. Tachycardia present.      Pulses: Normal pulses.   Pulmonary:      Effort: Pulmonary effort is normal.      Breath sounds: No wheezing, rhonchi or rales.   Abdominal:      General: There is no distension.      Palpations: Abdomen is soft.      Tenderness: There is no abdominal tenderness.   Musculoskeletal:      Right lower leg: No edema.      Left lower leg: No edema.      Comments: Minimal movement to upper and lower extremities   Skin:     General: Skin is warm and dry.      Capillary Refill: Capillary refill takes less than 2 seconds.   Neurological:      Mental Status: He is alert.      Comments: Pt alert, following commands with his head, eyes,  and tongue. Minimal movement to upper or lower extremities         Vents:  Vent Mode: A/C (08/26/20 1500)  Ventilator Initiated: Yes(chart correction) (07/26/20 2108)  Set Rate: 12 BPM (08/26/20 1500)  Vt Set: 440 mL (08/26/20 1500)  Pressure Support: 0 cmH20 (08/26/20 1500)  PEEP/CPAP: 5 cmH20 (08/26/20 1500)  Oxygen Concentration (%): 30 (08/26/20 1700)  Peak Airway Pressure: 21 cmH2O (08/26/20 1500)  Plateau Pressure: 23 cmH20 (08/26/20 1500)  Total Ve: 13.5 mL (08/26/20 1500)  F/VT Ratio<105 (RSBI): (!) 61.63 (08/26/20 1500)  Lines/Drains/Airways     Drain                 Urethral Catheter 08/23/20 1430 3 days         Gastrostomy/Enterostomy 08/25/20 1421 Percutaneous endoscopic gastrostomy (PEG) LUQ 1 day          Airway                 Surgical Airway 08/25/20 1355 Shiley Cuffed 1 day          Peripheral Intravenous Line                 Peripheral IV - Double Lumen 08/26/20 1400 18 G Anterior;Proximal;Right Forearm less than 1 day              Significant Labs:    CBC/Anemia Profile:  Recent Labs   Lab 08/25/20  0400 08/26/20  0401   WBC 7.58 8.75   HGB 9.5* 9.9*   HCT 31.9* 31.7*   * 441*   MCV 91 90   RDW 14.2 14.0        Chemistries:  Recent Labs   Lab 08/25/20  0400 08/26/20  0401    135*   K 3.5 3.8    101   CO2 28 26   BUN 23 16   CREATININE 0.7 0.7   CALCIUM 8.3* 8.3*   MG  --  1.9   PHOS  --  2.3*       ABGs:   Recent Labs   Lab 08/26/20  0500   PH 7.447   PCO2 34.6*   HCO3 23.9*   POCSATURATED 96   BE 0       Significant Imaging:  I have reviewed and interpreted all pertinent imaging results/findings within the past 24 hours.

## 2020-08-27 NOTE — DISCHARGE SUMMARY
[facility] - ICU 16   Critical Care Medicine  Discharge Summary    Patient Name: Anup Miller  MRN: [id number]  Admission Date: 7/26/2020  Hospital Length of Stay: 31 days  Code Status: Prior  Attending Provider: No att. providers found  Primary Care Provider: Bryce Gonzales MD   Principal Problem: Pneumonia due to COVID-19 virus    Subjective:     HPI:  Anup Miller is a 70 y.o. male patient with a PMHx of HTN, HLD, and DM who presents to the [place] [facility] for evaluation of SOB which  1 week ago. Pt became more SOB and has an O2 sat of low 70s upon arrival on RA. Symptoms are worsening and moderate in severity. No mitigating or exacerbating factors reported. Associated sxs include cough and fever. Patient denies any congestion, sore throat, CP, abd pain, N/V, back pain, HA, weakness, and all other sxs at this time. No prior Tx reported. No further complaints or concerns at this time. Patient was placed on Bipap for a few hours and subsequently intubated. COVID positive. Was started on IV dexamethasone, Ceftriaxone, and Azithromycin. Patient transferred [place] [facility] on evening of 7/26/20 for higher level of care.     Hospital/ICU Course:  As of 7/29, the patient's oxygenation continued to worsen with P:F <150. R IJ and Arterial Line placed. Pt sedated, paralyzed, and proned at 9:30pm with improved oxygenation. Repeat AM gas on 7/30 Arterial Blood Gas result:  pO2 112; pCO2 58.9; pH 7.256;  HCO3 26.2, %O2 Sat 97%. FiO2 60, 8 PEEP. Pt supinated at 4:10 pm on 7/30 with Arterial Blood Gas result:  pO2 106; pCO2 52.2; pH 7.349;  HCO3 28.7, %O2 Sat 106. (P:F 212). FiO2 50, 10 PEEP.    As of 7/31, paralytic d/c'ed and sedation slowly weaned. Propofol d/c'ed on 8/1 2/2 triglycerides with ketamine and versed initiated. Now requiring higher vent settings due to dyssynchrony, will continue to increase sedation. Patient paralyzed again on 8/1 for vent dyssynchrony. Nimbex was discontinued on  08/03 and sedative medications were increased, however, patient struggled to pull enough tidal volume and was desatting even on maximum sedation so nimbex was restarted. Nimbex was stopped on 08/04 and restarted propofol. As patient became more agitated, ketamine was added on 08/06. Sedation was attempted to be weaned off by adding seroquel and zyprexa. Versed and ketamine were weaned off. Patient was weaned off of levo on morning of 08/09. Patient failed multiple SBTs and it was discussed with family of likely PEG/trach, pending their decision. Patient has continued agitation with coughing/ increased BPs requiring propofol, fentanyl, and precedex with scheduled valium. Discussed with family that as patient continues to require ventillator support past 21d, would benefit from trach/peg. Family agrees to pursue trach/peg/LTAC placement. Trach/peg placed 8/25 and patient tolerating ventilation well.    Interval History/Significant Events: Patient with Tmax of 101.1 overnight, resolved by the morning. Patient with decreased sedation and more alert on exam than past days. Eyes open and nodding head, minimal extremity movement.    Review of Systems   Unable to perform ROS: Acuity of condition   Only able to give some nods to questions s/p trach placement yesterday    Objective:     Vital Signs (Most Recent):  Temp: (!) 100.4 °F (38 °C) (08/26/20 1600)  Pulse: (!) 122 (08/26/20 1700)  Resp: 19 (08/26/20 1700)  BP: (!) 149/86 (08/26/20 1700)  SpO2: 100 % (08/26/20 1700) Vital Signs (24h Range):  Temp:  [99.9 °F (37.7 °C)-100.4 °F (38 °C)] 100.4 °F (38 °C)  Pulse:  [115-124] 122  Resp:  [5-33] 19  SpO2:  [100 %] 100 %  BP: (141-162)/(76-90) 149/86   Weight: 112.5 kg (248 lb 0.3 oz)  Body mass index is 36.63 kg/m².      Intake/Output Summary (Last 24 hours) at 8/26/2020 1954  Last data filed at 8/26/2020 1800  Gross per 24 hour   Intake 1361.33 ml   Output 1535 ml   Net -173.67 ml       Physical Exam  Vitals signs and  nursing note reviewed.   Constitutional:       Comments: Eyes open, nods to some questions   HENT:      Head: Normocephalic and atraumatic.   Eyes:      Extraocular Movements: Extraocular movements intact.   Cardiovascular:      Rate and Rhythm: Regular rhythm. Tachycardia present.      Pulses: Normal pulses.   Pulmonary:      Effort: Pulmonary effort is normal.      Breath sounds: No wheezing, rhonchi or rales.   Abdominal:      General: There is no distension.      Palpations: Abdomen is soft.      Tenderness: There is no abdominal tenderness.   Musculoskeletal:      Right lower leg: No edema.      Left lower leg: No edema.      Comments: Minimal movement to upper and lower extremities   Skin:     General: Skin is warm and dry.      Capillary Refill: Capillary refill takes less than 2 seconds.   Neurological:      Mental Status: He is alert.      Comments: Pt alert, following commands with his head, eyes, and tongue. Minimal movement to upper or lower extremities         Vents:  Vent Mode: A/C (08/26/20 1500)  Ventilator Initiated: Yes(chart correction) (07/26/20 2108)  Set Rate: 12 BPM (08/26/20 1500)  Vt Set: 440 mL (08/26/20 1500)  Pressure Support: 0 cmH20 (08/26/20 1500)  PEEP/CPAP: 5 cmH20 (08/26/20 1500)  Oxygen Concentration (%): 30 (08/26/20 1700)  Peak Airway Pressure: 21 cmH2O (08/26/20 1500)  Plateau Pressure: 23 cmH20 (08/26/20 1500)  Total Ve: 13.5 mL (08/26/20 1500)  F/VT Ratio<105 (RSBI): (!) 61.63 (08/26/20 1500)  Lines/Drains/Airways     Drain                 Urethral Catheter 08/23/20 1430 3 days         Gastrostomy/Enterostomy 08/25/20 1421 Percutaneous endoscopic gastrostomy (PEG) LUQ 1 day          Airway                 Surgical Airway 08/25/20 1355 Shiley Cuffed 1 day          Peripheral Intravenous Line                 Peripheral IV - Double Lumen 08/26/20 1400 18 G Anterior;Proximal;Right Forearm less than 1 day              Significant Labs:    CBC/Anemia Profile:  Recent Labs   Lab  08/25/20  0400 08/26/20  0401   WBC 7.58 8.75   HGB 9.5* 9.9*   HCT 31.9* 31.7*   * 441*   MCV 91 90   RDW 14.2 14.0        Chemistries:  Recent Labs   Lab 08/25/20  0400 08/26/20  0401    135*   K 3.5 3.8    101   CO2 28 26   BUN 23 16   CREATININE 0.7 0.7   CALCIUM 8.3* 8.3*   MG  --  1.9   PHOS  --  2.3*       ABGs:   Recent Labs   Lab 08/26/20  0500   PH 7.447   PCO2 34.6*   HCO3 23.9*   POCSATURATED 96   BE 0       Significant Imaging:  I have reviewed and interpreted all pertinent imaging results/findings within the past 24 hours.      ABG  Recent Labs   Lab 08/26/20  0500   PH 7.447   PO2 80   PCO2 34.6*   HCO3 23.9*   BE 0     Assessment/Plan:     Pulmonary  Acute hypoxemic respiratory failure  - see Pneumonia due to COVID 19    Cardiac/Vascular  Hyperlipidemia associated with type 2 diabetes mellitus  - On home crestor    Hypertension associated with diabetes  Consider restarting hydrochlorothiazide with patient's Bps returning to 150s/70s    Renal/  UTI (urinary tract infection) due to urinary indwelling Boothe catheter  On 8/23 patient was febrile with low grade temperature and infectious workup was done. UA shows 3+ leukocytes and many bacteria. Source control was done with replacing the cather and further work up. Patient WBC and lactate WNL. On 8/25 patient spiked fever to 101.1. Central line removed. Considered VRE enterococcus, however cultures returned with vancomycin susceptibility.    - Continue Vancomycin and Cefepime    Endocrine  Uncontrolled type 2 diabetes mellitus  A1c 8.3%, was started on insulin gtt on 08/05 due to elevated BG but discontinued 08/07.    - Given 12U levemir for 8/24 trach and peg    - Levemir 12U for surgery then continue 25U BID  - Aspart 15U Q4H  - BG at goal of 140-180 currently    GI  Elevated liver enzymes  Hx of elevated LFTs dating back to 2015 and ranges consistent with this admission. Likely 2/2 fatty liver per PCP note. Acute hepatitis panel  and HIV were both negative    - On 8/22 AST 75 and  that are now back at patient's baseline.   -Trend CMP     Orthopedic  Sacral wound  Wound care assessed patient and shows stage 3 hospital acquired pressure sacral ulcer. Reposition q2 hours has been upheld by nursing staff.     -appreciate wound care recommendations  -will continue to assess     Other  * Pneumonia due to COVID-19 virus  70M  with HTN, HLD, and DM who presents to the [place] ED for SOB and found to be COVID positive.  [place] [facility] [place]20 for higher level of care. Vent AC FiO2 30%, PEEP 5 and tidal 440.Was previously paralyzed, proned, sedated. Nimbex discontinued on 08/04. Remdesivir, dexamethasone, Ceftriaxone and azithromycin complete.    - Patient febrile on 8/23, CXR shows no changes and respiratory culture pending.  - empiric antibiotics started on 8/23 with resolution of fevers and patient's WBC WNL.  -  valium 2 mg daily and continue fentanyl to help with coughing and sedation  - guaifenesin, scopolamine and acetylcysteine for secretions and coughing   - Zyprexa added on 08/07 to help wean sedation.  - Therapeutic lovenox for hypercoagulable state in COVID  - s/p Trach/PEG 8/25, tolerating well on 30% FiO2.     -Discussed with CM to begin referral process for LTAC placement with [facility] under review.      Fever  -- patient has been spiking fever and since admission   -- started spiking more frequent low-grade fever with a max of 100.8 on 8/21  -- started on vanc and cefepime   -- urine culture from 08/23 grew Enterococcus  -- patient keeps spiking fever despite broad-spectrum antibiotic  -- concern for resistance organism (VRE) however urine susceptibility cultures show vancomycin susceptible  -- patient blood pressure stable, does not appear in septic shock. Continue to follow at LTAC       Critical Care Daily Checklist:    A: Awake: RASS Goal/Actual Goal: RASS Goal: 0-->alert and calm  Actual: Lew  Agitation Sedation Scale (RASS): Drowsy   B: Spontaneous Breathing Trial Performed? Spon. Breathing Trial Initiated?: Initiated (08/19/20 0900)   C: SAT & SBT Coordinated?  No                D: Delirium: CAM-ICU Overall CAM-ICU: Negative   E: Early Mobility Performed? No   F: Feeding Goal: Goals: Meet % EEN, EPN by RD f/u date  Status: Nutrition Goal Status: goal not met   Current Diet Order   Procedures    Diet NPO      AS: Analgesia/Sedation Fentanyl switched to oxycodone   T: Thromboembolic Prophylaxis lovenox   H: HOB > 300 Yes   U: Stress Ulcer Prophylaxis (if needed) Pepcid   G: Glucose Control Good control   B: Bowel Function Stool Occurrence: 0   I: Indwelling Catheter (Lines & Boothe) Necessity Boothe in place   D: De-escalation of Antimicrobials/Pharmacotherapies Vanc and cefepime    Plan for the day/ETD Transfer to LTAC    Code Status:  Family/Goals of Care: Prior         Critical secondary to Patient has a condition that poses threat to life and bodily function: Severe Respiratory Distress      Critical care was time spent personally by me on the following activities: development of treatment plan with patient or surrogate and bedside caregivers, discussions with consultants, evaluation of patient's response to treatment, examination of patient, ordering and performing treatments and interventions, ordering and review of laboratory studies, ordering and review of radiographic studies, pulse oximetry, re-evaluation of patient's condition. This critical care time did not overlap with that of any other provider or involve time for any procedures.     Tanya Burgos MD  Critical Care Medicine  [facility] - ICU 16 WT

## 2020-08-27 NOTE — ASSESSMENT & PLAN NOTE
-- patient has been spiking fever and since admission   -- started spiking more frequent low-grade fever with a max of 100.8 on 8/21  -- started on vanc and cefepime   -- urine culture from 08/23 grew Enterococcus  -- patient keeps spiking fever despite broad-spectrum antibiotic  -- concern for resistance organism (VRE) however urine susceptibility cultures show vancomycin susceptible  -- patient blood pressure stable, does not appear in septic shock. Continue to follow at LTAC

## 2020-08-27 NOTE — ASSESSMENT & PLAN NOTE
On 8/23 patient was febrile with low grade temperature and infectious workup was done. UA shows 3+ leukocytes and many bacteria. Source control was done with replacing the cather and further work up. Patient WBC and lactate WNL. On 8/25 patient spiked fever to 101.1. Central line removed. Considered VRE enterococcus, however cultures returned with vancomycin susceptibility.    - Continue Vancomycin and Cefepime

## 2020-08-28 PROBLEM — E44.0 MALNUTRITION OF MODERATE DEGREE: Status: ACTIVE | Noted: 2020-08-28

## 2020-08-28 PROBLEM — G93.40 ACUTE ENCEPHALOPATHY: Status: ACTIVE | Noted: 2020-08-28

## 2020-08-28 LAB
BACTERIA BLD CULT: NORMAL
BACTERIA BLD CULT: NORMAL

## 2020-08-31 ENCOUNTER — TELEPHONE (OUTPATIENT)
Dept: INTERNAL MEDICINE | Facility: CLINIC | Age: 70
End: 2020-08-31

## 2020-08-31 PROBLEM — I10 ESSENTIAL HYPERTENSION, BENIGN: Status: ACTIVE | Noted: 2020-08-31

## 2020-08-31 NOTE — TELEPHONE ENCOUNTER
Pt wife informed to drop forms to Dr. Gonzales staff.  Pt informed processing time is 7-10 business days.  Pt was admitted into the [facility] and unable to schedule in office visit.  samia

## 2020-08-31 NOTE — TELEPHONE ENCOUNTER
----- Message from An Fermin sent at 8/31/2020  8:07 AM CDT -----  Type:  Needs Medical Advice    Who Called:   Pt wife (Zhou)  Symptoms (please be specific):   Pt states pt is still admitted in the hospital//she has paperwork(Short term disability)for pt and is needed filled out by his PCP//she is wanting to know if possible for her to come by this afternoon to have Dr fill it out//they have to have it in their office by 9-6-20   How long has patient had these symptoms:     Pharmacy name and phone #:     Would the patient rather a call back or a response via [facility]?    Call back   Best Call Back Number:    [phone]  Additional Information:   please call asap///néstor/meme

## 2020-09-01 LAB
BACTERIA SPEC AEROBE CULT: ABNORMAL
BACTERIA SPEC AEROBE CULT: ABNORMAL
GRAM STN SPEC: ABNORMAL

## 2020-09-04 ENCOUNTER — TELEPHONE (OUTPATIENT)
Dept: INTERNAL MEDICINE | Facility: CLINIC | Age: 70
End: 2020-09-04

## 2020-09-04 NOTE — TELEPHONE ENCOUNTER
----- Message from Hellen Rey sent at 9/4/2020 11:59 AM CDT -----  ..Type:  Patient Returning Call    Who Called: Zhou ( pt  )  Who Left Message for Patient:  Does the patient know what this is regarding?: documents   Would the patient rather a call back or a response via [facility]? Call back   Best Call Back Number:[phone]  Additional Information: Zhou ( pt  ) states she would like to drop off some documents for disability

## 2020-09-04 NOTE — TELEPHONE ENCOUNTER
Informed pt wife that she can drop the paperwork off to be reviewed by Dr. Gonzales. She verbalized understanding.

## 2020-09-08 ENCOUNTER — TELEPHONE (OUTPATIENT)
Dept: INTERNAL MEDICINE | Facility: CLINIC | Age: 70
End: 2020-09-08

## 2020-09-08 NOTE — TELEPHONE ENCOUNTER
Informed pt wife, per Dr. Gonzales, that she should speak with the doctor at the hospital to see if the paperwork can be filled out by them. She verbalized understanding.

## 2020-09-08 NOTE — TELEPHONE ENCOUNTER
Informed pt wife that the patient will need an appointment in order to have disability paperwork completed. She verbalized understanding and stated she will call to schedule once the pt is discharged from the hospital.

## 2020-09-10 ENCOUNTER — TELEPHONE (OUTPATIENT)
Dept: INTERNAL MEDICINE | Facility: CLINIC | Age: 70
End: 2020-09-10

## 2020-09-10 NOTE — TELEPHONE ENCOUNTER
----- Message from Lorie Hi sent at 9/10/2020 10:47 AM CDT -----  Regarding: WILL STOP IN TO  PAPERS FOR PATIENT TODAY.  Contact: OSMANY BARROS  PLEASE CALL WIFE @ [phone]. THANKS

## 2020-09-11 ENCOUNTER — PATIENT OUTREACH (OUTPATIENT)
Dept: ADMINISTRATIVE | Facility: HOSPITAL | Age: 70
End: 2020-09-11

## 2020-09-24 PROBLEM — Z93.0 TRACHEOSTOMY IN PLACE: Status: RESOLVED | Noted: 2020-08-26 | Resolved: 2020-09-24

## 2020-09-24 PROBLEM — J80 ACUTE RESPIRATORY DISTRESS SYNDROME (ARDS) DUE TO COVID-19 VIRUS: Status: RESOLVED | Noted: 2020-08-27 | Resolved: 2020-09-24

## 2020-09-24 PROBLEM — J96.01 ACUTE HYPOXEMIC RESPIRATORY FAILURE: Status: RESOLVED | Noted: 2020-07-26 | Resolved: 2020-09-24

## 2020-09-24 PROBLEM — D62 ACUTE BLOOD LOSS ANEMIA: Status: RESOLVED | Noted: 2020-08-26 | Resolved: 2020-09-24

## 2020-09-24 PROBLEM — T83.511A UTI (URINARY TRACT INFECTION) DUE TO URINARY INDWELLING FOLEY CATHETER: Status: RESOLVED | Noted: 2020-08-23 | Resolved: 2020-09-24

## 2020-09-24 PROBLEM — N39.0 UTI (URINARY TRACT INFECTION) DUE TO URINARY INDWELLING FOLEY CATHETER: Status: RESOLVED | Noted: 2020-08-23 | Resolved: 2020-09-24

## 2020-09-24 PROBLEM — Z93.1 PEG (PERCUTANEOUS ENDOSCOPIC GASTROSTOMY) STATUS: Status: RESOLVED | Noted: 2020-08-26 | Resolved: 2020-09-24

## 2020-09-24 PROBLEM — U07.1 ACUTE RESPIRATORY DISTRESS SYNDROME (ARDS) DUE TO COVID-19 VIRUS: Status: RESOLVED | Noted: 2020-08-27 | Resolved: 2020-09-24

## 2020-09-24 PROBLEM — G93.40 ACUTE ENCEPHALOPATHY: Status: RESOLVED | Noted: 2020-08-28 | Resolved: 2020-09-24

## 2020-10-06 ENCOUNTER — PATIENT MESSAGE (OUTPATIENT)
Dept: ADMINISTRATIVE | Facility: HOSPITAL | Age: 70
End: 2020-10-06

## 2020-10-08 ENCOUNTER — TELEPHONE (OUTPATIENT)
Dept: INTERNAL MEDICINE | Facility: CLINIC | Age: 70
End: 2020-10-08

## 2020-10-08 NOTE — TELEPHONE ENCOUNTER
----- Message from Zo Darling sent at 10/8/2020 11:04 AM CDT -----  Contact: Zhou Parker's wife called in regards to getting a new cpap machine. Stated that his current machine is broken. Please call back at [phone]. Thanks jh

## 2020-10-21 ENCOUNTER — PATIENT OUTREACH (OUTPATIENT)
Dept: ADMINISTRATIVE | Facility: CLINIC | Age: 70
End: 2020-10-21

## 2020-10-21 ENCOUNTER — EXTERNAL HOSPITAL ADMISSION (OUTPATIENT)
Dept: ADMINISTRATIVE | Facility: CLINIC | Age: 70
End: 2020-10-21

## 2020-10-21 NOTE — PATIENT INSTRUCTIONS
Instructions for Patients with Confirmed or Suspected COVID-19    If you are awaiting your test result, you will either be called or it will be released to the patient portal.  If you have any questions about your test, please visit [email or web address] or call our COVID-19 information line at [phone].      Instructions for non-hospitalized or discharged patients with confirmed or suspected COVID-19:       Stay home except to get medical care.    Separate yourself from other people and animals in your home.    Call ahead before visiting your doctor.    Wear a face mask.    Cover your coughs and sneezes.    Clean your hands often.    Avoid sharing personal household items.    Clean all high-touch surfaces every day.    Monitor your symptoms. Seek prompt medical attention if your illness is worsening (e.g., difficulty breathing). Before seeking care, call your healthcare provider.    If you have a medical emergency and must call 911, notify the dispatcher that you have or are being evaluated for COVID-19. If possible, put on a face mask before emergency medical services arrive.    Use the following symptom-based strategy to return to normal activity following a suspected or confirmed case of COVID-19. Continue isolation until:   o At least 3 days (72 hours) have passed since recovery defined as resolution of fever without the use of fever-reducing medications and improvement in respiratory symptoms (e.g. cough, shortness of breath), and   o At least 10 days have passed since the first positive test.       As one of the next steps, you will receive a call or text from the [place] Department of Health ([facility]) COVID-19 Tracing Team. See the contact information below so you know not to ignore the health departments call. It is important that you contact them back immediately so they can help.     Contact Tracer Number:  [phone]  Caller ID for most carriers: [place] [facility]    What is  contact tracing?   Contact tracing is a process that helps identify everyone who has been in close contact with an infected person. Contact tracers let those people know they may have been exposed and guide them on next steps. Confidentiality is important for everyone; no one will be told who may have exposed them to the virus.   Your involvement is important. The more we know about where and how this virus is spreading, the better chance we have at stopping it from spreading further.  What does exposure mean?   Exposure means you have been within 6 feet for more than 15 minutes with a person who has or had COVID-19.  What kind of questions do the contact tracers ask?   A contact tracer will confirm your basic contact information including name, address, phone number, and next of kin, as well as asking about any symptoms you may have had. Theyll also ask you how you think you may have gotten sick, such as places where you may have been exposed to the virus, and people you were with. Those names will never be shared with anyone outside of that call, and will only be used to help trace and stop the spread of the virus.   I have privacy concerns. How will the state use my information?   Your privacy about your health is important. All calls are completed using call centers that use the appropriate health privacy protection measures (HIPAA compliance), meaning that your patient information is safe. No one will ever ask you any questions related to immigration status. Your health comes first.   Do I have to participate?   You do not have to participate, but we strongly encourage you to. Contact tracing can help us catch and control new outbreaks as theyre developing to keep your friends and family safe.   What if I dont hear from anyone?   If you dont receive a call within 24 hours, you can call the number above right away to inquire about your status. That line is open from 8:00 am - 8:00 p.m., 7 days a  week.  Contact tracing saves lives! Together, we have the power to beat this virus and keep our loved ones and neighbors safe.       Instructions for household members, intimate partners and caregivers in a non-healthcare setting of a patient with confirmed or suspected COVID-19:         Close contacts should monitor their health and call their healthcare provider right away if they develop symptoms suggestive of COVID-19 (e.g., fever, cough, shortness of breath).    Stay home except to get medical care. Separate yourself from other people and animals in the home.   Monitor the patients symptoms. If the patient is getting sicker, call his or her healthcare provider. If the patient has a medical emergency and you need to call 911, notify the dispatch personnel that the patient has or is being evaluated for COVID-19.    Wear a facemask when around other people such as sharing a room or vehicle and before entering a healthcare provider's office.   Cover coughs and sneezes with a tissue. Throw used tissues in a lined trash can immediately and wash hands.   Clean hands often with soap and water for at least 20 seconds or with an alcohol-based hand , rubbing hands together until they feel dry. Avoid touching your eyes, nose, and mouth with unwashed hands.   Clean all high-touch; surfaces every day, including counters, tabletops, doorknobs, bathroom fixtures, toilets, phones, keyboards, tablets, bedside tables, etc. Use a household cleaning spray or wipe according to label instructions.   Avoid sharing personal household items such as dishes, drinking glasses, cups, towels, bedding, etc. After these items are used, they should be washed thoroughly with soap and water.   Continue isolation until:   At least 3 days (72 hours) have passed since recovery defined as resolution of fever without the use of fever-reducing medications and improvement in respiratory symptoms (e.g. cough, shortness of breath),  and    At least 10 days have passed since the patients first positive test.    https://www.cdc.gov/coronavirus/2019-ncov/your-health/index.htm    What is Delirium?  Delirium is a sudden change in a persons mental state that fluctuates over short periods of time. It can cause a person to have a hard time paying attention or having a conversation. Thinking and speech may be confused, illogical, unclear, and random. A persons mental state may vary from being restless and alert to sluggish and sleepy. At times the person can be frankly disoriented, hallucinate, and be combative.  Delirium is a medical emergency. If delirium is not diagnosed and treated, it can lead to permanent problems or even death.  Who is at risk?  Delirium happens most often in older adults. It can happen when a person is in an unfamiliar place, such as a hospital. It is common after surgery or during any serious illness. Other causes include drug abuse, withdrawal from drug abuse, certain medicines, toxins, and infections.  Usually, something triggers the delirium. Often delirium may be the first symptom that shows that a person has dementia. But dementia develops more gradually, over months to years.  Delirium can be upsetting for family and friends to see. But steps can be taken to help manage it. These can help ensure your loved ones safety and comfort.  What are the signs of delirium?  Delirium can come and go over the course of hours or days. A person with delirium may:  · Seem sleepy and quiet  · Be confused*  · Have trouble paying attention and focusing*  · Not know where he or she is*  · See or hear things that others cant see or hear (hallucinations)  · Believe things that arent known to be true (delusions)  · Think that people want to harm the person (paranoia)  · Have trouble remembering things that just happened*  · Change the subject too often while talking  · Talk about things that may not make sense*  · Seem restless and  alert  · Become violent*  · Have less interest in eating*  · Appear to be depressed and not interested in doing things*  · Have quick emotional changes such as anxiety, sadness or tearfulness, or strong feelings of susan and excitement (euphoria)  · Be unsteady while walking*  · Have twitching or stiffness of movement in the arms, legs, or neck*  *These symptoms can also be found in people with dementia and many other medical problems. A person with these symptoms should be checked by a healthcare provider.     If you think someone has delirium  Delirium is a medical emergency. If you think your loved one has delirium, get medical help right away. If the person is at home, call 911 or your local emergency number.   Date Last Reviewed: 4/1/2017 © 2000-2017 [employer]. [address] [place] [address], [place] [address]. All rights reserved. This information is not intended as a substitute for professional medical care. Always follow your healthcare professional's instructions.

## 2020-10-23 ENCOUNTER — TELEPHONE (OUTPATIENT)
Dept: INTERNAL MEDICINE | Facility: CLINIC | Age: 70
End: 2020-10-23

## 2020-10-23 ENCOUNTER — LAB VISIT (OUTPATIENT)
Dept: LAB | Facility: HOSPITAL | Age: 70
End: 2020-10-23
Attending: INTERNAL MEDICINE
Payer: COMMERCIAL

## 2020-10-23 ENCOUNTER — OFFICE VISIT (OUTPATIENT)
Dept: INTERNAL MEDICINE | Facility: CLINIC | Age: 70
End: 2020-10-23
Payer: COMMERCIAL

## 2020-10-23 VITALS
BODY MASS INDEX: 32.19 KG/M2 | DIASTOLIC BLOOD PRESSURE: 64 MMHG | TEMPERATURE: 97 F | OXYGEN SATURATION: 96 % | HEART RATE: 111 BPM | SYSTOLIC BLOOD PRESSURE: 110 MMHG | WEIGHT: 218 LBS

## 2020-10-23 DIAGNOSIS — N39.0 URINARY TRACT INFECTION WITHOUT HEMATURIA, SITE UNSPECIFIED: ICD-10-CM

## 2020-10-23 DIAGNOSIS — E11.65 UNCONTROLLED TYPE 2 DIABETES MELLITUS WITH HYPERGLYCEMIA: ICD-10-CM

## 2020-10-23 DIAGNOSIS — R97.20 ELEVATED PSA: ICD-10-CM

## 2020-10-23 DIAGNOSIS — E11.69 HYPERLIPIDEMIA ASSOCIATED WITH TYPE 2 DIABETES MELLITUS: ICD-10-CM

## 2020-10-23 DIAGNOSIS — Z23 NEED FOR INFLUENZA VACCINATION: ICD-10-CM

## 2020-10-23 DIAGNOSIS — E11.59 HYPERTENSION ASSOCIATED WITH DIABETES: ICD-10-CM

## 2020-10-23 DIAGNOSIS — I10 ESSENTIAL HYPERTENSION, BENIGN: ICD-10-CM

## 2020-10-23 DIAGNOSIS — E78.5 HYPERLIPIDEMIA ASSOCIATED WITH TYPE 2 DIABETES MELLITUS: ICD-10-CM

## 2020-10-23 DIAGNOSIS — U07.1 COVID-19 VIRUS INFECTION: Primary | ICD-10-CM

## 2020-10-23 DIAGNOSIS — I15.2 HYPERTENSION ASSOCIATED WITH DIABETES: ICD-10-CM

## 2020-10-23 DIAGNOSIS — U07.1 COVID-19: ICD-10-CM

## 2020-10-23 LAB
ANION GAP SERPL CALC-SCNC: 11 MMOL/L (ref 8–16)
BUN SERPL-MCNC: 18 MG/DL (ref 8–23)
CALCIUM SERPL-MCNC: 9.8 MG/DL (ref 8.7–10.5)
CHLORIDE SERPL-SCNC: 103 MMOL/L (ref 95–110)
CHOLEST SERPL-MCNC: 148 MG/DL (ref 120–199)
CHOLEST/HDLC SERPL: 3.3 {RATIO} (ref 2–5)
CO2 SERPL-SCNC: 26 MMOL/L (ref 23–29)
CREAT SERPL-MCNC: 1 MG/DL (ref 0.5–1.4)
EST. GFR  (AFRICAN AMERICAN): >60 ML/MIN/1.73 M^2
EST. GFR  (NON AFRICAN AMERICAN): >60 ML/MIN/1.73 M^2
GLUCOSE SERPL-MCNC: 91 MG/DL (ref 70–110)
HDLC SERPL-MCNC: 45 MG/DL (ref 40–75)
HDLC SERPL: 30.4 % (ref 20–50)
LDLC SERPL CALC-MCNC: 81 MG/DL (ref 63–159)
NONHDLC SERPL-MCNC: 103 MG/DL
POTASSIUM SERPL-SCNC: 4 MMOL/L (ref 3.5–5.1)
SODIUM SERPL-SCNC: 140 MMOL/L (ref 136–145)
TRIGL SERPL-MCNC: 110 MG/DL (ref 30–150)

## 2020-10-23 PROCEDURE — 84153 ASSAY OF PSA TOTAL: CPT

## 2020-10-23 PROCEDURE — 99495 TRANSJ CARE MGMT MOD F2F 14D: CPT | Mod: 25,S$GLB,, | Performed by: INTERNAL MEDICINE

## 2020-10-23 PROCEDURE — 99495 TCM SERVICES (MODERATE COMPLEXITY): ICD-10-PCS | Mod: 25,S$GLB,, | Performed by: INTERNAL MEDICINE

## 2020-10-23 PROCEDURE — 90694 VACC AIIV4 NO PRSRV 0.5ML IM: CPT | Mod: S$GLB,,, | Performed by: INTERNAL MEDICINE

## 2020-10-23 PROCEDURE — 80048 BASIC METABOLIC PNL TOTAL CA: CPT

## 2020-10-23 PROCEDURE — 83036 HEMOGLOBIN GLYCOSYLATED A1C: CPT

## 2020-10-23 PROCEDURE — 99999 PR PBB SHADOW E&M-EST. PATIENT-LVL IV: CPT | Mod: PBBFAC,,, | Performed by: INTERNAL MEDICINE

## 2020-10-23 PROCEDURE — 80061 LIPID PANEL: CPT

## 2020-10-23 PROCEDURE — 90471 FLU VACCINE - QUADRIVALENT - ADJUVANTED: ICD-10-PCS | Mod: S$GLB,,, | Performed by: INTERNAL MEDICINE

## 2020-10-23 PROCEDURE — 99999 PR PBB SHADOW E&M-EST. PATIENT-LVL IV: ICD-10-PCS | Mod: PBBFAC,,, | Performed by: INTERNAL MEDICINE

## 2020-10-23 PROCEDURE — 90694 FLU VACCINE - QUADRIVALENT - ADJUVANTED: ICD-10-PCS | Mod: S$GLB,,, | Performed by: INTERNAL MEDICINE

## 2020-10-23 PROCEDURE — 90471 IMMUNIZATION ADMIN: CPT | Mod: S$GLB,,, | Performed by: INTERNAL MEDICINE

## 2020-10-23 PROCEDURE — 36415 COLL VENOUS BLD VENIPUNCTURE: CPT

## 2020-10-23 NOTE — PROGRESS NOTES
Subjective:      Patient ID: Anup Miller is a 70 y.o. male.    Chief Complaint: Follow-up    HPI     Transitional Care Note    Family and/or Caretaker present at visit?  Yes.  Diagnostic tests reviewed/disposition: No diagnosic tests pending after this hospitalization.  Disease/illness education: cont rehab and wound care  Home health/community services discussion/referrals: Patient does not have home health established from hospital visit.  They do not need home health.  If needed, we will set up home health for the patient.   Establishment or re-establishment of referral orders for community resources: No other necessary community resources.   Discussion with other health care providers: No discussion with other health care providers necessary.       69 yo with   Patient Active Problem List   Diagnosis    Uncontrolled type 2 diabetes mellitus    Elevated liver enzymes    Hypertension associated with diabetes    Hyperlipidemia associated with type 2 diabetes mellitus    Multiple pulmonary nodules determined by computed tomography of lung    KHOI (obstructive sleep apnea)    Elevated PSA    Hx of colonic polyp    Cardiac arrhythmia    Pneumonia due to COVID-19 virus    COVID-19 virus infection    Oral phase dysphagia    Pressure injury of sacral region, stage 3    Fever    History of 2019 novel coronavirus disease (COVID-19)    Malnutrition of moderate degree    Essential hypertension, benign     Past Medical History:   Diagnosis Date    Diabetes mellitus type II Diagnosed 2002    Elevated liver enzymes     Fatty liver disease, nonalcoholic     Hyperlipidemia     Hypertension     Sleep apnea      Here today with his wife for f/u on covid infection. Complicated course including icu admission, tracheostomy, and in patient rehab. D/c summary reviewed as below.     HPI: Mr. Miller is a 69 yo male with DM2, VERDE, and HTN who presented to [facility] ED on 7/25/20 for evaluation of SOB. He had  hypoxia and respiratory distress requiring BiPAP was found to have COVID pneumonia. He was transferred to [facility] ICU on 7/26/20 for a higher level of care after he required intubation. He completed courses of dexamethasone, remdesivir, ceftriaxone and azithromycin. His respiratory status worsened during the hospital stay and required prone ventilator and paralytics. He underwent tracheostomy and PEG placement on 8/25/20 for continued ventilator weaning. He developed an Enterococcus UTI and GNR pneumonia towards the end of his hospital stay and was started on vancomycin and cefepime. He was discharged to [facility] for continued ventilator and tracheostomy management and weaning and rehabilitation     Hospital Course:  9/16/20--> Successfully decannulated, Regular diet started  9/17/20--> Pt deemed stable for stepdown to floor  9/24/20-->PEG tube successfully removed          Was dc'd to rehab facility with f/u appt with dm clinic 10/6 and sleep clinic 11/4.   Home glucose 219.  Unsure of meds actually taking.   No rehab DC info available (no computer records and pt did not bring any d/c papers)  Reports attending out pt rehab.   Has decubiti and was advised to See Dr. Barger as per my nurses conversation with [facility] rehab representative. Rep also reported home health not arranged in order to afford pr out pt rehab. Pt was dc'd from [facility] 4 days ago. Continues to require assistance to stand. Breathing improved. Denies cva.        Review of Systems   Constitutional: Negative for chills and fever.   HENT: Negative for ear pain and sore throat.    Eyes: Negative for visual disturbance.   Respiratory: Negative for cough and wheezing.    Cardiovascular: Negative for chest pain and palpitations.   Gastrointestinal: Negative for abdominal pain and blood in stool.   Genitourinary: Negative for dysuria and hematuria.   Musculoskeletal: Negative for neck stiffness.   Skin: Negative for rash and wound.   Neurological: Positive for  weakness. Negative for seizures and syncope.     Objective:   /64 (BP Location: Right arm, Patient Position: Sitting, BP Method: Large (Manual))   Pulse (!) 111   Temp 97.2 °F (36.2 °C) (Tympanic)   Wt 98.9 kg (218 lb)   SpO2 96%   BMI 32.19 kg/m²     Physical Exam  Constitutional:       General: He is not in acute distress.     Appearance: He is well-developed.   HENT:      Head: Normocephalic and atraumatic.   Eyes:      Pupils: Pupils are equal, round, and reactive to light.   Neck:      Musculoskeletal: Neck supple.      Thyroid: No thyromegaly.      Comments: Trach site without signs of infection.   Cardiovascular:      Rate and Rhythm: Normal rate and regular rhythm.   Pulmonary:      Breath sounds: Normal breath sounds. No wheezing or rales.   Abdominal:      General: Bowel sounds are normal.      Palpations: Abdomen is soft.      Tenderness: There is no abdominal tenderness.   Lymphadenopathy:      Cervical: No cervical adenopathy.   Skin:     General: Skin is warm and dry.   Neurological:      Mental Status: He is alert and oriented to person, place, and time.      Motor: Weakness present.   Psychiatric:         Mood and Affect: Mood normal.         Behavior: Behavior normal.         Thought Content: Thought content normal.         Judgment: Judgment normal.         Assessment:     1. COVID-19 virus infection    2. Need for influenza vaccination    3. Essential hypertension, benign    4. Hypertension associated with diabetes    5. Uncontrolled type 2 diabetes mellitus with hyperglycemia    6. Hyperlipidemia associated with type 2 diabetes mellitus    7. Urinary tract infection without hematuria, site unspecified    8. Elevated PSA    9. COVID-19      Plan:   COVID-19 virus infection  Complicated course  Improving  Cont rehab.     Need for influenza vaccination  -     Influenza (FLUAD) - Quadrivalent (Adjuvanted) *Preferred* (65+) (PF)    Essential hypertension, benign  controlled  Hypertension  associated with diabetes  Controlled    Uncontrolled type 2 diabetes mellitus with hyperglycemia  -     Hemoglobin A1C; Future; Expected date: 10/23/2020  -     Basic Metabolic Panel; Future; Expected date: 10/23/2020    Hyperlipidemia associated with type 2 diabetes mellitus  -     Lipid Panel; Future; Expected date: 10/23/2020    Urinary tract infection without hematuria, site unspecified  -     Urinalysis; Future; Expected date: 10/23/2020    Elevated PSA  -     PSA, Screening; Future; Expected date: 10/23/2020    COVID-19        Lab Frequency Next Occurrence   Ambulatory referral/consult to Podiatry Once 03/23/2020   Hemoglobin A1c Every 12 Weeks        Problem List Items Addressed This Visit        Cardiac/Vascular    Hypertension associated with diabetes    Hyperlipidemia associated with type 2 diabetes mellitus    Relevant Orders    Lipid Panel (Completed)    Essential hypertension, benign       Renal/    Elevated PSA    Overview     Did not f/u with urology as rec in 2015         Relevant Orders    PSA, Screening (Completed)       Endocrine    Uncontrolled type 2 diabetes mellitus    Relevant Orders    Hemoglobin A1C (Completed)    Basic Metabolic Panel (Completed)       Other    COVID-19 virus infection - Primary      Other Visit Diagnoses     Need for influenza vaccination        Relevant Orders    Influenza (FLUAD) - Quadrivalent (Adjuvanted) *Preferred* (65+) (PF) (Completed)    Urinary tract infection without hematuria, site unspecified        Relevant Orders    Urinalysis (Completed)    COVID-19              Follow up in about 5 days (around 10/28/2020), or if symptoms worsen or fail to improve.

## 2020-10-23 NOTE — PATIENT INSTRUCTIONS
Check with your pharmacy regarding new shingles vaccine.     Check your discharge papers about wound care appointment with Dr. Barger.

## 2020-10-23 NOTE — TELEPHONE ENCOUNTER
Insurance didn't pay for pt's lift. Pt was supposed to f/u with [facility] on 10/20. Pt was also meant to f/u with wound care and Dr. Sifuentes for 3 wounds. Pt will not be receiving home health due to his participation in [facility] day program. [phone]

## 2020-10-23 NOTE — TELEPHONE ENCOUNTER
I reached out to [facility], pt recently was discharged. Left a vm for  asking what the discharge plan was along with home health plan

## 2020-10-24 LAB
COMPLEXED PSA SERPL-MCNC: 3.5 NG/ML (ref 0–4)
ESTIMATED AVG GLUCOSE: 169 MG/DL (ref 68–131)
HBA1C MFR BLD HPLC: 7.5 % (ref 4–5.6)

## 2020-10-26 ENCOUNTER — OFFICE VISIT (OUTPATIENT)
Dept: INTERNAL MEDICINE | Facility: CLINIC | Age: 70
End: 2020-10-26
Payer: COMMERCIAL

## 2020-10-26 VITALS
WEIGHT: 218.06 LBS | BODY MASS INDEX: 32.2 KG/M2 | OXYGEN SATURATION: 97 % | TEMPERATURE: 97 F | HEART RATE: 126 BPM | SYSTOLIC BLOOD PRESSURE: 102 MMHG | DIASTOLIC BLOOD PRESSURE: 60 MMHG

## 2020-10-26 DIAGNOSIS — R53.81 DEBILITY: ICD-10-CM

## 2020-10-26 DIAGNOSIS — L89.153 PRESSURE ULCER OF SACRAL REGION, STAGE 3: Primary | ICD-10-CM

## 2020-10-26 DIAGNOSIS — E11.65 UNCONTROLLED TYPE 2 DIABETES MELLITUS WITH HYPERGLYCEMIA: ICD-10-CM

## 2020-10-26 DIAGNOSIS — E11.69 HYPERLIPIDEMIA ASSOCIATED WITH TYPE 2 DIABETES MELLITUS: ICD-10-CM

## 2020-10-26 DIAGNOSIS — E78.5 HYPERLIPIDEMIA ASSOCIATED WITH TYPE 2 DIABETES MELLITUS: ICD-10-CM

## 2020-10-26 DIAGNOSIS — E11.59 HYPERTENSION ASSOCIATED WITH DIABETES: ICD-10-CM

## 2020-10-26 DIAGNOSIS — I15.2 HYPERTENSION ASSOCIATED WITH DIABETES: ICD-10-CM

## 2020-10-26 PROCEDURE — 1159F PR MEDICATION LIST DOCUMENTED IN MEDICAL RECORD: ICD-10-PCS | Mod: S$GLB,,, | Performed by: INTERNAL MEDICINE

## 2020-10-26 PROCEDURE — 99999 PR PBB SHADOW E&M-EST. PATIENT-LVL V: CPT | Mod: PBBFAC,,, | Performed by: INTERNAL MEDICINE

## 2020-10-26 PROCEDURE — 1125F AMNT PAIN NOTED PAIN PRSNT: CPT | Mod: S$GLB,,, | Performed by: INTERNAL MEDICINE

## 2020-10-26 PROCEDURE — 3051F HG A1C>EQUAL 7.0%<8.0%: CPT | Mod: CPTII,S$GLB,, | Performed by: INTERNAL MEDICINE

## 2020-10-26 PROCEDURE — 3078F PR MOST RECENT DIASTOLIC BLOOD PRESSURE < 80 MM HG: ICD-10-PCS | Mod: CPTII,S$GLB,, | Performed by: INTERNAL MEDICINE

## 2020-10-26 PROCEDURE — 3051F PR MOST RECENT HEMOGLOBIN A1C LEVEL 7.0 - < 8.0%: ICD-10-PCS | Mod: CPTII,S$GLB,, | Performed by: INTERNAL MEDICINE

## 2020-10-26 PROCEDURE — 99999 PR PBB SHADOW E&M-EST. PATIENT-LVL V: ICD-10-PCS | Mod: PBBFAC,,, | Performed by: INTERNAL MEDICINE

## 2020-10-26 PROCEDURE — 3074F SYST BP LT 130 MM HG: CPT | Mod: CPTII,S$GLB,, | Performed by: INTERNAL MEDICINE

## 2020-10-26 PROCEDURE — 3078F DIAST BP <80 MM HG: CPT | Mod: CPTII,S$GLB,, | Performed by: INTERNAL MEDICINE

## 2020-10-26 PROCEDURE — 1101F PT FALLS ASSESS-DOCD LE1/YR: CPT | Mod: CPTII,S$GLB,, | Performed by: INTERNAL MEDICINE

## 2020-10-26 PROCEDURE — 1101F PR PT FALLS ASSESS DOC 0-1 FALLS W/OUT INJ PAST YR: ICD-10-PCS | Mod: CPTII,S$GLB,, | Performed by: INTERNAL MEDICINE

## 2020-10-26 PROCEDURE — 1159F MED LIST DOCD IN RCRD: CPT | Mod: S$GLB,,, | Performed by: INTERNAL MEDICINE

## 2020-10-26 PROCEDURE — 3008F BODY MASS INDEX DOCD: CPT | Mod: CPTII,S$GLB,, | Performed by: INTERNAL MEDICINE

## 2020-10-26 PROCEDURE — 99214 PR OFFICE/OUTPT VISIT, EST, LEVL IV, 30-39 MIN: ICD-10-PCS | Mod: S$GLB,,, | Performed by: INTERNAL MEDICINE

## 2020-10-26 PROCEDURE — 3074F PR MOST RECENT SYSTOLIC BLOOD PRESSURE < 130 MM HG: ICD-10-PCS | Mod: CPTII,S$GLB,, | Performed by: INTERNAL MEDICINE

## 2020-10-26 PROCEDURE — 1125F PR PAIN SEVERITY QUANTIFIED, PAIN PRESENT: ICD-10-PCS | Mod: S$GLB,,, | Performed by: INTERNAL MEDICINE

## 2020-10-26 PROCEDURE — 3008F PR BODY MASS INDEX (BMI) DOCUMENTED: ICD-10-PCS | Mod: CPTII,S$GLB,, | Performed by: INTERNAL MEDICINE

## 2020-10-26 PROCEDURE — 99214 OFFICE O/P EST MOD 30 MIN: CPT | Mod: S$GLB,,, | Performed by: INTERNAL MEDICINE

## 2020-10-26 RX ORDER — ZINC SULFATE 50(220)MG
220 CAPSULE ORAL DAILY
COMMUNITY
Start: 2020-10-14

## 2020-10-26 RX ORDER — LIRAGLUTIDE 6 MG/ML
0.6 INJECTION SUBCUTANEOUS DAILY
Qty: 9 ML | Refills: 1 | Status: SHIPPED | OUTPATIENT
Start: 2020-10-26 | End: 2021-04-03

## 2020-10-26 RX ORDER — PANTOPRAZOLE SODIUM 40 MG/1
TABLET, DELAYED RELEASE ORAL
COMMUNITY
Start: 2020-10-14 | End: 2022-06-15 | Stop reason: SDUPTHER

## 2020-10-26 RX ORDER — TAMSULOSIN HYDROCHLORIDE 0.4 MG/1
CAPSULE ORAL
COMMUNITY
Start: 2020-10-14 | End: 2020-12-04 | Stop reason: SDUPTHER

## 2020-10-26 RX ORDER — APIXABAN 2.5 MG/1
TABLET, FILM COATED ORAL
COMMUNITY
Start: 2020-10-14 | End: 2021-05-27

## 2020-10-26 RX ORDER — METFORMIN HYDROCHLORIDE 750 MG/1
750 TABLET, EXTENDED RELEASE ORAL 2 TIMES DAILY WITH MEALS
COMMUNITY
End: 2021-04-14

## 2020-10-26 RX ORDER — LOSARTAN POTASSIUM 50 MG/1
TABLET ORAL
COMMUNITY
Start: 2020-10-14 | End: 2020-10-26 | Stop reason: SDUPTHER

## 2020-10-26 RX ORDER — GLIMEPIRIDE 4 MG/1
8 TABLET ORAL DAILY
COMMUNITY
End: 2021-04-14 | Stop reason: SDUPTHER

## 2020-10-26 RX ORDER — POLYETHYLENE GLYCOL 3350 17 G/17G
POWDER, FOR SOLUTION ORAL
COMMUNITY
Start: 2020-10-15 | End: 2021-05-27

## 2020-10-26 RX ORDER — PIOGLITAZONEHYDROCHLORIDE 15 MG/1
15 TABLET ORAL DAILY
COMMUNITY
End: 2021-04-14 | Stop reason: SDUPTHER

## 2020-10-26 RX ORDER — GABAPENTIN 300 MG/1
CAPSULE ORAL
COMMUNITY
Start: 2020-10-14 | End: 2020-11-25 | Stop reason: SDUPTHER

## 2020-10-26 RX ORDER — ROSUVASTATIN CALCIUM 10 MG/1
10 TABLET, COATED ORAL DAILY
COMMUNITY
End: 2021-08-09 | Stop reason: SDUPTHER

## 2020-10-26 RX ORDER — METOPROLOL TARTRATE 50 MG/1
TABLET ORAL
COMMUNITY
Start: 2020-10-14 | End: 2024-03-11 | Stop reason: SDUPTHER

## 2020-10-26 RX ORDER — INSULIN DEGLUDEC 100 U/ML
INJECTION, SOLUTION SUBCUTANEOUS
COMMUNITY
Start: 2020-10-21 | End: 2021-04-14

## 2020-10-26 RX ORDER — ERTUGLIFLOZIN 15 MG/1
15 TABLET, FILM COATED ORAL DAILY
COMMUNITY
End: 2021-04-14 | Stop reason: SDUPTHER

## 2020-10-26 NOTE — PROGRESS NOTES
Subjective:      Patient ID: Anup Miller is a 70 y.o. male.    Chief Complaint: Follow-up    HPI     69 yo with   Patient Active Problem List   Diagnosis    Uncontrolled type 2 diabetes mellitus    Elevated liver enzymes    Hypertension associated with diabetes    Hyperlipidemia associated with type 2 diabetes mellitus    Multiple pulmonary nodules determined by computed tomography of lung    KHOI (obstructive sleep apnea)    Elevated PSA    Hx of colonic polyp    Cardiac arrhythmia    Pneumonia due to COVID-19 virus    COVID-19 virus infection    Oral phase dysphagia    Pressure injury of sacral region, stage 3    Fever    History of 2019 novel coronavirus disease (COVID-19)    Malnutrition of moderate degree    Essential hypertension, benign    Debility     Past Medical History:   Diagnosis Date    Diabetes mellitus type II Diagnosed 2002    Elevated liver enzymes     Fatty liver disease, nonalcoholic     Hyperlipidemia     Hypertension     Sleep apnea      Here today fo f/u on  Mult med problems as outlined below. He does not have home health. He was not able to find any appts with wound care/ dr. Barger. Wife is present today. Home glucose 169 nf. Home bp 120s to 130s/ 70s. No sig improvement in strength. Paperwork with documentation of stage III pressure ulcers.  Sacral/buttocks continues to require lift and slide board for standing/transferring.      Review of Systems   Constitutional: Negative for chills and fever.   HENT: Negative for ear pain and sore throat.    Respiratory: Negative for cough, shortness of breath and wheezing.    Cardiovascular: Negative for chest pain and palpitations.   Gastrointestinal: Negative for abdominal pain and blood in stool.   Genitourinary: Negative for dysuria and hematuria.   Neurological: Positive for weakness. Negative for seizures, syncope, speech difficulty and headaches.     Objective:   /60 (BP Location: Left arm, Patient Position:  Sitting, BP Method: Large (Manual))   Pulse (!) 126   Temp 96.8 °F (36 °C) (Tympanic)   Wt 98.9 kg (218 lb 0.6 oz)   SpO2 97%   BMI 32.20 kg/m²     Physical Exam  Constitutional:       General: He is not in acute distress.     Appearance: He is well-developed.   Cardiovascular:      Rate and Rhythm: Normal rate.   Pulmonary:      Effort: Pulmonary effort is normal.      Breath sounds: Normal breath sounds.   Skin:     General: Skin is warm and dry.   Psychiatric:         Behavior: Behavior normal.      In wheelchair.  Significant weakness to lower extremities.  Patient is unable to stand on his own.  He also reports unable to remain standing even with assistance.  Unable to view sacral/buttocks pressure ulcers due to severe weakness and unable to stand.    Lab Visit on 10/23/2020   Component Date Value Ref Range Status    Specimen UA 10/23/2020 Urine, Clean Catch   Final    Color, UA 10/23/2020 Yellow  Yellow, Straw, Sandra Final    Appearance, UA 10/23/2020 Clear  Clear Final    pH, UA 10/23/2020 6.0  5.0 - 8.0 Final    Specific [place], UA 10/23/2020 1.025  1.005 - 1.030 Final    Protein, UA 10/23/2020 Negative  Negative Final    Comment: Recommend a 24 hour urine protein or a urine   protein/creatinine ratio if globulin induced proteinuria is  clinically suspected.      Glucose, UA 10/23/2020 3+* Negative Final    Ketones, UA 10/23/2020 Negative  Negative Final    Bilirubin (UA) 10/23/2020 Negative  Negative Final    Occult Blood UA 10/23/2020 Negative  Negative Final    Nitrite, UA 10/23/2020 Negative  Negative Final    Leukocytes, UA 10/23/2020 Negative  Negative Final    RBC, UA 10/23/2020 1  0 - 4 /hpf Final    Bacteria 10/23/2020 Rare  None-Occ /hpf Final    Yeast, UA 10/23/2020 None  None Final    Microscopic Comment 10/23/2020 SEE COMMENT   Final    Comment: Other formed elements not mentioned in the report are not   present in the microscopic examination.      Lab Visit on 10/23/2020    Component Date Value Ref Range Status    Hemoglobin A1C 10/23/2020 7.5* 4.0 - 5.6 % Final    Comment: ADA Screening Guidelines:  5.7-6.4%  Consistent with prediabetes  >or=6.5%  Consistent with diabetes  High levels of fetal hemoglobin interfere with the HbA1C  assay. Heterozygous hemoglobin variants (HbS, HgC, etc)do  not significantly interfere with this assay.   However, presence of multiple variants may affect accuracy.      Estimated Avg Glucose 10/23/2020 169* 68 - 131 mg/dL Final    Cholesterol 10/23/2020 148  120 - 199 mg/dL Final    Comment: The National Cholesterol Education Program (NCEP) has set the  following guidelines (reference ranges) for Cholesterol:  Optimal.....................<200 mg/dL  Borderline High.............200-239 mg/dL  High........................> or = 240 mg/dL      Triglycerides 10/23/2020 110  30 - 150 mg/dL Final    Comment: The National Cholesterol Education Program (NCEP) has set the  following guidelines (reference values) for triglycerides:  Normal......................<150 mg/dL  Borderline High.............150-199 mg/dL  High........................200-499 mg/dL      HDL 10/23/2020 45  40 - 75 mg/dL Final    Comment: The National Cholesterol Education Program (NCEP) has set the  following guidelines (reference values) for HDL Cholesterol:  Low...............<40 mg/dL  Optimal...........>60 mg/dL      LDL Cholesterol 10/23/2020 81.0  63.0 - 159.0 mg/dL Final    Comment: The National Cholesterol Education Program (NCEP) has set the  following guidelines (reference values) for LDL Cholesterol:  Optimal.......................<130 mg/dL  Borderline High...............130-159 mg/dL  High..........................160-189 mg/dL  Very High.....................>190 mg/dL      HDL/Cholesterol Ratio 10/23/2020 30.4  20.0 - 50.0 % Final    Total Cholesterol/HDL Ratio 10/23/2020 3.3  2.0 - 5.0 Final    Non-HDL Cholesterol 10/23/2020 103  mg/dL Final    Comment: Risk category  and Non-HDL cholesterol goals:  Coronary heart disease (CHD)or equivalent (10-year risk of CHD >20%):  Non-HDL cholesterol goal     <130 mg/dL  Two or more CHD risk factors and 10-year risk of CHD <= 20%:  Non-HDL cholesterol goal     <160 mg/dL  0 to 1 CHD risk factor:  Non-HDL cholesterol goal     <190 mg/dL      Sodium 10/23/2020 140  136 - 145 mmol/L Final    Potassium 10/23/2020 4.0  3.5 - 5.1 mmol/L Final    Chloride 10/23/2020 103  95 - 110 mmol/L Final    CO2 10/23/2020 26  23 - 29 mmol/L Final    Glucose 10/23/2020 91  70 - 110 mg/dL Final    BUN 10/23/2020 18  8 - 23 mg/dL Final    Creatinine 10/23/2020 1.0  0.5 - 1.4 mg/dL Final    Calcium 10/23/2020 9.8  8.7 - 10.5 mg/dL Final    Anion Gap 10/23/2020 11  8 - 16 mmol/L Final    eGFR if African American 10/23/2020 >60.0  >60 mL/min/1.73 m^2 Final    eGFR if non African American 10/23/2020 >60.0  >60 mL/min/1.73 m^2 Final    Comment: Calculation used to obtain the estimated glomerular filtration  rate (eGFR) is the CKD-EPI equation.       PSA, Screen 10/23/2020 3.5  0.00 - 4.00 ng/mL Final    Comment: PSA Expected levels:  Hormonal Therapy: <0.05 ng/ml  Prostatectomy: <0.01 ng/ml  Radiation Therapy: <1.00 ng/ml         Assessment:     1. Pressure ulcer of sacral region, stage 3    2. Hypertension associated with diabetes    3. Hyperlipidemia associated with type 2 diabetes mellitus    4. Uncontrolled type 2 diabetes mellitus with hyperglycemia    5. Debility      Plan:   Pressure ulcer of sacral region, stage 3  -     Ambulatory referral/consult to Wound Clinic; Future; Expected date: 11/02/2020    Hypertension associated with diabetes  Controlled.  Continue current medications  Hyperlipidemia associated with type 2 diabetes mellitus    Uncontrolled type 2 diabetes mellitus with hyperglycemia  Not at goal.  Add Victoza  -     liraglutide 0.6 mg/0.1 mL, 18 mg/3 mL, subq PNIJ (VICTOZA 3-TEMI) 0.6 mg/0.1 mL (18 mg/3 mL) PnIj pen; Inject 0.6 mg into  the skin once daily.  Dispense: 9 mL; Refill: 1    Debility    he would like to continue with out patient PT.     Lab Frequency Next Occurrence   Ambulatory referral/consult to Podiatry Once 03/23/2020   Hemoglobin A1c Every 12 Weeks        Problem List Items Addressed This Visit        Cardiac/Vascular    Hypertension associated with diabetes    Relevant Medications    TRESIBA FLEXTOUCH U-100 100 unit/mL (3 mL) InPn    pioglitazone (ACTOS) 15 MG tablet    metFORMIN (GLUCOPHAGE-XR) 750 MG ER 24hr tablet    glimepiride (AMARYL) 4 MG tablet    liraglutide 0.6 mg/0.1 mL, 18 mg/3 mL, subq PNIJ (VICTOZA 3-TEMI) 0.6 mg/0.1 mL (18 mg/3 mL) PnIj pen    Hyperlipidemia associated with type 2 diabetes mellitus    Relevant Medications    TRESIBA FLEXTOUCH U-100 100 unit/mL (3 mL) InPn    pioglitazone (ACTOS) 15 MG tablet    metFORMIN (GLUCOPHAGE-XR) 750 MG ER 24hr tablet    glimepiride (AMARYL) 4 MG tablet    liraglutide 0.6 mg/0.1 mL, 18 mg/3 mL, subq PNIJ (VICTOZA 3-TEMI) 0.6 mg/0.1 mL (18 mg/3 mL) PnIj pen       Endocrine    Uncontrolled type 2 diabetes mellitus    Relevant Medications    TRESIBA FLEXTOUCH U-100 100 unit/mL (3 mL) InPn    pioglitazone (ACTOS) 15 MG tablet    metFORMIN (GLUCOPHAGE-XR) 750 MG ER 24hr tablet    glimepiride (AMARYL) 4 MG tablet    liraglutide 0.6 mg/0.1 mL, 18 mg/3 mL, subq PNIJ (VICTOZA 3-TEMI) 0.6 mg/0.1 mL (18 mg/3 mL) PnIj pen       Other    Debility      Other Visit Diagnoses     Pressure ulcer of sacral region, stage 3    -  Primary    Relevant Orders    Ambulatory referral/consult to Wound Clinic          Follow up in about 4 weeks (around 11/23/2020), or if symptoms worsen or fail to improve.

## 2020-10-27 ENCOUNTER — TELEPHONE (OUTPATIENT)
Dept: INTERNAL MEDICINE | Facility: CLINIC | Age: 70
End: 2020-10-27

## 2020-10-27 NOTE — TELEPHONE ENCOUNTER
----- Message from Serina Mir sent at 10/27/2020 11:04 AM CDT -----  [facility][phone]  Ms Priscilla is requesting a history and physical, also need last office note fax over to [phone]. Thanks/ar

## 2020-10-28 ENCOUNTER — PATIENT MESSAGE (OUTPATIENT)
Dept: INTERNAL MEDICINE | Facility: CLINIC | Age: 70
End: 2020-10-28

## 2020-10-28 ENCOUNTER — TELEPHONE (OUTPATIENT)
Dept: INTERNAL MEDICINE | Facility: CLINIC | Age: 70
End: 2020-10-28

## 2020-10-28 NOTE — TELEPHONE ENCOUNTER
Attempted to initiate prior authorization for victoza. I was unable to complete it due to not having a picture of the patients updated insurance card. I contacted the wife and asked if she can send a picture of it through the patient portal. She verbalized understanding and will send it.

## 2020-10-30 ENCOUNTER — TELEPHONE (OUTPATIENT)
Dept: INTERNAL MEDICINE | Facility: CLINIC | Age: 70
End: 2020-10-30

## 2020-11-04 ENCOUNTER — OFFICE VISIT (OUTPATIENT)
Dept: SLEEP MEDICINE | Facility: CLINIC | Age: 70
End: 2020-11-04
Payer: COMMERCIAL

## 2020-11-04 VITALS
RESPIRATION RATE: 19 BRPM | SYSTOLIC BLOOD PRESSURE: 126 MMHG | HEIGHT: 69 IN | OXYGEN SATURATION: 98 % | DIASTOLIC BLOOD PRESSURE: 76 MMHG | BODY MASS INDEX: 32.3 KG/M2 | HEART RATE: 118 BPM | WEIGHT: 218.06 LBS

## 2020-11-04 DIAGNOSIS — E66.9 OBESITY, CLASS II, BMI 35-39.9: ICD-10-CM

## 2020-11-04 DIAGNOSIS — G47.33 OSA (OBSTRUCTIVE SLEEP APNEA): Primary | ICD-10-CM

## 2020-11-04 DIAGNOSIS — Z86.16 HISTORY OF 2019 NOVEL CORONAVIRUS DISEASE (COVID-19): ICD-10-CM

## 2020-11-04 PROCEDURE — 3078F DIAST BP <80 MM HG: CPT | Mod: CPTII,S$GLB,, | Performed by: NURSE PRACTITIONER

## 2020-11-04 PROCEDURE — 3008F PR BODY MASS INDEX (BMI) DOCUMENTED: ICD-10-PCS | Mod: CPTII,S$GLB,, | Performed by: NURSE PRACTITIONER

## 2020-11-04 PROCEDURE — 3008F BODY MASS INDEX DOCD: CPT | Mod: CPTII,S$GLB,, | Performed by: NURSE PRACTITIONER

## 2020-11-04 PROCEDURE — 1101F PT FALLS ASSESS-DOCD LE1/YR: CPT | Mod: CPTII,S$GLB,, | Performed by: NURSE PRACTITIONER

## 2020-11-04 PROCEDURE — 3078F PR MOST RECENT DIASTOLIC BLOOD PRESSURE < 80 MM HG: ICD-10-PCS | Mod: CPTII,S$GLB,, | Performed by: NURSE PRACTITIONER

## 2020-11-04 PROCEDURE — 99214 PR OFFICE/OUTPT VISIT, EST, LEVL IV, 30-39 MIN: ICD-10-PCS | Mod: S$GLB,,, | Performed by: NURSE PRACTITIONER

## 2020-11-04 PROCEDURE — 99214 OFFICE O/P EST MOD 30 MIN: CPT | Mod: S$GLB,,, | Performed by: NURSE PRACTITIONER

## 2020-11-04 PROCEDURE — 99999 PR PBB SHADOW E&M-EST. PATIENT-LVL IV: ICD-10-PCS | Mod: PBBFAC,,, | Performed by: NURSE PRACTITIONER

## 2020-11-04 PROCEDURE — 1159F PR MEDICATION LIST DOCUMENTED IN MEDICAL RECORD: ICD-10-PCS | Mod: S$GLB,,, | Performed by: NURSE PRACTITIONER

## 2020-11-04 PROCEDURE — 99999 PR PBB SHADOW E&M-EST. PATIENT-LVL IV: CPT | Mod: PBBFAC,,, | Performed by: NURSE PRACTITIONER

## 2020-11-04 PROCEDURE — 1159F MED LIST DOCD IN RCRD: CPT | Mod: S$GLB,,, | Performed by: NURSE PRACTITIONER

## 2020-11-04 PROCEDURE — 3074F PR MOST RECENT SYSTOLIC BLOOD PRESSURE < 130 MM HG: ICD-10-PCS | Mod: CPTII,S$GLB,, | Performed by: NURSE PRACTITIONER

## 2020-11-04 PROCEDURE — 3074F SYST BP LT 130 MM HG: CPT | Mod: CPTII,S$GLB,, | Performed by: NURSE PRACTITIONER

## 2020-11-04 PROCEDURE — 1101F PR PT FALLS ASSESS DOC 0-1 FALLS W/OUT INJ PAST YR: ICD-10-PCS | Mod: CPTII,S$GLB,, | Performed by: NURSE PRACTITIONER

## 2020-11-04 NOTE — PROGRESS NOTES
"Subjective:      Patient ID: Anup Miller is a 70 y.o. male.    Chief Complaint: Sleep Apnea    HPI  Follow up sleep apnea on autopap. He has not worn since July due to complicated COVID infection, bilateral pneumonia, s/p trach, peg tube.   He tried to wear but states not blowing out pressure. I am going to order PAP replacement. .  He is in wheelchair. In therapy.     Patient Active Problem List   Diagnosis    Uncontrolled type 2 diabetes mellitus    Elevated liver enzymes    Hypertension associated with diabetes    Hyperlipidemia associated with type 2 diabetes mellitus    Multiple pulmonary nodules determined by computed tomography of lung    KHOI (obstructive sleep apnea)    Elevated PSA    Hx of colonic polyp    Cardiac arrhythmia    Pneumonia due to COVID-19 virus    COVID-19 virus infection    Oral phase dysphagia    Pressure injury of sacral region, stage 3    Fever    History of 2019 novel coronavirus disease (COVID-19)    Malnutrition of moderate degree    Essential hypertension, benign    Debility         /76   Pulse (!) 118   Resp 19   Ht 5' 9" (1.753 m)   Wt 98.9 kg (218 lb 0.6 oz)   SpO2 98%   BMI 32.20 kg/m²   Body mass index is 32.2 kg/m².    Review of Systems   Constitutional: Negative.    HENT: Negative.    Respiratory: Positive for snoring.    Cardiovascular: Negative.    Musculoskeletal: Positive for gait problem.   Gastrointestinal: Negative.    Neurological: Positive for weakness.   Psychiatric/Behavioral: Negative.      Objective:      Physical Exam  Constitutional:       Appearance: He is obese.   HENT:      Head: Normocephalic and atraumatic.   Cardiovascular:      Rate and Rhythm: Normal rate and regular rhythm.   Pulmonary:      Effort: Pulmonary effort is normal.      Breath sounds: Normal breath sounds.   Musculoskeletal:      Comments: In wheelchair   Skin:     General: Skin is warm and dry.   Neurological:      General: No focal deficit present.      " Mental Status: He is alert and oriented to person, place, and time.   Psychiatric:         Mood and Affect: Mood normal.         Behavior: Behavior normal.               Assessment:       1. KHOI (obstructive sleep apnea)    2. Obesity, Class II, BMI 35-39.9    3. History of 2019 novel coronavirus disease (COVID-19)        Outpatient Encounter Medications as of 11/4/2020   Medication Sig Dispense Refill    atorvastatin (LIPITOR) 40 MG tablet Take 1 tablet (40 mg total) by mouth every evening. 90 tablet     diclofenac sodium (VOLTAREN) 1 % Gel Apply 2 g topically once daily. Apply to left knee      ELIQUIS 2.5 mg Tab TK 1 T PO BID      ertugliflozin (STEGLATRO) 15 mg Tab Take 15 mg by mouth once daily.      gabapentin (NEURONTIN) 300 MG capsule TK 1 C PO TID      glimepiride (AMARYL) 4 MG tablet Take 8 mg by mouth once daily.      hydroCHLOROthiazide (HYDRODIURIL) 25 MG tablet Take 1 tablet (25 mg total) by mouth once daily.      losartan (COZAAR) 100 MG tablet Take 1 tablet (100 mg total) by mouth once daily.      metFORMIN (GLUCOPHAGE-XR) 750 MG ER 24hr tablet Take 750 mg by mouth 2 (two) times daily with meals.      metoprolol tartrate (LOPRESSOR) 50 MG tablet TK 1 T PO BID      pantoprazole (PROTONIX) 40 MG tablet TK 1 T PO D      pioglitazone (ACTOS) 15 MG tablet Take 15 mg by mouth once daily.      polyethylene glycol (GLYCOLAX) 17 gram PwPk DIS 17 GRAMS IN LQ AND DRINK BID      rosuvastatin (CRESTOR) 10 MG tablet Take 10 mg by mouth once daily.      tamsulosin (FLOMAX) 0.4 mg Cap TK 1 C PO D      TRESIBA FLEXTOUCH U-100 100 unit/mL (3 mL) InPn INJ 55 UNITS SC D UTD      zinc sulfate (ZINCATE) 220 (50) mg capsule TK 1 C PO D      liraglutide 0.6 mg/0.1 mL, 18 mg/3 mL, subq PNIJ (VICTOZA 3-TEMI) 0.6 mg/0.1 mL (18 mg/3 mL) PnIj pen Inject 0.6 mg into the skin once daily. (Patient not taking: Reported on 11/4/2020) 9 mL 1     No facility-administered encounter medications on file as of 11/4/2020.       Orders Placed This Encounter   Procedures    CPAP FOR HOME USE     Power issues, 2015. Replacement order     Order Specific Question:   Length of need (1-99 months):     Answer:   99     Order Specific Question:   Type ():     Answer:   Auto CPAP     Order Specific Question:   Auto CPAP pressure setting range (cmH20):     Answer:   7-20     Order Specific Question:   Humidification (/):     Answer:   Heated     Order Specific Question:   Choose ONE mask type and its corresponding cushions and/or pillows:     Answer:    Full Face Mask, 1 per 90 days:  Full Face Cushion, (3 per 90 days)     Order Specific Question:   Choose EITHER Heated or Non-Heated Tubjing     Answer:    Non-Heated Tubing, 1 per 90 days     Order Specific Question:   All other supplies as needed as listed below:     Answer:    Headgear, 1 per 180 days     Order Specific Question:   All other supplies as needed as listed below:     Answer:    Chin Strap, 1 per 180 days     Order Specific Question:   All other supplies as needed as listed below:     Answer:    Disposable Filter, 6 per 90 days     Order Specific Question:   All other supplies as needed as listed below:     Answer:    Non-Disposable Filter, 1 per 180 days     Order Specific Question:   All other supplies as needed as listed below:     Answer:    Humidifier Chamber, 1 per 180 days     Plan:   Order for replacement PAP     Problem List Items Addressed This Visit        Other    KHOI (obstructive sleep apnea) - Primary    Relevant Orders    CPAP FOR HOME USE    History of 2019 novel coronavirus disease (COVID-19)      Other Visit Diagnoses     Obesity, Class II, BMI 35-39.9

## 2020-11-05 ENCOUNTER — TELEPHONE (OUTPATIENT)
Dept: PULMONOLOGY | Facility: CLINIC | Age: 70
End: 2020-11-05

## 2020-11-05 ENCOUNTER — PATIENT MESSAGE (OUTPATIENT)
Dept: PULMONOLOGY | Facility: CLINIC | Age: 70
End: 2020-11-05

## 2020-11-05 NOTE — TELEPHONE ENCOUNTER
Phoned pt, pt brought in old machine to be serviced on 11/4/2020. Pt states its still not working. Instructed pt that an order for a new machine has already been placed yesterday and that it will take up to 2 weeks for the order to be processed with insurance. Pt is aware. Sent pt via [employer] the phone number to our [employer] company to discuss rentals until new machine comes in.       ----- Message from Karen Kenny sent at 11/5/2020  9:20 AM CST -----  Contact: pt  The pt states his cpap machine isn't working, the pt wants to be advised and can be reached at [phone]///txMW

## 2020-11-25 ENCOUNTER — TELEPHONE (OUTPATIENT)
Dept: INTERNAL MEDICINE | Facility: CLINIC | Age: 70
End: 2020-11-25

## 2020-11-25 ENCOUNTER — OFFICE VISIT (OUTPATIENT)
Dept: INTERNAL MEDICINE | Facility: CLINIC | Age: 70
End: 2020-11-25
Payer: COMMERCIAL

## 2020-11-25 VITALS
BODY MASS INDEX: 32.17 KG/M2 | DIASTOLIC BLOOD PRESSURE: 68 MMHG | TEMPERATURE: 96 F | SYSTOLIC BLOOD PRESSURE: 98 MMHG | HEART RATE: 98 BPM | OXYGEN SATURATION: 98 % | WEIGHT: 217.81 LBS

## 2020-11-25 DIAGNOSIS — L89.153 PRESSURE ULCER OF SACRAL REGION, STAGE 3: ICD-10-CM

## 2020-11-25 DIAGNOSIS — I15.2 HYPERTENSION ASSOCIATED WITH DIABETES: ICD-10-CM

## 2020-11-25 DIAGNOSIS — G62.9 NEUROPATHY: ICD-10-CM

## 2020-11-25 DIAGNOSIS — R53.81 DEBILITY: ICD-10-CM

## 2020-11-25 DIAGNOSIS — E11.65 UNCONTROLLED TYPE 2 DIABETES MELLITUS WITH HYPERGLYCEMIA: Primary | ICD-10-CM

## 2020-11-25 DIAGNOSIS — E11.59 HYPERTENSION ASSOCIATED WITH DIABETES: ICD-10-CM

## 2020-11-25 PROBLEM — R97.20 ELEVATED PSA: Status: RESOLVED | Noted: 2017-12-13 | Resolved: 2020-11-25

## 2020-11-25 PROCEDURE — 99214 PR OFFICE/OUTPT VISIT, EST, LEVL IV, 30-39 MIN: ICD-10-PCS | Mod: S$GLB,,, | Performed by: INTERNAL MEDICINE

## 2020-11-25 PROCEDURE — 1125F PR PAIN SEVERITY QUANTIFIED, PAIN PRESENT: ICD-10-PCS | Mod: S$GLB,,, | Performed by: INTERNAL MEDICINE

## 2020-11-25 PROCEDURE — 3288F PR FALLS RISK ASSESSMENT DOCUMENTED: ICD-10-PCS | Mod: CPTII,S$GLB,, | Performed by: INTERNAL MEDICINE

## 2020-11-25 PROCEDURE — 3074F PR MOST RECENT SYSTOLIC BLOOD PRESSURE < 130 MM HG: ICD-10-PCS | Mod: CPTII,S$GLB,, | Performed by: INTERNAL MEDICINE

## 2020-11-25 PROCEDURE — 1125F AMNT PAIN NOTED PAIN PRSNT: CPT | Mod: S$GLB,,, | Performed by: INTERNAL MEDICINE

## 2020-11-25 PROCEDURE — 3051F PR MOST RECENT HEMOGLOBIN A1C LEVEL 7.0 - < 8.0%: ICD-10-PCS | Mod: CPTII,S$GLB,, | Performed by: INTERNAL MEDICINE

## 2020-11-25 PROCEDURE — 3074F SYST BP LT 130 MM HG: CPT | Mod: CPTII,S$GLB,, | Performed by: INTERNAL MEDICINE

## 2020-11-25 PROCEDURE — 1101F PT FALLS ASSESS-DOCD LE1/YR: CPT | Mod: CPTII,S$GLB,, | Performed by: INTERNAL MEDICINE

## 2020-11-25 PROCEDURE — 99999 PR PBB SHADOW E&M-EST. PATIENT-LVL IV: CPT | Mod: PBBFAC,,, | Performed by: INTERNAL MEDICINE

## 2020-11-25 PROCEDURE — 99999 PR PBB SHADOW E&M-EST. PATIENT-LVL IV: ICD-10-PCS | Mod: PBBFAC,,, | Performed by: INTERNAL MEDICINE

## 2020-11-25 PROCEDURE — 3078F PR MOST RECENT DIASTOLIC BLOOD PRESSURE < 80 MM HG: ICD-10-PCS | Mod: CPTII,S$GLB,, | Performed by: INTERNAL MEDICINE

## 2020-11-25 PROCEDURE — 3288F FALL RISK ASSESSMENT DOCD: CPT | Mod: CPTII,S$GLB,, | Performed by: INTERNAL MEDICINE

## 2020-11-25 PROCEDURE — 1159F MED LIST DOCD IN RCRD: CPT | Mod: S$GLB,,, | Performed by: INTERNAL MEDICINE

## 2020-11-25 PROCEDURE — 3008F PR BODY MASS INDEX (BMI) DOCUMENTED: ICD-10-PCS | Mod: CPTII,S$GLB,, | Performed by: INTERNAL MEDICINE

## 2020-11-25 PROCEDURE — 1159F PR MEDICATION LIST DOCUMENTED IN MEDICAL RECORD: ICD-10-PCS | Mod: S$GLB,,, | Performed by: INTERNAL MEDICINE

## 2020-11-25 PROCEDURE — 3078F DIAST BP <80 MM HG: CPT | Mod: CPTII,S$GLB,, | Performed by: INTERNAL MEDICINE

## 2020-11-25 PROCEDURE — 3008F BODY MASS INDEX DOCD: CPT | Mod: CPTII,S$GLB,, | Performed by: INTERNAL MEDICINE

## 2020-11-25 PROCEDURE — 99214 OFFICE O/P EST MOD 30 MIN: CPT | Mod: S$GLB,,, | Performed by: INTERNAL MEDICINE

## 2020-11-25 PROCEDURE — 3051F HG A1C>EQUAL 7.0%<8.0%: CPT | Mod: CPTII,S$GLB,, | Performed by: INTERNAL MEDICINE

## 2020-11-25 PROCEDURE — 1101F PR PT FALLS ASSESS DOC 0-1 FALLS W/OUT INJ PAST YR: ICD-10-PCS | Mod: CPTII,S$GLB,, | Performed by: INTERNAL MEDICINE

## 2020-11-25 RX ORDER — GABAPENTIN 300 MG/1
CAPSULE ORAL
Qty: 90 CAPSULE | Refills: 1 | Status: SHIPPED | OUTPATIENT
Start: 2020-11-25 | End: 2021-01-24

## 2020-11-25 RX ORDER — LOSARTAN POTASSIUM 50 MG/1
100 TABLET ORAL DAILY
Qty: 90 TABLET | Refills: 1
Start: 2020-11-25 | End: 2021-05-27 | Stop reason: SDUPTHER

## 2020-11-25 NOTE — TELEPHONE ENCOUNTER
Pt wife stated that the pharmacy does not have his gabapentin. I contacted the pharmacy and gave a verbal for the medication.

## 2020-11-25 NOTE — TELEPHONE ENCOUNTER
----- Message from Danielle Casillas sent at 11/25/2020 11:04 AM CST -----  States he had an appt this morning. \[facility] [place] [facility]\"" [facility] [place] told her to call the nurse. Please call Zhou Miller [phone]. Thank you

## 2020-11-25 NOTE — PROGRESS NOTES
Subjective:      Patient ID: Anup Miller is a 70 y.o. male.    Chief Complaint: Follow-up    HPI     69 yo with   Patient Active Problem List   Diagnosis    Uncontrolled type 2 diabetes mellitus    Hypertension associated with diabetes    Hyperlipidemia associated with type 2 diabetes mellitus    Multiple pulmonary nodules determined by computed tomography of lung    KHOI (obstructive sleep apnea)    Hx of colonic polyp    Cardiac arrhythmia    Pneumonia due to COVID-19 virus    COVID-19 virus infection    Oral phase dysphagia    Pressure injury of sacral region, stage 3    Fever    History of 2019 novel coronavirus disease (COVID-19)    Malnutrition of moderate degree    Essential hypertension, benign    Debility    Neuropathy     Past Medical History:   Diagnosis Date    Diabetes mellitus type II Diagnosed 2002    Elevated liver enzymes     Elevated PSA 12/13/2017    Did not f/u with urology as rec in 2015    Fatty liver disease, nonalcoholic     Hyperlipidemia     Hypertension     Sleep apnea      Here today for management of mult med problems as outlined below. Present for months to years. Home glucose doing well. 110 fasting. Energy improving. Strength slowly improving but still can not stand on his own. Continues with outpt PT. Also is up to date with wound care. Reports one pressure ulcer essentially healed and the other is healing.       Review of Systems   Constitutional: Negative for chills and fever.   HENT: Negative for ear pain and sore throat.    Respiratory: Negative for cough.    Cardiovascular: Negative for chest pain.   Gastrointestinal: Negative for abdominal pain and blood in stool.   Genitourinary: Negative for dysuria and hematuria.   Neurological: Negative for seizures and syncope.     Objective:   BP 98/68 (BP Location: Right arm, Patient Position: Sitting, BP Method: Large (Manual))   Pulse 98   Temp 96.2 °F (35.7 °C) (Tympanic)   Wt 98.8 kg (217 lb 13 oz)    SpO2 98%   BMI 32.17 kg/m²     Physical Exam  Constitutional:       General: He is not in acute distress.     Appearance: He is well-developed.   HENT:      Head: Normocephalic and atraumatic.   Eyes:      Pupils: Pupils are equal, round, and reactive to light.   Neck:      Musculoskeletal: Neck supple.      Thyroid: No thyromegaly.   Cardiovascular:      Rate and Rhythm: Normal rate and regular rhythm.   Pulmonary:      Breath sounds: Normal breath sounds. No wheezing or rales.   Abdominal:      General: Bowel sounds are normal.      Palpations: Abdomen is soft.      Tenderness: There is no abdominal tenderness.   Lymphadenopathy:      Cervical: No cervical adenopathy.   Skin:     General: Skin is warm and dry.   Neurological:      Mental Status: He is alert and oriented to person, place, and time.   Psychiatric:         Behavior: Behavior normal.       No visits with results within 2 Week(s) from this visit.   Latest known visit with results is:   Lab Visit on 10/23/2020   Component Date Value Ref Range Status    Specimen UA 10/23/2020 Urine, Clean Catch   Final    Color, UA 10/23/2020 Yellow  Yellow, Straw, Sandra Final    Appearance, UA 10/23/2020 Clear  Clear Final    pH, UA 10/23/2020 6.0  5.0 - 8.0 Final    Specific [place], UA 10/23/2020 1.025  1.005 - 1.030 Final    Protein, UA 10/23/2020 Negative  Negative Final    Comment: Recommend a 24 hour urine protein or a urine   protein/creatinine ratio if globulin induced proteinuria is  clinically suspected.      Glucose, UA 10/23/2020 3+* Negative Final    Ketones, UA 10/23/2020 Negative  Negative Final    Bilirubin (UA) 10/23/2020 Negative  Negative Final    Occult Blood UA 10/23/2020 Negative  Negative Final    Nitrite, UA 10/23/2020 Negative  Negative Final    Leukocytes, UA 10/23/2020 Negative  Negative Final    RBC, UA 10/23/2020 1  0 - 4 /hpf Final    Bacteria 10/23/2020 Rare  None-Occ /hpf Final    Yeast, UA 10/23/2020 None  None Final     Microscopic Comment 10/23/2020 SEE COMMENT   Final    Comment: Other formed elements not mentioned in the report are not   present in the microscopic examination.          Assessment:     1. Uncontrolled type 2 diabetes mellitus with hyperglycemia    2. Hypertension associated with diabetes    3. Neuropathy    4. Debility    5. Pressure ulcer of sacral region, stage 3      Plan:   Uncontrolled type 2 diabetes mellitus with hyperglycemia  a1c improvingcont current meds  -     Cancel: Hemoglobin A1C; Future; Expected date: 02/25/2021  -     Hemoglobin A1C; Future; Expected date: 01/25/2021    Hypertension associated with diabetes  Controlled  Cont current meds  -     losartan (COZAAR) 50 MG tablet; Take 2 tablets (100 mg total) by mouth once daily.  Dispense: 90 tablet; Refill: 1    Neuropathy  Gabapentin refilled which as been helpful  Debility  Cont PT  Pressure ulcer of sacral region, stage 3  Improving. Continue wound care.   Other orders  -     gabapentin (NEURONTIN) 300 MG capsule; TK 1 C PO TID  Dispense: 90 capsule; Refill: 1        Lab Frequency Next Occurrence   Ambulatory referral/consult to Podiatry Once 03/23/2020   Ambulatory referral/consult to Wound Clinic Once 11/02/2020   Hemoglobin A1c Every 12 Weeks        Problem List Items Addressed This Visit        Neuro    Neuropathy    Overview     Pt reports started after covid infection. Gabapentin 300 tid started as inpatient.             Cardiac/Vascular    Hypertension associated with diabetes    Relevant Medications    losartan (COZAAR) 50 MG tablet       Endocrine    Uncontrolled type 2 diabetes mellitus - Primary    Relevant Orders    Hemoglobin A1C       Other    Debility      Other Visit Diagnoses     Pressure ulcer of sacral region, stage 3              Follow up in 2 months (on 1/25/2021), or if symptoms worsen or fail to improve.

## 2020-11-30 ENCOUNTER — EXTERNAL CHRONIC CARE MANAGEMENT (OUTPATIENT)
Dept: PRIMARY CARE CLINIC | Facility: CLINIC | Age: 70
End: 2020-11-30
Payer: COMMERCIAL

## 2020-11-30 PROCEDURE — 99490 CHRNC CARE MGMT STAFF 1ST 20: CPT | Mod: S$GLB,,, | Performed by: INTERNAL MEDICINE

## 2020-11-30 PROCEDURE — 99490 PR CHRONIC CARE MGMT, 1ST 20 MIN: ICD-10-PCS | Mod: S$GLB,,, | Performed by: INTERNAL MEDICINE

## 2020-12-04 RX ORDER — TAMSULOSIN HYDROCHLORIDE 0.4 MG/1
CAPSULE ORAL
Qty: 90 CAPSULE | Refills: 1 | Status: SHIPPED | OUTPATIENT
Start: 2020-12-04 | End: 2021-05-27

## 2020-12-04 NOTE — TELEPHONE ENCOUNTER
Pt would like to have flomax refilled. Also pt states he does not feel like gabapentin is effective for relieving pain r/t neuropathy.

## 2020-12-04 NOTE — TELEPHONE ENCOUNTER
"Please see message from [facility]. Please contact patient it Dr Gonzales's response.       "Pt stated he was taking Tamsulosin for prostate before switching providers to Dr. Gonzales, and was wondering if Dr. Gonzales could renew RX for medication. Also, pt asked CC to make MD aware that pt does not feel like Gabapentin is effective in relieving pain r/t neuropathy. Please advise.     Thank you,   Debbie Chapa LPN   Care Coordinator   [facility] "  "

## 2020-12-08 ENCOUNTER — TELEPHONE (OUTPATIENT)
Dept: INTERNAL MEDICINE | Facility: CLINIC | Age: 70
End: 2020-12-08

## 2020-12-08 NOTE — TELEPHONE ENCOUNTER
----- Message from Josse Julio sent at 12/8/2020  8:23 AM CST -----  Pt's wife would like return call regarding information for pt's disability.  Please call back at [phone].  Symone Clifford

## 2020-12-08 NOTE — TELEPHONE ENCOUNTER
Pt wife stated she needed records faxed over to someone.  She wasn't sure of the company or person I informed her to get the person to get in contact with me so I can be sure of who I am sending records to prior to sending. Pt verbalized understanding.    MELANIE Valdovinos

## 2020-12-31 ENCOUNTER — EXTERNAL CHRONIC CARE MANAGEMENT (OUTPATIENT)
Dept: PRIMARY CARE CLINIC | Facility: CLINIC | Age: 70
End: 2020-12-31
Payer: COMMERCIAL

## 2020-12-31 PROCEDURE — 99490 CHRNC CARE MGMT STAFF 1ST 20: CPT | Mod: S$GLB,,, | Performed by: INTERNAL MEDICINE

## 2020-12-31 PROCEDURE — 99490 PR CHRONIC CARE MGMT, 1ST 20 MIN: ICD-10-PCS | Mod: S$GLB,,, | Performed by: INTERNAL MEDICINE

## 2021-01-19 ENCOUNTER — LAB VISIT (OUTPATIENT)
Dept: LAB | Facility: HOSPITAL | Age: 71
End: 2021-01-19
Attending: INTERNAL MEDICINE
Payer: COMMERCIAL

## 2021-01-19 DIAGNOSIS — E11.65 UNCONTROLLED TYPE 2 DIABETES MELLITUS WITH HYPERGLYCEMIA: ICD-10-CM

## 2021-01-19 PROCEDURE — 36415 COLL VENOUS BLD VENIPUNCTURE: CPT | Mod: PO

## 2021-01-19 PROCEDURE — 83036 HEMOGLOBIN GLYCOSYLATED A1C: CPT

## 2021-01-20 ENCOUNTER — OFFICE VISIT (OUTPATIENT)
Dept: SLEEP MEDICINE | Facility: CLINIC | Age: 71
End: 2021-01-20
Payer: COMMERCIAL

## 2021-01-20 VITALS
OXYGEN SATURATION: 98 % | HEART RATE: 105 BPM | RESPIRATION RATE: 17 BRPM | DIASTOLIC BLOOD PRESSURE: 74 MMHG | WEIGHT: 217.81 LBS | BODY MASS INDEX: 32.26 KG/M2 | SYSTOLIC BLOOD PRESSURE: 110 MMHG | HEIGHT: 69 IN

## 2021-01-20 DIAGNOSIS — Z86.16 HISTORY OF 2019 NOVEL CORONAVIRUS DISEASE (COVID-19): ICD-10-CM

## 2021-01-20 DIAGNOSIS — G47.33 OSA (OBSTRUCTIVE SLEEP APNEA): Primary | ICD-10-CM

## 2021-01-20 DIAGNOSIS — E66.9 OBESITY, CLASS II, BMI 35-39.9: ICD-10-CM

## 2021-01-20 LAB
ESTIMATED AVG GLUCOSE: 148 MG/DL (ref 68–131)
HBA1C MFR BLD HPLC: 6.8 % (ref 4–5.6)

## 2021-01-20 PROCEDURE — 3288F FALL RISK ASSESSMENT DOCD: CPT | Mod: CPTII,S$GLB,, | Performed by: NURSE PRACTITIONER

## 2021-01-20 PROCEDURE — 1101F PT FALLS ASSESS-DOCD LE1/YR: CPT | Mod: CPTII,S$GLB,, | Performed by: NURSE PRACTITIONER

## 2021-01-20 PROCEDURE — 3074F SYST BP LT 130 MM HG: CPT | Mod: CPTII,S$GLB,, | Performed by: NURSE PRACTITIONER

## 2021-01-20 PROCEDURE — 3074F PR MOST RECENT SYSTOLIC BLOOD PRESSURE < 130 MM HG: ICD-10-PCS | Mod: CPTII,S$GLB,, | Performed by: NURSE PRACTITIONER

## 2021-01-20 PROCEDURE — 3008F BODY MASS INDEX DOCD: CPT | Mod: CPTII,S$GLB,, | Performed by: NURSE PRACTITIONER

## 2021-01-20 PROCEDURE — 1101F PR PT FALLS ASSESS DOC 0-1 FALLS W/OUT INJ PAST YR: ICD-10-PCS | Mod: CPTII,S$GLB,, | Performed by: NURSE PRACTITIONER

## 2021-01-20 PROCEDURE — 3288F PR FALLS RISK ASSESSMENT DOCUMENTED: ICD-10-PCS | Mod: CPTII,S$GLB,, | Performed by: NURSE PRACTITIONER

## 2021-01-20 PROCEDURE — 3008F PR BODY MASS INDEX (BMI) DOCUMENTED: ICD-10-PCS | Mod: CPTII,S$GLB,, | Performed by: NURSE PRACTITIONER

## 2021-01-20 PROCEDURE — 99999 PR PBB SHADOW E&M-EST. PATIENT-LVL IV: CPT | Mod: PBBFAC,,, | Performed by: NURSE PRACTITIONER

## 2021-01-20 PROCEDURE — 3078F DIAST BP <80 MM HG: CPT | Mod: CPTII,S$GLB,, | Performed by: NURSE PRACTITIONER

## 2021-01-20 PROCEDURE — 1159F MED LIST DOCD IN RCRD: CPT | Mod: S$GLB,,, | Performed by: NURSE PRACTITIONER

## 2021-01-20 PROCEDURE — 99214 OFFICE O/P EST MOD 30 MIN: CPT | Mod: S$GLB,,, | Performed by: NURSE PRACTITIONER

## 2021-01-20 PROCEDURE — 99214 PR OFFICE/OUTPT VISIT, EST, LEVL IV, 30-39 MIN: ICD-10-PCS | Mod: S$GLB,,, | Performed by: NURSE PRACTITIONER

## 2021-01-20 PROCEDURE — 3078F PR MOST RECENT DIASTOLIC BLOOD PRESSURE < 80 MM HG: ICD-10-PCS | Mod: CPTII,S$GLB,, | Performed by: NURSE PRACTITIONER

## 2021-01-20 PROCEDURE — 99999 PR PBB SHADOW E&M-EST. PATIENT-LVL IV: ICD-10-PCS | Mod: PBBFAC,,, | Performed by: NURSE PRACTITIONER

## 2021-01-20 PROCEDURE — 1159F PR MEDICATION LIST DOCUMENTED IN MEDICAL RECORD: ICD-10-PCS | Mod: S$GLB,,, | Performed by: NURSE PRACTITIONER

## 2021-01-21 ENCOUNTER — PATIENT MESSAGE (OUTPATIENT)
Dept: INTERNAL MEDICINE | Facility: CLINIC | Age: 71
End: 2021-01-21

## 2021-01-25 ENCOUNTER — TELEPHONE (OUTPATIENT)
Dept: DIABETES | Facility: CLINIC | Age: 71
End: 2021-01-25

## 2021-01-25 ENCOUNTER — OFFICE VISIT (OUTPATIENT)
Dept: INTERNAL MEDICINE | Facility: CLINIC | Age: 71
End: 2021-01-25
Payer: COMMERCIAL

## 2021-01-25 VITALS
TEMPERATURE: 98 F | BODY MASS INDEX: 32.05 KG/M2 | DIASTOLIC BLOOD PRESSURE: 64 MMHG | OXYGEN SATURATION: 95 % | HEART RATE: 94 BPM | SYSTOLIC BLOOD PRESSURE: 120 MMHG | WEIGHT: 217 LBS

## 2021-01-25 DIAGNOSIS — I10 ESSENTIAL HYPERTENSION, BENIGN: ICD-10-CM

## 2021-01-25 DIAGNOSIS — E78.5 HYPERLIPIDEMIA ASSOCIATED WITH TYPE 2 DIABETES MELLITUS: ICD-10-CM

## 2021-01-25 DIAGNOSIS — E11.65 UNCONTROLLED TYPE 2 DIABETES MELLITUS WITH HYPERGLYCEMIA: ICD-10-CM

## 2021-01-25 DIAGNOSIS — E11.59 HYPERTENSION ASSOCIATED WITH DIABETES: ICD-10-CM

## 2021-01-25 DIAGNOSIS — I49.9 CARDIAC ARRHYTHMIA, UNSPECIFIED CARDIAC ARRHYTHMIA TYPE: ICD-10-CM

## 2021-01-25 DIAGNOSIS — Z00.00 ROUTINE GENERAL MEDICAL EXAMINATION AT A HEALTH CARE FACILITY: Primary | ICD-10-CM

## 2021-01-25 DIAGNOSIS — R91.8 MULTIPLE PULMONARY NODULES DETERMINED BY COMPUTED TOMOGRAPHY OF LUNG: ICD-10-CM

## 2021-01-25 DIAGNOSIS — R53.81 DEBILITY: ICD-10-CM

## 2021-01-25 DIAGNOSIS — E11.69 HYPERLIPIDEMIA ASSOCIATED WITH TYPE 2 DIABETES MELLITUS: ICD-10-CM

## 2021-01-25 DIAGNOSIS — I15.2 HYPERTENSION ASSOCIATED WITH DIABETES: ICD-10-CM

## 2021-01-25 DIAGNOSIS — G47.33 OSA (OBSTRUCTIVE SLEEP APNEA): ICD-10-CM

## 2021-01-25 PROBLEM — I48.91 ATRIAL FIBRILLATION: Status: ACTIVE | Noted: 2021-01-25

## 2021-01-25 PROBLEM — D68.59 OTHER PRIMARY THROMBOPHILIA: Status: ACTIVE | Noted: 2021-01-25

## 2021-01-25 PROCEDURE — 99397 PR PREVENTIVE VISIT,EST,65 & OVER: ICD-10-PCS | Mod: S$GLB,,, | Performed by: INTERNAL MEDICINE

## 2021-01-25 PROCEDURE — 3044F PR MOST RECENT HEMOGLOBIN A1C LEVEL <7.0%: ICD-10-PCS | Mod: CPTII,S$GLB,, | Performed by: INTERNAL MEDICINE

## 2021-01-25 PROCEDURE — 99999 PR PBB SHADOW E&M-EST. PATIENT-LVL IV: CPT | Mod: PBBFAC,,, | Performed by: INTERNAL MEDICINE

## 2021-01-25 PROCEDURE — 3008F BODY MASS INDEX DOCD: CPT | Mod: CPTII,S$GLB,, | Performed by: INTERNAL MEDICINE

## 2021-01-25 PROCEDURE — 3288F PR FALLS RISK ASSESSMENT DOCUMENTED: ICD-10-PCS | Mod: CPTII,S$GLB,, | Performed by: INTERNAL MEDICINE

## 2021-01-25 PROCEDURE — 3078F DIAST BP <80 MM HG: CPT | Mod: CPTII,S$GLB,, | Performed by: INTERNAL MEDICINE

## 2021-01-25 PROCEDURE — 99397 PER PM REEVAL EST PAT 65+ YR: CPT | Mod: S$GLB,,, | Performed by: INTERNAL MEDICINE

## 2021-01-25 PROCEDURE — 3078F PR MOST RECENT DIASTOLIC BLOOD PRESSURE < 80 MM HG: ICD-10-PCS | Mod: CPTII,S$GLB,, | Performed by: INTERNAL MEDICINE

## 2021-01-25 PROCEDURE — 3074F SYST BP LT 130 MM HG: CPT | Mod: CPTII,S$GLB,, | Performed by: INTERNAL MEDICINE

## 2021-01-25 PROCEDURE — 99999 PR PBB SHADOW E&M-EST. PATIENT-LVL IV: ICD-10-PCS | Mod: PBBFAC,,, | Performed by: INTERNAL MEDICINE

## 2021-01-25 PROCEDURE — 1125F AMNT PAIN NOTED PAIN PRSNT: CPT | Mod: S$GLB,,, | Performed by: INTERNAL MEDICINE

## 2021-01-25 PROCEDURE — 3044F HG A1C LEVEL LT 7.0%: CPT | Mod: CPTII,S$GLB,, | Performed by: INTERNAL MEDICINE

## 2021-01-25 PROCEDURE — 1125F PR PAIN SEVERITY QUANTIFIED, PAIN PRESENT: ICD-10-PCS | Mod: S$GLB,,, | Performed by: INTERNAL MEDICINE

## 2021-01-25 PROCEDURE — 3074F PR MOST RECENT SYSTOLIC BLOOD PRESSURE < 130 MM HG: ICD-10-PCS | Mod: CPTII,S$GLB,, | Performed by: INTERNAL MEDICINE

## 2021-01-25 PROCEDURE — 3288F FALL RISK ASSESSMENT DOCD: CPT | Mod: CPTII,S$GLB,, | Performed by: INTERNAL MEDICINE

## 2021-01-25 PROCEDURE — 1101F PT FALLS ASSESS-DOCD LE1/YR: CPT | Mod: CPTII,S$GLB,, | Performed by: INTERNAL MEDICINE

## 2021-01-25 PROCEDURE — 3008F PR BODY MASS INDEX (BMI) DOCUMENTED: ICD-10-PCS | Mod: CPTII,S$GLB,, | Performed by: INTERNAL MEDICINE

## 2021-01-25 PROCEDURE — 1101F PR PT FALLS ASSESS DOC 0-1 FALLS W/OUT INJ PAST YR: ICD-10-PCS | Mod: CPTII,S$GLB,, | Performed by: INTERNAL MEDICINE

## 2021-01-27 ENCOUNTER — PATIENT MESSAGE (OUTPATIENT)
Dept: INTERNAL MEDICINE | Facility: CLINIC | Age: 71
End: 2021-01-27

## 2021-01-27 DIAGNOSIS — E11.65 UNCONTROLLED TYPE 2 DIABETES MELLITUS WITH HYPERGLYCEMIA: Primary | ICD-10-CM

## 2021-01-27 DIAGNOSIS — E11.65 UNCONTROLLED TYPE 2 DIABETES MELLITUS WITH HYPERGLYCEMIA: ICD-10-CM

## 2021-01-27 RX ORDER — PEN NEEDLE, DIABETIC 30 GX 1/3"
NEEDLE, DISPOSABLE MISCELLANEOUS
Qty: 100 EACH | Refills: 3 | Status: SHIPPED | OUTPATIENT
Start: 2021-01-27 | End: 2021-04-14

## 2021-01-27 RX ORDER — PEN NEEDLE, DIABETIC 30 GX 1/3"
NEEDLE, DISPOSABLE MISCELLANEOUS
Qty: 100 EACH | Refills: 3 | Status: SHIPPED | OUTPATIENT
Start: 2021-01-27 | End: 2021-01-27 | Stop reason: SDUPTHER

## 2021-01-28 ENCOUNTER — PATIENT MESSAGE (OUTPATIENT)
Dept: INTERNAL MEDICINE | Facility: CLINIC | Age: 71
End: 2021-01-28

## 2021-01-30 PROBLEM — I48.91 ATRIAL FIBRILLATION: Status: RESOLVED | Noted: 2021-01-25 | Resolved: 2021-01-30

## 2021-01-30 PROBLEM — D68.59 OTHER PRIMARY THROMBOPHILIA: Status: RESOLVED | Noted: 2021-01-25 | Resolved: 2021-01-30

## 2021-01-31 ENCOUNTER — EXTERNAL CHRONIC CARE MANAGEMENT (OUTPATIENT)
Dept: PRIMARY CARE CLINIC | Facility: CLINIC | Age: 71
End: 2021-01-31
Payer: COMMERCIAL

## 2021-01-31 PROCEDURE — 99490 PR CHRONIC CARE MGMT, 1ST 20 MIN: ICD-10-PCS | Mod: S$GLB,,, | Performed by: INTERNAL MEDICINE

## 2021-01-31 PROCEDURE — 99490 CHRNC CARE MGMT STAFF 1ST 20: CPT | Mod: S$GLB,,, | Performed by: INTERNAL MEDICINE

## 2021-02-08 ENCOUNTER — TELEPHONE (OUTPATIENT)
Dept: DIABETES | Facility: CLINIC | Age: 71
End: 2021-02-08

## 2021-02-28 ENCOUNTER — EXTERNAL CHRONIC CARE MANAGEMENT (OUTPATIENT)
Dept: PRIMARY CARE CLINIC | Facility: CLINIC | Age: 71
End: 2021-02-28
Payer: COMMERCIAL

## 2021-02-28 PROCEDURE — 99490 CHRNC CARE MGMT STAFF 1ST 20: CPT | Mod: S$GLB,,, | Performed by: INTERNAL MEDICINE

## 2021-02-28 PROCEDURE — 99490 PR CHRONIC CARE MGMT, 1ST 20 MIN: ICD-10-PCS | Mod: S$GLB,,, | Performed by: INTERNAL MEDICINE

## 2021-03-02 RX ORDER — HYDROCHLOROTHIAZIDE 25 MG/1
25 TABLET ORAL DAILY
Qty: 30 TABLET | Refills: 2
Start: 2021-03-02 | End: 2021-08-19 | Stop reason: SDUPTHER

## 2021-03-02 RX ORDER — HYDROCHLOROTHIAZIDE 25 MG/1
25 TABLET ORAL DAILY
Qty: 30 TABLET | Refills: 11
Start: 2021-03-02 | End: 2022-03-02

## 2021-03-09 ENCOUNTER — PATIENT MESSAGE (OUTPATIENT)
Dept: INTERNAL MEDICINE | Facility: CLINIC | Age: 71
End: 2021-03-09

## 2021-03-10 ENCOUNTER — TELEPHONE (OUTPATIENT)
Dept: INTERNAL MEDICINE | Facility: CLINIC | Age: 71
End: 2021-03-10

## 2021-03-12 ENCOUNTER — PES CALL (OUTPATIENT)
Dept: ADMINISTRATIVE | Facility: CLINIC | Age: 71
End: 2021-03-12

## 2021-03-22 RX ORDER — GABAPENTIN 300 MG/1
CAPSULE ORAL
Qty: 90 CAPSULE | Refills: 1 | Status: SHIPPED | OUTPATIENT
Start: 2021-03-22 | End: 2021-04-14 | Stop reason: SDUPTHER

## 2021-03-31 ENCOUNTER — EXTERNAL CHRONIC CARE MANAGEMENT (OUTPATIENT)
Dept: PRIMARY CARE CLINIC | Facility: CLINIC | Age: 71
End: 2021-03-31
Payer: COMMERCIAL

## 2021-03-31 PROCEDURE — 99490 CHRNC CARE MGMT STAFF 1ST 20: CPT | Mod: S$GLB,,, | Performed by: INTERNAL MEDICINE

## 2021-03-31 PROCEDURE — 99490 PR CHRONIC CARE MGMT, 1ST 20 MIN: ICD-10-PCS | Mod: S$GLB,,, | Performed by: INTERNAL MEDICINE

## 2021-04-12 ENCOUNTER — PATIENT OUTREACH (OUTPATIENT)
Dept: ADMINISTRATIVE | Facility: OTHER | Age: 71
End: 2021-04-12

## 2021-04-14 ENCOUNTER — OFFICE VISIT (OUTPATIENT)
Dept: DIABETES | Facility: CLINIC | Age: 71
End: 2021-04-14
Payer: MEDICARE

## 2021-04-14 DIAGNOSIS — E78.5 HYPERLIPIDEMIA ASSOCIATED WITH TYPE 2 DIABETES MELLITUS: ICD-10-CM

## 2021-04-14 DIAGNOSIS — I15.2 HYPERTENSION ASSOCIATED WITH DIABETES: ICD-10-CM

## 2021-04-14 DIAGNOSIS — E11.59 HYPERTENSION ASSOCIATED WITH DIABETES: ICD-10-CM

## 2021-04-14 DIAGNOSIS — E66.9 OBESITY (BMI 30-39.9): ICD-10-CM

## 2021-04-14 DIAGNOSIS — E11.65 UNCONTROLLED TYPE 2 DIABETES MELLITUS WITH HYPERGLYCEMIA: Primary | ICD-10-CM

## 2021-04-14 DIAGNOSIS — G47.33 OSA (OBSTRUCTIVE SLEEP APNEA): ICD-10-CM

## 2021-04-14 DIAGNOSIS — G62.9 NEUROPATHY: ICD-10-CM

## 2021-04-14 DIAGNOSIS — R74.8 ELEVATED LIVER ENZYMES: ICD-10-CM

## 2021-04-14 DIAGNOSIS — E11.69 HYPERLIPIDEMIA ASSOCIATED WITH TYPE 2 DIABETES MELLITUS: ICD-10-CM

## 2021-04-14 PROCEDURE — 99214 OFFICE O/P EST MOD 30 MIN: CPT | Mod: 95,,, | Performed by: PHYSICIAN ASSISTANT

## 2021-04-14 PROCEDURE — 1159F PR MEDICATION LIST DOCUMENTED IN MEDICAL RECORD: ICD-10-PCS | Mod: 95,,, | Performed by: PHYSICIAN ASSISTANT

## 2021-04-14 PROCEDURE — 3044F PR MOST RECENT HEMOGLOBIN A1C LEVEL <7.0%: ICD-10-PCS | Mod: CPTII,95,, | Performed by: PHYSICIAN ASSISTANT

## 2021-04-14 PROCEDURE — 1159F MED LIST DOCD IN RCRD: CPT | Mod: 95,,, | Performed by: PHYSICIAN ASSISTANT

## 2021-04-14 PROCEDURE — 99214 PR OFFICE/OUTPT VISIT, EST, LEVL IV, 30-39 MIN: ICD-10-PCS | Mod: 95,,, | Performed by: PHYSICIAN ASSISTANT

## 2021-04-14 PROCEDURE — 3044F HG A1C LEVEL LT 7.0%: CPT | Mod: CPTII,95,, | Performed by: PHYSICIAN ASSISTANT

## 2021-04-14 RX ORDER — GLIMEPIRIDE 4 MG/1
8 TABLET ORAL DAILY
Qty: 180 TABLET | Refills: 3 | Status: SHIPPED | OUTPATIENT
Start: 2021-04-14 | End: 2021-08-19 | Stop reason: SDUPTHER

## 2021-04-14 RX ORDER — PEN NEEDLE, DIABETIC 30 GX3/16"
1 NEEDLE, DISPOSABLE MISCELLANEOUS
Qty: 300 EACH | Refills: 3 | Status: SHIPPED | OUTPATIENT
Start: 2021-04-14 | End: 2022-02-10 | Stop reason: SDUPTHER

## 2021-04-14 RX ORDER — INSULIN DEGLUDEC 200 U/ML
55 INJECTION, SOLUTION SUBCUTANEOUS DAILY
Qty: 27 ML | Refills: 3 | Status: SHIPPED | OUTPATIENT
Start: 2021-04-14 | End: 2021-05-27

## 2021-04-14 RX ORDER — GABAPENTIN 300 MG/1
CAPSULE ORAL
Qty: 90 CAPSULE | Refills: 1 | Status: SHIPPED | OUTPATIENT
Start: 2021-04-14 | End: 2021-06-02

## 2021-04-14 RX ORDER — DULAGLUTIDE 0.75 MG/.5ML
0.75 INJECTION, SOLUTION SUBCUTANEOUS WEEKLY
Qty: 12 PEN | Refills: 3 | Status: SHIPPED | OUTPATIENT
Start: 2021-04-14 | End: 2021-05-05 | Stop reason: SDUPTHER

## 2021-04-14 RX ORDER — PIOGLITAZONEHYDROCHLORIDE 15 MG/1
15 TABLET ORAL DAILY
Qty: 90 TABLET | Refills: 3 | Status: SHIPPED | OUTPATIENT
Start: 2021-04-14 | End: 2021-06-02

## 2021-04-14 RX ORDER — ERTUGLIFLOZIN 15 MG/1
15 TABLET, FILM COATED ORAL DAILY
Qty: 90 TABLET | Refills: 3 | Status: SHIPPED | OUTPATIENT
Start: 2021-04-14 | End: 2021-06-02

## 2021-04-15 ENCOUNTER — PES CALL (OUTPATIENT)
Dept: ADMINISTRATIVE | Facility: CLINIC | Age: 71
End: 2021-04-15

## 2021-04-20 ENCOUNTER — PATIENT MESSAGE (OUTPATIENT)
Dept: DIABETES | Facility: CLINIC | Age: 71
End: 2021-04-20

## 2021-04-23 ENCOUNTER — PATIENT MESSAGE (OUTPATIENT)
Dept: DIABETES | Facility: CLINIC | Age: 71
End: 2021-04-23

## 2021-04-27 ENCOUNTER — TELEPHONE (OUTPATIENT)
Dept: DIABETES | Facility: CLINIC | Age: 71
End: 2021-04-27

## 2021-04-28 ENCOUNTER — TELEPHONE (OUTPATIENT)
Dept: DIABETES | Facility: CLINIC | Age: 71
End: 2021-04-28

## 2021-04-29 ENCOUNTER — TELEPHONE (OUTPATIENT)
Dept: INTERNAL MEDICINE | Facility: CLINIC | Age: 71
End: 2021-04-29

## 2021-04-30 ENCOUNTER — EXTERNAL CHRONIC CARE MANAGEMENT (OUTPATIENT)
Dept: PRIMARY CARE CLINIC | Facility: CLINIC | Age: 71
End: 2021-04-30
Payer: COMMERCIAL

## 2021-04-30 PROCEDURE — 99490 PR CHRONIC CARE MGMT, 1ST 20 MIN: ICD-10-PCS | Mod: S$GLB,,, | Performed by: INTERNAL MEDICINE

## 2021-04-30 PROCEDURE — 99490 CHRNC CARE MGMT STAFF 1ST 20: CPT | Mod: S$GLB,,, | Performed by: INTERNAL MEDICINE

## 2021-05-03 ENCOUNTER — PATIENT MESSAGE (OUTPATIENT)
Dept: INTERNAL MEDICINE | Facility: CLINIC | Age: 71
End: 2021-05-03

## 2021-05-04 ENCOUNTER — TELEPHONE (OUTPATIENT)
Dept: INTERNAL MEDICINE | Facility: CLINIC | Age: 71
End: 2021-05-04

## 2021-05-05 DIAGNOSIS — E11.65 UNCONTROLLED TYPE 2 DIABETES MELLITUS WITH HYPERGLYCEMIA: ICD-10-CM

## 2021-05-05 RX ORDER — DULAGLUTIDE 0.75 MG/.5ML
0.75 INJECTION, SOLUTION SUBCUTANEOUS WEEKLY
Qty: 12 PEN | Refills: 0 | Status: SHIPPED | OUTPATIENT
Start: 2021-05-05 | End: 2021-06-08 | Stop reason: SDUPTHER

## 2021-05-10 ENCOUNTER — PATIENT MESSAGE (OUTPATIENT)
Dept: DIABETES | Facility: CLINIC | Age: 71
End: 2021-05-10

## 2021-05-10 ENCOUNTER — TELEPHONE (OUTPATIENT)
Dept: DIABETES | Facility: CLINIC | Age: 71
End: 2021-05-10

## 2021-05-10 ENCOUNTER — PATIENT MESSAGE (OUTPATIENT)
Dept: INTERNAL MEDICINE | Facility: CLINIC | Age: 71
End: 2021-05-10

## 2021-05-12 ENCOUNTER — PATIENT MESSAGE (OUTPATIENT)
Dept: DIABETES | Facility: CLINIC | Age: 71
End: 2021-05-12

## 2021-05-13 ENCOUNTER — PATIENT MESSAGE (OUTPATIENT)
Dept: INTERNAL MEDICINE | Facility: CLINIC | Age: 71
End: 2021-05-13

## 2021-05-27 ENCOUNTER — OFFICE VISIT (OUTPATIENT)
Dept: INTERNAL MEDICINE | Facility: CLINIC | Age: 71
End: 2021-05-27
Payer: MEDICARE

## 2021-05-27 VITALS
BODY MASS INDEX: 30.83 KG/M2 | HEART RATE: 86 BPM | TEMPERATURE: 98 F | WEIGHT: 208.13 LBS | HEIGHT: 69 IN | SYSTOLIC BLOOD PRESSURE: 126 MMHG | OXYGEN SATURATION: 99 % | DIASTOLIC BLOOD PRESSURE: 76 MMHG

## 2021-05-27 DIAGNOSIS — E11.65 UNCONTROLLED TYPE 2 DIABETES MELLITUS WITH HYPERGLYCEMIA: ICD-10-CM

## 2021-05-27 DIAGNOSIS — G89.29 CHRONIC PAIN OF LEFT KNEE: ICD-10-CM

## 2021-05-27 DIAGNOSIS — M25.562 CHRONIC PAIN OF LEFT KNEE: ICD-10-CM

## 2021-05-27 DIAGNOSIS — M79.89 CALF SWELLING: ICD-10-CM

## 2021-05-27 DIAGNOSIS — R22.42 LOCALIZED SWELLING OF LEFT LOWER EXTREMITY: ICD-10-CM

## 2021-05-27 DIAGNOSIS — R53.1 WEAKNESS: ICD-10-CM

## 2021-05-27 DIAGNOSIS — Z86.16 HISTORY OF 2019 NOVEL CORONAVIRUS DISEASE (COVID-19): Primary | ICD-10-CM

## 2021-05-27 DIAGNOSIS — R60.9 EDEMA, UNSPECIFIED TYPE: ICD-10-CM

## 2021-05-27 PROCEDURE — 3008F PR BODY MASS INDEX (BMI) DOCUMENTED: ICD-10-PCS | Mod: S$GLB,,, | Performed by: INTERNAL MEDICINE

## 2021-05-27 PROCEDURE — 99214 PR OFFICE/OUTPT VISIT, EST, LEVL IV, 30-39 MIN: ICD-10-PCS | Mod: S$GLB,,, | Performed by: INTERNAL MEDICINE

## 2021-05-27 PROCEDURE — 99999 PR PBB SHADOW E&M-EST. PATIENT-LVL V: CPT | Mod: PBBFAC,,, | Performed by: INTERNAL MEDICINE

## 2021-05-27 PROCEDURE — 3008F BODY MASS INDEX DOCD: CPT | Mod: S$GLB,,, | Performed by: INTERNAL MEDICINE

## 2021-05-27 PROCEDURE — 1159F MED LIST DOCD IN RCRD: CPT | Mod: S$GLB,,, | Performed by: INTERNAL MEDICINE

## 2021-05-27 PROCEDURE — 1125F PR PAIN SEVERITY QUANTIFIED, PAIN PRESENT: ICD-10-PCS | Mod: S$GLB,,, | Performed by: INTERNAL MEDICINE

## 2021-05-27 PROCEDURE — 1101F PT FALLS ASSESS-DOCD LE1/YR: CPT | Mod: S$GLB,,, | Performed by: INTERNAL MEDICINE

## 2021-05-27 PROCEDURE — 99214 OFFICE O/P EST MOD 30 MIN: CPT | Mod: S$GLB,,, | Performed by: INTERNAL MEDICINE

## 2021-05-27 PROCEDURE — 1159F PR MEDICATION LIST DOCUMENTED IN MEDICAL RECORD: ICD-10-PCS | Mod: S$GLB,,, | Performed by: INTERNAL MEDICINE

## 2021-05-27 PROCEDURE — 1101F PR PT FALLS ASSESS DOC 0-1 FALLS W/OUT INJ PAST YR: ICD-10-PCS | Mod: S$GLB,,, | Performed by: INTERNAL MEDICINE

## 2021-05-27 PROCEDURE — 1125F AMNT PAIN NOTED PAIN PRSNT: CPT | Mod: S$GLB,,, | Performed by: INTERNAL MEDICINE

## 2021-05-27 PROCEDURE — 99999 PR PBB SHADOW E&M-EST. PATIENT-LVL V: ICD-10-PCS | Mod: PBBFAC,,, | Performed by: INTERNAL MEDICINE

## 2021-05-27 PROCEDURE — 3288F FALL RISK ASSESSMENT DOCD: CPT | Mod: S$GLB,,, | Performed by: INTERNAL MEDICINE

## 2021-05-27 PROCEDURE — 3288F PR FALLS RISK ASSESSMENT DOCUMENTED: ICD-10-PCS | Mod: S$GLB,,, | Performed by: INTERNAL MEDICINE

## 2021-05-27 RX ORDER — LOSARTAN POTASSIUM 100 MG/1
100 TABLET ORAL DAILY
COMMUNITY
Start: 2021-03-02 | End: 2024-03-11 | Stop reason: SDUPTHER

## 2021-05-28 ENCOUNTER — TELEPHONE (OUTPATIENT)
Dept: RADIOLOGY | Facility: HOSPITAL | Age: 71
End: 2021-05-28

## 2021-05-29 ENCOUNTER — HOSPITAL ENCOUNTER (OUTPATIENT)
Dept: RADIOLOGY | Facility: HOSPITAL | Age: 71
Discharge: HOME | End: 2021-05-29
Attending: INTERNAL MEDICINE
Payer: MEDICARE

## 2021-05-29 DIAGNOSIS — G89.29 CHRONIC PAIN OF LEFT KNEE: ICD-10-CM

## 2021-05-29 DIAGNOSIS — M25.562 CHRONIC PAIN OF LEFT KNEE: ICD-10-CM

## 2021-05-29 PROCEDURE — 73562 XR KNEE ORTHO LEFT: ICD-10-PCS | Mod: 26,LT,, | Performed by: RADIOLOGY

## 2021-05-29 PROCEDURE — 73560 X-RAY EXAM OF KNEE 1 OR 2: CPT | Mod: 26,RT,, | Performed by: RADIOLOGY

## 2021-05-29 PROCEDURE — 73562 X-RAY EXAM OF KNEE 3: CPT | Mod: 26,LT,, | Performed by: RADIOLOGY

## 2021-05-29 PROCEDURE — 73560 X-RAY EXAM OF KNEE 1 OR 2: CPT | Mod: TC,RT

## 2021-05-29 PROCEDURE — 73560 XR KNEE ORTHO LEFT: ICD-10-PCS | Mod: 26,RT,, | Performed by: RADIOLOGY

## 2021-05-31 ENCOUNTER — PATIENT MESSAGE (OUTPATIENT)
Dept: INTERNAL MEDICINE | Facility: CLINIC | Age: 71
End: 2021-05-31

## 2021-05-31 ENCOUNTER — HOSPITAL ENCOUNTER (OUTPATIENT)
Dept: RADIOLOGY | Facility: HOSPITAL | Age: 71
Discharge: HOME | End: 2021-05-31
Attending: INTERNAL MEDICINE
Payer: MEDICARE

## 2021-05-31 ENCOUNTER — OFFICE VISIT (OUTPATIENT)
Dept: INTERNAL MEDICINE | Facility: CLINIC | Age: 71
End: 2021-05-31
Payer: MEDICARE

## 2021-05-31 ENCOUNTER — EXTERNAL CHRONIC CARE MANAGEMENT (OUTPATIENT)
Dept: PRIMARY CARE CLINIC | Facility: CLINIC | Age: 71
End: 2021-05-31
Payer: MEDICARE

## 2021-05-31 VITALS
HEIGHT: 69 IN | WEIGHT: 208.13 LBS | OXYGEN SATURATION: 98 % | SYSTOLIC BLOOD PRESSURE: 140 MMHG | BODY MASS INDEX: 30.83 KG/M2 | TEMPERATURE: 98 F | HEART RATE: 83 BPM | DIASTOLIC BLOOD PRESSURE: 72 MMHG

## 2021-05-31 DIAGNOSIS — Z86.16 HISTORY OF 2019 NOVEL CORONAVIRUS DISEASE (COVID-19): ICD-10-CM

## 2021-05-31 DIAGNOSIS — R22.42 LOCALIZED SWELLING OF LEFT LOWER EXTREMITY: ICD-10-CM

## 2021-05-31 DIAGNOSIS — I82.432 ACUTE DEEP VEIN THROMBOSIS (DVT) OF POPLITEAL VEIN OF LEFT LOWER EXTREMITY: Primary | ICD-10-CM

## 2021-05-31 PROCEDURE — 99999 PR PBB SHADOW E&M-EST. PATIENT-LVL IV: CPT | Mod: PBBFAC,,, | Performed by: FAMILY MEDICINE

## 2021-05-31 PROCEDURE — 1159F PR MEDICATION LIST DOCUMENTED IN MEDICAL RECORD: ICD-10-PCS | Mod: S$GLB,,, | Performed by: FAMILY MEDICINE

## 2021-05-31 PROCEDURE — 1101F PR PT FALLS ASSESS DOC 0-1 FALLS W/OUT INJ PAST YR: ICD-10-PCS | Mod: S$GLB,,, | Performed by: FAMILY MEDICINE

## 2021-05-31 PROCEDURE — 99214 OFFICE O/P EST MOD 30 MIN: CPT | Mod: S$GLB,,, | Performed by: FAMILY MEDICINE

## 2021-05-31 PROCEDURE — 99490 PR CHRONIC CARE MGMT, 1ST 20 MIN: ICD-10-PCS | Mod: S$GLB,,, | Performed by: INTERNAL MEDICINE

## 2021-05-31 PROCEDURE — 93971 US LOWER EXTREMITY VEINS LEFT: ICD-10-PCS | Mod: 26,LT,, | Performed by: RADIOLOGY

## 2021-05-31 PROCEDURE — 99214 PR OFFICE/OUTPT VISIT, EST, LEVL IV, 30-39 MIN: ICD-10-PCS | Mod: S$GLB,,, | Performed by: FAMILY MEDICINE

## 2021-05-31 PROCEDURE — 3008F PR BODY MASS INDEX (BMI) DOCUMENTED: ICD-10-PCS | Mod: S$GLB,,, | Performed by: FAMILY MEDICINE

## 2021-05-31 PROCEDURE — 93971 EXTREMITY STUDY: CPT | Mod: 26,LT,, | Performed by: RADIOLOGY

## 2021-05-31 PROCEDURE — 3288F FALL RISK ASSESSMENT DOCD: CPT | Mod: S$GLB,,, | Performed by: FAMILY MEDICINE

## 2021-05-31 PROCEDURE — 1159F MED LIST DOCD IN RCRD: CPT | Mod: S$GLB,,, | Performed by: FAMILY MEDICINE

## 2021-05-31 PROCEDURE — 99999 PR PBB SHADOW E&M-EST. PATIENT-LVL IV: ICD-10-PCS | Mod: PBBFAC,,, | Performed by: FAMILY MEDICINE

## 2021-05-31 PROCEDURE — 3008F BODY MASS INDEX DOCD: CPT | Mod: S$GLB,,, | Performed by: FAMILY MEDICINE

## 2021-05-31 PROCEDURE — 3288F PR FALLS RISK ASSESSMENT DOCUMENTED: ICD-10-PCS | Mod: S$GLB,,, | Performed by: FAMILY MEDICINE

## 2021-05-31 PROCEDURE — 93971 EXTREMITY STUDY: CPT | Mod: TC,LT

## 2021-05-31 PROCEDURE — 99490 CHRNC CARE MGMT STAFF 1ST 20: CPT | Mod: S$GLB,,, | Performed by: INTERNAL MEDICINE

## 2021-05-31 PROCEDURE — 1101F PT FALLS ASSESS-DOCD LE1/YR: CPT | Mod: S$GLB,,, | Performed by: FAMILY MEDICINE

## 2021-05-31 RX ORDER — APIXABAN 5 MG (74)
KIT ORAL
Qty: 74 TABLET | Refills: 0 | Status: SHIPPED | OUTPATIENT
Start: 2021-05-31 | End: 2021-06-23 | Stop reason: SDUPTHER

## 2021-06-02 ENCOUNTER — PATIENT MESSAGE (OUTPATIENT)
Dept: INTERNAL MEDICINE | Facility: CLINIC | Age: 71
End: 2021-06-02

## 2021-06-02 ENCOUNTER — TELEPHONE (OUTPATIENT)
Dept: PHYSICAL MEDICINE AND REHAB | Facility: CLINIC | Age: 71
End: 2021-06-02

## 2021-06-02 ENCOUNTER — OFFICE VISIT (OUTPATIENT)
Dept: INTERNAL MEDICINE | Facility: CLINIC | Age: 71
End: 2021-06-02
Payer: MEDICARE

## 2021-06-02 ENCOUNTER — TELEPHONE (OUTPATIENT)
Dept: INTERNAL MEDICINE | Facility: CLINIC | Age: 71
End: 2021-06-02

## 2021-06-02 VITALS — DIASTOLIC BLOOD PRESSURE: 83 MMHG | TEMPERATURE: 98 F | HEART RATE: 85 BPM | SYSTOLIC BLOOD PRESSURE: 129 MMHG

## 2021-06-02 DIAGNOSIS — Z86.16 HISTORY OF 2019 NOVEL CORONAVIRUS DISEASE (COVID-19): Primary | ICD-10-CM

## 2021-06-02 DIAGNOSIS — R53.1 WEAKNESS: ICD-10-CM

## 2021-06-02 DIAGNOSIS — G62.9 NEUROPATHY: ICD-10-CM

## 2021-06-02 DIAGNOSIS — E11.65 UNCONTROLLED TYPE 2 DIABETES MELLITUS WITH HYPERGLYCEMIA: ICD-10-CM

## 2021-06-02 DIAGNOSIS — G72.81 INTENSIVE CARE (ICU) MYOPATHY: ICD-10-CM

## 2021-06-02 PROCEDURE — 99999 PR PBB SHADOW E&M-EST. PATIENT-LVL III: ICD-10-PCS | Mod: PBBFAC,,, | Performed by: INTERNAL MEDICINE

## 2021-06-02 PROCEDURE — 3288F FALL RISK ASSESSMENT DOCD: CPT | Mod: S$GLB,,, | Performed by: INTERNAL MEDICINE

## 2021-06-02 PROCEDURE — 1101F PT FALLS ASSESS-DOCD LE1/YR: CPT | Mod: S$GLB,,, | Performed by: INTERNAL MEDICINE

## 2021-06-02 PROCEDURE — 1159F PR MEDICATION LIST DOCUMENTED IN MEDICAL RECORD: ICD-10-PCS | Mod: S$GLB,,, | Performed by: INTERNAL MEDICINE

## 2021-06-02 PROCEDURE — 99999 PR PBB SHADOW E&M-EST. PATIENT-LVL III: CPT | Mod: PBBFAC,,, | Performed by: INTERNAL MEDICINE

## 2021-06-02 PROCEDURE — 99214 PR OFFICE/OUTPT VISIT, EST, LEVL IV, 30-39 MIN: ICD-10-PCS | Mod: S$GLB,,, | Performed by: INTERNAL MEDICINE

## 2021-06-02 PROCEDURE — 3288F PR FALLS RISK ASSESSMENT DOCUMENTED: ICD-10-PCS | Mod: S$GLB,,, | Performed by: INTERNAL MEDICINE

## 2021-06-02 PROCEDURE — 1101F PR PT FALLS ASSESS DOC 0-1 FALLS W/OUT INJ PAST YR: ICD-10-PCS | Mod: S$GLB,,, | Performed by: INTERNAL MEDICINE

## 2021-06-02 PROCEDURE — 1159F MED LIST DOCD IN RCRD: CPT | Mod: S$GLB,,, | Performed by: INTERNAL MEDICINE

## 2021-06-02 PROCEDURE — 99214 OFFICE O/P EST MOD 30 MIN: CPT | Mod: S$GLB,,, | Performed by: INTERNAL MEDICINE

## 2021-06-02 RX ORDER — PIOGLITAZONEHYDROCHLORIDE 15 MG/1
15 TABLET ORAL DAILY
Qty: 90 TABLET | Refills: 0 | Status: SHIPPED | OUTPATIENT
Start: 2021-06-02 | End: 2021-07-30 | Stop reason: SDUPTHER

## 2021-06-02 RX ORDER — GABAPENTIN 400 MG/1
400 CAPSULE ORAL 3 TIMES DAILY PRN
Qty: 90 CAPSULE | Refills: 2 | Status: SHIPPED | OUTPATIENT
Start: 2021-06-02 | End: 2021-09-03 | Stop reason: SDUPTHER

## 2021-06-02 RX ORDER — ERTUGLIFLOZIN 15 MG/1
15 TABLET, FILM COATED ORAL DAILY
Qty: 90 TABLET | Refills: 0 | Status: SHIPPED | OUTPATIENT
Start: 2021-06-02 | End: 2021-07-30 | Stop reason: SDUPTHER

## 2021-06-07 ENCOUNTER — PATIENT OUTREACH (OUTPATIENT)
Dept: ADMINISTRATIVE | Facility: OTHER | Age: 71
End: 2021-06-07

## 2021-06-07 ENCOUNTER — PATIENT MESSAGE (OUTPATIENT)
Dept: INTERNAL MEDICINE | Facility: CLINIC | Age: 71
End: 2021-06-07

## 2021-06-07 DIAGNOSIS — E11.65 UNCONTROLLED TYPE 2 DIABETES MELLITUS WITH HYPERGLYCEMIA: ICD-10-CM

## 2021-06-08 ENCOUNTER — OFFICE VISIT (OUTPATIENT)
Dept: PHYSICAL MEDICINE AND REHAB | Facility: CLINIC | Age: 71
End: 2021-06-08
Payer: MEDICARE

## 2021-06-08 DIAGNOSIS — R29.898 MUSCULAR DECONDITIONING: ICD-10-CM

## 2021-06-08 DIAGNOSIS — Z86.16 HISTORY OF 2019 NOVEL CORONAVIRUS DISEASE (COVID-19): ICD-10-CM

## 2021-06-08 PROCEDURE — 99999 PR PBB SHADOW E&M-EST. PATIENT-LVL III: CPT | Mod: PBBFAC,,, | Performed by: PHYSICAL MEDICINE & REHABILITATION

## 2021-06-08 PROCEDURE — 99203 PR OFFICE/OUTPT VISIT, NEW, LEVL III, 30-44 MIN: ICD-10-PCS | Mod: S$GLB,,, | Performed by: PHYSICAL MEDICINE & REHABILITATION

## 2021-06-08 PROCEDURE — 1159F PR MEDICATION LIST DOCUMENTED IN MEDICAL RECORD: ICD-10-PCS | Mod: S$GLB,,, | Performed by: PHYSICAL MEDICINE & REHABILITATION

## 2021-06-08 PROCEDURE — 1159F MED LIST DOCD IN RCRD: CPT | Mod: S$GLB,,, | Performed by: PHYSICAL MEDICINE & REHABILITATION

## 2021-06-08 PROCEDURE — 99999 PR PBB SHADOW E&M-EST. PATIENT-LVL III: ICD-10-PCS | Mod: PBBFAC,,, | Performed by: PHYSICAL MEDICINE & REHABILITATION

## 2021-06-08 PROCEDURE — 99203 OFFICE O/P NEW LOW 30 MIN: CPT | Mod: S$GLB,,, | Performed by: PHYSICAL MEDICINE & REHABILITATION

## 2021-06-08 RX ORDER — DULAGLUTIDE 0.75 MG/.5ML
0.75 INJECTION, SOLUTION SUBCUTANEOUS WEEKLY
Qty: 12 PEN | Refills: 0 | Status: SHIPPED | OUTPATIENT
Start: 2021-06-08 | End: 2021-06-17 | Stop reason: SDUPTHER

## 2021-06-14 PROBLEM — L89.153 PRESSURE INJURY OF SACRAL REGION, STAGE 3: Status: RESOLVED | Noted: 2020-08-24 | Resolved: 2021-06-14

## 2021-06-17 DIAGNOSIS — E11.65 UNCONTROLLED TYPE 2 DIABETES MELLITUS WITH HYPERGLYCEMIA: ICD-10-CM

## 2021-06-17 RX ORDER — DULAGLUTIDE 0.75 MG/.5ML
0.75 INJECTION, SOLUTION SUBCUTANEOUS WEEKLY
Qty: 12 PEN | Refills: 0 | Status: SHIPPED | OUTPATIENT
Start: 2021-06-17 | End: 2021-08-02

## 2021-06-22 ENCOUNTER — OFFICE VISIT (OUTPATIENT)
Dept: INTERNAL MEDICINE | Facility: CLINIC | Age: 71
End: 2021-06-22
Payer: MEDICARE

## 2021-06-22 ENCOUNTER — OFFICE VISIT (OUTPATIENT)
Dept: OPHTHALMOLOGY | Facility: CLINIC | Age: 71
End: 2021-06-22
Payer: MEDICARE

## 2021-06-22 VITALS
DIASTOLIC BLOOD PRESSURE: 76 MMHG | SYSTOLIC BLOOD PRESSURE: 122 MMHG | RESPIRATION RATE: 18 BRPM | TEMPERATURE: 98 F | WEIGHT: 254 LBS | BODY MASS INDEX: 37.62 KG/M2 | HEIGHT: 69 IN | OXYGEN SATURATION: 98 % | HEART RATE: 86 BPM

## 2021-06-22 DIAGNOSIS — Z86.16 HISTORY OF 2019 NOVEL CORONAVIRUS DISEASE (COVID-19): ICD-10-CM

## 2021-06-22 DIAGNOSIS — R29.898 MUSCULAR DECONDITIONING: ICD-10-CM

## 2021-06-22 DIAGNOSIS — H52.7 REFRACTIVE DISORDER: ICD-10-CM

## 2021-06-22 DIAGNOSIS — H25.11 NUCLEAR SCLEROSIS OF RIGHT EYE: ICD-10-CM

## 2021-06-22 DIAGNOSIS — Z82.49 FAMILY HISTORY OF BLOOD CLOTS: ICD-10-CM

## 2021-06-22 DIAGNOSIS — H04.123 DRY EYES, BILATERAL: ICD-10-CM

## 2021-06-22 DIAGNOSIS — I82.409 ACUTE DEEP VEIN THROMBOSIS (DVT) OF OTHER VEIN OF LOWER EXTREMITY, UNSPECIFIED LATERALITY: Primary | ICD-10-CM

## 2021-06-22 DIAGNOSIS — H25.12 NUCLEAR SCLEROSIS OF LEFT EYE: ICD-10-CM

## 2021-06-22 DIAGNOSIS — E11.9 TYPE 2 DIABETES MELLITUS WITHOUT COMPLICATION, WITHOUT LONG-TERM CURRENT USE OF INSULIN: Primary | ICD-10-CM

## 2021-06-22 PROBLEM — U07.1 COVID-19 VIRUS INFECTION: Status: RESOLVED | Noted: 2020-07-26 | Resolved: 2021-06-22

## 2021-06-22 PROCEDURE — 99213 PR OFFICE/OUTPT VISIT, EST, LEVL III, 20-29 MIN: ICD-10-PCS | Mod: S$GLB,,, | Performed by: INTERNAL MEDICINE

## 2021-06-22 PROCEDURE — 2023F DILAT RTA XM W/O RTNOPTHY: CPT | Mod: S$GLB,,, | Performed by: STUDENT IN AN ORGANIZED HEALTH CARE EDUCATION/TRAINING PROGRAM

## 2021-06-22 PROCEDURE — 99999 PR PBB SHADOW E&M-EST. PATIENT-LVL II: CPT | Mod: PBBFAC,,, | Performed by: STUDENT IN AN ORGANIZED HEALTH CARE EDUCATION/TRAINING PROGRAM

## 2021-06-22 PROCEDURE — 3008F PR BODY MASS INDEX (BMI) DOCUMENTED: ICD-10-PCS | Mod: S$GLB,,, | Performed by: INTERNAL MEDICINE

## 2021-06-22 PROCEDURE — 1101F PR PT FALLS ASSESS DOC 0-1 FALLS W/OUT INJ PAST YR: ICD-10-PCS | Mod: S$GLB,,, | Performed by: INTERNAL MEDICINE

## 2021-06-22 PROCEDURE — 99999 PR PBB SHADOW E&M-EST. PATIENT-LVL V: CPT | Mod: PBBFAC,,, | Performed by: INTERNAL MEDICINE

## 2021-06-22 PROCEDURE — 1125F AMNT PAIN NOTED PAIN PRSNT: CPT | Mod: S$GLB,,, | Performed by: INTERNAL MEDICINE

## 2021-06-22 PROCEDURE — 1159F MED LIST DOCD IN RCRD: CPT | Mod: S$GLB,,, | Performed by: INTERNAL MEDICINE

## 2021-06-22 PROCEDURE — 99213 OFFICE O/P EST LOW 20 MIN: CPT | Mod: S$GLB,,, | Performed by: INTERNAL MEDICINE

## 2021-06-22 PROCEDURE — 92015 DETERMINE REFRACTIVE STATE: CPT | Mod: S$GLB,,, | Performed by: STUDENT IN AN ORGANIZED HEALTH CARE EDUCATION/TRAINING PROGRAM

## 2021-06-22 PROCEDURE — 92015 PR REFRACTION: ICD-10-PCS | Mod: S$GLB,,, | Performed by: STUDENT IN AN ORGANIZED HEALTH CARE EDUCATION/TRAINING PROGRAM

## 2021-06-22 PROCEDURE — 1125F PR PAIN SEVERITY QUANTIFIED, PAIN PRESENT: ICD-10-PCS | Mod: S$GLB,,, | Performed by: INTERNAL MEDICINE

## 2021-06-22 PROCEDURE — 92004 COMPRE OPH EXAM NEW PT 1/>: CPT | Mod: S$GLB,,, | Performed by: STUDENT IN AN ORGANIZED HEALTH CARE EDUCATION/TRAINING PROGRAM

## 2021-06-22 PROCEDURE — 92004 PR EYE EXAM, NEW PATIENT,COMPREHESV: ICD-10-PCS | Mod: S$GLB,,, | Performed by: STUDENT IN AN ORGANIZED HEALTH CARE EDUCATION/TRAINING PROGRAM

## 2021-06-22 PROCEDURE — 1159F PR MEDICATION LIST DOCUMENTED IN MEDICAL RECORD: ICD-10-PCS | Mod: S$GLB,,, | Performed by: INTERNAL MEDICINE

## 2021-06-22 PROCEDURE — 2023F PR DILATED RETINAL EXAM W/O EVID OF RETINOPATHY: ICD-10-PCS | Mod: S$GLB,,, | Performed by: STUDENT IN AN ORGANIZED HEALTH CARE EDUCATION/TRAINING PROGRAM

## 2021-06-22 PROCEDURE — 3288F FALL RISK ASSESSMENT DOCD: CPT | Mod: S$GLB,,, | Performed by: INTERNAL MEDICINE

## 2021-06-22 PROCEDURE — 99999 PR PBB SHADOW E&M-EST. PATIENT-LVL V: ICD-10-PCS | Mod: PBBFAC,,, | Performed by: INTERNAL MEDICINE

## 2021-06-22 PROCEDURE — 3044F HG A1C LEVEL LT 7.0%: CPT | Mod: S$GLB,,, | Performed by: STUDENT IN AN ORGANIZED HEALTH CARE EDUCATION/TRAINING PROGRAM

## 2021-06-22 PROCEDURE — 3008F BODY MASS INDEX DOCD: CPT | Mod: S$GLB,,, | Performed by: INTERNAL MEDICINE

## 2021-06-22 PROCEDURE — 1101F PT FALLS ASSESS-DOCD LE1/YR: CPT | Mod: S$GLB,,, | Performed by: INTERNAL MEDICINE

## 2021-06-22 PROCEDURE — 3288F PR FALLS RISK ASSESSMENT DOCUMENTED: ICD-10-PCS | Mod: S$GLB,,, | Performed by: INTERNAL MEDICINE

## 2021-06-22 PROCEDURE — 99999 PR PBB SHADOW E&M-EST. PATIENT-LVL II: ICD-10-PCS | Mod: PBBFAC,,, | Performed by: STUDENT IN AN ORGANIZED HEALTH CARE EDUCATION/TRAINING PROGRAM

## 2021-06-22 PROCEDURE — 3044F PR MOST RECENT HEMOGLOBIN A1C LEVEL <7.0%: ICD-10-PCS | Mod: S$GLB,,, | Performed by: STUDENT IN AN ORGANIZED HEALTH CARE EDUCATION/TRAINING PROGRAM

## 2021-06-23 DIAGNOSIS — Z86.16 HISTORY OF 2019 NOVEL CORONAVIRUS DISEASE (COVID-19): ICD-10-CM

## 2021-06-23 DIAGNOSIS — I82.432 ACUTE DEEP VEIN THROMBOSIS (DVT) OF POPLITEAL VEIN OF LEFT LOWER EXTREMITY: ICD-10-CM

## 2021-06-24 RX ORDER — APIXABAN 5 MG (74)
KIT ORAL
Qty: 60 TABLET | Refills: 1 | Status: SHIPPED | OUTPATIENT
Start: 2021-06-24 | End: 2021-07-26 | Stop reason: SDUPTHER

## 2021-07-08 ENCOUNTER — PES CALL (OUTPATIENT)
Dept: ADMINISTRATIVE | Facility: CLINIC | Age: 71
End: 2021-07-08

## 2021-07-15 ENCOUNTER — OFFICE VISIT (OUTPATIENT)
Dept: HEMATOLOGY/ONCOLOGY | Facility: CLINIC | Age: 71
End: 2021-07-15
Payer: MEDICARE

## 2021-07-15 VITALS
DIASTOLIC BLOOD PRESSURE: 65 MMHG | TEMPERATURE: 97 F | BODY MASS INDEX: 37.62 KG/M2 | HEART RATE: 90 BPM | HEIGHT: 69 IN | WEIGHT: 254 LBS | SYSTOLIC BLOOD PRESSURE: 113 MMHG

## 2021-07-15 DIAGNOSIS — Z86.16 HISTORY OF 2019 NOVEL CORONAVIRUS DISEASE (COVID-19): ICD-10-CM

## 2021-07-15 DIAGNOSIS — Z82.49 FAMILY HISTORY OF BLOOD CLOTS: ICD-10-CM

## 2021-07-15 DIAGNOSIS — I82.409 ACUTE DEEP VEIN THROMBOSIS (DVT) OF OTHER VEIN OF LOWER EXTREMITY, UNSPECIFIED LATERALITY: ICD-10-CM

## 2021-07-15 DIAGNOSIS — D64.9 ANEMIA, UNSPECIFIED TYPE: Primary | ICD-10-CM

## 2021-07-15 DIAGNOSIS — D53.9 NUTRITIONAL ANEMIA, UNSPECIFIED: ICD-10-CM

## 2021-07-15 PROCEDURE — 1101F PT FALLS ASSESS-DOCD LE1/YR: CPT | Mod: S$GLB,,, | Performed by: INTERNAL MEDICINE

## 2021-07-15 PROCEDURE — 1125F PR PAIN SEVERITY QUANTIFIED, PAIN PRESENT: ICD-10-PCS | Mod: S$GLB,,, | Performed by: INTERNAL MEDICINE

## 2021-07-15 PROCEDURE — 1101F PR PT FALLS ASSESS DOC 0-1 FALLS W/OUT INJ PAST YR: ICD-10-PCS | Mod: S$GLB,,, | Performed by: INTERNAL MEDICINE

## 2021-07-15 PROCEDURE — 3008F BODY MASS INDEX DOCD: CPT | Mod: S$GLB,,, | Performed by: INTERNAL MEDICINE

## 2021-07-15 PROCEDURE — 99204 OFFICE O/P NEW MOD 45 MIN: CPT | Mod: S$GLB,,, | Performed by: INTERNAL MEDICINE

## 2021-07-15 PROCEDURE — 3008F PR BODY MASS INDEX (BMI) DOCUMENTED: ICD-10-PCS | Mod: S$GLB,,, | Performed by: INTERNAL MEDICINE

## 2021-07-15 PROCEDURE — 1125F AMNT PAIN NOTED PAIN PRSNT: CPT | Mod: S$GLB,,, | Performed by: INTERNAL MEDICINE

## 2021-07-15 PROCEDURE — 3288F FALL RISK ASSESSMENT DOCD: CPT | Mod: S$GLB,,, | Performed by: INTERNAL MEDICINE

## 2021-07-15 PROCEDURE — 99999 PR PBB SHADOW E&M-EST. PATIENT-LVL V: ICD-10-PCS | Mod: PBBFAC,,, | Performed by: INTERNAL MEDICINE

## 2021-07-15 PROCEDURE — 1159F MED LIST DOCD IN RCRD: CPT | Mod: S$GLB,,, | Performed by: INTERNAL MEDICINE

## 2021-07-15 PROCEDURE — 1159F PR MEDICATION LIST DOCUMENTED IN MEDICAL RECORD: ICD-10-PCS | Mod: S$GLB,,, | Performed by: INTERNAL MEDICINE

## 2021-07-15 PROCEDURE — 99999 PR PBB SHADOW E&M-EST. PATIENT-LVL V: CPT | Mod: PBBFAC,,, | Performed by: INTERNAL MEDICINE

## 2021-07-15 PROCEDURE — 3288F PR FALLS RISK ASSESSMENT DOCUMENTED: ICD-10-PCS | Mod: S$GLB,,, | Performed by: INTERNAL MEDICINE

## 2021-07-15 PROCEDURE — 99204 PR OFFICE/OUTPT VISIT, NEW, LEVL IV, 45-59 MIN: ICD-10-PCS | Mod: S$GLB,,, | Performed by: INTERNAL MEDICINE

## 2021-07-19 ENCOUNTER — LAB VISIT (OUTPATIENT)
Dept: LAB | Facility: HOSPITAL | Age: 71
End: 2021-07-19
Attending: INTERNAL MEDICINE
Payer: MEDICARE

## 2021-07-19 DIAGNOSIS — I82.409 ACUTE DEEP VEIN THROMBOSIS (DVT) OF OTHER VEIN OF LOWER EXTREMITY, UNSPECIFIED LATERALITY: ICD-10-CM

## 2021-07-19 DIAGNOSIS — D53.9 NUTRITIONAL ANEMIA, UNSPECIFIED: ICD-10-CM

## 2021-07-19 DIAGNOSIS — D64.9 ANEMIA, UNSPECIFIED TYPE: ICD-10-CM

## 2021-07-19 DIAGNOSIS — Z82.49 FAMILY HISTORY OF BLOOD CLOTS: ICD-10-CM

## 2021-07-19 DIAGNOSIS — Z00.00 ROUTINE GENERAL MEDICAL EXAMINATION AT A HEALTH CARE FACILITY: ICD-10-CM

## 2021-07-19 LAB
ALBUMIN SERPL BCP-MCNC: 4.1 G/DL (ref 3.5–5.2)
ALP SERPL-CCNC: 80 U/L (ref 55–135)
ALT SERPL W/O P-5'-P-CCNC: 37 U/L (ref 10–44)
ANION GAP SERPL CALC-SCNC: 12 MMOL/L (ref 8–16)
AST SERPL-CCNC: 23 U/L (ref 10–40)
BASOPHILS # BLD AUTO: 0.03 K/UL (ref 0–0.2)
BASOPHILS # BLD AUTO: 0.03 K/UL (ref 0–0.2)
BASOPHILS NFR BLD: 0.5 % (ref 0–1.9)
BASOPHILS NFR BLD: 0.5 % (ref 0–1.9)
BILIRUB SERPL-MCNC: 0.6 MG/DL (ref 0.1–1)
BUN SERPL-MCNC: 24 MG/DL (ref 8–23)
CALCIUM SERPL-MCNC: 10.5 MG/DL (ref 8.7–10.5)
CHLORIDE SERPL-SCNC: 102 MMOL/L (ref 95–110)
CHOLEST SERPL-MCNC: 173 MG/DL (ref 120–199)
CHOLEST/HDLC SERPL: 4.7 {RATIO} (ref 2–5)
CO2 SERPL-SCNC: 26 MMOL/L (ref 23–29)
CREAT SERPL-MCNC: 1.6 MG/DL (ref 0.5–1.4)
D DIMER PPP IA.FEU-MCNC: 0.21 MG/L FEU
DIFFERENTIAL METHOD: ABNORMAL
DIFFERENTIAL METHOD: ABNORMAL
EOSINOPHIL # BLD AUTO: 0.2 K/UL (ref 0–0.5)
EOSINOPHIL # BLD AUTO: 0.2 K/UL (ref 0–0.5)
EOSINOPHIL NFR BLD: 3 % (ref 0–8)
EOSINOPHIL NFR BLD: 3 % (ref 0–8)
ERYTHROCYTE [DISTWIDTH] IN BLOOD BY AUTOMATED COUNT: 14.4 % (ref 11.5–14.5)
ERYTHROCYTE [DISTWIDTH] IN BLOOD BY AUTOMATED COUNT: 14.4 % (ref 11.5–14.5)
EST. GFR  (AFRICAN AMERICAN): 50.1 ML/MIN/1.73 M^2
EST. GFR  (NON AFRICAN AMERICAN): 43.3 ML/MIN/1.73 M^2
ESTIMATED AVG GLUCOSE: 186 MG/DL (ref 68–131)
FOLATE SERPL-MCNC: 16.6 NG/ML (ref 4–24)
GLUCOSE SERPL-MCNC: 213 MG/DL (ref 70–110)
HBA1C MFR BLD: 8.1 % (ref 4–5.6)
HCT VFR BLD AUTO: 43.6 % (ref 40–54)
HCT VFR BLD AUTO: 43.6 % (ref 40–54)
HDLC SERPL-MCNC: 37 MG/DL (ref 40–75)
HDLC SERPL: 21.4 % (ref 20–50)
HGB BLD-MCNC: 13.7 G/DL (ref 14–18)
HGB BLD-MCNC: 13.7 G/DL (ref 14–18)
IMM GRANULOCYTES # BLD AUTO: 0.01 K/UL (ref 0–0.04)
IMM GRANULOCYTES # BLD AUTO: 0.01 K/UL (ref 0–0.04)
IMM GRANULOCYTES NFR BLD AUTO: 0.2 % (ref 0–0.5)
IMM GRANULOCYTES NFR BLD AUTO: 0.2 % (ref 0–0.5)
IRON SERPL-MCNC: 102 UG/DL (ref 45–160)
LDLC SERPL CALC-MCNC: 94.8 MG/DL (ref 63–159)
LYMPHOCYTES # BLD AUTO: 2.8 K/UL (ref 1–4.8)
LYMPHOCYTES # BLD AUTO: 2.8 K/UL (ref 1–4.8)
LYMPHOCYTES NFR BLD: 47.7 % (ref 18–48)
LYMPHOCYTES NFR BLD: 47.7 % (ref 18–48)
MCH RBC QN AUTO: 28.1 PG (ref 27–31)
MCH RBC QN AUTO: 28.1 PG (ref 27–31)
MCHC RBC AUTO-ENTMCNC: 31.4 G/DL (ref 32–36)
MCHC RBC AUTO-ENTMCNC: 31.4 G/DL (ref 32–36)
MCV RBC AUTO: 89 FL (ref 82–98)
MCV RBC AUTO: 89 FL (ref 82–98)
MONOCYTES # BLD AUTO: 0.7 K/UL (ref 0.3–1)
MONOCYTES # BLD AUTO: 0.7 K/UL (ref 0.3–1)
MONOCYTES NFR BLD: 11.6 % (ref 4–15)
MONOCYTES NFR BLD: 11.6 % (ref 4–15)
NEUTROPHILS # BLD AUTO: 2.1 K/UL (ref 1.8–7.7)
NEUTROPHILS # BLD AUTO: 2.1 K/UL (ref 1.8–7.7)
NEUTROPHILS NFR BLD: 37 % (ref 38–73)
NEUTROPHILS NFR BLD: 37 % (ref 38–73)
NONHDLC SERPL-MCNC: 136 MG/DL
NRBC BLD-RTO: 0 /100 WBC
NRBC BLD-RTO: 0 /100 WBC
PATH REV BLD -IMP: NORMAL
PLATELET # BLD AUTO: 279 K/UL (ref 150–450)
PLATELET # BLD AUTO: 279 K/UL (ref 150–450)
PMV BLD AUTO: 10.9 FL (ref 9.2–12.9)
PMV BLD AUTO: 10.9 FL (ref 9.2–12.9)
POTASSIUM SERPL-SCNC: 4.2 MMOL/L (ref 3.5–5.1)
PROT SERPL-MCNC: 8.2 G/DL (ref 6–8.4)
RBC # BLD AUTO: 4.88 M/UL (ref 4.6–6.2)
RBC # BLD AUTO: 4.88 M/UL (ref 4.6–6.2)
RETICS/RBC NFR AUTO: 1.7 % (ref 0.4–2)
SATURATED IRON: 19 % (ref 20–50)
SODIUM SERPL-SCNC: 140 MMOL/L (ref 136–145)
TOTAL IRON BINDING CAPACITY: 530 UG/DL (ref 250–450)
TRANSFERRIN SERPL-MCNC: 358 MG/DL (ref 200–375)
TRIGL SERPL-MCNC: 206 MG/DL (ref 30–150)
VIT B12 SERPL-MCNC: >2000 PG/ML (ref 210–950)
WBC # BLD AUTO: 5.76 K/UL (ref 3.9–12.7)
WBC # BLD AUTO: 5.76 K/UL (ref 3.9–12.7)

## 2021-07-19 PROCEDURE — 85379 FIBRIN DEGRADATION QUANT: CPT | Performed by: INTERNAL MEDICINE

## 2021-07-19 PROCEDURE — 85303 CLOT INHIBIT PROT C ACTIVITY: CPT | Performed by: INTERNAL MEDICINE

## 2021-07-19 PROCEDURE — 86147 CARDIOLIPIN ANTIBODY EA IG: CPT | Performed by: INTERNAL MEDICINE

## 2021-07-19 PROCEDURE — 83036 HEMOGLOBIN GLYCOSYLATED A1C: CPT | Performed by: INTERNAL MEDICINE

## 2021-07-19 PROCEDURE — 36415 COLL VENOUS BLD VENIPUNCTURE: CPT | Performed by: INTERNAL MEDICINE

## 2021-07-19 PROCEDURE — 82607 VITAMIN B-12: CPT | Performed by: INTERNAL MEDICINE

## 2021-07-19 PROCEDURE — 81240 F2 GENE: CPT | Performed by: INTERNAL MEDICINE

## 2021-07-19 PROCEDURE — 86146 BETA-2 GLYCOPROTEIN ANTIBODY: CPT | Mod: 59 | Performed by: INTERNAL MEDICINE

## 2021-07-19 PROCEDURE — 82728 ASSAY OF FERRITIN: CPT | Performed by: INTERNAL MEDICINE

## 2021-07-19 PROCEDURE — 84443 ASSAY THYROID STIM HORMONE: CPT | Performed by: INTERNAL MEDICINE

## 2021-07-19 PROCEDURE — 85025 COMPLETE CBC W/AUTO DIFF WBC: CPT | Performed by: INTERNAL MEDICINE

## 2021-07-19 PROCEDURE — 85045 AUTOMATED RETICULOCYTE COUNT: CPT | Performed by: INTERNAL MEDICINE

## 2021-07-19 PROCEDURE — 85060 BLOOD SMEAR INTERPRETATION: CPT | Mod: ,,, | Performed by: STUDENT IN AN ORGANIZED HEALTH CARE EDUCATION/TRAINING PROGRAM

## 2021-07-19 PROCEDURE — 85060 PATHOLOGIST REVIEW: ICD-10-PCS | Mod: ,,, | Performed by: STUDENT IN AN ORGANIZED HEALTH CARE EDUCATION/TRAINING PROGRAM

## 2021-07-19 PROCEDURE — 83540 ASSAY OF IRON: CPT | Performed by: INTERNAL MEDICINE

## 2021-07-19 PROCEDURE — 85613 RUSSELL VIPER VENOM DILUTED: CPT | Performed by: INTERNAL MEDICINE

## 2021-07-19 PROCEDURE — 85305 CLOT INHIBIT PROT S TOTAL: CPT | Performed by: INTERNAL MEDICINE

## 2021-07-19 PROCEDURE — 80053 COMPREHEN METABOLIC PANEL: CPT | Performed by: INTERNAL MEDICINE

## 2021-07-19 PROCEDURE — 80061 LIPID PANEL: CPT | Performed by: INTERNAL MEDICINE

## 2021-07-19 PROCEDURE — 85300 ANTITHROMBIN III ACTIVITY: CPT | Performed by: INTERNAL MEDICINE

## 2021-07-19 PROCEDURE — 81241 F5 GENE: CPT | Performed by: INTERNAL MEDICINE

## 2021-07-19 PROCEDURE — 82746 ASSAY OF FOLIC ACID SERUM: CPT | Performed by: INTERNAL MEDICINE

## 2021-07-20 LAB
FERRITIN SERPL-MCNC: 49 NG/ML (ref 20–300)
PATH REV BLD -IMP: NORMAL
PATH REV BLD -IMP: NORMAL
PROT C ACT/NOR PPP CHRO: 133 % (ref 70–150)
TSH SERPL DL<=0.005 MIU/L-ACNC: 0.94 UIU/ML (ref 0.4–4)

## 2021-07-21 LAB
APTT IMM NP PPP: NORMAL SEC (ref 32–48)
APTT P HEP NEUT PPP: NORMAL SEC (ref 32–48)
CONFIRM APTT STACLOT: NORMAL
DRVVT SCREEN TO CONFIRM RATIO: NORMAL RATIO
LA 3 SCREEN W REFLEX-IMP: NORMAL
LA NT DPL PPP QL: NORMAL
MIXING DRVVT: NORMAL SEC (ref 33–44)
PROT S ACT/NOR PPP: 130 % (ref 65–160)
PROTHROMBIN TIME: 13.6 SEC (ref 12–15.5)
REPTILASE TIME: NORMAL SEC
SCREEN APTT: 39 SEC (ref 32–48)
SCREEN DRVVT: 42 SEC (ref 33–44)
THROMBIN TIME: NORMAL SEC (ref 14.7–19.5)

## 2021-07-22 LAB
AT III ACT/NOR PPP CHRO: >131 % (ref 83–118)
B2 GLYCOPROT1 IGA SER QL: <9 SAU
B2 GLYCOPROT1 IGG SER QL: <9 SGU
B2 GLYCOPROT1 IGM SER QL: <9 SMU
CARDIOLIPIN IGG SER IA-ACNC: <9.4 GPL (ref 0–14.99)
CARDIOLIPIN IGM SER IA-ACNC: <9.4 MPL (ref 0–12.49)

## 2021-07-25 ENCOUNTER — PATIENT MESSAGE (OUTPATIENT)
Dept: INTERNAL MEDICINE | Facility: CLINIC | Age: 71
End: 2021-07-25

## 2021-07-26 DIAGNOSIS — Z86.16 HISTORY OF 2019 NOVEL CORONAVIRUS DISEASE (COVID-19): ICD-10-CM

## 2021-07-26 DIAGNOSIS — I82.432 ACUTE DEEP VEIN THROMBOSIS (DVT) OF POPLITEAL VEIN OF LEFT LOWER EXTREMITY: ICD-10-CM

## 2021-07-26 LAB
F2 C.20210G>A GENO BLD/T: NORMAL
F2 GENE MUT ANL BLD/T: NORMAL
F5 GENE MUT ANL BLD/T: NORMAL
F5 P.R506Q BLD/T QL: NORMAL

## 2021-07-26 RX ORDER — APIXABAN 5 MG (74)
KIT ORAL
Qty: 60 TABLET | Refills: 1 | Status: SHIPPED | OUTPATIENT
Start: 2021-07-26 | End: 2021-12-23

## 2021-07-30 DIAGNOSIS — R29.898 MUSCULAR DECONDITIONING: Primary | ICD-10-CM

## 2021-07-30 DIAGNOSIS — Z86.16 HISTORY OF 2019 NOVEL CORONAVIRUS DISEASE (COVID-19): ICD-10-CM

## 2021-07-30 DIAGNOSIS — E11.65 UNCONTROLLED TYPE 2 DIABETES MELLITUS WITH HYPERGLYCEMIA: ICD-10-CM

## 2021-08-02 ENCOUNTER — OFFICE VISIT (OUTPATIENT)
Dept: INTERNAL MEDICINE | Facility: CLINIC | Age: 71
End: 2021-08-02
Payer: MEDICARE

## 2021-08-02 VITALS
TEMPERATURE: 98 F | BODY MASS INDEX: 38.5 KG/M2 | DIASTOLIC BLOOD PRESSURE: 84 MMHG | SYSTOLIC BLOOD PRESSURE: 134 MMHG | WEIGHT: 259.94 LBS | HEART RATE: 68 BPM | HEIGHT: 69 IN

## 2021-08-02 DIAGNOSIS — E11.69 HYPERLIPIDEMIA ASSOCIATED WITH TYPE 2 DIABETES MELLITUS: ICD-10-CM

## 2021-08-02 DIAGNOSIS — E11.59 HYPERTENSION ASSOCIATED WITH DIABETES: ICD-10-CM

## 2021-08-02 DIAGNOSIS — E78.5 HYPERLIPIDEMIA ASSOCIATED WITH TYPE 2 DIABETES MELLITUS: ICD-10-CM

## 2021-08-02 DIAGNOSIS — E11.65 UNCONTROLLED TYPE 2 DIABETES MELLITUS WITH HYPERGLYCEMIA: Primary | ICD-10-CM

## 2021-08-02 DIAGNOSIS — I15.2 HYPERTENSION ASSOCIATED WITH DIABETES: ICD-10-CM

## 2021-08-02 PROBLEM — I10 ESSENTIAL HYPERTENSION, BENIGN: Status: RESOLVED | Noted: 2020-08-31 | Resolved: 2021-08-02

## 2021-08-02 PROCEDURE — 99999 PR PBB SHADOW E&M-EST. PATIENT-LVL IV: ICD-10-PCS | Mod: PBBFAC,,, | Performed by: INTERNAL MEDICINE

## 2021-08-02 PROCEDURE — 1125F AMNT PAIN NOTED PAIN PRSNT: CPT | Mod: CPTII,S$GLB,, | Performed by: INTERNAL MEDICINE

## 2021-08-02 PROCEDURE — 3075F SYST BP GE 130 - 139MM HG: CPT | Mod: CPTII,S$GLB,, | Performed by: INTERNAL MEDICINE

## 2021-08-02 PROCEDURE — 1125F PR PAIN SEVERITY QUANTIFIED, PAIN PRESENT: ICD-10-PCS | Mod: CPTII,S$GLB,, | Performed by: INTERNAL MEDICINE

## 2021-08-02 PROCEDURE — 3052F HG A1C>EQUAL 8.0%<EQUAL 9.0%: CPT | Mod: CPTII,S$GLB,, | Performed by: INTERNAL MEDICINE

## 2021-08-02 PROCEDURE — 99214 PR OFFICE/OUTPT VISIT, EST, LEVL IV, 30-39 MIN: ICD-10-PCS | Mod: S$GLB,,, | Performed by: INTERNAL MEDICINE

## 2021-08-02 PROCEDURE — 3079F PR MOST RECENT DIASTOLIC BLOOD PRESSURE 80-89 MM HG: ICD-10-PCS | Mod: CPTII,S$GLB,, | Performed by: INTERNAL MEDICINE

## 2021-08-02 PROCEDURE — 99214 OFFICE O/P EST MOD 30 MIN: CPT | Mod: S$GLB,,, | Performed by: INTERNAL MEDICINE

## 2021-08-02 PROCEDURE — 3008F PR BODY MASS INDEX (BMI) DOCUMENTED: ICD-10-PCS | Mod: CPTII,S$GLB,, | Performed by: INTERNAL MEDICINE

## 2021-08-02 PROCEDURE — 3008F BODY MASS INDEX DOCD: CPT | Mod: CPTII,S$GLB,, | Performed by: INTERNAL MEDICINE

## 2021-08-02 PROCEDURE — 3079F DIAST BP 80-89 MM HG: CPT | Mod: CPTII,S$GLB,, | Performed by: INTERNAL MEDICINE

## 2021-08-02 PROCEDURE — 99999 PR PBB SHADOW E&M-EST. PATIENT-LVL IV: CPT | Mod: PBBFAC,,, | Performed by: INTERNAL MEDICINE

## 2021-08-02 PROCEDURE — 3075F PR MOST RECENT SYSTOLIC BLOOD PRESS GE 130-139MM HG: ICD-10-PCS | Mod: CPTII,S$GLB,, | Performed by: INTERNAL MEDICINE

## 2021-08-02 PROCEDURE — 3052F PR MOST RECENT HEMOGLOBIN A1C LEVEL 8.0 - < 9.0%: ICD-10-PCS | Mod: CPTII,S$GLB,, | Performed by: INTERNAL MEDICINE

## 2021-08-02 RX ORDER — ERTUGLIFLOZIN 15 MG/1
15 TABLET, FILM COATED ORAL DAILY
Qty: 90 TABLET | Refills: 0 | Status: SHIPPED | OUTPATIENT
Start: 2021-08-02 | End: 2022-02-10

## 2021-08-02 RX ORDER — DULAGLUTIDE 1.5 MG/.5ML
1.5 INJECTION, SOLUTION SUBCUTANEOUS
Qty: 12 PEN | Refills: 1 | Status: SHIPPED | OUTPATIENT
Start: 2021-08-02 | End: 2021-09-14

## 2021-08-02 RX ORDER — PIOGLITAZONEHYDROCHLORIDE 15 MG/1
15 TABLET ORAL DAILY
Qty: 90 TABLET | Refills: 0 | Status: SHIPPED | OUTPATIENT
Start: 2021-08-02 | End: 2021-09-14 | Stop reason: SDUPTHER

## 2021-08-09 ENCOUNTER — PATIENT MESSAGE (OUTPATIENT)
Dept: INTERNAL MEDICINE | Facility: CLINIC | Age: 71
End: 2021-08-09

## 2021-08-09 RX ORDER — ROSUVASTATIN CALCIUM 10 MG/1
10 TABLET, COATED ORAL DAILY
Qty: 90 TABLET | Refills: 1 | Status: SHIPPED | OUTPATIENT
Start: 2021-08-09 | End: 2022-08-04 | Stop reason: SDUPTHER

## 2021-08-19 DIAGNOSIS — E11.65 UNCONTROLLED TYPE 2 DIABETES MELLITUS WITH HYPERGLYCEMIA: ICD-10-CM

## 2021-08-19 RX ORDER — GLIMEPIRIDE 4 MG/1
8 TABLET ORAL DAILY
Qty: 180 TABLET | Refills: 3 | Status: SHIPPED | OUTPATIENT
Start: 2021-08-19 | End: 2022-02-10 | Stop reason: ALTCHOICE

## 2021-08-19 RX ORDER — ATORVASTATIN CALCIUM 40 MG/1
40 TABLET, FILM COATED ORAL NIGHTLY
Qty: 90 TABLET | Refills: 0 | Status: SHIPPED | OUTPATIENT
Start: 2021-08-19 | End: 2022-05-05

## 2021-08-19 RX ORDER — HYDROCHLOROTHIAZIDE 25 MG/1
25 TABLET ORAL DAILY
Qty: 30 TABLET | Refills: 2 | Status: SHIPPED | OUTPATIENT
Start: 2021-08-19 | End: 2022-08-04 | Stop reason: SDUPTHER

## 2021-08-27 DIAGNOSIS — G62.9 NEUROPATHY: ICD-10-CM

## 2021-08-27 RX ORDER — GABAPENTIN 400 MG/1
400 CAPSULE ORAL 3 TIMES DAILY PRN
Qty: 90 CAPSULE | Refills: 2 | OUTPATIENT
Start: 2021-08-27 | End: 2022-08-27

## 2021-09-03 ENCOUNTER — TELEPHONE (OUTPATIENT)
Dept: INTERNAL MEDICINE | Facility: CLINIC | Age: 71
End: 2021-09-03

## 2021-09-03 DIAGNOSIS — G62.9 NEUROPATHY: ICD-10-CM

## 2021-09-03 RX ORDER — GABAPENTIN 400 MG/1
400 CAPSULE ORAL 3 TIMES DAILY PRN
Qty: 90 CAPSULE | Refills: 2 | Status: SHIPPED | OUTPATIENT
Start: 2021-09-03 | End: 2021-12-07 | Stop reason: SDUPTHER

## 2021-09-09 ENCOUNTER — LAB VISIT (OUTPATIENT)
Dept: LAB | Facility: HOSPITAL | Age: 71
End: 2021-09-09
Attending: INTERNAL MEDICINE
Payer: MEDICARE

## 2021-09-09 DIAGNOSIS — E11.65 UNCONTROLLED TYPE 2 DIABETES MELLITUS WITH HYPERGLYCEMIA: ICD-10-CM

## 2021-09-09 LAB
ESTIMATED AVG GLUCOSE: 203 MG/DL (ref 68–131)
HBA1C MFR BLD: 8.7 % (ref 4–5.6)

## 2021-09-09 PROCEDURE — 83036 HEMOGLOBIN GLYCOSYLATED A1C: CPT | Performed by: INTERNAL MEDICINE

## 2021-09-09 PROCEDURE — 36415 COLL VENOUS BLD VENIPUNCTURE: CPT | Performed by: INTERNAL MEDICINE

## 2021-09-14 ENCOUNTER — OFFICE VISIT (OUTPATIENT)
Dept: INTERNAL MEDICINE | Facility: CLINIC | Age: 71
End: 2021-09-14
Payer: MEDICARE

## 2021-09-14 VITALS
HEART RATE: 91 BPM | SYSTOLIC BLOOD PRESSURE: 104 MMHG | BODY MASS INDEX: 38.31 KG/M2 | DIASTOLIC BLOOD PRESSURE: 62 MMHG | WEIGHT: 258.63 LBS | HEIGHT: 69 IN | TEMPERATURE: 97 F

## 2021-09-14 DIAGNOSIS — E11.65 UNCONTROLLED TYPE 2 DIABETES MELLITUS WITH HYPERGLYCEMIA: ICD-10-CM

## 2021-09-14 PROCEDURE — 3074F SYST BP LT 130 MM HG: CPT | Mod: S$GLB,,, | Performed by: INTERNAL MEDICINE

## 2021-09-14 PROCEDURE — 99999 PR PBB SHADOW E&M-EST. PATIENT-LVL V: CPT | Mod: PBBFAC,,, | Performed by: INTERNAL MEDICINE

## 2021-09-14 PROCEDURE — 1126F AMNT PAIN NOTED NONE PRSNT: CPT | Mod: S$GLB,,, | Performed by: INTERNAL MEDICINE

## 2021-09-14 PROCEDURE — 4010F ACE/ARB THERAPY RXD/TAKEN: CPT | Mod: S$GLB,,, | Performed by: INTERNAL MEDICINE

## 2021-09-14 PROCEDURE — 99213 PR OFFICE/OUTPT VISIT, EST, LEVL III, 20-29 MIN: ICD-10-PCS | Mod: S$GLB,,, | Performed by: INTERNAL MEDICINE

## 2021-09-14 PROCEDURE — 3052F HG A1C>EQUAL 8.0%<EQUAL 9.0%: CPT | Mod: S$GLB,,, | Performed by: INTERNAL MEDICINE

## 2021-09-14 PROCEDURE — 4010F PR ACE/ARB THEARPY RXD/TAKEN: ICD-10-PCS | Mod: S$GLB,,, | Performed by: INTERNAL MEDICINE

## 2021-09-14 PROCEDURE — 1159F MED LIST DOCD IN RCRD: CPT | Mod: S$GLB,,, | Performed by: INTERNAL MEDICINE

## 2021-09-14 PROCEDURE — 99999 PR PBB SHADOW E&M-EST. PATIENT-LVL V: ICD-10-PCS | Mod: PBBFAC,,, | Performed by: INTERNAL MEDICINE

## 2021-09-14 PROCEDURE — 3008F BODY MASS INDEX DOCD: CPT | Mod: S$GLB,,, | Performed by: INTERNAL MEDICINE

## 2021-09-14 PROCEDURE — 3008F PR BODY MASS INDEX (BMI) DOCUMENTED: ICD-10-PCS | Mod: S$GLB,,, | Performed by: INTERNAL MEDICINE

## 2021-09-14 PROCEDURE — 3078F DIAST BP <80 MM HG: CPT | Mod: S$GLB,,, | Performed by: INTERNAL MEDICINE

## 2021-09-14 PROCEDURE — 3078F PR MOST RECENT DIASTOLIC BLOOD PRESSURE < 80 MM HG: ICD-10-PCS | Mod: S$GLB,,, | Performed by: INTERNAL MEDICINE

## 2021-09-14 PROCEDURE — 3052F PR MOST RECENT HEMOGLOBIN A1C LEVEL 8.0 - < 9.0%: ICD-10-PCS | Mod: S$GLB,,, | Performed by: INTERNAL MEDICINE

## 2021-09-14 PROCEDURE — 1159F PR MEDICATION LIST DOCUMENTED IN MEDICAL RECORD: ICD-10-PCS | Mod: S$GLB,,, | Performed by: INTERNAL MEDICINE

## 2021-09-14 PROCEDURE — 3074F PR MOST RECENT SYSTOLIC BLOOD PRESSURE < 130 MM HG: ICD-10-PCS | Mod: S$GLB,,, | Performed by: INTERNAL MEDICINE

## 2021-09-14 PROCEDURE — 1126F PR PAIN SEVERITY QUANTIFIED, NO PAIN PRESENT: ICD-10-PCS | Mod: S$GLB,,, | Performed by: INTERNAL MEDICINE

## 2021-09-14 PROCEDURE — 99213 OFFICE O/P EST LOW 20 MIN: CPT | Mod: S$GLB,,, | Performed by: INTERNAL MEDICINE

## 2021-09-14 RX ORDER — DULAGLUTIDE 3 MG/.5ML
3 INJECTION, SOLUTION SUBCUTANEOUS
Qty: 12 PEN | Refills: 1 | Status: SHIPPED | OUTPATIENT
Start: 2021-09-14 | End: 2021-12-24

## 2021-09-14 RX ORDER — PIOGLITAZONEHYDROCHLORIDE 30 MG/1
30 TABLET ORAL DAILY
Qty: 90 TABLET | Refills: 1 | Status: SHIPPED | OUTPATIENT
Start: 2021-09-14 | End: 2022-02-10 | Stop reason: ALTCHOICE

## 2021-09-22 ENCOUNTER — TELEPHONE (OUTPATIENT)
Dept: FAMILY MEDICINE | Facility: CLINIC | Age: 71
End: 2021-09-22

## 2021-10-06 ENCOUNTER — TELEPHONE (OUTPATIENT)
Dept: PULMONOLOGY | Facility: CLINIC | Age: 71
End: 2021-10-06

## 2021-10-28 ENCOUNTER — PATIENT OUTREACH (OUTPATIENT)
Dept: ADMINISTRATIVE | Facility: HOSPITAL | Age: 71
End: 2021-10-28
Payer: MEDICARE

## 2021-10-29 ENCOUNTER — TELEPHONE (OUTPATIENT)
Dept: INTERNAL MEDICINE | Facility: CLINIC | Age: 71
End: 2021-10-29
Payer: MEDICARE

## 2021-11-15 ENCOUNTER — PATIENT OUTREACH (OUTPATIENT)
Dept: ADMINISTRATIVE | Facility: OTHER | Age: 71
End: 2021-11-15
Payer: MEDICARE

## 2021-11-24 ENCOUNTER — TELEPHONE (OUTPATIENT)
Dept: INTERNAL MEDICINE | Facility: CLINIC | Age: 71
End: 2021-11-24
Payer: MEDICARE

## 2021-12-07 ENCOUNTER — LAB VISIT (OUTPATIENT)
Dept: LAB | Facility: HOSPITAL | Age: 71
End: 2021-12-07
Attending: INTERNAL MEDICINE
Payer: MEDICARE

## 2021-12-07 DIAGNOSIS — E11.65 UNCONTROLLED TYPE 2 DIABETES MELLITUS WITH HYPERGLYCEMIA: ICD-10-CM

## 2021-12-07 DIAGNOSIS — G62.9 NEUROPATHY: ICD-10-CM

## 2021-12-07 LAB
ESTIMATED AVG GLUCOSE: 283 MG/DL (ref 68–131)
HBA1C MFR BLD: 11.5 % (ref 4–5.6)

## 2021-12-07 PROCEDURE — 83036 HEMOGLOBIN GLYCOSYLATED A1C: CPT | Performed by: INTERNAL MEDICINE

## 2021-12-07 PROCEDURE — 36415 COLL VENOUS BLD VENIPUNCTURE: CPT | Mod: PO | Performed by: INTERNAL MEDICINE

## 2021-12-07 RX ORDER — GABAPENTIN 400 MG/1
400 CAPSULE ORAL 3 TIMES DAILY PRN
Qty: 90 CAPSULE | Refills: 2 | Status: SHIPPED | OUTPATIENT
Start: 2021-12-07 | End: 2022-03-11

## 2021-12-17 ENCOUNTER — PATIENT OUTREACH (OUTPATIENT)
Dept: ADMINISTRATIVE | Facility: OTHER | Age: 71
End: 2021-12-17
Payer: MEDICARE

## 2021-12-20 ENCOUNTER — TELEPHONE (OUTPATIENT)
Dept: PULMONOLOGY | Facility: CLINIC | Age: 71
End: 2021-12-20
Payer: MEDICARE

## 2021-12-23 ENCOUNTER — OFFICE VISIT (OUTPATIENT)
Dept: INTERNAL MEDICINE | Facility: CLINIC | Age: 71
End: 2021-12-23
Payer: MEDICARE

## 2021-12-23 VITALS
HEIGHT: 69 IN | RESPIRATION RATE: 18 BRPM | HEART RATE: 89 BPM | OXYGEN SATURATION: 97 % | BODY MASS INDEX: 38.17 KG/M2 | SYSTOLIC BLOOD PRESSURE: 102 MMHG | WEIGHT: 257.69 LBS | TEMPERATURE: 98 F | DIASTOLIC BLOOD PRESSURE: 58 MMHG

## 2021-12-23 DIAGNOSIS — I15.2 HYPERTENSION ASSOCIATED WITH DIABETES: ICD-10-CM

## 2021-12-23 DIAGNOSIS — E78.5 HYPERLIPIDEMIA ASSOCIATED WITH TYPE 2 DIABETES MELLITUS: ICD-10-CM

## 2021-12-23 DIAGNOSIS — Z59.6 LOW INCOME: ICD-10-CM

## 2021-12-23 DIAGNOSIS — E11.59 HYPERTENSION ASSOCIATED WITH DIABETES: ICD-10-CM

## 2021-12-23 DIAGNOSIS — Z12.11 COLON CANCER SCREENING: ICD-10-CM

## 2021-12-23 DIAGNOSIS — Z86.718 HISTORY OF DVT (DEEP VEIN THROMBOSIS): ICD-10-CM

## 2021-12-23 DIAGNOSIS — E11.69 HYPERLIPIDEMIA ASSOCIATED WITH TYPE 2 DIABETES MELLITUS: ICD-10-CM

## 2021-12-23 DIAGNOSIS — E11.65 UNCONTROLLED TYPE 2 DIABETES MELLITUS WITH HYPERGLYCEMIA: Primary | ICD-10-CM

## 2021-12-23 PROCEDURE — 3046F HEMOGLOBIN A1C LEVEL >9.0%: CPT | Mod: CPTII,S$GLB,, | Performed by: INTERNAL MEDICINE

## 2021-12-23 PROCEDURE — 3288F PR FALLS RISK ASSESSMENT DOCUMENTED: ICD-10-PCS | Mod: CPTII,S$GLB,, | Performed by: INTERNAL MEDICINE

## 2021-12-23 PROCEDURE — 1101F PR PT FALLS ASSESS DOC 0-1 FALLS W/OUT INJ PAST YR: ICD-10-PCS | Mod: CPTII,S$GLB,, | Performed by: INTERNAL MEDICINE

## 2021-12-23 PROCEDURE — 99214 OFFICE O/P EST MOD 30 MIN: CPT | Mod: S$GLB,,, | Performed by: INTERNAL MEDICINE

## 2021-12-23 PROCEDURE — 3074F SYST BP LT 130 MM HG: CPT | Mod: CPTII,S$GLB,, | Performed by: INTERNAL MEDICINE

## 2021-12-23 PROCEDURE — 1125F AMNT PAIN NOTED PAIN PRSNT: CPT | Mod: CPTII,S$GLB,, | Performed by: INTERNAL MEDICINE

## 2021-12-23 PROCEDURE — 3078F DIAST BP <80 MM HG: CPT | Mod: CPTII,S$GLB,, | Performed by: INTERNAL MEDICINE

## 2021-12-23 PROCEDURE — 3288F FALL RISK ASSESSMENT DOCD: CPT | Mod: CPTII,S$GLB,, | Performed by: INTERNAL MEDICINE

## 2021-12-23 PROCEDURE — 99999 PR PBB SHADOW E&M-EST. PATIENT-LVL V: ICD-10-PCS | Mod: PBBFAC,,, | Performed by: INTERNAL MEDICINE

## 2021-12-23 PROCEDURE — 4010F PR ACE/ARB THEARPY RXD/TAKEN: ICD-10-PCS | Mod: CPTII,S$GLB,, | Performed by: INTERNAL MEDICINE

## 2021-12-23 PROCEDURE — 3046F PR MOST RECENT HEMOGLOBIN A1C LEVEL > 9.0%: ICD-10-PCS | Mod: CPTII,S$GLB,, | Performed by: INTERNAL MEDICINE

## 2021-12-23 PROCEDURE — 3008F BODY MASS INDEX DOCD: CPT | Mod: CPTII,S$GLB,, | Performed by: INTERNAL MEDICINE

## 2021-12-23 PROCEDURE — 1159F PR MEDICATION LIST DOCUMENTED IN MEDICAL RECORD: ICD-10-PCS | Mod: CPTII,S$GLB,, | Performed by: INTERNAL MEDICINE

## 2021-12-23 PROCEDURE — 3008F PR BODY MASS INDEX (BMI) DOCUMENTED: ICD-10-PCS | Mod: CPTII,S$GLB,, | Performed by: INTERNAL MEDICINE

## 2021-12-23 PROCEDURE — 3074F PR MOST RECENT SYSTOLIC BLOOD PRESSURE < 130 MM HG: ICD-10-PCS | Mod: CPTII,S$GLB,, | Performed by: INTERNAL MEDICINE

## 2021-12-23 PROCEDURE — 4010F ACE/ARB THERAPY RXD/TAKEN: CPT | Mod: CPTII,S$GLB,, | Performed by: INTERNAL MEDICINE

## 2021-12-23 PROCEDURE — 99214 PR OFFICE/OUTPT VISIT, EST, LEVL IV, 30-39 MIN: ICD-10-PCS | Mod: S$GLB,,, | Performed by: INTERNAL MEDICINE

## 2021-12-23 PROCEDURE — 1159F MED LIST DOCD IN RCRD: CPT | Mod: CPTII,S$GLB,, | Performed by: INTERNAL MEDICINE

## 2021-12-23 PROCEDURE — 99999 PR PBB SHADOW E&M-EST. PATIENT-LVL V: CPT | Mod: PBBFAC,,, | Performed by: INTERNAL MEDICINE

## 2021-12-23 PROCEDURE — 1101F PT FALLS ASSESS-DOCD LE1/YR: CPT | Mod: CPTII,S$GLB,, | Performed by: INTERNAL MEDICINE

## 2021-12-23 PROCEDURE — 1125F PR PAIN SEVERITY QUANTIFIED, PAIN PRESENT: ICD-10-PCS | Mod: CPTII,S$GLB,, | Performed by: INTERNAL MEDICINE

## 2021-12-23 PROCEDURE — 3078F PR MOST RECENT DIASTOLIC BLOOD PRESSURE < 80 MM HG: ICD-10-PCS | Mod: CPTII,S$GLB,, | Performed by: INTERNAL MEDICINE

## 2021-12-23 RX ORDER — INSULIN GLARGINE 100 [IU]/ML
10 INJECTION, SOLUTION SUBCUTANEOUS NIGHTLY
Qty: 15 ML | Refills: 1 | Status: SHIPPED | OUTPATIENT
Start: 2021-12-23 | End: 2022-02-10

## 2021-12-23 SDOH — SOCIAL DETERMINANTS OF HEALTH (SDOH): LOW INCOME: Z59.6

## 2021-12-27 ENCOUNTER — PATIENT MESSAGE (OUTPATIENT)
Dept: ENDOSCOPY | Facility: HOSPITAL | Age: 71
End: 2021-12-27
Payer: MEDICARE

## 2021-12-27 ENCOUNTER — OFFICE VISIT (OUTPATIENT)
Dept: HEMATOLOGY/ONCOLOGY | Facility: CLINIC | Age: 71
End: 2021-12-27
Payer: MEDICARE

## 2021-12-27 VITALS
HEART RATE: 80 BPM | HEIGHT: 69 IN | BODY MASS INDEX: 37.78 KG/M2 | SYSTOLIC BLOOD PRESSURE: 119 MMHG | TEMPERATURE: 98 F | OXYGEN SATURATION: 95 % | WEIGHT: 255.06 LBS | DIASTOLIC BLOOD PRESSURE: 71 MMHG

## 2021-12-27 DIAGNOSIS — I82.409 ACUTE DEEP VEIN THROMBOSIS (DVT) OF OTHER VEIN OF LOWER EXTREMITY, UNSPECIFIED LATERALITY: Primary | ICD-10-CM

## 2021-12-27 PROCEDURE — 99213 OFFICE O/P EST LOW 20 MIN: CPT | Mod: S$GLB,,, | Performed by: INTERNAL MEDICINE

## 2021-12-27 PROCEDURE — 1125F PR PAIN SEVERITY QUANTIFIED, PAIN PRESENT: ICD-10-PCS | Mod: CPTII,S$GLB,, | Performed by: INTERNAL MEDICINE

## 2021-12-27 PROCEDURE — 3078F DIAST BP <80 MM HG: CPT | Mod: CPTII,S$GLB,, | Performed by: INTERNAL MEDICINE

## 2021-12-27 PROCEDURE — 99213 PR OFFICE/OUTPT VISIT, EST, LEVL III, 20-29 MIN: ICD-10-PCS | Mod: S$GLB,,, | Performed by: INTERNAL MEDICINE

## 2021-12-27 PROCEDURE — 3074F SYST BP LT 130 MM HG: CPT | Mod: CPTII,S$GLB,, | Performed by: INTERNAL MEDICINE

## 2021-12-27 PROCEDURE — 1101F PR PT FALLS ASSESS DOC 0-1 FALLS W/OUT INJ PAST YR: ICD-10-PCS | Mod: CPTII,S$GLB,, | Performed by: INTERNAL MEDICINE

## 2021-12-27 PROCEDURE — 3288F FALL RISK ASSESSMENT DOCD: CPT | Mod: CPTII,S$GLB,, | Performed by: INTERNAL MEDICINE

## 2021-12-27 PROCEDURE — 3008F PR BODY MASS INDEX (BMI) DOCUMENTED: ICD-10-PCS | Mod: CPTII,S$GLB,, | Performed by: INTERNAL MEDICINE

## 2021-12-27 PROCEDURE — 1159F PR MEDICATION LIST DOCUMENTED IN MEDICAL RECORD: ICD-10-PCS | Mod: CPTII,S$GLB,, | Performed by: INTERNAL MEDICINE

## 2021-12-27 PROCEDURE — 3046F HEMOGLOBIN A1C LEVEL >9.0%: CPT | Mod: CPTII,S$GLB,, | Performed by: INTERNAL MEDICINE

## 2021-12-27 PROCEDURE — 1160F RVW MEDS BY RX/DR IN RCRD: CPT | Mod: CPTII,S$GLB,, | Performed by: INTERNAL MEDICINE

## 2021-12-27 PROCEDURE — 1159F MED LIST DOCD IN RCRD: CPT | Mod: CPTII,S$GLB,, | Performed by: INTERNAL MEDICINE

## 2021-12-27 PROCEDURE — 4010F ACE/ARB THERAPY RXD/TAKEN: CPT | Mod: CPTII,S$GLB,, | Performed by: INTERNAL MEDICINE

## 2021-12-27 PROCEDURE — 3288F PR FALLS RISK ASSESSMENT DOCUMENTED: ICD-10-PCS | Mod: CPTII,S$GLB,, | Performed by: INTERNAL MEDICINE

## 2021-12-27 PROCEDURE — 99999 PR PBB SHADOW E&M-EST. PATIENT-LVL IV: CPT | Mod: PBBFAC,,, | Performed by: INTERNAL MEDICINE

## 2021-12-27 PROCEDURE — 3008F BODY MASS INDEX DOCD: CPT | Mod: CPTII,S$GLB,, | Performed by: INTERNAL MEDICINE

## 2021-12-27 PROCEDURE — 3078F PR MOST RECENT DIASTOLIC BLOOD PRESSURE < 80 MM HG: ICD-10-PCS | Mod: CPTII,S$GLB,, | Performed by: INTERNAL MEDICINE

## 2021-12-27 PROCEDURE — 3074F PR MOST RECENT SYSTOLIC BLOOD PRESSURE < 130 MM HG: ICD-10-PCS | Mod: CPTII,S$GLB,, | Performed by: INTERNAL MEDICINE

## 2021-12-27 PROCEDURE — 99999 PR PBB SHADOW E&M-EST. PATIENT-LVL IV: ICD-10-PCS | Mod: PBBFAC,,, | Performed by: INTERNAL MEDICINE

## 2021-12-27 PROCEDURE — 1125F AMNT PAIN NOTED PAIN PRSNT: CPT | Mod: CPTII,S$GLB,, | Performed by: INTERNAL MEDICINE

## 2021-12-27 PROCEDURE — 1160F PR REVIEW ALL MEDS BY PRESCRIBER/CLIN PHARMACIST DOCUMENTED: ICD-10-PCS | Mod: CPTII,S$GLB,, | Performed by: INTERNAL MEDICINE

## 2021-12-27 PROCEDURE — 1101F PT FALLS ASSESS-DOCD LE1/YR: CPT | Mod: CPTII,S$GLB,, | Performed by: INTERNAL MEDICINE

## 2021-12-27 PROCEDURE — 3046F PR MOST RECENT HEMOGLOBIN A1C LEVEL > 9.0%: ICD-10-PCS | Mod: CPTII,S$GLB,, | Performed by: INTERNAL MEDICINE

## 2021-12-27 PROCEDURE — 4010F PR ACE/ARB THEARPY RXD/TAKEN: ICD-10-PCS | Mod: CPTII,S$GLB,, | Performed by: INTERNAL MEDICINE

## 2021-12-29 RX ORDER — SODIUM, POTASSIUM,MAG SULFATES 17.5-3.13G
1 SOLUTION, RECONSTITUTED, ORAL ORAL DAILY
Qty: 1 KIT | Refills: 0 | Status: SHIPPED | OUTPATIENT
Start: 2021-12-29 | End: 2021-12-31

## 2022-01-04 ENCOUNTER — TELEPHONE (OUTPATIENT)
Dept: DIABETES | Facility: CLINIC | Age: 72
End: 2022-01-04
Payer: MEDICARE

## 2022-01-04 NOTE — TELEPHONE ENCOUNTER
Attempted to schedule a follow up with dm management. Pt stated he came to another appt this morning and was turned away because his insurance([employer]) was not accepted. Pt states he will get set up with his Medicaid/Medicare card and then schedule a follow up.

## 2022-01-11 DIAGNOSIS — G62.9 NEUROPATHY: ICD-10-CM

## 2022-01-11 RX ORDER — GABAPENTIN 400 MG/1
400 CAPSULE ORAL 3 TIMES DAILY PRN
Qty: 90 CAPSULE | Refills: 2 | Status: CANCELLED | OUTPATIENT
Start: 2022-01-11 | End: 2023-01-11

## 2022-01-11 NOTE — TELEPHONE ENCOUNTER
No new care gaps identified.  Powered by [employer]. Reference number: [id number].   1/11/2022 12:12:26 PM CST

## 2022-01-20 ENCOUNTER — TELEPHONE (OUTPATIENT)
Dept: INTERNAL MEDICINE | Facility: CLINIC | Age: 72
End: 2022-01-20
Payer: MEDICARE

## 2022-01-20 NOTE — TELEPHONE ENCOUNTER
Pt stated that he needs to see someone for his diabetes but he wont get new insurance until 2/1. I scheduled him an appt with diabetes management on 2/1 and instructed him, per Dr. Gonzales, to increase his insulin to 20 units nightly, follow a diabetic diet, and go to the emergency room if his sugar goes over 450 or if he starts having symptoms of confusion, weakness. He verbalized understanding.

## 2022-01-20 NOTE — TELEPHONE ENCOUNTER
----- Message from Radha Cyr sent at 1/20/2022 11:30 AM CST -----  Contact: Anup  Patient is calling to speak with the nurse regarding high blood sugar of 460 this morning and lowest of 330. Patient is requesting an urgent call back at [phone] as requested. Declined to speak with the On-call Triage Nurse at this time.  Thanks,  RP

## 2022-01-21 ENCOUNTER — TELEPHONE (OUTPATIENT)
Dept: PREADMISSION TESTING | Facility: HOSPITAL | Age: 72
End: 2022-01-21
Payer: MEDICARE

## 2022-01-21 NOTE — TELEPHONE ENCOUNTER
Spoke with patient this morning in regards to his scheduled colonoscopy on Tuesday 1/25. He asked to reschedule for next month when he get his medicaid because the insurance he has now wont cover enough and he cant afford it. Inquired if he had stopped his Eliquis for this procedure and he said no, he stopped taking the med awhile back because he couldn't afford it. Rescheduled him for 2/22.

## 2022-01-24 DIAGNOSIS — I82.409 ACUTE DEEP VEIN THROMBOSIS (DVT) OF OTHER VEIN OF LOWER EXTREMITY, UNSPECIFIED LATERALITY: Primary | ICD-10-CM

## 2022-01-24 DIAGNOSIS — D64.9 ANEMIA, UNSPECIFIED TYPE: ICD-10-CM

## 2022-01-24 PROBLEM — D50.0 IRON DEFICIENCY ANEMIA DUE TO CHRONIC BLOOD LOSS: Status: ACTIVE | Noted: 2022-01-24

## 2022-01-24 PROBLEM — Z86.718 HISTORY OF DVT (DEEP VEIN THROMBOSIS): Status: ACTIVE | Noted: 2022-01-24

## 2022-02-10 ENCOUNTER — OFFICE VISIT (OUTPATIENT)
Dept: DIABETES | Facility: CLINIC | Age: 72
End: 2022-02-10
Payer: MEDICARE

## 2022-02-10 ENCOUNTER — LAB VISIT (OUTPATIENT)
Dept: LAB | Facility: HOSPITAL | Age: 72
End: 2022-02-10
Attending: INTERNAL MEDICINE
Payer: MEDICARE

## 2022-02-10 VITALS
HEART RATE: 89 BPM | DIASTOLIC BLOOD PRESSURE: 52 MMHG | BODY MASS INDEX: 37.67 KG/M2 | HEIGHT: 69 IN | SYSTOLIC BLOOD PRESSURE: 102 MMHG

## 2022-02-10 DIAGNOSIS — E11.59 HYPERTENSION ASSOCIATED WITH DIABETES: ICD-10-CM

## 2022-02-10 DIAGNOSIS — E78.5 HYPERLIPIDEMIA ASSOCIATED WITH TYPE 2 DIABETES MELLITUS: ICD-10-CM

## 2022-02-10 DIAGNOSIS — E11.65 UNCONTROLLED TYPE 2 DIABETES MELLITUS WITH HYPERGLYCEMIA, WITH LONG-TERM CURRENT USE OF INSULIN: Primary | ICD-10-CM

## 2022-02-10 DIAGNOSIS — E11.65 UNCONTROLLED TYPE 2 DIABETES MELLITUS WITH HYPERGLYCEMIA: ICD-10-CM

## 2022-02-10 DIAGNOSIS — E11.69 HYPERLIPIDEMIA ASSOCIATED WITH TYPE 2 DIABETES MELLITUS: ICD-10-CM

## 2022-02-10 DIAGNOSIS — I15.2 HYPERTENSION ASSOCIATED WITH DIABETES: ICD-10-CM

## 2022-02-10 DIAGNOSIS — G62.9 NEUROPATHY: ICD-10-CM

## 2022-02-10 DIAGNOSIS — Z86.718 HISTORY OF DVT (DEEP VEIN THROMBOSIS): ICD-10-CM

## 2022-02-10 DIAGNOSIS — Z79.4 UNCONTROLLED TYPE 2 DIABETES MELLITUS WITH HYPERGLYCEMIA, WITH LONG-TERM CURRENT USE OF INSULIN: Primary | ICD-10-CM

## 2022-02-10 DIAGNOSIS — E66.9 OBESITY (BMI 30-39.9): ICD-10-CM

## 2022-02-10 LAB
ESTIMATED AVG GLUCOSE: 318 MG/DL (ref 68–131)
GLUCOSE SERPL-MCNC: 186 MG/DL (ref 70–110)
HBA1C MFR BLD: 12.7 % (ref 4–5.6)

## 2022-02-10 PROCEDURE — 1126F PR PAIN SEVERITY QUANTIFIED, NO PAIN PRESENT: ICD-10-PCS | Mod: HCNC,CPTII,S$GLB, | Performed by: NURSE PRACTITIONER

## 2022-02-10 PROCEDURE — 3074F SYST BP LT 130 MM HG: CPT | Mod: HCNC,CPTII,S$GLB, | Performed by: NURSE PRACTITIONER

## 2022-02-10 PROCEDURE — 1160F RVW MEDS BY RX/DR IN RCRD: CPT | Mod: HCNC,CPTII,S$GLB, | Performed by: NURSE PRACTITIONER

## 2022-02-10 PROCEDURE — 1159F PR MEDICATION LIST DOCUMENTED IN MEDICAL RECORD: ICD-10-PCS | Mod: HCNC,CPTII,S$GLB, | Performed by: NURSE PRACTITIONER

## 2022-02-10 PROCEDURE — 3008F PR BODY MASS INDEX (BMI) DOCUMENTED: ICD-10-PCS | Mod: HCNC,CPTII,S$GLB, | Performed by: NURSE PRACTITIONER

## 2022-02-10 PROCEDURE — 1159F MED LIST DOCD IN RCRD: CPT | Mod: HCNC,CPTII,S$GLB, | Performed by: NURSE PRACTITIONER

## 2022-02-10 PROCEDURE — 36415 COLL VENOUS BLD VENIPUNCTURE: CPT | Mod: HCNC | Performed by: INTERNAL MEDICINE

## 2022-02-10 PROCEDURE — 3074F PR MOST RECENT SYSTOLIC BLOOD PRESSURE < 130 MM HG: ICD-10-PCS | Mod: HCNC,CPTII,S$GLB, | Performed by: NURSE PRACTITIONER

## 2022-02-10 PROCEDURE — 83036 HEMOGLOBIN GLYCOSYLATED A1C: CPT | Mod: HCNC | Performed by: INTERNAL MEDICINE

## 2022-02-10 PROCEDURE — 3072F PR LOW RISK FOR RETINOPATHY: ICD-10-PCS | Mod: HCNC,CPTII,S$GLB, | Performed by: NURSE PRACTITIONER

## 2022-02-10 PROCEDURE — 3078F DIAST BP <80 MM HG: CPT | Mod: HCNC,CPTII,S$GLB, | Performed by: NURSE PRACTITIONER

## 2022-02-10 PROCEDURE — 99999 PR PBB SHADOW E&M-EST. PATIENT-LVL IV: CPT | Mod: PBBFAC,HCNC,, | Performed by: NURSE PRACTITIONER

## 2022-02-10 PROCEDURE — 99499 UNLISTED E&M SERVICE: CPT | Mod: S$GLB,,, | Performed by: NURSE PRACTITIONER

## 2022-02-10 PROCEDURE — 99215 OFFICE O/P EST HI 40 MIN: CPT | Mod: HCNC,S$GLB,, | Performed by: NURSE PRACTITIONER

## 2022-02-10 PROCEDURE — 99215 PR OFFICE/OUTPT VISIT, EST, LEVL V, 40-54 MIN: ICD-10-PCS | Mod: HCNC,S$GLB,, | Performed by: NURSE PRACTITIONER

## 2022-02-10 PROCEDURE — 82962 POCT GLUCOSE, HAND-HELD DEVICE: ICD-10-PCS | Mod: HCNC,S$GLB,, | Performed by: NURSE PRACTITIONER

## 2022-02-10 PROCEDURE — 1126F AMNT PAIN NOTED NONE PRSNT: CPT | Mod: HCNC,CPTII,S$GLB, | Performed by: NURSE PRACTITIONER

## 2022-02-10 PROCEDURE — 99999 PR PBB SHADOW E&M-EST. PATIENT-LVL IV: ICD-10-PCS | Mod: PBBFAC,HCNC,, | Performed by: NURSE PRACTITIONER

## 2022-02-10 PROCEDURE — 1160F PR REVIEW ALL MEDS BY PRESCRIBER/CLIN PHARMACIST DOCUMENTED: ICD-10-PCS | Mod: HCNC,CPTII,S$GLB, | Performed by: NURSE PRACTITIONER

## 2022-02-10 PROCEDURE — 3008F BODY MASS INDEX DOCD: CPT | Mod: HCNC,CPTII,S$GLB, | Performed by: NURSE PRACTITIONER

## 2022-02-10 PROCEDURE — 3078F PR MOST RECENT DIASTOLIC BLOOD PRESSURE < 80 MM HG: ICD-10-PCS | Mod: HCNC,CPTII,S$GLB, | Performed by: NURSE PRACTITIONER

## 2022-02-10 PROCEDURE — 99499 RISK ADDL DX/OHS AUDIT: ICD-10-PCS | Mod: S$GLB,,, | Performed by: NURSE PRACTITIONER

## 2022-02-10 PROCEDURE — 3072F LOW RISK FOR RETINOPATHY: CPT | Mod: HCNC,CPTII,S$GLB, | Performed by: NURSE PRACTITIONER

## 2022-02-10 PROCEDURE — 82962 GLUCOSE BLOOD TEST: CPT | Mod: HCNC,S$GLB,, | Performed by: NURSE PRACTITIONER

## 2022-02-10 RX ORDER — EMPAGLIFLOZIN 10 MG/1
10 TABLET, FILM COATED ORAL DAILY
Qty: 30 TABLET | Refills: 5 | Status: SHIPPED | OUTPATIENT
Start: 2022-02-10 | End: 2022-05-11

## 2022-02-10 RX ORDER — INFLUENZA A VIRUS A/VICTORIA/2570/2019 IVR-215 (H1N1) ANTIGEN (FORMALDEHYDE INACTIVATED), INFLUENZA A VIRUS A/TASMANIA/503/2020 IVR-221 (H3N2) ANTIGEN (FORMALDEHYDE INACTIVATED), INFLUENZA B VIRUS B/PHUKET/3073/2013 ANTIGEN (FORMALDEHYDE INACTIVATED), AND INFLUENZA B VIRUS B/WASHINGTON/02/2019 ANTIGEN (FORMALDEHYDE INACTIVATED) 60; 60; 60; 60 UG/.7ML; UG/.7ML; UG/.7ML; UG/.7ML
INJECTION, SUSPENSION INTRAMUSCULAR
COMMUNITY
Start: 2021-09-30 | End: 2022-05-05

## 2022-02-10 RX ORDER — PEN NEEDLE, DIABETIC 30 GX3/16"
NEEDLE, DISPOSABLE MISCELLANEOUS
Qty: 100 EACH | Refills: 1 | Status: SHIPPED | OUTPATIENT
Start: 2022-02-10 | End: 2022-07-06 | Stop reason: SDUPTHER

## 2022-02-10 RX ORDER — INSULIN GLARGINE 100 [IU]/ML
15 INJECTION, SOLUTION SUBCUTANEOUS NIGHTLY
Qty: 15 ML | Refills: 5 | Status: SHIPPED | OUTPATIENT
Start: 2022-02-10 | End: 2022-03-02 | Stop reason: SDUPTHER

## 2022-02-10 RX ORDER — METFORMIN HYDROCHLORIDE 500 MG/1
500 TABLET, EXTENDED RELEASE ORAL 2 TIMES DAILY WITH MEALS
Qty: 180 TABLET | Refills: 1 | Status: SHIPPED | OUTPATIENT
Start: 2022-02-10 | End: 2022-07-06 | Stop reason: SDUPTHER

## 2022-02-10 RX ORDER — DULAGLUTIDE 0.75 MG/.5ML
0.75 INJECTION, SOLUTION SUBCUTANEOUS
Qty: 4 PEN | Refills: 5 | Status: SHIPPED | OUTPATIENT
Start: 2022-02-10 | End: 2022-05-05

## 2022-02-10 NOTE — PROGRESS NOTES
Subjective:         Patient ID: Anup Miller is a 71 y.o. male.  Patient's current PCP is Bryce Gonzales MD.     Chief Complaint: Diabetes Mellitus (Re-Establish Care)    HPI  Anup Miller is a 71 y.o. Black or  male presenting for a new consult with me, previously seen by TIM Fitzgerald for diabetes. Patient has been diagnosed with type 2 diabetes since 2002 .    CURRENT DM MEDICATIONS:   Diabetes Medications             glimepiride (AMARYL) 4 MG tablet Take 2 tablets (8 mg total) by mouth once daily. After meals     insulin (LANTUS SOLOSTAR U-100 INSULIN) glargine 100 units/mL (3mL) SubQ pen Inject 10 Units into the skin every evening.     pioglitazone (ACTOS) 30 MG tablet Take 1 tablet (30 mg total) by mouth once daily.        Past failed treatment include:  Meds discontinued in the past due to cost only per patient report    Blood glucose testing is performed regularly. Patient is testing 4 times daily.   Meter: Reli On (Did not bring to appt today)  Preferred lab: [facility] when in town    Any episodes of hypoglycemia? Denies     Complications related to diabetes: peripheral neuropathy    His blood sugar in the clinic today was:   Lab Results   Component Value Date    POCGLU 186 (A) 02/10/2022       Anup Miller presents today for follow up visit to discuss diabetes management. Last saw Jamie Haynes 04/2021. Fasting BGs in the 200s. Overall glucose range of 105-436 mg/dL per patient report. He states BGs were much better controlled when he was on insulin combined with GLP1 agonist, Metformin, and SGLT2 inhibitor. He reports tolerating all of these meds well and that they were only discontinued in the past due to cost. He reports it has been years since he has attended formal DM education, but he would prefer to wait to schedule this until he sees how his BGs respond to new medications as his insurance has changed. We reviewed the diabetic diet in detail. He is  limited with exercise- still ambulating with cane/wheelchair for long ambulation following prolonged Covid hospitalization in 2020.    Current diet: Bfast-grits,eggs,degroot.  Water. Occasional sweet tea. Snacks-enjoys fruit, occasional crackers. Lunch-Often skipped. Supper-Salad with fried chicken.   Activity Level: Limited- in a wheelchair     Lab Results   Component Value Date    HGBA1C 11.5 (H) 12/07/2021    HGBA1C 8.7 (H) 09/09/2021    HGBA1C 8.1 (H) 07/19/2021       STANDARDS OF CARE  Diabetes Management Status    Statin: Taking  ACE/ARB: Taking    Screening or Prevention Patient's value Goal Complete/Controlled?   HgA1C Testing and Control   Lab Results   Component Value Date    HGBA1C 11.5 (H) 12/07/2021      Annually/Less than 8% No   Lipid profile : 07/19/2021 Annually Yes   LDL control Lab Results   Component Value Date    LDLCALC 94.8 07/19/2021    Annually/Less than 100 mg/dl  Yes   Nephropathy screening Lab Results   Component Value Date    LABMICR 4.0 06/18/2018     Lab Results   Component Value Date    PROTEINUA Negative 10/23/2020     Lab Results   Component Value Date    UTPCR 0.07 09/27/2019      Annually No   Blood pressure BP Readings from Last 1 Encounters:   02/10/22 (!) 102/52    Less than 140/90 Yes   Dilated retinal exam : 06/22/2021 Annually Yes   Foot exam   : 03/16/2020 Annually No       Labs reviewed and are noted below.    Lab Results   Component Value Date    WBC 5.76 07/19/2021    WBC 5.76 07/19/2021    HGB 13.7 (L) 07/19/2021    HGB 13.7 (L) 07/19/2021    HCT 43.6 07/19/2021    HCT 43.6 07/19/2021     07/19/2021     07/19/2021    CHOL 173 07/19/2021    TRIG 206 (H) 07/19/2021    HDL 37 (L) 07/19/2021    LDLCALC 94.8 07/19/2021    ALT 37 07/19/2021    AST 23 07/19/2021     07/19/2021    K 4.2 07/19/2021     07/19/2021    ANIONGAP 12 07/19/2021    CREATININE 1.6 (H) 07/19/2021    ESTGFRAFRICA 50.1 (A) 07/19/2021    EGFRNONAA 43.3 (A) 07/19/2021    BUN 24 (H)  07/19/2021    CO2 26 07/19/2021    TSH 0.937 07/19/2021    PSA 3.5 10/23/2020    INR 1.0 07/26/2020     (H) 07/19/2021    UTPCR 0.07 09/27/2019     Lab Results   Component Value Date    GLUTAMICACID 0.00 04/03/2017    CPEPTIDE 4.8 04/03/2017    FREET4 0.93 07/26/2020    TSH 0.937 07/19/2021    IRON 102 07/19/2021    TIBC 530 (H) 07/19/2021    FERRITIN 49 07/19/2021    DWKMQEPN84 >2000 (H) 07/19/2021    CALCIUM 10.5 07/19/2021    PHOS 3.9 09/25/2020     Lab Results   Component Value Date    CPEPTIDE 4.8 04/03/2017     Lab Results   Component Value Date    GLUTAMICACID 0.00 04/03/2017     Glucose   Date Value Ref Range Status   07/19/2021 213 (H) 70 - 110 mg/dL Final     Anion Gap   Date Value Ref Range Status   07/19/2021 12 8 - 16 mmol/L Final     eGFR if    Date Value Ref Range Status   07/19/2021 50.1 (A) >60 mL/min/1.73 m^2 Final     eGFR if non    Date Value Ref Range Status   07/19/2021 43.3 (A) >60 mL/min/1.73 m^2 Final     Comment:     Calculation used to obtain the estimated glomerular filtration  rate (eGFR) is the CKD-EPI equation.          The following portions of the patient's history were reviewed and updated as appropriate: allergies, current medications, past family history, past medical history, past social history, past surgical history and problem list.    Review of patient's allergies indicates:  No Known Allergies  Social History     Socioeconomic History    Marital status:      Spouse name: yoselin    Number of children: 1   Occupational History    Occupation:    Tobacco Use    Smoking status: Never Smoker    Smokeless tobacco: Never Used   Substance and Sexual Activity    Alcohol use: No    Drug use: No    Sexual activity: Yes     Partners: Female     Comment:    Social History Narrative    No smokers or pets in household.     Past Medical History:   Diagnosis Date    Acute respiratory failure due to COVID-19 08/2020    Colon  polyp     COVID-19 virus infection 07/2020    COVID-19 virus infection 7/26/2020    Diabetes mellitus type II Diagnosed 2002    Elevated liver enzymes     Elevated PSA 12/13/2017    Did not f/u with urology as rec in 2015    Fatty liver disease, nonalcoholic     Hyperlipidemia     Hypertension     KHOI on CPAP     Sleep apnea        REVIEW OF SYSTEMS:  Eyes No history of DR.  Cardiovascular: History of   GI: Denies nausea,vomiting,constipation,or diarrhea.  : Denies dysuria.  SKIN: Denies rashes and lesions.  Neuro: No neuropathy.  PSYCH: No tobacco use.  ENDO: See HPI.        Objective:      Vitals:    02/10/22 1021   BP: (!) 102/52   Pulse: 89     RESPIRATORY: No respiratory distress  NEUROLOGIC: Cranial nerves II-XII grossly intact.   PSYCHIATRIC: Alert & oriented x3. Normal mood and affect.  FOOT EXAMINATION: Patient states up to date but I do not see record of this- will complete at next visit  Assessment:       1. Uncontrolled type 2 diabetes mellitus with hyperglycemia, with long-term current use of insulin    2. Neuropathy    3. Hypertension associated with diabetes    4. Hyperlipidemia associated with type 2 diabetes mellitus    5. Obesity (BMI 30-39.9)    6. History of DVT (deep vein thrombosis)        Plan:   Uncontrolled type 2 diabetes mellitus with hyperglycemia, with long-term current use of insulin-Chronic  -     Ambulatory referral/consult to Diabetic Advanced Practice Providers (Medical Management)  -     POCT Glucose, Hand-Held Device  -     insulin (LANTUS SOLOSTAR U-100 INSULIN) glargine 100 units/mL (3mL) SubQ pen; Inject 15 Units into the skin every evening.  Dispense: 15 mL; Refill: 5  -     metFORMIN (GLUCOPHAGE XR) 500 MG ER 24hr tablet; Take 1 tablet (500 mg total) by mouth 2 (two) times daily with meals.  Dispense: 180 tablet; Refill: 1  -     empagliflozin (JARDIANCE) 10 mg tablet; Take 1 tablet (10 mg total) by mouth once daily.  Dispense: 30 tablet; Refill: 5  -      "dulaglutide (TRULICITY) 0.75 mg/0.5 mL pen injector; Inject 0.75 mg into the skin every 7 days.  Dispense: 4 pen; Refill: 5  -     pen needle, diabetic (BD ULTRA-FINE TATYANA PEN NEEDLE) 32 gauge x 5/32" Ndle; Use with Lantus daily.  Dispense: 100 each; Refill: 1  -     Basic Metabolic Panel; Future; Expected date: 03/10/2022  -     Lipid Panel; Future; Expected date: 03/10/2022  -     Microalbumin/Creatinine Ratio, Urine; Future; Expected date: 03/10/2022  -     TSH; Future; Expected date: 03/10/2022    -- Medications adjusted for today's visit include:    Referral for Moni- Sent to [facility].     Continue Amaryl and Actos UNTIL you are able to obtain the new medications from today, then you will stop both of these medications.    Increase Lantus to 15 units once a day.    Start Metformin 500 mg twice a day, with a meal.    Start Jardiance 10 mg once a day, in the morning.    Start Trulicity 0.75 mg once a week. Plan to increase as tolerated.     Neuropathy-Chronic    -On Gabapentin.    Hypertension associated with diabetes-Chronic,stable    -BP is at goal. Continue current medications.    Hyperlipidemia associated with type 2 diabetes mellitus-Chronic    -Continue Lipitor.    Obesity (BMI 30-39.9)    -Currently limited with exercise -still ambulating with cane. We discussed importance of low carb diet.    History of DVT (deep vein thrombosis)    - Follow up: 1 month    I spent a total of 45 minutes on the day of the visit.This includes face to face time and non-face to face time preparing to see the patient (eg, review of tests), documenting clinical information in the electronic record, independently interpreting results and communicating results to the patient.    Portions of this note may have been created with voice recognition software. Occasional "wrong-word" or "sound-a-like" substitutions may have occurred due to the inherent limitations of voice recognition software. Please, read the note " carefully and recognize, using context, where substitutions have occurred.

## 2022-02-10 NOTE — PATIENT INSTRUCTIONS
-- Medications adjusted for today's visit include:    Referral for Moni- Sent to [facility].     Continue Amaryl and Actos UNTIL you are able to obtain the new medications from today, then you will stop both of these medications.    Increase Lantus to 15 units once a day.    Start Metformin 500 mg twice a day, with a meal.    Start Jardiance 10 mg once a day, in the morning.    Start Trulicity 0.75 mg once a week. Plan to increase as tolerated.     -- Limit carbs to no more than 30-45 grams with each meal. Never eat carbs by themselves, always add protein. Make snacks low carb or non-carb only.    -- Continue checking blood sugar 4 times daily: Fasting blood sugar and vary your next 3 readings: Before lunch, before supper, 2 hours after any meal, or bedtime.   -Blood sugar goals should be a fasting blood sugar between , and no blood sugars throughout the day over 180 is good, less than 160 better, less than 140 perfect.    --Carry glucose tablets/soft peppermints/regular juice or Coke product with you at all times to treat a low blood sugar episode (less than 70). If your blood sugar is between 50-70, Chew 4 tablets or drink 1/2 cup of juice or regular Coke product. If your blood sugar is below 50, double the treatment. Re-check blood sugar in 15 minutes. If still low, repeat this. Always call the clinic to give an update for any low blood sugar episodes.    --Exercise as tolerated.    --Follow-up for your next visit in 4 weeks.     --Please either drop off, fax, or MyChart your readings to me as needed.

## 2022-02-11 ENCOUNTER — TELEPHONE (OUTPATIENT)
Dept: INTERNAL MEDICINE | Facility: CLINIC | Age: 72
End: 2022-02-11
Payer: MEDICARE

## 2022-02-11 DIAGNOSIS — E11.65 UNCONTROLLED TYPE 2 DIABETES MELLITUS WITH HYPERGLYCEMIA: Primary | ICD-10-CM

## 2022-02-11 NOTE — TELEPHONE ENCOUNTER
Hemoglobin A1c indicates extremely out of control diabetes.  Needs to make sure following a diabetic diet.  Needs to make sure following closely with diabetes clinic.  Needs A1c in 3 months

## 2022-03-01 ENCOUNTER — PATIENT MESSAGE (OUTPATIENT)
Dept: ADMINISTRATIVE | Facility: HOSPITAL | Age: 72
End: 2022-03-01
Payer: MEDICARE

## 2022-03-01 ENCOUNTER — PATIENT OUTREACH (OUTPATIENT)
Dept: ADMINISTRATIVE | Facility: HOSPITAL | Age: 72
End: 2022-03-01
Payer: MEDICARE

## 2022-03-01 NOTE — PROGRESS NOTES
Working Dm Report:       Pt had elevated A1c in 2/22. Repeat due in May 2022. Sent message to schedule in advance.

## 2022-03-02 DIAGNOSIS — E11.65 UNCONTROLLED TYPE 2 DIABETES MELLITUS WITH HYPERGLYCEMIA: ICD-10-CM

## 2022-03-02 RX ORDER — INSULIN GLARGINE 100 [IU]/ML
15 INJECTION, SOLUTION SUBCUTANEOUS NIGHTLY
Qty: 15 ML | Refills: 5 | Status: SHIPPED | OUTPATIENT
Start: 2022-03-02 | End: 2022-05-11

## 2022-03-02 NOTE — TELEPHONE ENCOUNTER
No new care gaps identified.  Powered by [employer]. Reference number: [id number].   3/02/2022 9:09:19 AM CST

## 2022-03-02 NOTE — TELEPHONE ENCOUNTER
----- Message from Dali Eliud sent at 3/2/2022  3:22 PM CST -----  .Type:  RX Refill Request    Who Called: Pt's wife (Zhou)  Refill or New Rx:refill  RX Name and Strength:insulin (LANTUS SOLOSTAR U-100 INSULIN) glargine 100 units/mL (3mL) SubQ pen  How is the patient currently taking it? (ex. 1XDay) daily  Is this a 30 day or 90 day RX:30    [facility]  [id number] [place] [id number]  Phone: [phone] Fax: [phone]    Local or Mail Order:local   Ordering Provider Dr. Gonzales  Would the patient rather a call back or a response via [facility]? Call back  Best Call Back Number:.[phone]  Additional Information:

## 2022-03-07 ENCOUNTER — TELEPHONE (OUTPATIENT)
Dept: PREADMISSION TESTING | Facility: HOSPITAL | Age: 72
End: 2022-03-07

## 2022-03-07 DIAGNOSIS — Z01.818 PRE-OP TESTING: ICD-10-CM

## 2022-03-09 ENCOUNTER — PATIENT OUTREACH (OUTPATIENT)
Dept: ADMINISTRATIVE | Facility: OTHER | Age: 72
End: 2022-03-09
Payer: MEDICARE

## 2022-03-10 ENCOUNTER — LAB VISIT (OUTPATIENT)
Dept: LAB | Facility: HOSPITAL | Age: 72
End: 2022-03-10
Attending: NURSE PRACTITIONER
Payer: MEDICARE

## 2022-03-10 DIAGNOSIS — E11.65 UNCONTROLLED TYPE 2 DIABETES MELLITUS WITH HYPERGLYCEMIA, WITH LONG-TERM CURRENT USE OF INSULIN: ICD-10-CM

## 2022-03-10 DIAGNOSIS — Z79.4 UNCONTROLLED TYPE 2 DIABETES MELLITUS WITH HYPERGLYCEMIA, WITH LONG-TERM CURRENT USE OF INSULIN: ICD-10-CM

## 2022-03-10 LAB
ANION GAP SERPL CALC-SCNC: 16 MMOL/L (ref 8–16)
BUN SERPL-MCNC: 21 MG/DL (ref 8–23)
CALCIUM SERPL-MCNC: 10 MG/DL (ref 8.7–10.5)
CHLORIDE SERPL-SCNC: 103 MMOL/L (ref 95–110)
CHOLEST SERPL-MCNC: 171 MG/DL (ref 120–199)
CHOLEST/HDLC SERPL: 3.2 {RATIO} (ref 2–5)
CO2 SERPL-SCNC: 24 MMOL/L (ref 23–29)
CREAT SERPL-MCNC: 1.6 MG/DL (ref 0.5–1.4)
EST. GFR  (AFRICAN AMERICAN): 50.1 ML/MIN/1.73 M^2
EST. GFR  (NON AFRICAN AMERICAN): 43.3 ML/MIN/1.73 M^2
GLUCOSE SERPL-MCNC: 129 MG/DL (ref 70–110)
HDLC SERPL-MCNC: 53 MG/DL (ref 40–75)
HDLC SERPL: 31 % (ref 20–50)
LDLC SERPL CALC-MCNC: 86 MG/DL (ref 63–159)
NONHDLC SERPL-MCNC: 118 MG/DL
POTASSIUM SERPL-SCNC: 4.4 MMOL/L (ref 3.5–5.1)
SODIUM SERPL-SCNC: 143 MMOL/L (ref 136–145)
TRIGL SERPL-MCNC: 160 MG/DL (ref 30–150)
TSH SERPL DL<=0.005 MIU/L-ACNC: 1.43 UIU/ML (ref 0.4–4)

## 2022-03-10 PROCEDURE — 84443 ASSAY THYROID STIM HORMONE: CPT | Performed by: NURSE PRACTITIONER

## 2022-03-10 PROCEDURE — 80048 BASIC METABOLIC PNL TOTAL CA: CPT | Performed by: NURSE PRACTITIONER

## 2022-03-10 PROCEDURE — 80061 LIPID PANEL: CPT | Performed by: NURSE PRACTITIONER

## 2022-03-10 PROCEDURE — 36415 COLL VENOUS BLD VENIPUNCTURE: CPT | Mod: PO | Performed by: NURSE PRACTITIONER

## 2022-03-10 NOTE — PRE-PROCEDURE INSTRUCTIONS
Per anesthesia review, Dr. PEREZ Rubi, deferred to Dr. Arora and Dr. Diallo per In-basket message.

## 2022-03-12 ENCOUNTER — NURSE TRIAGE (OUTPATIENT)
Dept: ADMINISTRATIVE | Facility: CLINIC | Age: 72
End: 2022-03-12
Payer: MEDICARE

## 2022-03-12 NOTE — TELEPHONE ENCOUNTER
Reason for Disposition   Question about upcoming scheduled test, no triage required and triager able to answer question   [1] Caller requesting NON-URGENT health information AND [2] PCP's office is the best resource    Protocols used: INFORMATION ONLY CALL - NO TRIAGE-A-    Anup 's wife Zhou called to confirm SuPrep instructions for pre-colonoscopy, scheduled for Tuesday, 03/15/2022.  Went over all with her, on speaker phone, so Anup is able to hear / ask questions.  All clear on the prep, the only question she has is what time should he begin prep part 2 on the morning of procedure.  She wants confirmation from Dr Jessie Diallo.  Message to MD. Please contact caller directly with any additional care advice on Monday morning when clinic opens.

## 2022-03-14 ENCOUNTER — TELEPHONE (OUTPATIENT)
Dept: GASTROENTEROLOGY | Facility: CLINIC | Age: 72
End: 2022-03-14
Payer: MEDICARE

## 2022-03-14 ENCOUNTER — ANESTHESIA EVENT (OUTPATIENT)
Dept: ENDOSCOPY | Facility: HOSPITAL | Age: 72
End: 2022-03-14
Payer: MEDICARE

## 2022-03-14 ENCOUNTER — TELEPHONE (OUTPATIENT)
Dept: HEMATOLOGY/ONCOLOGY | Facility: CLINIC | Age: 72
End: 2022-03-14
Payer: MEDICARE

## 2022-03-14 NOTE — TELEPHONE ENCOUNTER
Phoned patient to confirm what time he is to take the 2nd part of his colon prep.  Patient verbalized understanding.

## 2022-03-14 NOTE — PRE-PROCEDURE INSTRUCTIONS
Spoke with patient and wife regarding sugar-free diet while prepping for colonoscopy. Verified blood glucose checks every 4 hours and taking 15 u Insulin this evening unless notified further. Pt. To take both blood pressure pills and hctz in morning prior to arrival. Both verbalize understanding of instructions by repeat.

## 2022-03-14 NOTE — TELEPHONE ENCOUNTER
Nurse called pt to come in to discuss Eliquis. Pt states that he is not coming in and he is not taking that medication. nurse verbalized understanding.

## 2022-03-14 NOTE — PRE-PROCEDURE INSTRUCTIONS
PAT call completed and patient educated on the bowel prep, clear liquid diet, procedure instructions and medical history discussed. Patient informed of arrival  time of 0730am at [facility].     Please check in on the 1st floor of hospital. Phone contact [phone] for any needs morning of procedure. Patient will be accompanied by wife and is made aware that  is to remain during entire visit and made aware of the limited visitor policy. All questions and concerns addressed.   Endoscopy instructions reviewed. Bowel prep received by patient/routed to pharmacy and copy of instructions received by patient and instructions received via [employer].     Stop all solid foods by 9PM on Monday (day/date) until after procedure and begin clear liquid diet.     Monday- First half of bowel prep to begin by 6pm, drink in 1.5 hours and continue clear liquid diet. CONTINUE TO DRINK PLENTY OF WATER.      TAKE 15 UNITS LANTUS INSULIN MONDAY NIGHT.     DO NOT TAKE ORAL DIABETIC MEDS UNTIL AFTER PROCEDURE.     CHECK BLOOD GLUCOSE EVERY 4 HOURS WHILE AWAKE AND IF FEELING SYMPTOMS.       Tuesday- Second half of bowel prep start at 2am, morning of procedure and to complete in 1.5 hours.       Nothing by mouth after 2nd bowel prep is completed.     Informed to take blood pressure medications of hctz, metoprolol and losartan with sip of water one hour prior to arrival.     Patient verbalizes understanding of teaching and all instructions. Patient fully vaccinated.     Your procedure will be performed at [facility]. Many GPS systems are NOT providing accurate instructions, take [address], from either direction, to [address] and proceed to the eastbound service road, turning between the [place] and [facility]. Once at the [facility], look for signs directing you to Hospital/Surgery. Check in for your procedure at  for Hospital/Surgery.

## 2022-03-14 NOTE — ANESTHESIA PREPROCEDURE EVALUATION
03/14/2022  Anup Miller is a 71 y.o., male.  Past Medical History:   Diagnosis Date    Acute respiratory failure due to COVID-19 08/2020    Colon polyp     COVID-19 virus infection 07/2020    COVID-19 virus infection 7/26/2020    Diabetes mellitus type II Diagnosed 2002    Elevated liver enzymes     Elevated PSA 12/13/2017    Did not f/u with urology as rec in 2015    Fatty liver disease, nonalcoholic     Hyperlipidemia     Hypertension     KHOI on CPAP     Sleep apnea      Past Surgical History:   Procedure Laterality Date    BACK SURGERY      CHOLECYSTECTOMY  2013    COLONOSCOPY N/A 7/19/2018    Procedure: COLONOSCOPY;  Surgeon: Chacorta Lopez III, MD;  [facility];  Service: Endoscopy;  Laterality: N/A;    ESOPHAGOGASTRODUODENOSCOPY N/A 8/25/2020    Procedure: EGD (ESOPHAGOGASTRODUODENOSCOPY);  Surgeon: Anup Green MD;  Location: [facility] OR [address] [place] [address];  Service: General;  Laterality: N/A;    TRACHEOSTOMY  08/2020    Temporarily post COVID infection.           Pre-op Assessment    I have reviewed the Patient Summary Reports.     I have reviewed the Nursing Notes. I have reviewed the NPO Status.   I have reviewed the Medications.     Review of Systems  Anesthesia Hx:  No problems with previous Anesthesia  Denies Family Hx of Anesthesia complications.   Denies Personal Hx of Anesthesia complications.   Social:  Non-Smoker, No Alcohol Use    Hematology/Oncology:     Oncology Normal     EENT/Dental:EENT/Dental Normal   Cardiovascular:   Hypertension hyperlipidemia Echo 8/2020 w nml EF    Pulmonary:   Sleep Apnea, CPAP H/o resp failure from covid pneumonia 8/2020   Education provided regarding risk of obstructive sleep apnea     Renal/:  Renal/ Normal     Hepatic/GI:   Fatty liver disease    Musculoskeletal:  Musculoskeletal Normal    Neurological:  Neurology Normal     Endocrine:   Diabetes, poorly controlled, type 2, using insulin  Obesity / BMI > 30  Dermatological:  Skin Normal    Psych:  Psychiatric Normal           Physical Exam  General: Well nourished, Cooperative, Alert and Oriented    Airway:  Mallampati: III / II  Mouth Opening: Normal  TM Distance: Normal  Tongue: Normal  Neck ROM: Normal ROM    Dental:  Intact        Anesthesia Plan  Type of Anesthesia, risks & benefits discussed:    Anesthesia Type: Gen Natural Airway  Intra-op Monitoring Plan: Standard ASA Monitors  Post Op Pain Control Plan: multimodal analgesia  Induction:  IV  Informed Consent: Informed consent signed with the Patient and all parties understand the risks and agree with anesthesia plan.  All questions answered. Patient consented to blood products? No  ASA Score: 3  Day of Surgery Review of History & Physical: H&P Update referred to the surgeon/provider.    Ready For Surgery From Anesthesia Perspective.     .

## 2022-03-15 ENCOUNTER — ANESTHESIA (OUTPATIENT)
Dept: ENDOSCOPY | Facility: HOSPITAL | Age: 72
End: 2022-03-15
Payer: MEDICARE

## 2022-03-15 ENCOUNTER — HOSPITAL ENCOUNTER (OUTPATIENT)
Facility: HOSPITAL | Age: 72
Discharge: HOME | End: 2022-03-15
Attending: INTERNAL MEDICINE | Admitting: INTERNAL MEDICINE
Payer: MEDICARE

## 2022-03-15 VITALS
DIASTOLIC BLOOD PRESSURE: 80 MMHG | WEIGHT: 255.06 LBS | HEIGHT: 69 IN | SYSTOLIC BLOOD PRESSURE: 130 MMHG | TEMPERATURE: 98 F | RESPIRATION RATE: 18 BRPM | BODY MASS INDEX: 37.78 KG/M2 | HEART RATE: 78 BPM | OXYGEN SATURATION: 98 %

## 2022-03-15 DIAGNOSIS — Z12.11 COLON CANCER SCREENING: Primary | ICD-10-CM

## 2022-03-15 LAB — POCT GLUCOSE: 155 MG/DL (ref 70–110)

## 2022-03-15 PROCEDURE — 88305 TISSUE EXAM BY PATHOLOGIST: ICD-10-PCS | Mod: 26,,, | Performed by: STUDENT IN AN ORGANIZED HEALTH CARE EDUCATION/TRAINING PROGRAM

## 2022-03-15 PROCEDURE — 45385 COLONOSCOPY W/LESION REMOVAL: CPT | Mod: PT,,, | Performed by: INTERNAL MEDICINE

## 2022-03-15 PROCEDURE — 45385 PR COLONOSCOPY,REMV LESN,SNARE: ICD-10-PCS | Mod: PT,,, | Performed by: INTERNAL MEDICINE

## 2022-03-15 PROCEDURE — 45385 COLONOSCOPY W/LESION REMOVAL: CPT | Mod: PT | Performed by: INTERNAL MEDICINE

## 2022-03-15 PROCEDURE — 88305 TISSUE EXAM BY PATHOLOGIST: CPT | Mod: 26,,, | Performed by: STUDENT IN AN ORGANIZED HEALTH CARE EDUCATION/TRAINING PROGRAM

## 2022-03-15 PROCEDURE — D9220A PRA ANESTHESIA: ICD-10-PCS | Mod: PT,,, | Performed by: ANESTHESIOLOGY

## 2022-03-15 PROCEDURE — 27201089 HC SNARE, DISP (ANY): Performed by: INTERNAL MEDICINE

## 2022-03-15 PROCEDURE — 25000003 PHARM REV CODE 250: Performed by: NURSE ANESTHETIST, CERTIFIED REGISTERED

## 2022-03-15 PROCEDURE — 63600175 PHARM REV CODE 636 W HCPCS: Performed by: NURSE ANESTHETIST, CERTIFIED REGISTERED

## 2022-03-15 PROCEDURE — 82962 GLUCOSE BLOOD TEST: CPT | Performed by: INTERNAL MEDICINE

## 2022-03-15 PROCEDURE — 88305 TISSUE EXAM BY PATHOLOGIST: CPT | Performed by: STUDENT IN AN ORGANIZED HEALTH CARE EDUCATION/TRAINING PROGRAM

## 2022-03-15 PROCEDURE — 37000009 HC ANESTHESIA EA ADD 15 MINS: Performed by: INTERNAL MEDICINE

## 2022-03-15 PROCEDURE — D9220A PRA ANESTHESIA: Mod: PT,,, | Performed by: ANESTHESIOLOGY

## 2022-03-15 PROCEDURE — 63600175 PHARM REV CODE 636 W HCPCS: Performed by: INTERNAL MEDICINE

## 2022-03-15 PROCEDURE — 37000008 HC ANESTHESIA 1ST 15 MINUTES: Performed by: INTERNAL MEDICINE

## 2022-03-15 RX ORDER — PROPOFOL 10 MG/ML
VIAL (ML) INTRAVENOUS
Status: DISCONTINUED | OUTPATIENT
Start: 2022-03-15 | End: 2022-03-15

## 2022-03-15 RX ORDER — LIDOCAINE HYDROCHLORIDE 20 MG/ML
INJECTION, SOLUTION EPIDURAL; INFILTRATION; INTRACAUDAL; PERINEURAL
Status: DISCONTINUED | OUTPATIENT
Start: 2022-03-15 | End: 2022-03-15

## 2022-03-15 RX ORDER — SODIUM CHLORIDE, SODIUM LACTATE, POTASSIUM CHLORIDE, CALCIUM CHLORIDE 600; 310; 30; 20 MG/100ML; MG/100ML; MG/100ML; MG/100ML
INJECTION, SOLUTION INTRAVENOUS CONTINUOUS
Status: DISCONTINUED | OUTPATIENT
Start: 2022-03-15 | End: 2022-03-15 | Stop reason: HOSPADM

## 2022-03-15 RX ADMIN — PROPOFOL 50 MG: 10 INJECTION, EMULSION INTRAVENOUS at 09:03

## 2022-03-15 RX ADMIN — PROPOFOL 30 MG: 10 INJECTION, EMULSION INTRAVENOUS at 09:03

## 2022-03-15 RX ADMIN — PROPOFOL 20 MG: 10 INJECTION, EMULSION INTRAVENOUS at 09:03

## 2022-03-15 RX ADMIN — PROPOFOL 100 MG: 10 INJECTION, EMULSION INTRAVENOUS at 09:03

## 2022-03-15 RX ADMIN — SODIUM CHLORIDE, SODIUM LACTATE, POTASSIUM CHLORIDE, AND CALCIUM CHLORIDE: .6; .31; .03; .02 INJECTION, SOLUTION INTRAVENOUS at 08:03

## 2022-03-15 RX ADMIN — LIDOCAINE HYDROCHLORIDE 80 MG: 20 INJECTION, SOLUTION EPIDURAL; INFILTRATION; INTRACAUDAL; PERINEURAL at 09:03

## 2022-03-15 NOTE — TRANSFER OF CARE
"Anesthesia Transfer of Care Note    Patient: Anup Miller    Procedure(s) Performed: Procedure(s) (LRB):  COLONOSCOPY (N/A)    Patient location: PACU    Anesthesia Type: general    Transport from OR: Transported from OR on 2-3 L/min O2 by NC with adequate spontaneous ventilation    Post pain: adequate analgesia    Post assessment: no apparent anesthetic complications    Post vital signs: stable    Level of consciousness: sedated and responds to stimulation    Nausea/Vomiting: no nausea/vomiting    Complications: none    Transfer of care protocol was followed      Last vitals:   Visit Vitals  BP (!) 144/66 (BP Location: Left arm, Patient Position: Sitting)   Pulse 77   Temp 36.7 °C (98 °F) (Temporal)   Resp 18   Ht 5' 9" (1.753 m)   Wt 115.7 kg (255 lb 1.2 oz)   SpO2 98%   BMI 37.67 kg/m²     "

## 2022-03-15 NOTE — H&P
PRE PROCEDURE H&P    Patient Name: Anup Miller  MRN: [id number]  : 1950  Date of Procedure:  3/15/2022  Referring Physician: Bryce Gonzales MD  Primary Physician: Bryce Gonzales MD  Procedure Physician: Jessie Diallo MD       Planned Procedure: Colonoscopy  Diagnosis: screening for colon cancer  Chief Complaint: Same as above    HPI: Patient is an 71 y.o. male is here for the above.     Last colonoscopy: unknown   Family history: negative   Anticoagulation: none     Past Medical History:   Past Medical History:   Diagnosis Date    Acute respiratory failure due to COVID-19 2020    Colon polyp     COVID-19 virus infection 2020    COVID-19 virus infection 2020    Diabetes mellitus type II Diagnosed     Elevated liver enzymes     Elevated PSA 2017    Did not f/u with urology as rec in     Fatty liver disease, nonalcoholic     Hyperlipidemia     Hypertension     KHOI on CPAP     Sleep apnea         Past Surgical History:  Past Surgical History:   Procedure Laterality Date    BACK SURGERY      CHOLECYSTECTOMY      COLONOSCOPY N/A 2018    Procedure: COLONOSCOPY;  Surgeon: Chacorta Lopez III, MD;  Location: [facility];  Service: Endoscopy;  Laterality: N/A;    ESOPHAGOGASTRODUODENOSCOPY N/A 2020    Procedure: EGD (ESOPHAGOGASTRODUODENOSCOPY);  Surgeon: Anup Green MD;  Location: [address];  Service: General;  Laterality: N/A;    TRACHEOSTOMY  2020    Temporarily post COVID infection.        Home Medications:  Prior to Admission medications    Medication Sig Start Date End Date Taking? Authorizing Provider   atorvastatin (LIPITOR) 40 MG tablet Take 1 tablet (40 mg total) by mouth every evening. 21 Yes Bryce Gonzales MD   dulaglutide (TRULICITY) 0.75 mg/0.5 mL pen injector Inject 0.75 mg into the skin every 7 days. 2/10/22  Yes Ana Paula Merritt NP   empagliflozin (JARDIANCE) 10 mg tablet Take 1 tablet (10 mg total) by mouth  "once daily. 2/10/22  Yes Ana Paula Merritt NP   gabapentin (NEURONTIN) 400 MG capsule TAKE 1 CAPSULE BY MOUTH THREE TIMES DAILY FOR  PAIN/NEUROPATHY 3/11/22  Yes Bryce Gonzales MD   hydroCHLOROthiazide (HYDRODIURIL) 25 MG tablet Take 1 tablet (25 mg total) by mouth once daily. 8/19/21 8/19/22 Yes Bryce Gonzales MD   insulin (LANTUS SOLOSTAR U-100 INSULIN) glargine 100 units/mL (3mL) SubQ pen Inject 15 Units into the skin every evening.  Patient taking differently: Inject 20 Units into the skin every evening. 3/2/22  Yes Ana Paula Merritt NP   losartan (COZAAR) 100 MG tablet Take 100 mg by mouth once daily. 3/2/21  Yes Historical Provider   metFORMIN (GLUCOPHAGE XR) 500 MG ER 24hr tablet Take 1 tablet (500 mg total) by mouth 2 (two) times daily with meals. 2/10/22  Yes Ana Paula Merritt NP   metoprolol tartrate (LOPRESSOR) 50 MG tablet TK 1 T PO BID 10/14/20  Yes Historical Provider   pantoprazole (PROTONIX) 40 MG tablet TK 1 T PO D 10/14/20  Yes Historical Provider   rosuvastatin (CRESTOR) 10 MG tablet Take 1 tablet (10 mg total) by mouth once daily. 8/9/21  Yes Bryce Gonzales MD   zinc sulfate (ZINCATE) 220 (50) mg capsule TK 1 C PO D 10/14/20  Yes Historical Provider   FLUZONE HIGHDOSE QUAD 21-22  mcg/0.7 mL Syrg  9/30/21   Historical Provider   pen needle, diabetic (BD ULTRA-FINE TATYANA PEN NEEDLE) 32 gauge x 5/32" Ndle Use with Lantus daily. 2/10/22   Ana Paula Merritt NP        Allergies:  Review of patient's allergies indicates:  No Known Allergies     Social History:   Social History     Socioeconomic History    Marital status:      Spouse name: yoselin    Number of children: 1   Occupational History    Occupation:    Tobacco Use    Smoking status: Never Smoker    Smokeless tobacco: Never Used   Substance and Sexual Activity    Alcohol use: No    Drug use: No    Sexual activity: Yes     Partners: Female     Comment:    Social History Narrative    No smokers or pets in " "household.       Family History:  Family History   Problem Relation Age of Onset    Asthma Mother     Diabetes Father     Cancer Sister         Breast    Diabetes Paternal Aunt     Cancer Maternal Grandmother         Breast    Diabetes Maternal Grandmother     Diabetes Paternal Uncle     Colon cancer Neg Hx        ROS: No acute cardiac events, no acute respiratory complaints.     Physical Exam (all patients):    BP (!) 144/66 (BP Location: Left arm, Patient Position: Sitting)   Pulse 77   Temp 98 °F (36.7 °C) (Temporal)   Resp 18   Ht 5' 9" (1.753 m)   Wt 115.7 kg (255 lb 1.2 oz)   SpO2 98%   BMI 37.67 kg/m²   Lungs: Clear to auscultation bilaterally, respirations unlabored  Heart: Regular rate and rhythm, S1 and S2 normal, no obvious murmurs  Abdomen:         Soft, non-tender, bowel sounds normal, no masses, no organomegaly    Lab Results   Component Value Date    WBC 5.76 07/19/2021    WBC 5.76 07/19/2021    MCV 89 07/19/2021    MCV 89 07/19/2021    RDW 14.4 07/19/2021    RDW 14.4 07/19/2021     07/19/2021     07/19/2021    INR 1.0 07/26/2020     (H) 03/10/2022    HGBA1C 12.7 (H) 02/10/2022    BUN 21 03/10/2022     03/10/2022    K 4.4 03/10/2022     03/10/2022        SEDATION PLAN: per anesthesia      History reviewed, vital signs satisfactory, cardiopulmonary status satisfactory, sedation options, risks and plans have been discussed with the patient  All their questions were answered and the patient agrees to the sedation procedures as planned and the patient is deemed an appropriate candidate for the sedation as planned.    Procedure explained to patient, informed consent obtained and placed in chart.    Jessie Diallo  3/15/2022  9:22 AM   "

## 2022-03-15 NOTE — PLAN OF CARE
Patient d/c home in stable condition via wheelchair with ride. Verbalized understanding of d/c instructions. Patient voiced no complaints. Patient stood at side of bed, walked steps with no new motor deficits. Neuro intact foot drop and weakness on right leg same as pre-op.  Dr Diallo in to see pt.

## 2022-03-15 NOTE — ANESTHESIA POSTPROCEDURE EVALUATION
Anesthesia Post Evaluation    Patient: Anup Miller    Procedure(s) Performed: Procedure(s) (LRB):  COLONOSCOPY (N/A)    Final Anesthesia Type: general      Patient location during evaluation: PACU  Patient participation: Yes- Able to Participate  Level of consciousness: awake and alert and oriented  Post-procedure vital signs: reviewed and stable  Pain management: adequate  Airway patency: patent    PONV status at discharge: No PONV  Anesthetic complications: no      Cardiovascular status: blood pressure returned to baseline, stable and hemodynamically stable  Respiratory status: unassisted  Hydration status: euvolemic  Follow-up not needed.          Vitals Value Taken Time   /80 03/15/22 1023   Temp 36.6 °C (97.8 °F) 03/15/22 0953   Pulse 78 03/15/22 1023   Resp 18 03/15/22 1023   SpO2 98 % 03/15/22 1023         Event Time   Out of Recovery 10:23:00         Pain/Dudley Score: Dudley Score: 10 (3/15/2022 10:23 AM)

## 2022-03-15 NOTE — PROVATION PATIENT INSTRUCTIONS
Discharge Summary/Instructions after an Endoscopic Procedure  Patient Name: Anup Miller  Patient MRN: [id number]  Patient YOB: 1950  Tuesday, March 15, 2022  Jessie Diallo MD  Dear patient,  As a result of recent federal legislation (The Federal Cures Act), you may   receive lab or pathology results from your procedure in your [facility]   account before your physician is able to contact you. Your physician or   their representative will relay the results to you with their   recommendations at their soonest availability.  Thank you,  RESTRICTIONS:  During your procedure today, you received medications for sedation.  These   medications may affect your judgment, balance and coordination.  Therefore,   for 24 hours, you have the following restrictions:   - DO NOT drive a car, operate machinery, make legal/financial decisions,   sign important papers or drink alcohol.    ACTIVITY:  Today: no heavy lifting, straining or running due to procedural   sedation/anesthesia.  The following day: return to full activity including work.  DIET:  Eat and drink normally unless instructed otherwise.     TREATMENT FOR COMMON SIDE EFFECTS:  - Mild abdominal pain, nausea, belching, bloating or excessive gas:  rest,   eat lightly and use a heating pad.  - Sore Throat: treat with throat lozenges and/or gargle with warm salt   water.  - Because air was used during the procedure, expelling large amounts of air   from your rectum or belching is normal.  - If a bowel prep was taken, you may not have a bowel movement for 1-3 days.    This is normal.  SYMPTOMS TO WATCH FOR AND REPORT TO YOUR PHYSICIAN:  1. Abdominal pain or bloating, other than gas cramps.  2. Chest pain.  3. Back pain.  4. Signs of infection such as: chills or fever occurring within 24 hours   after the procedure.  5. Rectal bleeding, which would show as bright red, maroon, or black stools.   (A tablespoon of blood from the rectum is not serious, especially  if   hemorrhoids are present.)  6. Vomiting.  7. Weakness or dizziness.  GO DIRECTLY TO THE NEAREST EMERGENCY ROOM IF YOU HAVE ANY OF THE FOLLOWING:      Difficulty breathing              Chills and/or fever over 101 F   Persistent vomiting and/or vomiting blood   Severe abdominal pain   Severe chest pain   Black, tarry stools   Bleeding- more than one tablespoon   Any other symptom or condition that you feel may need urgent attention  Your doctor recommends these additional instructions:  If any biopsies were taken, your doctors clinic will contact you in 1 to 2   weeks with any results.  - Repeat colonoscopy in 5 years for surveillance.   - Resume previous diet today.   - Continue present medications.   - No aspirin, ibuprofen, naproxen, or other non-steroidal anti-inflammatory   drugs for 7 days.   - Discharge patient to home (via wheelchair).   - Patient has a contact number available for emergencies.  The signs and   symptoms of potential delayed complications were discussed with the   patient.  Return to normal activities tomorrow.  Written discharge   instructions were provided to the patient.  For questions, problems or results please call your physician Jessie Diallo MD at Work:  [phone]  If you have any questions about the above instructions, call the GI   department at [phone] or call the endoscopy unit at [phone]   from 7am until 3 pm.  [facility] [place], EMERGENCY ROOM PHONE NUMBER:   [phone]  IF A COMPLICATION OR EMERGENCY SITUATION ARISES AND YOU ARE UNABLE TO REACH   YOUR PHYSICIAN - GO DIRECTLY TO THE EMERGENCY ROOM.  I have read or have had read to me these discharge instructions for my   procedure and have received a written copy.  I understand these   instructions and will follow-up with my physician if I have any questions.     __________________________________       _____________________________________  Nurse Signature                                           Patient/Designated   Responsible Party Signature  MD Jessie Molina MD  3/15/2022 9:53:48 AM  This report has been verified and signed electronically.  Dear patient,  As a result of recent federal legislation (The Federal Cures Act), you may   receive lab or pathology results from your procedure in your [facility]   account before your physician is able to contact you. Your physician or   their representative will relay the results to you with their   recommendations at their soonest availability.  Thank you,  PROVATION

## 2022-03-21 LAB
FINAL PATHOLOGIC DIAGNOSIS: NORMAL
Lab: NORMAL

## 2022-03-29 ENCOUNTER — PATIENT MESSAGE (OUTPATIENT)
Dept: INTERNAL MEDICINE | Facility: CLINIC | Age: 72
End: 2022-03-29
Payer: MEDICARE

## 2022-04-26 ENCOUNTER — PATIENT MESSAGE (OUTPATIENT)
Dept: DIABETES | Facility: CLINIC | Age: 72
End: 2022-04-26
Payer: MEDICARE

## 2022-04-26 ENCOUNTER — TELEPHONE (OUTPATIENT)
Dept: DIABETES | Facility: CLINIC | Age: 72
End: 2022-04-26
Payer: MEDICARE

## 2022-04-26 DIAGNOSIS — E11.65 UNCONTROLLED TYPE 2 DIABETES MELLITUS WITH HYPERGLYCEMIA, WITH LONG-TERM CURRENT USE OF INSULIN: Primary | ICD-10-CM

## 2022-04-26 DIAGNOSIS — Z79.4 UNCONTROLLED TYPE 2 DIABETES MELLITUS WITH HYPERGLYCEMIA, WITH LONG-TERM CURRENT USE OF INSULIN: Primary | ICD-10-CM

## 2022-04-26 NOTE — TELEPHONE ENCOUNTER
Please let patient know I am sending a referral to our pharmacy assistance department to see if they can help. Will send Johannepaulclementina Humphrey a private message also to see if we can expedite this for him and to see what options we have.

## 2022-04-26 NOTE — TELEPHONE ENCOUNTER
----- Message from Starr Perez LPN sent at 4/26/2022  3:13 PM CDT -----  Patient is stating the medications are to high and requesting a medication for Lantus. Patient has fallen into the insurance hole..            ----- Message -----  From: Donna Llanes  Sent: 4/26/2022   1:57 PM CDT  To: [facility] Staff    Patient called in asking for new medications to be filled he cant afford the old prescriptions any longer     [phone] please call with questions   empagliflozin (JARDIANCE) 10 mg tablet  dulaglutide (TRinsulin (LANTUS SOLOSTAR U-100 INSULIN) glargine 100 units/mL (3mLULICITY) 0.75 mg/0.5 mL pen injector    metFORMIN (GLUCOPHAGE XR) 500 MG ER 24hr tablet    Thanks bautista

## 2022-05-05 ENCOUNTER — TELEPHONE (OUTPATIENT)
Dept: PODIATRY | Facility: CLINIC | Age: 72
End: 2022-05-05
Payer: MEDICARE

## 2022-05-05 ENCOUNTER — OFFICE VISIT (OUTPATIENT)
Dept: INTERNAL MEDICINE | Facility: CLINIC | Age: 72
End: 2022-05-05
Payer: MEDICARE

## 2022-05-05 ENCOUNTER — LAB VISIT (OUTPATIENT)
Dept: LAB | Facility: HOSPITAL | Age: 72
End: 2022-05-05
Attending: INTERNAL MEDICINE
Payer: MEDICARE

## 2022-05-05 VITALS
OXYGEN SATURATION: 95 % | BODY MASS INDEX: 39.98 KG/M2 | WEIGHT: 270.75 LBS | HEART RATE: 77 BPM | TEMPERATURE: 97 F | DIASTOLIC BLOOD PRESSURE: 70 MMHG | SYSTOLIC BLOOD PRESSURE: 120 MMHG

## 2022-05-05 DIAGNOSIS — E11.65 UNCONTROLLED TYPE 2 DIABETES MELLITUS WITH HYPERGLYCEMIA: ICD-10-CM

## 2022-05-05 DIAGNOSIS — E11.59 HYPERTENSION ASSOCIATED WITH DIABETES: ICD-10-CM

## 2022-05-05 DIAGNOSIS — D50.0 IRON DEFICIENCY ANEMIA DUE TO CHRONIC BLOOD LOSS: ICD-10-CM

## 2022-05-05 DIAGNOSIS — E11.65 UNCONTROLLED TYPE 2 DIABETES MELLITUS WITH HYPERGLYCEMIA, WITH LONG-TERM CURRENT USE OF INSULIN: ICD-10-CM

## 2022-05-05 DIAGNOSIS — Z00.00 ROUTINE GENERAL MEDICAL EXAMINATION AT A HEALTH CARE FACILITY: Primary | ICD-10-CM

## 2022-05-05 DIAGNOSIS — E66.01 SEVERE OBESITY (BMI 35.0-35.9 WITH COMORBIDITY): ICD-10-CM

## 2022-05-05 DIAGNOSIS — G62.9 NEUROPATHY: Primary | ICD-10-CM

## 2022-05-05 DIAGNOSIS — Z79.4 UNCONTROLLED TYPE 2 DIABETES MELLITUS WITH HYPERGLYCEMIA, WITH LONG-TERM CURRENT USE OF INSULIN: ICD-10-CM

## 2022-05-05 DIAGNOSIS — I15.2 HYPERTENSION ASSOCIATED WITH DIABETES: ICD-10-CM

## 2022-05-05 DIAGNOSIS — E11.69 HYPERLIPIDEMIA ASSOCIATED WITH TYPE 2 DIABETES MELLITUS: ICD-10-CM

## 2022-05-05 DIAGNOSIS — I82.409 ACUTE DEEP VEIN THROMBOSIS (DVT) OF OTHER VEIN OF LOWER EXTREMITY, UNSPECIFIED LATERALITY: ICD-10-CM

## 2022-05-05 DIAGNOSIS — E78.5 HYPERLIPIDEMIA ASSOCIATED WITH TYPE 2 DIABETES MELLITUS: ICD-10-CM

## 2022-05-05 DIAGNOSIS — N18.31 CHRONIC KIDNEY DISEASE, STAGE 3A: ICD-10-CM

## 2022-05-05 LAB
ESTIMATED AVG GLUCOSE: 189 MG/DL (ref 68–131)
HBA1C MFR BLD: 8.2 % (ref 4–5.6)

## 2022-05-05 PROCEDURE — 1159F PR MEDICATION LIST DOCUMENTED IN MEDICAL RECORD: ICD-10-PCS | Mod: CPTII,S$GLB,, | Performed by: INTERNAL MEDICINE

## 2022-05-05 PROCEDURE — 83036 HEMOGLOBIN GLYCOSYLATED A1C: CPT | Performed by: INTERNAL MEDICINE

## 2022-05-05 PROCEDURE — 3046F PR MOST RECENT HEMOGLOBIN A1C LEVEL > 9.0%: ICD-10-PCS | Mod: CPTII,S$GLB,, | Performed by: INTERNAL MEDICINE

## 2022-05-05 PROCEDURE — 1159F MED LIST DOCD IN RCRD: CPT | Mod: CPTII,S$GLB,, | Performed by: INTERNAL MEDICINE

## 2022-05-05 PROCEDURE — 1101F PR PT FALLS ASSESS DOC 0-1 FALLS W/OUT INJ PAST YR: ICD-10-PCS | Mod: CPTII,S$GLB,, | Performed by: INTERNAL MEDICINE

## 2022-05-05 PROCEDURE — 3008F BODY MASS INDEX DOCD: CPT | Mod: CPTII,S$GLB,, | Performed by: INTERNAL MEDICINE

## 2022-05-05 PROCEDURE — 3072F LOW RISK FOR RETINOPATHY: CPT | Mod: CPTII,S$GLB,, | Performed by: INTERNAL MEDICINE

## 2022-05-05 PROCEDURE — 99397 PER PM REEVAL EST PAT 65+ YR: CPT | Mod: S$GLB,,, | Performed by: INTERNAL MEDICINE

## 2022-05-05 PROCEDURE — 1126F PR PAIN SEVERITY QUANTIFIED, NO PAIN PRESENT: ICD-10-PCS | Mod: CPTII,S$GLB,, | Performed by: INTERNAL MEDICINE

## 2022-05-05 PROCEDURE — 99499 UNLISTED E&M SERVICE: CPT | Mod: S$GLB,,, | Performed by: INTERNAL MEDICINE

## 2022-05-05 PROCEDURE — 3072F PR LOW RISK FOR RETINOPATHY: ICD-10-PCS | Mod: CPTII,S$GLB,, | Performed by: INTERNAL MEDICINE

## 2022-05-05 PROCEDURE — 99999 PR PBB SHADOW E&M-EST. PATIENT-LVL III: CPT | Mod: PBBFAC,,, | Performed by: INTERNAL MEDICINE

## 2022-05-05 PROCEDURE — 3288F PR FALLS RISK ASSESSMENT DOCUMENTED: ICD-10-PCS | Mod: CPTII,S$GLB,, | Performed by: INTERNAL MEDICINE

## 2022-05-05 PROCEDURE — 1101F PT FALLS ASSESS-DOCD LE1/YR: CPT | Mod: CPTII,S$GLB,, | Performed by: INTERNAL MEDICINE

## 2022-05-05 PROCEDURE — 1126F AMNT PAIN NOTED NONE PRSNT: CPT | Mod: CPTII,S$GLB,, | Performed by: INTERNAL MEDICINE

## 2022-05-05 PROCEDURE — 3078F PR MOST RECENT DIASTOLIC BLOOD PRESSURE < 80 MM HG: ICD-10-PCS | Mod: CPTII,S$GLB,, | Performed by: INTERNAL MEDICINE

## 2022-05-05 PROCEDURE — 3074F SYST BP LT 130 MM HG: CPT | Mod: CPTII,S$GLB,, | Performed by: INTERNAL MEDICINE

## 2022-05-05 PROCEDURE — 99999 PR PBB SHADOW E&M-EST. PATIENT-LVL III: ICD-10-PCS | Mod: PBBFAC,,, | Performed by: INTERNAL MEDICINE

## 2022-05-05 PROCEDURE — 99397 PR PREVENTIVE VISIT,EST,65 & OVER: ICD-10-PCS | Mod: S$GLB,,, | Performed by: INTERNAL MEDICINE

## 2022-05-05 PROCEDURE — 36415 COLL VENOUS BLD VENIPUNCTURE: CPT | Performed by: INTERNAL MEDICINE

## 2022-05-05 PROCEDURE — 3078F DIAST BP <80 MM HG: CPT | Mod: CPTII,S$GLB,, | Performed by: INTERNAL MEDICINE

## 2022-05-05 PROCEDURE — 3288F FALL RISK ASSESSMENT DOCD: CPT | Mod: CPTII,S$GLB,, | Performed by: INTERNAL MEDICINE

## 2022-05-05 PROCEDURE — 3074F PR MOST RECENT SYSTOLIC BLOOD PRESSURE < 130 MM HG: ICD-10-PCS | Mod: CPTII,S$GLB,, | Performed by: INTERNAL MEDICINE

## 2022-05-05 PROCEDURE — 99499 RISK ADDL DX/OHS AUDIT: ICD-10-PCS | Mod: S$GLB,,, | Performed by: INTERNAL MEDICINE

## 2022-05-05 PROCEDURE — 3008F PR BODY MASS INDEX (BMI) DOCUMENTED: ICD-10-PCS | Mod: CPTII,S$GLB,, | Performed by: INTERNAL MEDICINE

## 2022-05-05 PROCEDURE — 3046F HEMOGLOBIN A1C LEVEL >9.0%: CPT | Mod: CPTII,S$GLB,, | Performed by: INTERNAL MEDICINE

## 2022-05-05 RX ORDER — SEMAGLUTIDE 1.34 MG/ML
0.25 INJECTION, SOLUTION SUBCUTANEOUS
Qty: 1 PEN | Refills: 5 | Status: SHIPPED | OUTPATIENT
Start: 2022-05-05 | End: 2022-05-05 | Stop reason: SDUPTHER

## 2022-05-05 RX ORDER — SEMAGLUTIDE 1.34 MG/ML
0.25 INJECTION, SOLUTION SUBCUTANEOUS
Qty: 1 PEN | Refills: 5 | Status: SHIPPED | OUTPATIENT
Start: 2022-05-05 | End: 2022-05-31 | Stop reason: SDUPTHER

## 2022-05-05 NOTE — PROGRESS NOTES
Subjective:      Patient ID: Anup Miller is a 72 y.o. male.    Chief Complaint: Follow-up    HPI     71 yo with   Patient Active Problem List   Diagnosis    Uncontrolled type 2 diabetes mellitus    Hypertension associated with diabetes    Hyperlipidemia associated with type 2 diabetes mellitus    Multiple pulmonary nodules determined by computed tomography of lung    KHOI (obstructive sleep apnea)    Hx of colonic polyp    Cardiac arrhythmia    Pneumonia due to COVID-19 virus    Oral phase dysphagia    Fever    History of 2019 novel coronavirus disease (COVID-19)    Malnutrition of moderate degree    Severe obesity (BMI 35.0-35.9 with comorbidity)    Debility    Neuropathy    Muscular deconditioning    History of DVT (deep vein thrombosis)    Iron deficiency anemia due to chronic blood loss    Acute deep vein thrombosis (DVT) of other vein of lower extremity, unspecified laterality    Chronic kidney disease, stage 3a     Past Medical History:   Diagnosis Date    Acute respiratory failure due to COVID-19 08/2020    Colon polyp     COVID-19 virus infection 07/2020    COVID-19 virus infection 7/26/2020    Diabetes mellitus type II Diagnosed 2002    Elevated liver enzymes     Elevated PSA 12/13/2017    Did not f/u with urology as rec in 2015    Fatty liver disease, nonalcoholic     Hyperlipidemia     Hypertension     KHOI on CPAP     Sleep apnea      Here today for annual prev exam.  Compliant with meds without significant side effects. Energy and appetite are good.     Past Surgical History:   Procedure Laterality Date    BACK SURGERY      CHOLECYSTECTOMY  2013    COLONOSCOPY N/A 7/19/2018    Procedure: COLONOSCOPY;  Surgeon: Chacorta Lopez III, MD;  Location: [facility];  Service: Endoscopy;  Laterality: N/A;    COLONOSCOPY N/A 3/15/2022    Procedure: COLONOSCOPY;  Surgeon: Jessie Diallo MD;  Location: [facility];  Service: Endoscopy;  Laterality: N/A;     ESOPHAGOGASTRODUODENOSCOPY N/A 8/25/2020    Procedure: EGD (ESOPHAGOGASTRODUODENOSCOPY);  Surgeon: Anup Green MD;  Location: [facility] [address] [place] [address];  Service: General;  Laterality: N/A;    TRACHEOSTOMY  08/2020    Temporarily post COVID infection.     Social History     Socioeconomic History    Marital status:      Spouse name: yoselin    Number of children: 1   Occupational History    Occupation:    Tobacco Use    Smoking status: Never Smoker    Smokeless tobacco: Never Used   Substance and Sexual Activity    Alcohol use: No    Drug use: No    Sexual activity: Yes     Partners: Female     Comment:    Social History Narrative    No smokers or pets in household.     family history includes Asthma in his mother; Cancer in his maternal grandmother and sister; Diabetes in his father, maternal grandmother, paternal aunt, and paternal uncle.    Review of Systems   Constitutional: Negative for chills and fever.   HENT: Negative for ear pain and sore throat.    Respiratory: Negative for cough.    Cardiovascular: Negative for chest pain.   Gastrointestinal: Negative for abdominal pain and blood in stool.   Genitourinary: Negative for dysuria and hematuria.   Neurological: Negative for seizures and syncope.     Objective:   /70 (BP Location: Left arm, Patient Position: Sitting, BP Method: Large (Manual))   Pulse 77   Temp 97.3 °F (36.3 °C) (Tympanic)   Wt 122.8 kg (270 lb 11.6 oz)   SpO2 95%   BMI 39.98 kg/m²     Physical Exam  Constitutional:       General: He is not in acute distress.     Appearance: He is well-developed.   HENT:      Head: Normocephalic and atraumatic.   Eyes:      Pupils: Pupils are equal, round, and reactive to light.   Neck:      Thyroid: No thyromegaly.   Cardiovascular:      Rate and Rhythm: Normal rate and regular rhythm.   Pulmonary:      Breath sounds: Normal breath sounds. No wheezing or rales.   Abdominal:      General: Bowel sounds are normal.       Palpations: Abdomen is soft.      Tenderness: There is no abdominal tenderness.   Musculoskeletal:      Cervical back: Neck supple.   Lymphadenopathy:      Cervical: No cervical adenopathy.   Skin:     General: Skin is warm and dry.   Neurological:      Mental Status: He is alert and oriented to person, place, and time.      Motor: Weakness present.      Gait: Gait abnormal.   Psychiatric:         Behavior: Behavior normal.     Range of motion of hands and lower extremities improved from last visit.    Assessment:     1. Routine general medical examination at a health care facility    2. Uncontrolled type 2 diabetes mellitus with hyperglycemia    3. Acute deep vein thrombosis (DVT) of other vein of lower extremity, unspecified laterality    4. Chronic kidney disease, stage 3a    5. Severe obesity (BMI 35.0-35.9 with comorbidity)    6. Hypertension associated with diabetes    7. Hyperlipidemia associated with type 2 diabetes mellitus    8. Iron deficiency anemia due to chronic blood loss      Plan:   Routine general medical examination at a health care facility  The diet and regular exercise.  Health maintenance reviewed patient    Uncontrolled type 2 diabetes mellitus with hyperglycemia  -     Hemoglobin A1C; Future; Expected date: 05/05/2022    Acute deep vein thrombosis (DVT) of other vein of lower extremity, unspecified laterality    Chronic kidney disease, stage 3a    Severe obesity (BMI 35.0-35.9 with comorbidity)    Hypertension associated with diabetes    Hyperlipidemia associated with type 2 diabetes mellitus    Iron deficiency anemia due to chronic blood loss        Lab Frequency Next Occurrence   Microalbumin/Creatinine Ratio, Urine Once 05/27/2021   Ambulatory referral/consult to Orthopedics Once 06/03/2021   Ambulatory referral/consult to Neurology Once 06/03/2021   Ambulatory referral/consult to Physical/Occupational Therapy Once 06/08/2021   Ambulatory referral/consult to Diabetic Advanced Practice  Providers (Medical Management) Once 09/21/2021   Ambulatory referral/consult to Pharmacy Assistance Once 12/30/2021   CBC Auto Differential Once 01/25/2022   Comprehensive Metabolic Panel Once 01/25/2022   Ferritin Once 01/25/2022   Iron and TIBC Once 01/25/2022   Microalbumin/Creatinine Ratio, Urine Once 03/10/2022   Hemoglobin A1C Once 05/11/2022   COVID-19 Routine Screening Once 03/12/2022   Ambulatory referral/consult to Pharmacy Assistance Once 04/26/2022       Problem List Items Addressed This Visit     Uncontrolled type 2 diabetes mellitus    Current Assessment & Plan     Not at goal.  Medications as discussed.  Unfortunately has poor insurance coverage for medications.  This situation makes it extremely difficult to control his diabetes optimally.  Diet and exercise as discussed.  Check A1c           Relevant Orders    Hemoglobin A1C    Hypertension associated with diabetes    Current Assessment & Plan     Controlled           Hyperlipidemia associated with type 2 diabetes mellitus    Current Assessment & Plan     Continue statin           Severe obesity (BMI 35.0-35.9 with comorbidity)    Current Assessment & Plan     Diet and exercise           Iron deficiency anemia due to chronic blood loss    Overview     Up-to-date with Hematology           Acute deep vein thrombosis (DVT) of other vein of lower extremity, unspecified laterality    Current Assessment & Plan     Up-to-date with Hematology           Chronic kidney disease, stage 3a    Current Assessment & Plan     Stable             Other Visit Diagnoses     Routine general medical examination at a health care facility    -  Primary          Follow up in about 3 months (around 8/5/2022), or if symptoms worsen or fail to improve.  Needs diabetes clinic appointment scheduled at the appropriate interval.  I do not see diabetes clinic follow-up appointment on his chart.

## 2022-05-05 NOTE — ASSESSMENT & PLAN NOTE
Not at goal.  Medications as discussed.  Unfortunately has poor insurance coverage for medications.  This situation makes it extremely difficult to control his diabetes optimally.  Diet and exercise as discussed.  Check A1c

## 2022-05-05 NOTE — TELEPHONE ENCOUNTER
Tried calling the patient. VM not set up    ----- Message from Sallie Mckee LPN sent at 5/5/2022 10:16 AM CDT -----  Please schedule Patient next available appointment.    Thanks.

## 2022-05-10 ENCOUNTER — PATIENT OUTREACH (OUTPATIENT)
Dept: ADMINISTRATIVE | Facility: OTHER | Age: 72
End: 2022-05-10
Payer: MEDICARE

## 2022-05-11 ENCOUNTER — OFFICE VISIT (OUTPATIENT)
Dept: DIABETES | Facility: CLINIC | Age: 72
End: 2022-05-11
Payer: MEDICARE

## 2022-05-11 VITALS
HEIGHT: 69 IN | WEIGHT: 261.94 LBS | HEART RATE: 77 BPM | DIASTOLIC BLOOD PRESSURE: 75 MMHG | BODY MASS INDEX: 38.8 KG/M2 | SYSTOLIC BLOOD PRESSURE: 119 MMHG

## 2022-05-11 DIAGNOSIS — E11.69 HYPERLIPIDEMIA ASSOCIATED WITH TYPE 2 DIABETES MELLITUS: ICD-10-CM

## 2022-05-11 DIAGNOSIS — E66.9 OBESITY (BMI 30-39.9): ICD-10-CM

## 2022-05-11 DIAGNOSIS — E11.65 UNCONTROLLED TYPE 2 DIABETES MELLITUS WITH HYPERGLYCEMIA, WITH LONG-TERM CURRENT USE OF INSULIN: Primary | ICD-10-CM

## 2022-05-11 DIAGNOSIS — I15.2 HYPERTENSION ASSOCIATED WITH DIABETES: ICD-10-CM

## 2022-05-11 DIAGNOSIS — E11.59 HYPERTENSION ASSOCIATED WITH DIABETES: ICD-10-CM

## 2022-05-11 DIAGNOSIS — G62.9 NEUROPATHY: ICD-10-CM

## 2022-05-11 DIAGNOSIS — Z86.718 HISTORY OF DVT (DEEP VEIN THROMBOSIS): ICD-10-CM

## 2022-05-11 DIAGNOSIS — Z79.4 UNCONTROLLED TYPE 2 DIABETES MELLITUS WITH HYPERGLYCEMIA, WITH LONG-TERM CURRENT USE OF INSULIN: Primary | ICD-10-CM

## 2022-05-11 DIAGNOSIS — E78.5 HYPERLIPIDEMIA ASSOCIATED WITH TYPE 2 DIABETES MELLITUS: ICD-10-CM

## 2022-05-11 LAB — GLUCOSE SERPL-MCNC: 160 MG/DL (ref 70–110)

## 2022-05-11 PROCEDURE — 3008F BODY MASS INDEX DOCD: CPT | Mod: CPTII,S$GLB,, | Performed by: NURSE PRACTITIONER

## 2022-05-11 PROCEDURE — 99999 PR PBB SHADOW E&M-EST. PATIENT-LVL IV: CPT | Mod: PBBFAC,,, | Performed by: NURSE PRACTITIONER

## 2022-05-11 PROCEDURE — 1101F PR PT FALLS ASSESS DOC 0-1 FALLS W/OUT INJ PAST YR: ICD-10-PCS | Mod: CPTII,S$GLB,, | Performed by: NURSE PRACTITIONER

## 2022-05-11 PROCEDURE — 3288F FALL RISK ASSESSMENT DOCD: CPT | Mod: CPTII,S$GLB,, | Performed by: NURSE PRACTITIONER

## 2022-05-11 PROCEDURE — 99999 PR PBB SHADOW E&M-EST. PATIENT-LVL IV: ICD-10-PCS | Mod: PBBFAC,,, | Performed by: NURSE PRACTITIONER

## 2022-05-11 PROCEDURE — 82962 POCT GLUCOSE, HAND-HELD DEVICE: ICD-10-PCS | Mod: S$GLB,,, | Performed by: NURSE PRACTITIONER

## 2022-05-11 PROCEDURE — 3008F PR BODY MASS INDEX (BMI) DOCUMENTED: ICD-10-PCS | Mod: CPTII,S$GLB,, | Performed by: NURSE PRACTITIONER

## 2022-05-11 PROCEDURE — 1125F PR PAIN SEVERITY QUANTIFIED, PAIN PRESENT: ICD-10-PCS | Mod: CPTII,S$GLB,, | Performed by: NURSE PRACTITIONER

## 2022-05-11 PROCEDURE — 1101F PT FALLS ASSESS-DOCD LE1/YR: CPT | Mod: CPTII,S$GLB,, | Performed by: NURSE PRACTITIONER

## 2022-05-11 PROCEDURE — 1159F PR MEDICATION LIST DOCUMENTED IN MEDICAL RECORD: ICD-10-PCS | Mod: CPTII,S$GLB,, | Performed by: NURSE PRACTITIONER

## 2022-05-11 PROCEDURE — 99214 OFFICE O/P EST MOD 30 MIN: CPT | Mod: S$GLB,,, | Performed by: NURSE PRACTITIONER

## 2022-05-11 PROCEDURE — 3078F PR MOST RECENT DIASTOLIC BLOOD PRESSURE < 80 MM HG: ICD-10-PCS | Mod: CPTII,S$GLB,, | Performed by: NURSE PRACTITIONER

## 2022-05-11 PROCEDURE — 3288F PR FALLS RISK ASSESSMENT DOCUMENTED: ICD-10-PCS | Mod: CPTII,S$GLB,, | Performed by: NURSE PRACTITIONER

## 2022-05-11 PROCEDURE — 3078F DIAST BP <80 MM HG: CPT | Mod: CPTII,S$GLB,, | Performed by: NURSE PRACTITIONER

## 2022-05-11 PROCEDURE — 1160F RVW MEDS BY RX/DR IN RCRD: CPT | Mod: CPTII,S$GLB,, | Performed by: NURSE PRACTITIONER

## 2022-05-11 PROCEDURE — 3074F PR MOST RECENT SYSTOLIC BLOOD PRESSURE < 130 MM HG: ICD-10-PCS | Mod: CPTII,S$GLB,, | Performed by: NURSE PRACTITIONER

## 2022-05-11 PROCEDURE — 3052F PR MOST RECENT HEMOGLOBIN A1C LEVEL 8.0 - < 9.0%: ICD-10-PCS | Mod: CPTII,S$GLB,, | Performed by: NURSE PRACTITIONER

## 2022-05-11 PROCEDURE — 1159F MED LIST DOCD IN RCRD: CPT | Mod: CPTII,S$GLB,, | Performed by: NURSE PRACTITIONER

## 2022-05-11 PROCEDURE — 3074F SYST BP LT 130 MM HG: CPT | Mod: CPTII,S$GLB,, | Performed by: NURSE PRACTITIONER

## 2022-05-11 PROCEDURE — 82962 GLUCOSE BLOOD TEST: CPT | Mod: S$GLB,,, | Performed by: NURSE PRACTITIONER

## 2022-05-11 PROCEDURE — 99214 PR OFFICE/OUTPT VISIT, EST, LEVL IV, 30-39 MIN: ICD-10-PCS | Mod: S$GLB,,, | Performed by: NURSE PRACTITIONER

## 2022-05-11 PROCEDURE — 1160F PR REVIEW ALL MEDS BY PRESCRIBER/CLIN PHARMACIST DOCUMENTED: ICD-10-PCS | Mod: CPTII,S$GLB,, | Performed by: NURSE PRACTITIONER

## 2022-05-11 PROCEDURE — 1125F AMNT PAIN NOTED PAIN PRSNT: CPT | Mod: CPTII,S$GLB,, | Performed by: NURSE PRACTITIONER

## 2022-05-11 PROCEDURE — 3052F HG A1C>EQUAL 8.0%<EQUAL 9.0%: CPT | Mod: CPTII,S$GLB,, | Performed by: NURSE PRACTITIONER

## 2022-05-11 PROCEDURE — 3072F LOW RISK FOR RETINOPATHY: CPT | Mod: CPTII,S$GLB,, | Performed by: NURSE PRACTITIONER

## 2022-05-11 PROCEDURE — 3072F PR LOW RISK FOR RETINOPATHY: ICD-10-PCS | Mod: CPTII,S$GLB,, | Performed by: NURSE PRACTITIONER

## 2022-05-11 RX ORDER — EMPAGLIFLOZIN 25 MG/1
25 TABLET, FILM COATED ORAL DAILY
Qty: 30 TABLET | Refills: 5 | Status: SHIPPED | OUTPATIENT
Start: 2022-05-11 | End: 2022-08-04

## 2022-05-11 RX ORDER — INSULIN GLARGINE 100 [IU]/ML
20 INJECTION, SOLUTION SUBCUTANEOUS NIGHTLY
Qty: 15 ML | Refills: 5 | Status: SHIPPED | OUTPATIENT
Start: 2022-05-11 | End: 2022-07-06

## 2022-05-11 NOTE — PROGRESS NOTES
Subjective:         Patient ID: Anup Miller is a 72 y.o. male.  Patient's current PCP is Bryce Gonzales MD.     Chief Complaint: Diabetes Mellitus    HPI  Anup Miller is a 72 y.o. Black or  male presenting for a follow up for diabetes. Patient has been diagnosed with type 2 diabetes since 2002 .    CURRENT DM MEDICATIONS:   Diabetes Medications             empagliflozin (JARDIANCE) 10 mg tablet Take 1 tablet (10 mg total) by mouth once daily.    insulin (LANTUS SOLOSTAR U-100 INSULIN) glargine 100 units/mL (3mL) SubQ pen Inject 15 Units into the skin every evening.    metFORMIN (GLUCOPHAGE XR) 500 MG ER 24hr tablet Take 1 tablet (500 mg total) by mouth 2 (two) times daily with meals.    semaglutide (OZEMPIC) 0.25 mg or 0.5 mg(2 mg/1.5 mL) pen injector Inject 0.25 mg into the skin every 7 days. For 4 weeks. On week 5, increase to 0.5 mg weekly thereafter.        Past failed treatment include:  Meds discontinued in the past due to cost only per patient report    Blood glucose testing is performed regularly. Patient is testing 0-1 daily.    Meter: Reli On (Did not bring to appt today)  Preferred lab: [facility] when in town    Any episodes of hypoglycemia? Denies     Complications related to diabetes: peripheral neuropathy    His blood sugar in the clinic today was:   Lab Results   Component Value Date    POCGLU 160 (A) 05/11/2022       Anup Miller presents today for follow up visit to discuss diabetes management. Having issues with cost of medication. No meter/logs today. Trying to check 1x per day, sometimes missing. blood sugar range 130s-180s, currently only checking in the morning. The lowest he has seen at 90. He continues to express concern for cost of medication-specifically, Lantus and Ozempic.  Will refer for pharmacy assistance.  He states he has hit the coverage gap.  He is currently tolerating Ozempic 0.25 mg weekly well but just started so we will wait to increase  his dose until he has been on the low is strength for a bit longer.  He has no  side effects associated with Jardiance and we will increase this today.  Otherwise he feels well.  His eye exam is due next month and he does complain of blurry vision and feels like a film is over his eyes.  We will send a message to have him scheduled.  He sees the podiatrist next Monday.    He is limited with exercise- still ambulating with cane for ambulation following prolonged Covid hospitalization in 2020.    Current diet: Bfast-grits,eggs,degroot.  Water. Occasional sweet tea. Snacks-enjoys fruit, occasional crackers. Lunch-Often skipped. Supper-Salad with fried chicken.   Activity Level: Limited- ambulates with a cane    Lab Results   Component Value Date    HGBA1C 8.2 (H) 05/05/2022    HGBA1C 12.7 (H) 02/10/2022    HGBA1C 11.5 (H) 12/07/2021     STANDARDS OF CARE  Diabetes Management Status    Statin: Taking  ACE/ARB: Taking    Screening or Prevention Patient's value Goal Complete/Controlled?   HgA1C Testing and Control   Lab Results   Component Value Date    HGBA1C 8.2 (H) 05/05/2022      Annually/Less than 8% No   Lipid profile : 03/10/2022 Annually Yes   LDL control Lab Results   Component Value Date    LDLCALC 86.0 03/10/2022    Annually/Less than 100 mg/dl  Yes   Nephropathy screening Lab Results   Component Value Date    LABMICR 4.0 06/18/2018     Lab Results   Component Value Date    PROTEINUA Negative 10/23/2020     Lab Results   Component Value Date    UTPCR 0.07 09/27/2019      Annually No   Blood pressure BP Readings from Last 1 Encounters:   05/11/22 119/75    Less than 140/90 Yes   Dilated retinal exam : 06/22/2021 Annually Yes   Foot exam   : 05/05/2022 Annually No       Labs reviewed and are noted below.    Lab Results   Component Value Date    WBC 5.76 07/19/2021    WBC 5.76 07/19/2021    HGB 13.7 (L) 07/19/2021    HGB 13.7 (L) 07/19/2021    HCT 43.6 07/19/2021    HCT 43.6 07/19/2021     07/19/2021    PLT  279 07/19/2021    CHOL 171 03/10/2022    TRIG 160 (H) 03/10/2022    HDL 53 03/10/2022    LDLCALC 86.0 03/10/2022    ALT 37 07/19/2021    AST 23 07/19/2021     03/10/2022    K 4.4 03/10/2022     03/10/2022    ANIONGAP 16 03/10/2022    CREATININE 1.6 (H) 03/10/2022    ESTGFRAFRICA 50.1 (A) 03/10/2022    EGFRNONAA 43.3 (A) 03/10/2022    BUN 21 03/10/2022    CO2 24 03/10/2022    TSH 1.432 03/10/2022    PSA 3.5 10/23/2020    INR 1.0 07/26/2020     (H) 03/10/2022    UTPCR 0.07 09/27/2019     Lab Results   Component Value Date    GLUTAMICACID 0.00 04/03/2017    CPEPTIDE 4.8 04/03/2017    FREET4 0.93 07/26/2020    TSH 1.432 03/10/2022    IRON 102 07/19/2021    TIBC 530 (H) 07/19/2021    FERRITIN 49 07/19/2021    JCOQDRRN72 >2000 (H) 07/19/2021    CALCIUM 10.0 03/10/2022    PHOS 3.9 09/25/2020     Lab Results   Component Value Date    CPEPTIDE 4.8 04/03/2017     Lab Results   Component Value Date    GLUTAMICACID 0.00 04/03/2017     Glucose   Date Value Ref Range Status   03/10/2022 129 (H) 70 - 110 mg/dL Final     Anion Gap   Date Value Ref Range Status   03/10/2022 16 8 - 16 mmol/L Final     eGFR if    Date Value Ref Range Status   03/10/2022 50.1 (A) >60 mL/min/1.73 m^2 Final     eGFR if non    Date Value Ref Range Status   03/10/2022 43.3 (A) >60 mL/min/1.73 m^2 Final     Comment:     Calculation used to obtain the estimated glomerular filtration  rate (eGFR) is the CKD-EPI equation.          The following portions of the patient's history were reviewed and updated as appropriate: allergies, current medications, past family history, past medical history, past social history, past surgical history and problem list.    Review of patient's allergies indicates:  No Known Allergies  Social History     Socioeconomic History    Marital status:      Spouse name: yoselin    Number of children: 1   Occupational History    Occupation:    Tobacco Use    Smoking  status: Never Smoker    Smokeless tobacco: Never Used   Substance and Sexual Activity    Alcohol use: No    Drug use: No    Sexual activity: Yes     Partners: Female     Comment:    Social History Narrative    No smokers or pets in household.     Past Medical History:   Diagnosis Date    Acute respiratory failure due to COVID-19 08/2020    Colon polyp     COVID-19 virus infection 07/2020    COVID-19 virus infection 7/26/2020    Diabetes mellitus type II Diagnosed 2002    Elevated liver enzymes     Elevated PSA 12/13/2017    Did not f/u with urology as rec in 2015    Fatty liver disease, nonalcoholic     Hyperlipidemia     Hypertension     KHOI on CPAP     Sleep apnea        REVIEW OF SYSTEMS:  Eyes No history of DR.  Cardiovascular: History of hypertension and hyperlipidemia  GI:  Tolerating Ozempic well from a GI perspective.  History of fatty liver disease.  :  No CKD.  Neuro:  Positive neuropathy.  PSYCH: No tobacco use.  ENDO: See HPI.        Objective:      Vitals:    05/11/22 0817   BP: 119/75   Pulse: 77     RESPIRATORY: No respiratory distress  NEUROLOGIC: Cranial nerves II-XII grossly intact.   PSYCHIATRIC: Alert & oriented x3. Normal mood and affect.  FOOT EXAMINATION:Will see podiatrist next week.  Assessment:       1. Uncontrolled type 2 diabetes mellitus with hyperglycemia, with long-term current use of insulin    2. Neuropathy    3. Hypertension associated with diabetes    4. Hyperlipidemia associated with type 2 diabetes mellitus    5. Obesity (BMI 30-39.9)    6. History of DVT (deep vein thrombosis)        Plan:   Anup was seen today for diabetes mellitus.    Diagnoses and all orders for this visit:    Uncontrolled type 2 diabetes mellitus with hyperglycemia, with long-term current use of insulin  -     POCT Glucose, Hand-Held Device  -     POCT Glucose, Hand-Held Device  -     insulin (LANTUS SOLOSTAR U-100 INSULIN) glargine 100 units/mL (3mL) SubQ pen; Inject 20 Units  "into the skin every evening.  -     empagliflozin (JARDIANCE) 25 mg tablet; Take 1 tablet (25 mg total) by mouth once daily.  -     Ambulatory referral/consult to Pharmacy Assistance; Future for help with Ozempic and Lantus cost. Patient reports he is in the coverage gap.    Chronic,Improving-    -- Medications adjusted for today's visit include:    Continue Lantus 20 units once a day.    Continue Metformin 500 mg twice a day, with a meal.    Increase Jardiance 25 mg once a day, in the morning.    Stay on Ozempic 0.25 for another 2 weeks, then try increasing to 0.5 mg weekly thereafter.    Neuropathy-Chronic    -On Gabapentin.    Hypertension associated with diabetes-Chronic,stable    -BP is at goal. Continue current medications.    Hyperlipidemia associated with type 2 diabetes mellitus-Chronic    -Continue Lipitor.    Obesity (BMI 30-39.9)    -Currently limited with exercise -still ambulating with cane. We discussed importance of low carb diet. Starting Ozempic and will also increase Jardiance. He has lost 10 lbs since 5/5.    History of DVT (deep vein thrombosis)    - Follow up: 2 months    I spent a total of 30 minutes on the day of the visit.This includes face to face time and non-face to face time preparing to see the patient (eg, review of tests), documenting clinical information in the electronic record, independently interpreting results and communicating results to the patient.    Portions of this note may have been created with voice recognition software. Occasional "wrong-word" or "sound-a-like" substitutions may have occurred due to the inherent limitations of voice recognition software. Please, read the note carefully and recognize, using context, where substitutions have occurred.          Sheri Ammons,FNP-C [facility]     "

## 2022-05-11 NOTE — PATIENT INSTRUCTIONS
-- Medications adjusted for today's visit include:    Continue Lantus 20 units once a day.    Continue Metformin 500 mg twice a day, with a meal.    Increase Jardiance 25 mg once a day, in the morning.    Stay on Ozempic 0.25 for another 2 weeks, then try increasing to 0.5 mg weekly thereafter.    -- Limit carbs to no more than 45 grams with each meal. Never eat carbs by themselves, always add protein. Make snacks low carb or non-carb only.    -- Increase checking blood sugar 2 times daily: Fasting blood sugar and vary your next 3 readings: Before lunch, before supper, 2 hours after any meal, or bedtime.     -Blood sugar goals should be a fasting blood sugar between , and no blood sugars throughout the day over 180 is good, less than 160 better.    --Carry glucose tablets/soft peppermints/regular juice or Coke product with you at all times to treat a low blood sugar episode (less than 70). If your blood sugar is between 50-70, Chew 4 tablets or drink 1/2 cup of juice or regular Coke product. If your blood sugar is below 50, double the treatment. Re-check blood sugar in 15 minutes. If still low, repeat this. Always call the clinic to give an update for any low blood sugar episodes.    --Exercise as tolerated.    --Follow-up for your next visit in 8 weeks.     --Please either drop off, fax, or MyChart your readings to me as needed.

## 2022-05-16 ENCOUNTER — HOSPITAL ENCOUNTER (OUTPATIENT)
Dept: RADIOLOGY | Facility: HOSPITAL | Age: 72
Discharge: HOME | End: 2022-05-16
Attending: PODIATRIST
Payer: MEDICARE

## 2022-05-16 ENCOUNTER — OFFICE VISIT (OUTPATIENT)
Dept: PODIATRY | Facility: CLINIC | Age: 72
End: 2022-05-16
Payer: MEDICARE

## 2022-05-16 VITALS
WEIGHT: 261.94 LBS | BODY MASS INDEX: 38.8 KG/M2 | HEIGHT: 69 IN | SYSTOLIC BLOOD PRESSURE: 124 MMHG | DIASTOLIC BLOOD PRESSURE: 70 MMHG | HEART RATE: 95 BPM

## 2022-05-16 DIAGNOSIS — G62.9 NEUROPATHY: ICD-10-CM

## 2022-05-16 DIAGNOSIS — R29.898 MUSCULAR DECONDITIONING: ICD-10-CM

## 2022-05-16 DIAGNOSIS — E11.65 UNCONTROLLED TYPE 2 DIABETES MELLITUS WITH HYPERGLYCEMIA: ICD-10-CM

## 2022-05-16 DIAGNOSIS — G62.9 NEUROPATHY: Primary | ICD-10-CM

## 2022-05-16 PROCEDURE — 1101F PR PT FALLS ASSESS DOC 0-1 FALLS W/OUT INJ PAST YR: ICD-10-PCS | Mod: CPTII,S$GLB,, | Performed by: PODIATRIST

## 2022-05-16 PROCEDURE — 3008F BODY MASS INDEX DOCD: CPT | Mod: CPTII,S$GLB,, | Performed by: PODIATRIST

## 2022-05-16 PROCEDURE — 3074F PR MOST RECENT SYSTOLIC BLOOD PRESSURE < 130 MM HG: ICD-10-PCS | Mod: CPTII,S$GLB,, | Performed by: PODIATRIST

## 2022-05-16 PROCEDURE — 3052F PR MOST RECENT HEMOGLOBIN A1C LEVEL 8.0 - < 9.0%: ICD-10-PCS | Mod: CPTII,S$GLB,, | Performed by: PODIATRIST

## 2022-05-16 PROCEDURE — 1160F RVW MEDS BY RX/DR IN RCRD: CPT | Mod: CPTII,S$GLB,, | Performed by: PODIATRIST

## 2022-05-16 PROCEDURE — 1159F MED LIST DOCD IN RCRD: CPT | Mod: CPTII,S$GLB,, | Performed by: PODIATRIST

## 2022-05-16 PROCEDURE — 1125F PR PAIN SEVERITY QUANTIFIED, PAIN PRESENT: ICD-10-PCS | Mod: CPTII,S$GLB,, | Performed by: PODIATRIST

## 2022-05-16 PROCEDURE — 3074F SYST BP LT 130 MM HG: CPT | Mod: CPTII,S$GLB,, | Performed by: PODIATRIST

## 2022-05-16 PROCEDURE — 3078F PR MOST RECENT DIASTOLIC BLOOD PRESSURE < 80 MM HG: ICD-10-PCS | Mod: CPTII,S$GLB,, | Performed by: PODIATRIST

## 2022-05-16 PROCEDURE — 1125F AMNT PAIN NOTED PAIN PRSNT: CPT | Mod: CPTII,S$GLB,, | Performed by: PODIATRIST

## 2022-05-16 PROCEDURE — 73630 XR FOOT COMPLETE 3 VIEW BILATERAL: ICD-10-PCS | Mod: 26,50,, | Performed by: RADIOLOGY

## 2022-05-16 PROCEDURE — 99204 OFFICE O/P NEW MOD 45 MIN: CPT | Mod: 25,S$GLB,, | Performed by: PODIATRIST

## 2022-05-16 PROCEDURE — 99999 PR PBB SHADOW E&M-EST. PATIENT-LVL IV: CPT | Mod: PBBFAC,,, | Performed by: PODIATRIST

## 2022-05-16 PROCEDURE — 1160F PR REVIEW ALL MEDS BY PRESCRIBER/CLIN PHARMACIST DOCUMENTED: ICD-10-PCS | Mod: CPTII,S$GLB,, | Performed by: PODIATRIST

## 2022-05-16 PROCEDURE — 3288F PR FALLS RISK ASSESSMENT DOCUMENTED: ICD-10-PCS | Mod: CPTII,S$GLB,, | Performed by: PODIATRIST

## 2022-05-16 PROCEDURE — 3052F HG A1C>EQUAL 8.0%<EQUAL 9.0%: CPT | Mod: CPTII,S$GLB,, | Performed by: PODIATRIST

## 2022-05-16 PROCEDURE — 73630 X-RAY EXAM OF FOOT: CPT | Mod: 26,50,, | Performed by: RADIOLOGY

## 2022-05-16 PROCEDURE — 99999 PR PBB SHADOW E&M-EST. PATIENT-LVL IV: ICD-10-PCS | Mod: PBBFAC,,, | Performed by: PODIATRIST

## 2022-05-16 PROCEDURE — 3288F FALL RISK ASSESSMENT DOCD: CPT | Mod: CPTII,S$GLB,, | Performed by: PODIATRIST

## 2022-05-16 PROCEDURE — 99204 PR OFFICE/OUTPT VISIT, NEW, LEVL IV, 45-59 MIN: ICD-10-PCS | Mod: 25,S$GLB,, | Performed by: PODIATRIST

## 2022-05-16 PROCEDURE — 1159F PR MEDICATION LIST DOCUMENTED IN MEDICAL RECORD: ICD-10-PCS | Mod: CPTII,S$GLB,, | Performed by: PODIATRIST

## 2022-05-16 PROCEDURE — 3078F DIAST BP <80 MM HG: CPT | Mod: CPTII,S$GLB,, | Performed by: PODIATRIST

## 2022-05-16 PROCEDURE — 3008F PR BODY MASS INDEX (BMI) DOCUMENTED: ICD-10-PCS | Mod: CPTII,S$GLB,, | Performed by: PODIATRIST

## 2022-05-16 PROCEDURE — 3072F LOW RISK FOR RETINOPATHY: CPT | Mod: CPTII,S$GLB,, | Performed by: PODIATRIST

## 2022-05-16 PROCEDURE — 3072F PR LOW RISK FOR RETINOPATHY: ICD-10-PCS | Mod: CPTII,S$GLB,, | Performed by: PODIATRIST

## 2022-05-16 PROCEDURE — 73630 X-RAY EXAM OF FOOT: CPT | Mod: TC,50

## 2022-05-16 PROCEDURE — 1101F PT FALLS ASSESS-DOCD LE1/YR: CPT | Mod: CPTII,S$GLB,, | Performed by: PODIATRIST

## 2022-05-16 RX ORDER — GABAPENTIN 600 MG/1
600 TABLET ORAL 4 TIMES DAILY
Qty: 120 TABLET | Refills: 1 | Status: SHIPPED | OUTPATIENT
Start: 2022-05-16 | End: 2022-09-07

## 2022-05-17 ENCOUNTER — PATIENT MESSAGE (OUTPATIENT)
Dept: PODIATRY | Facility: CLINIC | Age: 72
End: 2022-05-17
Payer: MEDICARE

## 2022-05-17 NOTE — PROGRESS NOTES
Subjective:     Patient ID: Anup Miller is a 72 y.o. male.    Chief Complaint: Diabetic Foot Exam (Pt c/o BL foot pain 7/10, diabetic pt wears tennis shoes w/ socks, PCP Dr. Gonzales last seen 5-5-22) and Foot Pain    Anup is a 72 y.o. male who presents to the clinic for evaluation and treatment of high risk feet. Anup has a past medical history of Acute respiratory failure due to COVID-19 (08/2020), Colon polyp, COVID-19 virus infection (07/2020), COVID-19 virus infection (7/26/2020), Diabetes mellitus type II (Diagnosed 2002), Elevated liver enzymes, Elevated PSA (12/13/2017), Fatty liver disease, nonalcoholic, Hyperlipidemia, Hypertension, KHOI on CPAP, and Sleep apnea. The patient's chief complaint is bilateral foot pain. Patient states pain is pins/needles, electric, burning, and hot feeling. Patient states pain is constant. Patient rates pain 7/10. Patient states he started wearing AFO's bilateral after COVID 19 infection in 7/2020. Patient states he is taking Gabapentin as prescribed but only sees a little difference. This patient has documented high risk feet requiring routine maintenance secondary to diabetes mellitis and those secondary complications of diabetes, as mentioned..    PCP: Bryce Gonzales MD    Date Last Seen by PCP: 05/05/2022    Current shoe gear:  Affected Foot: tennis shoes with AFO     Unaffected Foot: tennis shoes with AFO    Hemoglobin A1C   Date Value Ref Range Status   05/05/2022 8.2 (H) 4.0 - 5.6 % Final     Comment:     ADA Screening Guidelines:  5.7-6.4%  Consistent with prediabetes  >or=6.5%  Consistent with diabetes    High levels of fetal hemoglobin interfere with the HbA1C  assay. Heterozygous hemoglobin variants (HbS, HgC, etc)do  not significantly interfere with this assay.   However, presence of multiple variants may affect accuracy.     02/10/2022 12.7 (H) 4.0 - 5.6 % Final     Comment:     ADA Screening Guidelines:  5.7-6.4%  Consistent with prediabetes  >or=6.5%   Consistent with diabetes    High levels of fetal hemoglobin interfere with the HbA1C  assay. Heterozygous hemoglobin variants (HbS, HgC, etc)do  not significantly interfere with this assay.   However, presence of multiple variants may affect accuracy.     12/07/2021 11.5 (H) 4.0 - 5.6 % Final     Comment:     ADA Screening Guidelines:  5.7-6.4%  Consistent with prediabetes  >or=6.5%  Consistent with diabetes    High levels of fetal hemoglobin interfere with the HbA1C  assay. Heterozygous hemoglobin variants (HbS, HgC, etc)do  not significantly interfere with this assay.   However, presence of multiple variants may affect accuracy.         Patient Active Problem List   Diagnosis    Uncontrolled type 2 diabetes mellitus    Hypertension associated with diabetes    Hyperlipidemia associated with type 2 diabetes mellitus    Multiple pulmonary nodules determined by computed tomography of lung    KHOI (obstructive sleep apnea)    Hx of colonic polyp    Cardiac arrhythmia    Pneumonia due to COVID-19 virus    Oral phase dysphagia    Fever    History of 2019 novel coronavirus disease (COVID-19)    Malnutrition of moderate degree    Severe obesity (BMI 35.0-35.9 with comorbidity)    Debility    Neuropathy    Muscular deconditioning    History of DVT (deep vein thrombosis)    Iron deficiency anemia due to chronic blood loss    Acute deep vein thrombosis (DVT) of other vein of lower extremity, unspecified laterality    Chronic kidney disease, stage 3a       Medication List with Changes/Refills   New Medications    GABAPENTIN (NEURONTIN) 600 MG TABLET    Take 1 tablet (600 mg total) by mouth 4 (four) times daily.   Current Medications    EMPAGLIFLOZIN (JARDIANCE) 25 MG TABLET    Take 1 tablet (25 mg total) by mouth once daily.    FLASH GLUCOSE SENSOR (FREESTYLE DERRELL 2 SENSOR) KIT    Inject 1 each into the skin every 14 (fourteen) days.    FREESTYLE DERRELL 2 READER MISC    Use to monitor blood sugar  "consistently.    HYDROCHLOROTHIAZIDE (HYDRODIURIL) 25 MG TABLET    Take 1 tablet (25 mg total) by mouth once daily.    INSULIN (LANTUS SOLOSTAR U-100 INSULIN) GLARGINE 100 UNITS/ML (3ML) SUBQ PEN    Inject 20 Units into the skin every evening.    LOSARTAN (COZAAR) 100 MG TABLET    Take 100 mg by mouth once daily.    METFORMIN (GLUCOPHAGE XR) 500 MG ER 24HR TABLET    Take 1 tablet (500 mg total) by mouth 2 (two) times daily with meals.    METOPROLOL TARTRATE (LOPRESSOR) 50 MG TABLET    TK 1 T PO BID    PANTOPRAZOLE (PROTONIX) 40 MG TABLET    TK 1 T PO D    PEN NEEDLE, DIABETIC (BD ULTRA-FINE TATYANA PEN NEEDLE) 32 GAUGE X 5/32" NDLE    Use with Lantus daily.    ROSUVASTATIN (CRESTOR) 10 MG TABLET    Take 1 tablet (10 mg total) by mouth once daily.    SEMAGLUTIDE (OZEMPIC) 0.25 MG OR 0.5 MG(2 MG/1.5 ML) PEN INJECTOR    Inject 0.25 mg into the skin every 7 days. For 4 weeks. On week 5, increase to 0.5 mg weekly thereafter.    ZINC SULFATE (ZINCATE) 220 (50) MG CAPSULE    Take 220 mg by mouth once daily.   Discontinued Medications    GABAPENTIN (NEURONTIN) 400 MG CAPSULE    TAKE 1 CAPSULE BY MOUTH THREE TIMES DAILY FOR  PAIN/NEUROPATHY       Review of patient's allergies indicates:  No Known Allergies    Past Surgical History:   Procedure Laterality Date    BACK SURGERY      CHOLECYSTECTOMY  2013    COLONOSCOPY N/A 7/19/2018    Procedure: COLONOSCOPY;  Surgeon: Chacorta Lopez III, MD;  Location: [facility] END[facility];  Service: Endoscopy;  Laterality: N/A;    COLONOSCOPY N/A 3/15/2022    Procedure: COLONOSCOPY;  Surgeon: Jessie Diallo MD;  Location: [facility] ENDO;  Service: Endoscopy;  Laterality: N/A;    ESOPHAGOGASTRODUODENOSCOPY N/A 8/25/2020    Procedure: EGD (ESOPHAGOGASTRODUODENOSCOPY);  Surgeon: Anup Green MD;  Location: [facility] OR [address] [place] [address];  Service: General;  Laterality: N/A;    TRACHEOSTOMY  08/2020    Temporarily post COVID infection.       Family History   Problem Relation Age of Onset    Asthma Mother     " "Diabetes Father     Cancer Sister         Breast    Diabetes Paternal Aunt     Cancer Maternal Grandmother         Breast    Diabetes Maternal Grandmother     Diabetes Paternal Uncle     Colon cancer Neg Hx        Social History     Socioeconomic History    Marital status:      Spouse name: yoselin    Number of children: 1   Occupational History    Occupation:    Tobacco Use    Smoking status: Never Smoker    Smokeless tobacco: Never Used   Substance and Sexual Activity    Alcohol use: No    Drug use: No    Sexual activity: Yes     Partners: Female     Comment:    Social History Narrative    No smokers or pets in household.       Vitals:    05/16/22 0744   BP: 124/70   Pulse: 95   Weight: 118.8 kg (261 lb 14.5 oz)   Height: 5' 9" (1.753 m)   PainSc:   7       Hemoglobin A1C   Date Value Ref Range Status   05/05/2022 8.2 (H) 4.0 - 5.6 % Final     Comment:     ADA Screening Guidelines:  5.7-6.4%  Consistent with prediabetes  >or=6.5%  Consistent with diabetes    High levels of fetal hemoglobin interfere with the HbA1C  assay. Heterozygous hemoglobin variants (HbS, HgC, etc)do  not significantly interfere with this assay.   However, presence of multiple variants may affect accuracy.     02/10/2022 12.7 (H) 4.0 - 5.6 % Final     Comment:     ADA Screening Guidelines:  5.7-6.4%  Consistent with prediabetes  >or=6.5%  Consistent with diabetes    High levels of fetal hemoglobin interfere with the HbA1C  assay. Heterozygous hemoglobin variants (HbS, HgC, etc)do  not significantly interfere with this assay.   However, presence of multiple variants may affect accuracy.     12/07/2021 11.5 (H) 4.0 - 5.6 % Final     Comment:     ADA Screening Guidelines:  5.7-6.4%  Consistent with prediabetes  >or=6.5%  Consistent with diabetes    High levels of fetal hemoglobin interfere with the HbA1C  assay. Heterozygous hemoglobin variants (HbS, HgC, etc)do  not significantly interfere with this assay. "   However, presence of multiple variants may affect accuracy.         Review of Systems   Constitutional: Negative for chills and fever.   Respiratory: Negative for shortness of breath.    Cardiovascular: Negative for chest pain, palpitations, orthopnea, claudication and leg swelling.   Gastrointestinal: Negative for diarrhea, nausea and vomiting.   Musculoskeletal: Negative for joint pain.   Skin: Negative for rash.   Neurological: Positive for tingling and sensory change.   Psychiatric/Behavioral: Negative.          Objective:      PHYSICAL EXAM: Apperance: Alert and orient in no distress,well developed, and with good attention to grooming and body habits  Patient presents ambulating in tennis shoes with AFO  LOWER EXTREMITY EXAM:  VASCULAR: Dorsalis pedis pulses 2/4 bilateral and Posterior Tibial pulses 2/4 bilateral. Capillary fill time <4 seconds bilateral. Mild edema observed bilateral. Varicosities absent bilateral. Skin temperature of the lower extremities is warm to warm, proximal to distal. Hair growth dim bilateral.  DERMATOLOGICAL: No skin rashes, subcutaneous nodules, lesions, or ulcers observed bilateral. Nails 1,2,3,4,5 bilateral normal left. Webspaces 1,2,3,4 bilateral clean, dry and without evidence of break in skin integrity.   NEUROLOGICAL: Light touch, sharp-dull, proprioception all present and equal bilaterally.  Vibratory sensation diminished at bilateral hallux and navicular. Protective sensation absent at 8/10 sites as tested with a [employer] 5.07 monofilament.   MUSCULOSKELETAL: Muscle strength is 3/5 for foot inverters, everters, plantarflexors, and dorsiflexors. Muscle tone is normal.         Assessment:       ICD-10-CM ICD-9-CM   1. Neuropathy  G62.9 355.9   2. Muscular deconditioning  R29.898 781.99   3. Uncontrolled type 2 diabetes mellitus with hyperglycemia  E11.65 250.02       Plan:   Neuropathy  -     Ambulatory referral/consult to Podiatry  -     Cancel: X-Ray Foot AP  Bilateral; Future; Expected date: 05/16/2022  -     gabapentin (NEURONTIN) 600 MG tablet; Take 1 tablet (600 mg total) by mouth 4 (four) times daily.  Dispense: 120 tablet; Refill: 1    Muscular deconditioning  -     gabapentin (NEURONTIN) 600 MG tablet; Take 1 tablet (600 mg total) by mouth 4 (four) times daily.  Dispense: 120 tablet; Refill: 1    Uncontrolled type 2 diabetes mellitus with hyperglycemia      I counseled the patient on his conditions, regarding findings of my examination, my impressions, and usual treatment plan.   Greater than 50% of this visit spent on counseling and coordination of care.  Greater than 15 minutes of a 20 minute appointment spent on education about the diabetic foot, neuropathy, and prevention of limb loss.  Shoe inspection. Diabetic Foot Education. Patient reminded of the importance of good nutrition and blood sugar control to help prevent podiatric complications of diabetes. Patient instructed on proper foot hygeine. We discussed wearing proper shoe gear, daily foot inspections, never walking without protective shoe gear, never putting sharp instruments to feet.    Discussed with patient treatments for neuropathy consisting of topical or oral medication.  Prescription written for Gabapentin 600mg to be taken once nightly. Informed patient of possible side effects including but not limited to disorientation and drowsiness. Patient instructed to discontinue use if there are any adverse effects. Patient states he understands.   Patient  will continue to monitor the areas daily, inspect feet, wear protective shoe gear when ambulatory, moisturizer to maintain skin integrity. Patient reminded of the importance of good nutrition and blood sugar control to help prevent podiatric complications of diabetes.  Patient to return 1 months or sooner if needed.                Katy Fernandes DPM  [facility]

## 2022-05-31 DIAGNOSIS — Z79.4 UNCONTROLLED TYPE 2 DIABETES MELLITUS WITH HYPERGLYCEMIA, WITH LONG-TERM CURRENT USE OF INSULIN: ICD-10-CM

## 2022-05-31 DIAGNOSIS — E11.65 UNCONTROLLED TYPE 2 DIABETES MELLITUS WITH HYPERGLYCEMIA, WITH LONG-TERM CURRENT USE OF INSULIN: ICD-10-CM

## 2022-05-31 RX ORDER — SEMAGLUTIDE 1.34 MG/ML
INJECTION, SOLUTION SUBCUTANEOUS
Qty: 3 PEN | Refills: 3 | Status: SHIPPED | OUTPATIENT
Start: 2022-05-31 | End: 2022-07-06

## 2022-06-11 ENCOUNTER — PATIENT MESSAGE (OUTPATIENT)
Dept: INTERNAL MEDICINE | Facility: CLINIC | Age: 72
End: 2022-06-11
Payer: MEDICARE

## 2022-06-13 ENCOUNTER — PATIENT MESSAGE (OUTPATIENT)
Dept: INTERNAL MEDICINE | Facility: CLINIC | Age: 72
End: 2022-06-13
Payer: MEDICARE

## 2022-06-14 RX ORDER — PANTOPRAZOLE SODIUM 40 MG/1
TABLET, DELAYED RELEASE ORAL
OUTPATIENT
Start: 2022-06-14

## 2022-06-14 NOTE — TELEPHONE ENCOUNTER
Care Due:                  Date            Visit Type   Department     Provider  --------------------------------------------------------------------------------                                EP -                              PRIMARY      [facility] INTERNAL  Last Visit: 05-      CARE ([facility])   ORQUIDEA Gonzales                              EP -                              PRIMARY      [facility] INTERNAL  Next Visit: 08-      CARE ([facility])   ORQUIDEA Gonzales                                                            Last  Test          Frequency    Reason                     Performed    Due Date  --------------------------------------------------------------------------------    CMP.........  12 months..  rosuvastatin.............  07-   07-    [facility] Embedded Care Gaps. Reference number: [id number]. 6/14/2022   7:31:16 AM CDT

## 2022-06-15 ENCOUNTER — PATIENT MESSAGE (OUTPATIENT)
Dept: INTERNAL MEDICINE | Facility: CLINIC | Age: 72
End: 2022-06-15
Payer: MEDICARE

## 2022-06-15 RX ORDER — PANTOPRAZOLE SODIUM 40 MG/1
TABLET, DELAYED RELEASE ORAL
Qty: 90 TABLET | Refills: 1 | Status: SHIPPED | OUTPATIENT
Start: 2022-06-15 | End: 2023-02-13 | Stop reason: SDUPTHER

## 2022-06-15 NOTE — TELEPHONE ENCOUNTER
No new care gaps identified.  [facility] Embedded Care Gaps. Reference number: [id number]. 6/15/2022   9:25:40 AM CDT

## 2022-06-16 ENCOUNTER — LAB VISIT (OUTPATIENT)
Dept: LAB | Facility: HOSPITAL | Age: 72
End: 2022-06-16
Payer: MEDICARE

## 2022-06-16 ENCOUNTER — OFFICE VISIT (OUTPATIENT)
Dept: PODIATRY | Facility: CLINIC | Age: 72
End: 2022-06-16
Payer: MEDICARE

## 2022-06-16 VITALS
BODY MASS INDEX: 38.66 KG/M2 | WEIGHT: 261 LBS | SYSTOLIC BLOOD PRESSURE: 117 MMHG | DIASTOLIC BLOOD PRESSURE: 76 MMHG | HEIGHT: 69 IN | HEART RATE: 82 BPM

## 2022-06-16 DIAGNOSIS — I82.409 ACUTE DEEP VEIN THROMBOSIS (DVT) OF OTHER VEIN OF LOWER EXTREMITY, UNSPECIFIED LATERALITY: ICD-10-CM

## 2022-06-16 DIAGNOSIS — G62.9 NEUROPATHY: Primary | ICD-10-CM

## 2022-06-16 DIAGNOSIS — R29.898 MUSCULAR DECONDITIONING: ICD-10-CM

## 2022-06-16 DIAGNOSIS — D64.9 ANEMIA, UNSPECIFIED TYPE: ICD-10-CM

## 2022-06-16 DIAGNOSIS — E11.65 UNCONTROLLED TYPE 2 DIABETES MELLITUS WITH HYPERGLYCEMIA: ICD-10-CM

## 2022-06-16 LAB
ALBUMIN SERPL BCP-MCNC: 4.1 G/DL (ref 3.5–5.2)
ALP SERPL-CCNC: 78 U/L (ref 55–135)
ALT SERPL W/O P-5'-P-CCNC: 80 U/L (ref 10–44)
ANION GAP SERPL CALC-SCNC: 14 MMOL/L (ref 8–16)
AST SERPL-CCNC: 48 U/L (ref 10–40)
BASOPHILS # BLD AUTO: 0.03 K/UL (ref 0–0.2)
BASOPHILS NFR BLD: 0.5 % (ref 0–1.9)
BILIRUB SERPL-MCNC: 0.6 MG/DL (ref 0.1–1)
BUN SERPL-MCNC: 19 MG/DL (ref 8–23)
CALCIUM SERPL-MCNC: 10.1 MG/DL (ref 8.7–10.5)
CHLORIDE SERPL-SCNC: 98 MMOL/L (ref 95–110)
CO2 SERPL-SCNC: 24 MMOL/L (ref 23–29)
CREAT SERPL-MCNC: 1.5 MG/DL (ref 0.5–1.4)
DIFFERENTIAL METHOD: ABNORMAL
EOSINOPHIL # BLD AUTO: 0.1 K/UL (ref 0–0.5)
EOSINOPHIL NFR BLD: 2.1 % (ref 0–8)
ERYTHROCYTE [DISTWIDTH] IN BLOOD BY AUTOMATED COUNT: 13.2 % (ref 11.5–14.5)
EST. GFR  (AFRICAN AMERICAN): 54 ML/MIN/1.73 M^2
EST. GFR  (NON AFRICAN AMERICAN): 46 ML/MIN/1.73 M^2
FERRITIN SERPL-MCNC: 182 NG/ML (ref 20–300)
GLUCOSE SERPL-MCNC: 304 MG/DL (ref 70–110)
HCT VFR BLD AUTO: 44.1 % (ref 40–54)
HGB BLD-MCNC: 14.1 G/DL (ref 14–18)
IMM GRANULOCYTES # BLD AUTO: 0.01 K/UL (ref 0–0.04)
IMM GRANULOCYTES NFR BLD AUTO: 0.2 % (ref 0–0.5)
IRON SERPL-MCNC: 95 UG/DL (ref 45–160)
LYMPHOCYTES # BLD AUTO: 3.3 K/UL (ref 1–4.8)
LYMPHOCYTES NFR BLD: 49.8 % (ref 18–48)
MCH RBC QN AUTO: 28.2 PG (ref 27–31)
MCHC RBC AUTO-ENTMCNC: 32 G/DL (ref 32–36)
MCV RBC AUTO: 88 FL (ref 82–98)
MONOCYTES # BLD AUTO: 0.5 K/UL (ref 0.3–1)
MONOCYTES NFR BLD: 8 % (ref 4–15)
NEUTROPHILS # BLD AUTO: 2.6 K/UL (ref 1.8–7.7)
NEUTROPHILS NFR BLD: 39.4 % (ref 38–73)
NRBC BLD-RTO: 0 /100 WBC
PLATELET # BLD AUTO: 273 K/UL (ref 150–450)
PMV BLD AUTO: 10.4 FL (ref 9.2–12.9)
POTASSIUM SERPL-SCNC: 4.7 MMOL/L (ref 3.5–5.1)
PROT SERPL-MCNC: 7.7 G/DL (ref 6–8.4)
RBC # BLD AUTO: 5 M/UL (ref 4.6–6.2)
SATURATED IRON: 19 % (ref 20–50)
SODIUM SERPL-SCNC: 136 MMOL/L (ref 136–145)
TOTAL IRON BINDING CAPACITY: 512 UG/DL (ref 250–450)
TRANSFERRIN SERPL-MCNC: 346 MG/DL (ref 200–375)
WBC # BLD AUTO: 6.59 K/UL (ref 3.9–12.7)

## 2022-06-16 PROCEDURE — 3078F DIAST BP <80 MM HG: CPT | Mod: CPTII,S$GLB,, | Performed by: PODIATRIST

## 2022-06-16 PROCEDURE — 1125F PR PAIN SEVERITY QUANTIFIED, PAIN PRESENT: ICD-10-PCS | Mod: CPTII,S$GLB,, | Performed by: PODIATRIST

## 2022-06-16 PROCEDURE — 3078F PR MOST RECENT DIASTOLIC BLOOD PRESSURE < 80 MM HG: ICD-10-PCS | Mod: CPTII,S$GLB,, | Performed by: PODIATRIST

## 2022-06-16 PROCEDURE — 1101F PR PT FALLS ASSESS DOC 0-1 FALLS W/OUT INJ PAST YR: ICD-10-PCS | Mod: CPTII,S$GLB,, | Performed by: PODIATRIST

## 2022-06-16 PROCEDURE — 99999 PR PBB SHADOW E&M-EST. PATIENT-LVL IV: ICD-10-PCS | Mod: PBBFAC,,, | Performed by: PODIATRIST

## 2022-06-16 PROCEDURE — 3074F SYST BP LT 130 MM HG: CPT | Mod: CPTII,S$GLB,, | Performed by: PODIATRIST

## 2022-06-16 PROCEDURE — 99213 OFFICE O/P EST LOW 20 MIN: CPT | Mod: S$GLB,,, | Performed by: PODIATRIST

## 2022-06-16 PROCEDURE — 3074F PR MOST RECENT SYSTOLIC BLOOD PRESSURE < 130 MM HG: ICD-10-PCS | Mod: CPTII,S$GLB,, | Performed by: PODIATRIST

## 2022-06-16 PROCEDURE — 84466 ASSAY OF TRANSFERRIN: CPT

## 2022-06-16 PROCEDURE — 3052F PR MOST RECENT HEMOGLOBIN A1C LEVEL 8.0 - < 9.0%: ICD-10-PCS | Mod: CPTII,S$GLB,, | Performed by: PODIATRIST

## 2022-06-16 PROCEDURE — 80053 COMPREHEN METABOLIC PANEL: CPT

## 2022-06-16 PROCEDURE — 1159F PR MEDICATION LIST DOCUMENTED IN MEDICAL RECORD: ICD-10-PCS | Mod: CPTII,S$GLB,, | Performed by: PODIATRIST

## 2022-06-16 PROCEDURE — 99213 PR OFFICE/OUTPT VISIT, EST, LEVL III, 20-29 MIN: ICD-10-PCS | Mod: S$GLB,,, | Performed by: PODIATRIST

## 2022-06-16 PROCEDURE — 85025 COMPLETE CBC W/AUTO DIFF WBC: CPT

## 2022-06-16 PROCEDURE — 1160F RVW MEDS BY RX/DR IN RCRD: CPT | Mod: CPTII,S$GLB,, | Performed by: PODIATRIST

## 2022-06-16 PROCEDURE — 1160F PR REVIEW ALL MEDS BY PRESCRIBER/CLIN PHARMACIST DOCUMENTED: ICD-10-PCS | Mod: CPTII,S$GLB,, | Performed by: PODIATRIST

## 2022-06-16 PROCEDURE — 3072F LOW RISK FOR RETINOPATHY: CPT | Mod: CPTII,S$GLB,, | Performed by: PODIATRIST

## 2022-06-16 PROCEDURE — 1159F MED LIST DOCD IN RCRD: CPT | Mod: CPTII,S$GLB,, | Performed by: PODIATRIST

## 2022-06-16 PROCEDURE — 1101F PT FALLS ASSESS-DOCD LE1/YR: CPT | Mod: CPTII,S$GLB,, | Performed by: PODIATRIST

## 2022-06-16 PROCEDURE — 3288F PR FALLS RISK ASSESSMENT DOCUMENTED: ICD-10-PCS | Mod: CPTII,S$GLB,, | Performed by: PODIATRIST

## 2022-06-16 PROCEDURE — 1125F AMNT PAIN NOTED PAIN PRSNT: CPT | Mod: CPTII,S$GLB,, | Performed by: PODIATRIST

## 2022-06-16 PROCEDURE — 3008F BODY MASS INDEX DOCD: CPT | Mod: CPTII,S$GLB,, | Performed by: PODIATRIST

## 2022-06-16 PROCEDURE — 3052F HG A1C>EQUAL 8.0%<EQUAL 9.0%: CPT | Mod: CPTII,S$GLB,, | Performed by: PODIATRIST

## 2022-06-16 PROCEDURE — 3072F PR LOW RISK FOR RETINOPATHY: ICD-10-PCS | Mod: CPTII,S$GLB,, | Performed by: PODIATRIST

## 2022-06-16 PROCEDURE — 82728 ASSAY OF FERRITIN: CPT

## 2022-06-16 PROCEDURE — 3008F PR BODY MASS INDEX (BMI) DOCUMENTED: ICD-10-PCS | Mod: CPTII,S$GLB,, | Performed by: PODIATRIST

## 2022-06-16 PROCEDURE — 3288F FALL RISK ASSESSMENT DOCD: CPT | Mod: CPTII,S$GLB,, | Performed by: PODIATRIST

## 2022-06-16 PROCEDURE — 99999 PR PBB SHADOW E&M-EST. PATIENT-LVL IV: CPT | Mod: PBBFAC,,, | Performed by: PODIATRIST

## 2022-06-16 RX ORDER — LIDOCAINE HYDROCHLORIDE 20 MG/ML
JELLY TOPICAL DAILY
Qty: 30 ML | Refills: 0 | Status: SHIPPED | OUTPATIENT
Start: 2022-06-16 | End: 2022-07-02 | Stop reason: SDUPTHER

## 2022-06-16 RX ORDER — GABAPENTIN 800 MG/1
800 TABLET ORAL NIGHTLY
Qty: 30 TABLET | Refills: 1 | Status: SHIPPED | OUTPATIENT
Start: 2022-06-16 | End: 2022-08-16 | Stop reason: SDUPTHER

## 2022-06-16 NOTE — PROGRESS NOTES
Subjective:     Patient ID: Anup Miller is a 72 y.o. male.    Chief Complaint: Follow-up (C/o bilateral mid-foot pain, tingling and burning sensation to toes, left foot 5/10, right 3/10, wears tennis shoes with socks, diabetic Pt, last seen on 05/05/22 with PCP Dr. Gonzales)    Anup is a 72 y.o. male who presents to the clinic for evaluation and treatment of high risk feet. Anup has a past medical history of Acute respiratory failure due to COVID-19 (08/2020), Colon polyp, COVID-19 virus infection (07/2020), COVID-19 virus infection (7/26/2020), Diabetes mellitus type II (Diagnosed 2002), Elevated liver enzymes, Elevated PSA (12/13/2017), Fatty liver disease, nonalcoholic, Hyperlipidemia, Hypertension, KHOI on CPAP, and Sleep apnea. The patient's chief complaint is bilateral foot pain. Patient states pain is pins/needles, electric, burning, and hot feeling. Patient states pain is constant. Patient rates pain  5/10(left) and 3/10(right). Patient states he started wearing AFO's bilateral after COVID 19 infection in 7/2020. Patient states he is taking Gabapentin as prescribed but only sees a little difference. This patient has documented high risk feet requiring routine maintenance secondary to diabetes mellitis and those secondary complications of diabetes, as mentioned..    PCP: Bryce Gonzales MD    Date Last Seen by PCP: 05/05/2022    Current shoe gear:  Affected Foot: tennis shoes with AFO     Unaffected Foot: tennis shoes with AFO    Hemoglobin A1C   Date Value Ref Range Status   05/05/2022 8.2 (H) 4.0 - 5.6 % Final     Comment:     ADA Screening Guidelines:  5.7-6.4%  Consistent with prediabetes  >or=6.5%  Consistent with diabetes    High levels of fetal hemoglobin interfere with the HbA1C  assay. Heterozygous hemoglobin variants (HbS, HgC, etc)do  not significantly interfere with this assay.   However, presence of multiple variants may affect accuracy.     02/10/2022 12.7 (H) 4.0 - 5.6 % Final      Comment:     ADA Screening Guidelines:  5.7-6.4%  Consistent with prediabetes  >or=6.5%  Consistent with diabetes    High levels of fetal hemoglobin interfere with the HbA1C  assay. Heterozygous hemoglobin variants (HbS, HgC, etc)do  not significantly interfere with this assay.   However, presence of multiple variants may affect accuracy.     12/07/2021 11.5 (H) 4.0 - 5.6 % Final     Comment:     ADA Screening Guidelines:  5.7-6.4%  Consistent with prediabetes  >or=6.5%  Consistent with diabetes    High levels of fetal hemoglobin interfere with the HbA1C  assay. Heterozygous hemoglobin variants (HbS, HgC, etc)do  not significantly interfere with this assay.   However, presence of multiple variants may affect accuracy.         Patient Active Problem List   Diagnosis    Uncontrolled type 2 diabetes mellitus    Hypertension associated with diabetes    Hyperlipidemia associated with type 2 diabetes mellitus    Multiple pulmonary nodules determined by computed tomography of lung    KHOI (obstructive sleep apnea)    Hx of colonic polyp    Cardiac arrhythmia    Pneumonia due to COVID-19 virus    Oral phase dysphagia    Fever    History of 2019 novel coronavirus disease (COVID-19)    Malnutrition of moderate degree    Severe obesity (BMI 35.0-35.9 with comorbidity)    Debility    Neuropathy    Muscular deconditioning    History of DVT (deep vein thrombosis)    Iron deficiency anemia due to chronic blood loss    Acute deep vein thrombosis (DVT) of other vein of lower extremity, unspecified laterality    Chronic kidney disease, stage 3a       Medication List with Changes/Refills   New Medications    GABAPENTIN (NEURONTIN) 800 MG TABLET    Take 1 tablet (800 mg total) by mouth every evening.    LIDOCAINE HCL 2% (XYLOCAINE) 2 % JELLY    Apply topically once daily.   Current Medications    EMPAGLIFLOZIN (JARDIANCE) 25 MG TABLET    Take 1 tablet (25 mg total) by mouth once daily.    FLASH GLUCOSE SENSOR  "(FREESTYLE DERRELL 2 SENSOR) KIT    Inject 1 each into the skin every 14 (fourteen) days.    FREESTYLE DERRELL 2 READER [facility]    Use to monitor blood sugar consistently.    GABAPENTIN (NEURONTIN) 600 MG TABLET    Take 1 tablet (600 mg total) by mouth 4 (four) times daily.    HYDROCHLOROTHIAZIDE (HYDRODIURIL) 25 MG TABLET    Take 1 tablet (25 mg total) by mouth once daily.    INSULIN (LANTUS SOLOSTAR U-100 INSULIN) GLARGINE 100 UNITS/ML (3ML) SUBQ PEN    Inject 20 Units into the skin every evening.    LOSARTAN (COZAAR) 100 MG TABLET    Take 100 mg by mouth once daily.    METFORMIN (GLUCOPHAGE XR) 500 MG ER 24HR TABLET    Take 1 tablet (500 mg total) by mouth 2 (two) times daily with meals.    METOPROLOL TARTRATE (LOPRESSOR) 50 MG TABLET    TK 1 T PO BID    PANTOPRAZOLE (PROTONIX) 40 MG TABLET    TK 1 T PO D    PEN NEEDLE, DIABETIC (BD ULTRA-FINE TATYANA PEN NEEDLE) 32 GAUGE X 5/32" NDLE    Use with Lantus daily.    ROSUVASTATIN (CRESTOR) 10 MG TABLET    Take 1 tablet (10 mg total) by mouth once daily.    SEMAGLUTIDE (OZEMPIC) 0.25 MG OR 0.5 MG(2 MG/1.5 ML) PEN INJECTOR    Inject 0.25 mg once a week for 4 weeks, then increase to 0.5 mg once weekly thereafter.    ZINC SULFATE (ZINCATE) 220 (50) MG CAPSULE    Take 220 mg by mouth once daily.       Review of patient's allergies indicates:  No Known Allergies    Past Surgical History:   Procedure Laterality Date    BACK SURGERY      CHOLECYSTECTOMY  2013    COLONOSCOPY N/A 7/19/2018    Procedure: COLONOSCOPY;  Surgeon: Chacorta Lopez III, MD;  Location[facility] ENDO;  Service: Endoscopy;  Laterality: N/A;    COLONOSCOPY N/A 3/15/2022    Procedure: COLONOSCOPY;  Surgeon: Jessie Diallo MD;  Location: [facility] ENDO;  Service: Endoscopy;  Laterality: N/A;    ESOPHAGOGASTRODUODENOSCOPY N/A 8/25/2020    Procedure: EGD (ESOPHAGOGASTRODUODENOSCOPY);  Surgeon: Anup Green MD;  Location: [address];  Service: General;  Laterality: N/A;    TRACHEOSTOMY  08/2020    " "Temporarily post COVID infection.       Family History   Problem Relation Age of Onset    Asthma Mother     Diabetes Father     Cancer Sister         Breast    Diabetes Paternal Aunt     Cancer Maternal Grandmother         Breast    Diabetes Maternal Grandmother     Diabetes Paternal Uncle     Colon cancer Neg Hx        Social History     Socioeconomic History    Marital status:      Spouse name: yoselin    Number of children: 1   Occupational History    Occupation:    Tobacco Use    Smoking status: Never Smoker    Smokeless tobacco: Never Used   Substance and Sexual Activity    Alcohol use: No    Drug use: No    Sexual activity: Yes     Partners: Female     Comment:    Social History Narrative    No smokers or pets in household.       Vitals:    06/16/22 0827   BP: 117/76   Pulse: 82   Weight: 118.4 kg (261 lb)   Height: 5' 9" (1.753 m)   PainSc:   5   PainLoc: Foot       Hemoglobin A1C   Date Value Ref Range Status   05/05/2022 8.2 (H) 4.0 - 5.6 % Final     Comment:     ADA Screening Guidelines:  5.7-6.4%  Consistent with prediabetes  >or=6.5%  Consistent with diabetes    High levels of fetal hemoglobin interfere with the HbA1C  assay. Heterozygous hemoglobin variants (HbS, HgC, etc)do  not significantly interfere with this assay.   However, presence of multiple variants may affect accuracy.     02/10/2022 12.7 (H) 4.0 - 5.6 % Final     Comment:     ADA Screening Guidelines:  5.7-6.4%  Consistent with prediabetes  >or=6.5%  Consistent with diabetes    High levels of fetal hemoglobin interfere with the HbA1C  assay. Heterozygous hemoglobin variants (HbS, HgC, etc)do  not significantly interfere with this assay.   However, presence of multiple variants may affect accuracy.     12/07/2021 11.5 (H) 4.0 - 5.6 % Final     Comment:     ADA Screening Guidelines:  5.7-6.4%  Consistent with prediabetes  >or=6.5%  Consistent with diabetes    High levels of fetal hemoglobin interfere with " the HbA1C  assay. Heterozygous hemoglobin variants (HbS, HgC, etc)do  not significantly interfere with this assay.   However, presence of multiple variants may affect accuracy.         Review of Systems   Constitutional: Negative for chills and fever.   Respiratory: Negative for shortness of breath.    Cardiovascular: Negative for chest pain, palpitations, orthopnea, claudication and leg swelling.   Gastrointestinal: Negative for diarrhea, nausea and vomiting.   Musculoskeletal: Negative for joint pain.   Skin: Negative for rash.   Neurological: Positive for tingling and sensory change.   Psychiatric/Behavioral: Negative.          Objective:      PHYSICAL EXAM: Apperance: Alert and orient in no distress,well developed, and with good attention to grooming and body habits  Patient presents ambulating in tennis shoes with AFO  LOWER EXTREMITY EXAM:  VASCULAR: Dorsalis pedis pulses 2/4 bilateral and Posterior Tibial pulses 2/4 bilateral. Capillary fill time <4 seconds bilateral. Mild edema observed bilateral. Varicosities absent bilateral. Skin temperature of the lower extremities is warm to warm, proximal to distal. Hair growth dim bilateral.  DERMATOLOGICAL: No skin rashes, subcutaneous nodules, lesions, or ulcers observed bilateral. Nails 1,2,3,4,5 bilateral normal left. Webspaces 1,2,3,4 bilateral clean, dry and without evidence of break in skin integrity.   NEUROLOGICAL: Light touch, sharp-dull, proprioception all present and equal bilaterally.  Vibratory sensation diminished at bilateral hallux and navicular. Protective sensation absent at 8/10 sites as tested with a [place] 5.07 monofilament.   MUSCULOSKELETAL: Muscle strength is 3/5 for foot inverters, everters, plantarflexors, and dorsiflexors. Muscle tone is normal.         Assessment:       ICD-10-CM ICD-9-CM   1. Neuropathy  G62.9 355.9   2. Muscular deconditioning  R29.898 781.99   3. Uncontrolled type 2 diabetes mellitus with hyperglycemia  E11.65  250.02       Plan:   Neuropathy  -     gabapentin (NEURONTIN) 800 MG tablet; Take 1 tablet (800 mg total) by mouth every evening.  Dispense: 30 tablet; Refill: 1  -     LIDOcaine HCL 2% (XYLOCAINE) 2 % jelly; Apply topically once daily.  Dispense: 30 mL; Refill: 0    Muscular deconditioning  -     gabapentin (NEURONTIN) 800 MG tablet; Take 1 tablet (800 mg total) by mouth every evening.  Dispense: 30 tablet; Refill: 1  -     LIDOcaine HCL 2% (XYLOCAINE) 2 % jelly; Apply topically once daily.  Dispense: 30 mL; Refill: 0    Uncontrolled type 2 diabetes mellitus with hyperglycemia  -     gabapentin (NEURONTIN) 800 MG tablet; Take 1 tablet (800 mg total) by mouth every evening.  Dispense: 30 tablet; Refill: 1      I counseled the patient on his conditions, regarding findings of my examination, my impressions, and usual treatment plan.   Greater than 15 minutes of a 20 minute appointment spent on education about the diabetic foot, neuropathy, and prevention of limb loss.  Shoe inspection. Diabetic Foot Education. Patient reminded of the importance of good nutrition and blood sugar control to help prevent podiatric complications of diabetes. Patient instructed on proper foot hygeine. We discussed wearing proper shoe gear, daily foot inspections, never walking without protective shoe gear, never putting sharp instruments to feet.    Discussed with patient treatments for neuropathy consisting of topical or oral medication.  Prescription written for Gabapentin 800mg to be taken once nightly. Informed patient of possible side effects including but not limited to disorientation and drowsiness. Patient instructed to discontinue use if there are any adverse effects. Patient states he understands.   Prescription written for Lidocaine jelly to be applied as needed for pain.     Patient  will continue to monitor the areas daily, inspect feet, wear protective shoe gear when ambulatory, moisturizer to maintain skin integrity. Patient  reminded of the importance of good nutrition and blood sugar control to help prevent podiatric complications of diabetes.  Patient to return 2 months or sooner if needed.        Katy Fernandes DPM  [facility]

## 2022-07-02 DIAGNOSIS — G62.9 NEUROPATHY: ICD-10-CM

## 2022-07-02 DIAGNOSIS — R29.898 MUSCULAR DECONDITIONING: ICD-10-CM

## 2022-07-05 RX ORDER — LIDOCAINE HYDROCHLORIDE 20 MG/ML
JELLY TOPICAL DAILY
Qty: 30 ML | Refills: 0 | Status: SHIPPED | OUTPATIENT
Start: 2022-07-05 | End: 2022-09-14 | Stop reason: SDUPTHER

## 2022-07-06 ENCOUNTER — OFFICE VISIT (OUTPATIENT)
Dept: DIABETES | Facility: CLINIC | Age: 72
End: 2022-07-06
Payer: MEDICARE

## 2022-07-06 ENCOUNTER — OFFICE VISIT (OUTPATIENT)
Dept: OPHTHALMOLOGY | Facility: CLINIC | Age: 72
End: 2022-07-06
Payer: MEDICARE

## 2022-07-06 VITALS
HEIGHT: 69 IN | DIASTOLIC BLOOD PRESSURE: 75 MMHG | BODY MASS INDEX: 38.8 KG/M2 | WEIGHT: 262 LBS | SYSTOLIC BLOOD PRESSURE: 120 MMHG

## 2022-07-06 DIAGNOSIS — E78.5 HYPERLIPIDEMIA ASSOCIATED WITH TYPE 2 DIABETES MELLITUS: ICD-10-CM

## 2022-07-06 DIAGNOSIS — E11.59 HYPERTENSION ASSOCIATED WITH DIABETES: ICD-10-CM

## 2022-07-06 DIAGNOSIS — G62.9 NEUROPATHY: ICD-10-CM

## 2022-07-06 DIAGNOSIS — H25.12 NUCLEAR SCLEROSIS OF LEFT EYE: ICD-10-CM

## 2022-07-06 DIAGNOSIS — E11.9 TYPE 2 DIABETES MELLITUS WITHOUT COMPLICATION, WITHOUT LONG-TERM CURRENT USE OF INSULIN: Primary | ICD-10-CM

## 2022-07-06 DIAGNOSIS — E11.65 UNCONTROLLED TYPE 2 DIABETES MELLITUS WITH HYPERGLYCEMIA, WITH LONG-TERM CURRENT USE OF INSULIN: Primary | ICD-10-CM

## 2022-07-06 DIAGNOSIS — H25.11 NUCLEAR SCLEROSIS OF RIGHT EYE: ICD-10-CM

## 2022-07-06 DIAGNOSIS — I15.2 HYPERTENSION ASSOCIATED WITH DIABETES: ICD-10-CM

## 2022-07-06 DIAGNOSIS — H52.7 REFRACTIVE DISORDER: ICD-10-CM

## 2022-07-06 DIAGNOSIS — E11.69 HYPERLIPIDEMIA ASSOCIATED WITH TYPE 2 DIABETES MELLITUS: ICD-10-CM

## 2022-07-06 DIAGNOSIS — Z79.4 UNCONTROLLED TYPE 2 DIABETES MELLITUS WITH HYPERGLYCEMIA, WITH LONG-TERM CURRENT USE OF INSULIN: Primary | ICD-10-CM

## 2022-07-06 LAB
GLUCOSE SERPL-MCNC: 214 MG/DL (ref 70–110)
LEFT EYE DM RETINOPATHY: NEGATIVE
RIGHT EYE DM RETINOPATHY: NEGATIVE

## 2022-07-06 PROCEDURE — 3052F PR MOST RECENT HEMOGLOBIN A1C LEVEL 8.0 - < 9.0%: ICD-10-PCS | Mod: CPTII,S$GLB,, | Performed by: NURSE PRACTITIONER

## 2022-07-06 PROCEDURE — 3078F DIAST BP <80 MM HG: CPT | Mod: CPTII,S$GLB,, | Performed by: NURSE PRACTITIONER

## 2022-07-06 PROCEDURE — 99213 PR OFFICE/OUTPT VISIT, EST, LEVL III, 20-29 MIN: ICD-10-PCS | Mod: S$GLB,,, | Performed by: NURSE PRACTITIONER

## 2022-07-06 PROCEDURE — 3288F PR FALLS RISK ASSESSMENT DOCUMENTED: ICD-10-PCS | Mod: CPTII,S$GLB,, | Performed by: NURSE PRACTITIONER

## 2022-07-06 PROCEDURE — 99999 PR PBB SHADOW E&M-EST. PATIENT-LVL IV: CPT | Mod: PBBFAC,,, | Performed by: NURSE PRACTITIONER

## 2022-07-06 PROCEDURE — 3052F HG A1C>EQUAL 8.0%<EQUAL 9.0%: CPT | Mod: CPTII,S$GLB,, | Performed by: STUDENT IN AN ORGANIZED HEALTH CARE EDUCATION/TRAINING PROGRAM

## 2022-07-06 PROCEDURE — 99214 OFFICE O/P EST MOD 30 MIN: CPT | Mod: S$GLB,,, | Performed by: STUDENT IN AN ORGANIZED HEALTH CARE EDUCATION/TRAINING PROGRAM

## 2022-07-06 PROCEDURE — 3074F PR MOST RECENT SYSTOLIC BLOOD PRESSURE < 130 MM HG: ICD-10-PCS | Mod: CPTII,S$GLB,, | Performed by: NURSE PRACTITIONER

## 2022-07-06 PROCEDURE — 99999 PR PBB SHADOW E&M-EST. PATIENT-LVL III: ICD-10-PCS | Mod: PBBFAC,,, | Performed by: STUDENT IN AN ORGANIZED HEALTH CARE EDUCATION/TRAINING PROGRAM

## 2022-07-06 PROCEDURE — 1160F PR REVIEW ALL MEDS BY PRESCRIBER/CLIN PHARMACIST DOCUMENTED: ICD-10-PCS | Mod: CPTII,S$GLB,, | Performed by: STUDENT IN AN ORGANIZED HEALTH CARE EDUCATION/TRAINING PROGRAM

## 2022-07-06 PROCEDURE — 1159F PR MEDICATION LIST DOCUMENTED IN MEDICAL RECORD: ICD-10-PCS | Mod: CPTII,S$GLB,, | Performed by: STUDENT IN AN ORGANIZED HEALTH CARE EDUCATION/TRAINING PROGRAM

## 2022-07-06 PROCEDURE — 82962 POCT GLUCOSE, HAND-HELD DEVICE: ICD-10-PCS | Mod: S$GLB,,, | Performed by: NURSE PRACTITIONER

## 2022-07-06 PROCEDURE — 3288F FALL RISK ASSESSMENT DOCD: CPT | Mod: CPTII,S$GLB,, | Performed by: NURSE PRACTITIONER

## 2022-07-06 PROCEDURE — 99999 PR PBB SHADOW E&M-EST. PATIENT-LVL III: CPT | Mod: PBBFAC,,, | Performed by: STUDENT IN AN ORGANIZED HEALTH CARE EDUCATION/TRAINING PROGRAM

## 2022-07-06 PROCEDURE — 1125F PR PAIN SEVERITY QUANTIFIED, PAIN PRESENT: ICD-10-PCS | Mod: CPTII,S$GLB,, | Performed by: NURSE PRACTITIONER

## 2022-07-06 PROCEDURE — 1125F AMNT PAIN NOTED PAIN PRSNT: CPT | Mod: CPTII,S$GLB,, | Performed by: NURSE PRACTITIONER

## 2022-07-06 PROCEDURE — 1160F RVW MEDS BY RX/DR IN RCRD: CPT | Mod: CPTII,S$GLB,, | Performed by: NURSE PRACTITIONER

## 2022-07-06 PROCEDURE — 99214 PR OFFICE/OUTPT VISIT, EST, LEVL IV, 30-39 MIN: ICD-10-PCS | Mod: S$GLB,,, | Performed by: STUDENT IN AN ORGANIZED HEALTH CARE EDUCATION/TRAINING PROGRAM

## 2022-07-06 PROCEDURE — 3008F PR BODY MASS INDEX (BMI) DOCUMENTED: ICD-10-PCS | Mod: CPTII,S$GLB,, | Performed by: NURSE PRACTITIONER

## 2022-07-06 PROCEDURE — 1101F PT FALLS ASSESS-DOCD LE1/YR: CPT | Mod: CPTII,S$GLB,, | Performed by: NURSE PRACTITIONER

## 2022-07-06 PROCEDURE — 1159F PR MEDICATION LIST DOCUMENTED IN MEDICAL RECORD: ICD-10-PCS | Mod: CPTII,S$GLB,, | Performed by: NURSE PRACTITIONER

## 2022-07-06 PROCEDURE — 1101F PR PT FALLS ASSESS DOC 0-1 FALLS W/OUT INJ PAST YR: ICD-10-PCS | Mod: CPTII,S$GLB,, | Performed by: NURSE PRACTITIONER

## 2022-07-06 PROCEDURE — 99213 OFFICE O/P EST LOW 20 MIN: CPT | Mod: S$GLB,,, | Performed by: NURSE PRACTITIONER

## 2022-07-06 PROCEDURE — 1159F MED LIST DOCD IN RCRD: CPT | Mod: CPTII,S$GLB,, | Performed by: STUDENT IN AN ORGANIZED HEALTH CARE EDUCATION/TRAINING PROGRAM

## 2022-07-06 PROCEDURE — 3072F PR LOW RISK FOR RETINOPATHY: ICD-10-PCS | Mod: CPTII,S$GLB,, | Performed by: NURSE PRACTITIONER

## 2022-07-06 PROCEDURE — 99999 PR PBB SHADOW E&M-EST. PATIENT-LVL IV: ICD-10-PCS | Mod: PBBFAC,,, | Performed by: NURSE PRACTITIONER

## 2022-07-06 PROCEDURE — 1160F PR REVIEW ALL MEDS BY PRESCRIBER/CLIN PHARMACIST DOCUMENTED: ICD-10-PCS | Mod: CPTII,S$GLB,, | Performed by: NURSE PRACTITIONER

## 2022-07-06 PROCEDURE — 3052F PR MOST RECENT HEMOGLOBIN A1C LEVEL 8.0 - < 9.0%: ICD-10-PCS | Mod: CPTII,S$GLB,, | Performed by: STUDENT IN AN ORGANIZED HEALTH CARE EDUCATION/TRAINING PROGRAM

## 2022-07-06 PROCEDURE — 3008F BODY MASS INDEX DOCD: CPT | Mod: CPTII,S$GLB,, | Performed by: NURSE PRACTITIONER

## 2022-07-06 PROCEDURE — 3052F HG A1C>EQUAL 8.0%<EQUAL 9.0%: CPT | Mod: CPTII,S$GLB,, | Performed by: NURSE PRACTITIONER

## 2022-07-06 PROCEDURE — 3074F SYST BP LT 130 MM HG: CPT | Mod: CPTII,S$GLB,, | Performed by: NURSE PRACTITIONER

## 2022-07-06 PROCEDURE — 92015 DETERMINE REFRACTIVE STATE: CPT | Mod: S$GLB,,, | Performed by: STUDENT IN AN ORGANIZED HEALTH CARE EDUCATION/TRAINING PROGRAM

## 2022-07-06 PROCEDURE — 3078F PR MOST RECENT DIASTOLIC BLOOD PRESSURE < 80 MM HG: ICD-10-PCS | Mod: CPTII,S$GLB,, | Performed by: NURSE PRACTITIONER

## 2022-07-06 PROCEDURE — 3072F LOW RISK FOR RETINOPATHY: CPT | Mod: CPTII,S$GLB,, | Performed by: NURSE PRACTITIONER

## 2022-07-06 PROCEDURE — 92015 PR REFRACTION: ICD-10-PCS | Mod: S$GLB,,, | Performed by: STUDENT IN AN ORGANIZED HEALTH CARE EDUCATION/TRAINING PROGRAM

## 2022-07-06 PROCEDURE — 1159F MED LIST DOCD IN RCRD: CPT | Mod: CPTII,S$GLB,, | Performed by: NURSE PRACTITIONER

## 2022-07-06 PROCEDURE — 1160F RVW MEDS BY RX/DR IN RCRD: CPT | Mod: CPTII,S$GLB,, | Performed by: STUDENT IN AN ORGANIZED HEALTH CARE EDUCATION/TRAINING PROGRAM

## 2022-07-06 PROCEDURE — 82962 GLUCOSE BLOOD TEST: CPT | Mod: S$GLB,,, | Performed by: NURSE PRACTITIONER

## 2022-07-06 RX ORDER — INSULIN GLARGINE 100 [IU]/ML
25 INJECTION, SOLUTION SUBCUTANEOUS NIGHTLY
Qty: 15 ML | Refills: 5 | Status: SHIPPED | OUTPATIENT
Start: 2022-07-06 | End: 2022-07-11

## 2022-07-06 RX ORDER — SEMAGLUTIDE 1.34 MG/ML
0.5 INJECTION, SOLUTION SUBCUTANEOUS
Qty: 1 PEN | Refills: 5 | Status: SHIPPED | OUTPATIENT
Start: 2022-07-06 | End: 2022-08-04

## 2022-07-06 RX ORDER — METFORMIN HYDROCHLORIDE 500 MG/1
500 TABLET, EXTENDED RELEASE ORAL 2 TIMES DAILY WITH MEALS
Qty: 180 TABLET | Refills: 1 | Status: SHIPPED | OUTPATIENT
Start: 2022-07-06 | End: 2023-02-06

## 2022-07-06 RX ORDER — GLIMEPIRIDE 4 MG/1
4 TABLET ORAL
Qty: 90 TABLET | Refills: 1 | Status: SHIPPED | OUTPATIENT
Start: 2022-07-06 | End: 2023-01-17 | Stop reason: SDUPTHER

## 2022-07-06 RX ORDER — PEN NEEDLE, DIABETIC 30 GX3/16"
NEEDLE, DISPOSABLE MISCELLANEOUS
Qty: 100 EACH | Refills: 1 | Status: SHIPPED | OUTPATIENT
Start: 2022-07-06 | End: 2022-08-05 | Stop reason: SDUPTHER

## 2022-07-06 NOTE — PATIENT INSTRUCTIONS
-- Medications adjusted for today's visit include:    Increase Lantus 25 units once a day.    Continue Metformin 500 mg twice a day, with a meal.    Start Glimepiride 4 mg every morning with breakfast.     We will discuss adding back Jardiance and Ozempic if affordable- I have sent a message to pharmacy assistance for this.     -- Limit carbs to no more than 45 grams with each meal. Never eat carbs by themselves, always add protein. Make snacks low carb or non-carb only.    -- Increase checking blood sugar 2 times daily: Fasting blood sugar and vary your next 3 readings: Before lunch, before supper, 2 hours after any meal, or bedtime.     -Blood sugar goals should be a fasting blood sugar between , and no blood sugars throughout the day over 180 is good, less than 160 better.    --Carry glucose tablets/soft peppermints/regular juice or Coke product with you at all times to treat a low blood sugar episode (less than 70). If your blood sugar is between 50-70, Chew 4 tablets or drink 1/2 cup of juice or regular Coke product. If your blood sugar is below 50, double the treatment. Re-check blood sugar in 15 minutes. If still low, repeat this. Always call the clinic to give an update for any low blood sugar episodes.    --Exercise as tolerated.    --Follow-up for your next visit in 8 weeks.     --Please either drop off, fax, or MyChart your readings to me as needed.

## 2022-07-06 NOTE — PROGRESS NOTES
HPI     Diabetic Eye Exam      Additional comments: Diagnosed with diabetes at the age of 52.  Lab Results       Component                Value               Date                       HGBA1C                   8.2 (H)             05/05/2022                                 Comments     Vision changes since last eye exam? Slight decrease with overall vision     Any eye pain today: Sometimes sharp pain OU    Other ocular symptoms: Itchy eyes, using Pataday PRN     Interested in contact lens fitting today? No                     Last edited by Alisa Krishna on 7/6/2022  9:45 AM. (History)            Assessment /Plan     For exam results, see Encounter Report.    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

## 2022-07-07 ENCOUNTER — PATIENT MESSAGE (OUTPATIENT)
Dept: INTERNAL MEDICINE | Facility: CLINIC | Age: 72
End: 2022-07-07
Payer: MEDICARE

## 2022-08-04 ENCOUNTER — LAB VISIT (OUTPATIENT)
Dept: LAB | Facility: HOSPITAL | Age: 72
End: 2022-08-04
Attending: INTERNAL MEDICINE
Payer: MEDICARE

## 2022-08-04 ENCOUNTER — TELEPHONE (OUTPATIENT)
Dept: DIABETES | Facility: CLINIC | Age: 72
End: 2022-08-04
Payer: MEDICARE

## 2022-08-04 ENCOUNTER — OFFICE VISIT (OUTPATIENT)
Dept: INTERNAL MEDICINE | Facility: CLINIC | Age: 72
End: 2022-08-04
Payer: MEDICARE

## 2022-08-04 VITALS
TEMPERATURE: 98 F | SYSTOLIC BLOOD PRESSURE: 118 MMHG | OXYGEN SATURATION: 97 % | HEART RATE: 98 BPM | BODY MASS INDEX: 40.17 KG/M2 | DIASTOLIC BLOOD PRESSURE: 64 MMHG | WEIGHT: 272.06 LBS

## 2022-08-04 DIAGNOSIS — Z79.4 UNCONTROLLED TYPE 2 DIABETES MELLITUS WITH HYPERGLYCEMIA, WITH LONG-TERM CURRENT USE OF INSULIN: Primary | ICD-10-CM

## 2022-08-04 DIAGNOSIS — E11.69 HYPERLIPIDEMIA ASSOCIATED WITH TYPE 2 DIABETES MELLITUS: ICD-10-CM

## 2022-08-04 DIAGNOSIS — E11.65 UNCONTROLLED TYPE 2 DIABETES MELLITUS WITH HYPERGLYCEMIA: ICD-10-CM

## 2022-08-04 DIAGNOSIS — I15.2 HYPERTENSION ASSOCIATED WITH DIABETES: ICD-10-CM

## 2022-08-04 DIAGNOSIS — E11.65 UNCONTROLLED TYPE 2 DIABETES MELLITUS WITH HYPERGLYCEMIA: Primary | ICD-10-CM

## 2022-08-04 DIAGNOSIS — E11.65 UNCONTROLLED TYPE 2 DIABETES MELLITUS WITH HYPERGLYCEMIA, WITH LONG-TERM CURRENT USE OF INSULIN: Primary | ICD-10-CM

## 2022-08-04 DIAGNOSIS — E11.59 HYPERTENSION ASSOCIATED WITH DIABETES: ICD-10-CM

## 2022-08-04 DIAGNOSIS — E78.5 HYPERLIPIDEMIA ASSOCIATED WITH TYPE 2 DIABETES MELLITUS: ICD-10-CM

## 2022-08-04 DIAGNOSIS — D50.0 IRON DEFICIENCY ANEMIA DUE TO CHRONIC BLOOD LOSS: ICD-10-CM

## 2022-08-04 LAB
ESTIMATED AVG GLUCOSE: 269 MG/DL (ref 68–131)
HBA1C MFR BLD: 11 % (ref 4–5.6)

## 2022-08-04 PROCEDURE — 3288F PR FALLS RISK ASSESSMENT DOCUMENTED: ICD-10-PCS | Mod: CPTII,S$GLB,, | Performed by: INTERNAL MEDICINE

## 2022-08-04 PROCEDURE — 1101F PR PT FALLS ASSESS DOC 0-1 FALLS W/OUT INJ PAST YR: ICD-10-PCS | Mod: CPTII,S$GLB,, | Performed by: INTERNAL MEDICINE

## 2022-08-04 PROCEDURE — 1159F MED LIST DOCD IN RCRD: CPT | Mod: CPTII,S$GLB,, | Performed by: INTERNAL MEDICINE

## 2022-08-04 PROCEDURE — 3078F DIAST BP <80 MM HG: CPT | Mod: CPTII,S$GLB,, | Performed by: INTERNAL MEDICINE

## 2022-08-04 PROCEDURE — 3046F HEMOGLOBIN A1C LEVEL >9.0%: CPT | Mod: CPTII,S$GLB,, | Performed by: INTERNAL MEDICINE

## 2022-08-04 PROCEDURE — 3074F SYST BP LT 130 MM HG: CPT | Mod: CPTII,S$GLB,, | Performed by: INTERNAL MEDICINE

## 2022-08-04 PROCEDURE — 83036 HEMOGLOBIN GLYCOSYLATED A1C: CPT | Performed by: INTERNAL MEDICINE

## 2022-08-04 PROCEDURE — 3072F LOW RISK FOR RETINOPATHY: CPT | Mod: CPTII,S$GLB,, | Performed by: INTERNAL MEDICINE

## 2022-08-04 PROCEDURE — 99999 PR PBB SHADOW E&M-EST. PATIENT-LVL V: ICD-10-PCS | Mod: PBBFAC,,, | Performed by: INTERNAL MEDICINE

## 2022-08-04 PROCEDURE — 1126F AMNT PAIN NOTED NONE PRSNT: CPT | Mod: CPTII,S$GLB,, | Performed by: INTERNAL MEDICINE

## 2022-08-04 PROCEDURE — 99214 PR OFFICE/OUTPT VISIT, EST, LEVL IV, 30-39 MIN: ICD-10-PCS | Mod: S$GLB,,, | Performed by: INTERNAL MEDICINE

## 2022-08-04 PROCEDURE — 1159F PR MEDICATION LIST DOCUMENTED IN MEDICAL RECORD: ICD-10-PCS | Mod: CPTII,S$GLB,, | Performed by: INTERNAL MEDICINE

## 2022-08-04 PROCEDURE — 3008F BODY MASS INDEX DOCD: CPT | Mod: CPTII,S$GLB,, | Performed by: INTERNAL MEDICINE

## 2022-08-04 PROCEDURE — 3078F PR MOST RECENT DIASTOLIC BLOOD PRESSURE < 80 MM HG: ICD-10-PCS | Mod: CPTII,S$GLB,, | Performed by: INTERNAL MEDICINE

## 2022-08-04 PROCEDURE — 3072F PR LOW RISK FOR RETINOPATHY: ICD-10-PCS | Mod: CPTII,S$GLB,, | Performed by: INTERNAL MEDICINE

## 2022-08-04 PROCEDURE — 3074F PR MOST RECENT SYSTOLIC BLOOD PRESSURE < 130 MM HG: ICD-10-PCS | Mod: CPTII,S$GLB,, | Performed by: INTERNAL MEDICINE

## 2022-08-04 PROCEDURE — 99214 OFFICE O/P EST MOD 30 MIN: CPT | Mod: S$GLB,,, | Performed by: INTERNAL MEDICINE

## 2022-08-04 PROCEDURE — 3046F PR MOST RECENT HEMOGLOBIN A1C LEVEL > 9.0%: ICD-10-PCS | Mod: CPTII,S$GLB,, | Performed by: INTERNAL MEDICINE

## 2022-08-04 PROCEDURE — 1101F PT FALLS ASSESS-DOCD LE1/YR: CPT | Mod: CPTII,S$GLB,, | Performed by: INTERNAL MEDICINE

## 2022-08-04 PROCEDURE — 99999 PR PBB SHADOW E&M-EST. PATIENT-LVL V: CPT | Mod: PBBFAC,,, | Performed by: INTERNAL MEDICINE

## 2022-08-04 PROCEDURE — 1126F PR PAIN SEVERITY QUANTIFIED, NO PAIN PRESENT: ICD-10-PCS | Mod: CPTII,S$GLB,, | Performed by: INTERNAL MEDICINE

## 2022-08-04 PROCEDURE — 3288F FALL RISK ASSESSMENT DOCD: CPT | Mod: CPTII,S$GLB,, | Performed by: INTERNAL MEDICINE

## 2022-08-04 PROCEDURE — 3008F PR BODY MASS INDEX (BMI) DOCUMENTED: ICD-10-PCS | Mod: CPTII,S$GLB,, | Performed by: INTERNAL MEDICINE

## 2022-08-04 PROCEDURE — 36415 COLL VENOUS BLD VENIPUNCTURE: CPT | Performed by: INTERNAL MEDICINE

## 2022-08-04 RX ORDER — HYDROCHLOROTHIAZIDE 25 MG/1
25 TABLET ORAL DAILY
Qty: 90 TABLET | Refills: 2 | Status: SHIPPED | OUTPATIENT
Start: 2022-08-04 | End: 2023-08-07 | Stop reason: SDUPTHER

## 2022-08-04 RX ORDER — PIOGLITAZONEHYDROCHLORIDE 15 MG/1
15 TABLET ORAL DAILY
Qty: 90 TABLET | Refills: 0 | Status: SHIPPED | OUTPATIENT
Start: 2022-08-04 | End: 2023-02-06

## 2022-08-04 RX ORDER — ROSUVASTATIN CALCIUM 10 MG/1
10 TABLET, COATED ORAL DAILY
Qty: 90 TABLET | Refills: 1 | Status: SHIPPED | OUTPATIENT
Start: 2022-08-04 | End: 2023-02-06 | Stop reason: SDUPTHER

## 2022-08-04 NOTE — TELEPHONE ENCOUNTER
Called patient with regards to reported fasting BG readings over the last week staying on high 200's, 287 this morning. Current medications include:   - Lantus 25 units once a day    - Metformin 500 mg twice a day    - Glimepiride 4 mg every morning with breakfast    Pharmacy assistance referral was ordered by LEOBARDO Barkley at previous visit for Jardiance and Ozempic. Pt states remains to be costly even with pharmacy assistance.    Instructions were given to increase Lantus to 30 units daily beginning today. Will schedule soonest available appt with LEOBARDO Barkley at [facility].      Charlotte T. Delacruz, FNP-C [facility]

## 2022-08-04 NOTE — PATIENT INSTRUCTIONS
Increase glargine  insulin to 30 units every evening.     Notify Dr. Gonzales if any glucose below 85.     Check with your pharmacy regarding new shingles vaccine.      Midications similar to ozempic include trulicity, victoza.    Medications similar to jardiance are farxiga and invokana.

## 2022-08-04 NOTE — PROGRESS NOTES
Subjective:      Patient ID: Anup Miller is a 72 y.o. male.    Chief Complaint: Follow-up    HPI     71 yo with   Patient Active Problem List   Diagnosis    Uncontrolled type 2 diabetes mellitus with hyperglycemia, with long-term current use of insulin    Hypertension associated with diabetes    Hyperlipidemia associated with type 2 diabetes mellitus    Multiple pulmonary nodules determined by computed tomography of lung    KHOI (obstructive sleep apnea)    Hx of colonic polyp    Cardiac arrhythmia    Pneumonia due to COVID-19 virus    Oral phase dysphagia    Fever    History of 2019 novel coronavirus disease (COVID-19)    Malnutrition of moderate degree    Severe obesity (BMI 35.0-35.9 with comorbidity)    Debility    Neuropathy    Muscular deconditioning    History of DVT (deep vein thrombosis)    Iron deficiency anemia due to chronic blood loss    Acute deep vein thrombosis (DVT) of other vein of lower extremity, unspecified laterality    Chronic kidney disease, stage 3a     Past Medical History:   Diagnosis Date    Acute respiratory failure due to COVID-19 08/2020    Colon polyp     COVID-19 virus infection 07/2020    COVID-19 virus infection 7/26/2020    Diabetes mellitus type II Diagnosed 2002    Elevated liver enzymes     Elevated PSA 12/13/2017    Did not f/u with urology as rec in 2015    Fatty liver disease, nonalcoholic     Hyperlipidemia     Hypertension     KHOI on CPAP     Sleep apnea      Here today for management of mult med problems as outlined below.  Compliant with meds without significant side effects. Energy and appetite good.     Unable to get jardiance and ozempic due to insurance. Glucose rising to 200 to 300. Off both x 2 mo. Wants to resume actos.       Review of Systems   Constitutional: Negative for chills and fever.   HENT: Negative for ear pain and sore throat.    Respiratory: Negative for cough.    Cardiovascular: Negative for chest pain.    Gastrointestinal: Negative for abdominal pain and blood in stool.   Genitourinary: Negative for dysuria and hematuria.   Neurological: Negative for seizures and syncope.     Objective:   /64 (BP Location: Left arm, Patient Position: Sitting, BP Method: Large (Manual))   Pulse 98   Temp 98.3 °F (36.8 °C) (Tympanic)   Wt 123.4 kg (272 lb 0.8 oz)   SpO2 97%   BMI 40.17 kg/m²     Physical Exam  Constitutional:       General: He is not in acute distress.     Appearance: He is well-developed.   Cardiovascular:      Rate and Rhythm: Normal rate.   Pulmonary:      Effort: Pulmonary effort is normal.      Breath sounds: Normal breath sounds.   Skin:     General: Skin is warm and dry.   Psychiatric:         Behavior: Behavior normal.         Assessment:     1. Uncontrolled type 2 diabetes mellitus with hyperglycemia    2. Hypertension associated with diabetes    3. Hyperlipidemia associated with type 2 diabetes mellitus    4. Iron deficiency anemia due to chronic blood loss      Plan:   Uncontrolled type 2 diabetes mellitus with hyperglycemia  meds as below.  See dm clinic  -     Hemoglobin A1C; Future; Expected date: 08/04/2022    Hypertension associated with diabetes  controlled  Hyperlipidemia associated with type 2 diabetes mellitus  Cont statin  Iron deficiency anemia due to chronic blood loss  Stable. Seeing heme/onc    Other orders  -     pioglitazone (ACTOS) 15 MG tablet; Take 1 tablet (15 mg total) by mouth once daily.  Dispense: 90 tablet; Refill: 0  -     rosuvastatin (CRESTOR) 10 MG tablet; Take 1 tablet (10 mg total) by mouth once daily.  Dispense: 90 tablet; Refill: 1  -     hydroCHLOROthiazide (HYDRODIURIL) 25 MG tablet; Take 1 tablet (25 mg total) by mouth once daily.  Dispense: 90 tablet; Refill: 2        Lab Frequency Next Occurrence   Ambulatory referral/consult to Diabetic Advanced Practice Providers (Medical Management) Once 09/21/2021   Ambulatory referral/consult to Pharmacy Assistance Once  12/30/2021   Microalbumin/Creatinine Ratio, Urine Once 03/10/2022   COVID-19 Routine Screening Once 03/12/2022   Ambulatory referral/consult to Pharmacy Assistance Once 04/26/2022   Ambulatory referral/consult to Pharmacy Assistance Once 05/11/2022       Problem List Items Addressed This Visit     Uncontrolled type 2 diabetes mellitus with hyperglycemia, with long-term current use of insulin - Primary    Relevant Medications    pioglitazone (ACTOS) 15 MG tablet    Hypertension associated with diabetes    Relevant Medications    pioglitazone (ACTOS) 15 MG tablet    Hyperlipidemia associated with type 2 diabetes mellitus    Relevant Medications    pioglitazone (ACTOS) 15 MG tablet    Iron deficiency anemia due to chronic blood loss    Overview     Up-to-date with Hematology                 Follow up in about 6 months (around 2/4/2023), or if symptoms worsen or fail to improve.  Needs next appt with diabetes ROCHELLE.

## 2022-08-05 RX ORDER — EMPAGLIFLOZIN 25 MG/1
25 TABLET, FILM COATED ORAL DAILY
Qty: 90 TABLET | Refills: 3
Start: 2022-08-05 | End: 2023-10-17

## 2022-08-05 RX ORDER — INSULIN GLARGINE 100 [IU]/ML
35 INJECTION, SOLUTION SUBCUTANEOUS NIGHTLY
Qty: 15 ML | Refills: 5 | Status: SHIPPED | OUTPATIENT
Start: 2022-08-05 | End: 2022-09-07

## 2022-08-05 RX ORDER — PEN NEEDLE, DIABETIC 30 GX3/16"
NEEDLE, DISPOSABLE MISCELLANEOUS
Qty: 200 EACH | Refills: 5 | Status: SHIPPED | OUTPATIENT
Start: 2022-08-05 | End: 2023-10-17

## 2022-08-05 RX ORDER — INSULIN ASPART 100 [IU]/ML
5 INJECTION, SOLUTION INTRAVENOUS; SUBCUTANEOUS
Qty: 15 ML | Refills: 5 | Status: SHIPPED | OUTPATIENT
Start: 2022-08-05 | End: 2022-09-07

## 2022-08-05 RX ORDER — SEMAGLUTIDE 1.34 MG/ML
0.5 INJECTION, SOLUTION SUBCUTANEOUS
Qty: 1 PEN | Refills: 5
Start: 2022-08-05 | End: 2023-02-06

## 2022-08-05 NOTE — TELEPHONE ENCOUNTER
Informed pt of below advise. Informed pt to call in a few days with updated bgs. Informed pt new insulin was sent to [facility]. Made pt a appt with markus

## 2022-08-05 NOTE — TELEPHONE ENCOUNTER
Please let patient know I spoke with Irma Yin with pharm assistance and she will be reaching out to him- the message he sent doesn't make sense, because he's either approved or not approved to receive the medication for free through the drug company. In the meantime, he does need to start the Actos as ordered by PCP with increased Lantus of 30 units daily. Ultimately, if nothing else is affordable, he will need to start mealtime insulin. He must check blood sugars three times per day, before each main meal and send me an update on this. I am going to go ahead and send Novolog or Humalog to the pharmacy, whichever is ultimately preferred to take 5 units three times a day before each main meal if daytime blood sugars remain above 180. If fasting blood sugar despite taking 30 units of Lantus is > 130, I want him to increase Lantus to 35 units and send update on Monday. He can be scheduled with me in [facility] next week, next avail.

## 2022-08-23 ENCOUNTER — PATIENT OUTREACH (OUTPATIENT)
Dept: ADMINISTRATIVE | Facility: HOSPITAL | Age: 72
End: 2022-08-23
Payer: MEDICARE

## 2022-08-23 ENCOUNTER — TELEPHONE (OUTPATIENT)
Dept: PHARMACY | Facility: CLINIC | Age: 72
End: 2022-08-23
Payer: MEDICARE

## 2022-08-23 NOTE — PROGRESS NOTES
Maren Statin DM Report: Called and spoke with patient. He reported that he is on the rosuvastatin 10 mg daily. Last filled 8/4/22 per [facility] at [facility].

## 2022-09-07 ENCOUNTER — OFFICE VISIT (OUTPATIENT)
Dept: DIABETES | Facility: CLINIC | Age: 72
End: 2022-09-07
Payer: MEDICARE

## 2022-09-07 VITALS
DIASTOLIC BLOOD PRESSURE: 79 MMHG | SYSTOLIC BLOOD PRESSURE: 146 MMHG | HEIGHT: 69 IN | HEART RATE: 67 BPM | WEIGHT: 280.19 LBS | BODY MASS INDEX: 41.5 KG/M2

## 2022-09-07 DIAGNOSIS — E11.65 UNCONTROLLED TYPE 2 DIABETES MELLITUS WITH HYPERGLYCEMIA, WITH LONG-TERM CURRENT USE OF INSULIN: Primary | ICD-10-CM

## 2022-09-07 DIAGNOSIS — Z79.4 UNCONTROLLED TYPE 2 DIABETES MELLITUS WITH HYPERGLYCEMIA, WITH LONG-TERM CURRENT USE OF INSULIN: Primary | ICD-10-CM

## 2022-09-07 DIAGNOSIS — E66.01 MORBID OBESITY WITH BMI OF 40.0-44.9, ADULT: ICD-10-CM

## 2022-09-07 DIAGNOSIS — E78.5 HYPERLIPIDEMIA ASSOCIATED WITH TYPE 2 DIABETES MELLITUS: ICD-10-CM

## 2022-09-07 DIAGNOSIS — E11.69 HYPERLIPIDEMIA ASSOCIATED WITH TYPE 2 DIABETES MELLITUS: ICD-10-CM

## 2022-09-07 DIAGNOSIS — I15.2 HYPERTENSION ASSOCIATED WITH DIABETES: ICD-10-CM

## 2022-09-07 DIAGNOSIS — G62.9 NEUROPATHY: ICD-10-CM

## 2022-09-07 DIAGNOSIS — E11.59 HYPERTENSION ASSOCIATED WITH DIABETES: ICD-10-CM

## 2022-09-07 LAB — GLUCOSE SERPL-MCNC: 155 MG/DL (ref 70–110)

## 2022-09-07 PROCEDURE — 3072F LOW RISK FOR RETINOPATHY: CPT | Mod: CPTII,S$GLB,, | Performed by: NURSE PRACTITIONER

## 2022-09-07 PROCEDURE — 1101F PR PT FALLS ASSESS DOC 0-1 FALLS W/OUT INJ PAST YR: ICD-10-PCS | Mod: CPTII,S$GLB,, | Performed by: NURSE PRACTITIONER

## 2022-09-07 PROCEDURE — 1101F PT FALLS ASSESS-DOCD LE1/YR: CPT | Mod: CPTII,S$GLB,, | Performed by: NURSE PRACTITIONER

## 2022-09-07 PROCEDURE — 82962 GLUCOSE BLOOD TEST: CPT | Mod: S$GLB,,, | Performed by: NURSE PRACTITIONER

## 2022-09-07 PROCEDURE — 3046F PR MOST RECENT HEMOGLOBIN A1C LEVEL > 9.0%: ICD-10-PCS | Mod: CPTII,S$GLB,, | Performed by: NURSE PRACTITIONER

## 2022-09-07 PROCEDURE — 1159F PR MEDICATION LIST DOCUMENTED IN MEDICAL RECORD: ICD-10-PCS | Mod: CPTII,S$GLB,, | Performed by: NURSE PRACTITIONER

## 2022-09-07 PROCEDURE — 3077F PR MOST RECENT SYSTOLIC BLOOD PRESSURE >= 140 MM HG: ICD-10-PCS | Mod: CPTII,S$GLB,, | Performed by: NURSE PRACTITIONER

## 2022-09-07 PROCEDURE — 3078F DIAST BP <80 MM HG: CPT | Mod: CPTII,S$GLB,, | Performed by: NURSE PRACTITIONER

## 2022-09-07 PROCEDURE — 1159F MED LIST DOCD IN RCRD: CPT | Mod: CPTII,S$GLB,, | Performed by: NURSE PRACTITIONER

## 2022-09-07 PROCEDURE — 3288F PR FALLS RISK ASSESSMENT DOCUMENTED: ICD-10-PCS | Mod: CPTII,S$GLB,, | Performed by: NURSE PRACTITIONER

## 2022-09-07 PROCEDURE — 1125F AMNT PAIN NOTED PAIN PRSNT: CPT | Mod: CPTII,S$GLB,, | Performed by: NURSE PRACTITIONER

## 2022-09-07 PROCEDURE — 82962 POCT GLUCOSE, HAND-HELD DEVICE: ICD-10-PCS | Mod: S$GLB,,, | Performed by: NURSE PRACTITIONER

## 2022-09-07 PROCEDURE — 1160F PR REVIEW ALL MEDS BY PRESCRIBER/CLIN PHARMACIST DOCUMENTED: ICD-10-PCS | Mod: CPTII,S$GLB,, | Performed by: NURSE PRACTITIONER

## 2022-09-07 PROCEDURE — 99999 PR PBB SHADOW E&M-EST. PATIENT-LVL IV: CPT | Mod: PBBFAC,,, | Performed by: NURSE PRACTITIONER

## 2022-09-07 PROCEDURE — 99214 PR OFFICE/OUTPT VISIT, EST, LEVL IV, 30-39 MIN: ICD-10-PCS | Mod: S$GLB,,, | Performed by: NURSE PRACTITIONER

## 2022-09-07 PROCEDURE — 3078F PR MOST RECENT DIASTOLIC BLOOD PRESSURE < 80 MM HG: ICD-10-PCS | Mod: CPTII,S$GLB,, | Performed by: NURSE PRACTITIONER

## 2022-09-07 PROCEDURE — 3077F SYST BP >= 140 MM HG: CPT | Mod: CPTII,S$GLB,, | Performed by: NURSE PRACTITIONER

## 2022-09-07 PROCEDURE — 1160F RVW MEDS BY RX/DR IN RCRD: CPT | Mod: CPTII,S$GLB,, | Performed by: NURSE PRACTITIONER

## 2022-09-07 PROCEDURE — 1125F PR PAIN SEVERITY QUANTIFIED, PAIN PRESENT: ICD-10-PCS | Mod: CPTII,S$GLB,, | Performed by: NURSE PRACTITIONER

## 2022-09-07 PROCEDURE — 99999 PR PBB SHADOW E&M-EST. PATIENT-LVL IV: ICD-10-PCS | Mod: PBBFAC,,, | Performed by: NURSE PRACTITIONER

## 2022-09-07 PROCEDURE — 3008F BODY MASS INDEX DOCD: CPT | Mod: CPTII,S$GLB,, | Performed by: NURSE PRACTITIONER

## 2022-09-07 PROCEDURE — 3288F FALL RISK ASSESSMENT DOCD: CPT | Mod: CPTII,S$GLB,, | Performed by: NURSE PRACTITIONER

## 2022-09-07 PROCEDURE — 3008F PR BODY MASS INDEX (BMI) DOCUMENTED: ICD-10-PCS | Mod: CPTII,S$GLB,, | Performed by: NURSE PRACTITIONER

## 2022-09-07 PROCEDURE — 3046F HEMOGLOBIN A1C LEVEL >9.0%: CPT | Mod: CPTII,S$GLB,, | Performed by: NURSE PRACTITIONER

## 2022-09-07 PROCEDURE — 3072F PR LOW RISK FOR RETINOPATHY: ICD-10-PCS | Mod: CPTII,S$GLB,, | Performed by: NURSE PRACTITIONER

## 2022-09-07 PROCEDURE — 99214 OFFICE O/P EST MOD 30 MIN: CPT | Mod: S$GLB,,, | Performed by: NURSE PRACTITIONER

## 2022-09-07 RX ORDER — INSULIN ASPART 100 [IU]/ML
12 INJECTION, SOLUTION INTRAVENOUS; SUBCUTANEOUS
Qty: 15 ML | Refills: 5 | Status: SHIPPED | OUTPATIENT
Start: 2022-09-07 | End: 2023-03-10 | Stop reason: CLARIF

## 2022-09-07 RX ORDER — INSULIN GLARGINE 100 [IU]/ML
40 INJECTION, SOLUTION SUBCUTANEOUS NIGHTLY
Qty: 15 ML | Refills: 5 | Status: SHIPPED | OUTPATIENT
Start: 2022-09-07 | End: 2023-10-17 | Stop reason: SDUPTHER

## 2022-09-07 NOTE — PROGRESS NOTES
Subjective:         Patient ID: Anup Miller is a 72 y.o. male.  Patient's current PCP is Bryce Gonzales MD.     Chief Complaint: No chief complaint on file.    HPI  Anup Miller is a 72 y.o. Black or  male presenting for a follow up for diabetes. Patient has been diagnosed with type 2 diabetes since 2002 .    CURRENT DM MEDICATIONS:   Diabetes Medications               empagliflozin (JARDIANCE) 25 mg tablet Take 1 tablet (25 mg total) by mouth once daily. Not taking due to cost    glimepiride (AMARYL) 4 MG tablet Take 1 tablet (4 mg total) by mouth before breakfast.    LANTUS SOLOSTAR U-100 INSULIN glargine 100 units/mL SubQ pen Inject 35 Units into the skin every evening.    metFORMIN (GLUCOPHAGE XR) 500 MG ER 24hr tablet Take 1 tablet (500 mg total) by mouth 2 (two) times daily with meals.    NOVOLOG FLEXPEN U-100 INSULIN 100 unit/mL (3 mL) InPn pen Inject 5 Units into the skin 3 (three) times daily before meals. If the meal is skipped or is carbohydrate free, do not take the dose. Taking 10 units  - self-adjusted    pioglitazone (ACTOS) 15 MG tablet Take 1 tablet (15 mg total) by mouth once daily.    semaglutide (OZEMPIC) 0.25 mg or 0.5 mg(2 mg/1.5 mL) pen injector Inject 0.5 mg into the skin every 7 days. Not taking due to cost           Past failed treatment include:  Meds discontinued in the past due to cost only per patient report    Blood glucose testing is performed regularly. Patient is testing 3 times daily.    Meter: Reli On (Did not bring to appt today)  Preferred lab: [facility] when in town    Any episodes of hypoglycemia? Denies     Complications related to diabetes: peripheral neuropathy    His blood sugar in the clinic today was:   Lab Results   Component Value Date    POCGLU 155 (A) 09/07/2022     Anup Miller presents today for follow up visit to discuss diabetes management. No meter/logs today. Reports Bgs have been higher until he self adjusted the Novolog  from 5 to 10 units. Between visits, patient assistance mailed applications for Ozempic and Jardiance however he states he has not received yet- message sent to [employer]. Reports fasting Bgs are between 150s-200s. Higher Bgs associated with late supper. He is giving his last dose of Novolog at bedtime. We discussed MOA of long- vs short-acting insulins and importance of the timing of both. He verbalized understanding. Concerned with weight gain - we discussed higher insulin needs contributing. Stressed importance of portion control, low carb diet. Limited with exercise-ambulates with cane. Adding back Jardiance and Ozempic while lessening insulin needs should help. Denies chest pain or SOB.     He is limited with exercise- still ambulating with cane for ambulation following prolonged Covid hospitalization in 2020.    Current diet: Bfast-grits,eggs,degroot.  Water. Occasional sweet tea. Snacks-enjoys fruit, occasional crackers. Lunch-Often skipped. Supper-Salad with fried chicken.   Activity Level: Limited- ambulates with a cane    Lab Results   Component Value Date    HGBA1C 11.0 (H) 08/04/2022    HGBA1C 8.2 (H) 05/05/2022    HGBA1C 12.7 (H) 02/10/2022     STANDARDS OF CARE  Diabetes Management Status    Statin: Taking  ACE/ARB: Taking    Screening or Prevention Patient's value Goal Complete/Controlled?   HgA1C Testing and Control   Lab Results   Component Value Date    HGBA1C 11.0 (H) 08/04/2022      Annually/Less than 8% No     Lipid profile : 03/10/2022 Annually Yes     LDL control Lab Results   Component Value Date    LDLCALC 86.0 03/10/2022    Annually/Less than 100 mg/dl  Yes     Nephropathy screening Lab Results   Component Value Date    LABMICR 4.0 06/18/2018     Lab Results   Component Value Date    PROTEINUA Negative 10/23/2020     Lab Results   Component Value Date    UTPCR 0.07 09/27/2019      Annually No     Blood pressure BP Readings from Last 1 Encounters:   09/07/22 (!) 146/79    Less than 140/90  No     Dilated retinal exam : 07/06/2022 Annually Yes     Foot exam   : 05/16/2022 Annually Yes        Labs reviewed and are noted below.    Lab Results   Component Value Date    WBC 6.59 06/16/2022    HGB 14.1 06/16/2022    HCT 44.1 06/16/2022     06/16/2022    CHOL 171 03/10/2022    TRIG 160 (H) 03/10/2022    HDL 53 03/10/2022    LDLCALC 86.0 03/10/2022    ALT 80 (H) 06/16/2022    AST 48 (H) 06/16/2022     06/16/2022    K 4.7 06/16/2022    CL 98 06/16/2022    ANIONGAP 14 06/16/2022    CREATININE 1.5 (H) 06/16/2022    ESTGFRAFRICA 54 (A) 06/16/2022    EGFRNONAA 46 (A) 06/16/2022    BUN 19 06/16/2022    CO2 24 06/16/2022    TSH 1.432 03/10/2022    PSA 3.5 10/23/2020    INR 1.0 07/26/2020     (H) 06/16/2022    UTPCR 0.07 09/27/2019     Lab Results   Component Value Date    GLUTAMICACID 0.00 04/03/2017    CPEPTIDE 4.8 04/03/2017    FREET4 0.93 07/26/2020    TSH 1.432 03/10/2022    IRON 95 06/16/2022    TIBC 512 (H) 06/16/2022    FERRITIN 182 06/16/2022    MHLTREZS89 >2000 (H) 07/19/2021    CALCIUM 10.1 06/16/2022    PHOS 3.9 09/25/2020     Lab Results   Component Value Date    CPEPTIDE 4.8 04/03/2017     Lab Results   Component Value Date    GLUTAMICACID 0.00 04/03/2017     Glucose   Date Value Ref Range Status   06/16/2022 304 (H) 70 - 110 mg/dL Final     Anion Gap   Date Value Ref Range Status   06/16/2022 14 8 - 16 mmol/L Final     eGFR if    Date Value Ref Range Status   06/16/2022 54 (A) >60 mL/min/1.73 m^2 Final     eGFR if non    Date Value Ref Range Status   06/16/2022 46 (A) >60 mL/min/1.73 m^2 Final     Comment:     Calculation used to obtain the estimated glomerular filtration  rate (eGFR) is the CKD-EPI equation.          The following portions of the patient's history were reviewed and updated as appropriate: allergies, current medications, past family history, past medical history, past social history, past surgical history and problem list.    Review  of patient's allergies indicates:  No Known Allergies  Social History     Socioeconomic History    Marital status:      Spouse name: yoselin    Number of children: 1   Occupational History    Occupation:    Tobacco Use    Smoking status: Never    Smokeless tobacco: Never   Substance and Sexual Activity    Alcohol use: No    Drug use: No    Sexual activity: Yes     Partners: Female     Comment:    Social History Narrative    No smokers or pets in household.     Past Medical History:   Diagnosis Date    Acute respiratory failure due to COVID-19 08/2020    Colon polyp     COVID-19 virus infection 07/2020    COVID-19 virus infection 7/26/2020    Diabetes mellitus type II Diagnosed 2002    Elevated liver enzymes     Elevated PSA 12/13/2017    Did not f/u with urology as rec in 2015    Fatty liver disease, nonalcoholic     Hyperlipidemia     Hypertension     KHOI on CPAP     Sleep apnea      REVIEW OF SYSTEMS:  Eyes No history of DR.  Cardiovascular: History of hypertension and hyperlipidemia. Denies chest pain.  GI:   History of fatty liver disease. Previously tolerated Ozempic well.  :  No CKD.  Neuro:  Positive neuropathy.  PSYCH: No tobacco use.  ENDO: See HPI.        Objective:      Vitals:    09/07/22 0848   BP: (!) 146/79   Pulse: 67     RESPIRATORY: No respiratory distress  NEUROLOGIC: Cranial nerves II-XII grossly intact.   PSYCHIATRIC: Alert & oriented x3. Normal mood and affect.  FOOT EXAMINATION: UTD  Assessment:       1. Uncontrolled type 2 diabetes mellitus with hyperglycemia, with long-term current use of insulin    2. Neuropathy    3. Hyperlipidemia associated with type 2 diabetes mellitus    4. Hypertension associated with diabetes    5. Morbid obesity with BMI of 40.0-44.9, adult        Plan:   Diagnoses and all orders for this visit:    Uncontrolled type 2 diabetes mellitus with hyperglycemia, with long-term current use of insulin  -     POCT Glucose, Hand-Held Device  -     LANTUS  "SOLOSTAR U-100 INSULIN glargine 100 units/mL SubQ pen; Inject 40 Units into the skin every evening.  -     NOVOLOG FLEXPEN U-100 INSULIN 100 unit/mL (3 mL) InPn pen; Inject 12 Units into the skin 3 (three) times daily before meals. If the meal is skipped or is carbohydrate free, do not take the dose.    Chronic- no Bgs to review today. Insulin adjusted based on patient's report of Bgs. Message sent to [employer] to check on pharmacy assistance applications-patient stating he has not received.    Trying to find DME provider for Moni with current insurance.    -- Medications adjusted for today's visit include:    Increase Lantus 40 units once a day. Take at the same time every night.     Increase Novolog 12 units before each main meal - try to use this insulin 10 minutes before you eat. Do not take any Novolog at bedtime.     Continue Metformin 500 mg twice a day, with a meal.    Continue Glimepiride and Actos.    Neuropathy-Chronic    -On Gabapentin.    Hypertension associated with diabetes-Chronic,stable    -BP is near goal. Continue current medications.    Hyperlipidemia associated with type 2 diabetes mellitus-Chronic    -Continue Lipitor.    Morbid obesity with BMI of 40.0-44.9, adult    -Worsening. Patient is unable to exercise. Likely due to addition of Actos with high insulin needs - currently unable to afford Jardiance and Ozempic. He has not received patient assistance applications yet- message sent to [employer]. Stressed importance of low carb, heart healthy diet.     - Follow up: 2 months    Portions of this note may have been created with voice recognition software. Occasional "wrong-word" or "sound-a-like" substitutions may have occurred due to the inherent limitations of voice recognition software. Please, read the note carefully and recognize, using context, where substitutions have occurred.            Sheri Ammons,FNP-C [facility]         "

## 2022-09-07 NOTE — PATIENT INSTRUCTIONS
-- Medications adjusted for today's visit include:    Increase Lantus 40 units once a day. Take at the same time every night.     Increase Novolog 12 units before each main meal - try to use this insulin 10 minutes before you eat. Do not take any Novolog at bedtime.     Continue Metformin 500 mg twice a day, with a meal.    Continue Glimepiride and Actos.    We will discuss adding back Jardiance and Ozempic if affordable- I have sent a message to pharmacy assistance for this.     -- Limit carbs to no more than 45 grams with each meal. Never eat carbs by themselves, always add protein. Make snacks low carb or non-carb only.    -- Start checking blood sugar 3 times daily: Fasting blood sugar and vary your next 3 readings: Before lunch, before supper, 2 hours after any meal, or bedtime.     -Blood sugar goals should be a fasting blood sugar between , and no blood sugars throughout the day over 180 is good, less than 160 better.    --Carry glucose tablets/soft peppermints/regular juice or Coke product with you at all times to treat a low blood sugar episode (less than 70). If your blood sugar is between 50-70, Chew 4 tablets or drink 1/2 cup of juice or regular Coke product. If your blood sugar is below 50, double the treatment. Re-check blood sugar in 15 minutes. If still low, repeat this. Always call the clinic to give an update for any low blood sugar episodes.    --Exercise as tolerated.    --Follow-up for your next visit in 8 weeks.     --Please either drop off, fax, or MyChart your readings to me as needed.

## 2022-09-14 ENCOUNTER — OFFICE VISIT (OUTPATIENT)
Dept: PODIATRY | Facility: CLINIC | Age: 72
End: 2022-09-14
Payer: MEDICARE

## 2022-09-14 VITALS — HEIGHT: 69 IN | BODY MASS INDEX: 41.5 KG/M2 | WEIGHT: 280.19 LBS

## 2022-09-14 DIAGNOSIS — R29.898 MUSCULAR DECONDITIONING: ICD-10-CM

## 2022-09-14 DIAGNOSIS — M10.9 ACUTE GOUT, UNSPECIFIED CAUSE, UNSPECIFIED SITE: Primary | ICD-10-CM

## 2022-09-14 DIAGNOSIS — G62.9 NEUROPATHY: ICD-10-CM

## 2022-09-14 PROCEDURE — 3046F HEMOGLOBIN A1C LEVEL >9.0%: CPT | Mod: CPTII,S$GLB,, | Performed by: PODIATRIST

## 2022-09-14 PROCEDURE — 1101F PT FALLS ASSESS-DOCD LE1/YR: CPT | Mod: CPTII,S$GLB,, | Performed by: PODIATRIST

## 2022-09-14 PROCEDURE — 99999 PR PBB SHADOW E&M-EST. PATIENT-LVL III: ICD-10-PCS | Mod: PBBFAC,,, | Performed by: PODIATRIST

## 2022-09-14 PROCEDURE — 1159F MED LIST DOCD IN RCRD: CPT | Mod: CPTII,S$GLB,, | Performed by: PODIATRIST

## 2022-09-14 PROCEDURE — 1159F PR MEDICATION LIST DOCUMENTED IN MEDICAL RECORD: ICD-10-PCS | Mod: CPTII,S$GLB,, | Performed by: PODIATRIST

## 2022-09-14 PROCEDURE — 1160F PR REVIEW ALL MEDS BY PRESCRIBER/CLIN PHARMACIST DOCUMENTED: ICD-10-PCS | Mod: CPTII,S$GLB,, | Performed by: PODIATRIST

## 2022-09-14 PROCEDURE — 3288F FALL RISK ASSESSMENT DOCD: CPT | Mod: CPTII,S$GLB,, | Performed by: PODIATRIST

## 2022-09-14 PROCEDURE — 3008F PR BODY MASS INDEX (BMI) DOCUMENTED: ICD-10-PCS | Mod: CPTII,S$GLB,, | Performed by: PODIATRIST

## 2022-09-14 PROCEDURE — 1160F RVW MEDS BY RX/DR IN RCRD: CPT | Mod: CPTII,S$GLB,, | Performed by: PODIATRIST

## 2022-09-14 PROCEDURE — 3288F PR FALLS RISK ASSESSMENT DOCUMENTED: ICD-10-PCS | Mod: CPTII,S$GLB,, | Performed by: PODIATRIST

## 2022-09-14 PROCEDURE — 3046F PR MOST RECENT HEMOGLOBIN A1C LEVEL > 9.0%: ICD-10-PCS | Mod: CPTII,S$GLB,, | Performed by: PODIATRIST

## 2022-09-14 PROCEDURE — 99213 OFFICE O/P EST LOW 20 MIN: CPT | Mod: S$GLB,,, | Performed by: PODIATRIST

## 2022-09-14 PROCEDURE — 99213 PR OFFICE/OUTPT VISIT, EST, LEVL III, 20-29 MIN: ICD-10-PCS | Mod: S$GLB,,, | Performed by: PODIATRIST

## 2022-09-14 PROCEDURE — 99999 PR PBB SHADOW E&M-EST. PATIENT-LVL III: CPT | Mod: PBBFAC,,, | Performed by: PODIATRIST

## 2022-09-14 PROCEDURE — 1125F AMNT PAIN NOTED PAIN PRSNT: CPT | Mod: CPTII,S$GLB,, | Performed by: PODIATRIST

## 2022-09-14 PROCEDURE — 3008F BODY MASS INDEX DOCD: CPT | Mod: CPTII,S$GLB,, | Performed by: PODIATRIST

## 2022-09-14 PROCEDURE — 3072F PR LOW RISK FOR RETINOPATHY: ICD-10-PCS | Mod: CPTII,S$GLB,, | Performed by: PODIATRIST

## 2022-09-14 PROCEDURE — 1101F PR PT FALLS ASSESS DOC 0-1 FALLS W/OUT INJ PAST YR: ICD-10-PCS | Mod: CPTII,S$GLB,, | Performed by: PODIATRIST

## 2022-09-14 PROCEDURE — 3072F LOW RISK FOR RETINOPATHY: CPT | Mod: CPTII,S$GLB,, | Performed by: PODIATRIST

## 2022-09-14 PROCEDURE — 1125F PR PAIN SEVERITY QUANTIFIED, PAIN PRESENT: ICD-10-PCS | Mod: CPTII,S$GLB,, | Performed by: PODIATRIST

## 2022-09-14 RX ORDER — LIDOCAINE HYDROCHLORIDE 20 MG/ML
JELLY TOPICAL DAILY
Qty: 30 ML | Refills: 3 | Status: SHIPPED | OUTPATIENT
Start: 2022-09-14 | End: 2022-11-29 | Stop reason: SDUPTHER

## 2022-09-14 RX ORDER — PREGABALIN 100 MG/1
100 CAPSULE ORAL 2 TIMES DAILY
Qty: 60 CAPSULE | Refills: 1 | Status: SHIPPED | OUTPATIENT
Start: 2022-09-14 | End: 2022-11-29 | Stop reason: SDUPTHER

## 2022-09-19 ENCOUNTER — LAB VISIT (OUTPATIENT)
Dept: LAB | Facility: HOSPITAL | Age: 72
End: 2022-09-19
Attending: PODIATRIST
Payer: MEDICARE

## 2022-09-19 DIAGNOSIS — M10.9 ACUTE GOUT, UNSPECIFIED CAUSE, UNSPECIFIED SITE: ICD-10-CM

## 2022-09-19 LAB
CRP SERPL-MCNC: 1.8 MG/L (ref 0–8.2)
ERYTHROCYTE [SEDIMENTATION RATE] IN BLOOD BY WESTERGREN METHOD: 29 MM/HR (ref 0–10)
URATE SERPL-MCNC: 6.7 MG/DL (ref 3.4–7)

## 2022-09-19 PROCEDURE — 86140 C-REACTIVE PROTEIN: CPT | Performed by: PODIATRIST

## 2022-09-19 PROCEDURE — 36415 COLL VENOUS BLD VENIPUNCTURE: CPT | Mod: PO | Performed by: PODIATRIST

## 2022-09-19 PROCEDURE — 85651 RBC SED RATE NONAUTOMATED: CPT | Performed by: PODIATRIST

## 2022-09-19 PROCEDURE — 84550 ASSAY OF BLOOD/URIC ACID: CPT | Performed by: PODIATRIST

## 2022-09-25 NOTE — PROGRESS NOTES
Subjective:     Patient ID: Anup Miller is a 72 y.o. male.    Chief Complaint: Nail Care (Pt c/o BL foot pain 5/10, Diabetic pt wears tennis, PCP Dr. Gonzales last seen 8-4-22)    Anup is a 72 y.o. male who presents to the clinic for evaluation and treatment of high risk feet. Anup has a past medical history of Acute respiratory failure due to COVID-19 (08/2020), Colon polyp, COVID-19 virus infection (07/2020), COVID-19 virus infection (7/26/2020), Diabetes mellitus type II (Diagnosed 2002), Elevated liver enzymes, Elevated PSA (12/13/2017), Fatty liver disease, nonalcoholic, Hyperlipidemia, Hypertension, KHOI on CPAP, and Sleep apnea. The patient's chief complaint is bilateral foot pain. Patient states pain is pins/needles, electric, burning, and hot feeling. Patient states pain is constant. Patient rates pain  5/10(left) and 3/10(right). Patient states he started wearing AFO's bilateral after COVID 19 infection in 7/2020. Patient states he is taking Gabapentin as prescribed but only sees a little difference. This patient has documented high risk feet requiring routine maintenance secondary to diabetes mellitis and those secondary complications of diabetes, as mentioned..    PCP: Bryce Gonzales MD    Date Last Seen by PCP: 08/04/2022    Current shoe gear:  Affected Foot: tennis shoes     Unaffected Foot: tennis shoes    Hemoglobin A1C   Date Value Ref Range Status   08/04/2022 11.0 (H) 4.0 - 5.6 % Final     Comment:     ADA Screening Guidelines:  5.7-6.4%  Consistent with prediabetes  >or=6.5%  Consistent with diabetes    High levels of fetal hemoglobin interfere with the HbA1C  assay. Heterozygous hemoglobin variants (HbS, HgC, etc)do  not significantly interfere with this assay.   However, presence of multiple variants may affect accuracy.     05/05/2022 8.2 (H) 4.0 - 5.6 % Final     Comment:     ADA Screening Guidelines:  5.7-6.4%  Consistent with prediabetes  >or=6.5%  Consistent with diabetes    High  levels of fetal hemoglobin interfere with the HbA1C  assay. Heterozygous hemoglobin variants (HbS, HgC, etc)do  not significantly interfere with this assay.   However, presence of multiple variants may affect accuracy.     02/10/2022 12.7 (H) 4.0 - 5.6 % Final     Comment:     ADA Screening Guidelines:  5.7-6.4%  Consistent with prediabetes  >or=6.5%  Consistent with diabetes    High levels of fetal hemoglobin interfere with the HbA1C  assay. Heterozygous hemoglobin variants (HbS, HgC, etc)do  not significantly interfere with this assay.   However, presence of multiple variants may affect accuracy.         Patient Active Problem List   Diagnosis    Uncontrolled type 2 diabetes mellitus with hyperglycemia, with long-term current use of insulin    Hypertension associated with diabetes    Hyperlipidemia associated with type 2 diabetes mellitus    Multiple pulmonary nodules determined by computed tomography of lung    KHOI (obstructive sleep apnea)    Hx of colonic polyp    Cardiac arrhythmia    Pneumonia due to COVID-19 virus    Oral phase dysphagia    Fever    History of 2019 novel coronavirus disease (COVID-19)    Malnutrition of moderate degree    Severe obesity (BMI 35.0-35.9 with comorbidity)    Debility    Neuropathy    Muscular deconditioning    History of DVT (deep vein thrombosis)    Iron deficiency anemia due to chronic blood loss    Acute deep vein thrombosis (DVT) of other vein of lower extremity, unspecified laterality    Chronic kidney disease, stage 3a       Medication List with Changes/Refills   New Medications    PREGABALIN (LYRICA) 100 MG CAPSULE    Take 1 capsule (100 mg total) by mouth 2 (two) times daily.   Current Medications    ASPIRIN 81 MG CAP    Take by mouth.    EMPAGLIFLOZIN (JARDIANCE) 25 MG TABLET    Take 1 tablet (25 mg total) by mouth once daily.    FLASH GLUCOSE SENSOR (FREESTYLE DERRELL 2 SENSOR) KIT    Inject 1 each into the skin every 14 (fourteen) days.    FREESTYLE DERRELL 2 READER [facility] [place] [place]  "   Use to monitor blood sugar consistently.    GLIMEPIRIDE (AMARYL) 4 MG TABLET    Take 1 tablet (4 mg total) by mouth before breakfast.    HYDROCHLOROTHIAZIDE (HYDRODIURIL) 25 MG TABLET    Take 1 tablet (25 mg total) by mouth once daily.    LANTUS SOLOSTAR U-100 INSULIN GLARGINE 100 UNITS/ML SUBQ PEN    Inject 40 Units into the skin every evening.    LOSARTAN (COZAAR) 100 MG TABLET    Take 100 mg by mouth once daily.    METFORMIN (GLUCOPHAGE XR) 500 MG ER 24HR TABLET    Take 1 tablet (500 mg total) by mouth 2 (two) times daily with meals.    METOPROLOL TARTRATE (LOPRESSOR) 50 MG TABLET    TK 1 T PO BID    NOVOLOG FLEXPEN U-100 INSULIN 100 UNIT/ML (3 ML) INPN PEN    Inject 12 Units into the skin 3 (three) times daily before meals. If the meal is skipped or is carbohydrate free, do not take the dose.    PANTOPRAZOLE (PROTONIX) 40 MG TABLET    TK 1 T PO D    PEN NEEDLE, DIABETIC (BD ULTRA-FINE TATYANA PEN NEEDLE) 32 GAUGE X 5/32" NDLE    Use with Lantus daily and Novolog 3 times daily    PIOGLITAZONE (ACTOS) 15 MG TABLET    Take 1 tablet (15 mg total) by mouth once daily.    ROSUVASTATIN (CRESTOR) 10 MG TABLET    Take 1 tablet (10 mg total) by mouth once daily.    SEMAGLUTIDE (OZEMPIC) 0.25 MG OR 0.5 MG(2 MG/1.5 ML) PEN INJECTOR    Inject 0.5 mg into the skin every 7 days.    ZINC SULFATE (ZINCATE) 220 (50) MG CAPSULE    Take 220 mg by mouth once daily.   Changed and/or Refilled Medications    Modified Medication Previous Medication    LIDOCAINE HCL 2% (XYLOCAINE) 2 % JELLY LIDOcaine HCL 2% (XYLOCAINE) 2 % jelly       Apply topically once daily.    Apply topically once daily.   Discontinued Medications    GABAPENTIN (NEURONTIN) 600 MG TABLET    Take 1 tablet by mouth 4 times daily    GABAPENTIN (NEURONTIN) 800 MG TABLET    Take 1 tablet (800 mg total) by mouth every evening.       Review of patient's allergies indicates:  No Known Allergies    Past Surgical History:   Procedure Laterality Date    BACK SURGERY      " "CHOLECYSTECTOMY  2013    COLONOSCOPY N/A 7/19/2018    Procedure: COLONOSCOPY;  Surgeon: Chacorta Lopez III, MD;  Location: [facility] ENDO;  Service: Endoscopy;  Laterality: N/A;    COLONOSCOPY N/A 3/15/2022    Procedure: COLONOSCOPY;  Surgeon: Jessie Diallo MD;  Location: [facility] ENDO;  Service: Endoscopy;  Laterality: N/A;    ESOPHAGOGASTRODUODENOSCOPY N/A 8/25/2020    Procedure: EGD (ESOPHAGOGASTRODUODENOSCOPY);  Surgeon: Anup Green MD;  Location: [facility] OR [address] [place] [address];  Service: General;  Laterality: N/A;    TRACHEOSTOMY  08/2020    Temporarily post COVID infection.       Family History   Problem Relation Age of Onset    Asthma Mother     Diabetes Father     Cancer Sister         Breast    Diabetes Paternal Aunt     Cancer Maternal Grandmother         Breast    Diabetes Maternal Grandmother     Diabetes Paternal Uncle     Colon cancer Neg Hx        Social History     Socioeconomic History    Marital status:      Spouse name: yoselin    Number of children: 1   Occupational History    Occupation:    Tobacco Use    Smoking status: Never    Smokeless tobacco: Never   Substance and Sexual Activity    Alcohol use: No    Drug use: No    Sexual activity: Yes     Partners: Female     Comment:    Social History Narrative    No smokers or pets in household.       Vitals:    09/14/22 0820   Weight: 127.1 kg (280 lb 3.3 oz)   Height: 5' 9" (1.753 m)   PainSc:   5       Hemoglobin A1C   Date Value Ref Range Status   08/04/2022 11.0 (H) 4.0 - 5.6 % Final     Comment:     ADA Screening Guidelines:  5.7-6.4%  Consistent with prediabetes  >or=6.5%  Consistent with diabetes    High levels of fetal hemoglobin interfere with the HbA1C  assay. Heterozygous hemoglobin variants (HbS, HgC, etc)do  not significantly interfere with this assay.   However, presence of multiple variants may affect accuracy.     05/05/2022 8.2 (H) 4.0 - 5.6 % Final     Comment:     ADA Screening Guidelines:  5.7-6.4%  Consistent with " prediabetes  >or=6.5%  Consistent with diabetes    High levels of fetal hemoglobin interfere with the HbA1C  assay. Heterozygous hemoglobin variants (HbS, HgC, etc)do  not significantly interfere with this assay.   However, presence of multiple variants may affect accuracy.     02/10/2022 12.7 (H) 4.0 - 5.6 % Final     Comment:     ADA Screening Guidelines:  5.7-6.4%  Consistent with prediabetes  >or=6.5%  Consistent with diabetes    High levels of fetal hemoglobin interfere with the HbA1C  assay. Heterozygous hemoglobin variants (HbS, HgC, etc)do  not significantly interfere with this assay.   However, presence of multiple variants may affect accuracy.         Review of Systems   Constitutional:  Negative for chills and fever.   Respiratory:  Negative for shortness of breath.    Cardiovascular:  Negative for chest pain, palpitations, orthopnea, claudication and leg swelling.   Gastrointestinal:  Negative for diarrhea, nausea and vomiting.   Musculoskeletal:  Negative for joint pain.   Skin:  Negative for rash.   Neurological:  Positive for tingling and sensory change.   Psychiatric/Behavioral: Negative.         Objective:      PHYSICAL EXAM: Apperance: Alert and orient in no distress,well developed, and with good attention to grooming and body habits  Patient presents ambulating in tennis shoes with AFO  LOWER EXTREMITY EXAM:  VASCULAR: Dorsalis pedis pulses 2/4 bilateral and Posterior Tibial pulses 2/4 bilateral. Capillary fill time <4 seconds bilateral. Mild edema observed bilateral. Varicosities absent bilateral. Skin temperature of the lower extremities is warm to warm, proximal to distal. Hair growth dim bilateral.  DERMATOLOGICAL: No skin rashes, subcutaneous nodules, lesions, or ulcers observed bilateral. Webspaces 1,2,3,4 bilateral clean, dry and without evidence of break in skin integrity.   NEUROLOGICAL: Light touch, sharp-dull, proprioception all present and equal bilaterally.  Vibratory sensation  diminished at bilateral hallux and navicular. Protective sensation absent at 8/10 sites as tested with a [place] 5.07 monofilament.   MUSCULOSKELETAL: Muscle strength is 3/5 for foot inverters, everters, plantarflexors, and dorsiflexors. Muscle tone is normal.         Assessment:       ICD-10-CM ICD-9-CM   1. Acute gout, unspecified cause, unspecified site  M10.9 274.01   2. Neuropathy  G62.9 355.9   3. Muscular deconditioning  R29.898 781.99       Plan:   Acute gout, unspecified cause, unspecified site  -     Sedimentation rate; Future; Expected date: 09/14/2022  -     C-Reactive Protein; Future; Expected date: 09/14/2022  -     Uric Acid; Future; Expected date: 09/14/2022    Neuropathy  -     LIDOcaine HCL 2% (XYLOCAINE) 2 % jelly; Apply topically once daily.  Dispense: 30 mL; Refill: 3  -     pregabalin (LYRICA) 100 MG capsule; Take 1 capsule (100 mg total) by mouth 2 (two) times daily.  Dispense: 60 capsule; Refill: 1    Muscular deconditioning  -     LIDOcaine HCL 2% (XYLOCAINE) 2 % jelly; Apply topically once daily.  Dispense: 30 mL; Refill: 3  -     pregabalin (LYRICA) 100 MG capsule; Take 1 capsule (100 mg total) by mouth 2 (two) times daily.  Dispense: 60 capsule; Refill: 1    I counseled the patient on his conditions, regarding findings of my examination, my impressions, and usual treatment plan.   Greater than 15 minutes of a 20 minute appointment spent on education about the diabetic foot, neuropathy, and prevention of limb loss.  Shoe inspection. Diabetic Foot Education. Patient reminded of the importance of good nutrition and blood sugar control to help prevent podiatric complications of diabetes. Patient instructed on proper foot hygeine. We discussed wearing proper shoe gear, daily foot inspections, never walking without protective shoe gear, never putting sharp instruments to feet.    Discussed with patient treatments for neuropathy consisting of topical or oral medication.  Prescription  written for Lyrica 100mg to be taken once nightly. Informed patient of possible side effects including but not limited to disorientation and drowsiness. Patient instructed to discontinue use if there are any adverse effects. Patient states he understands.   Prescription written for Lidocaine jelly to be applied as needed for pain.   Ordered uric acid, CRP, ESR.  Patient  will continue to monitor the areas daily, inspect feet, wear protective shoe gear when ambulatory, moisturizer to maintain skin integrity. Patient reminded of the importance of good nutrition and blood sugar control to help prevent podiatric complications of diabetes.  Patient to return 2 months or sooner if needed.        Katy Fernandes DPM  [facility]

## 2022-10-21 ENCOUNTER — PATIENT OUTREACH (OUTPATIENT)
Dept: ADMINISTRATIVE | Facility: HOSPITAL | Age: 72
End: 2022-10-21
Payer: MEDICARE

## 2022-10-21 NOTE — PROGRESS NOTES
Working HTN report:     Called to discuss scheduling appointment and to get updated BP reading. No answer, LVM

## 2022-11-29 ENCOUNTER — PATIENT MESSAGE (OUTPATIENT)
Dept: PODIATRY | Facility: CLINIC | Age: 72
End: 2022-11-29
Payer: MEDICARE

## 2022-12-07 DIAGNOSIS — E11.9 TYPE 2 DIABETES MELLITUS WITHOUT COMPLICATION: ICD-10-CM

## 2022-12-16 ENCOUNTER — OFFICE VISIT (OUTPATIENT)
Dept: PODIATRY | Facility: CLINIC | Age: 72
End: 2022-12-16
Payer: MEDICARE

## 2022-12-16 VITALS — BODY MASS INDEX: 41.5 KG/M2 | HEIGHT: 69 IN | WEIGHT: 280.19 LBS

## 2022-12-16 DIAGNOSIS — G62.9 NEUROPATHY: ICD-10-CM

## 2022-12-16 DIAGNOSIS — R29.898 MUSCULAR DECONDITIONING: ICD-10-CM

## 2022-12-16 DIAGNOSIS — B35.1 DERMATOPHYTOSIS OF NAIL: ICD-10-CM

## 2022-12-16 DIAGNOSIS — E11.65 UNCONTROLLED TYPE 2 DIABETES MELLITUS WITH HYPERGLYCEMIA: Primary | ICD-10-CM

## 2022-12-16 PROCEDURE — 1159F PR MEDICATION LIST DOCUMENTED IN MEDICAL RECORD: ICD-10-PCS | Mod: CPTII,S$GLB,, | Performed by: PODIATRIST

## 2022-12-16 PROCEDURE — 1101F PR PT FALLS ASSESS DOC 0-1 FALLS W/OUT INJ PAST YR: ICD-10-PCS | Mod: CPTII,S$GLB,, | Performed by: PODIATRIST

## 2022-12-16 PROCEDURE — 99999 PR PBB SHADOW E&M-EST. PATIENT-LVL III: CPT | Mod: PBBFAC,,, | Performed by: PODIATRIST

## 2022-12-16 PROCEDURE — 1101F PT FALLS ASSESS-DOCD LE1/YR: CPT | Mod: CPTII,S$GLB,, | Performed by: PODIATRIST

## 2022-12-16 PROCEDURE — 11721 DEBRIDE NAIL 6 OR MORE: CPT | Mod: Q9,S$GLB,, | Performed by: PODIATRIST

## 2022-12-16 PROCEDURE — 1159F MED LIST DOCD IN RCRD: CPT | Mod: CPTII,S$GLB,, | Performed by: PODIATRIST

## 2022-12-16 PROCEDURE — 99213 PR OFFICE/OUTPT VISIT, EST, LEVL III, 20-29 MIN: ICD-10-PCS | Mod: 25,S$GLB,, | Performed by: PODIATRIST

## 2022-12-16 PROCEDURE — 3008F PR BODY MASS INDEX (BMI) DOCUMENTED: ICD-10-PCS | Mod: CPTII,S$GLB,, | Performed by: PODIATRIST

## 2022-12-16 PROCEDURE — 99999 PR PBB SHADOW E&M-EST. PATIENT-LVL III: ICD-10-PCS | Mod: PBBFAC,,, | Performed by: PODIATRIST

## 2022-12-16 PROCEDURE — 1126F AMNT PAIN NOTED NONE PRSNT: CPT | Mod: CPTII,S$GLB,, | Performed by: PODIATRIST

## 2022-12-16 PROCEDURE — 1160F PR REVIEW ALL MEDS BY PRESCRIBER/CLIN PHARMACIST DOCUMENTED: ICD-10-PCS | Mod: CPTII,S$GLB,, | Performed by: PODIATRIST

## 2022-12-16 PROCEDURE — 1126F PR PAIN SEVERITY QUANTIFIED, NO PAIN PRESENT: ICD-10-PCS | Mod: CPTII,S$GLB,, | Performed by: PODIATRIST

## 2022-12-16 PROCEDURE — 3008F BODY MASS INDEX DOCD: CPT | Mod: CPTII,S$GLB,, | Performed by: PODIATRIST

## 2022-12-16 PROCEDURE — 99213 OFFICE O/P EST LOW 20 MIN: CPT | Mod: 25,S$GLB,, | Performed by: PODIATRIST

## 2022-12-16 PROCEDURE — 11721 PR DEBRIDEMENT OF NAILS, 6 OR MORE: ICD-10-PCS | Mod: Q9,S$GLB,, | Performed by: PODIATRIST

## 2022-12-16 PROCEDURE — 3288F FALL RISK ASSESSMENT DOCD: CPT | Mod: CPTII,S$GLB,, | Performed by: PODIATRIST

## 2022-12-16 PROCEDURE — 1160F RVW MEDS BY RX/DR IN RCRD: CPT | Mod: CPTII,S$GLB,, | Performed by: PODIATRIST

## 2022-12-16 PROCEDURE — 3288F PR FALLS RISK ASSESSMENT DOCUMENTED: ICD-10-PCS | Mod: CPTII,S$GLB,, | Performed by: PODIATRIST

## 2022-12-16 RX ORDER — HYDROCODONE BITARTRATE AND ACETAMINOPHEN 10; 325 MG/1; MG/1
1 TABLET ORAL EVERY 6 HOURS PRN
Qty: 30 TABLET | Refills: 0 | Status: SHIPPED | OUTPATIENT
Start: 2022-12-16 | End: 2024-03-04 | Stop reason: SDUPTHER

## 2022-12-16 RX ORDER — PREGABALIN 100 MG/1
100 CAPSULE ORAL 2 TIMES DAILY
Qty: 60 CAPSULE | Refills: 1 | Status: SHIPPED | OUTPATIENT
Start: 2022-12-16 | End: 2023-01-30 | Stop reason: SDUPTHER

## 2023-01-08 NOTE — PROGRESS NOTES
Subjective:     Patient ID: Anup Miller is a 72 y.o. male.    Chief Complaint: Nail Care (Bilateral foot pain, rates pain 5/10, swelling, Diabetic pt, wears slipper with socks, PCP Dr. Gonzales last seen 8-4-22)      Anup is a 72 y.o. male who presents to the clinic for evaluation and treatment of high risk feet. Anup has a past medical history of Acute respiratory failure due to COVID-19 (08/2020), Colon polyp, COVID-19 virus infection (07/2020), COVID-19 virus infection (7/26/2020), Diabetes mellitus type II (Diagnosed 2002), Elevated liver enzymes, Elevated PSA (12/13/2017), Fatty liver disease, nonalcoholic, Hyperlipidemia, Hypertension, KHOI on CPAP, and Sleep apnea. The patient's chief complaint is bilateral foot pain. Patient states pain is pins/needles, electric, burning, and hot feeling. Patient states pain is constant. Patient rates pain  5/10(left) and 3/10(right). Patient states he is taking Gabapentin as prescribed but only sees a little difference. This patient has documented high risk feet requiring routine maintenance secondary to diabetes mellitis and those secondary complications of diabetes, as mentioned..    PCP: Bryce Gonzales MD    Date Last Seen by PCP: 08/04/2022    Current shoe gear:  Affected Foot: tennis shoes     Unaffected Foot: tennis shoes    Hemoglobin A1C   Date Value Ref Range Status   08/04/2022 11.0 (H) 4.0 - 5.6 % Final     Comment:     ADA Screening Guidelines:  5.7-6.4%  Consistent with prediabetes  >or=6.5%  Consistent with diabetes    High levels of fetal hemoglobin interfere with the HbA1C  assay. Heterozygous hemoglobin variants (HbS, HgC, etc)do  not significantly interfere with this assay.   However, presence of multiple variants may affect accuracy.     05/05/2022 8.2 (H) 4.0 - 5.6 % Final     Comment:     ADA Screening Guidelines:  5.7-6.4%  Consistent with prediabetes  >or=6.5%  Consistent with diabetes    High levels of fetal hemoglobin interfere with the  HbA1C  assay. Heterozygous hemoglobin variants (HbS, HgC, etc)do  not significantly interfere with this assay.   However, presence of multiple variants may affect accuracy.     02/10/2022 12.7 (H) 4.0 - 5.6 % Final     Comment:     ADA Screening Guidelines:  5.7-6.4%  Consistent with prediabetes  >or=6.5%  Consistent with diabetes    High levels of fetal hemoglobin interfere with the HbA1C  assay. Heterozygous hemoglobin variants (HbS, HgC, etc)do  not significantly interfere with this assay.   However, presence of multiple variants may affect accuracy.         Patient Active Problem List   Diagnosis    Uncontrolled type 2 diabetes mellitus with hyperglycemia, with long-term current use of insulin    Hypertension associated with diabetes    Hyperlipidemia associated with type 2 diabetes mellitus    Multiple pulmonary nodules determined by computed tomography of lung    KHOI (obstructive sleep apnea)    Hx of colonic polyp    Cardiac arrhythmia    Pneumonia due to COVID-19 virus    Oral phase dysphagia    Fever    History of 2019 novel coronavirus disease (COVID-19)    Malnutrition of moderate degree    Severe obesity (BMI 35.0-35.9 with comorbidity)    Debility    Neuropathy    Muscular deconditioning    History of DVT (deep vein thrombosis)    Iron deficiency anemia due to chronic blood loss    Acute deep vein thrombosis (DVT) of other vein of lower extremity, unspecified laterality    Chronic kidney disease, stage 3a       Medication List with Changes/Refills   New Medications    HYDROCODONE-ACETAMINOPHEN (NORCO)  MG PER TABLET    Take 1 tablet by mouth every 6 (six) hours as needed for Pain.   Current Medications    ASPIRIN 81 MG CAP    Take by mouth.    EMPAGLIFLOZIN (JARDIANCE) 25 MG TABLET    Take 1 tablet (25 mg total) by mouth once daily.    FLASH GLUCOSE SENSOR (FREESTYLE DERRELL 2 SENSOR) KIT    Inject 1 each into the skin every 14 (fourteen) days.    FREESTYLE DERRELL 2 READER MISC    Use to monitor  "blood sugar consistently.    GLIMEPIRIDE (AMARYL) 4 MG TABLET    Take 1 tablet (4 mg total) by mouth before breakfast.    HYDROCHLOROTHIAZIDE (HYDRODIURIL) 25 MG TABLET    Take 1 tablet (25 mg total) by mouth once daily.    LANTUS SOLOSTAR U-100 INSULIN GLARGINE 100 UNITS/ML SUBQ PEN    Inject 40 Units into the skin every evening.    LIDOCAINE HCL 2% (XYLOCAINE) 2 % JELLY    Apply topically once daily.    LOSARTAN (COZAAR) 100 MG TABLET    Take 100 mg by mouth once daily.    METFORMIN (GLUCOPHAGE XR) 500 MG ER 24HR TABLET    Take 1 tablet (500 mg total) by mouth 2 (two) times daily with meals.    METOPROLOL TARTRATE (LOPRESSOR) 50 MG TABLET    TK 1 T PO BID    NOVOLOG FLEXPEN U-100 INSULIN 100 UNIT/ML (3 ML) INPN PEN    Inject 12 Units into the skin 3 (three) times daily before meals. If the meal is skipped or is carbohydrate free, do not take the dose.    PANTOPRAZOLE (PROTONIX) 40 MG TABLET    TK 1 T PO D    PEN NEEDLE, DIABETIC (BD ULTRA-FINE TATYANA PEN NEEDLE) 32 GAUGE X 5/32" NDLE    Use with Lantus daily and Novolog 3 times daily    PIOGLITAZONE (ACTOS) 15 MG TABLET    Take 1 tablet (15 mg total) by mouth once daily.    ROSUVASTATIN (CRESTOR) 10 MG TABLET    Take 1 tablet (10 mg total) by mouth once daily.    SEMAGLUTIDE (OZEMPIC) 0.25 MG OR 0.5 MG(2 MG/1.5 ML) PEN INJECTOR    Inject 0.5 mg into the skin every 7 days.    ZINC SULFATE (ZINCATE) 220 (50) MG CAPSULE    Take 220 mg by mouth once daily.   Changed and/or Refilled Medications    Modified Medication Previous Medication    PREGABALIN (LYRICA) 100 MG CAPSULE pregabalin (LYRICA) 100 MG capsule       Take 1 capsule (100 mg total) by mouth 2 (two) times daily.    Take 1 capsule (100 mg total) by mouth 2 (two) times daily.       Review of patient's allergies indicates:  No Known Allergies    Past Surgical History:   Procedure Laterality Date    BACK SURGERY      CHOLECYSTECTOMY  2013    COLONOSCOPY N/A 7/19/2018    Procedure: COLONOSCOPY;  Surgeon: Chacorta TATE" "John BELL MD;  Location: [facility] ENDO;  Service: Endoscopy;  Laterality: N/A;    COLONOSCOPY N/A 3/15/2022    Procedure: COLONOSCOPY;  Surgeon: Jessie Diallo MD;  Location: [facility] ENDO;  Service: Endoscopy;  Laterality: N/A;    ESOPHAGOGASTRODUODENOSCOPY N/A 8/25/2020    Procedure: EGD (ESOPHAGOGASTRODUODENOSCOPY);  Surgeon: Anup Green MD;  Location: [address];  Service: General;  Laterality: N/A;    TRACHEOSTOMY  08/2020    Temporarily post COVID infection.       Family History   Problem Relation Age of Onset    Asthma Mother     Diabetes Father     Cancer Sister         Breast    Diabetes Paternal Aunt     Cancer Maternal Grandmother         Breast    Diabetes Maternal Grandmother     Diabetes Paternal Uncle     Colon cancer Neg Hx        Social History     Socioeconomic History    Marital status:      Spouse name: yoselin    Number of children: 1   Occupational History    Occupation:    Tobacco Use    Smoking status: Never    Smokeless tobacco: Never   Substance and Sexual Activity    Alcohol use: No    Drug use: No    Sexual activity: Yes     Partners: Female     Comment:    Social History Narrative    No smokers or pets in household.       Vitals:    12/16/22 1151   Weight: 127.1 kg (280 lb 3.3 oz)   Height: 5' 9" (1.753 m)   PainSc: 0-No pain       Hemoglobin A1C   Date Value Ref Range Status   08/04/2022 11.0 (H) 4.0 - 5.6 % Final     Comment:     ADA Screening Guidelines:  5.7-6.4%  Consistent with prediabetes  >or=6.5%  Consistent with diabetes    High levels of fetal hemoglobin interfere with the HbA1C  assay. Heterozygous hemoglobin variants (HbS, HgC, etc)do  not significantly interfere with this assay.   However, presence of multiple variants may affect accuracy.     05/05/2022 8.2 (H) 4.0 - 5.6 % Final     Comment:     ADA Screening Guidelines:  5.7-6.4%  Consistent with prediabetes  >or=6.5%  Consistent with diabetes    High levels of fetal hemoglobin interfere with " the HbA1C  assay. Heterozygous hemoglobin variants (HbS, HgC, etc)do  not significantly interfere with this assay.   However, presence of multiple variants may affect accuracy.     02/10/2022 12.7 (H) 4.0 - 5.6 % Final     Comment:     ADA Screening Guidelines:  5.7-6.4%  Consistent with prediabetes  >or=6.5%  Consistent with diabetes    High levels of fetal hemoglobin interfere with the HbA1C  assay. Heterozygous hemoglobin variants (HbS, HgC, etc)do  not significantly interfere with this assay.   However, presence of multiple variants may affect accuracy.         Review of Systems   Constitutional:  Negative for chills and fever.   Respiratory:  Negative for shortness of breath.    Cardiovascular:  Negative for chest pain, palpitations, orthopnea, claudication and leg swelling.   Gastrointestinal:  Negative for diarrhea, nausea and vomiting.   Musculoskeletal:  Negative for joint pain.   Skin:  Negative for rash.   Neurological:  Positive for tingling and sensory change.   Psychiatric/Behavioral: Negative.         Objective:      PHYSICAL EXAM: Apperance: Alert and orient in no distress,well developed, and with good attention to grooming and body habits  Patient presents ambulating in tennis shoes.  LOWER EXTREMITY EXAM:  VASCULAR: Dorsalis pedis pulses 2/4 bilateral and Posterior Tibial pulses 2/4 bilateral. Capillary fill time <4 seconds bilateral. Mild edema observed bilateral. Varicosities absent bilateral. Skin temperature of the lower extremities is warm to warm, proximal to distal. Hair growth dim bilateral.  DERMATOLOGICAL: No skin rashes, subcutaneous nodules, lesions, or ulcers observed bilateral. Webspaces 1,2,3,4 bilateral clean, dry and without evidence of break in skin integrity. Nails 1,2,3,4,5 bilateral elongated, thickened, and discolored with subungual debris.  NEUROLOGICAL: Light touch, sharp-dull, proprioception all present and equal bilaterally.  Vibratory sensation diminished at bilateral  hallux and navicular. Protective sensation absent at 8/10 sites as tested with a [place]-[employer] 5.07 monofilament.   MUSCULOSKELETAL: Muscle strength is 3/5 for foot inverters, everters, plantarflexors, and dorsiflexors. Muscle tone is normal.         Assessment:       ICD-10-CM ICD-9-CM   1. Uncontrolled type 2 diabetes mellitus with hyperglycemia  E11.65 250.02   2. Neuropathy  G62.9 355.9   3. Muscular deconditioning  R29.898 781.99   4. Dermatophytosis of nail  B35.1 110.1       Plan:   Uncontrolled type 2 diabetes mellitus with hyperglycemia    Neuropathy  -     pregabalin (LYRICA) 100 MG capsule; Take 1 capsule (100 mg total) by mouth 2 (two) times daily.  Dispense: 60 capsule; Refill: 1  -     HYDROcodone-acetaminophen (NORCO)  mg per tablet; Take 1 tablet by mouth every 6 (six) hours as needed for Pain.  Dispense: 30 tablet; Refill: 0    Muscular deconditioning  -     pregabalin (LYRICA) 100 MG capsule; Take 1 capsule (100 mg total) by mouth 2 (two) times daily.  Dispense: 60 capsule; Refill: 1  -     HYDROcodone-acetaminophen (NORCO)  mg per tablet; Take 1 tablet by mouth every 6 (six) hours as needed for Pain.  Dispense: 30 tablet; Refill: 0    Dermatophytosis of nail      I counseled the patient on his conditions, regarding findings of my examination, my impressions, and usual treatment plan.   Greater than 15 minutes of a 20 minute appointment spent on education about the diabetic foot, neuropathy, and prevention of limb loss.  Shoe inspection. Diabetic Foot Education. Patient reminded of the importance of good nutrition and blood sugar control to help prevent podiatric complications of diabetes. Patient instructed on proper foot hygeine. We discussed wearing proper shoe gear, daily foot inspections, never walking without protective shoe gear, never putting sharp instruments to feet.    With patient's permission, nails 1-5 bilateral were debrided/trimmed in length and thickness aggressively  to their soft tissue attachment mechanically and with electric , removing all offending nail and debris. Patient relates relief following the procedure.  Discussed with patient treatments for neuropathy consisting of topical or oral medication.  Prescription written for Lyrica 100mg to be taken once nightly. Informed patient of possible side effects including but not limited to disorientation and drowsiness. Patient instructed to discontinue use if there are any adverse effects. Patient states he understands.   Prescribed Norco 10-325mg to be taken as needed for pain. Patient advised on the possible elevation of blood pressure or heart effects and caution to take pills as needed and to discontinue use if symptoms arise, patient agreed.   Patient  will continue to monitor the areas daily, inspect feet, wear protective shoe gear when ambulatory, moisturizer to maintain skin integrity. Patient reminded of the importance of good nutrition and blood sugar control to help prevent podiatric complications of diabetes.  Patient to return 2 months or sooner if needed.        Katy Fernandes DPM  [facility]

## 2023-01-17 ENCOUNTER — PATIENT MESSAGE (OUTPATIENT)
Dept: DIABETES | Facility: CLINIC | Age: 73
End: 2023-01-17
Payer: MEDICARE

## 2023-01-17 DIAGNOSIS — Z79.4 UNCONTROLLED TYPE 2 DIABETES MELLITUS WITH HYPERGLYCEMIA, WITH LONG-TERM CURRENT USE OF INSULIN: ICD-10-CM

## 2023-01-17 DIAGNOSIS — E11.65 UNCONTROLLED TYPE 2 DIABETES MELLITUS WITH HYPERGLYCEMIA, WITH LONG-TERM CURRENT USE OF INSULIN: ICD-10-CM

## 2023-01-17 RX ORDER — GLIMEPIRIDE 4 MG/1
4 TABLET ORAL
Qty: 90 TABLET | Refills: 1 | Status: SHIPPED | OUTPATIENT
Start: 2023-01-17 | End: 2023-03-07 | Stop reason: SDUPTHER

## 2023-01-25 ENCOUNTER — PATIENT MESSAGE (OUTPATIENT)
Dept: ADMINISTRATIVE | Facility: HOSPITAL | Age: 73
End: 2023-01-25
Payer: MEDICARE

## 2023-01-30 ENCOUNTER — PATIENT MESSAGE (OUTPATIENT)
Dept: INTERNAL MEDICINE | Facility: CLINIC | Age: 73
End: 2023-01-30
Payer: MEDICARE

## 2023-02-02 PROBLEM — E44.0 MALNUTRITION OF MODERATE DEGREE: Status: RESOLVED | Noted: 2020-08-28 | Resolved: 2023-02-02

## 2023-02-02 PROBLEM — R50.9 FEVER: Status: RESOLVED | Noted: 2020-08-25 | Resolved: 2023-02-02

## 2023-02-06 ENCOUNTER — PATIENT MESSAGE (OUTPATIENT)
Dept: PRIMARY CARE CLINIC | Facility: CLINIC | Age: 73
End: 2023-02-06
Payer: MEDICARE

## 2023-02-06 ENCOUNTER — LAB VISIT (OUTPATIENT)
Dept: LAB | Facility: HOSPITAL | Age: 73
End: 2023-02-06
Attending: INTERNAL MEDICINE
Payer: MEDICARE

## 2023-02-06 ENCOUNTER — OFFICE VISIT (OUTPATIENT)
Dept: INTERNAL MEDICINE | Facility: CLINIC | Age: 73
End: 2023-02-06
Payer: MEDICARE

## 2023-02-06 VITALS
BODY MASS INDEX: 42.58 KG/M2 | OXYGEN SATURATION: 98 % | HEIGHT: 69 IN | WEIGHT: 287.5 LBS | TEMPERATURE: 97 F | DIASTOLIC BLOOD PRESSURE: 82 MMHG | HEART RATE: 71 BPM | SYSTOLIC BLOOD PRESSURE: 142 MMHG

## 2023-02-06 DIAGNOSIS — Z79.4 UNCONTROLLED TYPE 2 DIABETES MELLITUS WITH HYPERGLYCEMIA, WITH LONG-TERM CURRENT USE OF INSULIN: ICD-10-CM

## 2023-02-06 DIAGNOSIS — E11.59 HYPERTENSION ASSOCIATED WITH DIABETES: ICD-10-CM

## 2023-02-06 DIAGNOSIS — Z00.00 ROUTINE GENERAL MEDICAL EXAMINATION AT A HEALTH CARE FACILITY: Primary | ICD-10-CM

## 2023-02-06 DIAGNOSIS — E11.69 HYPERLIPIDEMIA ASSOCIATED WITH TYPE 2 DIABETES MELLITUS: ICD-10-CM

## 2023-02-06 DIAGNOSIS — E11.65 UNCONTROLLED TYPE 2 DIABETES MELLITUS WITH HYPERGLYCEMIA, WITH LONG-TERM CURRENT USE OF INSULIN: ICD-10-CM

## 2023-02-06 DIAGNOSIS — E66.01 SEVERE OBESITY (BMI >= 40): ICD-10-CM

## 2023-02-06 DIAGNOSIS — I15.2 HYPERTENSION ASSOCIATED WITH DIABETES: ICD-10-CM

## 2023-02-06 DIAGNOSIS — E78.5 HYPERLIPIDEMIA ASSOCIATED WITH TYPE 2 DIABETES MELLITUS: ICD-10-CM

## 2023-02-06 DIAGNOSIS — E66.01 SEVERE OBESITY (BMI 35.0-35.9 WITH COMORBIDITY): ICD-10-CM

## 2023-02-06 DIAGNOSIS — N18.31 CHRONIC KIDNEY DISEASE, STAGE 3A: ICD-10-CM

## 2023-02-06 PROBLEM — I82.409: Status: RESOLVED | Noted: 2022-05-05 | Resolved: 2023-02-06

## 2023-02-06 LAB
ALBUMIN SERPL BCP-MCNC: 3.8 G/DL (ref 3.5–5.2)
ALP SERPL-CCNC: 74 U/L (ref 55–135)
ALT SERPL W/O P-5'-P-CCNC: 80 U/L (ref 10–44)
ANION GAP SERPL CALC-SCNC: 14 MMOL/L (ref 8–16)
AST SERPL-CCNC: 68 U/L (ref 10–40)
BILIRUB SERPL-MCNC: 0.5 MG/DL (ref 0.1–1)
BUN SERPL-MCNC: 17 MG/DL (ref 8–23)
CALCIUM SERPL-MCNC: 9 MG/DL (ref 8.7–10.5)
CHLORIDE SERPL-SCNC: 105 MMOL/L (ref 95–110)
CHOLEST SERPL-MCNC: 251 MG/DL (ref 120–199)
CHOLEST/HDLC SERPL: 6.3 {RATIO} (ref 2–5)
CO2 SERPL-SCNC: 19 MMOL/L (ref 23–29)
CREAT SERPL-MCNC: 1.4 MG/DL (ref 0.5–1.4)
EST. GFR  (NO RACE VARIABLE): 54.1 ML/MIN/1.73 M^2
ESTIMATED AVG GLUCOSE: 214 MG/DL (ref 68–131)
GLUCOSE SERPL-MCNC: 164 MG/DL (ref 70–110)
HBA1C MFR BLD: 9.1 % (ref 4–5.6)
HDLC SERPL-MCNC: 40 MG/DL (ref 40–75)
HDLC SERPL: 15.9 % (ref 20–50)
LDLC SERPL CALC-MCNC: 178.4 MG/DL (ref 63–159)
NONHDLC SERPL-MCNC: 211 MG/DL
POTASSIUM SERPL-SCNC: 4.1 MMOL/L (ref 3.5–5.1)
PROT SERPL-MCNC: 7 G/DL (ref 6–8.4)
SODIUM SERPL-SCNC: 138 MMOL/L (ref 136–145)
TRIGL SERPL-MCNC: 163 MG/DL (ref 30–150)

## 2023-02-06 PROCEDURE — 1159F PR MEDICATION LIST DOCUMENTED IN MEDICAL RECORD: ICD-10-PCS | Mod: CPTII,S$GLB,, | Performed by: INTERNAL MEDICINE

## 2023-02-06 PROCEDURE — 1101F PR PT FALLS ASSESS DOC 0-1 FALLS W/OUT INJ PAST YR: ICD-10-PCS | Mod: CPTII,S$GLB,, | Performed by: INTERNAL MEDICINE

## 2023-02-06 PROCEDURE — 99397 PER PM REEVAL EST PAT 65+ YR: CPT | Mod: GZ,S$GLB,, | Performed by: INTERNAL MEDICINE

## 2023-02-06 PROCEDURE — 1159F MED LIST DOCD IN RCRD: CPT | Mod: CPTII,S$GLB,, | Performed by: INTERNAL MEDICINE

## 2023-02-06 PROCEDURE — 3079F PR MOST RECENT DIASTOLIC BLOOD PRESSURE 80-89 MM HG: ICD-10-PCS | Mod: CPTII,S$GLB,, | Performed by: INTERNAL MEDICINE

## 2023-02-06 PROCEDURE — 3046F HEMOGLOBIN A1C LEVEL >9.0%: CPT | Mod: CPTII,S$GLB,, | Performed by: INTERNAL MEDICINE

## 2023-02-06 PROCEDURE — 3008F PR BODY MASS INDEX (BMI) DOCUMENTED: ICD-10-PCS | Mod: CPTII,S$GLB,, | Performed by: INTERNAL MEDICINE

## 2023-02-06 PROCEDURE — 99999 PR PBB SHADOW E&M-EST. PATIENT-LVL V: ICD-10-PCS | Mod: PBBFAC,,, | Performed by: INTERNAL MEDICINE

## 2023-02-06 PROCEDURE — 83036 HEMOGLOBIN GLYCOSYLATED A1C: CPT | Performed by: INTERNAL MEDICINE

## 2023-02-06 PROCEDURE — 3288F PR FALLS RISK ASSESSMENT DOCUMENTED: ICD-10-PCS | Mod: CPTII,S$GLB,, | Performed by: INTERNAL MEDICINE

## 2023-02-06 PROCEDURE — 3046F PR MOST RECENT HEMOGLOBIN A1C LEVEL > 9.0%: ICD-10-PCS | Mod: CPTII,S$GLB,, | Performed by: INTERNAL MEDICINE

## 2023-02-06 PROCEDURE — 80061 LIPID PANEL: CPT | Performed by: INTERNAL MEDICINE

## 2023-02-06 PROCEDURE — 99999 PR PBB SHADOW E&M-EST. PATIENT-LVL V: CPT | Mod: PBBFAC,,, | Performed by: INTERNAL MEDICINE

## 2023-02-06 PROCEDURE — 3077F PR MOST RECENT SYSTOLIC BLOOD PRESSURE >= 140 MM HG: ICD-10-PCS | Mod: CPTII,S$GLB,, | Performed by: INTERNAL MEDICINE

## 2023-02-06 PROCEDURE — 3288F FALL RISK ASSESSMENT DOCD: CPT | Mod: CPTII,S$GLB,, | Performed by: INTERNAL MEDICINE

## 2023-02-06 PROCEDURE — 1125F AMNT PAIN NOTED PAIN PRSNT: CPT | Mod: CPTII,S$GLB,, | Performed by: INTERNAL MEDICINE

## 2023-02-06 PROCEDURE — 80053 COMPREHEN METABOLIC PANEL: CPT | Performed by: INTERNAL MEDICINE

## 2023-02-06 PROCEDURE — 3079F DIAST BP 80-89 MM HG: CPT | Mod: CPTII,S$GLB,, | Performed by: INTERNAL MEDICINE

## 2023-02-06 PROCEDURE — 1125F PR PAIN SEVERITY QUANTIFIED, PAIN PRESENT: ICD-10-PCS | Mod: CPTII,S$GLB,, | Performed by: INTERNAL MEDICINE

## 2023-02-06 PROCEDURE — 36415 COLL VENOUS BLD VENIPUNCTURE: CPT | Performed by: INTERNAL MEDICINE

## 2023-02-06 PROCEDURE — 3008F BODY MASS INDEX DOCD: CPT | Mod: CPTII,S$GLB,, | Performed by: INTERNAL MEDICINE

## 2023-02-06 PROCEDURE — 1101F PT FALLS ASSESS-DOCD LE1/YR: CPT | Mod: CPTII,S$GLB,, | Performed by: INTERNAL MEDICINE

## 2023-02-06 PROCEDURE — 3077F SYST BP >= 140 MM HG: CPT | Mod: CPTII,S$GLB,, | Performed by: INTERNAL MEDICINE

## 2023-02-06 PROCEDURE — 99397 PR PREVENTIVE VISIT,EST,65 & OVER: ICD-10-PCS | Mod: GZ,S$GLB,, | Performed by: INTERNAL MEDICINE

## 2023-02-06 RX ORDER — SEMAGLUTIDE 1.34 MG/ML
0.25 INJECTION, SOLUTION SUBCUTANEOUS
Qty: 2 PEN | Refills: 1 | Status: SHIPPED | OUTPATIENT
Start: 2023-02-06 | End: 2023-08-02 | Stop reason: RX

## 2023-02-06 RX ORDER — AMLODIPINE BESYLATE 5 MG/1
5 TABLET ORAL DAILY
Qty: 90 TABLET | Refills: 1 | Status: SHIPPED | OUTPATIENT
Start: 2023-02-06 | End: 2023-08-01 | Stop reason: SDUPTHER

## 2023-02-06 RX ORDER — ROSUVASTATIN CALCIUM 10 MG/1
10 TABLET, COATED ORAL DAILY
Qty: 90 TABLET | Refills: 1 | Status: SHIPPED | OUTPATIENT
Start: 2023-02-06 | End: 2023-08-07 | Stop reason: SDUPTHER

## 2023-02-06 NOTE — PROGRESS NOTES
Subjective:      Patient ID: Anup Miller is a 72 y.o. male.    Chief Complaint: Follow-up (6 month)    HPI    72 y.o. with  Patient Active Problem List   Diagnosis    Uncontrolled type 2 diabetes mellitus with hyperglycemia, with long-term current use of insulin    Hypertension associated with diabetes    Hyperlipidemia associated with type 2 diabetes mellitus    Multiple pulmonary nodules determined by computed tomography of lung    KHOI (obstructive sleep apnea)    Hx of colonic polyp    Cardiac arrhythmia    Pneumonia due to COVID-19 virus    Oral phase dysphagia    History of 2019 novel coronavirus disease (COVID-19)    Severe obesity (BMI 35.0-35.9 with comorbidity)    Debility    Neuropathy    Muscular deconditioning    History of DVT (deep vein thrombosis)    Iron deficiency anemia due to chronic blood loss    Chronic kidney disease, stage 3a     Past Medical History:   Diagnosis Date    Acute respiratory failure due to COVID-19 08/2020    Colon polyp     COVID-19 virus infection 07/2020    COVID-19 virus infection 7/26/2020    Diabetes mellitus type II Diagnosed 2002    Elevated liver enzymes     Elevated PSA 12/13/2017    Did not f/u with urology as rec in 2015    Fatty liver disease, nonalcoholic     Fever 8/25/2020    -- patient has been spiking fever and since admission  -- started spiking more frequent low-grade fever with a max of 100.8 on 8/21 -- started on vanc and cefepime  -- urine culture from 08/23 grew Enterococcus -- patient keeps spiking fever despite broad-spectrum antibiotic -- concern for resistance organism (VRE)  -- consulting ID if fever persist.    Hyperlipidemia     Hypertension     KHOI on CPAP     Sleep apnea        Here today for annual prev exam.  Compliant with meds without significant side effects. Energy and appetite are good.     Dm- home glucose 200s. Wants to try ozempic again as has new insurance plan.         Past Surgical History:   Procedure Laterality Date    BACK  "SURGERY      CHOLECYSTECTOMY  2013    COLONOSCOPY N/A 7/19/2018    Procedure: COLONOSCOPY;  Surgeon: Chacorta Lopez III, MD;  Location: [facility] ENDO;  Service: Endoscopy;  Laterality: N/A;    COLONOSCOPY N/A 3/15/2022    Procedure: COLONOSCOPY;  Surgeon: Jessie Diallo MD;  Location: [facility] ENDO;  Service: Endoscopy;  Laterality: N/A;    ESOPHAGOGASTRODUODENOSCOPY N/A 8/25/2020    Procedure: EGD (ESOPHAGOGASTRODUODENOSCOPY);  Surgeon: Anup Green MD;  Location: [facility] OR [address] [place] [address];  Service: General;  Laterality: N/A;    TRACHEOSTOMY  08/2020    Temporarily post COVID infection.     Social History     Socioeconomic History    Marital status:      Spouse name: yoselin    Number of children: 1   Occupational History    Occupation:    Tobacco Use    Smoking status: Never    Smokeless tobacco: Never   Substance and Sexual Activity    Alcohol use: No    Drug use: No    Sexual activity: Yes     Partners: Female     Comment:    Social History Narrative    No smokers or pets in household.     family history includes Asthma in his mother; Cancer in his maternal grandmother and sister; Diabetes in his father, maternal grandmother, paternal aunt, and paternal uncle.  Review of Systems   Constitutional:  Negative for chills and fever.   HENT:  Negative for ear pain and sore throat.    Respiratory:  Negative for cough.    Cardiovascular:  Negative for chest pain.   Gastrointestinal:  Negative for abdominal pain and blood in stool.   Genitourinary:  Negative for dysuria and hematuria.   Neurological:  Positive for weakness. Negative for seizures and syncope.   Objective:   BP (!) 142/82 (BP Location: Left arm, Patient Position: Sitting, BP Method: Large (Manual))   Pulse 71   Temp 97.2 °F (36.2 °C)   Ht 5' 9" (1.753 m)   Wt 130.4 kg (287 lb 7.7 oz)   SpO2 98%   BMI 42.45 kg/m²     Physical Exam  Constitutional:       General: He is not in acute distress.     Appearance: He is well-developed.   HENT: "      Head: Normocephalic and atraumatic.   Eyes:      Pupils: Pupils are equal, round, and reactive to light.   Neck:      Thyroid: No thyromegaly.      Vascular: No carotid bruit.   Cardiovascular:      Rate and Rhythm: Normal rate and regular rhythm.   Pulmonary:      Breath sounds: Normal breath sounds. No wheezing or rales.   Abdominal:      General: Bowel sounds are normal.      Palpations: Abdomen is soft.      Tenderness: There is no abdominal tenderness.   Musculoskeletal:      Cervical back: Neck supple.   Lymphadenopathy:      Cervical: No cervical adenopathy.   Skin:     General: Skin is warm and dry.   Neurological:      Mental Status: He is alert and oriented to person, place, and time.   Psychiatric:         Behavior: Behavior normal.       Lab Results   Component Value Date    WBC 6.59 06/16/2022    HGB 14.1 06/16/2022    HGB 13.7 (L) 07/19/2021    HGB 13.7 (L) 07/19/2021    HCT 44.1 06/16/2022    MCV 88 06/16/2022    MCV 89 07/19/2021    MCV 89 07/19/2021     06/16/2022    CHOL 251 (H) 02/06/2023    TRIG 163 (H) 02/06/2023    HDL 40 02/06/2023    LDLCALC 178.4 (H) 02/06/2023    LDLCALC 86.0 03/10/2022    LDLCALC 94.8 07/19/2021    ALT 80 (H) 02/06/2023    AST 68 (H) 02/06/2023     02/06/2023    K 4.1 02/06/2023     02/06/2023    CO2 19 (L) 02/06/2023    BUN 17 02/06/2023    CREATININE 1.4 02/06/2023    CREATININE 1.5 (H) 06/16/2022    CREATININE 1.6 (H) 03/10/2022    EGFRNORACEVR 54.1 (A) 02/06/2023    TSH 1.432 03/10/2022    TSH 0.937 07/19/2021    TSH 0.177 (L) 07/26/2020    PSA 3.5 10/23/2020    PSA 10.1 (H) 07/25/2019    PSA 9.8 (H) 02/07/2019    PSADIAG 12.3 (H) 01/29/2020     (H) 02/06/2023    HGBA1C 9.1 (H) 02/06/2023    HGBA1C 11.0 (H) 08/04/2022    HGBA1C 8.2 (H) 05/05/2022    BNP <10 07/26/2020          The 10-year ASCVD risk score (Yeimi LAWLER, et al., 2019) is: 43%    Values used to calculate the score:      Age: 72 years      Sex: Male      Is Non-   American: Yes      Diabetic: Yes      Tobacco smoker: No      Systolic Blood Pressure: 142 mmHg      Is BP treated: Yes      HDL Cholesterol: 40 mg/dL      Total Cholesterol: 251 mg/dL     Assessment:     1. Routine general medical examination at a health care facility    2. Uncontrolled type 2 diabetes mellitus with hyperglycemia, with long-term current use of insulin    3. Hypertension associated with diabetes    4. Hyperlipidemia associated with type 2 diabetes mellitus    5. Severe obesity (BMI 35.0-35.9 with comorbidity)    6. Chronic kidney disease, stage 3a    7. Severe obesity (BMI >= 40)      Plan:   1. Routine general medical examination at a health care facility  Heart healthy diet, regular exercise, and regular use of sunscreen.   HM reviewed    2. Uncontrolled type 2 diabetes mellitus with hyperglycemia, with long-term current use of insulin  Assessment & Plan:  .  Check A1c    Orders:  -     Hemoglobin A1C; Future; Expected date: 02/06/2023  -     Ambulatory referral/consult to [facility] Lifestyle and Wellness; Future; Expected date: 02/13/2023  -     Ambulatory referral/consult to Diabetic Advanced Practice Providers (Medical Management); Future; Expected date: 02/13/2023  -     Diabetes Digital Medicine (DDMP) Enrollment Order  -     Diabetes Digital Medicine (DDMP): Assign Onboarding Questionnaires    3. Hypertension associated with diabetes  Assessment & Plan:  Controlled    Orders:  -     Comprehensive Metabolic Panel; Future; Expected date: 02/06/2023  -     Hypertension Digital Medicine (HDMP) Enrollment Order  -     Hypertension Digital Medicine (HDMP): Assign Onboarding Questionnaires  -     amLODIPine (NORVASC) 5 MG tablet; Take 1 tablet (5 mg total) by mouth once daily.  Dispense: 90 tablet; Refill: 1    4. Hyperlipidemia associated with type 2 diabetes mellitus  Assessment & Plan:  Continue statin    Orders:  -     Lipid Panel; Future; Expected date: 02/06/2023  -     rosuvastatin (CRESTOR) 10  MG tablet; Take 1 tablet (10 mg total) by mouth once daily.  Dispense: 90 tablet; Refill: 1    5. Severe obesity (BMI 35.0-35.9 with comorbidity)  Assessment & Plan:  Diet and exercise      6. Chronic kidney disease, stage 3a  Assessment & Plan:  Stable      7. Severe obesity (BMI >= 40)  -     Ambulatory referral/consult to [facility] Lifestyle and Wellness; Future; Expected date: 02/13/2023    Other orders  -     semaglutide (OZEMPIC) 0.25 mg or 0.5 mg(2 mg/1.5 mL) pen injector; Inject 0.25 mg into the skin every 7 days.  Dispense: 2 pen; Refill: 1        There are no Patient Instructions on file for this visit.    Future Appointments   Date Time Provider Department Center   3/9/2023  8:45 AM Katy Fernandes DPM [facility] POD [place]   8/7/2023  8:00 AM Bryce Gonzales MD [facility] IM [place]       Lab Frequency Next Occurrence   Microalbumin/Creatinine Ratio, Urine Once 03/10/2022   COVID-19 Routine Screening Once 03/12/2022   Ambulatory referral/consult to Pharmacy Assistance Once 04/26/2022   Ambulatory referral/consult to Pharmacy Assistance Once 05/11/2022   Hemoglobin A1c Once 12/07/2022       Follow up in about 6 months (around 8/6/2023), or if symptoms worsen or fail to improve.

## 2023-02-13 RX ORDER — PANTOPRAZOLE SODIUM 40 MG/1
TABLET, DELAYED RELEASE ORAL
Qty: 90 TABLET | Refills: 1 | Status: SHIPPED | OUTPATIENT
Start: 2023-02-13 | End: 2023-09-27 | Stop reason: SDUPTHER

## 2023-02-13 NOTE — TELEPHONE ENCOUNTER
No new care gaps identified.  [facility] Embedded Care Gaps. Reference number: [id number]. 2/13/2023   2:52:05 PM CST

## 2023-02-13 NOTE — TELEPHONE ENCOUNTER
----- Message from Ryanne Bill sent at 2/13/2023  2:41 PM CST -----  Contact: Zhou (Wife)  Zhou called to request refill on this medication Please call her back at [phone]  or [phone]    pantoprazole (PROTONIX) 40 MG tablet 90 tablet   Sig: TK 1 T PO D            [address] [address]  Phone: [phone] Fax: [phone]

## 2023-02-24 ENCOUNTER — PATIENT OUTREACH (OUTPATIENT)
Dept: ADMINISTRATIVE | Facility: HOSPITAL | Age: 73
End: 2023-02-24
Payer: MEDICARE

## 2023-02-24 DIAGNOSIS — E11.65 UNCONTROLLED TYPE 2 DIABETES MELLITUS WITH HYPERGLYCEMIA, WITH LONG-TERM CURRENT USE OF INSULIN: Primary | ICD-10-CM

## 2023-02-24 DIAGNOSIS — Z79.4 UNCONTROLLED TYPE 2 DIABETES MELLITUS WITH HYPERGLYCEMIA, WITH LONG-TERM CURRENT USE OF INSULIN: Primary | ICD-10-CM

## 2023-02-24 NOTE — PROGRESS NOTES
Working Diabetic-A1C Report.    Attempted to contact pt to schedule A1C, May 2022.  No answer or voice mail.

## 2023-03-06 ENCOUNTER — PATIENT OUTREACH (OUTPATIENT)
Dept: ADMINISTRATIVE | Facility: HOSPITAL | Age: 73
End: 2023-03-06
Payer: MEDICARE

## 2023-03-06 NOTE — PROGRESS NOTES
Working [employer] Statin Report.    Per med card pt is on rosuvastatin 10 mg.  Med dispense history indicates pt filled 90 day supply, 2/07/23.  Confirmed with pharmacy rx was picked up.

## 2023-03-07 ENCOUNTER — PATIENT MESSAGE (OUTPATIENT)
Dept: INTERNAL MEDICINE | Facility: CLINIC | Age: 73
End: 2023-03-07
Payer: MEDICARE

## 2023-03-07 ENCOUNTER — PATIENT MESSAGE (OUTPATIENT)
Dept: DIABETES | Facility: CLINIC | Age: 73
End: 2023-03-07
Payer: MEDICARE

## 2023-03-07 DIAGNOSIS — E11.65 UNCONTROLLED TYPE 2 DIABETES MELLITUS WITH HYPERGLYCEMIA, WITH LONG-TERM CURRENT USE OF INSULIN: ICD-10-CM

## 2023-03-07 DIAGNOSIS — Z79.4 UNCONTROLLED TYPE 2 DIABETES MELLITUS WITH HYPERGLYCEMIA, WITH LONG-TERM CURRENT USE OF INSULIN: ICD-10-CM

## 2023-03-07 RX ORDER — GLIMEPIRIDE 4 MG/1
4 TABLET ORAL
Qty: 90 TABLET | Refills: 1 | Status: SHIPPED | OUTPATIENT
Start: 2023-03-07 | End: 2023-10-17 | Stop reason: ALTCHOICE

## 2023-03-09 ENCOUNTER — OFFICE VISIT (OUTPATIENT)
Dept: PODIATRY | Facility: CLINIC | Age: 73
End: 2023-03-09
Payer: MEDICARE

## 2023-03-09 VITALS — BODY MASS INDEX: 41.24 KG/M2 | HEIGHT: 69 IN | WEIGHT: 278.44 LBS

## 2023-03-09 DIAGNOSIS — B35.1 DERMATOPHYTOSIS OF NAIL: ICD-10-CM

## 2023-03-09 DIAGNOSIS — R29.898 MUSCULAR DECONDITIONING: ICD-10-CM

## 2023-03-09 DIAGNOSIS — G62.9 NEUROPATHY: Primary | ICD-10-CM

## 2023-03-09 DIAGNOSIS — E11.65 UNCONTROLLED TYPE 2 DIABETES MELLITUS WITH HYPERGLYCEMIA: ICD-10-CM

## 2023-03-09 PROCEDURE — 3008F PR BODY MASS INDEX (BMI) DOCUMENTED: ICD-10-PCS | Mod: CPTII,S$GLB,, | Performed by: PODIATRIST

## 2023-03-09 PROCEDURE — 1160F PR REVIEW ALL MEDS BY PRESCRIBER/CLIN PHARMACIST DOCUMENTED: ICD-10-PCS | Mod: CPTII,S$GLB,, | Performed by: PODIATRIST

## 2023-03-09 PROCEDURE — 1159F MED LIST DOCD IN RCRD: CPT | Mod: CPTII,S$GLB,, | Performed by: PODIATRIST

## 2023-03-09 PROCEDURE — 99999 PR PBB SHADOW E&M-EST. PATIENT-LVL III: CPT | Mod: PBBFAC,,, | Performed by: PODIATRIST

## 2023-03-09 PROCEDURE — 99999 PR PBB SHADOW E&M-EST. PATIENT-LVL III: ICD-10-PCS | Mod: PBBFAC,,, | Performed by: PODIATRIST

## 2023-03-09 PROCEDURE — 1101F PR PT FALLS ASSESS DOC 0-1 FALLS W/OUT INJ PAST YR: ICD-10-PCS | Mod: CPTII,S$GLB,, | Performed by: PODIATRIST

## 2023-03-09 PROCEDURE — 3008F BODY MASS INDEX DOCD: CPT | Mod: CPTII,S$GLB,, | Performed by: PODIATRIST

## 2023-03-09 PROCEDURE — 11721 PR DEBRIDEMENT OF NAILS, 6 OR MORE: ICD-10-PCS | Mod: Q9,S$GLB,, | Performed by: PODIATRIST

## 2023-03-09 PROCEDURE — 11721 DEBRIDE NAIL 6 OR MORE: CPT | Mod: Q9,S$GLB,, | Performed by: PODIATRIST

## 2023-03-09 PROCEDURE — 3046F PR MOST RECENT HEMOGLOBIN A1C LEVEL > 9.0%: ICD-10-PCS | Mod: CPTII,S$GLB,, | Performed by: PODIATRIST

## 2023-03-09 PROCEDURE — 1160F RVW MEDS BY RX/DR IN RCRD: CPT | Mod: CPTII,S$GLB,, | Performed by: PODIATRIST

## 2023-03-09 PROCEDURE — 1101F PT FALLS ASSESS-DOCD LE1/YR: CPT | Mod: CPTII,S$GLB,, | Performed by: PODIATRIST

## 2023-03-09 PROCEDURE — 3046F HEMOGLOBIN A1C LEVEL >9.0%: CPT | Mod: CPTII,S$GLB,, | Performed by: PODIATRIST

## 2023-03-09 PROCEDURE — 99499 UNLISTED E&M SERVICE: CPT | Mod: S$GLB,,, | Performed by: PODIATRIST

## 2023-03-09 PROCEDURE — 3288F FALL RISK ASSESSMENT DOCD: CPT | Mod: CPTII,S$GLB,, | Performed by: PODIATRIST

## 2023-03-09 PROCEDURE — 3288F PR FALLS RISK ASSESSMENT DOCUMENTED: ICD-10-PCS | Mod: CPTII,S$GLB,, | Performed by: PODIATRIST

## 2023-03-09 PROCEDURE — 99499 NO LOS: ICD-10-PCS | Mod: S$GLB,,, | Performed by: PODIATRIST

## 2023-03-09 PROCEDURE — 1159F PR MEDICATION LIST DOCUMENTED IN MEDICAL RECORD: ICD-10-PCS | Mod: CPTII,S$GLB,, | Performed by: PODIATRIST

## 2023-03-09 PROCEDURE — 1125F AMNT PAIN NOTED PAIN PRSNT: CPT | Mod: CPTII,S$GLB,, | Performed by: PODIATRIST

## 2023-03-09 PROCEDURE — 1125F PR PAIN SEVERITY QUANTIFIED, PAIN PRESENT: ICD-10-PCS | Mod: CPTII,S$GLB,, | Performed by: PODIATRIST

## 2023-03-09 RX ORDER — LIDOCAINE HYDROCHLORIDE 20 MG/ML
JELLY TOPICAL DAILY
Qty: 30 ML | Refills: 3 | Status: SHIPPED | OUTPATIENT
Start: 2023-03-09 | End: 2023-10-17

## 2023-03-09 NOTE — PROGRESS NOTES
Subjective:     Patient ID: Anup Miller is a 72 y.o. male.    Chief Complaint: Nail Care (Pt c/o BL foot pain 5/10, Diabetic pt wears tennis shoes, PCP Dr. Gonzales last seen 2-6-23, )      Anup is a 72 y.o. male who presents to the clinic for evaluation and treatment of high risk feet. Anup has a past medical history of Acute respiratory failure due to COVID-19 (08/2020), Colon polyp, COVID-19 virus infection (07/2020), COVID-19 virus infection (7/26/2020), Diabetes mellitus type II (Diagnosed 2002), Elevated liver enzymes, Elevated PSA (12/13/2017), Fatty liver disease, nonalcoholic, Fever (8/25/2020), Hyperlipidemia, Hypertension, KHOI on CPAP, and Sleep apnea. The patient's chief complaint is bilateral foot pain. Patient states pain is pins/needles, electric, burning, and hot feeling. Patient states pain is constant. Patient rates pain  5/10(left) and 3/10(right). Patient states he is taking Gabapentin as prescribed but only sees a little difference. This patient has documented high risk feet requiring routine maintenance secondary to diabetes mellitis and those secondary complications of diabetes, as mentioned..    PCP: Bryce Gonzales MD    Date Last Seen by PCP: 02/06/2023    Current shoe gear:  Affected Foot: tennis shoes     Unaffected Foot: tennis shoes    Hemoglobin A1C   Date Value Ref Range Status   02/06/2023 9.1 (H) 4.0 - 5.6 % Final     Comment:     ADA Screening Guidelines:  5.7-6.4%  Consistent with prediabetes  >or=6.5%  Consistent with diabetes    High levels of fetal hemoglobin interfere with the HbA1C  assay. Heterozygous hemoglobin variants (HbS, HgC, etc)do  not significantly interfere with this assay.   However, presence of multiple variants may affect accuracy.     08/04/2022 11.0 (H) 4.0 - 5.6 % Final     Comment:     ADA Screening Guidelines:  5.7-6.4%  Consistent with prediabetes  >or=6.5%  Consistent with diabetes    High levels of fetal hemoglobin interfere with the  HbA1C  assay. Heterozygous hemoglobin variants (HbS, HgC, etc)do  not significantly interfere with this assay.   However, presence of multiple variants may affect accuracy.     05/05/2022 8.2 (H) 4.0 - 5.6 % Final     Comment:     ADA Screening Guidelines:  5.7-6.4%  Consistent with prediabetes  >or=6.5%  Consistent with diabetes    High levels of fetal hemoglobin interfere with the HbA1C  assay. Heterozygous hemoglobin variants (HbS, HgC, etc)do  not significantly interfere with this assay.   However, presence of multiple variants may affect accuracy.         Patient Active Problem List   Diagnosis    Uncontrolled type 2 diabetes mellitus with hyperglycemia, with long-term current use of insulin    Hypertension associated with diabetes    Hyperlipidemia associated with type 2 diabetes mellitus    Multiple pulmonary nodules determined by computed tomography of lung    KHOI (obstructive sleep apnea)    Hx of colonic polyp    Cardiac arrhythmia    Pneumonia due to COVID-19 virus    Oral phase dysphagia    History of 2019 novel coronavirus disease (COVID-19)    Severe obesity (BMI 35.0-35.9 with comorbidity)    Debility    Neuropathy    Muscular deconditioning    History of DVT (deep vein thrombosis)    Iron deficiency anemia due to chronic blood loss    Chronic kidney disease, stage 3a       Medication List with Changes/Refills   Current Medications    AMLODIPINE (NORVASC) 5 MG TABLET    Take 1 tablet (5 mg total) by mouth once daily.    ASPIRIN 81 MG CAP    Take by mouth.    EMPAGLIFLOZIN (JARDIANCE) 25 MG TABLET    Take 1 tablet (25 mg total) by mouth once daily.    FLASH GLUCOSE SENSOR (FREESTYLE DERRELL 2 SENSOR) KIT    Inject 1 each into the skin every 14 (fourteen) days.    FREESTYLE DERRELL 2 READER MISC    Use to monitor blood sugar consistently.    GLIMEPIRIDE (AMARYL) 4 MG TABLET    Take 1 tablet (4 mg total) by mouth before breakfast.    HYDROCHLOROTHIAZIDE (HYDRODIURIL) 25 MG TABLET    Take 1 tablet (25 mg  "total) by mouth once daily.    HYDROCODONE-ACETAMINOPHEN (NORCO)  MG PER TABLET    Take 1 tablet by mouth every 6 (six) hours as needed for Pain.    LANTUS SOLOSTAR U-100 INSULIN GLARGINE 100 UNITS/ML SUBQ PEN    Inject 40 Units into the skin every evening.    LIDOCAINE HCL 2% (XYLOCAINE) 2 % JELLY    Apply topically once daily.    LOSARTAN (COZAAR) 100 MG TABLET    Take 100 mg by mouth once daily.    METOPROLOL TARTRATE (LOPRESSOR) 50 MG TABLET    TK 1 T PO BID    NOVOLOG FLEXPEN U-100 INSULIN 100 UNIT/ML (3 ML) INPN PEN    Inject 12 Units into the skin 3 (three) times daily before meals. If the meal is skipped or is carbohydrate free, do not take the dose.    PANTOPRAZOLE (PROTONIX) 40 MG TABLET    TK 1 T PO D    PEN NEEDLE, DIABETIC (BD ULTRA-FINE TATYANA PEN NEEDLE) 32 GAUGE X 5/32" NDLE    Use with Lantus daily and Novolog 3 times daily    PREGABALIN (LYRICA) 100 MG CAPSULE    Take 1 capsule (100 mg total) by mouth 2 (two) times daily.    ROSUVASTATIN (CRESTOR) 10 MG TABLET    Take 1 tablet (10 mg total) by mouth once daily.    SEMAGLUTIDE (OZEMPIC) 0.25 MG OR 0.5 MG(2 MG/1.5 ML) PEN INJECTOR    Inject 0.25 mg into the skin every 7 days.    ZINC SULFATE (ZINCATE) 220 (50) MG CAPSULE    Take 220 mg by mouth once daily.       Review of patient's allergies indicates:  No Known Allergies    Past Surgical History:   Procedure Laterality Date    BACK SURGERY      CHOLECYSTECTOMY  2013    COLONOSCOPY N/A 7/19/2018    Procedure: COLONOSCOPY;  Surgeon: Chacorta Lopez III, MD;  Location[facility] ENDO;  Service: Endoscopy;  Laterality: N/A;    COLONOSCOPY N/A 3/15/2022    Procedure: COLONOSCOPY;  Surgeon: Jessie Diallo MD;  Location: [facility] ENDO;  Service: Endoscopy;  Laterality: N/A;    ESOPHAGOGASTRODUODENOSCOPY N/A 8/25/2020    Procedure: EGD (ESOPHAGOGASTRODUODENOSCOPY);  Surgeon: Anup Green MD;  Location: [facility] OR [address] [place] [address];  Service: General;  Laterality: N/A;    TRACHEOSTOMY  08/2020    Temporarily post " "COVID infection.       Family History   Problem Relation Age of Onset    Asthma Mother     Diabetes Father     Cancer Sister         Breast    Diabetes Paternal Aunt     Cancer Maternal Grandmother         Breast    Diabetes Maternal Grandmother     Diabetes Paternal Uncle     Colon cancer Neg Hx        Social History     Socioeconomic History    Marital status:      Spouse name: yoselin    Number of children: 1   Occupational History    Occupation:    Tobacco Use    Smoking status: Never    Smokeless tobacco: Never   Substance and Sexual Activity    Alcohol use: No    Drug use: No    Sexual activity: Yes     Partners: Female     Comment:    Social History Narrative    No smokers or pets in household.       Vitals:    03/09/23 0818   Weight: 126.3 kg (278 lb 7.1 oz)   Height: 5' 9" (1.753 m)   PainSc:   5       Hemoglobin A1C   Date Value Ref Range Status   02/06/2023 9.1 (H) 4.0 - 5.6 % Final     Comment:     ADA Screening Guidelines:  5.7-6.4%  Consistent with prediabetes  >or=6.5%  Consistent with diabetes    High levels of fetal hemoglobin interfere with the HbA1C  assay. Heterozygous hemoglobin variants (HbS, HgC, etc)do  not significantly interfere with this assay.   However, presence of multiple variants may affect accuracy.     08/04/2022 11.0 (H) 4.0 - 5.6 % Final     Comment:     ADA Screening Guidelines:  5.7-6.4%  Consistent with prediabetes  >or=6.5%  Consistent with diabetes    High levels of fetal hemoglobin interfere with the HbA1C  assay. Heterozygous hemoglobin variants (HbS, HgC, etc)do  not significantly interfere with this assay.   However, presence of multiple variants may affect accuracy.     05/05/2022 8.2 (H) 4.0 - 5.6 % Final     Comment:     ADA Screening Guidelines:  5.7-6.4%  Consistent with prediabetes  >or=6.5%  Consistent with diabetes    High levels of fetal hemoglobin interfere with the HbA1C  assay. Heterozygous hemoglobin variants (HbS, HgC, etc)do  not " significantly interfere with this assay.   However, presence of multiple variants may affect accuracy.         Review of Systems   Constitutional:  Negative for chills and fever.   Respiratory:  Negative for shortness of breath.    Cardiovascular:  Negative for chest pain, palpitations, orthopnea, claudication and leg swelling.   Gastrointestinal:  Negative for diarrhea, nausea and vomiting.   Musculoskeletal:  Negative for joint pain.   Skin:  Negative for rash.   Neurological:  Positive for tingling and sensory change.   Psychiatric/Behavioral: Negative.         Objective:      PHYSICAL EXAM: Apperance: Alert and orient in no distress,well developed, and with good attention to grooming and body habits  Patient presents ambulating in tennis shoes.  LOWER EXTREMITY EXAM:  VASCULAR: Dorsalis pedis pulses 2/4 bilateral and Posterior Tibial pulses 2/4 bilateral. Capillary fill time <4 seconds bilateral. Mild edema observed bilateral. Varicosities absent bilateral. Skin temperature of the lower extremities is warm to warm, proximal to distal. Hair growth dim bilateral.  DERMATOLOGICAL: No skin rashes, subcutaneous nodules, lesions, or ulcers observed bilateral. Webspaces 1,2,3,4 bilateral clean, dry and without evidence of break in skin integrity. Nails 1,2,3,4,5 bilateral elongated, thickened, and discolored with subungual debris.  NEUROLOGICAL: Light touch, sharp-dull, proprioception all present and equal bilaterally.  Vibratory sensation diminished at bilateral hallux and navicular. Protective sensation absent at 8/10 sites as tested with a [employer] 5.07 monofilament.   MUSCULOSKELETAL: Muscle strength is 3/5 for foot inverters, everters, plantarflexors, and dorsiflexors. Muscle tone is normal.         Assessment:       ICD-10-CM ICD-9-CM   1. Neuropathy  G62.9 355.9   2. Uncontrolled type 2 diabetes mellitus with hyperglycemia  E11.65 250.02   3. Dermatophytosis of nail  B35.1 110.1         Plan:    Neuropathy    Uncontrolled type 2 diabetes mellitus with hyperglycemia    Dermatophytosis of nail    I counseled the patient on his conditions, regarding findings of my examination, my impressions, and usual treatment plan.   Appointment spent on education about the diabetic foot, neuropathy, and prevention of limb loss.  Shoe inspection. Diabetic Foot Education. Patient reminded of the importance of good nutrition and blood sugar control to help prevent podiatric complications of diabetes. Patient instructed on proper foot hygeine. We discussed wearing proper shoe gear, daily foot inspections, never walking without protective shoe gear, never putting sharp instruments to feet.    With patient's permission, nails 1-5 bilateral were debrided/trimmed in length and thickness aggressively to their soft tissue attachment mechanically and with electric , removing all offending nail and debris. Patient relates relief following the procedure.  Discussed with patient treatments for neuropathy consisting of topical or oral medication.  Prescription written for Lyrica 100mg to be taken once nightly. Informed patient of possible side effects including but not limited to disorientation and drowsiness. Patient instructed to discontinue use if there are any adverse effects. Patient states he understands.   Prescribed Norco 10-325mg to be taken as needed for pain. Patient advised on the possible elevation of blood pressure or heart effects and caution to take pills as needed and to discontinue use if symptoms arise, patient agreed.   Patient  will continue to monitor the areas daily, inspect feet, wear protective shoe gear when ambulatory, moisturizer to maintain skin integrity. Patient reminded of the importance of good nutrition and blood sugar control to help prevent podiatric complications of diabetes.  Patient to return 2 months or sooner if needed.        Katy Fernandes DPM  [facility]

## 2023-03-10 ENCOUNTER — TELEPHONE (OUTPATIENT)
Dept: DIABETES | Facility: CLINIC | Age: 73
End: 2023-03-10
Payer: MEDICARE

## 2023-03-10 ENCOUNTER — PATIENT MESSAGE (OUTPATIENT)
Dept: DIABETES | Facility: CLINIC | Age: 73
End: 2023-03-10
Payer: MEDICARE

## 2023-03-10 DIAGNOSIS — E11.65 UNCONTROLLED TYPE 2 DIABETES MELLITUS WITH HYPERGLYCEMIA, WITH LONG-TERM CURRENT USE OF INSULIN: ICD-10-CM

## 2023-03-10 DIAGNOSIS — Z79.4 UNCONTROLLED TYPE 2 DIABETES MELLITUS WITH HYPERGLYCEMIA, WITH LONG-TERM CURRENT USE OF INSULIN: ICD-10-CM

## 2023-03-10 RX ORDER — INSULIN LISPRO 100 [IU]/ML
12 INJECTION, SOLUTION INTRAVENOUS; SUBCUTANEOUS
Qty: 15 ML | Refills: 5 | Status: SHIPPED | OUTPATIENT
Start: 2023-03-10 | End: 2023-06-30 | Stop reason: SDUPTHER

## 2023-03-10 NOTE — TELEPHONE ENCOUNTER
Received a fax notification from Ricardo Shaw, Patient currently on Novolog but Humalog is preferred by insurance. If not Novolog requires a PA

## 2023-03-10 NOTE — TELEPHONE ENCOUNTER
Spoke with patients wife about glimepiride RX to let her know he's only suppose to be taking the medication 1x a day   She voiced understanding

## 2023-03-10 NOTE — TELEPHONE ENCOUNTER
Noted- please let patient I am sending Humalog due to formulary change- sending now. Same instructions as with Novolog.

## 2023-03-10 NOTE — TELEPHONE ENCOUNTER
Called patient to discuss changing Novalog to Humalog due to insurance   Patient voiced understanding

## 2023-03-31 DIAGNOSIS — Z79.4 UNCONTROLLED TYPE 2 DIABETES MELLITUS WITH HYPERGLYCEMIA, WITH LONG-TERM CURRENT USE OF INSULIN: ICD-10-CM

## 2023-03-31 DIAGNOSIS — E11.65 UNCONTROLLED TYPE 2 DIABETES MELLITUS WITH HYPERGLYCEMIA, WITH LONG-TERM CURRENT USE OF INSULIN: ICD-10-CM

## 2023-03-31 RX ORDER — GLIMEPIRIDE 4 MG/1
4 TABLET ORAL
Qty: 90 TABLET | Refills: 1 | OUTPATIENT
Start: 2023-03-31

## 2023-03-31 RX ORDER — INSULIN LISPRO 100 [IU]/ML
12 INJECTION, SOLUTION INTRAVENOUS; SUBCUTANEOUS
Qty: 15 ML | Refills: 5 | OUTPATIENT
Start: 2023-03-31

## 2023-05-09 ENCOUNTER — OFFICE VISIT (OUTPATIENT)
Dept: PODIATRY | Facility: CLINIC | Age: 73
End: 2023-05-09
Payer: MEDICARE

## 2023-05-09 VITALS — BODY MASS INDEX: 39.62 KG/M2 | WEIGHT: 268.31 LBS

## 2023-05-09 DIAGNOSIS — E11.9 ENCOUNTER FOR COMPREHENSIVE DIABETIC FOOT EXAMINATION, TYPE 2 DIABETES MELLITUS: ICD-10-CM

## 2023-05-09 DIAGNOSIS — G62.9 NEUROPATHY: ICD-10-CM

## 2023-05-09 DIAGNOSIS — M25.562 ACUTE PAIN OF LEFT KNEE: Primary | ICD-10-CM

## 2023-05-09 DIAGNOSIS — B35.1 DERMATOPHYTOSIS OF NAIL: ICD-10-CM

## 2023-05-09 PROCEDURE — 99214 OFFICE O/P EST MOD 30 MIN: CPT | Performed by: PODIATRIST

## 2023-05-09 PROCEDURE — 11721 DEBRIDE NAIL 6 OR MORE: CPT | Mod: Q9,S$GLB,, | Performed by: PODIATRIST

## 2023-05-09 PROCEDURE — 3008F BODY MASS INDEX DOCD: CPT | Mod: CPTII,,, | Performed by: PODIATRIST

## 2023-05-09 PROCEDURE — 99213 OFFICE O/P EST LOW 20 MIN: CPT | Mod: 25,S$GLB,, | Performed by: PODIATRIST

## 2023-05-09 PROCEDURE — 3046F HEMOGLOBIN A1C LEVEL >9.0%: CPT | Mod: CPTII,,, | Performed by: PODIATRIST

## 2023-05-09 PROCEDURE — 3288F FALL RISK ASSESSMENT DOCD: CPT | Mod: CPTII,,, | Performed by: PODIATRIST

## 2023-05-09 PROCEDURE — 1125F AMNT PAIN NOTED PAIN PRSNT: CPT | Mod: CPTII,,, | Performed by: PODIATRIST

## 2023-05-09 PROCEDURE — 3288F PR FALLS RISK ASSESSMENT DOCUMENTED: ICD-10-PCS | Mod: CPTII,,, | Performed by: PODIATRIST

## 2023-05-09 PROCEDURE — 1160F PR REVIEW ALL MEDS BY PRESCRIBER/CLIN PHARMACIST DOCUMENTED: ICD-10-PCS | Mod: CPTII,,, | Performed by: PODIATRIST

## 2023-05-09 PROCEDURE — 1159F MED LIST DOCD IN RCRD: CPT | Mod: CPTII,,, | Performed by: PODIATRIST

## 2023-05-09 PROCEDURE — 1125F PR PAIN SEVERITY QUANTIFIED, PAIN PRESENT: ICD-10-PCS | Mod: CPTII,,, | Performed by: PODIATRIST

## 2023-05-09 PROCEDURE — 1160F RVW MEDS BY RX/DR IN RCRD: CPT | Mod: CPTII,,, | Performed by: PODIATRIST

## 2023-05-09 PROCEDURE — 3008F PR BODY MASS INDEX (BMI) DOCUMENTED: ICD-10-PCS | Mod: CPTII,,, | Performed by: PODIATRIST

## 2023-05-09 PROCEDURE — 99213 PR OFFICE/OUTPT VISIT, EST, LEVL III, 20-29 MIN: ICD-10-PCS | Mod: 25,S$GLB,, | Performed by: PODIATRIST

## 2023-05-09 PROCEDURE — 1101F PT FALLS ASSESS-DOCD LE1/YR: CPT | Mod: CPTII,,, | Performed by: PODIATRIST

## 2023-05-09 PROCEDURE — 11721 PR DEBRIDEMENT OF NAILS, 6 OR MORE: ICD-10-PCS | Mod: Q9,S$GLB,, | Performed by: PODIATRIST

## 2023-05-09 PROCEDURE — 1101F PR PT FALLS ASSESS DOC 0-1 FALLS W/OUT INJ PAST YR: ICD-10-PCS | Mod: CPTII,,, | Performed by: PODIATRIST

## 2023-05-09 PROCEDURE — 1159F PR MEDICATION LIST DOCUMENTED IN MEDICAL RECORD: ICD-10-PCS | Mod: CPTII,,, | Performed by: PODIATRIST

## 2023-05-09 PROCEDURE — 3046F PR MOST RECENT HEMOGLOBIN A1C LEVEL > 9.0%: ICD-10-PCS | Mod: CPTII,,, | Performed by: PODIATRIST

## 2023-05-15 ENCOUNTER — PES CALL (OUTPATIENT)
Dept: ADMINISTRATIVE | Facility: CLINIC | Age: 73
End: 2023-05-15
Payer: MEDICARE

## 2023-05-21 NOTE — PROGRESS NOTES
Subjective:     Patient ID: Anup Miller is a 73 y.o. male.    Chief Complaint: Nail Care (2 month nail care/Type 2 diabetic/Pain 3/10 (toes)/Tennis shoes/Last visit to PCP, 2/6/2023)    Anup is a 73 y.o. male who presents to the clinic for evaluation and treatment of high risk feet. Anup has a past medical history of Acute respiratory failure due to COVID-19 (08/2020), Colon polyp, COVID-19 virus infection (07/2020), COVID-19 virus infection (7/26/2020), Diabetes mellitus type II (Diagnosed 2002), Elevated liver enzymes, Elevated PSA (12/13/2017), Fatty liver disease, nonalcoholic, Fever (8/25/2020), Hyperlipidemia, Hypertension, KHOI on CPAP, and Sleep apnea. The patient's chief complaint is bilateral foot pain. Patient states pain is pins/needles, electric, burning, and hot feeling. Patient states pain is constant. Patient rates pain  5/10(left) and 3/10(right). Patient states he stopped taking Gabapentin because he only sees a little difference. This patient has documented high risk feet requiring routine maintenance secondary to diabetes mellitis and those secondary complications of diabetes, as mentioned..    PCP: Bryce Gonzales MD    Date Last Seen by PCP: 02/06/2023    Current shoe gear:  Affected Foot: tennis shoes     Unaffected Foot: tennis shoes    Hemoglobin A1C   Date Value Ref Range Status   02/06/2023 9.1 (H) 4.0 - 5.6 % Final     Comment:     ADA Screening Guidelines:  5.7-6.4%  Consistent with prediabetes  >or=6.5%  Consistent with diabetes    High levels of fetal hemoglobin interfere with the HbA1C  assay. Heterozygous hemoglobin variants (HbS, HgC, etc)do  not significantly interfere with this assay.   However, presence of multiple variants may affect accuracy.     08/04/2022 11.0 (H) 4.0 - 5.6 % Final     Comment:     ADA Screening Guidelines:  5.7-6.4%  Consistent with prediabetes  >or=6.5%  Consistent with diabetes    High levels of fetal hemoglobin interfere with the HbA1C  assay.  Heterozygous hemoglobin variants (HbS, HgC, etc)do  not significantly interfere with this assay.   However, presence of multiple variants may affect accuracy.     05/05/2022 8.2 (H) 4.0 - 5.6 % Final     Comment:     ADA Screening Guidelines:  5.7-6.4%  Consistent with prediabetes  >or=6.5%  Consistent with diabetes    High levels of fetal hemoglobin interfere with the HbA1C  assay. Heterozygous hemoglobin variants (HbS, HgC, etc)do  not significantly interfere with this assay.   However, presence of multiple variants may affect accuracy.         Patient Active Problem List   Diagnosis    Uncontrolled type 2 diabetes mellitus with hyperglycemia, with long-term current use of insulin    Hypertension associated with diabetes    Hyperlipidemia associated with type 2 diabetes mellitus    Multiple pulmonary nodules determined by computed tomography of lung    KHOI (obstructive sleep apnea)    Hx of colonic polyp    Cardiac arrhythmia    Pneumonia due to COVID-19 virus    Oral phase dysphagia    History of 2019 novel coronavirus disease (COVID-19)    Severe obesity (BMI 35.0-35.9 with comorbidity)    Debility    Neuropathy    Muscular deconditioning    History of DVT (deep vein thrombosis)    Iron deficiency anemia due to chronic blood loss    Chronic kidney disease, stage 3a       Medication List with Changes/Refills   Current Medications    AMLODIPINE (NORVASC) 5 MG TABLET    Take 1 tablet (5 mg total) by mouth once daily.    ASPIRIN 81 MG CAP    Take by mouth.    EMPAGLIFLOZIN (JARDIANCE) 25 MG TABLET    Take 1 tablet (25 mg total) by mouth once daily.    FLASH GLUCOSE SENSOR (FREESTYLE DERRELL 2 SENSOR) KIT    Inject 1 each into the skin every 14 (fourteen) days.    FREESTYLE DERRELL 2 READER MISC    Use to monitor blood sugar consistently.    GLIMEPIRIDE (AMARYL) 4 MG TABLET    Take 1 tablet (4 mg total) by mouth before breakfast.    HUMALOG KWIKPEN INSULIN 100 UNIT/ML PEN    Inject 12 Units into the skin 3 (three) times  "daily before meals.    HYDROCHLOROTHIAZIDE (HYDRODIURIL) 25 MG TABLET    Take 1 tablet (25 mg total) by mouth once daily.    HYDROCODONE-ACETAMINOPHEN (NORCO)  MG PER TABLET    Take 1 tablet by mouth every 6 (six) hours as needed for Pain.    LANTUS SOLOSTAR U-100 INSULIN GLARGINE 100 UNITS/ML SUBQ PEN    Inject 40 Units into the skin every evening.    LIDOCAINE HCL 2% (XYLOCAINE) 2 % JELLY    Apply topically once daily.    LOSARTAN (COZAAR) 100 MG TABLET    Take 100 mg by mouth once daily.    METOPROLOL TARTRATE (LOPRESSOR) 50 MG TABLET    TK 1 T PO BID    PANTOPRAZOLE (PROTONIX) 40 MG TABLET    TK 1 T PO D    PEN NEEDLE, DIABETIC (BD ULTRA-FINE TATYANA PEN NEEDLE) 32 GAUGE X 5/32" NDLE    Use with Lantus daily and Novolog 3 times daily    PREGABALIN (LYRICA) 100 MG CAPSULE    Take 1 capsule (100 mg total) by mouth 2 (two) times daily.    ROSUVASTATIN (CRESTOR) 10 MG TABLET    Take 1 tablet (10 mg total) by mouth once daily.    SEMAGLUTIDE (OZEMPIC) 0.25 MG OR 0.5 MG(2 MG/1.5 ML) PEN INJECTOR    Inject 0.25 mg into the skin every 7 days.    ZINC SULFATE (ZINCATE) 220 (50) MG CAPSULE    Take 220 mg by mouth once daily.       Review of patient's allergies indicates:  No Known Allergies    Past Surgical History:   Procedure Laterality Date    BACK SURGERY      CHOLECYSTECTOMY  2013    COLONOSCOPY N/A 7/19/2018    Procedure: COLONOSCOPY;  Surgeon: Chacorta Lopez III, MD;  Location: [facility];  Service: Endoscopy;  Laterality: N/A;    COLONOSCOPY N/A 3/15/2022    Procedure: COLONOSCOPY;  Surgeon: Jessie Diallo MD;  Location: [facility];  Service: Endoscopy;  Laterality: N/A;    ESOPHAGOGASTRODUODENOSCOPY N/A 8/25/2020    Procedure: EGD (ESOPHAGOGASTRODUODENOSCOPY);  Surgeon: Anup Green MD;  Location: [address];  Service: General;  Laterality: N/A;    TRACHEOSTOMY  08/2020    Temporarily post COVID infection.       Family History   Problem Relation Age of Onset    Asthma Mother     Diabetes Father  "    Cancer Sister         Breast    Diabetes Paternal Aunt     Cancer Maternal Grandmother         Breast    Diabetes Maternal Grandmother     Diabetes Paternal Uncle     Colon cancer Neg Hx        Social History     Socioeconomic History    Marital status:      Spouse name: yoselin    Number of children: 1   Occupational History    Occupation:    Tobacco Use    Smoking status: Never    Smokeless tobacco: Never   Substance and Sexual Activity    Alcohol use: No    Drug use: No    Sexual activity: Yes     Partners: Female     Comment:    Social History Narrative    No smokers or pets in household.       Vitals:    05/09/23 0853   Weight: 121.7 kg (268 lb 4.8 oz)   PainSc:   4   PainLoc: Toe       Hemoglobin A1C   Date Value Ref Range Status   02/06/2023 9.1 (H) 4.0 - 5.6 % Final     Comment:     ADA Screening Guidelines:  5.7-6.4%  Consistent with prediabetes  >or=6.5%  Consistent with diabetes    High levels of fetal hemoglobin interfere with the HbA1C  assay. Heterozygous hemoglobin variants (HbS, HgC, etc)do  not significantly interfere with this assay.   However, presence of multiple variants may affect accuracy.     08/04/2022 11.0 (H) 4.0 - 5.6 % Final     Comment:     ADA Screening Guidelines:  5.7-6.4%  Consistent with prediabetes  >or=6.5%  Consistent with diabetes    High levels of fetal hemoglobin interfere with the HbA1C  assay. Heterozygous hemoglobin variants (HbS, HgC, etc)do  not significantly interfere with this assay.   However, presence of multiple variants may affect accuracy.     05/05/2022 8.2 (H) 4.0 - 5.6 % Final     Comment:     ADA Screening Guidelines:  5.7-6.4%  Consistent with prediabetes  >or=6.5%  Consistent with diabetes    High levels of fetal hemoglobin interfere with the HbA1C  assay. Heterozygous hemoglobin variants (HbS, HgC, etc)do  not significantly interfere with this assay.   However, presence of multiple variants may affect accuracy.         Review of  Systems   Constitutional:  Negative for chills and fever.   Respiratory:  Negative for shortness of breath.    Cardiovascular:  Negative for chest pain, palpitations, orthopnea, claudication and leg swelling.   Gastrointestinal:  Negative for diarrhea, nausea and vomiting.   Musculoskeletal:  Negative for joint pain.   Skin:  Negative for rash.   Neurological:  Positive for tingling and sensory change.   Psychiatric/Behavioral: Negative.         Objective:      PHYSICAL EXAM: Apperance: Alert and orient in no distress,well developed, and with good attention to grooming and body habits  Patient presents ambulating in tennis shoes.  LOWER EXTREMITY EXAM:  VASCULAR: Dorsalis pedis pulses 2/4 bilateral and Posterior Tibial pulses 2/4 bilateral. Capillary fill time <4 seconds bilateral. Mild edema observed bilateral. Varicosities absent bilateral. Skin temperature of the lower extremities is warm to warm, proximal to distal. Hair growth dim bilateral.  DERMATOLOGICAL: No skin rashes, subcutaneous nodules, lesions, or ulcers observed bilateral. Webspaces 1,2,3,4 bilateral clean, dry and without evidence of break in skin integrity. Nails 1,2,3,4,5 bilateral elongated, thickened, and discolored with subungual debris.  NEUROLOGICAL: Light touch, sharp-dull, proprioception all present and equal bilaterally.  Vibratory sensation diminished at bilateral hallux and navicular. Protective sensation absent at 8/10 sites as tested with a [place] Isabel 5.07 monofilament.   MUSCULOSKELETAL: Muscle strength is 3/5 for foot inverters, everters, plantarflexors, and dorsiflexors. Muscle tone is normal.         Assessment:       ICD-10-CM ICD-9-CM   1. Acute pain of left knee  M25.562 719.46   2. Encounter for comprehensive diabetic foot examination, type 2 diabetes mellitus  E11.9 250.00   3. Neuropathy  G62.9 355.9   4. Dermatophytosis of nail  B35.1 110.1         Plan:   Acute pain of left knee  -     Ambulatory referral/consult to  Orthopedics; Future; Expected date: 05/16/2023    Encounter for comprehensive diabetic foot examination, type 2 diabetes mellitus    Neuropathy    Dermatophytosis of nail    I counseled the patient on his conditions, regarding findings of my examination, my impressions, and usual treatment plan.   Appointment spent on education about the diabetic foot, neuropathy, and prevention of limb loss.  Shoe inspection. Diabetic Foot Education. Patient reminded of the importance of good nutrition and blood sugar control to help prevent podiatric complications of diabetes. Patient instructed on proper foot hygeine. We discussed wearing proper shoe gear, daily foot inspections, never walking without protective shoe gear, never putting sharp instruments to feet.    With patient's permission, nails 1-5 bilateral were debrided/trimmed in length and thickness aggressively to their soft tissue attachment mechanically and with electric , removing all offending nail and debris. Patient relates relief following the procedure.  Discussed with patient treatments for neuropathy consisting of topical or oral medication.  Prescription written for Lyrica 100mg to be taken once nightly. Informed patient of possible side effects including but not limited to disorientation and drowsiness. Patient instructed to discontinue use if there are any adverse effects. Patient states he understands.   Prescribed Norco 10-325mg to be taken as needed for pain. Patient advised on the possible elevation of blood pressure or heart effects and caution to take pills as needed and to discontinue use if symptoms arise, patient agreed.   Patient  will continue to monitor the areas daily, inspect feet, wear protective shoe gear when ambulatory, moisturizer to maintain skin integrity. Patient reminded of the importance of good nutrition and blood sugar control to help prevent podiatric complications of diabetes.  Referral completed for left knee pain.    Patient to return 2 months or sooner if needed.        Katy Fernandes DPM  [facility]

## 2023-06-28 ENCOUNTER — PATIENT OUTREACH (OUTPATIENT)
Dept: ADMINISTRATIVE | Facility: HOSPITAL | Age: 73
End: 2023-06-28
Payer: MEDICARE

## 2023-06-28 NOTE — PROGRESS NOTES
[facility] Report: CMP done on 2/6/23 - micro already fiona - upcoming appt already fiona

## 2023-06-30 DIAGNOSIS — Z79.4 UNCONTROLLED TYPE 2 DIABETES MELLITUS WITH HYPERGLYCEMIA, WITH LONG-TERM CURRENT USE OF INSULIN: ICD-10-CM

## 2023-06-30 DIAGNOSIS — E11.65 UNCONTROLLED TYPE 2 DIABETES MELLITUS WITH HYPERGLYCEMIA, WITH LONG-TERM CURRENT USE OF INSULIN: ICD-10-CM

## 2023-06-30 RX ORDER — INSULIN LISPRO 100 [IU]/ML
12 INJECTION, SOLUTION INTRAVENOUS; SUBCUTANEOUS
Qty: 15 ML | Refills: 0 | Status: SHIPPED | OUTPATIENT
Start: 2023-06-30 | End: 2023-10-17 | Stop reason: SDUPTHER

## 2023-06-30 NOTE — TELEPHONE ENCOUNTER
Courtesy refill of Humalog given for coverage of [employer]. Needs follow up scheduled with above provider.    Jamie Haynes PA-C  Diabetes Management

## 2023-07-10 ENCOUNTER — OFFICE VISIT (OUTPATIENT)
Dept: PODIATRY | Facility: CLINIC | Age: 73
End: 2023-07-10
Payer: MEDICARE

## 2023-07-10 VITALS — BODY MASS INDEX: 39.69 KG/M2 | HEIGHT: 69 IN | WEIGHT: 268 LBS

## 2023-07-10 DIAGNOSIS — B35.1 DERMATOPHYTOSIS OF NAIL: ICD-10-CM

## 2023-07-10 DIAGNOSIS — E11.65 UNCONTROLLED TYPE 2 DIABETES MELLITUS WITH HYPERGLYCEMIA: ICD-10-CM

## 2023-07-10 DIAGNOSIS — G62.9 NEUROPATHY: Primary | ICD-10-CM

## 2023-07-10 PROCEDURE — 99999 PR PBB SHADOW E&M-EST. PATIENT-LVL III: CPT | Mod: PBBFAC,,, | Performed by: PODIATRIST

## 2023-07-10 PROCEDURE — 99499 UNLISTED E&M SERVICE: CPT | Mod: S$GLB,,, | Performed by: PODIATRIST

## 2023-07-10 PROCEDURE — 11721 PR DEBRIDEMENT OF NAILS, 6 OR MORE: ICD-10-PCS | Mod: Q9,S$GLB,, | Performed by: PODIATRIST

## 2023-07-10 PROCEDURE — 11721 DEBRIDE NAIL 6 OR MORE: CPT | Mod: Q9,S$GLB,, | Performed by: PODIATRIST

## 2023-07-10 PROCEDURE — 99999 PR PBB SHADOW E&M-EST. PATIENT-LVL III: ICD-10-PCS | Mod: PBBFAC,,, | Performed by: PODIATRIST

## 2023-07-10 PROCEDURE — 99499 NO LOS: ICD-10-PCS | Mod: S$GLB,,, | Performed by: PODIATRIST

## 2023-07-10 NOTE — PROGRESS NOTES
Subjective:     Patient ID: Anup Miller is a 73 y.o. male.    Chief Complaint: Nail Care (C/o numbness tingling and aching in legs and feet, swelling, wears casuals shoes with socks, diabetic Pt, Last seen on 02/26/23 with PCP Dr. Gonzales )    Anup is a 73 y.o. male who presents to the clinic for evaluation and treatment of high risk feet. Anup has a past medical history of Acute respiratory failure due to COVID-19 (08/2020), Colon polyp, COVID-19 virus infection (07/2020), COVID-19 virus infection (7/26/2020), Diabetes mellitus type II (Diagnosed 2002), Elevated liver enzymes, Elevated PSA (12/13/2017), Fatty liver disease, nonalcoholic, Fever (8/25/2020), Hyperlipidemia, Hypertension, KHOI on CPAP, and Sleep apnea. The patient's chief complaint is bilateral foot pain. Patient states pain is pins/needles, electric, burning, and hot feeling. Patient states pain is constant. Patient rates pain  5/10(left) and 3/10(right). Patient states he stopped taking Gabapentin because he only sees a little difference. This patient has documented high risk feet requiring routine maintenance secondary to diabetes mellitis and those secondary complications of diabetes, as mentioned..    PCP: Bryce Gonzales MD    Date Last Seen by PCP: 02/06/2023    Current shoe gear:  Affected Foot: tennis shoes     Unaffected Foot: tennis shoes    Hemoglobin A1C   Date Value Ref Range Status   02/06/2023 9.1 (H) 4.0 - 5.6 % Final     Comment:     ADA Screening Guidelines:  5.7-6.4%  Consistent with prediabetes  >or=6.5%  Consistent with diabetes    High levels of fetal hemoglobin interfere with the HbA1C  assay. Heterozygous hemoglobin variants (HbS, HgC, etc)do  not significantly interfere with this assay.   However, presence of multiple variants may affect accuracy.     08/04/2022 11.0 (H) 4.0 - 5.6 % Final     Comment:     ADA Screening Guidelines:  5.7-6.4%  Consistent with prediabetes  >or=6.5%  Consistent with diabetes    High  levels of fetal hemoglobin interfere with the HbA1C  assay. Heterozygous hemoglobin variants (HbS, HgC, etc)do  not significantly interfere with this assay.   However, presence of multiple variants may affect accuracy.     05/05/2022 8.2 (H) 4.0 - 5.6 % Final     Comment:     ADA Screening Guidelines:  5.7-6.4%  Consistent with prediabetes  >or=6.5%  Consistent with diabetes    High levels of fetal hemoglobin interfere with the HbA1C  assay. Heterozygous hemoglobin variants (HbS, HgC, etc)do  not significantly interfere with this assay.   However, presence of multiple variants may affect accuracy.         Patient Active Problem List   Diagnosis    Uncontrolled type 2 diabetes mellitus with hyperglycemia, with long-term current use of insulin    Hypertension associated with diabetes    Hyperlipidemia associated with type 2 diabetes mellitus    Multiple pulmonary nodules determined by computed tomography of lung    KHOI (obstructive sleep apnea)    Hx of colonic polyp    Cardiac arrhythmia    Pneumonia due to COVID-19 virus    Oral phase dysphagia    History of 2019 novel coronavirus disease (COVID-19)    Severe obesity (BMI 35.0-35.9 with comorbidity)    Debility    Neuropathy    Muscular deconditioning    History of DVT (deep vein thrombosis)    Iron deficiency anemia due to chronic blood loss    Chronic kidney disease, stage 3a       Medication List with Changes/Refills   Current Medications    AMLODIPINE (NORVASC) 5 MG TABLET    Take 1 tablet (5 mg total) by mouth once daily.    ASPIRIN 81 MG CAP    Take by mouth.    EMPAGLIFLOZIN (JARDIANCE) 25 MG TABLET    Take 1 tablet (25 mg total) by mouth once daily.    FLASH GLUCOSE SENSOR (FREESTYLE DERRELL 2 SENSOR) KIT    Inject 1 each into the skin every 14 (fourteen) days.    FREESTYLE DERRELL 2 READER MISC    Use to monitor blood sugar consistently.    GLIMEPIRIDE (AMARYL) 4 MG TABLET    Take 1 tablet (4 mg total) by mouth before breakfast.    HUMALOG KWIKPEN INSULIN 100  "UNIT/ML PEN    Inject 12 Units into the skin 3 (three) times daily before meals.    HYDROCHLOROTHIAZIDE (HYDRODIURIL) 25 MG TABLET    Take 1 tablet (25 mg total) by mouth once daily.    HYDROCODONE-ACETAMINOPHEN (NORCO)  MG PER TABLET    Take 1 tablet by mouth every 6 (six) hours as needed for Pain.    LANTUS SOLOSTAR U-100 INSULIN GLARGINE 100 UNITS/ML SUBQ PEN    Inject 40 Units into the skin every evening.    LIDOCAINE HCL 2% (XYLOCAINE) 2 % JELLY    Apply topically once daily.    LOSARTAN (COZAAR) 100 MG TABLET    Take 100 mg by mouth once daily.    METOPROLOL TARTRATE (LOPRESSOR) 50 MG TABLET    TK 1 T PO BID    PANTOPRAZOLE (PROTONIX) 40 MG TABLET    TK 1 T PO D    PEN NEEDLE, DIABETIC (BD ULTRA-FINE TATYANA PEN NEEDLE) 32 GAUGE X 5/32" NDLE    Use with Lantus daily and Novolog 3 times daily    PREGABALIN (LYRICA) 100 MG CAPSULE    Take 1 capsule (100 mg total) by mouth 2 (two) times daily.    ROSUVASTATIN (CRESTOR) 10 MG TABLET    Take 1 tablet (10 mg total) by mouth once daily.    SEMAGLUTIDE (OZEMPIC) 0.25 MG OR 0.5 MG (2 MG/3 ML) PEN INJECTOR    Inject 0.25 mg into the skin every 7 days.    SEMAGLUTIDE (OZEMPIC) 0.25 MG OR 0.5 MG(2 MG/1.5 ML) PEN INJECTOR    Inject 0.25 mg into the skin every 7 days.    ZINC SULFATE (ZINCATE) 220 (50) MG CAPSULE    Take 220 mg by mouth once daily.       Review of patient's allergies indicates:  No Known Allergies    Past Surgical History:   Procedure Laterality Date    BACK SURGERY      CHOLECYSTECTOMY  2013    COLONOSCOPY N/A 7/19/2018    Procedure: COLONOSCOPY;  Surgeon: Chacorta Lopez III, MD;  Location: [facility] ENDO;  Service: Endoscopy;  Laterality: N/A;    COLONOSCOPY N/A 3/15/2022    Procedure: COLONOSCOPY;  Surgeon: Jessie Diallo MD;  Location: [facility] ENDO;  Service: Endoscopy;  Laterality: N/A;    ESOPHAGOGASTRODUODENOSCOPY N/A 8/25/2020    Procedure: EGD (ESOPHAGOGASTRODUODENOSCOPY);  Surgeon: Anup Green MD;  Location: [facility] OR [address] [place] [address];  Service: " "General;  Laterality: N/A;    TRACHEOSTOMY  08/2020    Temporarily post COVID infection.       Family History   Problem Relation Age of Onset    Asthma Mother     Diabetes Father     Cancer Sister         Breast    Diabetes Paternal Aunt     Cancer Maternal Grandmother         Breast    Diabetes Maternal Grandmother     Diabetes Paternal Uncle     Colon cancer Neg Hx        Social History     Socioeconomic History    Marital status:      Spouse name: yoselin    Number of children: 1   Occupational History    Occupation:    Tobacco Use    Smoking status: Never    Smokeless tobacco: Never   Substance and Sexual Activity    Alcohol use: No    Drug use: No    Sexual activity: Yes     Partners: Female     Comment:    Social History Narrative    No smokers or pets in household.       Vitals:    07/10/23 0854   Weight: 121.6 kg (268 lb)   Height: 5' 9" (1.753 m)       Hemoglobin A1C   Date Value Ref Range Status   02/06/2023 9.1 (H) 4.0 - 5.6 % Final     Comment:     ADA Screening Guidelines:  5.7-6.4%  Consistent with prediabetes  >or=6.5%  Consistent with diabetes    High levels of fetal hemoglobin interfere with the HbA1C  assay. Heterozygous hemoglobin variants (HbS, HgC, etc)do  not significantly interfere with this assay.   However, presence of multiple variants may affect accuracy.     08/04/2022 11.0 (H) 4.0 - 5.6 % Final     Comment:     ADA Screening Guidelines:  5.7-6.4%  Consistent with prediabetes  >or=6.5%  Consistent with diabetes    High levels of fetal hemoglobin interfere with the HbA1C  assay. Heterozygous hemoglobin variants (HbS, HgC, etc)do  not significantly interfere with this assay.   However, presence of multiple variants may affect accuracy.     05/05/2022 8.2 (H) 4.0 - 5.6 % Final     Comment:     ADA Screening Guidelines:  5.7-6.4%  Consistent with prediabetes  >or=6.5%  Consistent with diabetes    High levels of fetal hemoglobin interfere with the HbA1C  assay. Heterozygous " hemoglobin variants (HbS, HgC, etc)do  not significantly interfere with this assay.   However, presence of multiple variants may affect accuracy.         Review of Systems   Constitutional:  Negative for chills and fever.   Respiratory:  Negative for shortness of breath.    Cardiovascular:  Negative for chest pain, palpitations, orthopnea, claudication and leg swelling.   Gastrointestinal:  Negative for diarrhea, nausea and vomiting.   Musculoskeletal:  Negative for joint pain.   Skin:  Negative for rash.   Neurological:  Positive for tingling and sensory change.   Psychiatric/Behavioral: Negative.         Objective:      PHYSICAL EXAM: Apperance: Alert and orient in no distress,well developed, and with good attention to grooming and body habits  Patient presents ambulating in tennis shoes.  LOWER EXTREMITY EXAM:  VASCULAR: Dorsalis pedis pulses 2/4 bilateral and Posterior Tibial pulses 2/4 bilateral. Capillary fill time <4 seconds bilateral. Mild edema observed bilateral. Varicosities absent bilateral. Skin temperature of the lower extremities is warm to warm, proximal to distal. Hair growth dim bilateral.  DERMATOLOGICAL: No skin rashes, subcutaneous nodules, lesions, or ulcers observed bilateral. Webspaces 1,2,3,4 bilateral clean, dry and without evidence of break in skin integrity. Nails 1,2,3,4,5 bilateral elongated, thickened, and discolored with subungual debris.  NEUROLOGICAL: Light touch, sharp-dull, proprioception all present and equal bilaterally.  Vibratory sensation diminished at bilateral hallux and navicular. Protective sensation absent at 8/10 sites as tested with a [place]-Maria Isabel 5.07 monofilament.   MUSCULOSKELETAL: Muscle strength is 3/5 for foot inverters, everters, plantarflexors, and dorsiflexors. Muscle tone is normal.         Assessment:       ICD-10-CM ICD-9-CM   1. Neuropathy  G62.9 355.9   2. Dermatophytosis of nail  B35.1 110.1   3. Uncontrolled type 2 diabetes mellitus with  hyperglycemia  E11.65 250.02           Plan:   Neuropathy    Dermatophytosis of nail    Uncontrolled type 2 diabetes mellitus with hyperglycemia      I counseled the patient on his conditions, regarding findings of my examination, my impressions, and usual treatment plan.   Appointment spent on education about the diabetic foot, neuropathy, and prevention of limb loss.  Shoe inspection. Diabetic Foot Education. Patient reminded of the importance of good nutrition and blood sugar control to help prevent podiatric complications of diabetes. Patient instructed on proper foot hygeine. We discussed wearing proper shoe gear, daily foot inspections, never walking without protective shoe gear, never putting sharp instruments to feet.    With patient's permission, nails 1-5 bilateral were debrided/trimmed in length and thickness aggressively to their soft tissue attachment mechanically and with electric , removing all offending nail and debris. Patient relates relief following the procedure.  Patient  will continue to monitor the areas daily, inspect feet, wear protective shoe gear when ambulatory, moisturizer to maintain skin integrity. Patient reminded of the importance of good nutrition and blood sugar control to help prevent podiatric complications of diabetes.  Referral completed for left knee pain.   Patient to return 2 months or sooner if needed.        Katy Fernandes DPM  [facility]

## 2023-07-31 ENCOUNTER — PATIENT OUTREACH (OUTPATIENT)
Dept: ADMINISTRATIVE | Facility: HOSPITAL | Age: 73
End: 2023-07-31
Payer: MEDICARE

## 2023-07-31 NOTE — PROGRESS NOTES
DM lab report: Per chart review, patient has a lab appointment on 8/7/23 for recheck of diabetic labs.

## 2023-08-01 DIAGNOSIS — E11.59 HYPERTENSION ASSOCIATED WITH DIABETES: ICD-10-CM

## 2023-08-01 DIAGNOSIS — I15.2 HYPERTENSION ASSOCIATED WITH DIABETES: ICD-10-CM

## 2023-08-02 RX ORDER — AMLODIPINE BESYLATE 5 MG/1
5 TABLET ORAL DAILY
Qty: 90 TABLET | Refills: 1 | Status: SHIPPED | OUTPATIENT
Start: 2023-08-02 | End: 2024-03-11 | Stop reason: SDUPTHER

## 2023-08-02 NOTE — TELEPHONE ENCOUNTER
Care Due:                  Date            Visit Type   Department     Provider  --------------------------------------------------------------------------------                                EP -                              PRIMARY      [facility] INTERNAL  Last Visit: 02-      CARE ([facility])   ORQUIDEA Gonzales                              EP -                              PRIMARY      [facility] INTERNAL  Next Visit: 08-      CARE ([facility])   ORQUIDEA Gonzales                                                            Last  Test          Frequency    Reason                     Performed    Due Date  --------------------------------------------------------------------------------    HBA1C.......  6 months...  semaglutide..............  02- 08-    Health [facility] Embedded Care Due Messages. Reference number: [id number].   8/01/2023 7:23:20 PM CDT

## 2023-08-02 NOTE — TELEPHONE ENCOUNTER
Refill Routing Note   Medication(s) are not appropriate for processing by [facility] for the following reason(s):      Required vitals abnormal    [facility] action(s):  Defer Care Due:  None identified     Medication Therapy Plan: FLOS      Appointments  past 12m or future 3m with PCP    Date Provider   Last Visit   2/6/2023 Bryce Gonzales MD   Next Visit   8/7/2023 Bryce Gonzales MD   ED visits in past 90 days: 0        Note composed:4:09 AM 08/02/2023

## 2023-08-07 ENCOUNTER — PATIENT OUTREACH (OUTPATIENT)
Dept: ADMINISTRATIVE | Facility: HOSPITAL | Age: 73
End: 2023-08-07
Payer: MEDICARE

## 2023-08-07 ENCOUNTER — HOSPITAL ENCOUNTER (OUTPATIENT)
Dept: RADIOLOGY | Facility: HOSPITAL | Age: 73
Discharge: HOME | End: 2023-08-07
Attending: INTERNAL MEDICINE
Payer: MEDICARE

## 2023-08-07 ENCOUNTER — OFFICE VISIT (OUTPATIENT)
Dept: INTERNAL MEDICINE | Facility: CLINIC | Age: 73
End: 2023-08-07
Payer: MEDICARE

## 2023-08-07 ENCOUNTER — TELEPHONE (OUTPATIENT)
Dept: DIABETES | Facility: CLINIC | Age: 73
End: 2023-08-07
Payer: MEDICARE

## 2023-08-07 VITALS
OXYGEN SATURATION: 98 % | DIASTOLIC BLOOD PRESSURE: 80 MMHG | HEIGHT: 69 IN | TEMPERATURE: 98 F | WEIGHT: 265.19 LBS | HEART RATE: 88 BPM | BODY MASS INDEX: 39.28 KG/M2 | SYSTOLIC BLOOD PRESSURE: 136 MMHG

## 2023-08-07 DIAGNOSIS — R91.8 MULTIPLE PULMONARY NODULES DETERMINED BY COMPUTED TOMOGRAPHY OF LUNG: ICD-10-CM

## 2023-08-07 DIAGNOSIS — G89.29 CHRONIC PAIN OF LEFT KNEE: ICD-10-CM

## 2023-08-07 DIAGNOSIS — R91.1 SOLITARY PULMONARY NODULE: ICD-10-CM

## 2023-08-07 DIAGNOSIS — M25.562 CHRONIC PAIN OF LEFT KNEE: ICD-10-CM

## 2023-08-07 DIAGNOSIS — I15.2 HYPERTENSION ASSOCIATED WITH DIABETES: ICD-10-CM

## 2023-08-07 DIAGNOSIS — N18.31 CHRONIC KIDNEY DISEASE, STAGE 3A: ICD-10-CM

## 2023-08-07 DIAGNOSIS — D50.0 IRON DEFICIENCY ANEMIA DUE TO CHRONIC BLOOD LOSS: ICD-10-CM

## 2023-08-07 DIAGNOSIS — E78.5 HYPERLIPIDEMIA ASSOCIATED WITH TYPE 2 DIABETES MELLITUS: ICD-10-CM

## 2023-08-07 DIAGNOSIS — E11.65 UNCONTROLLED TYPE 2 DIABETES MELLITUS WITH HYPERGLYCEMIA, WITH LONG-TERM CURRENT USE OF INSULIN: Primary | ICD-10-CM

## 2023-08-07 DIAGNOSIS — E11.69 HYPERLIPIDEMIA ASSOCIATED WITH TYPE 2 DIABETES MELLITUS: ICD-10-CM

## 2023-08-07 DIAGNOSIS — E11.59 HYPERTENSION ASSOCIATED WITH DIABETES: ICD-10-CM

## 2023-08-07 DIAGNOSIS — G62.9 NEUROPATHY: ICD-10-CM

## 2023-08-07 DIAGNOSIS — I82.439 DEEP VEIN THROMBOSIS (DVT) OF POPLITEAL VEIN, UNSPECIFIED CHRONICITY, UNSPECIFIED LATERALITY: ICD-10-CM

## 2023-08-07 DIAGNOSIS — Z79.4 UNCONTROLLED TYPE 2 DIABETES MELLITUS WITH HYPERGLYCEMIA, WITH LONG-TERM CURRENT USE OF INSULIN: Primary | ICD-10-CM

## 2023-08-07 DIAGNOSIS — Z86.718 HISTORY OF DVT (DEEP VEIN THROMBOSIS): ICD-10-CM

## 2023-08-07 PROBLEM — E78.1 TYPE 2 DIABETES MELLITUS WITH HYPERTRIGLYCERIDEMIA: Status: ACTIVE | Noted: 2023-08-07

## 2023-08-07 PROCEDURE — 3008F BODY MASS INDEX DOCD: CPT | Mod: CPTII,S$GLB,, | Performed by: INTERNAL MEDICINE

## 2023-08-07 PROCEDURE — 3008F PR BODY MASS INDEX (BMI) DOCUMENTED: ICD-10-PCS | Mod: CPTII,S$GLB,, | Performed by: INTERNAL MEDICINE

## 2023-08-07 PROCEDURE — 3046F PR MOST RECENT HEMOGLOBIN A1C LEVEL > 9.0%: ICD-10-PCS | Mod: CPTII,S$GLB,, | Performed by: INTERNAL MEDICINE

## 2023-08-07 PROCEDURE — 3288F FALL RISK ASSESSMENT DOCD: CPT | Mod: CPTII,S$GLB,, | Performed by: INTERNAL MEDICINE

## 2023-08-07 PROCEDURE — 1126F PR PAIN SEVERITY QUANTIFIED, NO PAIN PRESENT: ICD-10-PCS | Mod: CPTII,S$GLB,, | Performed by: INTERNAL MEDICINE

## 2023-08-07 PROCEDURE — 73562 XR KNEE ORTHO LEFT: ICD-10-PCS | Mod: 26,LT,, | Performed by: RADIOLOGY

## 2023-08-07 PROCEDURE — 73560 XR KNEE ORTHO LEFT: ICD-10-PCS | Mod: 26,RT,, | Performed by: RADIOLOGY

## 2023-08-07 PROCEDURE — 3046F HEMOGLOBIN A1C LEVEL >9.0%: CPT | Mod: CPTII,S$GLB,, | Performed by: INTERNAL MEDICINE

## 2023-08-07 PROCEDURE — 1159F MED LIST DOCD IN RCRD: CPT | Mod: CPTII,S$GLB,, | Performed by: INTERNAL MEDICINE

## 2023-08-07 PROCEDURE — 3079F PR MOST RECENT DIASTOLIC BLOOD PRESSURE 80-89 MM HG: ICD-10-PCS | Mod: CPTII,S$GLB,, | Performed by: INTERNAL MEDICINE

## 2023-08-07 PROCEDURE — 3075F SYST BP GE 130 - 139MM HG: CPT | Mod: CPTII,S$GLB,, | Performed by: INTERNAL MEDICINE

## 2023-08-07 PROCEDURE — 73562 X-RAY EXAM OF KNEE 3: CPT | Mod: 26,LT,, | Performed by: RADIOLOGY

## 2023-08-07 PROCEDURE — 3079F DIAST BP 80-89 MM HG: CPT | Mod: CPTII,S$GLB,, | Performed by: INTERNAL MEDICINE

## 2023-08-07 PROCEDURE — 1159F PR MEDICATION LIST DOCUMENTED IN MEDICAL RECORD: ICD-10-PCS | Mod: CPTII,S$GLB,, | Performed by: INTERNAL MEDICINE

## 2023-08-07 PROCEDURE — 1101F PT FALLS ASSESS-DOCD LE1/YR: CPT | Mod: CPTII,S$GLB,, | Performed by: INTERNAL MEDICINE

## 2023-08-07 PROCEDURE — 1126F AMNT PAIN NOTED NONE PRSNT: CPT | Mod: CPTII,S$GLB,, | Performed by: INTERNAL MEDICINE

## 2023-08-07 PROCEDURE — 3288F PR FALLS RISK ASSESSMENT DOCUMENTED: ICD-10-PCS | Mod: CPTII,S$GLB,, | Performed by: INTERNAL MEDICINE

## 2023-08-07 PROCEDURE — 73560 X-RAY EXAM OF KNEE 1 OR 2: CPT | Mod: TC,RT

## 2023-08-07 PROCEDURE — 73560 X-RAY EXAM OF KNEE 1 OR 2: CPT | Mod: 26,RT,, | Performed by: RADIOLOGY

## 2023-08-07 PROCEDURE — 1101F PR PT FALLS ASSESS DOC 0-1 FALLS W/OUT INJ PAST YR: ICD-10-PCS | Mod: CPTII,S$GLB,, | Performed by: INTERNAL MEDICINE

## 2023-08-07 PROCEDURE — 99999 PR PBB SHADOW E&M-EST. PATIENT-LVL V: CPT | Mod: PBBFAC,,, | Performed by: INTERNAL MEDICINE

## 2023-08-07 PROCEDURE — 99215 OFFICE O/P EST HI 40 MIN: CPT | Mod: S$GLB,,, | Performed by: INTERNAL MEDICINE

## 2023-08-07 PROCEDURE — 3075F PR MOST RECENT SYSTOLIC BLOOD PRESS GE 130-139MM HG: ICD-10-PCS | Mod: CPTII,S$GLB,, | Performed by: INTERNAL MEDICINE

## 2023-08-07 PROCEDURE — 99215 PR OFFICE/OUTPT VISIT, EST, LEVL V, 40-54 MIN: ICD-10-PCS | Mod: S$GLB,,, | Performed by: INTERNAL MEDICINE

## 2023-08-07 PROCEDURE — 99999 PR PBB SHADOW E&M-EST. PATIENT-LVL V: ICD-10-PCS | Mod: PBBFAC,,, | Performed by: INTERNAL MEDICINE

## 2023-08-07 RX ORDER — HYDROCHLOROTHIAZIDE 25 MG/1
25 TABLET ORAL DAILY
Qty: 90 TABLET | Refills: 2 | Status: SHIPPED | OUTPATIENT
Start: 2023-08-07 | End: 2024-02-29 | Stop reason: SDUPTHER

## 2023-08-07 RX ORDER — SEMAGLUTIDE 0.68 MG/ML
0.25 INJECTION, SOLUTION SUBCUTANEOUS
Qty: 2 EACH | Refills: 0 | Status: SHIPPED | OUTPATIENT
Start: 2023-08-07 | End: 2023-08-31 | Stop reason: SDUPTHER

## 2023-08-07 RX ORDER — ROSUVASTATIN CALCIUM 10 MG/1
10 TABLET, COATED ORAL DAILY
Qty: 90 TABLET | Refills: 1 | Status: SHIPPED | OUTPATIENT
Start: 2023-08-07 | End: 2024-03-11 | Stop reason: SDUPTHER

## 2023-08-07 NOTE — PATIENT INSTRUCTIONS
Check with your pharmacy regarding new shingles vaccine.      Call billing department for pricing before completing your CT scan.  [place] phone number [phone].

## 2023-08-07 NOTE — PROGRESS NOTES
Subjective:      Patient ID: Anup Miller is a 73 y.o. male.    Chief Complaint: Follow-up    HPI    72 yo with   Patient Active Problem List   Diagnosis    Uncontrolled type 2 diabetes mellitus with hyperglycemia, with long-term current use of insulin    Hypertension associated with diabetes    Hyperlipidemia associated with type 2 diabetes mellitus    Multiple pulmonary nodules determined by computed tomography of lung    KHOI (obstructive sleep apnea)    Hx of colonic polyp    Cardiac arrhythmia    Pneumonia due to COVID-19 virus    Oral phase dysphagia    History of 2019 novel coronavirus disease (COVID-19)    Severe obesity (BMI 35.0-35.9 with comorbidity)    Debility    Neuropathy    Muscular deconditioning    History of DVT (deep vein thrombosis)    Iron deficiency anemia due to chronic blood loss    Chronic kidney disease, stage 3a     Past Medical History:   Diagnosis Date    Acute respiratory failure due to COVID-19 08/2020    Colon polyp     COVID-19 virus infection 07/2020    COVID-19 virus infection 7/26/2020    Diabetes mellitus type II Diagnosed 2002    Elevated liver enzymes     Elevated PSA 12/13/2017    Did not f/u with urology as rec in 2015    Fatty liver disease, nonalcoholic     Fever 8/25/2020    -- patient has been spiking fever and since admission  -- started spiking more frequent low-grade fever with a max of 100.8 on 8/21 -- started on vanc and cefepime  -- urine culture from 08/23 grew Enterococcus -- patient keeps spiking fever despite broad-spectrum antibiotic -- concern for resistance organism (VRE)  -- consulting ID if fever persist.    Hyperlipidemia     Hypertension     KHOI on CPAP     Sleep apnea      Here today for management of mult med problems as outlined below.  Compliant with meds without significant side effects. appetite good.     Admits to diet noncompliance.  Reports home glucose upper 100s to low 200s.    He also complains of continue difficulty walking and  "neuropathy bilateral feet.  Has had some improvement.  Has completed physical therapy.    Patient also complains of chronic left knee pain for over 1 year.  Continues to worsen.  Reports he has been told he has arthritis in his knee.    Social History     Socioeconomic History    Marital status:      Spouse name: yoselin    Number of children: 1   Occupational History    Occupation:    Tobacco Use    Smoking status: Never    Smokeless tobacco: Never   Substance and Sexual Activity    Alcohol use: No    Drug use: No    Sexual activity: Yes     Partners: Female     Comment:    Social History Narrative    No smokers or pets in household.       Review of Systems   Constitutional:  Negative for chills and fever.   HENT:  Negative for ear pain and sore throat.    Respiratory:  Negative for cough.    Cardiovascular:  Negative for chest pain.   Gastrointestinal:  Negative for abdominal pain and blood in stool.   Genitourinary:  Negative for dysuria and hematuria.   Neurological:  Negative for seizures and syncope.     Objective:   /80 (BP Location: Right arm, Patient Position: Sitting, BP Method: Large (Manual))   Pulse 88   Temp 98.2 °F (36.8 °C) (Tympanic)   Ht 5' 9" (1.753 m)   Wt 120.3 kg (265 lb 3.4 oz)   SpO2 98%   BMI 39.17 kg/m²     Physical Exam  Constitutional:       General: He is not in acute distress.     Appearance: He is well-developed.   HENT:      Head: Normocephalic and atraumatic.   Eyes:      Extraocular Movements: Extraocular movements intact.   Neck:      Thyroid: No thyromegaly.   Cardiovascular:      Rate and Rhythm: Normal rate and regular rhythm.   Pulmonary:      Breath sounds: Normal breath sounds. No wheezing or rales.   Abdominal:      General: Bowel sounds are normal.      Palpations: Abdomen is soft.      Tenderness: There is no abdominal tenderness.   Musculoskeletal:         General: No swelling.      Cervical back: Neck supple. No rigidity.   Lymphadenopathy: "      Cervical: No cervical adenopathy.   Skin:     General: Skin is warm and dry.   Neurological:      Mental Status: He is alert and oriented to person, place, and time.   Psychiatric:         Behavior: Behavior normal.         Lab Results   Component Value Date    WBC 6.59 06/16/2022    HGB 14.1 06/16/2022    HGB 13.7 (L) 07/19/2021    HGB 13.7 (L) 07/19/2021    HCT 44.1 06/16/2022    MCV 88 06/16/2022    MCV 89 07/19/2021    MCV 89 07/19/2021     06/16/2022    CHOL 251 (H) 02/06/2023    TRIG 163 (H) 02/06/2023    HDL 40 02/06/2023    LDLCALC 178.4 (H) 02/06/2023    LDLCALC 86.0 03/10/2022    LDLCALC 94.8 07/19/2021    ALT 80 (H) 02/06/2023    AST 68 (H) 02/06/2023     02/06/2023    K 4.1 02/06/2023    CALCIUM 9.0 02/06/2023     02/06/2023    CO2 19 (L) 02/06/2023    BUN 17 02/06/2023    CREATININE 1.4 02/06/2023    CREATININE 1.5 (H) 06/16/2022    CREATININE 1.6 (H) 03/10/2022    EGFRNORACEVR 54.1 (A) 02/06/2023    TSH 1.432 03/10/2022    TSH 0.937 07/19/2021    TSH 0.177 (L) 07/26/2020    PSA 3.5 10/23/2020    PSA 10.1 (H) 07/25/2019    PSA 9.8 (H) 02/07/2019    PSADIAG 12.3 (H) 01/29/2020     (H) 02/06/2023    HGBA1C 9.1 (H) 02/06/2023    HGBA1C 11.0 (H) 08/04/2022    HGBA1C 8.2 (H) 05/05/2022    BNP <10 07/26/2020          The 10-year ASCVD risk score (Yeimi LAWLER, et al., 2019) is: 41.5%    Values used to calculate the score:      Age: 73 years      Sex: Male      Is Non- : Yes      Diabetic: Yes      Tobacco smoker: No      Systolic Blood Pressure: 136 mmHg      Is BP treated: Yes      HDL Cholesterol: 40 mg/dL      Total Cholesterol: 251 mg/dL     Assessment:     1. Uncontrolled type 2 diabetes mellitus with hyperglycemia, with long-term current use of insulin    2. Multiple pulmonary nodules determined by computed tomography of lung    3. Hyperlipidemia associated with type 2 diabetes mellitus    4. Chronic kidney disease, stage 3a    5. Iron deficiency  anemia due to chronic blood loss    6. Hypertension associated with diabetes    7. History of DVT (deep vein thrombosis)    8. Deep vein thrombosis (DVT) of popliteal vein, unspecified chronicity, unspecified laterality    9. Neuropathy    10. Solitary pulmonary nodule    11. Chronic pain of left knee      Plan:   1. Uncontrolled type 2 diabetes mellitus with hyperglycemia, with long-term current use of insulin  Assessment & Plan:  Continue current medications.  Check A1c today.    Orders:  -     Ambulatory referral/consult to Optometry; Future; Expected date: 08/14/2023  -     Ambulatory referral/consult to Diabetes Education; Future; Expected date: 08/14/2023    2. Multiple pulmonary nodules determined by computed tomography of lung  Overview:  Found 2013. Largest nodule 4.2mm. Pt has declined repeat scanning.       3. Hyperlipidemia associated with type 2 diabetes mellitus  Assessment & Plan:  Previously not at goal.  Recheck on statin.    Orders:  -     Lipid Panel; Future; Expected date: 08/07/2023  -     Hepatic Function Panel; Future; Expected date: 09/07/2023  -     rosuvastatin (CRESTOR) 10 MG tablet; Take 1 tablet (10 mg total) by mouth once daily.  Dispense: 90 tablet; Refill: 1  -     semaglutide (OZEMPIC) 0.25 mg or 0.5 mg (2 mg/3 mL) pen injector; Inject 0.25 mg into the skin every 7 days.  Dispense: 2 each; Refill: 0    4. Chronic kidney disease, stage 3a  Overview:  stable      5. Iron deficiency anemia due to chronic blood loss  Overview:  Up-to-date with Hematology      6. Hypertension associated with diabetes  Overview:  Controlled.  Continue current medications    Orders:  -     hydroCHLOROthiazide (HYDRODIURIL) 25 MG tablet; Take 1 tablet (25 mg total) by mouth once daily.  Dispense: 90 tablet; Refill: 2    7. History of DVT (deep vein thrombosis)  Overview:  Unprovoked. Pt has declined anticoagulation in the past.  Declines again today.  He is willing to discuss further with  Hematology/Oncology.     Partially occlusive DVT left popliteal vein.   05/31/2021.     I attempted to order repeat ultrasound of left lower extremity on 08/07/2023 due to DVT and computer indicated that this would not be covered for reason DVT.  Patient will discuss further with Hematology/Oncology    Orders:  -     Ambulatory referral/consult to Hematology / Oncology; Future; Expected date: 08/14/2023    8. Deep vein thrombosis (DVT) of popliteal vein, unspecified chronicity, unspecified laterality    9. Neuropathy  Overview:  Pt reports started after covid infection. Gabapentin 300 tid started as inpatient.   Patient saw Physical Medicine and Rehabilitation in 2021.  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  He has completed extensive physical therapy.  He has had significant improvement but continues with difficulty walking and chronic pain in his feet.    Orders:  -     Cancel: Ambulatory referral/consult to Pediatric Physical Medicine Rehab; Future; Expected date: 08/14/2023  -     Ambulatory referral/consult to Physical Medicine Rehab; Future; Expected date: 08/14/2023    10. Solitary pulmonary nodule  -     CT Chest Without Contrast; Future; Expected date: 08/07/2023    11. Chronic pain of left knee  -     X-Ray Knee Complete 4 or More Views Left; Future; Expected date: 08/07/2023  -     Ambulatory referral/consult to Orthopedics; Future; Expected date: 08/14/2023      40+ minutes of total time spent on the encounter, which includes face to face time and non-face to face time preparing to see the patient (eg, review of tests), Obtaining and/or reviewing separately obtained history, documenting clinical information in the electronic or other health record, independently interpreting results (not separately reported) and communicating results to the patient/family/caregiver, or Care coordination (not separately reported).       Patient Instructions   Check with your pharmacy regarding  new shingles vaccine.      Call billing department for pricing before completing your CT scan.  [place] phone number [phone].     Future Appointments   Date Time Provider Department Center   8/7/2023  1:40 PM LABORATORY, [facility] HGV LAB [facility]   8/10/2023  7:00 AM Lela Crespo FNP [facility] IM [facility]   8/10/2023  7:30 AM HG CT1 LIMIT 500 LBS HGV CT SCAN [facility]   8/10/2023  8:40 AM Florentino Todd MD [facility] SPMEDPC [facility]   9/11/2023  8:15 AM Katy Fernandes DPM [facility] POD [facility]   9/19/2023  8:45 AM Ama Hidalgo MD [facility] OPHTHAL [facility]   9/29/2023  8:30 AM Katie Villa NP [facility] BHEMON [facility]   2/7/2024  8:20 [facility]       Lab Frequency Next Occurrence   Hemoglobin A1c Once 12/07/2022   Ambulatory referral/consult to [facility] Lif[facility] Wellness Once 02/13/2023   Ambulatory referral/consult to Diabetic Advanced Practice Providers (Medical Management) Once 02/13/2023   Hemoglobin A1C Once 02/24/2023   Microalbumin/Creatinine Ratio, Urine Once 02/24/2023   Ambulatory referral/consult to Orthopedics Once 05/16/2023       Follow up in about 6 months (around 2/7/2024), or if symptoms worsen or fail to improve.

## 2023-08-08 DIAGNOSIS — E11.65 UNCONTROLLED TYPE 2 DIABETES MELLITUS WITH HYPERGLYCEMIA, WITH LONG-TERM CURRENT USE OF INSULIN: Primary | ICD-10-CM

## 2023-08-08 DIAGNOSIS — Z79.4 UNCONTROLLED TYPE 2 DIABETES MELLITUS WITH HYPERGLYCEMIA, WITH LONG-TERM CURRENT USE OF INSULIN: Primary | ICD-10-CM

## 2023-08-09 ENCOUNTER — PATIENT MESSAGE (OUTPATIENT)
Dept: RADIOLOGY | Facility: HOSPITAL | Age: 73
End: 2023-08-09
Payer: MEDICARE

## 2023-08-22 ENCOUNTER — TELEPHONE (OUTPATIENT)
Dept: DIABETES | Facility: CLINIC | Age: 73
End: 2023-08-22
Payer: MEDICARE

## 2023-08-22 NOTE — TELEPHONE ENCOUNTER
Left VM about missed 8:30 am appt from today 8/22/23. Will send message; also left call back number.

## 2023-08-31 DIAGNOSIS — E78.5 HYPERLIPIDEMIA ASSOCIATED WITH TYPE 2 DIABETES MELLITUS: ICD-10-CM

## 2023-08-31 DIAGNOSIS — E11.69 HYPERLIPIDEMIA ASSOCIATED WITH TYPE 2 DIABETES MELLITUS: ICD-10-CM

## 2023-08-31 RX ORDER — SEMAGLUTIDE 0.68 MG/ML
0.25 INJECTION, SOLUTION SUBCUTANEOUS
Qty: 2 EACH | Refills: 1 | Status: SHIPPED | OUTPATIENT
Start: 2023-08-31 | End: 2023-10-17 | Stop reason: DRUGHIGH

## 2023-08-31 NOTE — TELEPHONE ENCOUNTER
No care due was identified.  Health [facility] Embedded Care Due Messages. Reference number: [id number].   8/31/2023 11:07:59 AM CDT

## 2023-09-05 PROBLEM — E11.22 TYPE 2 DIABETES MELLITUS WITH STAGE 3A CHRONIC KIDNEY DISEASE, WITH LONG-TERM CURRENT USE OF INSULIN: Status: ACTIVE | Noted: 2022-05-05

## 2023-09-05 PROBLEM — Z79.4 TYPE 2 DIABETES MELLITUS WITH STAGE 3A CHRONIC KIDNEY DISEASE, WITH LONG-TERM CURRENT USE OF INSULIN: Status: ACTIVE | Noted: 2022-05-05

## 2023-09-06 ENCOUNTER — TELEPHONE (OUTPATIENT)
Dept: PHARMACY | Facility: CLINIC | Age: 73
End: 2023-09-06
Payer: MEDICARE

## 2023-09-06 ENCOUNTER — OFFICE VISIT (OUTPATIENT)
Dept: INTERNAL MEDICINE | Facility: CLINIC | Age: 73
End: 2023-09-06
Payer: MEDICARE

## 2023-09-06 VITALS
WEIGHT: 261.44 LBS | BODY MASS INDEX: 38.72 KG/M2 | HEART RATE: 76 BPM | SYSTOLIC BLOOD PRESSURE: 130 MMHG | DIASTOLIC BLOOD PRESSURE: 76 MMHG | HEIGHT: 69 IN | RESPIRATION RATE: 20 BRPM

## 2023-09-06 DIAGNOSIS — F41.1 GAD (GENERALIZED ANXIETY DISORDER): ICD-10-CM

## 2023-09-06 DIAGNOSIS — E78.5 HYPERLIPIDEMIA ASSOCIATED WITH TYPE 2 DIABETES MELLITUS: ICD-10-CM

## 2023-09-06 DIAGNOSIS — Z00.00 ENCOUNTER FOR PREVENTATIVE ADULT HEALTH CARE EXAMINATION: Primary | ICD-10-CM

## 2023-09-06 DIAGNOSIS — I15.2 HYPERTENSION ASSOCIATED WITH DIABETES: ICD-10-CM

## 2023-09-06 DIAGNOSIS — Z79.4 TYPE 2 DIABETES MELLITUS WITH STAGE 3A CHRONIC KIDNEY DISEASE, WITH LONG-TERM CURRENT USE OF INSULIN: ICD-10-CM

## 2023-09-06 DIAGNOSIS — N18.31 TYPE 2 DIABETES MELLITUS WITH STAGE 3A CHRONIC KIDNEY DISEASE, WITH LONG-TERM CURRENT USE OF INSULIN: ICD-10-CM

## 2023-09-06 DIAGNOSIS — G62.9 NEUROPATHY: ICD-10-CM

## 2023-09-06 DIAGNOSIS — I49.9 CARDIAC ARRHYTHMIA, UNSPECIFIED CARDIAC ARRHYTHMIA TYPE: ICD-10-CM

## 2023-09-06 DIAGNOSIS — Z86.010 HX OF COLONIC POLYP: ICD-10-CM

## 2023-09-06 DIAGNOSIS — E11.69 HYPERLIPIDEMIA ASSOCIATED WITH TYPE 2 DIABETES MELLITUS: ICD-10-CM

## 2023-09-06 DIAGNOSIS — R53.81 DEBILITY: ICD-10-CM

## 2023-09-06 DIAGNOSIS — E78.1 TYPE 2 DIABETES MELLITUS WITH HYPERTRIGLYCERIDEMIA: ICD-10-CM

## 2023-09-06 DIAGNOSIS — R91.8 MULTIPLE PULMONARY NODULES DETERMINED BY COMPUTED TOMOGRAPHY OF LUNG: ICD-10-CM

## 2023-09-06 DIAGNOSIS — G47.33 OSA ON CPAP: ICD-10-CM

## 2023-09-06 DIAGNOSIS — E11.69 TYPE 2 DIABETES MELLITUS WITH HYPERTRIGLYCERIDEMIA: ICD-10-CM

## 2023-09-06 DIAGNOSIS — E66.01 SEVERE OBESITY (BMI 35.0-35.9 WITH COMORBIDITY): ICD-10-CM

## 2023-09-06 DIAGNOSIS — D50.0 IRON DEFICIENCY ANEMIA DUE TO CHRONIC BLOOD LOSS: ICD-10-CM

## 2023-09-06 DIAGNOSIS — Z59.9 FINANCIAL DIFFICULTIES: ICD-10-CM

## 2023-09-06 DIAGNOSIS — Z86.718 HISTORY OF DVT (DEEP VEIN THROMBOSIS): ICD-10-CM

## 2023-09-06 DIAGNOSIS — Z79.4 UNCONTROLLED TYPE 2 DIABETES MELLITUS WITH HYPERGLYCEMIA, WITH LONG-TERM CURRENT USE OF INSULIN: ICD-10-CM

## 2023-09-06 DIAGNOSIS — Z74.09 OTHER REDUCED MOBILITY: ICD-10-CM

## 2023-09-06 DIAGNOSIS — R29.90 PERSISTENT NEUROLOGIC SYMPTOMS AFTER COVID-19: ICD-10-CM

## 2023-09-06 DIAGNOSIS — U09.9 PERSISTENT NEUROLOGIC SYMPTOMS AFTER COVID-19: ICD-10-CM

## 2023-09-06 DIAGNOSIS — E11.65 UNCONTROLLED TYPE 2 DIABETES MELLITUS WITH HYPERGLYCEMIA, WITH LONG-TERM CURRENT USE OF INSULIN: ICD-10-CM

## 2023-09-06 DIAGNOSIS — R26.9 ABNORMALITY OF GAIT AND MOBILITY: ICD-10-CM

## 2023-09-06 DIAGNOSIS — E11.36 DM CATARACT: ICD-10-CM

## 2023-09-06 DIAGNOSIS — E11.22 TYPE 2 DIABETES MELLITUS WITH STAGE 3A CHRONIC KIDNEY DISEASE, WITH LONG-TERM CURRENT USE OF INSULIN: ICD-10-CM

## 2023-09-06 DIAGNOSIS — R29.898 MUSCULAR DECONDITIONING: ICD-10-CM

## 2023-09-06 DIAGNOSIS — E11.59 HYPERTENSION ASSOCIATED WITH DIABETES: ICD-10-CM

## 2023-09-06 PROCEDURE — 3061F NEG MICROALBUMINURIA REV: CPT | Mod: CPTII,S$GLB,, | Performed by: NURSE PRACTITIONER

## 2023-09-06 PROCEDURE — 3008F BODY MASS INDEX DOCD: CPT | Mod: CPTII,S$GLB,, | Performed by: NURSE PRACTITIONER

## 2023-09-06 PROCEDURE — 1159F MED LIST DOCD IN RCRD: CPT | Mod: CPTII,S$GLB,, | Performed by: NURSE PRACTITIONER

## 2023-09-06 PROCEDURE — 3046F PR MOST RECENT HEMOGLOBIN A1C LEVEL > 9.0%: ICD-10-PCS | Mod: CPTII,S$GLB,, | Performed by: NURSE PRACTITIONER

## 2023-09-06 PROCEDURE — 3072F LOW RISK FOR RETINOPATHY: CPT | Mod: CPTII,S$GLB,, | Performed by: NURSE PRACTITIONER

## 2023-09-06 PROCEDURE — 3288F FALL RISK ASSESSMENT DOCD: CPT | Mod: CPTII,S$GLB,, | Performed by: NURSE PRACTITIONER

## 2023-09-06 PROCEDURE — 1160F RVW MEDS BY RX/DR IN RCRD: CPT | Mod: CPTII,S$GLB,, | Performed by: NURSE PRACTITIONER

## 2023-09-06 PROCEDURE — 1160F PR REVIEW ALL MEDS BY PRESCRIBER/CLIN PHARMACIST DOCUMENTED: ICD-10-PCS | Mod: CPTII,S$GLB,, | Performed by: NURSE PRACTITIONER

## 2023-09-06 PROCEDURE — 1159F PR MEDICATION LIST DOCUMENTED IN MEDICAL RECORD: ICD-10-PCS | Mod: CPTII,S$GLB,, | Performed by: NURSE PRACTITIONER

## 2023-09-06 PROCEDURE — 3288F PR FALLS RISK ASSESSMENT DOCUMENTED: ICD-10-PCS | Mod: CPTII,S$GLB,, | Performed by: NURSE PRACTITIONER

## 2023-09-06 PROCEDURE — 1170F PR FUNCTIONAL STATUS ASSESSED: ICD-10-PCS | Mod: CPTII,S$GLB,, | Performed by: NURSE PRACTITIONER

## 2023-09-06 PROCEDURE — 1170F FXNL STATUS ASSESSED: CPT | Mod: CPTII,S$GLB,, | Performed by: NURSE PRACTITIONER

## 2023-09-06 PROCEDURE — 3066F PR DOCUMENTATION OF TREATMENT FOR NEPHROPATHY: ICD-10-PCS | Mod: CPTII,S$GLB,, | Performed by: NURSE PRACTITIONER

## 2023-09-06 PROCEDURE — 1101F PR PT FALLS ASSESS DOC 0-1 FALLS W/OUT INJ PAST YR: ICD-10-PCS | Mod: CPTII,S$GLB,, | Performed by: NURSE PRACTITIONER

## 2023-09-06 PROCEDURE — 3008F PR BODY MASS INDEX (BMI) DOCUMENTED: ICD-10-PCS | Mod: CPTII,S$GLB,, | Performed by: NURSE PRACTITIONER

## 2023-09-06 PROCEDURE — 3078F PR MOST RECENT DIASTOLIC BLOOD PRESSURE < 80 MM HG: ICD-10-PCS | Mod: CPTII,S$GLB,, | Performed by: NURSE PRACTITIONER

## 2023-09-06 PROCEDURE — 3075F SYST BP GE 130 - 139MM HG: CPT | Mod: CPTII,S$GLB,, | Performed by: NURSE PRACTITIONER

## 2023-09-06 PROCEDURE — 3078F DIAST BP <80 MM HG: CPT | Mod: CPTII,S$GLB,, | Performed by: NURSE PRACTITIONER

## 2023-09-06 PROCEDURE — 1101F PT FALLS ASSESS-DOCD LE1/YR: CPT | Mod: CPTII,S$GLB,, | Performed by: NURSE PRACTITIONER

## 2023-09-06 PROCEDURE — 3066F NEPHROPATHY DOC TX: CPT | Mod: CPTII,S$GLB,, | Performed by: NURSE PRACTITIONER

## 2023-09-06 PROCEDURE — 99999 PR PBB SHADOW E&M-EST. PATIENT-LVL V: ICD-10-PCS | Mod: PBBFAC,,, | Performed by: NURSE PRACTITIONER

## 2023-09-06 PROCEDURE — 3075F PR MOST RECENT SYSTOLIC BLOOD PRESS GE 130-139MM HG: ICD-10-PCS | Mod: CPTII,S$GLB,, | Performed by: NURSE PRACTITIONER

## 2023-09-06 PROCEDURE — 3046F HEMOGLOBIN A1C LEVEL >9.0%: CPT | Mod: CPTII,S$GLB,, | Performed by: NURSE PRACTITIONER

## 2023-09-06 PROCEDURE — 99999 PR PBB SHADOW E&M-EST. PATIENT-LVL V: CPT | Mod: PBBFAC,,, | Performed by: NURSE PRACTITIONER

## 2023-09-06 PROCEDURE — 3072F PR LOW RISK FOR RETINOPATHY: ICD-10-PCS | Mod: CPTII,S$GLB,, | Performed by: NURSE PRACTITIONER

## 2023-09-06 PROCEDURE — G0439 PPPS, SUBSEQ VISIT: HCPCS | Mod: S$GLB,,, | Performed by: NURSE PRACTITIONER

## 2023-09-06 PROCEDURE — G0439 PR MEDICARE ANNUAL WELLNESS SUBSEQUENT VISIT: ICD-10-PCS | Mod: S$GLB,,, | Performed by: NURSE PRACTITIONER

## 2023-09-06 PROCEDURE — 3061F PR NEG MICROALBUMINURIA RESULT DOCUMENTED/REVIEW: ICD-10-PCS | Mod: CPTII,S$GLB,, | Performed by: NURSE PRACTITIONER

## 2023-09-06 SDOH — SOCIAL DETERMINANTS OF HEALTH (SDOH): PROBLEM RELATED TO HOUSING AND ECONOMIC CIRCUMSTANCES, UNSPECIFIED: Z59.9

## 2023-09-06 NOTE — PROGRESS NOTES
"  Anup Miller presented for a  Medicare AWV and comprehensive Health Risk Assessment today. The following components were reviewed and updated:    Medical history  Family History  Social history  Allergies and Current Medications  Health Risk Assessment  Health Maintenance  Care Team         ** See Completed Assessments for Annual Wellness Visit within the encounter summary.**         The following assessments were completed:  Living Situation  CAGE  Depression Screening  Timed Get Up and Go  Whisper Test  Cognitive Function Screening  Nutrition Screening  ADL Screening  PAQ Screening        Vitals:    09/06/23 0828   BP: 130/76   BP Location: Right arm   Patient Position: Sitting   Pulse: 76   Resp: 20   Weight: 118.6 kg (261 lb 7.5 oz)   Height:    Pain scale 5' 9" (1.753 m)    1/5 LLE, Feet     Body mass index is 38.61 kg/m².  Physical Exam  Constitutional:       General: He is not in acute distress.     Appearance: He is not ill-appearing or diaphoretic.   HENT:      Head: Atraumatic.      Mouth/Throat:      Mouth: Mucous membranes are moist.   Eyes:      General:         Right eye: No discharge.         Left eye: No discharge.      Conjunctiva/sclera: Conjunctivae normal.   Cardiovascular:      Rate and Rhythm: Normal rate and regular rhythm.      Heart sounds: Normal heart sounds.   Pulmonary:      Effort: Pulmonary effort is normal. No respiratory distress.      Breath sounds: Normal breath sounds.   Skin:     General: Skin is warm and dry.   Neurological:      Mental Status: He is alert and oriented to person, place, and time. Mental status is at baseline.      Gait: Gait abnormal.   Psychiatric:         Mood and Affect: Mood normal.         Behavior: Behavior normal.         Thought Content: Thought content normal.         Judgment: Judgment normal.     Diagnoses and health risks identified today and associated recommendations/orders:    1. Encounter for preventative adult health care " examination  Review for opioid screening: Patient does not have Rx for Opioids  Review for substance use disorder: Patient does not use substance per chart    Patient states he does not drink alcohol    I offered to discuss advanced care planning, including how to pick a person who would make decisions for you if you were unable to make them for yourself, called a health care power of , and what kind of decisions you might make such as use of life sustaining treatments such as ventilators and tube feeding when faced with a life limiting illness recorded on a living will that they will need to know. (How you want to be cared for as you near the end of your natural life)     X Patient is interested in learning more about how to make advanced directives.  I provided them paperwork and offered to discuss this with them.    2. Hypertension associated with diabetes  Monitor  Stable  Continue home norvasc, hctz, losartan    3. Hyperlipidemia associated with type 2 diabetes mellitus, Type 2 diabetes mellitus with hypertriglyceridemia  Monitor  Follow up with PCP  Continue home cresor    4. Cardiac arrhythmia, unspecified cardiac arrhythmia type  Monitor  Follow up as directed     5. Uncontrolled type 2 diabetes mellitus with hyperglycemia, with long-term current use of insulin  Monitor  Follow up with PCP  Continue home amaryl, lantus, humalog  Patient states he can not afford jardiance and  Ozempic  - Ambulatory referral/consult to Pharmacy Assistance; Future- jardiance, Ozempic, humalog, lantus    6. Severe obesity (BMI 35.0-35.9 with comorbidity)  Monitor  Follow up with PCP  Increased activity encouraged   - Ambulatory referral/consult to Outpatient Case Management- Gym benefits?     7. KHOI on CPAP  Monitor  Follow up as needed, directed    Continue home CPAP    8. Type 2 diabetes mellitus with stage 3a chronic kidney disease, with long-term current use of insulin  Monitor  Follow up with PCP     9. DM  cataract  Monitor  Follow up with Ophtho     10. History of DVT (deep vein thrombosis)  Monitor  Follow up as directed   Continue home asa    11. Persistent neurologic symptoms after COVID-19, Neuropathy, Muscular deconditioning, Abnormality of gait and mobility, Debility, Other reduced mobility  Monitor  Patient reports ongoing debility and neuropathy since prior Covid infection- Now walks with a cane  Follow up as directed    Fall precautions    12. Multiple pulmonary nodules determined by computed tomography of lung  Monitor  Follow up as directed      13. Iron deficiency anemia due to chronic blood loss  Monitor  Stable- Resolved at this time  Follow up with PCP    14. Hx of colonic polyp  Monitor  Follow up with GI as directed      15. Financial difficulties  Patient reports ongoing financial issues since being sick, debilitated from prior Covid infection  - Ambulatory referral/consult to Outpatient Case Management- See if patient can qualify for [facility] patient assistance program    16. RICHA (generalized anxiety disorder)   Monitor  Follow up with PCP  Patient declined Psych referral for now                         Provided Anup with a 5-10 year written screening schedule and personal prevention plan. Recommendations were developed using the USPSTF age appropriate recommendations. Education, counseling, and referrals were provided as needed. After Visit Summary printed and given to patient which includes a list of additional screenings\tests needed.    Follow up in about 1 year (around 9/6/2024) for Annual wellness visit.    LEOBARDO Salcido

## 2023-09-06 NOTE — TELEPHONE ENCOUNTER
Spoke with patient about referral for medications.  I mailed out the applications back in November.  The patient remember if he received them or not.  I am mailing them out again today.

## 2023-09-06 NOTE — PATIENT INSTRUCTIONS
Counseling and Referral of Other Preventative  (Italic type indicates deductible and co-insurance are waived)    Patient Name: Anup Miller  Today's Date: 9/6/2023    Health Maintenance       Date Due Completion Date    COVID-19 Vaccine (4 - Pfizer series) 11/25/2021 9/30/2021    TETANUS VACCINE 04/02/2023 4/2/2013    Eye Exam 07/06/2023 7/6/2022    Override on 11/22/2016: Done (vision center on [place])    Override on 8/13/2015: Done (No diabetic retinopathy OU.)    Override on 4/1/2013: (N/S)    Hemoglobin A1c 11/07/2023 8/7/2023    Foot Exam 05/09/2024 5/9/2023 (Done)    Override on 5/9/2023: Done    Override on 2/6/2018: Done    Override on 6/20/2016: (N/S)    Override on 11/24/2015: (N/S)    Override on 1/14/2015: (N/S)    Override on 1/30/2014: Done    Low Dose Statin 08/07/2024 8/7/2023    Diabetes Urine Screening 08/07/2024 8/7/2023    Lipid Panel 08/07/2024 8/7/2023    Colorectal Cancer Screening 03/15/2027 3/15/2022    Override on 1/14/2009: (N/S)        No orders of the defined types were placed in this encounter.      The following information is provided to all patients.  This information is to help you find resources for any of the problems found today that may be affecting your health:                Living healthy guide: www.[facility].[place].gov      Understanding Diabetes: www.diabetes.org      Eating healthy: www.cdc.gov/healthyweight      CDC home safety checklist: www.cdc.gov/steadi/patient.html      Agency on Aging: www.goea.[place].gov      Alcoholics anonymous (AA): www.aa.org      Physical Activity: www.janeen.nih.gov/ae1fxhj      Tobacco use: www.quitwithusla.org

## 2023-09-07 ENCOUNTER — PATIENT OUTREACH (OUTPATIENT)
Dept: ADMINISTRATIVE | Facility: HOSPITAL | Age: 73
End: 2023-09-07
Payer: MEDICARE

## 2023-09-07 NOTE — PROGRESS NOTES
Kidney Health Report: Per chart review, patient had a eGFR done on 2/6/23 and urine micro done on 8/7/23.

## 2023-09-11 ENCOUNTER — PATIENT OUTREACH (OUTPATIENT)
Dept: ADMINISTRATIVE | Facility: OTHER | Age: 73
End: 2023-09-11
Payer: MEDICARE

## 2023-09-11 NOTE — PROGRESS NOTES
CHW - Outreach Attempt    Community Health Worker left a voicemail message for 1st attempt to contact patient regarding: meals on wheels    Community Health Worker to attempt to contact patient on: 9/11/23

## 2023-09-13 ENCOUNTER — PATIENT MESSAGE (OUTPATIENT)
Dept: ADMINISTRATIVE | Facility: OTHER | Age: 73
End: 2023-09-13
Payer: MEDICARE

## 2023-09-13 NOTE — PROGRESS NOTES
"CHW - Case Closure    This Community Health Worker spoke to patient via telephone today.     Pt/Caregiver reported: CM referral stated "meals on wheels"   CHW called pt and pt confirmed he needs meals on wheels information and how to apply for the program    CHW told pt his local [place] on aging does Meals on Wheels applications and to contact their office when he gets a chance    Pt requested Meals On wheels/ C.O.A be sent to his [facility] portal    CHW sent portal message shortly after pt outreach and CHW asked pt if he needs any other resources pt stated not at this time Meals on wheels is most important right now    Pt/Caregiver denied any additional needs at this time and agrees with episode closure at this time.  Provided patient with Community Health Worker's contact information and encouraged him/her to contact this Community Health Worker if additional needs arise.       "

## 2023-09-27 DIAGNOSIS — Z79.4 UNCONTROLLED TYPE 2 DIABETES MELLITUS WITH HYPERGLYCEMIA, WITH LONG-TERM CURRENT USE OF INSULIN: ICD-10-CM

## 2023-09-27 DIAGNOSIS — E11.65 UNCONTROLLED TYPE 2 DIABETES MELLITUS WITH HYPERGLYCEMIA, WITH LONG-TERM CURRENT USE OF INSULIN: ICD-10-CM

## 2023-09-27 RX ORDER — INSULIN LISPRO 100 [IU]/ML
12 INJECTION, SOLUTION INTRAVENOUS; SUBCUTANEOUS
Qty: 15 ML | Refills: 0 | OUTPATIENT
Start: 2023-09-27

## 2023-09-27 RX ORDER — GLIMEPIRIDE 4 MG/1
4 TABLET ORAL
Qty: 90 TABLET | Refills: 1 | OUTPATIENT
Start: 2023-09-27

## 2023-10-05 DIAGNOSIS — E11.65 UNCONTROLLED TYPE 2 DIABETES MELLITUS WITH HYPERGLYCEMIA, WITH LONG-TERM CURRENT USE OF INSULIN: ICD-10-CM

## 2023-10-05 DIAGNOSIS — Z79.4 UNCONTROLLED TYPE 2 DIABETES MELLITUS WITH HYPERGLYCEMIA, WITH LONG-TERM CURRENT USE OF INSULIN: ICD-10-CM

## 2023-10-06 RX ORDER — GLIMEPIRIDE 4 MG/1
4 TABLET ORAL
Qty: 90 TABLET | Refills: 1 | OUTPATIENT
Start: 2023-10-06

## 2023-10-17 ENCOUNTER — TELEPHONE (OUTPATIENT)
Dept: DIABETES | Facility: CLINIC | Age: 73
End: 2023-10-17
Payer: MEDICARE

## 2023-10-17 ENCOUNTER — OFFICE VISIT (OUTPATIENT)
Dept: DIABETES | Facility: CLINIC | Age: 73
End: 2023-10-17
Payer: MEDICARE

## 2023-10-17 VITALS
WEIGHT: 261.44 LBS | HEART RATE: 80 BPM | DIASTOLIC BLOOD PRESSURE: 76 MMHG | SYSTOLIC BLOOD PRESSURE: 141 MMHG | BODY MASS INDEX: 38.72 KG/M2 | HEIGHT: 69 IN

## 2023-10-17 DIAGNOSIS — I15.2 HYPERTENSION ASSOCIATED WITH DIABETES: ICD-10-CM

## 2023-10-17 DIAGNOSIS — E78.5 HYPERLIPIDEMIA ASSOCIATED WITH TYPE 2 DIABETES MELLITUS: ICD-10-CM

## 2023-10-17 DIAGNOSIS — E11.65 UNCONTROLLED TYPE 2 DIABETES MELLITUS WITH HYPERGLYCEMIA, WITH LONG-TERM CURRENT USE OF INSULIN: Primary | ICD-10-CM

## 2023-10-17 DIAGNOSIS — G62.9 NEUROPATHY: ICD-10-CM

## 2023-10-17 DIAGNOSIS — Z79.4 UNCONTROLLED TYPE 2 DIABETES MELLITUS WITH HYPERGLYCEMIA, WITH LONG-TERM CURRENT USE OF INSULIN: Primary | ICD-10-CM

## 2023-10-17 DIAGNOSIS — E11.59 HYPERTENSION ASSOCIATED WITH DIABETES: ICD-10-CM

## 2023-10-17 DIAGNOSIS — E11.69 HYPERLIPIDEMIA ASSOCIATED WITH TYPE 2 DIABETES MELLITUS: ICD-10-CM

## 2023-10-17 LAB — GLUCOSE SERPL-MCNC: 184 MG/DL (ref 70–110)

## 2023-10-17 PROCEDURE — 3046F HEMOGLOBIN A1C LEVEL >9.0%: CPT | Mod: CPTII,S$GLB,, | Performed by: NURSE PRACTITIONER

## 2023-10-17 PROCEDURE — 3046F PR MOST RECENT HEMOGLOBIN A1C LEVEL > 9.0%: ICD-10-PCS | Mod: CPTII,S$GLB,, | Performed by: NURSE PRACTITIONER

## 2023-10-17 PROCEDURE — 99999 PR PBB SHADOW E&M-EST. PATIENT-LVL IV: ICD-10-PCS | Mod: PBBFAC,,, | Performed by: NURSE PRACTITIONER

## 2023-10-17 PROCEDURE — 82962 GLUCOSE BLOOD TEST: CPT | Mod: S$GLB,,, | Performed by: NURSE PRACTITIONER

## 2023-10-17 PROCEDURE — 3061F NEG MICROALBUMINURIA REV: CPT | Mod: CPTII,S$GLB,, | Performed by: NURSE PRACTITIONER

## 2023-10-17 PROCEDURE — 3288F FALL RISK ASSESSMENT DOCD: CPT | Mod: CPTII,S$GLB,, | Performed by: NURSE PRACTITIONER

## 2023-10-17 PROCEDURE — 3078F PR MOST RECENT DIASTOLIC BLOOD PRESSURE < 80 MM HG: ICD-10-PCS | Mod: CPTII,S$GLB,, | Performed by: NURSE PRACTITIONER

## 2023-10-17 PROCEDURE — 3061F PR NEG MICROALBUMINURIA RESULT DOCUMENTED/REVIEW: ICD-10-PCS | Mod: CPTII,S$GLB,, | Performed by: NURSE PRACTITIONER

## 2023-10-17 PROCEDURE — 1159F PR MEDICATION LIST DOCUMENTED IN MEDICAL RECORD: ICD-10-PCS | Mod: CPTII,S$GLB,, | Performed by: NURSE PRACTITIONER

## 2023-10-17 PROCEDURE — 99214 PR OFFICE/OUTPT VISIT, EST, LEVL IV, 30-39 MIN: ICD-10-PCS | Mod: S$GLB,,, | Performed by: NURSE PRACTITIONER

## 2023-10-17 PROCEDURE — 1160F RVW MEDS BY RX/DR IN RCRD: CPT | Mod: CPTII,S$GLB,, | Performed by: NURSE PRACTITIONER

## 2023-10-17 PROCEDURE — 1101F PR PT FALLS ASSESS DOC 0-1 FALLS W/OUT INJ PAST YR: ICD-10-PCS | Mod: CPTII,S$GLB,, | Performed by: NURSE PRACTITIONER

## 2023-10-17 PROCEDURE — 3008F PR BODY MASS INDEX (BMI) DOCUMENTED: ICD-10-PCS | Mod: CPTII,S$GLB,, | Performed by: NURSE PRACTITIONER

## 2023-10-17 PROCEDURE — 3072F LOW RISK FOR RETINOPATHY: CPT | Mod: CPTII,S$GLB,, | Performed by: NURSE PRACTITIONER

## 2023-10-17 PROCEDURE — 3288F PR FALLS RISK ASSESSMENT DOCUMENTED: ICD-10-PCS | Mod: CPTII,S$GLB,, | Performed by: NURSE PRACTITIONER

## 2023-10-17 PROCEDURE — 3008F BODY MASS INDEX DOCD: CPT | Mod: CPTII,S$GLB,, | Performed by: NURSE PRACTITIONER

## 2023-10-17 PROCEDURE — 1159F MED LIST DOCD IN RCRD: CPT | Mod: CPTII,S$GLB,, | Performed by: NURSE PRACTITIONER

## 2023-10-17 PROCEDURE — 3072F PR LOW RISK FOR RETINOPATHY: ICD-10-PCS | Mod: CPTII,S$GLB,, | Performed by: NURSE PRACTITIONER

## 2023-10-17 PROCEDURE — 3066F PR DOCUMENTATION OF TREATMENT FOR NEPHROPATHY: ICD-10-PCS | Mod: CPTII,S$GLB,, | Performed by: NURSE PRACTITIONER

## 2023-10-17 PROCEDURE — 1126F PR PAIN SEVERITY QUANTIFIED, NO PAIN PRESENT: ICD-10-PCS | Mod: CPTII,S$GLB,, | Performed by: NURSE PRACTITIONER

## 2023-10-17 PROCEDURE — 82962 POCT GLUCOSE, HAND-HELD DEVICE: ICD-10-PCS | Mod: S$GLB,,, | Performed by: NURSE PRACTITIONER

## 2023-10-17 PROCEDURE — 3066F NEPHROPATHY DOC TX: CPT | Mod: CPTII,S$GLB,, | Performed by: NURSE PRACTITIONER

## 2023-10-17 PROCEDURE — 3078F DIAST BP <80 MM HG: CPT | Mod: CPTII,S$GLB,, | Performed by: NURSE PRACTITIONER

## 2023-10-17 PROCEDURE — 99999 PR PBB SHADOW E&M-EST. PATIENT-LVL IV: CPT | Mod: PBBFAC,,, | Performed by: NURSE PRACTITIONER

## 2023-10-17 PROCEDURE — 1126F AMNT PAIN NOTED NONE PRSNT: CPT | Mod: CPTII,S$GLB,, | Performed by: NURSE PRACTITIONER

## 2023-10-17 PROCEDURE — 99214 OFFICE O/P EST MOD 30 MIN: CPT | Mod: S$GLB,,, | Performed by: NURSE PRACTITIONER

## 2023-10-17 PROCEDURE — 1160F PR REVIEW ALL MEDS BY PRESCRIBER/CLIN PHARMACIST DOCUMENTED: ICD-10-PCS | Mod: CPTII,S$GLB,, | Performed by: NURSE PRACTITIONER

## 2023-10-17 PROCEDURE — 3077F PR MOST RECENT SYSTOLIC BLOOD PRESSURE >= 140 MM HG: ICD-10-PCS | Mod: CPTII,S$GLB,, | Performed by: NURSE PRACTITIONER

## 2023-10-17 PROCEDURE — 1101F PT FALLS ASSESS-DOCD LE1/YR: CPT | Mod: CPTII,S$GLB,, | Performed by: NURSE PRACTITIONER

## 2023-10-17 PROCEDURE — 3077F SYST BP >= 140 MM HG: CPT | Mod: CPTII,S$GLB,, | Performed by: NURSE PRACTITIONER

## 2023-10-17 RX ORDER — INSULIN GLARGINE 100 [IU]/ML
40 INJECTION, SOLUTION SUBCUTANEOUS NIGHTLY
Qty: 15 ML | Refills: 5 | Status: SHIPPED | OUTPATIENT
Start: 2023-10-17 | End: 2023-11-28 | Stop reason: SDUPTHER

## 2023-10-17 RX ORDER — SEMAGLUTIDE 1.34 MG/ML
1 INJECTION, SOLUTION SUBCUTANEOUS
Qty: 3 ML | Refills: 5 | Status: SHIPPED | OUTPATIENT
Start: 2023-10-17 | End: 2023-11-01 | Stop reason: SDUPTHER

## 2023-10-17 RX ORDER — METFORMIN HYDROCHLORIDE 500 MG/1
500 TABLET, EXTENDED RELEASE ORAL 2 TIMES DAILY WITH MEALS
Qty: 180 TABLET | Refills: 3 | Status: SHIPPED | OUTPATIENT
Start: 2023-10-17

## 2023-10-17 RX ORDER — INSULIN LISPRO 100 [IU]/ML
12 INJECTION, SOLUTION INTRAVENOUS; SUBCUTANEOUS
Qty: 15 ML | Refills: 5 | Status: SHIPPED | OUTPATIENT
Start: 2023-10-17 | End: 2023-11-06 | Stop reason: SDUPTHER

## 2023-10-17 NOTE — PATIENT INSTRUCTIONS
-- Medications adjusted for today's visit include:    Can try Zyrtec for allergies, and Fluticasone 1 spray to each nostril if needed for sinuses. If not improving schedule to see PCP or we can refer to ENT.    Increase Lantus 40 units once a day. Take at the same time every night.     Continue Novolog 12 units before each main meal - try to use this insulin 10 minutes before you eat. Do not take any Novolog at bedtime.     Re-start Metformin 500 mg twice a day, with a meal.    Increase Ozempic to 1 mg weekly.     -- Limit carbs to no more than 45 grams with each meal. Never eat carbs by themselves, always add protein. Make snacks low carb or non-carb only.    -- Start checking blood sugar 3 times daily: Fasting blood sugar and vary your next 3 readings: Before lunch, before supper, 2 hours after any meal, or bedtime.     -Blood sugar goals should be a fasting blood sugar between , and no blood sugars throughout the day over 180 is good, less than 160 better.    --Carry glucose tablets/soft peppermints/regular juice or Coke product with you at all times to treat a low blood sugar episode (less than 70). If your blood sugar is between 50-70, Chew 4 tablets or drink 1/2 cup of juice or regular Coke product. If your blood sugar is below 50, double the treatment. Re-check blood sugar in 15 minutes. If still low, repeat this. Always call the clinic to give an update for any low blood sugar episodes.    --Exercise as tolerated.    --Follow-up for your next visit in 6 weeks.     --Please either drop off, fax, or MyChart your readings to me as needed.

## 2023-10-17 NOTE — TELEPHONE ENCOUNTER
[place] order for Freestyle odilia 3 sent to [facility] due to insurance not accepted at [facility]

## 2023-10-17 NOTE — PROGRESS NOTES
Subjective:         Patient ID: Anup Miller is a 73 y.o. male.  Patient's current PCP is Bryce Gonzales MD.     Chief Complaint: Diabetes Mellitus    HPI  Anup Miller is a 73 y.o. Black or  male presenting for a follow up for diabetes. Patient has been diagnosed with type 2 diabetes since 2002 .    CURRENT DM MEDICATIONS:   Diabetes Medications               glimepiride (AMARYL) 4 MG tablet Take 1 tablet (4 mg total) by mouth before breakfast. Not taking    HUMALOG KWIKPEN INSULIN 100 unit/mL pen Inject 12 Units into the skin 3 (three) times daily before meals.    LANTUS SOLOSTAR U-100 INSULIN glargine 100 units/mL SubQ pen Inject 40 Units into the skin every evening. 30 units    semaglutide (OZEMPIC) 0.25 mg or 0.5 mg (2 mg/3 mL) pen injector Inject 0.25 mg into the skin every 7 days.    empagliflozin (JARDIANCE) 25 mg tablet Take 1 tablet (25 mg total) by mouth once daily. Off x several months           Past failed treatment include:  Meds discontinued in the past due to cost only per patient report    Blood glucose testing is performed regularly. Patient is testing 3 times daily.    Meter: Reli On (Did not bring to appt today)  Preferred lab: [facility]    Any episodes of hypoglycemia? Denies     Complications related to diabetes: peripheral neuropathy    His blood sugar in the clinic today was:   Lab Results   Component Value Date    POCGLU 184 (A) 10/17/2023     Anup Miller presents today for follow up visit to discuss diabetes management. No meter/logs today. Off of Jardiance due to cost. He is taking Ozempic and tolerating well from a GI perspective.  He stopped his Metformin in the past to see if this was the cause of lower back pain. He is agreeable to re-start. He reports he previously tolerated it well from a GI perspective. He was not able to start CGM yet- he is very interested in Moni. Will see if Moni 3 is covered for him. He reports compliance with insulin, but  he has only been taking 30 units of Lantus instead of 40. He reports all fasting Bgs are > 200 when checked. Due for blood work in November. Reminded him to schedule his eye exam.    He is limited with exercise- still ambulating with cane for ambulation following prolonged Covid hospitalization in 2020.    Current diet: Bfast-grits,eggs,degroot.  Water. Occasional sweet tea. Snacks-enjoys fruit, occasional crackers. Lunch-Often skipped. Supper-Salad with fried chicken.   Activity Level: Limited- ambulates with a cane    Lab Results   Component Value Date    HGBA1C 9.3 (H) 08/07/2023    HGBA1C 9.1 (H) 02/06/2023    HGBA1C 11.0 (H) 08/04/2022     STANDARDS OF CARE  Diabetes Management Status    Statin: Taking  ACE/ARB: Taking    Screening or Prevention Patient's value Goal Complete/Controlled?   HgA1C Testing and Control   Lab Results   Component Value Date    HGBA1C 9.3 (H) 08/07/2023      Annually/Less than 8% No   Lipid profile : 08/07/2023 Annually Yes   LDL control Lab Results   Component Value Date    LDLCALC 95.6 08/07/2023    Annually/Less than 100 mg/dl  Yes   Nephropathy screening Lab Results   Component Value Date    LABMICR 25.0 08/07/2023     Lab Results   Component Value Date    PROTEINUA Negative 10/23/2020     Lab Results   Component Value Date    UTPCR 0.07 09/27/2019      Annually Yes   Blood pressure BP Readings from Last 1 Encounters:   10/17/23 (!) 141/76    Less than 140/90 Yes   Dilated retinal exam : 07/06/2022 Annually No Reminded to schedule.   Foot exam   : 05/09/2023 Annually Yes      Labs reviewed and are noted below.    Lab Results   Component Value Date    WBC 6.59 06/16/2022    HGB 14.1 06/16/2022    HCT 44.1 06/16/2022     06/16/2022    CHOL 160 08/07/2023    TRIG 127 08/07/2023    HDL 39 (L) 08/07/2023    LDLCALC 95.6 08/07/2023    ALT 69 (H) 08/07/2023    AST 57 (H) 08/07/2023     02/06/2023    K 4.1 02/06/2023     02/06/2023    ANIONGAP 14 02/06/2023    CREATININE  1.4 02/06/2023    ESTGFRAFRICA 54 (A) 06/16/2022    EGFRNONAA 46 (A) 06/16/2022    BUN 17 02/06/2023    CO2 19 (L) 02/06/2023    TSH 1.432 03/10/2022    PSA 3.5 10/23/2020    INR 1.0 07/26/2020     (H) 02/06/2023    UTPCR 0.07 09/27/2019     Lab Results   Component Value Date    GLUTAMICACID 0.00 04/03/2017    CPEPTIDE 4.8 04/03/2017    FREET4 0.93 07/26/2020    TSH 1.432 03/10/2022    IRON 95 06/16/2022    TIBC 512 (H) 06/16/2022    FERRITIN 182 06/16/2022    MIHTREBW94 >2000 (H) 07/19/2021    CALCIUM 9.0 02/06/2023    PHOS 3.9 09/25/2020     Lab Results   Component Value Date    CPEPTIDE 4.8 04/03/2017     Lab Results   Component Value Date    GLUTAMICACID 0.00 04/03/2017     Glucose   Date Value Ref Range Status   02/06/2023 164 (H) 70 - 110 mg/dL Final     Anion Gap   Date Value Ref Range Status   02/06/2023 14 8 - 16 mmol/L Final     eGFR if    Date Value Ref Range Status   06/16/2022 54 (A) >60 mL/min/1.73 m^2 Final     eGFR if non    Date Value Ref Range Status   06/16/2022 46 (A) >60 mL/min/1.73 m^2 Final     Comment:     Calculation used to obtain the estimated glomerular filtration  rate (eGFR) is the CKD-EPI equation.        The following portions of the patient's history were reviewed and updated as appropriate: allergies, current medications, past family history, past medical history, past social history, past surgical history and problem list.    Review of patient's allergies indicates:  No Known Allergies  Social History     Socioeconomic History    Marital status:      Spouse name: yoselin    Number of children: 1   Occupational History    Occupation:    Tobacco Use    Smoking status: Never    Smokeless tobacco: Never   Substance and Sexual Activity    Alcohol use: No    Drug use: No    Sexual activity: Yes     Partners: Female     Comment:    Social History Narrative    No smokers or pets in household.     Social Determinants of Health      Financial Resource Strain: Low Risk  (9/6/2023)    Overall Financial Resource Strain (CARDIA)     Difficulty of Paying Living Expenses: Not very hard   Food Insecurity: Food Insecurity Present (9/6/2023)    Hunger Vital Sign     Worried About Running Out of Food in the Last Year: Sometimes true     Ran Out of Food in the Last Year: Sometimes true   Transportation Needs: No Transportation Needs (9/6/2023)    PRAPARE - Transportation     Lack of Transportation (Medical): No     Lack of Transportation (Non-Medical): No   Physical Activity: Inactive (9/6/2023)    Exercise Vital Sign     Days of Exercise per Week: 0 days     Minutes of Exercise per Session: 0 min   Stress: Stress Concern Present (9/6/2023)    [employer] [place] [employer] - Occupational Stress Questionnaire     Feeling of Stress : To some extent   Social Connections: Socially Integrated (9/6/2023)    Social Connection and Isolation Panel [NHANES]     Frequency of Communication with Friends and Family: More than three times a week     Frequency of Social Gatherings with Friends and Family: Once a week     Attends Taoist Services: More than 4 times per year     Active Member of Clubs or Organizations: Yes     Attends Club or Organization Meetings: More than 4 times per year     Marital Status:    Housing Stability: Low Risk  (9/6/2023)    Housing Stability Vital Sign     Unable to Pay for Housing in the Last Year: No     Number of Places Lived in the Last Year: 1     Unstable Housing in the Last Year: No     Past Medical History:   Diagnosis Date    Acute respiratory failure due to COVID-19 08/2020    Colon polyp     COVID-19 virus infection 07/2020    COVID-19 virus infection 7/26/2020    Diabetes mellitus type II Diagnosed 2002    Elevated liver enzymes     Elevated PSA 12/13/2017    Did not f/u with urology as rec in 2015    Fatty liver disease, nonalcoholic     Fever 8/25/2020    -- patient has been spiking fever and since  admission  -- started spiking more frequent low-grade fever with a max of 100.8 on 8/21 -- started on vanc and cefepime  -- urine culture from 08/23 grew Enterococcus -- patient keeps spiking fever despite broad-spectrum antibiotic -- concern for resistance organism (VRE)  -- consulting ID if fever persist.    Hyperlipidemia     Hypertension     KHOI on CPAP     Sleep apnea      REVIEW OF SYSTEMS:  Eyes No history of DR.  Cardiovascular: History of hypertension and hyperlipidemia. Denies chest pain.  GI:   History of fatty liver disease. Tolerating Ozempic well without GI side effects.   :  No CKD.  Neuro:  Positive neuropathy.  PSYCH: No tobacco use.  ENDO: See HPI.        Objective:      Vitals:    10/17/23 0821   BP: (!) 141/76   Pulse: 80   RESPIRATORY: No respiratory distress  NEUROLOGIC: Cranial nerves II-XII grossly intact.   PSYCHIATRIC: Alert & oriented x3. Normal mood and affect.  FOOT EXAMINATION: UTD  Assessment:       1. Uncontrolled type 2 diabetes mellitus with hyperglycemia, with long-term current use of insulin    2. Neuropathy    3. Hyperlipidemia associated with type 2 diabetes mellitus    4. Hypertension associated with diabetes        Plan:   Anup was seen today for diabetes mellitus.    Diagnoses and all orders for this visit:    Uncontrolled type 2 diabetes mellitus with hyperglycemia, with long-term current use of insulin  -     POCT Glucose, Hand-Held Device  -     semaglutide (OZEMPIC) 1 mg/dose (4 mg/3 mL); Inject 1 mg into the skin every 7 days.  -     metFORMIN (GLUCOPHAGE-XR) 500 MG ER 24hr tablet; Take 1 tablet (500 mg total) by mouth 2 (two) times daily with meals.  -     HUMALOG KWIKPEN INSULIN 100 unit/mL pen; Inject 12 Units into the skin 3 (three) times daily before meals.  -     LANTUS SOLOSTAR U-100 INSULIN glargine 100 units/mL SubQ pen; Inject 40 Units into the skin every evening.  -     Basic Metabolic Panel; Future  -     Hemoglobin A1C; Future  -     TSH;  "Future    Chronic -     Increase Lantus 40 units once a day. Take at the same time every night.     Continue Novolog 12 units before each main meal - try to use this insulin 10 minutes before you eat. Do not take any Novolog at bedtime.     Re-start Metformin 500 mg twice a day, with a meal.    Increase Ozempic to 1 mg weekly.     Neuropathy    Hyperlipidemia associated with type 2 diabetes mellitus    Hypertension associated with diabetes    - Follow up: 6 weeks    Portions of this note may have been created with voice recognition software. Occasional "wrong-word" or "sound-a-like" substitutions may have occurred due to the inherent limitations of voice recognition software. Please, read the note carefully and recognize, using context, where substitutions have occurred.            CECILIA Barrera, BC-ADM  [facility]         "

## 2023-10-18 ENCOUNTER — OFFICE VISIT (OUTPATIENT)
Dept: PODIATRY | Facility: CLINIC | Age: 73
End: 2023-10-18
Payer: MEDICARE

## 2023-10-18 VITALS — HEIGHT: 69 IN | WEIGHT: 261.44 LBS | BODY MASS INDEX: 38.72 KG/M2

## 2023-10-18 DIAGNOSIS — G62.9 NEUROPATHY: Primary | ICD-10-CM

## 2023-10-18 DIAGNOSIS — B35.1 DERMATOPHYTOSIS OF NAIL: ICD-10-CM

## 2023-10-18 DIAGNOSIS — E11.65 UNCONTROLLED TYPE 2 DIABETES MELLITUS WITH HYPERGLYCEMIA: ICD-10-CM

## 2023-10-18 PROCEDURE — 11721 PR DEBRIDEMENT OF NAILS, 6 OR MORE: ICD-10-PCS | Mod: Q9,S$GLB,, | Performed by: PODIATRIST

## 2023-10-18 PROCEDURE — 99499 NO LOS: ICD-10-PCS | Mod: S$GLB,,, | Performed by: PODIATRIST

## 2023-10-18 PROCEDURE — 11721 DEBRIDE NAIL 6 OR MORE: CPT | Mod: Q9,S$GLB,, | Performed by: PODIATRIST

## 2023-10-18 PROCEDURE — 99999 PR PBB SHADOW E&M-EST. PATIENT-LVL III: ICD-10-PCS | Mod: PBBFAC,,, | Performed by: PODIATRIST

## 2023-10-18 PROCEDURE — 99499 UNLISTED E&M SERVICE: CPT | Mod: S$GLB,,, | Performed by: PODIATRIST

## 2023-10-18 PROCEDURE — 99999 PR PBB SHADOW E&M-EST. PATIENT-LVL III: CPT | Mod: PBBFAC,,, | Performed by: PODIATRIST

## 2023-10-18 NOTE — PROGRESS NOTES
Subjective:     Patient ID: nAup Miller is a 73 y.o. male.    Chief Complaint: Follow-up (F/u neuropathy, pt c/o BL foot pain 3/10, diabetic pt wears tennis shoes, PCP Dr. Gonzales last seen 9-6-23)    Anup is a 73 y.o. male who presents to the clinic for evaluation and treatment of high risk feet. Anup has a past medical history of Acute respiratory failure due to COVID-19 (08/2020), Colon polyp, COVID-19 virus infection (07/2020), COVID-19 virus infection (7/26/2020), Diabetes mellitus type II (Diagnosed 2002), Elevated liver enzymes, Elevated PSA (12/13/2017), Fatty liver disease, nonalcoholic, Fever (8/25/2020), Hyperlipidemia, Hypertension, KHOI on CPAP, and Sleep apnea. The patient's chief complaint is bilateral foot pain. Patient states pain is pins/needles, electric, burning, and hot feeling. Patient states pain is constant. Patient rates pain 3/10. Patient states he stopped taking Gabapentin because he only sees a little difference. This patient has documented high risk feet requiring routine maintenance secondary to diabetes mellitis and those secondary complications of diabetes, as mentioned..    PCP: Bryce Gonzales MD    Date Last Seen by PCP: 09/06/2023    Current shoe gear:  Affected Foot: tennis shoes     Unaffected Foot: tennis shoes    Hemoglobin A1C   Date Value Ref Range Status   08/07/2023 9.3 (H) 4.0 - 5.6 % Final     Comment:     ADA Screening Guidelines:  5.7-6.4%  Consistent with prediabetes  >or=6.5%  Consistent with diabetes    High levels of fetal hemoglobin interfere with the HbA1C  assay. Heterozygous hemoglobin variants (HbS, HgC, etc)do  not significantly interfere with this assay.   However, presence of multiple variants may affect accuracy.     02/06/2023 9.1 (H) 4.0 - 5.6 % Final     Comment:     ADA Screening Guidelines:  5.7-6.4%  Consistent with prediabetes  >or=6.5%  Consistent with diabetes    High levels of fetal hemoglobin interfere with the HbA1C  assay.  Heterozygous hemoglobin variants (HbS, HgC, etc)do  not significantly interfere with this assay.   However, presence of multiple variants may affect accuracy.     08/04/2022 11.0 (H) 4.0 - 5.6 % Final     Comment:     ADA Screening Guidelines:  5.7-6.4%  Consistent with prediabetes  >or=6.5%  Consistent with diabetes    High levels of fetal hemoglobin interfere with the HbA1C  assay. Heterozygous hemoglobin variants (HbS, HgC, etc)do  not significantly interfere with this assay.   However, presence of multiple variants may affect accuracy.         Patient Active Problem List   Diagnosis    Uncontrolled type 2 diabetes mellitus with hyperglycemia, with long-term current use of insulin    Hypertension associated with diabetes    Hyperlipidemia associated with type 2 diabetes mellitus    Multiple pulmonary nodules determined by computed tomography of lung    KHOI (obstructive sleep apnea)    Hx of colonic polyp    Cardiac arrhythmia    Pneumonia due to COVID-19 virus    Oral phase dysphagia    History of 2019 novel coronavirus disease (COVID-19)    Severe obesity (BMI 35.0-35.9 with comorbidity)    Debility    Neuropathy    Muscular deconditioning    History of DVT (deep vein thrombosis)    Iron deficiency anemia due to chronic blood loss    Type 2 diabetes mellitus with stage 3a chronic kidney disease, with long-term current use of insulin    Type 2 diabetes mellitus with hypertriglyceridemia    Abnormality of gait and mobility    Other reduced mobility    Financial difficulties    DM cataract    Persistent neurologic symptoms after COVID-19    RICHA (generalized anxiety disorder)       Medication List with Changes/Refills   Current Medications    AMLODIPINE (NORVASC) 5 MG TABLET    Take 1 tablet (5 mg total) by mouth once daily.    ASPIRIN 81 MG CAP    Take 81 mg by mouth once daily.    CYANOCOBALAMIN, VITAMIN B-12, ORAL    Take by mouth once daily.    ERGOCALCIFEROL, VITAMIN D2, (VITAMIN D ORAL)    Take by mouth once  daily.    HUMALOG KWIKPEN INSULIN 100 UNIT/ML PEN    Inject 12 Units into the skin 3 (three) times daily before meals.    HYDROCHLOROTHIAZIDE (HYDRODIURIL) 25 MG TABLET    Take 1 tablet (25 mg total) by mouth once daily.    HYDROCODONE-ACETAMINOPHEN (NORCO)  MG PER TABLET    Take 1 tablet by mouth every 6 (six) hours as needed for Pain.    LANTUS SOLOSTAR U-100 INSULIN GLARGINE 100 UNITS/ML SUBQ PEN    Inject 40 Units into the skin every evening.    LOSARTAN (COZAAR) 100 MG TABLET    Take 100 mg by mouth once daily.    METFORMIN (GLUCOPHAGE-XR) 500 MG ER 24HR TABLET    Take 1 tablet (500 mg total) by mouth 2 (two) times daily with meals.    METOPROLOL TARTRATE (LOPRESSOR) 50 MG TABLET    TK 1 T PO BID    PANTOPRAZOLE (PROTONIX) 40 MG TABLET    TK 1 T PO D    ROSUVASTATIN (CRESTOR) 10 MG TABLET    Take 1 tablet (10 mg total) by mouth once daily.    SEMAGLUTIDE (OZEMPIC) 1 MG/DOSE (4 MG/3 ML)    Inject 1 mg into the skin every 7 days.    TURMERIC ORAL    Take by mouth once daily.    ZINC SULFATE (ZINCATE) 220 (50) MG CAPSULE    Take 220 mg by mouth once daily.       Review of patient's allergies indicates:  No Known Allergies    Past Surgical History:   Procedure Laterality Date    BACK SURGERY      CHOLECYSTECTOMY  2013    COLONOSCOPY N/A 7/19/2018    Procedure: COLONOSCOPY;  Surgeon: Chacorta Lopez III, MD;  Location: [facility];  Service: Endoscopy;  Laterality: N/A;    COLONOSCOPY N/A 3/15/2022    Procedure: COLONOSCOPY;  Surgeon: Jessie Diallo MD;  Location: [facility];  Service: Endoscopy;  Laterality: N/A;    ESOPHAGOGASTRODUODENOSCOPY N/A 8/25/2020    Procedure: EGD (ESOPHAGOGASTRODUODENOSCOPY);  Surgeon: Anup Green MD;  Location: [address];  Service: General;  Laterality: N/A;    TRACHEOSTOMY  08/2020    Temporarily post COVID infection.       Family History   Problem Relation Age of Onset    Asthma Mother     Diabetes Father     Cancer Sister         Breast    Diabetes Paternal Aunt      Cancer Maternal Grandmother         Breast    Diabetes Maternal Grandmother     Diabetes Paternal Uncle     Colon cancer Neg Hx        Social History     Socioeconomic History    Marital status:      Spouse name: yoselin    Number of children: 1   Occupational History    Occupation:    Tobacco Use    Smoking status: Never    Smokeless tobacco: Never   Substance and Sexual Activity    Alcohol use: No    Drug use: No    Sexual activity: Yes     Partners: Female     Comment:    Social History Narrative    No smokers or pets in household.     Social Determinants of Health     Financial Resource Strain: Low Risk  (9/6/2023)    Overall Financial Resource Strain (CARDIA)     Difficulty of Paying Living Expenses: Not very hard   Food Insecurity: Food Insecurity Present (9/6/2023)    Hunger Vital Sign     Worried About Running Out of Food in the Last Year: Sometimes true     Ran Out of Food in the Last Year: Sometimes true   Transportation Needs: No Transportation Needs (9/6/2023)    PRAPARE - Transportation     Lack of Transportation (Medical): No     Lack of Transportation (Non-Medical): No   Physical Activity: Inactive (9/6/2023)    Exercise Vital Sign     Days of Exercise per Week: 0 days     Minutes of Exercise per Session: 0 min   Stress: Stress Concern Present (9/6/2023)    [place] [place] [employer] - Occupational Stress Questionnaire     Feeling of Stress : To some extent   Social Connections: Socially Integrated (9/6/2023)    Social Connection and Isolation Panel [NHANES]     Frequency of Communication with Friends and Family: More than three times a week     Frequency of Social Gatherings with Friends and Family: Once a week     Attends Faith Services: More than 4 times per year     Active Member of Clubs or Organizations: Yes     Attends Club or Organization Meetings: More than 4 times per year     Marital Status:    Housing Stability: Low Risk  (9/6/2023)    Housing  "Stability Vital Sign     Unable to Pay for Housing in the Last Year: No     Number of Places Lived in the Last Year: 1     Unstable Housing in the Last Year: No       Vitals:    10/18/23 0853   Weight: 118.6 kg (261 lb 7.5 oz)   Height: 5' 9" (1.753 m)   PainSc:   3       Hemoglobin A1C   Date Value Ref Range Status   08/07/2023 9.3 (H) 4.0 - 5.6 % Final     Comment:     ADA Screening Guidelines:  5.7-6.4%  Consistent with prediabetes  >or=6.5%  Consistent with diabetes    High levels of fetal hemoglobin interfere with the HbA1C  assay. Heterozygous hemoglobin variants (HbS, HgC, etc)do  not significantly interfere with this assay.   However, presence of multiple variants may affect accuracy.     02/06/2023 9.1 (H) 4.0 - 5.6 % Final     Comment:     ADA Screening Guidelines:  5.7-6.4%  Consistent with prediabetes  >or=6.5%  Consistent with diabetes    High levels of fetal hemoglobin interfere with the HbA1C  assay. Heterozygous hemoglobin variants (HbS, HgC, etc)do  not significantly interfere with this assay.   However, presence of multiple variants may affect accuracy.     08/04/2022 11.0 (H) 4.0 - 5.6 % Final     Comment:     ADA Screening Guidelines:  5.7-6.4%  Consistent with prediabetes  >or=6.5%  Consistent with diabetes    High levels of fetal hemoglobin interfere with the HbA1C  assay. Heterozygous hemoglobin variants (HbS, HgC, etc)do  not significantly interfere with this assay.   However, presence of multiple variants may affect accuracy.         Review of Systems   Constitutional:  Negative for chills and fever.   Respiratory:  Negative for shortness of breath.    Cardiovascular:  Negative for chest pain, palpitations, orthopnea, claudication and leg swelling.   Gastrointestinal:  Negative for diarrhea, nausea and vomiting.   Musculoskeletal:  Negative for joint pain.   Skin:  Negative for rash.   Neurological:  Positive for tingling and sensory change.   Psychiatric/Behavioral: Negative.         "   Objective:      PHYSICAL EXAM: Apperance: Alert and orient in no distress,well developed, and with good attention to grooming and body habits  Patient presents ambulating in tennis shoes.  LOWER EXTREMITY EXAM:  VASCULAR: Dorsalis pedis pulses 2/4 bilateral and Posterior Tibial pulses 2/4 bilateral. Capillary fill time <4 seconds bilateral. Mild edema observed bilateral. Varicosities absent bilateral. Skin temperature of the lower extremities is warm to warm, proximal to distal. Hair growth dim bilateral.  DERMATOLOGICAL: No skin rashes, subcutaneous nodules, lesions, or ulcers observed bilateral. Webspaces 1,2,3,4 bilateral clean, dry and without evidence of break in skin integrity. Nails 1,2,3,4,5 bilateral elongated, thickened, and discolored with subungual debris.  NEUROLOGICAL: Light touch, sharp-dull, proprioception all present and equal bilaterally.  Vibratory sensation diminished at bilateral hallux and navicular. Protective sensation absent at 8/10 sites as tested with a [place] 5.07 monofilament.   MUSCULOSKELETAL: Muscle strength is 3/5 for foot inverters, everters, plantarflexors, and dorsiflexors. Muscle tone is normal.         Assessment:       ICD-10-CM ICD-9-CM   1. Neuropathy  G62.9 355.9   2. Dermatophytosis of nail  B35.1 110.1   3. Uncontrolled type 2 diabetes mellitus with hyperglycemia  E11.65 250.02           Plan:   Neuropathy    Dermatophytosis of nail    Uncontrolled type 2 diabetes mellitus with hyperglycemia      I counseled the patient on his conditions, regarding findings of my examination, my impressions, and usual treatment plan.   Appointment spent on education about the diabetic foot, neuropathy, and prevention of limb loss.  Shoe inspection. Diabetic Foot Education. Patient reminded of the importance of good nutrition and blood sugar control to help prevent podiatric complications of diabetes. Patient instructed on proper foot hygeine. We discussed wearing proper shoe  gear, daily foot inspections, never walking without protective shoe gear, never putting sharp instruments to feet.    With patient's permission, nails 1-5 bilateral were debrided/trimmed in length and thickness aggressively to their soft tissue attachment mechanically and with electric , removing all offending nail and debris. Patient relates relief following the procedure.  Patient  will continue to monitor the areas daily, inspect feet, wear protective shoe gear when ambulatory, moisturizer to maintain skin integrity. Patient reminded of the importance of good nutrition and blood sugar control to help prevent podiatric complications of diabetes.  Referral completed for left knee pain.   Patient to return 2 months or sooner if needed.        Katy Fernandes DPM  [facility]

## 2023-11-01 DIAGNOSIS — E11.65 UNCONTROLLED TYPE 2 DIABETES MELLITUS WITH HYPERGLYCEMIA, WITH LONG-TERM CURRENT USE OF INSULIN: ICD-10-CM

## 2023-11-01 DIAGNOSIS — Z79.4 UNCONTROLLED TYPE 2 DIABETES MELLITUS WITH HYPERGLYCEMIA, WITH LONG-TERM CURRENT USE OF INSULIN: ICD-10-CM

## 2023-11-01 RX ORDER — SEMAGLUTIDE 1.34 MG/ML
1 INJECTION, SOLUTION SUBCUTANEOUS
Qty: 3 ML | Refills: 5 | Status: SHIPPED | OUTPATIENT
Start: 2023-11-01 | End: 2023-12-15

## 2023-11-06 DIAGNOSIS — Z79.4 UNCONTROLLED TYPE 2 DIABETES MELLITUS WITH HYPERGLYCEMIA, WITH LONG-TERM CURRENT USE OF INSULIN: ICD-10-CM

## 2023-11-06 DIAGNOSIS — E11.65 UNCONTROLLED TYPE 2 DIABETES MELLITUS WITH HYPERGLYCEMIA, WITH LONG-TERM CURRENT USE OF INSULIN: ICD-10-CM

## 2023-11-06 RX ORDER — INSULIN LISPRO 100 [IU]/ML
12 INJECTION, SOLUTION INTRAVENOUS; SUBCUTANEOUS
Qty: 15 ML | Refills: 5 | Status: SHIPPED | OUTPATIENT
Start: 2023-11-06 | End: 2023-12-13 | Stop reason: SDUPTHER

## 2023-11-07 ENCOUNTER — LAB VISIT (OUTPATIENT)
Dept: LAB | Facility: HOSPITAL | Age: 73
End: 2023-11-07
Attending: NURSE PRACTITIONER
Payer: MEDICARE

## 2023-11-07 DIAGNOSIS — E11.65 UNCONTROLLED TYPE 2 DIABETES MELLITUS WITH HYPERGLYCEMIA, WITH LONG-TERM CURRENT USE OF INSULIN: ICD-10-CM

## 2023-11-07 DIAGNOSIS — Z79.4 UNCONTROLLED TYPE 2 DIABETES MELLITUS WITH HYPERGLYCEMIA, WITH LONG-TERM CURRENT USE OF INSULIN: ICD-10-CM

## 2023-11-07 LAB
ANION GAP SERPL CALC-SCNC: 12 MMOL/L (ref 8–16)
BUN SERPL-MCNC: 18 MG/DL (ref 8–23)
CALCIUM SERPL-MCNC: 9.5 MG/DL (ref 8.7–10.5)
CHLORIDE SERPL-SCNC: 106 MMOL/L (ref 95–110)
CO2 SERPL-SCNC: 22 MMOL/L (ref 23–29)
CREAT SERPL-MCNC: 1.3 MG/DL (ref 0.5–1.4)
EST. GFR  (NO RACE VARIABLE): 58.7 ML/MIN/1.73 M^2
ESTIMATED AVG GLUCOSE: 249 MG/DL (ref 68–131)
GLUCOSE SERPL-MCNC: 169 MG/DL (ref 70–110)
HBA1C MFR BLD: 10.3 % (ref 4–5.6)
POTASSIUM SERPL-SCNC: 4 MMOL/L (ref 3.5–5.1)
SODIUM SERPL-SCNC: 140 MMOL/L (ref 136–145)
TSH SERPL DL<=0.005 MIU/L-ACNC: 1.11 UIU/ML (ref 0.4–4)

## 2023-11-07 PROCEDURE — 84443 ASSAY THYROID STIM HORMONE: CPT | Performed by: NURSE PRACTITIONER

## 2023-11-07 PROCEDURE — 83036 HEMOGLOBIN GLYCOSYLATED A1C: CPT | Performed by: NURSE PRACTITIONER

## 2023-11-07 PROCEDURE — 80048 BASIC METABOLIC PNL TOTAL CA: CPT | Performed by: NURSE PRACTITIONER

## 2023-11-07 PROCEDURE — 36415 COLL VENOUS BLD VENIPUNCTURE: CPT | Mod: PN | Performed by: NURSE PRACTITIONER

## 2023-11-08 NOTE — PROGRESS NOTES
Kidney function is stable, but A1c is worse at 10.3%. Thyroid level is normal. Let him know I highly recommend moving forward with Moni - has he heard anything? Get an update on how blood sugars have trended since his last visit where I increased Lantus, Ozempic, and re-started Metformin?

## 2023-11-09 ENCOUNTER — TELEPHONE (OUTPATIENT)
Dept: DIABETES | Facility: CLINIC | Age: 73
End: 2023-11-09
Payer: MEDICARE

## 2023-11-09 NOTE — TELEPHONE ENCOUNTER
----- Message from Ana Paula Merritt NP sent at 11/8/2023  4:34 PM CST -----  Kidney function is stable, but A1c is worse at 10.3%. Thyroid level is normal. Let him know I highly recommend moving forward with Moni - has he heard anything? Get an update on how blood sugars have trended since his last visit where I increased Lantus, Ozempic, and re-started Metformin?

## 2023-11-10 ENCOUNTER — TELEPHONE (OUTPATIENT)
Dept: DIABETES | Facility: CLINIC | Age: 73
End: 2023-11-10
Payer: MEDICARE

## 2023-11-10 ENCOUNTER — PATIENT MESSAGE (OUTPATIENT)
Dept: DIABETES | Facility: CLINIC | Age: 73
End: 2023-11-10
Payer: MEDICARE

## 2023-11-10 DIAGNOSIS — E11.65 UNCONTROLLED TYPE 2 DIABETES MELLITUS WITH HYPERGLYCEMIA, WITH LONG-TERM CURRENT USE OF INSULIN: Primary | ICD-10-CM

## 2023-11-10 DIAGNOSIS — Z79.4 UNCONTROLLED TYPE 2 DIABETES MELLITUS WITH HYPERGLYCEMIA, WITH LONG-TERM CURRENT USE OF INSULIN: Primary | ICD-10-CM

## 2023-11-10 NOTE — TELEPHONE ENCOUNTER
Attempted to contact pt via both home and mobile number, to discuss recent results per Ana Paula. The home number on file is no longer pt's number, and mobile phone is disconnected so I am unable to leave a message.

## 2023-11-10 NOTE — TELEPHONE ENCOUNTER
Called pt confirmed lab results pt has been taken lantus 40 units at night and pt states that he takes metformin 500 bid and ozempic 1 mg once a week

## 2023-11-21 ENCOUNTER — TELEPHONE (OUTPATIENT)
Dept: DIABETES | Facility: CLINIC | Age: 73
End: 2023-11-21
Payer: MEDICARE

## 2023-11-28 DIAGNOSIS — E11.65 UNCONTROLLED TYPE 2 DIABETES MELLITUS WITH HYPERGLYCEMIA, WITH LONG-TERM CURRENT USE OF INSULIN: ICD-10-CM

## 2023-11-28 DIAGNOSIS — Z79.4 UNCONTROLLED TYPE 2 DIABETES MELLITUS WITH HYPERGLYCEMIA, WITH LONG-TERM CURRENT USE OF INSULIN: ICD-10-CM

## 2023-11-29 RX ORDER — INSULIN GLARGINE 100 [IU]/ML
40 INJECTION, SOLUTION SUBCUTANEOUS NIGHTLY
Qty: 15 ML | Refills: 5 | Status: SHIPPED | OUTPATIENT
Start: 2023-11-29 | End: 2023-12-28

## 2023-12-05 ENCOUNTER — OFFICE VISIT (OUTPATIENT)
Dept: OTOLARYNGOLOGY | Facility: CLINIC | Age: 73
End: 2023-12-05
Payer: MEDICARE

## 2023-12-05 VITALS — BODY MASS INDEX: 38.61 KG/M2 | WEIGHT: 261.44 LBS

## 2023-12-05 DIAGNOSIS — H91.90 HEARING LOSS, UNSPECIFIED HEARING LOSS TYPE, UNSPECIFIED LATERALITY: Primary | ICD-10-CM

## 2023-12-05 DIAGNOSIS — H61.22 IMPACTED CERUMEN OF LEFT EAR: ICD-10-CM

## 2023-12-05 DIAGNOSIS — J30.9 ALLERGIC RHINITIS, UNSPECIFIED SEASONALITY, UNSPECIFIED TRIGGER: ICD-10-CM

## 2023-12-05 DIAGNOSIS — R04.0 EPISTAXIS: ICD-10-CM

## 2023-12-05 PROCEDURE — 1159F PR MEDICATION LIST DOCUMENTED IN MEDICAL RECORD: ICD-10-PCS | Mod: CPTII,S$GLB,, | Performed by: STUDENT IN AN ORGANIZED HEALTH CARE EDUCATION/TRAINING PROGRAM

## 2023-12-05 PROCEDURE — 69210 PR REMOVAL IMPACTED CERUMEN REQUIRING INSTRUMENTATION, UNILATERAL: ICD-10-PCS | Mod: S$GLB,,, | Performed by: STUDENT IN AN ORGANIZED HEALTH CARE EDUCATION/TRAINING PROGRAM

## 2023-12-05 PROCEDURE — 69210 REMOVE IMPACTED EAR WAX UNI: CPT | Mod: S$GLB,,, | Performed by: STUDENT IN AN ORGANIZED HEALTH CARE EDUCATION/TRAINING PROGRAM

## 2023-12-05 PROCEDURE — 3008F BODY MASS INDEX DOCD: CPT | Mod: CPTII,S$GLB,, | Performed by: STUDENT IN AN ORGANIZED HEALTH CARE EDUCATION/TRAINING PROGRAM

## 2023-12-05 PROCEDURE — 3066F PR DOCUMENTATION OF TREATMENT FOR NEPHROPATHY: ICD-10-PCS | Mod: CPTII,S$GLB,, | Performed by: STUDENT IN AN ORGANIZED HEALTH CARE EDUCATION/TRAINING PROGRAM

## 2023-12-05 PROCEDURE — 99999 PR PBB SHADOW E&M-EST. PATIENT-LVL III: ICD-10-PCS | Mod: PBBFAC,,, | Performed by: STUDENT IN AN ORGANIZED HEALTH CARE EDUCATION/TRAINING PROGRAM

## 2023-12-05 PROCEDURE — 1125F AMNT PAIN NOTED PAIN PRSNT: CPT | Mod: CPTII,S$GLB,, | Performed by: STUDENT IN AN ORGANIZED HEALTH CARE EDUCATION/TRAINING PROGRAM

## 2023-12-05 PROCEDURE — 3008F PR BODY MASS INDEX (BMI) DOCUMENTED: ICD-10-PCS | Mod: CPTII,S$GLB,, | Performed by: STUDENT IN AN ORGANIZED HEALTH CARE EDUCATION/TRAINING PROGRAM

## 2023-12-05 PROCEDURE — 99204 PR OFFICE/OUTPT VISIT, NEW, LEVL IV, 45-59 MIN: ICD-10-PCS | Mod: 25,S$GLB,, | Performed by: STUDENT IN AN ORGANIZED HEALTH CARE EDUCATION/TRAINING PROGRAM

## 2023-12-05 PROCEDURE — 1101F PT FALLS ASSESS-DOCD LE1/YR: CPT | Mod: CPTII,S$GLB,, | Performed by: STUDENT IN AN ORGANIZED HEALTH CARE EDUCATION/TRAINING PROGRAM

## 2023-12-05 PROCEDURE — 99999 PR PBB SHADOW E&M-EST. PATIENT-LVL III: CPT | Mod: PBBFAC,,, | Performed by: STUDENT IN AN ORGANIZED HEALTH CARE EDUCATION/TRAINING PROGRAM

## 2023-12-05 PROCEDURE — 3072F LOW RISK FOR RETINOPATHY: CPT | Mod: CPTII,S$GLB,, | Performed by: STUDENT IN AN ORGANIZED HEALTH CARE EDUCATION/TRAINING PROGRAM

## 2023-12-05 PROCEDURE — 3046F HEMOGLOBIN A1C LEVEL >9.0%: CPT | Mod: CPTII,S$GLB,, | Performed by: STUDENT IN AN ORGANIZED HEALTH CARE EDUCATION/TRAINING PROGRAM

## 2023-12-05 PROCEDURE — 3046F PR MOST RECENT HEMOGLOBIN A1C LEVEL > 9.0%: ICD-10-PCS | Mod: CPTII,S$GLB,, | Performed by: STUDENT IN AN ORGANIZED HEALTH CARE EDUCATION/TRAINING PROGRAM

## 2023-12-05 PROCEDURE — 1125F PR PAIN SEVERITY QUANTIFIED, PAIN PRESENT: ICD-10-PCS | Mod: CPTII,S$GLB,, | Performed by: STUDENT IN AN ORGANIZED HEALTH CARE EDUCATION/TRAINING PROGRAM

## 2023-12-05 PROCEDURE — 3288F PR FALLS RISK ASSESSMENT DOCUMENTED: ICD-10-PCS | Mod: CPTII,S$GLB,, | Performed by: STUDENT IN AN ORGANIZED HEALTH CARE EDUCATION/TRAINING PROGRAM

## 2023-12-05 PROCEDURE — 1101F PR PT FALLS ASSESS DOC 0-1 FALLS W/OUT INJ PAST YR: ICD-10-PCS | Mod: CPTII,S$GLB,, | Performed by: STUDENT IN AN ORGANIZED HEALTH CARE EDUCATION/TRAINING PROGRAM

## 2023-12-05 PROCEDURE — 3061F NEG MICROALBUMINURIA REV: CPT | Mod: CPTII,S$GLB,, | Performed by: STUDENT IN AN ORGANIZED HEALTH CARE EDUCATION/TRAINING PROGRAM

## 2023-12-05 PROCEDURE — 99204 OFFICE O/P NEW MOD 45 MIN: CPT | Mod: 25,S$GLB,, | Performed by: STUDENT IN AN ORGANIZED HEALTH CARE EDUCATION/TRAINING PROGRAM

## 2023-12-05 PROCEDURE — 3066F NEPHROPATHY DOC TX: CPT | Mod: CPTII,S$GLB,, | Performed by: STUDENT IN AN ORGANIZED HEALTH CARE EDUCATION/TRAINING PROGRAM

## 2023-12-05 PROCEDURE — 3072F PR LOW RISK FOR RETINOPATHY: ICD-10-PCS | Mod: CPTII,S$GLB,, | Performed by: STUDENT IN AN ORGANIZED HEALTH CARE EDUCATION/TRAINING PROGRAM

## 2023-12-05 PROCEDURE — 3061F PR NEG MICROALBUMINURIA RESULT DOCUMENTED/REVIEW: ICD-10-PCS | Mod: CPTII,S$GLB,, | Performed by: STUDENT IN AN ORGANIZED HEALTH CARE EDUCATION/TRAINING PROGRAM

## 2023-12-05 PROCEDURE — 3288F FALL RISK ASSESSMENT DOCD: CPT | Mod: CPTII,S$GLB,, | Performed by: STUDENT IN AN ORGANIZED HEALTH CARE EDUCATION/TRAINING PROGRAM

## 2023-12-05 PROCEDURE — 1159F MED LIST DOCD IN RCRD: CPT | Mod: CPTII,S$GLB,, | Performed by: STUDENT IN AN ORGANIZED HEALTH CARE EDUCATION/TRAINING PROGRAM

## 2023-12-05 RX ORDER — AZELASTINE 1 MG/ML
1 SPRAY, METERED NASAL 2 TIMES DAILY
Qty: 30 ML | Refills: 3 | Status: SHIPPED | OUTPATIENT
Start: 2023-12-05 | End: 2024-12-04

## 2023-12-05 RX ORDER — FLUTICASONE PROPIONATE 50 MCG
1 SPRAY, SUSPENSION (ML) NASAL DAILY
Qty: 16 G | Refills: 3 | Status: SHIPPED | OUTPATIENT
Start: 2023-12-05

## 2023-12-05 NOTE — PROGRESS NOTES
Chief complaint:    Chief Complaint   Patient presents with    Sinus Problem     Was Having some congestion and when he blow his nose he is having blood in his mucus when he blows his nose, Also having some hoarseness,            Referring Provider:  Self, Aaareferral  No address on file      History of present illness:     Mr. Miller is a 73 y.o. presenting for evaluation of epistaxis.     Onset of bleedin months;happens when he blows his nose,   Associated symptoms included nasal congestion, increased rhinorrhea and nose blowing, and some hoarseness, and ear itching.   Frequency of nose bleeds: was daily, but hasn't happened as much  Laterality:  bilateral  Volume of bleeding: usually blood mixed in with his mucus  History of coagulation disorders/bleeding when having teeth cleaning:  No  Currently on anticoagulant medications:   ASA  History of HTN: yes, well controlled  Blood pressure:   BP Readings from Last 3 Encounters:   10/17/23 (!) 141/76   23 130/76   23 136/80     Exacerbating factors: blowing his nose  Treatments have included: mucinex      Also with bialteral hearing loss. Many years. Progressive. Denies otalgia, otorrhea, vertigo, tinnitus.  Does have left ear itching.    History      Past Medical History:   Past Medical History:   Diagnosis Date    Acute respiratory failure due to COVID-19 2020    Colon polyp     COVID-19 virus infection 2020    COVID-19 virus infection 2020    Diabetes mellitus type II Diagnosed     Elevated liver enzymes     Elevated PSA 2017    Did not f/u with urology as rec in     Fatty liver disease, nonalcoholic     Fever 2020    -- patient has been spiking fever and since admission  -- started spiking more frequent low-grade fever with a max of 100.8 on  -- started on vanc and cefepime  -- urine culture from  grew Enterococcus -- patient keeps spiking fever despite broad-spectrum antibiotic -- concern for resistance  organism (VRE)  -- consulting ID if fever persist.    Hyperlipidemia     Hypertension     KHOI on CPAP     Sleep apnea          Past Surgical History:  Past Surgical History:   Procedure Laterality Date    BACK SURGERY      CHOLECYSTECTOMY  2013    COLONOSCOPY N/A 7/19/2018    Procedure: COLONOSCOPY;  Surgeon: Chacorta Lopez III, MD;  Location: [facility] ENDO;  Service: Endoscopy;  Laterality: N/A;    COLONOSCOPY N/A 3/15/2022    Procedure: COLONOSCOPY;  Surgeon: Jessie Diallo MD;  Location: [facility] ENDO;  Service: Endoscopy;  Laterality: N/A;    ESOPHAGOGASTRODUODENOSCOPY N/A 8/25/2020    Procedure: EGD (ESOPHAGOGASTRODUODENOSCOPY);  Surgeon: Anup Green MD;  Location: [facility] OR [address] [place] [address];  Service: General;  Laterality: N/A;    TRACHEOSTOMY  08/2020    Temporarily post COVID infection.         Medications: Medication list reviewed. He  has a current medication list which includes the following prescription(s): amlodipine, aspirin, cyanocobalamin (vitamin b-12), ergocalciferol (vitamin d2), humalog kwikpen insulin, hydrochlorothiazide, hydrocodone-acetaminophen, lantus solostar u-100 insulin, losartan, metformin, metoprolol tartrate, pantoprazole, rosuvastatin, ozempic, turmeric, and zinc sulfate.     Allergies: Review of patient's allergies indicates:  No Known Allergies      Family history: family history includes Asthma in his mother; Cancer in his maternal grandmother and sister; Diabetes in his father, maternal grandmother, paternal aunt, and paternal uncle.         Social History          Alcohol use:  reports no history of alcohol use.            Tobacco:  reports that he has never smoked. He has never used smokeless tobacco.         Physical Examination      Vitals: Weight 118.6 kg (261 lb 7.5 oz).      General: Well developed, well nourished, well hydrated.     Voice: no hoarseness, no dysarthria      Head/Face: Normocephalic, atraumatic. No scars or lesions. Facial musculature equal.     Eyes: No scleral  icterus or conjunctival hemorrhage. EOMI. PERRLA.     Ears: after disimpaction    Right ear: No gross deformity. EAC is clear of debris and erythema. TM are intact with a pneumatized middle ear. No signs of retraction, fluid or infection.      Left ear: No gross deformity. EAC is clear of debris and erythema. TM are intact with a pneumatized middle ear. No signs of retraction, fluid or infection.      Nose: No gross deformity or lesions. No purulent discharge. No significant NSD.      Mouth/Oropharynx: Lips without any lesions. No mucosal lesions within the oropharynx. No tonsillar exudate or lesions. Pharyngeal walls symmetrical. Uvula midline. Tongue midline without lesions.     Neck: Trachea midline. No masses. No thyromegaly or nodules palpated.     Lymphatic: No lymphadenopathy in the neck.     Extremities: No cyanosis. Warm and well-perfused.     Skin: No scars or lesions on face or neck.      Neurologic: Moving all extremities without gross abnormality.CN II-XII grossly intact. House-Brackmann 1/6. No signs of nystagmus.      Procedure: ear microscopy with removal of cerumen    Description: The patient was in agreement with the examination and debridement of the ears. Removal of the cerumen required use of an operating microscope and multiple micro-instruments.     With the patient in the supine position, we used the operating microscope to examine both ears with the appropriate sized ear speculum.  A variety of sterile, micro-instruments were utilized to remove the cerumen atraumatically.  I performed the procedure which required a significant amount of time and effort. The tympanic membrane was then well visualized.  The patient tolerated the procedure well and there were no complications.    Findings:   Right ear had little wax, the EAC was normal, and the tympanic membrane was intact with no evidence of middle ear fluid.    Left ear had significant wax, the EAC was normal, and the tympanic membrane was  intact with no evidence of middle ear fluid.      Data reviewed      Review of records:      I reviewed records from the referring provider's office visits, describing the history, workup, and/or treatment of this problem thus far.      Assessment/Plan:    No diagnosis found.     Anup has blood tinged mucus due to increased nasal congestion and rhinorrhea. We discussed starting flonase sensimist, astelin, and saline. Return to clinic 3 months if not improved    Cerumen disimpacted - likely explains his itching.     Hearing loss - audio at his convenience       Ernesto Garcia MD  [facility]   [facility] [address]  [place] [address]  P: [phone]  F: [phone]

## 2023-12-11 DIAGNOSIS — E11.65 UNCONTROLLED TYPE 2 DIABETES MELLITUS WITH HYPERGLYCEMIA, WITH LONG-TERM CURRENT USE OF INSULIN: ICD-10-CM

## 2023-12-11 DIAGNOSIS — Z79.4 UNCONTROLLED TYPE 2 DIABETES MELLITUS WITH HYPERGLYCEMIA, WITH LONG-TERM CURRENT USE OF INSULIN: ICD-10-CM

## 2023-12-11 RX ORDER — INSULIN LISPRO 100 [IU]/ML
12 INJECTION, SOLUTION INTRAVENOUS; SUBCUTANEOUS
Qty: 15 ML | Refills: 5 | OUTPATIENT
Start: 2023-12-11

## 2023-12-13 DIAGNOSIS — Z79.4 UNCONTROLLED TYPE 2 DIABETES MELLITUS WITH HYPERGLYCEMIA, WITH LONG-TERM CURRENT USE OF INSULIN: ICD-10-CM

## 2023-12-13 DIAGNOSIS — E11.65 UNCONTROLLED TYPE 2 DIABETES MELLITUS WITH HYPERGLYCEMIA, WITH LONG-TERM CURRENT USE OF INSULIN: ICD-10-CM

## 2023-12-13 RX ORDER — BLOOD-GLUCOSE SENSOR
EACH MISCELLANEOUS
Qty: 2 EACH | Refills: 3 | Status: SHIPPED | OUTPATIENT
Start: 2023-12-13 | End: 2023-12-18 | Stop reason: SDUPTHER

## 2023-12-13 RX ORDER — INSULIN LISPRO 100 [IU]/ML
12 INJECTION, SOLUTION INTRAVENOUS; SUBCUTANEOUS
Qty: 15 ML | Refills: 5 | Status: SHIPPED | OUTPATIENT
Start: 2023-12-13 | End: 2023-12-28

## 2023-12-13 RX ORDER — SEMAGLUTIDE 1.34 MG/ML
1 INJECTION, SOLUTION SUBCUTANEOUS
Qty: 3 ML | Refills: 5 | Status: SHIPPED | OUTPATIENT
Start: 2023-12-13 | End: 2023-12-15

## 2023-12-13 NOTE — TELEPHONE ENCOUNTER
----- Message from Priscilla Dasilva sent at 12/13/2023  9:01 AM CST -----  Regarding: refills  Type:  RX Refill Request    Who Called: Tae  Refill or New Rx:refills    RX Name and Strength:     HUMALOG KWIKPEN INSULIN 100 unit/mL pen / [facility] [address] [place] [address]  Phone: [phone]  Fax: [phone]      Preferred Pharmacy with phone number:HUMALOG KWIKPEN INSULIN 100 unit/mL pen and also odilia sensors at [facility] [address] [place] [address]  Phone: [phone]  Fax: [phone]      Local or Mail Order:local  Ordering Provider:  Would the patient rather a call back or a response via [facility]? Call back  Best Call Back Number: [phone]  Additional Information:

## 2023-12-13 NOTE — TELEPHONE ENCOUNTER
----- Message from Emilee Ramirez sent at 12/13/2023  9:35 AM CST -----  Contact: pt  Type:  RX Refill Request    Who Called:  pt  Refill or New Rx: refill  RX Name and Strength: semaglutide (OZEMPIC) 1 mg/dose (4 mg/3 mL)  How is the patient currently taking it? (ex. 1XDay):  Is this a 30 day or 90 day RX:  Preferred Pharmacy with phone number: [phone]  Local or Mail Order: local  Ordering Provider:verenice  Would the patient rather a call back or a response via [facility]?   Best Call Back Number:  Additional Information:         [facility] [address] [place] [address] [address] [address]  Phone: [phone] Fax: [phone]

## 2023-12-14 ENCOUNTER — TELEPHONE (OUTPATIENT)
Dept: DIABETES | Facility: CLINIC | Age: 73
End: 2023-12-14
Payer: MEDICARE

## 2023-12-14 DIAGNOSIS — Z79.4 UNCONTROLLED TYPE 2 DIABETES MELLITUS WITH HYPERGLYCEMIA, WITH LONG-TERM CURRENT USE OF INSULIN: ICD-10-CM

## 2023-12-14 DIAGNOSIS — E11.65 UNCONTROLLED TYPE 2 DIABETES MELLITUS WITH HYPERGLYCEMIA, WITH LONG-TERM CURRENT USE OF INSULIN: ICD-10-CM

## 2023-12-14 NOTE — TELEPHONE ENCOUNTER
Pt was called pt ozempic didn't need a PA pt just  needed to  refill and ozempic is ready at the [facility]

## 2023-12-14 NOTE — TELEPHONE ENCOUNTER
----- Message from Dnoa Montes sent at 12/14/2023 11:53 AM CST -----  Contact: Zhou Epperson needs a call back at [phone], Regards to a PA is needed for HUMALOG KWIKPEN INSULIN 100 unit/mL pen,  Semaglutide (OZEMPIC) 1 mg/dose (4 mg/3 mL) and Blood-glucose sensor (FREESTYLE DERRELL 3 SENSOR) [place]    [facility] [address] [place] [address]  Phone: [phone] Fax: [phone]

## 2023-12-15 ENCOUNTER — PATIENT MESSAGE (OUTPATIENT)
Dept: DIABETES | Facility: CLINIC | Age: 73
End: 2023-12-15
Payer: MEDICARE

## 2023-12-15 DIAGNOSIS — E11.65 UNCONTROLLED TYPE 2 DIABETES MELLITUS WITH HYPERGLYCEMIA, WITH LONG-TERM CURRENT USE OF INSULIN: Primary | ICD-10-CM

## 2023-12-15 DIAGNOSIS — Z79.4 UNCONTROLLED TYPE 2 DIABETES MELLITUS WITH HYPERGLYCEMIA, WITH LONG-TERM CURRENT USE OF INSULIN: Primary | ICD-10-CM

## 2023-12-15 RX ORDER — DULAGLUTIDE 1.5 MG/.5ML
1.5 INJECTION, SOLUTION SUBCUTANEOUS
Qty: 4 PEN | Refills: 11 | Status: SHIPPED | OUTPATIENT
Start: 2023-12-15 | End: 2023-12-28

## 2023-12-15 NOTE — TELEPHONE ENCOUNTER
Let patient know there are no cheaper alternatives. There are no generics in this class. Will have to try again Jan 1 and just work with the insulin for now. Is he on Moni? I can adjust if I can see a download. Verify what he is taking.

## 2023-12-15 NOTE — TELEPHONE ENCOUNTER
I am going to send Trulicity- check with the pharmacy downstairs for me to see if this will be any cheaper.

## 2023-12-15 NOTE — TELEPHONE ENCOUNTER
Increase Lantus to 45 units at bedtime, and please also increase Humalog to 14 units before each main meal.

## 2023-12-15 NOTE — TELEPHONE ENCOUNTER
Spoke with patient, he is taking Metformin 500 BID, Humalog 12 units TID, Lantus 40 units at bedtime, Hadn't had ozempic in 2 weeks.     Moni report in media

## 2023-12-15 NOTE — TELEPHONE ENCOUNTER
Spoke with patient, instructed him to Increase lantus to 45 units at bedtime and increase Humalog to 14 units before each meal. Patient verbalized understanding

## 2023-12-18 DIAGNOSIS — Z79.4 UNCONTROLLED TYPE 2 DIABETES MELLITUS WITH HYPERGLYCEMIA, WITH LONG-TERM CURRENT USE OF INSULIN: Primary | ICD-10-CM

## 2023-12-18 DIAGNOSIS — E11.65 UNCONTROLLED TYPE 2 DIABETES MELLITUS WITH HYPERGLYCEMIA, WITH LONG-TERM CURRENT USE OF INSULIN: Primary | ICD-10-CM

## 2023-12-18 RX ORDER — BLOOD-GLUCOSE SENSOR
EACH MISCELLANEOUS
Qty: 2 EACH | Refills: 11 | Status: SHIPPED | OUTPATIENT
Start: 2023-12-18

## 2023-12-20 ENCOUNTER — TELEPHONE (OUTPATIENT)
Dept: OPHTHALMOLOGY | Facility: CLINIC | Age: 73
End: 2023-12-20
Payer: MEDICARE

## 2023-12-23 NOTE — TELEPHONE ENCOUNTER
No care due was identified.  [facility] Embedded Care Due Messages. Reference number: [id number].   12/23/2023 9:21:21 AM CST

## 2023-12-24 RX ORDER — PANTOPRAZOLE SODIUM 40 MG/1
TABLET, DELAYED RELEASE ORAL
Qty: 90 TABLET | Refills: 2 | Status: SHIPPED | OUTPATIENT
Start: 2023-12-24 | End: 2024-03-11 | Stop reason: SDUPTHER

## 2023-12-24 NOTE — TELEPHONE ENCOUNTER
Refill Decision Note   Anup Angela  is requesting a refill authorization.  Brief Assessment and Rationale for Refill:  Approve     Medication Therapy Plan:       Medication Reconciliation Completed: No   Comments:     No Care Gaps recommended.     Note composed:3:04 PM 12/24/2023

## 2023-12-26 ENCOUNTER — OFFICE VISIT (OUTPATIENT)
Dept: OPHTHALMOLOGY | Facility: CLINIC | Age: 73
End: 2023-12-26
Payer: MEDICARE

## 2023-12-26 DIAGNOSIS — H25.11 NUCLEAR SCLEROSIS OF RIGHT EYE: ICD-10-CM

## 2023-12-26 DIAGNOSIS — H25.12 NUCLEAR SCLEROSIS OF LEFT EYE: ICD-10-CM

## 2023-12-26 DIAGNOSIS — E11.9 TYPE 2 DIABETES MELLITUS WITHOUT COMPLICATION, WITHOUT LONG-TERM CURRENT USE OF INSULIN: Primary | ICD-10-CM

## 2023-12-26 PROCEDURE — 99999 PR PBB SHADOW E&M-EST. PATIENT-LVL III: CPT | Mod: PBBFAC,,, | Performed by: STUDENT IN AN ORGANIZED HEALTH CARE EDUCATION/TRAINING PROGRAM

## 2023-12-26 PROCEDURE — 3066F NEPHROPATHY DOC TX: CPT | Mod: CPTII,S$GLB,, | Performed by: STUDENT IN AN ORGANIZED HEALTH CARE EDUCATION/TRAINING PROGRAM

## 2023-12-26 PROCEDURE — 3046F PR MOST RECENT HEMOGLOBIN A1C LEVEL > 9.0%: ICD-10-PCS | Mod: CPTII,S$GLB,, | Performed by: STUDENT IN AN ORGANIZED HEALTH CARE EDUCATION/TRAINING PROGRAM

## 2023-12-26 PROCEDURE — 3061F PR NEG MICROALBUMINURIA RESULT DOCUMENTED/REVIEW: ICD-10-PCS | Mod: CPTII,S$GLB,, | Performed by: STUDENT IN AN ORGANIZED HEALTH CARE EDUCATION/TRAINING PROGRAM

## 2023-12-26 PROCEDURE — 3061F NEG MICROALBUMINURIA REV: CPT | Mod: CPTII,S$GLB,, | Performed by: STUDENT IN AN ORGANIZED HEALTH CARE EDUCATION/TRAINING PROGRAM

## 2023-12-26 PROCEDURE — 3046F HEMOGLOBIN A1C LEVEL >9.0%: CPT | Mod: CPTII,S$GLB,, | Performed by: STUDENT IN AN ORGANIZED HEALTH CARE EDUCATION/TRAINING PROGRAM

## 2023-12-26 PROCEDURE — 99214 PR OFFICE/OUTPT VISIT, EST, LEVL IV, 30-39 MIN: ICD-10-PCS | Mod: S$GLB,,, | Performed by: STUDENT IN AN ORGANIZED HEALTH CARE EDUCATION/TRAINING PROGRAM

## 2023-12-26 PROCEDURE — 99214 OFFICE O/P EST MOD 30 MIN: CPT | Mod: S$GLB,,, | Performed by: STUDENT IN AN ORGANIZED HEALTH CARE EDUCATION/TRAINING PROGRAM

## 2023-12-26 PROCEDURE — 3066F PR DOCUMENTATION OF TREATMENT FOR NEPHROPATHY: ICD-10-PCS | Mod: CPTII,S$GLB,, | Performed by: STUDENT IN AN ORGANIZED HEALTH CARE EDUCATION/TRAINING PROGRAM

## 2023-12-26 PROCEDURE — 1159F MED LIST DOCD IN RCRD: CPT | Mod: CPTII,S$GLB,, | Performed by: STUDENT IN AN ORGANIZED HEALTH CARE EDUCATION/TRAINING PROGRAM

## 2023-12-26 PROCEDURE — 1160F RVW MEDS BY RX/DR IN RCRD: CPT | Mod: CPTII,S$GLB,, | Performed by: STUDENT IN AN ORGANIZED HEALTH CARE EDUCATION/TRAINING PROGRAM

## 2023-12-26 PROCEDURE — 2023F PR DILATED RETINAL EXAM W/O EVID OF RETINOPATHY: ICD-10-PCS | Mod: CPTII,S$GLB,, | Performed by: STUDENT IN AN ORGANIZED HEALTH CARE EDUCATION/TRAINING PROGRAM

## 2023-12-26 PROCEDURE — 2023F DILAT RTA XM W/O RTNOPTHY: CPT | Mod: CPTII,S$GLB,, | Performed by: STUDENT IN AN ORGANIZED HEALTH CARE EDUCATION/TRAINING PROGRAM

## 2023-12-26 PROCEDURE — 1160F PR REVIEW ALL MEDS BY PRESCRIBER/CLIN PHARMACIST DOCUMENTED: ICD-10-PCS | Mod: CPTII,S$GLB,, | Performed by: STUDENT IN AN ORGANIZED HEALTH CARE EDUCATION/TRAINING PROGRAM

## 2023-12-26 PROCEDURE — 99999 PR PBB SHADOW E&M-EST. PATIENT-LVL III: ICD-10-PCS | Mod: PBBFAC,,, | Performed by: STUDENT IN AN ORGANIZED HEALTH CARE EDUCATION/TRAINING PROGRAM

## 2023-12-26 PROCEDURE — 1159F PR MEDICATION LIST DOCUMENTED IN MEDICAL RECORD: ICD-10-PCS | Mod: CPTII,S$GLB,, | Performed by: STUDENT IN AN ORGANIZED HEALTH CARE EDUCATION/TRAINING PROGRAM

## 2023-12-26 NOTE — PROGRESS NOTES
HPI     Annual Exam     Additional comments: Diagnosed with diabetes in 2000  Lab Results       Component                Value               Date                       HGBA1C                   10.3 (H)            11/07/2023              Pt states his va is stable. Pt states his eyes are very watery and red. Pt   states sometimes it make shis va very blurry. Pt states he sometimes has a   pain in his eyes.          Last edited by Latha Griffin on 12/26/2023  9:17 AM.            Assessment /Plan     For exam results, see Encounter Report.    Type 2 diabetes mellitus without complication, without long-term current use of insulin-   last A1c 10.3   Strict BG control, f/u w/ PCP, and annual DFE  Stressed importance of DM control to preserve vision    Nuclear sclerosis of right eye  Nuclear sclerosis of left eye- - Not visually significant, monitor        Return to clinic in 1 year DFE or PRN

## 2023-12-28 ENCOUNTER — OFFICE VISIT (OUTPATIENT)
Dept: DIABETES | Facility: CLINIC | Age: 73
End: 2023-12-28
Payer: MEDICARE

## 2023-12-28 DIAGNOSIS — Z79.4 UNCONTROLLED TYPE 2 DIABETES MELLITUS WITH HYPERGLYCEMIA, WITH LONG-TERM CURRENT USE OF INSULIN: Primary | ICD-10-CM

## 2023-12-28 DIAGNOSIS — E11.69 HYPERLIPIDEMIA ASSOCIATED WITH TYPE 2 DIABETES MELLITUS: ICD-10-CM

## 2023-12-28 DIAGNOSIS — E11.59 HYPERTENSION ASSOCIATED WITH DIABETES: ICD-10-CM

## 2023-12-28 DIAGNOSIS — G62.9 NEUROPATHY: ICD-10-CM

## 2023-12-28 DIAGNOSIS — E78.5 HYPERLIPIDEMIA ASSOCIATED WITH TYPE 2 DIABETES MELLITUS: ICD-10-CM

## 2023-12-28 DIAGNOSIS — E11.65 UNCONTROLLED TYPE 2 DIABETES MELLITUS WITH HYPERGLYCEMIA, WITH LONG-TERM CURRENT USE OF INSULIN: Primary | ICD-10-CM

## 2023-12-28 DIAGNOSIS — I15.2 HYPERTENSION ASSOCIATED WITH DIABETES: ICD-10-CM

## 2023-12-28 PROCEDURE — 1160F PR REVIEW ALL MEDS BY PRESCRIBER/CLIN PHARMACIST DOCUMENTED: ICD-10-PCS | Mod: CPTII,95,, | Performed by: NURSE PRACTITIONER

## 2023-12-28 PROCEDURE — 99441 PR PHYSICIAN TELEPHONE EVALUATION 5-10 MIN: ICD-10-PCS | Mod: 95,,, | Performed by: NURSE PRACTITIONER

## 2023-12-28 PROCEDURE — 3046F PR MOST RECENT HEMOGLOBIN A1C LEVEL > 9.0%: ICD-10-PCS | Mod: CPTII,95,, | Performed by: NURSE PRACTITIONER

## 2023-12-28 PROCEDURE — 99441 PR PHYSICIAN TELEPHONE EVALUATION 5-10 MIN: CPT | Mod: 95,,, | Performed by: NURSE PRACTITIONER

## 2023-12-28 PROCEDURE — 3066F NEPHROPATHY DOC TX: CPT | Mod: CPTII,95,, | Performed by: NURSE PRACTITIONER

## 2023-12-28 PROCEDURE — 1159F PR MEDICATION LIST DOCUMENTED IN MEDICAL RECORD: ICD-10-PCS | Mod: CPTII,95,, | Performed by: NURSE PRACTITIONER

## 2023-12-28 PROCEDURE — 1160F RVW MEDS BY RX/DR IN RCRD: CPT | Mod: CPTII,95,, | Performed by: NURSE PRACTITIONER

## 2023-12-28 PROCEDURE — 1159F MED LIST DOCD IN RCRD: CPT | Mod: CPTII,95,, | Performed by: NURSE PRACTITIONER

## 2023-12-28 PROCEDURE — 3066F PR DOCUMENTATION OF TREATMENT FOR NEPHROPATHY: ICD-10-PCS | Mod: CPTII,95,, | Performed by: NURSE PRACTITIONER

## 2023-12-28 PROCEDURE — 3061F NEG MICROALBUMINURIA REV: CPT | Mod: CPTII,95,, | Performed by: NURSE PRACTITIONER

## 2023-12-28 PROCEDURE — 3046F HEMOGLOBIN A1C LEVEL >9.0%: CPT | Mod: CPTII,95,, | Performed by: NURSE PRACTITIONER

## 2023-12-28 PROCEDURE — 3061F PR NEG MICROALBUMINURIA RESULT DOCUMENTED/REVIEW: ICD-10-PCS | Mod: CPTII,95,, | Performed by: NURSE PRACTITIONER

## 2023-12-28 RX ORDER — INSULIN GLARGINE 100 [IU]/ML
45 INJECTION, SOLUTION SUBCUTANEOUS NIGHTLY
Qty: 15 ML | Refills: 5 | Status: SHIPPED | OUTPATIENT
Start: 2023-12-28

## 2023-12-28 RX ORDER — INSULIN LISPRO 100 [IU]/ML
14 INJECTION, SOLUTION INTRAVENOUS; SUBCUTANEOUS
Qty: 15 ML | Refills: 5 | Status: SHIPPED | OUTPATIENT
Start: 2023-12-28

## 2023-12-28 NOTE — PATIENT INSTRUCTIONS
-- Medications adjusted for today's visit include:    Continue Lantus 45 units once a day.     Continue Novolog 14 units before each main meal - try to use this insulin 10 minutes before you eat. Do not take any Novolog at bedtime.     Continue Metformin 500 mg twice a day, with a meal.    Plan to re-start Ozempic in January.    -- Limit carbs to no more than 45 grams with each meal. Never eat carbs by themselves, always add protein. Make snacks low carb or non-carb only.    -- Start checking blood sugar 3 times daily: Fasting blood sugar and vary your next 3 readings: Before lunch, before supper, 2 hours after any meal, or bedtime.     -Blood sugar goals should be a fasting blood sugar between , and no blood sugars throughout the day over 180 is good, less than 160 better.    --Carry glucose tablets/soft peppermints/regular juice or Coke product with you at all times to treat a low blood sugar episode (less than 70). If your blood sugar is between 50-70, Chew 4 tablets or drink 1/2 cup of juice or regular Coke product. If your blood sugar is below 50, double the treatment. Re-check blood sugar in 15 minutes. If still low, repeat this. Always call the clinic to give an update for any low blood sugar episodes.    --Exercise as tolerated.    --Follow-up for your next visit in 6 weeks.     --Please either drop off, fax, or MyChart your readings to me as needed.

## 2023-12-28 NOTE — Clinical Note
Please call patient - schedule a 2 month follow up visit with me at the [place]. Notes: Moni / Order labs day of visit

## 2023-12-28 NOTE — PROGRESS NOTES
Established Patient - Audio Only Telehealth Visit     The patient location is: [place]  The chief complaint leading to consultation is: diabetes management follow up   Visit type: Virtual visit with audio only (telephone)  Total time spent with patient: 10 minutes       The reason for the audio only service rather than synchronous audio and video virtual visit was related to technical difficulties or patient preference/necessity.     Each patient to whom I provide medical services by telemedicine is:  (1) informed of the relationship between the physician and patient and the respective role of any other health care provider with respect to management of the patient; and (2) notified that they may decline to receive medical services by telemedicine and may withdraw from such care at any time. Patient verbally consented to receive this service via voice-only telephone call.       HPI:  Between visits, started using Moni. Per Moni download, for the last 14 days: Average glucose of 151 mg/dL. He is 81% in range. 18% of readings are high with 1% of readings > 250. No hypoglycemia. Unfortunately, GLP1 agonists are too expensive - he would like to try for Ozempic again in the new year. He will be due for labs in February. Overall glycemic control is much improved.         Diabetes Medications               dulaglutide (TRULICITY) 1.5 mg/0.5 mL pen injector Inject 1.5 mg into the skin every 7 days. Not taking too expensive    HUMALOG KWIKPEN INSULIN 100 unit/mL pen Inject 12 Units into the skin 3 (three) times daily before meals. 14 units    LANTUS SOLOSTAR U-100 INSULIN glargine 100 units/mL SubQ pen Inject 40 Units into the skin every evening. 45 units    metFORMIN (GLUCOPHAGE-XR) 500 MG ER 24hr tablet Take 1 tablet (500 mg total) by mouth 2 (two) times daily with meals.           Diabetes Management Status    Statin: Taking  ACE/ARB: Taking    Screening or Prevention Patient's value Goal Complete/Controlled?   HgA1C  Testing and Control   Lab Results   Component Value Date    HGBA1C 10.3 (H) 11/07/2023      Annually/Less than 8% No   Lipid profile : 08/07/2023 Annually Yes   LDL control Lab Results   Component Value Date    LDLCALC 95.6 08/07/2023    Annually/Less than 100 mg/dl  Yes   Nephropathy screening Lab Results   Component Value Date    LABMICR 25.0 08/07/2023     Lab Results   Component Value Date    PROTEINUA Negative 10/23/2020     Lab Results   Component Value Date    UTPCR 0.07 09/27/2019      Annually Yes   Blood pressure BP Readings from Last 1 Encounters:   10/17/23 (!) 141/76    Less than 140/90 No   Dilated retinal exam : 12/26/2023 Annually Yes   Foot exam   : 05/09/2023 Annually Yes         Assessment and plan:      Anup was seen today for diabetes mellitus.    Diagnoses and all orders for this visit:    Uncontrolled type 2 diabetes mellitus with hyperglycemia, with long-term current use of insulin  -     HUMALOG KWIKPEN INSULIN 100 unit/mL pen; Inject 14 Units into the skin 3 (three) times daily before meals.  -     LANTUS SOLOSTAR U-100 INSULIN glargine 100 units/mL SubQ pen; Inject 45 Units into the skin every evening.    Chronic -     -- Medications adjusted for today's visit include:    Continue Lantus 45 units once a day.     Continue Novolog 14 units before each main meal - try to use this insulin 10 minutes before you eat. Do not take any Novolog at bedtime.     Continue Metformin 500 mg twice a day, with a meal.    Plan to re-start Ozempic in January.    Neuropathy    Hyperlipidemia associated with type 2 diabetes mellitus    Hypertension associated with diabetes                              This service was not originating from a related E/M service provided within the previous 7 days nor will  to an E/M service or procedure within the next 24 hours or my soonest available appointment.  Prevailing standard of care was able to be met in this audio-only visit.

## 2024-01-02 DIAGNOSIS — E11.65 UNCONTROLLED TYPE 2 DIABETES MELLITUS WITH HYPERGLYCEMIA, WITH LONG-TERM CURRENT USE OF INSULIN: Primary | ICD-10-CM

## 2024-01-02 DIAGNOSIS — Z79.4 UNCONTROLLED TYPE 2 DIABETES MELLITUS WITH HYPERGLYCEMIA, WITH LONG-TERM CURRENT USE OF INSULIN: Primary | ICD-10-CM

## 2024-01-02 RX ORDER — SEMAGLUTIDE 0.68 MG/ML
0.5 INJECTION, SOLUTION SUBCUTANEOUS
Qty: 3 ML | Refills: 5 | Status: SHIPPED | OUTPATIENT
Start: 2024-01-02

## 2024-02-29 DIAGNOSIS — E11.59 HYPERTENSION ASSOCIATED WITH DIABETES: ICD-10-CM

## 2024-02-29 DIAGNOSIS — I15.2 HYPERTENSION ASSOCIATED WITH DIABETES: ICD-10-CM

## 2024-02-29 RX ORDER — HYDROCHLOROTHIAZIDE 25 MG/1
25 TABLET ORAL DAILY
Qty: 90 TABLET | Refills: 2 | Status: SHIPPED | OUTPATIENT
Start: 2024-02-29 | End: 2024-03-11 | Stop reason: SDUPTHER

## 2024-02-29 NOTE — TELEPHONE ENCOUNTER
No care due was identified.  [facility] Embedded Care Due Messages. Reference number: [id number].   2/29/2024 4:15:38 PM CST

## 2024-03-04 ENCOUNTER — OFFICE VISIT (OUTPATIENT)
Dept: PODIATRY | Facility: CLINIC | Age: 74
End: 2024-03-04
Payer: MEDICARE

## 2024-03-04 VITALS — HEIGHT: 69 IN | BODY MASS INDEX: 39.55 KG/M2 | WEIGHT: 267 LBS

## 2024-03-04 DIAGNOSIS — R29.898 MUSCULAR DECONDITIONING: ICD-10-CM

## 2024-03-04 DIAGNOSIS — E11.65 UNCONTROLLED TYPE 2 DIABETES MELLITUS WITH HYPERGLYCEMIA: ICD-10-CM

## 2024-03-04 DIAGNOSIS — G62.9 NEUROPATHY: ICD-10-CM

## 2024-03-04 DIAGNOSIS — B35.1 DERMATOPHYTOSIS OF NAIL: Primary | ICD-10-CM

## 2024-03-04 PROCEDURE — 99999 PR PBB SHADOW E&M-EST. PATIENT-LVL III: CPT | Mod: PBBFAC,,, | Performed by: PODIATRIST

## 2024-03-04 PROCEDURE — 99213 OFFICE O/P EST LOW 20 MIN: CPT | Mod: 25,S$GLB,, | Performed by: PODIATRIST

## 2024-03-04 PROCEDURE — 11721 DEBRIDE NAIL 6 OR MORE: CPT | Mod: Q9,S$GLB,, | Performed by: PODIATRIST

## 2024-03-04 RX ORDER — HYDROCODONE BITARTRATE AND ACETAMINOPHEN 10; 325 MG/1; MG/1
1 TABLET ORAL EVERY 6 HOURS PRN
Qty: 15 TABLET | Refills: 0 | Status: SHIPPED | OUTPATIENT
Start: 2024-03-04

## 2024-03-04 RX ORDER — PREGABALIN 100 MG/1
100 CAPSULE ORAL 2 TIMES DAILY
Qty: 60 CAPSULE | Refills: 1 | Status: SHIPPED | OUTPATIENT
Start: 2024-03-04 | End: 2024-04-30 | Stop reason: SDUPTHER

## 2024-03-10 NOTE — PROGRESS NOTES
Subjective:     Patient ID: Anup Miller is a 73 y.o. male.    Chief Complaint: Nail Care (Nail care (diabetic pt, wearing tennis shoes, BL foot Pain, pt states ate something that cause swelling in feet,  rates pain 5/10, last seen PCP Dr. Gonzales 8/7/23.))    Anup is a 73 y.o. male who presents to the clinic for evaluation and treatment of high risk feet. Anup has a past medical history of Acute respiratory failure due to COVID-19 (08/2020), Colon polyp, COVID-19 virus infection (07/2020), COVID-19 virus infection (7/26/2020), Diabetes mellitus type II (Diagnosed 2002), Elevated liver enzymes, Elevated PSA (12/13/2017), Fatty liver disease, nonalcoholic, Fever (8/25/2020), Hyperlipidemia, Hypertension, KHOI on CPAP, and Sleep apnea. The patient's chief complaint is bilateral foot pain. Patient states pain is pins/needles, electric, burning, and hot feeling. Patient states pain is has worsened later. Patient rates pain 5/10.  This patient has documented high risk feet requiring routine maintenance secondary to diabetes mellitis and those secondary complications of diabetes, as mentioned..    PCP: Bryce Gonzales MD    Date Last Seen by PCP: 08/07/2023    Current shoe gear:  Affected Foot: tennis shoes     Unaffected Foot: tennis shoes    Hemoglobin A1C   Date Value Ref Range Status   11/07/2023 10.3 (H) 4.0 - 5.6 % Final     Comment:     ADA Screening Guidelines:  5.7-6.4%  Consistent with prediabetes  >or=6.5%  Consistent with diabetes    High levels of fetal hemoglobin interfere with the HbA1C  assay. Heterozygous hemoglobin variants (HbS, HgC, etc)do  not significantly interfere with this assay.   However, presence of multiple variants may affect accuracy.     08/07/2023 9.3 (H) 4.0 - 5.6 % Final     Comment:     ADA Screening Guidelines:  5.7-6.4%  Consistent with prediabetes  >or=6.5%  Consistent with diabetes    High levels of fetal hemoglobin interfere with the HbA1C  assay. Heterozygous hemoglobin  variants (HbS, HgC, etc)do  not significantly interfere with this assay.   However, presence of multiple variants may affect accuracy.     02/06/2023 9.1 (H) 4.0 - 5.6 % Final     Comment:     ADA Screening Guidelines:  5.7-6.4%  Consistent with prediabetes  >or=6.5%  Consistent with diabetes    High levels of fetal hemoglobin interfere with the HbA1C  assay. Heterozygous hemoglobin variants (HbS, HgC, etc)do  not significantly interfere with this assay.   However, presence of multiple variants may affect accuracy.         Patient Active Problem List   Diagnosis    Uncontrolled type 2 diabetes mellitus with hyperglycemia, with long-term current use of insulin    Hypertension associated with diabetes    Hyperlipidemia associated with type 2 diabetes mellitus    Multiple pulmonary nodules determined by computed tomography of lung    KHOI (obstructive sleep apnea)    Hx of colonic polyp    Cardiac arrhythmia    Pneumonia due to COVID-19 virus    Oral phase dysphagia    History of 2019 novel coronavirus disease (COVID-19)    Severe obesity (BMI 35.0-35.9 with comorbidity)    Debility    Neuropathy    Muscular deconditioning    History of DVT (deep vein thrombosis)    Iron deficiency anemia due to chronic blood loss    Type 2 diabetes mellitus with stage 3a chronic kidney disease, with long-term current use of insulin    Type 2 diabetes mellitus with hypertriglyceridemia    Abnormality of gait and mobility    Other reduced mobility    Financial difficulties    DM cataract    Persistent neurologic symptoms after COVID-19    RICHA (generalized anxiety disorder)       Medication List with Changes/Refills   New Medications    PREGABALIN (LYRICA) 100 MG CAPSULE    Take 1 capsule (100 mg total) by mouth 2 (two) times daily.   Current Medications    AMLODIPINE (NORVASC) 5 MG TABLET    Take 1 tablet (5 mg total) by mouth once daily.    ASPIRIN 81 MG CAP    Take 81 mg by mouth once daily.    AZELASTINE (ASTELIN) 137 MCG (0.1 %)  NASAL SPRAY    1 spray (137 mcg total) by Nasal route 2 (two) times daily.    BLOOD-GLUCOSE SENSOR (FREESTYLE DERRELL 3 SENSOR) MIHIR    Use as directed    CYANOCOBALAMIN, VITAMIN B-12, ORAL    Take by mouth once daily.    ERGOCALCIFEROL, VITAMIN D2, (VITAMIN D ORAL)    Take by mouth once daily.    FLUTICASONE PROPIONATE (FLONASE) 50 MCG/ACTUATION NASAL SPRAY    1 spray (50 mcg total) by Each Nostril route once daily.    HUMALOG KWIKPEN INSULIN 100 UNIT/ML PEN    Inject 14 Units into the skin 3 (three) times daily before meals.    HYDROCHLOROTHIAZIDE (HYDRODIURIL) 25 MG TABLET    Take 1 tablet (25 mg total) by mouth once daily.    LANTUS SOLOSTAR U-100 INSULIN GLARGINE 100 UNITS/ML SUBQ PEN    Inject 45 Units into the skin every evening.    LOSARTAN (COZAAR) 100 MG TABLET    Take 100 mg by mouth once daily.    METFORMIN (GLUCOPHAGE-XR) 500 MG ER 24HR TABLET    Take 1 tablet (500 mg total) by mouth 2 (two) times daily with meals.    METOPROLOL TARTRATE (LOPRESSOR) 50 MG TABLET    TK 1 T PO BID    PANTOPRAZOLE (PROTONIX) 40 MG TABLET    Take 1 tablet by mouth once daily    ROSUVASTATIN (CRESTOR) 10 MG TABLET    Take 1 tablet (10 mg total) by mouth once daily.    SEMAGLUTIDE (OZEMPIC) 0.25 MG OR 0.5 MG (2 MG/3 ML) PEN INJECTOR    Inject 0.5 mg into the skin every 7 days.    TURMERIC ORAL    Take by mouth once daily.    ZINC SULFATE (ZINCATE) 220 (50) MG CAPSULE    Take 220 mg by mouth once daily.   Changed and/or Refilled Medications    Modified Medication Previous Medication    HYDROCODONE-ACETAMINOPHEN (NORCO)  MG PER TABLET HYDROcodone-acetaminophen (NORCO)  mg per tablet       Take 1 tablet by mouth every 6 (six) hours as needed for Pain.    Take 1 tablet by mouth every 6 (six) hours as needed for Pain.       Review of patient's allergies indicates:  No Known Allergies    Past Surgical History:   Procedure Laterality Date    BACK SURGERY      CHOLECYSTECTOMY  2013    COLONOSCOPY N/A 7/19/2018    Procedure:  COLONOSCOPY;  Surgeon: Chacorta Lopez III, MD;  Location: [facility] ENDO;  Service: Endoscopy;  Laterality: N/A;    COLONOSCOPY N/A 3/15/2022    Procedure: COLONOSCOPY;  Surgeon: Jessie Diallo MD;  Location[facility] ENDO;  Service: Endoscopy;  Laterality: N/A;    ESOPHAGOGASTRODUODENOSCOPY N/A 8/25/2020    Procedure: EGD (ESOPHAGOGASTRODUODENOSCOPY);  Surgeon: Anup Green MD;  Location: [address];  Service: General;  Laterality: N/A;    TRACHEOSTOMY  08/2020    Temporarily post COVID infection.       Family History   Problem Relation Age of Onset    Asthma Mother     Diabetes Father     Cancer Sister         Breast    Diabetes Paternal Aunt     Cancer Maternal Grandmother         Breast    Diabetes Maternal Grandmother     Diabetes Paternal Uncle     Colon cancer Neg Hx        Social History     Socioeconomic History    Marital status:      Spouse name: yoselin    Number of children: 1   Occupational History    Occupation:    Tobacco Use    Smoking status: Never    Smokeless tobacco: Never   Substance and Sexual Activity    Alcohol use: No    Drug use: No    Sexual activity: Yes     Partners: Female     Comment:    Social History Narrative    No smokers or pets in household.     Social Determinants of Health     Financial Resource Strain: Low Risk  (9/6/2023)    Overall Financial Resource Strain (CARDIA)     Difficulty of Paying Living Expenses: Not very hard   Food Insecurity: Food Insecurity Present (9/6/2023)    Hunger Vital Sign     Worried About Running Out of Food in the Last Year: Sometimes true     Ran Out of Food in the Last Year: Sometimes true   Transportation Needs: No Transportation Needs (9/6/2023)    PRAPARE - Transportation     Lack of Transportation (Medical): No     Lack of Transportation (Non-Medical): No   Physical Activity: Inactive (9/6/2023)    Exercise Vital Sign     Days of Exercise per Week: 0 days     Minutes of Exercise per Session: 0 min   Stress: Stress  "Concern Present (9/6/2023)    [employer] [place] [employer] - Occupational Stress Questionnaire     Feeling of Stress : To some extent   Social Connections: Socially Integrated (9/6/2023)    Social Connection and Isolation Panel [NHANES]     Frequency of Communication with Friends and Family: More than three times a week     Frequency of Social Gatherings with Friends and Family: Once a week     Attends Hoahaoism Services: More than 4 times per year     Active Member of Clubs or Organizations: Yes     Attends Club or Organization Meetings: More than 4 times per year     Marital Status:    Housing Stability: Low Risk  (9/6/2023)    Housing Stability Vital Sign     Unable to Pay for Housing in the Last Year: No     Number of Places Lived in the Last Year: 1     Unstable Housing in the Last Year: No       Vitals:    03/04/24 0905   Weight: 121.1 kg (266 lb 15.6 oz)   Height: 5' 9" (1.753 m)   PainSc:   5       Hemoglobin A1C   Date Value Ref Range Status   11/07/2023 10.3 (H) 4.0 - 5.6 % Final     Comment:     ADA Screening Guidelines:  5.7-6.4%  Consistent with prediabetes  >or=6.5%  Consistent with diabetes    High levels of fetal hemoglobin interfere with the HbA1C  assay. Heterozygous hemoglobin variants (HbS, HgC, etc)do  not significantly interfere with this assay.   However, presence of multiple variants may affect accuracy.     08/07/2023 9.3 (H) 4.0 - 5.6 % Final     Comment:     ADA Screening Guidelines:  5.7-6.4%  Consistent with prediabetes  >or=6.5%  Consistent with diabetes    High levels of fetal hemoglobin interfere with the HbA1C  assay. Heterozygous hemoglobin variants (HbS, HgC, etc)do  not significantly interfere with this assay.   However, presence of multiple variants may affect accuracy.     02/06/2023 9.1 (H) 4.0 - 5.6 % Final     Comment:     ADA Screening Guidelines:  5.7-6.4%  Consistent with prediabetes  >or=6.5%  Consistent with diabetes    High levels of fetal hemoglobin " interfere with the HbA1C  assay. Heterozygous hemoglobin variants (HbS, HgC, etc)do  not significantly interfere with this assay.   However, presence of multiple variants may affect accuracy.         Review of Systems   Constitutional:  Negative for chills and fever.   Respiratory:  Negative for shortness of breath.    Cardiovascular:  Negative for chest pain, palpitations, orthopnea, claudication and leg swelling.   Gastrointestinal:  Negative for diarrhea, nausea and vomiting.   Musculoskeletal:  Negative for joint pain.   Skin:  Negative for rash.   Neurological:  Positive for tingling and sensory change.   Psychiatric/Behavioral: Negative.           Objective:      PHYSICAL EXAM: Apperance: Alert and orient in no distress,well developed, and with good attention to grooming and body habits  Patient presents ambulating in tennis shoes.  LOWER EXTREMITY EXAM:  VASCULAR: Dorsalis pedis pulses 2/4 bilateral and Posterior Tibial pulses 2/4 bilateral. Capillary fill time <4 seconds bilateral. Mild edema observed bilateral. Varicosities absent bilateral. Skin temperature of the lower extremities is warm to warm, proximal to distal. Hair growth dim bilateral.  DERMATOLOGICAL: No skin rashes, subcutaneous nodules, lesions, or ulcers observed bilateral. Webspaces 1,2,3,4 bilateral clean, dry and without evidence of break in skin integrity. Nails 1,2,3,4,5 bilateral elongated, thickened, and discolored with subungual debris.  NEUROLOGICAL: Light touch, sharp-dull, proprioception all present and equal bilaterally.  Vibratory sensation diminished at bilateral hallux and navicular. Protective sensation absent at 8/10 sites as tested with a [place] 5.07 monofilament.   MUSCULOSKELETAL: Muscle strength is 3/5 for foot inverters, everters, plantarflexors, and dorsiflexors. Muscle tone is normal.         Assessment:       ICD-10-CM ICD-9-CM   1. Dermatophytosis of nail  B35.1 110.1   2. Neuropathy  G62.9 355.9   3.  Muscular deconditioning  R29.898 781.99   4. Uncontrolled type 2 diabetes mellitus with hyperglycemia  E11.65 250.02           Plan:   Dermatophytosis of nail    Neuropathy  -     HYDROcodone-acetaminophen (NORCO)  mg per tablet; Take 1 tablet by mouth every 6 (six) hours as needed for Pain.  Dispense: 15 tablet; Refill: 0  -     pregabalin (LYRICA) 100 MG capsule; Take 1 capsule (100 mg total) by mouth 2 (two) times daily.  Dispense: 60 capsule; Refill: 1    Muscular deconditioning  -     HYDROcodone-acetaminophen (NORCO)  mg per tablet; Take 1 tablet by mouth every 6 (six) hours as needed for Pain.  Dispense: 15 tablet; Refill: 0  -     pregabalin (LYRICA) 100 MG capsule; Take 1 capsule (100 mg total) by mouth 2 (two) times daily.  Dispense: 60 capsule; Refill: 1    Uncontrolled type 2 diabetes mellitus with hyperglycemia      I counseled the patient on his conditions, regarding findings of my examination, my impressions, and usual treatment plan.   Appointment spent on education about the diabetic foot, neuropathy, and prevention of limb loss.  Shoe inspection. Diabetic Foot Education. Patient reminded of the importance of good nutrition and blood sugar control to help prevent podiatric complications of diabetes. Patient instructed on proper foot hygeine. We discussed wearing proper shoe gear, daily foot inspections, never walking without protective shoe gear, never putting sharp instruments to feet.    With patient's permission, nails 1-5 bilateral were debrided/trimmed in length and thickness aggressively to their soft tissue attachment mechanically and with electric , removing all offending nail and debris. Patient relates relief following the procedure.  Prescription written for Lyrica 100mg to be taken once nightly. Informed patient of possible side effects including but not limited to disorientation and drowsiness. Patient instructed to discontinue use if there are any adverse effects.  Patient states he understands.   Prescribed Norco 10-325mg to be taken as needed for pain. Patient advised on the possible elevation of blood pressure or heart effects and caution to take pills as needed and to discontinue use if symptoms arise, patient agreed.   Patient  will continue to monitor the areas daily, inspect feet, wear protective shoe gear when ambulatory, moisturizer to maintain skin integrity. Patient reminded of the importance of good nutrition and blood sugar control to help prevent podiatric complications of diabetes.  Referral completed for left knee pain.   Patient to return 2 months or sooner if needed.        Katy Fernandes DPM  [facility]

## 2024-03-11 ENCOUNTER — OFFICE VISIT (OUTPATIENT)
Dept: INTERNAL MEDICINE | Facility: CLINIC | Age: 74
End: 2024-03-11
Payer: MEDICARE

## 2024-03-11 ENCOUNTER — LAB VISIT (OUTPATIENT)
Dept: LAB | Facility: HOSPITAL | Age: 74
End: 2024-03-11
Attending: INTERNAL MEDICINE
Payer: MEDICARE

## 2024-03-11 VITALS
HEART RATE: 94 BPM | BODY MASS INDEX: 39.25 KG/M2 | SYSTOLIC BLOOD PRESSURE: 124 MMHG | OXYGEN SATURATION: 99 % | HEIGHT: 69 IN | WEIGHT: 265 LBS | DIASTOLIC BLOOD PRESSURE: 78 MMHG | TEMPERATURE: 97 F

## 2024-03-11 DIAGNOSIS — E11.59 HYPERTENSION ASSOCIATED WITH DIABETES: ICD-10-CM

## 2024-03-11 DIAGNOSIS — Z79.4 UNCONTROLLED TYPE 2 DIABETES MELLITUS WITH HYPERGLYCEMIA, WITH LONG-TERM CURRENT USE OF INSULIN: Primary | ICD-10-CM

## 2024-03-11 DIAGNOSIS — Z79.4 TYPE 2 DIABETES MELLITUS WITH STAGE 3A CHRONIC KIDNEY DISEASE, WITH LONG-TERM CURRENT USE OF INSULIN: ICD-10-CM

## 2024-03-11 DIAGNOSIS — E78.5 HYPERLIPIDEMIA ASSOCIATED WITH TYPE 2 DIABETES MELLITUS: ICD-10-CM

## 2024-03-11 DIAGNOSIS — N18.31 TYPE 2 DIABETES MELLITUS WITH STAGE 3A CHRONIC KIDNEY DISEASE, WITH LONG-TERM CURRENT USE OF INSULIN: ICD-10-CM

## 2024-03-11 DIAGNOSIS — E11.65 UNCONTROLLED TYPE 2 DIABETES MELLITUS WITH HYPERGLYCEMIA, WITH LONG-TERM CURRENT USE OF INSULIN: Primary | ICD-10-CM

## 2024-03-11 DIAGNOSIS — E78.1 TYPE 2 DIABETES MELLITUS WITH HYPERTRIGLYCERIDEMIA: ICD-10-CM

## 2024-03-11 DIAGNOSIS — I15.2 HYPERTENSION ASSOCIATED WITH DIABETES: ICD-10-CM

## 2024-03-11 DIAGNOSIS — Z12.5 ENCOUNTER FOR SCREENING FOR MALIGNANT NEOPLASM OF PROSTATE: ICD-10-CM

## 2024-03-11 DIAGNOSIS — E11.69 HYPERLIPIDEMIA ASSOCIATED WITH TYPE 2 DIABETES MELLITUS: ICD-10-CM

## 2024-03-11 DIAGNOSIS — E11.22 TYPE 2 DIABETES MELLITUS WITH STAGE 3A CHRONIC KIDNEY DISEASE, WITH LONG-TERM CURRENT USE OF INSULIN: ICD-10-CM

## 2024-03-11 DIAGNOSIS — F41.1 GAD (GENERALIZED ANXIETY DISORDER): ICD-10-CM

## 2024-03-11 DIAGNOSIS — E66.01 SEVERE OBESITY (BMI 35.0-35.9 WITH COMORBIDITY): ICD-10-CM

## 2024-03-11 DIAGNOSIS — E11.69 TYPE 2 DIABETES MELLITUS WITH HYPERTRIGLYCERIDEMIA: ICD-10-CM

## 2024-03-11 LAB
ANION GAP SERPL CALC-SCNC: 13 MMOL/L (ref 8–16)
BUN SERPL-MCNC: 19 MG/DL (ref 8–23)
CALCIUM SERPL-MCNC: 10.6 MG/DL (ref 8.7–10.5)
CHLORIDE SERPL-SCNC: 101 MMOL/L (ref 95–110)
CO2 SERPL-SCNC: 23 MMOL/L (ref 23–29)
COMPLEXED PSA SERPL-MCNC: 38.7 NG/ML (ref 0–4)
CREAT SERPL-MCNC: 1.4 MG/DL (ref 0.5–1.4)
EST. GFR  (NO RACE VARIABLE): 53.7 ML/MIN/1.73 M^2
ESTIMATED AVG GLUCOSE: 166 MG/DL (ref 68–131)
GLUCOSE SERPL-MCNC: 180 MG/DL (ref 70–110)
HBA1C MFR BLD: 7.4 % (ref 4–5.6)
POTASSIUM SERPL-SCNC: 4.2 MMOL/L (ref 3.5–5.1)
SODIUM SERPL-SCNC: 137 MMOL/L (ref 136–145)

## 2024-03-11 PROCEDURE — 83036 HEMOGLOBIN GLYCOSYLATED A1C: CPT | Performed by: INTERNAL MEDICINE

## 2024-03-11 PROCEDURE — 99214 OFFICE O/P EST MOD 30 MIN: CPT | Mod: S$GLB,,, | Performed by: INTERNAL MEDICINE

## 2024-03-11 PROCEDURE — 84153 ASSAY OF PSA TOTAL: CPT | Performed by: INTERNAL MEDICINE

## 2024-03-11 PROCEDURE — 99999 PR PBB SHADOW E&M-EST. PATIENT-LVL IV: CPT | Mod: PBBFAC,,, | Performed by: INTERNAL MEDICINE

## 2024-03-11 PROCEDURE — 80048 BASIC METABOLIC PNL TOTAL CA: CPT | Performed by: INTERNAL MEDICINE

## 2024-03-11 PROCEDURE — 36415 COLL VENOUS BLD VENIPUNCTURE: CPT | Performed by: INTERNAL MEDICINE

## 2024-03-11 RX ORDER — ROSUVASTATIN CALCIUM 20 MG/1
20 TABLET, COATED ORAL DAILY
Qty: 90 TABLET | Refills: 3 | Status: SHIPPED | OUTPATIENT
Start: 2024-03-11 | End: 2025-03-11

## 2024-03-11 RX ORDER — PANTOPRAZOLE SODIUM 40 MG/1
TABLET, DELAYED RELEASE ORAL
Qty: 90 TABLET | Refills: 3 | Status: SHIPPED | OUTPATIENT
Start: 2024-03-11

## 2024-03-11 RX ORDER — HYDROCHLOROTHIAZIDE 25 MG/1
25 TABLET ORAL DAILY
Qty: 90 TABLET | Refills: 2 | Status: SHIPPED | OUTPATIENT
Start: 2024-03-11 | End: 2025-03-11

## 2024-03-11 RX ORDER — METOPROLOL TARTRATE 50 MG/1
TABLET ORAL
Qty: 90 TABLET | Refills: 3 | Status: SHIPPED | OUTPATIENT
Start: 2024-03-11

## 2024-03-11 RX ORDER — LOSARTAN POTASSIUM 100 MG/1
100 TABLET ORAL DAILY
Qty: 90 TABLET | Refills: 4 | Status: SHIPPED | OUTPATIENT
Start: 2024-03-11 | End: 2025-03-11

## 2024-03-11 RX ORDER — AMLODIPINE BESYLATE 5 MG/1
5 TABLET ORAL DAILY
Qty: 90 TABLET | Refills: 4 | Status: SHIPPED | OUTPATIENT
Start: 2024-03-11 | End: 2025-03-11

## 2024-03-11 NOTE — PROGRESS NOTES
Subjective:      Patient ID: Anup Miller is a 73 y.o. male.    Chief Complaint: Follow-up    Follow-up  Pertinent negatives include no abdominal pain, chest pain, chills, coughing, fever or sore throat.       74 yo with   Patient Active Problem List   Diagnosis    Uncontrolled type 2 diabetes mellitus with hyperglycemia, with long-term current use of insulin    Hypertension associated with diabetes    Hyperlipidemia associated with type 2 diabetes mellitus    Multiple pulmonary nodules determined by computed tomography of lung    KHOI (obstructive sleep apnea)    Hx of colonic polyp    Cardiac arrhythmia    Pneumonia due to COVID-19 virus    Oral phase dysphagia    History of 2019 novel coronavirus disease (COVID-19)    Severe obesity (BMI 35.0-35.9 with comorbidity)    Debility    Neuropathy    Muscular deconditioning    History of DVT (deep vein thrombosis)    Iron deficiency anemia due to chronic blood loss    Type 2 diabetes mellitus with stage 3a chronic kidney disease, with long-term current use of insulin    Type 2 diabetes mellitus with hypertriglyceridemia    Abnormality of gait and mobility    Other reduced mobility    Financial difficulties    DM cataract    Persistent neurologic symptoms after COVID-19    RICHA (generalized anxiety disorder)     Past Medical History:   Diagnosis Date    Acute respiratory failure due to COVID-19 08/2020    Colon polyp     COVID-19 virus infection 07/2020    COVID-19 virus infection 7/26/2020    Diabetes mellitus type II Diagnosed 2002    Elevated liver enzymes     Elevated PSA 12/13/2017    Did not f/u with urology as rec in 2015    Fatty liver disease, nonalcoholic     Fever 8/25/2020    -- patient has been spiking fever and since admission  -- started spiking more frequent low-grade fever with a max of 100.8 on 8/21 -- started on vanc and cefepime  -- urine culture from 08/23 grew Enterococcus -- patient keeps spiking fever despite broad-spectrum antibiotic --  "concern for resistance organism (VRE)  -- consulting ID if fever persist.    Hyperlipidemia     Hypertension     KHOI on CPAP     Sleep apnea          Review of Systems   Constitutional:  Negative for chills and fever.   HENT:  Negative for ear pain and sore throat.    Respiratory:  Negative for cough.    Cardiovascular:  Negative for chest pain.   Gastrointestinal:  Negative for abdominal pain and blood in stool.   Genitourinary:  Negative for dysuria and hematuria.   Neurological:  Negative for seizures and syncope.     Objective:   /78 (BP Location: Left arm, Patient Position: Sitting, BP Method: Large (Manual))   Pulse 94   Temp 97.2 °F (36.2 °C) (Tympanic)   Ht 5' 9" (1.753 m)   Wt 120.2 kg (264 lb 15.9 oz)   SpO2 99%   BMI 39.13 kg/m²     Physical Exam  Constitutional:       General: He is not in acute distress.     Appearance: He is well-developed.   HENT:      Head: Normocephalic and atraumatic.   Eyes:      Extraocular Movements: Extraocular movements intact.   Neck:      Thyroid: No thyromegaly.   Cardiovascular:      Rate and Rhythm: Normal rate and regular rhythm.   Pulmonary:      Breath sounds: Normal breath sounds. No wheezing or rales.   Abdominal:      General: Bowel sounds are normal.      Palpations: Abdomen is soft.      Tenderness: There is no abdominal tenderness.   Musculoskeletal:         General: No swelling.      Cervical back: Neck supple. No rigidity.   Lymphadenopathy:      Cervical: No cervical adenopathy.   Skin:     General: Skin is warm and dry.   Neurological:      Mental Status: He is alert and oriented to person, place, and time.   Psychiatric:         Behavior: Behavior normal.         Lab Results   Component Value Date    WBC 6.59 06/16/2022    HGB 14.1 06/16/2022    HGB 13.7 (L) 07/19/2021    HGB 13.7 (L) 07/19/2021    HCT 44.1 06/16/2022    MCV 88 06/16/2022    MCV 89 07/19/2021    MCV 89 07/19/2021     06/16/2022    CHOL 160 08/07/2023    TRIG 127 08/07/2023 "    HDL 39 (L) 08/07/2023    LDLCALC 95.6 08/07/2023    LDLCALC 178.4 (H) 02/06/2023    LDLCALC 86.0 03/10/2022    ALT 69 (H) 08/07/2023    AST 57 (H) 08/07/2023     03/11/2024    K 4.2 03/11/2024    CALCIUM 10.6 (H) 03/11/2024     03/11/2024    CO2 23 03/11/2024    BUN 19 03/11/2024    CREATININE 1.4 03/11/2024    CREATININE 1.3 11/07/2023    CREATININE 1.4 02/06/2023    EGFRNORACEVR 53.7 (A) 03/11/2024    EGFRNORACEVR 58.7 (A) 11/07/2023    EGFRNORACEVR 54.1 (A) 02/06/2023    TSH 1.111 11/07/2023    TSH 1.432 03/10/2022    TSH 0.937 07/19/2021    PSA 38.7 (H) 03/11/2024    PSA 3.5 10/23/2020    PSA 10.1 (H) 07/25/2019    PSADIAG 12.3 (H) 01/29/2020     (H) 03/11/2024    HGBA1C 7.4 (H) 03/11/2024    HGBA1C 10.3 (H) 11/07/2023    HGBA1C 9.3 (H) 08/07/2023    BNP <10 07/26/2020          The 10-year ASCVD risk score (Yeimi LAWLER, et al., 2019) is: 32.2%    Values used to calculate the score:      Age: 73 years      Sex: Male      Is Non- : Yes      Diabetic: Yes      Tobacco smoker: No      Systolic Blood Pressure: 123 mmHg      Is BP treated: Yes      HDL Cholesterol: 39 mg/dL      Total Cholesterol: 160 mg/dL     Assessment:     1. Uncontrolled type 2 diabetes mellitus with hyperglycemia, with long-term current use of insulin    2. Hypertension associated with diabetes    3. Hyperlipidemia associated with type 2 diabetes mellitus    4. Type 2 diabetes mellitus with hypertriglyceridemia    5. Type 2 diabetes mellitus with stage 3a chronic kidney disease, with long-term current use of insulin    6. RICHA (generalized anxiety disorder)    7. Severe obesity (BMI 35.0-35.9 with comorbidity)    8. Encounter for screening for malignant neoplasm of prostate      Plan:   1. Uncontrolled type 2 diabetes mellitus with hyperglycemia, with long-term current use of insulin  Cont current meds. Check a1c  2. Hypertension associated with diabetes  Overview:  Controlled.  Continue current  medications    Orders:  -     amLODIPine (NORVASC) 5 MG tablet; Take 1 tablet (5 mg total) by mouth once daily.  Dispense: 90 tablet; Refill: 4  -     hydroCHLOROthiazide (HYDRODIURIL) 25 MG tablet; Take 1 tablet (25 mg total) by mouth once daily.  Dispense: 90 tablet; Refill: 2  -     losartan (COZAAR) 100 MG tablet; Take 1 tablet (100 mg total) by mouth once daily.  Dispense: 90 tablet; Refill: 4  -     Basic Metabolic Panel; Future; Expected date: 03/11/2024    3. Hyperlipidemia associated with type 2 diabetes mellitus  Stable. Cont statin  -     rosuvastatin (CRESTOR) 20 MG tablet; Take 1 tablet (20 mg total) by mouth once daily.  Dispense: 90 tablet; Refill: 3  -     Lipid Panel; Future; Expected date: 09/07/2024  -     Hemoglobin A1C; Future; Expected date: 03/11/2024  -     Comprehensive Metabolic Panel; Future; Expected date: 09/07/2024  -     CBC Auto Differential; Future; Expected date: 09/07/2024    4. Type 2 diabetes mellitus with hypertriglyceridemia    5. Type 2 diabetes mellitus with stage 3a chronic kidney disease, with long-term current use of insulin  Overview:  stable      6. RICHA (generalized anxiety disorder)  Stable.     7. Severe obesity (BMI 35.0-35.9 with comorbidity)  Assessment & Plan:  D and e.       8. Encounter for screening for malignant neoplasm of prostate  -     PSA, Screening; Future; Expected date: 03/11/2024    Other orders  -     pantoprazole (PROTONIX) 40 MG tablet; Take 1 tablet by mouth once daily  Dispense: 90 tablet; Refill: 3  -     metoprolol tartrate (LOPRESSOR) 50 MG tablet; TK 1 T PO BID  Dispense: 90 tablet; Refill: 3        Patient Instructions   Check with your pharmacy regarding rsv and covid vaccine.      Future Appointments   Date Time Provider Department Center   6/7/2024  9:00 AM Katy Fernandes DPM [facility] POD [facility]   6/20/2024  8:00 AM Ana Paula Merritt NP [facility] DIABETE [facility]   9/4/2024  7:35 AM LABORATORY, V [facility] LAB [facility]   9/11/2024 10:40 AM  Bryce Gonzales MD [facility]       Lab Frequency Next Occurrence   Hemoglobin A1C Once 02/24/2023   Ambulatory referral/consult to Orthopedics Once 05/16/2023   Ambulatory referral/consult to Optometry Once 08/14/2023   Ambulatory referral/consult to Diabetes Education Once 08/14/2023   Ambulatory referral/consult to Hematology / Oncology Once 08/14/2023   Ambulatory referral/consult to Physical Medicine Rehab Once 08/14/2023   CT Chest Without Contrast Once 08/07/2023   Ambulatory referral/consult to Orthopedics Once 08/14/2023   Ambulatory referral/consult to Diabetes Education Once 08/15/2023   Ambulatory referral/consult to Pharmacy Assistance Once 09/13/2023   Ambulatory referral/consult to Diabetes Education Once 11/10/2023       Follow up in about 6 months (around 9/11/2024), or if symptoms worsen or fail to improve.  Patient needs to complete CT scan of chest as previously ordered by me.

## 2024-03-14 ENCOUNTER — OFFICE VISIT (OUTPATIENT)
Dept: DIABETES | Facility: CLINIC | Age: 74
End: 2024-03-14
Payer: MEDICARE

## 2024-03-14 VITALS
BODY MASS INDEX: 38.63 KG/M2 | HEART RATE: 75 BPM | HEIGHT: 69 IN | DIASTOLIC BLOOD PRESSURE: 77 MMHG | WEIGHT: 260.81 LBS | SYSTOLIC BLOOD PRESSURE: 123 MMHG

## 2024-03-14 DIAGNOSIS — Z79.4 UNCONTROLLED TYPE 2 DIABETES MELLITUS WITH HYPERGLYCEMIA, WITH LONG-TERM CURRENT USE OF INSULIN: Primary | ICD-10-CM

## 2024-03-14 DIAGNOSIS — E11.65 UNCONTROLLED TYPE 2 DIABETES MELLITUS WITH HYPERGLYCEMIA, WITH LONG-TERM CURRENT USE OF INSULIN: Primary | ICD-10-CM

## 2024-03-14 DIAGNOSIS — G62.9 NEUROPATHY: ICD-10-CM

## 2024-03-14 DIAGNOSIS — E11.59 HYPERTENSION ASSOCIATED WITH DIABETES: ICD-10-CM

## 2024-03-14 DIAGNOSIS — E78.5 HYPERLIPIDEMIA ASSOCIATED WITH TYPE 2 DIABETES MELLITUS: ICD-10-CM

## 2024-03-14 DIAGNOSIS — I15.2 HYPERTENSION ASSOCIATED WITH DIABETES: ICD-10-CM

## 2024-03-14 DIAGNOSIS — E11.69 HYPERLIPIDEMIA ASSOCIATED WITH TYPE 2 DIABETES MELLITUS: ICD-10-CM

## 2024-03-14 PROCEDURE — 99999 PR PBB SHADOW E&M-EST. PATIENT-LVL V: CPT | Mod: PBBFAC,,, | Performed by: NURSE PRACTITIONER

## 2024-03-14 PROCEDURE — 99214 OFFICE O/P EST MOD 30 MIN: CPT | Mod: S$GLB,,, | Performed by: NURSE PRACTITIONER

## 2024-03-14 RX ORDER — SEMAGLUTIDE 1.34 MG/ML
1 INJECTION, SOLUTION SUBCUTANEOUS
Qty: 3 ML | Refills: 5 | Status: SHIPPED | OUTPATIENT
Start: 2024-03-14

## 2024-03-14 NOTE — PROGRESS NOTES
Subjective:         Patient ID: Anup Miller is a 73 y.o. male.  Patient's current PCP is Bryce Gonzales MD.     Chief Complaint: Diabetes Mellitus    HPI  Anup Miller is a 73 y.o. Black or  male presenting for a follow up for diabetes. Patient has been diagnosed with type 2 diabetes since 2002 .    CURRENT DM MEDICATIONS:   Diabetes Medications               HUMALOG KWIKPEN INSULIN 100 unit/mL pen Inject 14 Units into the skin 3 (three) times daily before meals.    LANTUS SOLOSTAR U-100 INSULIN glargine 100 units/mL SubQ pen Inject 45 Units into the skin every evening.    metFORMIN (GLUCOPHAGE-XR) 500 MG ER 24hr tablet Take 1 tablet (500 mg total) by mouth 2 (two) times daily with meals.    semaglutide (OZEMPIC) 0.25 mg or 0.5 mg (2 mg/3 mL) pen injector Inject 0.5 mg into the skin every 7 days.     Past failed treatment include:  Meds discontinued in the past due to cost only per patient report    Blood glucose testing Continuous per [facility]  Preferred lab: [facility]    Any episodes of hypoglycemia? Denies     Complications related to diabetes: peripheral neuropathy    His blood sugar in the clinic today was:   Lab Results   Component Value Date    POCGLU 184 (A) 10/17/2023     Anup Miller presents today for follow up visit to discuss diabetes management. No meter/logs today. He is tolerating Mounjaro well without GI side effects. Diabetes control has improved.    Per [facility] download, for the last 14 days: Average glucose of 157 mg/dL. He is 76% in range. 23% of readings are high, with 1% of readings > 250. 0% hypoglycemia. Glucose variability of 22% with an estimated GMI of 7.1%. Pattern of postprandial hyperglycemia continues, though improved. Based on review, increase of Ozempic warranted.            He is limited with exercise- still ambulating with cane for ambulation following prolonged Covid hospitalization in 2020.    Current diet: Bfast-grits,eggs,degroot.  Water. Occasional  sweet tea. Snacks-enjoys fruit, occasional crackers. Lunch-Often skipped. Supper-Salad with fried chicken.   Activity Level: Limited- ambulates with a cane    Lab Results   Component Value Date    HGBA1C 7.4 (H) 03/11/2024    HGBA1C 10.3 (H) 11/07/2023    HGBA1C 9.3 (H) 08/07/2023     STANDARDS OF CARE  Diabetes Management Status    Statin: Taking  ACE/ARB: Taking    Screening or Prevention Patient's value Goal Complete/Controlled?   HgA1C Testing and Control   Lab Results   Component Value Date    HGBA1C 7.4 (H) 03/11/2024      Annually/Less than 8% Yes   Lipid profile : 08/07/2023 Annually Yes   LDL control Lab Results   Component Value Date    LDLCALC 95.6 08/07/2023    Annually/Less than 100 mg/dl  Yes   Nephropathy screening Lab Results   Component Value Date    LABMICR 25.0 08/07/2023     Lab Results   Component Value Date    PROTEINUA Negative 10/23/2020     Lab Results   Component Value Date    UTPCR 0.07 09/27/2019      Annually Yes   Blood pressure BP Readings from Last 1 Encounters:   03/14/24 123/77    Less than 140/90 Yes   Dilated retinal exam : 12/26/2023 Annually Yes   Foot exam   : 05/09/2023 Annually Yes      Labs reviewed and are noted below.    Lab Results   Component Value Date    WBC 6.59 06/16/2022    HGB 14.1 06/16/2022    HCT 44.1 06/16/2022     06/16/2022    CHOL 160 08/07/2023    TRIG 127 08/07/2023    HDL 39 (L) 08/07/2023    LDLCALC 95.6 08/07/2023    ALT 69 (H) 08/07/2023    AST 57 (H) 08/07/2023     03/11/2024    K 4.2 03/11/2024     03/11/2024    ANIONGAP 13 03/11/2024    CREATININE 1.4 03/11/2024    ESTGFRAFRICA 54 (A) 06/16/2022    EGFRNONAA 46 (A) 06/16/2022    BUN 19 03/11/2024    CO2 23 03/11/2024    TSH 1.111 11/07/2023    PSA 38.7 (H) 03/11/2024    INR 1.0 07/26/2020     (H) 03/11/2024    UTPCR 0.07 09/27/2019     Lab Results   Component Value Date    GLUTAMICACID 0.00 04/03/2017    CPEPTIDE 4.8 04/03/2017    FREET4 0.93 07/26/2020    TSH 1.111  11/07/2023    IRON 95 06/16/2022    TIBC 512 (H) 06/16/2022    FERRITIN 182 06/16/2022    SXKGGCDD03 >2000 (H) 07/19/2021    CALCIUM 10.6 (H) 03/11/2024    PHOS 3.9 09/25/2020     Lab Results   Component Value Date    CPEPTIDE 4.8 04/03/2017     Lab Results   Component Value Date    GLUTAMICACID 0.00 04/03/2017     Glucose   Date Value Ref Range Status   03/11/2024 180 (H) 70 - 110 mg/dL Final     Anion Gap   Date Value Ref Range Status   03/11/2024 13 8 - 16 mmol/L Final     eGFR if    Date Value Ref Range Status   06/16/2022 54 (A) >60 mL/min/1.73 m^2 Final     eGFR if non    Date Value Ref Range Status   06/16/2022 46 (A) >60 mL/min/1.73 m^2 Final     Comment:     Calculation used to obtain the estimated glomerular filtration  rate (eGFR) is the CKD-EPI equation.        The following portions of the patient's history were reviewed and updated as appropriate: allergies, current medications, past family history, past medical history, past social history, past surgical history and problem list.    Review of patient's allergies indicates:  No Known Allergies  Social History     Socioeconomic History    Marital status:      Spouse name: yoselin    Number of children: 1   Occupational History    Occupation:    Tobacco Use    Smoking status: Never    Smokeless tobacco: Never   Substance and Sexual Activity    Alcohol use: No    Drug use: No    Sexual activity: Yes     Partners: Female     Comment:    Social History Narrative    No smokers or pets in household.     Social Determinants of Health     Financial Resource Strain: Low Risk  (9/6/2023)    Overall Financial Resource Strain (CARDIA)     Difficulty of Paying Living Expenses: Not very hard   Food Insecurity: Food Insecurity Present (9/6/2023)    Hunger Vital Sign     Worried About Running Out of Food in the Last Year: Sometimes true     Ran Out of Food in the Last Year: Sometimes true   Transportation Needs: No  Transportation Needs (9/6/2023)    PRAPARE - Transportation     Lack of Transportation (Medical): No     Lack of Transportation (Non-Medical): No   Physical Activity: Inactive (9/6/2023)    Exercise Vital Sign     Days of Exercise per Week: 0 days     Minutes of Exercise per Session: 0 min   Stress: Stress Concern Present (9/6/2023)    [employer] - Occupational Stress Questionnaire     Feeling of Stress : To some extent   Social Connections: Socially Integrated (9/6/2023)    Social Connection and Isolation Panel [NHANES]     Frequency of Communication with Friends and Family: More than three times a week     Frequency of Social Gatherings with Friends and Family: Once a week     Attends Tenriism Services: More than 4 times per year     Active Member of Clubs or Organizations: Yes     Attends Club or Organization Meetings: More than 4 times per year     Marital Status:    Housing Stability: Low Risk  (9/6/2023)    Housing Stability Vital Sign     Unable to Pay for Housing in the Last Year: No     Number of Places Lived in the Last Year: 1     Unstable Housing in the Last Year: No     Past Medical History:   Diagnosis Date    Acute respiratory failure due to COVID-19 08/2020    Colon polyp     COVID-19 virus infection 07/2020    COVID-19 virus infection 7/26/2020    Diabetes mellitus type II Diagnosed 2002    Elevated liver enzymes     Elevated PSA 12/13/2017    Did not f/u with urology as rec in 2015    Fatty liver disease, nonalcoholic     Fever 8/25/2020    -- patient has been spiking fever and since admission  -- started spiking more frequent low-grade fever with a max of 100.8 on 8/21 -- started on vanc and cefepime  -- urine culture from 08/23 grew Enterococcus -- patient keeps spiking fever despite broad-spectrum antibiotic -- concern for resistance organism (VRE)  -- consulting ID if fever persist.    Hyperlipidemia     Hypertension     KHOI on CPAP     Sleep apnea   "    REVIEW OF SYSTEMS:  Eyes No history of DR.  Cardiovascular: History of HTN and HLD.   GI:   History of fatty liver disease. Tolerating Ozempic well without GI side effects.   :  No CKD.  Neuro:  Positive neuropathy.  PSYCH: No tobacco use.  ENDO: See HPI.        Objective:      Vitals:    03/14/24 0821   BP: 123/77   Pulse: 75   RESPIRATORY: No respiratory distress  NEUROLOGIC: Cranial nerves II-XII grossly intact.   PSYCHIATRIC: Alert & oriented x3. Normal mood and affect.  FOOT EXAMINATION: UTD  Assessment:       1. Uncontrolled type 2 diabetes mellitus with hyperglycemia, with long-term current use of insulin    2. Neuropathy    3. Hyperlipidemia associated with type 2 diabetes mellitus    4. Hypertension associated with diabetes          Plan:   Anup was seen today for diabetes mellitus.    Diagnoses and all orders for this visit:    Uncontrolled type 2 diabetes mellitus with hyperglycemia, with long-term current use of insulin  -     semaglutide (OZEMPIC) 1 mg/dose (4 mg/3 mL); Inject 1 mg into the skin every 7 days.    Chronic -     -- Medications adjusted for today's visit include:    Continue Lantus 45 units once a day.     Continue Novolog 14 units before each main meal - try to use this insulin 10 minutes before you eat. Do not take any Novolog at bedtime.     Continue Metformin 500 mg twice a day, with a meal.    Increase Ozempic 1 mg weekly.     Neuropathy    Hyperlipidemia associated with type 2 diabetes mellitus    Hypertension associated with diabetes    - Follow up: 3 months    Portions of this note may have been created with voice recognition software. Occasional "wrong-word" or "sound-a-like" substitutions may have occurred due to the inherent limitations of voice recognition software. Please, read the note carefully and recognize, using context, where substitutions have occurred.            Ana Paula Merritt,FNP-C, BC-ADM  [facility]   "

## 2024-03-14 NOTE — PATIENT INSTRUCTIONS
-- Medications adjusted for today's visit include:    Continue Lantus 45 units once a day.     Continue Novolog 14 units before each main meal - try to use this insulin 10 minutes before you eat. Do not take any Novolog at bedtime.     Continue Metformin 500 mg twice a day, with a meal.    Increase Ozempic 1 mg weekly.     -- Limit carbs to no more than 45 grams with each meal. Never eat carbs by themselves, always add protein. Make snacks low carb or non-carb only.    -- Start checking blood sugar 3 times daily: Fasting blood sugar and vary your next 3 readings: Before lunch, before supper, 2 hours after any meal, or bedtime.     -Blood sugar goals should be a fasting blood sugar between , and no blood sugars throughout the day over 180 is good, less than 160 better.    --Carry glucose tablets/soft peppermints/regular juice or Coke product with you at all times to treat a low blood sugar episode (less than 70). If your blood sugar is between 50-70, Chew 4 tablets or drink 1/2 cup of juice or regular Coke product. If your blood sugar is below 50, double the treatment. Re-check blood sugar in 15 minutes. If still low, repeat this. Always call the clinic to give an update for any low blood sugar episodes.    --Exercise as tolerated.    --Follow-up for your next visit in 3 months.    --Please either drop off, fax, or MyChart your readings to me as needed.

## 2024-03-16 DIAGNOSIS — R97.20 ELEVATED PSA: Primary | ICD-10-CM

## 2024-03-19 ENCOUNTER — TELEPHONE (OUTPATIENT)
Dept: UROLOGY | Facility: CLINIC | Age: 74
End: 2024-03-19
Payer: MEDICARE

## 2024-03-19 NOTE — TELEPHONE ENCOUNTER
Appt scheduled today for pt to come in and discuss with Dr Jimenez       ----- Message from Laci Greene sent at 3/18/2024 12:28 PM CDT -----  Contact: Zhou  Type:  Patient Call          Who Called: patient wife - Zhou         Does the patient know what this is regarding?: Requesting a call back to discuss pt  appt that's scheduled for tomorrow ; please advise           Would the patient rather a call back or a response via [facility]?call           Best Call Back Number: [phone]             Additional Information:

## 2024-04-09 ENCOUNTER — LAB VISIT (OUTPATIENT)
Dept: LAB | Facility: HOSPITAL | Age: 74
End: 2024-04-09
Attending: UROLOGY
Payer: MEDICARE

## 2024-04-09 ENCOUNTER — OFFICE VISIT (OUTPATIENT)
Dept: UROLOGY | Facility: CLINIC | Age: 74
End: 2024-04-09
Payer: MEDICARE

## 2024-04-09 VITALS — BODY MASS INDEX: 38.63 KG/M2 | WEIGHT: 260.81 LBS | HEIGHT: 69 IN

## 2024-04-09 DIAGNOSIS — R97.20 ELEVATED PSA: ICD-10-CM

## 2024-04-09 DIAGNOSIS — R97.20 ELEVATED PSA: Primary | ICD-10-CM

## 2024-04-09 LAB
COMPLEXED PSA SERPL-MCNC: 39.3 NG/ML (ref 0–4)
CREAT SERPL-MCNC: 1.5 MG/DL (ref 0.5–1.4)
EST. GFR  (NO RACE VARIABLE): 49 ML/MIN/1.73 M^2

## 2024-04-09 PROCEDURE — 1159F MED LIST DOCD IN RCRD: CPT | Mod: CPTII,S$GLB,, | Performed by: UROLOGY

## 2024-04-09 PROCEDURE — 3288F FALL RISK ASSESSMENT DOCD: CPT | Mod: CPTII,S$GLB,, | Performed by: UROLOGY

## 2024-04-09 PROCEDURE — 3051F HG A1C>EQUAL 7.0%<8.0%: CPT | Mod: CPTII,S$GLB,, | Performed by: UROLOGY

## 2024-04-09 PROCEDURE — 3008F BODY MASS INDEX DOCD: CPT | Mod: CPTII,S$GLB,, | Performed by: UROLOGY

## 2024-04-09 PROCEDURE — 99204 OFFICE O/P NEW MOD 45 MIN: CPT | Mod: S$GLB,,, | Performed by: UROLOGY

## 2024-04-09 PROCEDURE — 1101F PT FALLS ASSESS-DOCD LE1/YR: CPT | Mod: CPTII,S$GLB,, | Performed by: UROLOGY

## 2024-04-09 PROCEDURE — 1160F RVW MEDS BY RX/DR IN RCRD: CPT | Mod: CPTII,S$GLB,, | Performed by: UROLOGY

## 2024-04-09 PROCEDURE — 82565 ASSAY OF CREATININE: CPT | Performed by: UROLOGY

## 2024-04-09 PROCEDURE — 84153 ASSAY OF PSA TOTAL: CPT | Performed by: UROLOGY

## 2024-04-09 PROCEDURE — 36415 COLL VENOUS BLD VENIPUNCTURE: CPT | Performed by: UROLOGY

## 2024-04-09 PROCEDURE — 99999 PR PBB SHADOW E&M-EST. PATIENT-LVL IV: CPT | Mod: PBBFAC,,, | Performed by: UROLOGY

## 2024-04-09 PROCEDURE — 3072F LOW RISK FOR RETINOPATHY: CPT | Mod: CPTII,S$GLB,, | Performed by: UROLOGY

## 2024-04-09 PROCEDURE — 4010F ACE/ARB THERAPY RXD/TAKEN: CPT | Mod: CPTII,S$GLB,, | Performed by: UROLOGY

## 2024-04-09 NOTE — PROGRESS NOTES
Chief Complaint:  Elevated PSA    HPI:   04/09/2024 - returns today after an absence with a PSA of 38.7, no voiding issues or new bone pain, got COVID during the pandemic and has had long COVID symptoms  IPSS - 0/2/0/0/0/0/2 = 4  QOL- 1(pleased)    7/18/18: PSA trend up over last years, no other new acute problems.  11/30/15: 66 yo man referred by Dr. Rincon for elevated PSA.  No abd/pelvic pain and no exac/rel factors.  No hematuria.  No urolithiasis.  No urinary bother.  No  history.  Normal sexual function except mild ED.  Works as a  loads/unloads too. One uncle had prostate cancer.       PMH:  Past Medical History:   Diagnosis Date    Acute respiratory failure due to COVID-19 08/2020    Colon polyp     COVID-19 virus infection 07/2020    COVID-19 virus infection 7/26/2020    Diabetes mellitus type II Diagnosed 2002    Elevated liver enzymes     Elevated PSA 12/13/2017    Did not f/u with urology as rec in 2015    Fatty liver disease, nonalcoholic     Fever 8/25/2020    -- patient has been spiking fever and since admission  -- started spiking more frequent low-grade fever with a max of 100.8 on 8/21 -- started on vanc and cefepime  -- urine culture from 08/23 grew Enterococcus -- patient keeps spiking fever despite broad-spectrum antibiotic -- concern for resistance organism (VRE)  -- consulting ID if fever persist.    Hyperlipidemia     Hypertension     KHOI on CPAP     Sleep apnea        PSH:  Past Surgical History:   Procedure Laterality Date    BACK SURGERY      CHOLECYSTECTOMY  2013    COLONOSCOPY N/A 7/19/2018    Procedure: COLONOSCOPY;  Surgeon: Chacorta Lopez III, MD;  Location: [facility];  Service: Endoscopy;  Laterality: N/A;    COLONOSCOPY N/A 3/15/2022    Procedure: COLONOSCOPY;  Surgeon: Jessie Diallo MD;  Location: [facility] ENDO;  Service: Endoscopy;  Laterality: N/A;    ESOPHAGOGASTRODUODENOSCOPY N/A 8/25/2020    Procedure: EGD (ESOPHAGOGASTRODUODENOSCOPY);  Surgeon: Anup MONZON  MD Peter;  Location: [facility] OR [address] [place] [address];  Service: General;  Laterality: N/A;    TRACHEOSTOMY  08/2020    Temporarily post COVID infection.       Family History:  Family History   Problem Relation Age of Onset    Asthma Mother     Diabetes Father     Cancer Sister         Breast    Diabetes Paternal Aunt     Cancer Maternal Grandmother         Breast    Diabetes Maternal Grandmother     Diabetes Paternal Uncle     Colon cancer Neg Hx        Social History:  Social History     Tobacco Use    Smoking status: Never    Smokeless tobacco: Never   Substance Use Topics    Alcohol use: No    Drug use: No        Review of Systems:  General: No fever, chills  Skin: No rashes  Chest:  Denies cough and sputum production  Heart: Denies chest pain  Resp: Denies dyspnea  Abdomen: Denies diarrhea, abdominal pain, hematemesis, or blood in stool.  Musculoskeletal: No joint stiffness or swelling. Denies back pain.  : see HPI  Neuro: no dizziness or weakness    Allergies:  Patient has no known allergies.    Medications:    Current Outpatient Medications:     amLODIPine (NORVASC) 5 MG tablet, Take 1 tablet (5 mg total) by mouth once daily., Disp: 90 tablet, Rfl: 4    aspirin 81 mg Cap, Take 81 mg by mouth once daily., Disp: , Rfl:     azelastine (ASTELIN) 137 mcg (0.1 %) nasal spray, 1 spray (137 mcg total) by Nasal route 2 (two) times daily., Disp: 30 mL, Rfl: 3    blood-glucose sensor (FREESTYLE DERRELL 3 SENSOR) Sandi, Use as directed, Disp: 2 each, Rfl: 11    CYANOCOBALAMIN, VITAMIN B-12, ORAL, Take by mouth once daily., Disp: , Rfl:     ergocalciferol, vitamin D2, (VITAMIN D ORAL), Take by mouth once daily., Disp: , Rfl:     fluticasone propionate (FLONASE) 50 mcg/actuation nasal spray, 1 spray (50 mcg total) by Each Nostril route once daily., Disp: 16 g, Rfl: 3    HUMALOG KWIKPEN INSULIN 100 unit/mL pen, Inject 14 Units into the skin 3 (three) times daily before meals., Disp: 15 mL, Rfl: 5    hydroCHLOROthiazide (HYDRODIURIL) 25  MG tablet, Take 1 tablet (25 mg total) by mouth once daily., Disp: 90 tablet, Rfl: 2    HYDROcodone-acetaminophen (NORCO)  mg per tablet, Take 1 tablet by mouth every 6 (six) hours as needed for Pain., Disp: 15 tablet, Rfl: 0    LANTUS SOLOSTAR U-100 INSULIN glargine 100 units/mL SubQ pen, Inject 45 Units into the skin every evening., Disp: 15 mL, Rfl: 5    losartan (COZAAR) 100 MG tablet, Take 1 tablet (100 mg total) by mouth once daily., Disp: 90 tablet, Rfl: 4    metFORMIN (GLUCOPHAGE-XR) 500 MG ER 24hr tablet, Take 1 tablet (500 mg total) by mouth 2 (two) times daily with meals., Disp: 180 tablet, Rfl: 3    metoprolol tartrate (LOPRESSOR) 50 MG tablet, TK 1 T PO BID, Disp: 90 tablet, Rfl: 3    pantoprazole (PROTONIX) 40 MG tablet, Take 1 tablet by mouth once daily, Disp: 90 tablet, Rfl: 3    pregabalin (LYRICA) 100 MG capsule, Take 1 capsule (100 mg total) by mouth 2 (two) times daily., Disp: 60 capsule, Rfl: 1    rosuvastatin (CRESTOR) 20 MG tablet, Take 1 tablet (20 mg total) by mouth once daily., Disp: 90 tablet, Rfl: 3    semaglutide (OZEMPIC) 1 mg/dose (4 mg/3 mL), Inject 1 mg into the skin every 7 days., Disp: 3 mL, Rfl: 5    TURMERIC ORAL, Take by mouth once daily., Disp: , Rfl:     zinc sulfate (ZINCATE) 220 (50) mg capsule, Take 220 mg by mouth once daily., Disp: , Rfl:     Physical Exam:  There were no vitals filed for this visit.  Body mass index is 38.51 kg/m².  General: awake, alert, cooperative  Head: NC/AT  Ears: external ears normal  Eyes: sclera normal  Lungs: normal inspiration, NAD  Heart: well-perfused  Abdomen: Soft, NT, ND  : Normal uncirc'd phallus, meatus normal in size and position, BL testicles palpable, no masses, nontender, no abnormalities of epididymi  ANGELICA: Normal rectal tone, no hemorrhoids.  Entirety of the prostate difficult to palpate secondary to patient's body habitus, apex smooth but slightly firm SV not palpable. Perineum and anus normal.  Lymphatic: groin nodes  negative  Skin: The skin is warm and dry  Ext: No c/c/e.  Neuro: grossly intact, AOx3    RADIOLOGY:  No recent relevant imaging available for review.    LABS:  I personally reviewed the following lab values:  Lab Results   Component Value Date    WBC 6.59 06/16/2022    HGB 14.1 06/16/2022    HCT 44.1 06/16/2022     06/16/2022     03/11/2024    K 4.2 03/11/2024     03/11/2024    CREATININE 1.4 03/11/2024    BUN 19 03/11/2024    CO2 23 03/11/2024    TSH 1.111 11/07/2023    PSA 38.7 (H) 03/11/2024    INR 1.0 07/26/2020    GLUF 123 (H) 02/10/2011    HGBA1C 7.4 (H) 03/11/2024    CHOL 160 08/07/2023    TRIG 127 08/07/2023    HDL 39 (L) 08/07/2023    ALT 69 (H) 08/07/2023    AST 57 (H) 08/07/2023     Assessment/Plan:   Anup Miller is a 74 y.o. male with:    Elevated PSA - repeat PSA today, obtain MRI, follow-up for prostate biopsy      Thank you for allowing me the opportunity to participate in this patient's care.     Fabián Jimenez MD  Urology

## 2024-04-26 ENCOUNTER — PATIENT MESSAGE (OUTPATIENT)
Dept: PODIATRY | Facility: CLINIC | Age: 74
End: 2024-04-26
Payer: MEDICARE

## 2024-04-30 DIAGNOSIS — G62.9 NEUROPATHY: ICD-10-CM

## 2024-04-30 DIAGNOSIS — R29.898 MUSCULAR DECONDITIONING: ICD-10-CM

## 2024-04-30 RX ORDER — PREGABALIN 100 MG/1
100 CAPSULE ORAL 2 TIMES DAILY
Qty: 60 CAPSULE | Refills: 6 | Status: SHIPPED | OUTPATIENT
Start: 2024-04-30

## 2024-05-31 DIAGNOSIS — R97.20 ELEVATED PSA: Primary | ICD-10-CM

## 2024-06-12 ENCOUNTER — TELEPHONE (OUTPATIENT)
Dept: UROLOGY | Facility: CLINIC | Age: 74
End: 2024-06-12
Payer: MEDICARE

## 2024-06-12 NOTE — TELEPHONE ENCOUNTER
Called patient confirmed name and . Informed patient that Dr. Jimenez wanted him to follow up. Due to him not having his MRI and Biopsy done. Patient stated that he will be going to a different urologist for a second opinion.             ----- Message from Fabián Jimenez MD sent at 2024  2:23 PM CDT -----  Patient really needs a prostate biopsy, looks like he canceled them, we need to call him and get him scheduled for follow-up

## 2024-06-20 ENCOUNTER — OFFICE VISIT (OUTPATIENT)
Dept: DIABETES | Facility: CLINIC | Age: 74
End: 2024-06-20
Payer: MEDICARE

## 2024-06-20 ENCOUNTER — LAB VISIT (OUTPATIENT)
Dept: LAB | Facility: HOSPITAL | Age: 74
End: 2024-06-20
Attending: NURSE PRACTITIONER
Payer: MEDICARE

## 2024-06-20 VITALS
WEIGHT: 274.94 LBS | HEART RATE: 82 BPM | HEIGHT: 69 IN | SYSTOLIC BLOOD PRESSURE: 127 MMHG | BODY MASS INDEX: 40.72 KG/M2 | DIASTOLIC BLOOD PRESSURE: 70 MMHG

## 2024-06-20 DIAGNOSIS — E66.01 MORBID OBESITY WITH BMI OF 40.0-44.9, ADULT: ICD-10-CM

## 2024-06-20 DIAGNOSIS — Z79.4 UNCONTROLLED TYPE 2 DIABETES MELLITUS WITH HYPERGLYCEMIA, WITH LONG-TERM CURRENT USE OF INSULIN: Primary | ICD-10-CM

## 2024-06-20 DIAGNOSIS — E11.59 HYPERTENSION ASSOCIATED WITH DIABETES: ICD-10-CM

## 2024-06-20 DIAGNOSIS — G62.9 NEUROPATHY: ICD-10-CM

## 2024-06-20 DIAGNOSIS — Z79.4 UNCONTROLLED TYPE 2 DIABETES MELLITUS WITH HYPERGLYCEMIA, WITH LONG-TERM CURRENT USE OF INSULIN: ICD-10-CM

## 2024-06-20 DIAGNOSIS — I15.2 HYPERTENSION ASSOCIATED WITH DIABETES: ICD-10-CM

## 2024-06-20 DIAGNOSIS — E11.69 HYPERLIPIDEMIA ASSOCIATED WITH TYPE 2 DIABETES MELLITUS: ICD-10-CM

## 2024-06-20 DIAGNOSIS — E11.36 DM CATARACT: ICD-10-CM

## 2024-06-20 DIAGNOSIS — G47.33 OSA (OBSTRUCTIVE SLEEP APNEA): ICD-10-CM

## 2024-06-20 DIAGNOSIS — E11.65 UNCONTROLLED TYPE 2 DIABETES MELLITUS WITH HYPERGLYCEMIA, WITH LONG-TERM CURRENT USE OF INSULIN: ICD-10-CM

## 2024-06-20 DIAGNOSIS — E78.5 HYPERLIPIDEMIA ASSOCIATED WITH TYPE 2 DIABETES MELLITUS: ICD-10-CM

## 2024-06-20 DIAGNOSIS — E11.65 UNCONTROLLED TYPE 2 DIABETES MELLITUS WITH HYPERGLYCEMIA, WITH LONG-TERM CURRENT USE OF INSULIN: Primary | ICD-10-CM

## 2024-06-20 DIAGNOSIS — F41.1 GAD (GENERALIZED ANXIETY DISORDER): ICD-10-CM

## 2024-06-20 LAB
ANION GAP SERPL CALC-SCNC: 12 MMOL/L (ref 8–16)
BUN SERPL-MCNC: 14 MG/DL (ref 8–23)
CALCIUM SERPL-MCNC: 9.9 MG/DL (ref 8.7–10.5)
CHLORIDE SERPL-SCNC: 103 MMOL/L (ref 95–110)
CO2 SERPL-SCNC: 20 MMOL/L (ref 23–29)
CREAT SERPL-MCNC: 1.3 MG/DL (ref 0.5–1.4)
EST. GFR  (NO RACE VARIABLE): 58.4 ML/MIN/1.73 M^2
ESTIMATED AVG GLUCOSE: 174 MG/DL (ref 68–131)
GLUCOSE SERPL-MCNC: 196 MG/DL (ref 70–110)
GLUCOSE SERPL-MCNC: 267 MG/DL (ref 70–110)
HBA1C MFR BLD: 7.7 % (ref 4–5.6)
POTASSIUM SERPL-SCNC: 4 MMOL/L (ref 3.5–5.1)
SODIUM SERPL-SCNC: 135 MMOL/L (ref 136–145)

## 2024-06-20 PROCEDURE — 82962 GLUCOSE BLOOD TEST: CPT | Mod: S$GLB,,, | Performed by: NURSE PRACTITIONER

## 2024-06-20 PROCEDURE — 36415 COLL VENOUS BLD VENIPUNCTURE: CPT | Performed by: NURSE PRACTITIONER

## 2024-06-20 PROCEDURE — 83036 HEMOGLOBIN GLYCOSYLATED A1C: CPT | Performed by: NURSE PRACTITIONER

## 2024-06-20 PROCEDURE — 80048 BASIC METABOLIC PNL TOTAL CA: CPT | Performed by: NURSE PRACTITIONER

## 2024-06-20 PROCEDURE — 99999 PR PBB SHADOW E&M-EST. PATIENT-LVL IV: CPT | Mod: PBBFAC,,, | Performed by: NURSE PRACTITIONER

## 2024-06-20 RX ORDER — INSULIN GLARGINE 100 [IU]/ML
55 INJECTION, SOLUTION SUBCUTANEOUS NIGHTLY
Qty: 30 ML | Refills: 5 | Status: SHIPPED | OUTPATIENT
Start: 2024-06-20

## 2024-06-20 RX ORDER — INSULIN LISPRO 100 [IU]/ML
14 INJECTION, SOLUTION INTRAVENOUS; SUBCUTANEOUS
Qty: 15 ML | Refills: 5 | Status: SHIPPED | OUTPATIENT
Start: 2024-06-20

## 2024-06-20 NOTE — PATIENT INSTRUCTIONS
-- Medications adjusted for today's visit include:    Continue Lantus 55 units once a day.     Continue Novolog 14 units before each main meal - try to use this insulin 10 minutes before you eat. Do not take any Novolog at bedtime.     Continue Metformin 500 mg twice a day, with a meal.    Re-start Ozempic if approved on patient assistance.     -- Limit carbs to no more than 45 grams with each meal. Never eat carbs by themselves, always add protein. Make snacks low carb or non-carb only.    -- Start checking blood sugar 3 times daily: Fasting blood sugar and vary your next 3 readings: Before lunch, before supper, 2 hours after any meal, or bedtime.     -Blood sugar goals should be a fasting blood sugar between , and no blood sugars throughout the day over 180 is good, less than 160 better.    --Carry glucose tablets/soft peppermints/regular juice or Coke product with you at all times to treat a low blood sugar episode (less than 70). If your blood sugar is between 50-70, Chew 4 tablets or drink 1/2 cup of juice or regular Coke product. If your blood sugar is below 50, double the treatment. Re-check blood sugar in 15 minutes. If still low, repeat this. Always call the clinic to give an update for any low blood sugar episodes.    --Exercise as tolerated.    --Follow-up for your next visit in 3 months.    --Please either drop off, fax, or MyChart your readings to me as needed.

## 2024-06-20 NOTE — PROGRESS NOTES
Subjective:         Patient ID: Anup Miller is a 74 y.o. male.  Patient's current PCP is Bryce Gonzales MD.     Chief Complaint: Diabetes Mellitus    HPI  Anup Miller is a 74 y.o. Black or  male presenting for a follow up for diabetes. Patient has been diagnosed with type 2 diabetes since 2002 .    CURRENT DM MEDICATIONS:   Diabetes Medications               HUMALOG KWIKPEN INSULIN 100 unit/mL pen Inject 14 Units into the skin 3 (three) times daily before meals.    LANTUS SOLOSTAR U-100 INSULIN glargine 100 units/mL SubQ pen Inject 45 Units into the skin every evening. 55 units     metFORMIN (GLUCOPHAGE-XR) 500 MG ER 24hr tablet Take 1 tablet (500 mg total) by mouth 2 (two) times daily with meals.    semaglutide (OZEMPIC) 1 mg/dose (4 mg/3 mL) Inject 1 mg into the skin every 7 days. Not taking due to cost- out x 3 weeks.      Past failed treatment include:  Meds discontinued in the past due to cost only per patient report    Blood glucose testing Continuous per [facility] 3  Preferred lab: [facility]    Any episodes of hypoglycemia? 0% per [facility]    Complications related to diabetes: peripheral neuropathy    His blood sugar in the clinic today was:   Lab Results   Component Value Date    POCGLU 267 (A) 06/20/2024     Anup Miller presents today for follow up visit to discuss diabetes management.  Stopped Ozempic about 3 weeks ago due to cost- will see if he qualifies for patient assistance. He previously tolerated well from a GI perspective.     Per [facility] download, for the last 14 days: Average glucose of 174 mg/dL, worse when compared to previous. He is 61% in range. 32% of readings are high, with 7% of readings > 250. 0% hypoglycemia. Estimated GMI 7.5%. Pattern of postprandial hyperglycemia. Overnight Bgs are stable. Based on review, re-start of Ozempic necessary - sending orders to establish with patient assistance, if approved, otherwise- will need increase of mealtime insulin. He  verbalized understanding.           He is limited with exercise- still ambulating with cane for ambulation following prolonged Covid hospitalization in 2020.    Current diet: Bfast-grits,eggs,degroot.  Water. Occasional sweet tea. Snacks-enjoys fruit, occasional crackers. Lunch-Often skipped. Supper-Salad with fried chicken.   Activity Level: Limited- ambulates with a cane    Lab Results   Component Value Date    HGBA1C 7.4 (H) 03/11/2024    HGBA1C 10.3 (H) 11/07/2023    HGBA1C 9.3 (H) 08/07/2023     STANDARDS OF CARE  Diabetes Management Status    Statin: Taking  ACE/ARB: Taking    Screening or Prevention Patient's value Goal Complete/Controlled?   HgA1C Testing and Control   Lab Results   Component Value Date    HGBA1C 7.4 (H) 03/11/2024      Annually/Less than 8% Yes   Lipid profile : 08/07/2023 Annually Yes   LDL control Lab Results   Component Value Date    LDLCALC 95.6 08/07/2023    Annually/Less than 100 mg/dl  Yes   Nephropathy screening Lab Results   Component Value Date    LABMICR 25.0 08/07/2023     Lab Results   Component Value Date    PROTEINUA Negative 10/23/2020     Lab Results   Component Value Date    UTPCR 0.07 09/27/2019      Annually Yes   Blood pressure BP Readings from Last 1 Encounters:   06/20/24 127/70    Less than 140/90 Yes   Dilated retinal exam : 12/26/2023 Annually Yes   Foot exam   : 05/09/2023 Annually No      Labs reviewed and are noted below.    Lab Results   Component Value Date    WBC 6.59 06/16/2022    HGB 14.1 06/16/2022    HCT 44.1 06/16/2022     06/16/2022    CHOL 160 08/07/2023    TRIG 127 08/07/2023    HDL 39 (L) 08/07/2023    LDLCALC 95.6 08/07/2023    ALT 69 (H) 08/07/2023    AST 57 (H) 08/07/2023     03/11/2024    K 4.2 03/11/2024     03/11/2024    ANIONGAP 13 03/11/2024    CREATININE 1.5 (H) 04/09/2024    ESTGFRAFRICA 54 (A) 06/16/2022    EGFRNONAA 46 (A) 06/16/2022    BUN 19 03/11/2024    CO2 23 03/11/2024    TSH 1.111 11/07/2023    PSA 38.7 (H)  03/11/2024    INR 1.0 07/26/2020     (H) 03/11/2024    UTPCR 0.07 09/27/2019     Lab Results   Component Value Date    GLUTAMICACID 0.00 04/03/2017    CPEPTIDE 4.8 04/03/2017    FREET4 0.93 07/26/2020    TSH 1.111 11/07/2023    IRON 95 06/16/2022    TIBC 512 (H) 06/16/2022    FERRITIN 182 06/16/2022    XLWCGSNE34 >2000 (H) 07/19/2021    CALCIUM 10.6 (H) 03/11/2024    PHOS 3.9 09/25/2020     Lab Results   Component Value Date    CPEPTIDE 4.8 04/03/2017     Lab Results   Component Value Date    GLUTAMICACID 0.00 04/03/2017     Glucose   Date Value Ref Range Status   03/11/2024 180 (H) 70 - 110 mg/dL Final     Anion Gap   Date Value Ref Range Status   03/11/2024 13 8 - 16 mmol/L Final     eGFR if    Date Value Ref Range Status   06/16/2022 54 (A) >60 mL/min/1.73 m^2 Final     eGFR if non    Date Value Ref Range Status   06/16/2022 46 (A) >60 mL/min/1.73 m^2 Final     Comment:     Calculation used to obtain the estimated glomerular filtration  rate (eGFR) is the CKD-EPI equation.        The following portions of the patient's history were reviewed and updated as appropriate: allergies, current medications, past family history, past medical history, past social history, past surgical history and problem list.    Review of patient's allergies indicates:  No Known Allergies  Social History     Socioeconomic History    Marital status:      Spouse name: yoselin    Number of children: 1   Occupational History    Occupation:    Tobacco Use    Smoking status: Never     Passive exposure: Never    Smokeless tobacco: Never   Substance and Sexual Activity    Alcohol use: No    Drug use: No    Sexual activity: Yes     Partners: Female     Comment:    Social History Narrative    No smokers or pets in household.     Social Determinants of Health     Financial Resource Strain: Low Risk  (9/6/2023)    Overall Financial Resource Strain (CARDIA)     Difficulty of Paying Living  Expenses: Not very hard   Food Insecurity: Food Insecurity Present (9/6/2023)    Hunger Vital Sign     Worried About Running Out of Food in the Last Year: Sometimes true     Ran Out of Food in the Last Year: Sometimes true   Transportation Needs: No Transportation Needs (9/6/2023)    PRAPARE - Transportation     Lack of Transportation (Medical): No     Lack of Transportation (Non-Medical): No   Physical Activity: Inactive (9/6/2023)    Exercise Vital Sign     Days of Exercise per Week: 0 days     Minutes of Exercise per Session: 0 min   Stress: Stress Concern Present (9/6/2023)    [employer] - Occupational Stress Questionnaire     Feeling of Stress : To some extent   Housing Stability: Low Risk  (9/6/2023)    Housing Stability Vital Sign     Unable to Pay for Housing in the Last Year: No     Number of Places Lived in the Last Year: 1     Unstable Housing in the Last Year: No     Past Medical History:   Diagnosis Date    Acute respiratory failure due to COVID-19 08/2020    Colon polyp     COVID-19 virus infection 07/2020    COVID-19 virus infection 7/26/2020    Diabetes mellitus type II Diagnosed 2002    Elevated liver enzymes     Elevated PSA 12/13/2017    Did not f/u with urology as rec in 2015    Fatty liver disease, nonalcoholic     Fever 8/25/2020    -- patient has been spiking fever and since admission  -- started spiking more frequent low-grade fever with a max of 100.8 on 8/21 -- started on vanc and cefepime  -- urine culture from 08/23 grew Enterococcus -- patient keeps spiking fever despite broad-spectrum antibiotic -- concern for resistance organism (VRE)  -- consulting ID if fever persist.    Hyperlipidemia     Hypertension     KHOI on CPAP     Sleep apnea      REVIEW OF SYSTEMS:  Eyes No history of DR.  Cardiovascular: History of HTN and HLD.   GI:   History of fatty liver disease. Previously tolerated Ozempic well without GI side effects.   :  No CKD.  Neuro:  Positive  "neuropathy.  PSYCH: No tobacco use.  ENDO: See HPI.        Objective:      Vitals:    06/20/24 0806   BP: 127/70   Pulse: 82   RESPIRATORY: No respiratory distress  NEUROLOGIC: Cranial nerves II-XII grossly intact.   PSYCHIATRIC: Alert & oriented x3. Normal mood and affect.  FOOT EXAMINATION: Deferred to next visit  Assessment:       1. Uncontrolled type 2 diabetes mellitus with hyperglycemia, with long-term current use of insulin    2. Neuropathy    3. Hyperlipidemia associated with type 2 diabetes mellitus    4. Hypertension associated with diabetes    5. Morbid obesity with BMI of 40.0-44.9, adult    6. DM cataract    7. RICHA (generalized anxiety disorder)    8. KHOI (obstructive sleep apnea)          Plan:   Anup was seen today for diabetes mellitus.    Diagnoses and all orders for this visit:    Uncontrolled type 2 diabetes mellitus with hyperglycemia, with long-term current use of insulin  -     POCT Glucose, Hand-Held Device  -     Basic Metabolic Panel; Future  -     Hemoglobin A1C; Future    Chronic -     -- Medications adjusted for today's visit include:    Continue Lantus 55 units once a day.     Continue Novolog 14 units before each main meal - try to use this insulin 10 minutes before you eat. Do not take any Novolog at bedtime.     Continue Metformin 500 mg twice a day, with a meal.    Re-start Ozempic if approved on patient assistance.     Continuous glucose monitoring report:    The patient's CGM was downloaded and was reviewed for the last 14 days. See HPI for interpretation & plan.    Neuropathy    Hyperlipidemia associated with type 2 diabetes mellitus    Hypertension associated with diabetes    Morbid obesity with BMI of 40.0-44.9, adult    DM cataract    RICHA (generalized anxiety disorder)    KHOI (obstructive sleep apnea)      - Follow up: 3 months    Portions of this note may have been created with voice recognition software. Occasional "wrong-word" or "sound-a-like" substitutions may have " occurred due to the inherent limitations of voice recognition software. Please, read the note carefully and recognize, using context, where substitutions have occurred.          CECILIA Barrera, BC-ADM [facility]

## 2024-06-20 NOTE — Clinical Note
Patient is in the gap and can no longer afford Ozempic. Can we see if he he qualifies for patient assistance?

## 2024-06-21 ENCOUNTER — TELEPHONE (OUTPATIENT)
Dept: PHARMACY | Facility: CLINIC | Age: 74
End: 2024-06-21
Payer: MEDICARE

## 2024-06-21 NOTE — TELEPHONE ENCOUNTER
We have reached out to Anup to inform him of the [employer] application process for Lantus Pens and Ozempic 1mg and whats required to apply.  He did not answer. Voicemail was left,   voicemail left .   Follow-up will be made in 5 business days.

## 2024-08-07 DIAGNOSIS — E11.9 TYPE 2 DIABETES MELLITUS WITHOUT COMPLICATION: ICD-10-CM

## 2024-09-03 ENCOUNTER — PATIENT OUTREACH (OUTPATIENT)
Dept: ADMINISTRATIVE | Facility: HOSPITAL | Age: 74
End: 2024-09-03
Payer: MEDICARE

## 2024-09-03 NOTE — PROGRESS NOTES
9.3.24 Lab Visit Report: patient cancelled labs stating received care elsewhere. Looks as if patient is seeing outside provider as PCP now.

## 2024-09-09 PROBLEM — E11.69 TYPE 2 DIABETES MELLITUS WITH MORBID OBESITY: Status: ACTIVE | Noted: 2024-09-09

## 2024-09-09 PROBLEM — I70.0 AORTIC ARCH ATHEROSCLEROSIS: Status: ACTIVE | Noted: 2024-09-09

## 2024-09-09 PROBLEM — E66.01 TYPE 2 DIABETES MELLITUS WITH MORBID OBESITY: Status: ACTIVE | Noted: 2024-09-09

## 2024-09-09 PROBLEM — R97.20 ELEVATED PSA: Status: ACTIVE | Noted: 2024-09-09

## 2024-10-04 DIAGNOSIS — G62.9 NEUROPATHY: ICD-10-CM

## 2024-10-04 DIAGNOSIS — R29.898 MUSCULAR DECONDITIONING: ICD-10-CM

## 2024-10-04 RX ORDER — PREGABALIN 100 MG/1
100 CAPSULE ORAL 2 TIMES DAILY
Qty: 60 CAPSULE | Refills: 0 | Status: SHIPPED | OUTPATIENT
Start: 2024-10-04

## 2024-10-22 ENCOUNTER — PATIENT OUTREACH (OUTPATIENT)
Dept: ADMINISTRATIVE | Facility: HOSPITAL | Age: 74
End: 2024-10-22
Payer: MEDICARE

## 2024-12-16 ENCOUNTER — PATIENT MESSAGE (OUTPATIENT)
Dept: DIABETES | Facility: CLINIC | Age: 74
End: 2024-12-16
Payer: MEDICARE

## 2024-12-17 ENCOUNTER — TELEPHONE (OUTPATIENT)
Dept: DIABETES | Facility: CLINIC | Age: 74
End: 2024-12-17
Payer: MEDICARE

## 2024-12-17 NOTE — TELEPHONE ENCOUNTER
Incoming fax for prior auth informing for Ozempic approval through  12/31/2025 under Medicare part D     Letter scanned to media

## (undated) DEVICE — KIT PEG PULL STANDARD 20F

## (undated) DEVICE — GAUZE SPONGE PEANUT STRL

## (undated) DEVICE — LUBRICANT SURGILUBE 2 OZ

## (undated) DEVICE — SEE MEDLINE ITEM 157117

## (undated) DEVICE — GAUZE SPONGE 4X4 12PLY

## (undated) DEVICE — SEE MEDLINE ITEM 152487

## (undated) DEVICE — SEE MEDLINE ITEM 146313

## (undated) DEVICE — SEE MEDLINE ITEM 146420

## (undated) DEVICE — CHLORAPREP 10.5 ML APPLICATOR

## (undated) DEVICE — SEE L#95700

## (undated) DEVICE — GOWN SURGICAL X-LARGE

## (undated) DEVICE — TRACH TUBE CUFF FLEX DISP 8.5